# Patient Record
Sex: FEMALE | Race: WHITE | NOT HISPANIC OR LATINO | Employment: OTHER | URBAN - METROPOLITAN AREA
[De-identification: names, ages, dates, MRNs, and addresses within clinical notes are randomized per-mention and may not be internally consistent; named-entity substitution may affect disease eponyms.]

---

## 2017-01-04 ENCOUNTER — OFFICE VISIT (OUTPATIENT)
Dept: OTOLARYNGOLOGY | Facility: CLINIC | Age: 72
End: 2017-01-04
Payer: MEDICARE

## 2017-01-04 ENCOUNTER — CLINICAL SUPPORT (OUTPATIENT)
Dept: AUDIOLOGY | Facility: CLINIC | Age: 72
End: 2017-01-04
Payer: MEDICARE

## 2017-01-04 VITALS
DIASTOLIC BLOOD PRESSURE: 70 MMHG | WEIGHT: 179 LBS | SYSTOLIC BLOOD PRESSURE: 114 MMHG | BODY MASS INDEX: 33.82 KG/M2 | TEMPERATURE: 99 F | HEART RATE: 80 BPM

## 2017-01-04 DIAGNOSIS — H93.11 RIGHT-SIDED TINNITUS: Primary | ICD-10-CM

## 2017-01-04 DIAGNOSIS — H93.11 SUBJECTIVE TINNITUS, RIGHT: Primary | ICD-10-CM

## 2017-01-04 DIAGNOSIS — H69.93 ETD (EUSTACHIAN TUBE DYSFUNCTION), BILATERAL: ICD-10-CM

## 2017-01-04 DIAGNOSIS — G24.5 BLEPHAROSPASM: ICD-10-CM

## 2017-01-04 PROCEDURE — 99999 PR PBB SHADOW E&M-EST. PATIENT-LVL III: CPT | Mod: PBBFAC,,, | Performed by: ORTHOPAEDIC SURGERY

## 2017-01-04 PROCEDURE — 92557 COMPREHENSIVE HEARING TEST: CPT | Mod: S$GLB,,, | Performed by: AUDIOLOGIST

## 2017-01-04 PROCEDURE — 99214 OFFICE O/P EST MOD 30 MIN: CPT | Mod: S$GLB,,, | Performed by: ORTHOPAEDIC SURGERY

## 2017-01-04 PROCEDURE — 92567 TYMPANOMETRY: CPT | Mod: S$GLB,,, | Performed by: AUDIOLOGIST

## 2017-01-04 RX ORDER — CETIRIZINE HYDROCHLORIDE 10 MG/1
10 TABLET ORAL DAILY
COMMUNITY
End: 2022-05-11 | Stop reason: SDUPTHER

## 2017-01-04 RX ORDER — METHYLPREDNISOLONE 4 MG/1
TABLET ORAL
Qty: 1 PACKAGE | Refills: 0 | Status: SHIPPED | OUTPATIENT
Start: 2017-01-04 | End: 2017-02-02 | Stop reason: ALTCHOICE

## 2017-01-04 NOTE — PROGRESS NOTES
Subjective:       Patient ID: Jessica Perez is a 71 y.o. female.    Chief Complaint: Follow-up (rev )    HPI Comments: Patient is a very pleasant 71 year old female here to see me today for evaluation of several issues.  First, she has had constant tinnitus in her right ear that is worse at night.  She has not noted any significant change in her hearing.  She has no ear pain or ear drainage.  She has a family history of hearing loss.  She does have intermittent issues with dizziness, and she has noted a lightheaded and woozy feeling when she stands up (low blood pressure).  She has not changed her medications recently, and does not take any ASA.  She has an upcoming flight to [place], and is concerned that she will have ear pain and pressure following the flight.    Review of Systems   Constitutional: Positive for fatigue. Negative for chills, fever and unexpected weight change.   HENT: Positive for tinnitus. Negative for congestion, dental problem, ear discharge, ear pain, facial swelling, hearing loss, nosebleeds, postnasal drip, rhinorrhea, sinus pressure, sneezing, sore throat, trouble swallowing and voice change.    Eyes: Negative for redness, itching and visual disturbance.   Respiratory: Positive for shortness of breath. Negative for cough (she feels that she is more winded with exertion), choking and wheezing.    Cardiovascular: Negative for chest pain and palpitations.   Gastrointestinal: Negative for abdominal pain.        No reflux.   Musculoskeletal: Negative for gait problem.   Skin: Negative for rash.   Allergic/Immunologic: Positive for environmental allergies (on Zyrtec and Flonase as needed, not currently taking).   Neurological: Positive for light-headedness. Negative for dizziness and headaches.       Objective:      Physical Exam   Constitutional: She is oriented to person, place, and time. She appears well-developed and well-nourished. No distress.   HENT:   Head: Normocephalic and  atraumatic.   Right Ear: Tympanic membrane, external ear and ear canal normal.   Left Ear: Tympanic membrane, external ear and ear canal normal.   Nose: Mucosal edema and rhinorrhea present. No nasal deformity or septal deviation. No epistaxis. Right sinus exhibits no maxillary sinus tenderness and no frontal sinus tenderness. Left sinus exhibits no maxillary sinus tenderness and no frontal sinus tenderness.   Mouth/Throat: Uvula is midline, oropharynx is clear and moist and mucous membranes are normal. Mucous membranes are not pale and not dry. No dental caries. No oropharyngeal exudate or posterior oropharyngeal erythema.   Eyes: Conjunctivae, EOM and lids are normal. Pupils are equal, round, and reactive to light. Right eye exhibits no chemosis. Left eye exhibits no chemosis. Right conjunctiva is not injected. Left conjunctiva is not injected. No scleral icterus. Right eye exhibits normal extraocular motion and no nystagmus. Left eye exhibits normal extraocular motion and no nystagmus.   Neck: Trachea normal and phonation normal. No tracheal tenderness present. No tracheal deviation present. No thyroid mass and no thyromegaly present.   Cardiovascular: Intact distal pulses.    Pulmonary/Chest: Effort normal. No stridor. No respiratory distress.   Abdominal: She exhibits no distension.   Lymphadenopathy:        Head (right side): No submental, no submandibular, no preauricular, no posterior auricular and no occipital adenopathy present.        Head (left side): No submental, no submandibular, no preauricular, no posterior auricular and no occipital adenopathy present.     She has no cervical adenopathy.   Neurological: She is alert and oriented to person, place, and time. No cranial nerve deficit.   Skin: Skin is warm and dry. No rash noted. No erythema.   Psychiatric: She has a normal mood and affect. Her behavior is normal.       AUDIOGRAM:  Results reveal normal hearing sensitivity 250-8000 Hz bilaterally.  Speech Reception Thresholds were 10 dBHL for the right ear and 10 dBHL for the left ear. Word recognition scores were excellent bilaterally. Tympanograms were Type A, normal for the right ear and Type A, normal for the left ear.         Assessment:       1. Right-sided tinnitus    2. ETD (eustachian tube dysfunction), bilateral    3. Blepharospasm        Plan:       1.  Right sided tinnitus:  We reviewed her audiogram together in detail.  Overall, she does not have significant hearing loss as is often found with tinnitus Some patients find that restricting the salt or caffeine in their diet helps, she has been drinking more caffeine over the last year so that certainly could be a potential cause.  There is also an OTC supplement, lipflavinoids, that some people find to be effective though their benefit is not fully proven.  Tinnitus tends to be louder in times of stress and fatigue, and may decrease with time.  Sound machines may also be an effective masking technique if needed at night.  2.  ETD:  I would recommend she start on Flonase now prior to her flight.  We reviewed proper technique to help decrease nasal irritation.  She can also use Afrin 30 min prior to take off and landing.  Earplanes are also available from drug stores as an ear plug to help with pressure.  I did send in a medrol dose pack for her to take in [place] only if absolutely necessary if she cannot pop her ears following her flight.  3.  Blepharospasm:  Reassured patient it is a benign finding, and may also be exacerbated by caffeine and fatigue as above.

## 2017-01-04 NOTE — MR AVS SNAPSHOT
[facility] - ENT  [address] [facility] [address] [address]  Phone: [phone]  Fax: [phone]                  Jessica Perez   2017 2:00 PM   Office Visit    Description:  Female : 1945   Provider:  Rolanda Coyle MD   Department:  [facility] - ENT           Reason for Visit     Follow-up           Diagnoses this Visit        Comments    Right-sided tinnitus    -  Primary            To Do List           Future Appointments        Provider Department Dept Phone    2017 10:40 AM [phone]    3/15/2017 1:00 PM Ramiro Palomino MD [facility] Ophthalmology [phone]      Goals (5 Years of Data)     None       These Medications        Disp Refills Start End    methylPREDNISolone (MEDROL DOSEPACK) 4 mg tablet 1 Package 0 2017     use as directed    Pharmacy: [id number] #: [phone]         [facility] On Call     [facility] On [facility] Nurse Care Line -  Assistance  Registered nurses in the [facility] On Call Center provide clinical advisement, health education, appointment booking, and other advisory services.  Call for this free service at [phone].             Medications           Message regarding Medications     Verify the changes and/or additions to your medication regime listed below are the same as discussed with your clinician today.  If any of these changes or additions are incorrect, please notify your healthcare provider.        START taking these NEW medications        Refills    methylPREDNISolone (MEDROL DOSEPACK) 4 mg tablet 0    Sig: use as directed    Class: Normal      STOP taking these medications     baclofen (LIORESAL) 10 MG tablet Take 10 mg by mouth as needed.     FLAXSEED OIL ORAL     cetirizine (ZYRTEC) 5 MG chewable tablet Take 5 mg by mouth once daily.    olopatadine (PATANOL) 0.1 % ophthalmic solution Place 1 drop into both eyes 2 (two) times daily.     olopatadine (PAZEO) 0.7 % Drop Apply 1 drop to eye once daily.    olopatadine 0.7 % Drop Apply 1 drop to eye once daily.           Verify that the below list of medications is an accurate representation of the medications you are currently taking.  If none reported, the list may be blank. If incorrect, please contact your healthcare provider. Carry this list with you in case of emergency.           Current Medications     acetaminophen (TYLENOL) 500 MG tablet Take 500 mg by mouth every 6 (six) hours as needed for Pain.    artificial tear, hypromellose, (GENTEAL) 0.3 % Gel     cetirizine (ZYRTEC) 10 MG tablet Take 10 mg by mouth once daily.    fluticasone (FLONASE) 50 mcg/actuation nasal spray 2 sprays by Each Nare route once daily.    krill oil-omega-3-dha-epa 150-450 mg CpDR Take by mouth.    methylPREDNISolone (MEDROL DOSEPACK) 4 mg tablet use as directed    multivitamin capsule Take 1 capsule by mouth Daily.    naproxen sodium (ALEVE) 220 mg Cap Take 1 capsule by mouth 2 (two) times daily.    peg 400-propylene glycol (SYSTANE ULTRA) 0.4-0.3 % Drop Place 1 drop into both eyes As instructed. As directed Over the counter products     ranitidine (ZANTAC) 150 MG tablet Take 1 tablet (150 mg total) by mouth 2 (two) times daily.           Clinical Reference Information           Vital Signs - Last Recorded  Most recent update: 1/4/2017  2:34 PM by Saima Ordonez LPN    BP Pulse Temp Wt BMI    114/70 (BP Location: Left arm, Patient Position: Sitting, BP Method: Automatic) 80 98.7 °F (37.1 °C) (Tympanic) 81.2 kg (179 lb 0.2 oz) 33.82 kg/m2      Blood Pressure          Most Recent Value    BP  114/70      Allergies as of 1/4/2017     Augmentin [Amoxicillin-pot Clavulanate]    Bactrim  [Sulfamethoxazole-trimethoprim]    Lipitor  [Atorvastatin]    Pravachol  [Pravastatin]    Statins-hmg-coa Reductase Inhibitors    Sulfa (Sulfonamide Antibiotics)      Immunizations Administered on Date of Encounter - 1/4/2017     None     "  Instructions    Tinnitus:  Try and decrease caffeine  Over the counter lipoflavinoid (supplement)  Annual hearing tests    Ear pain with flying:  Start Flonase daily  Afrin 30 min before take off and landing  Consider "Earplanes" ear plugs         "

## 2017-01-04 NOTE — PROGRESS NOTES
"Jessica Perez was seen 01/04/2017 for an audiological evaluation.  Patient complains of constant tinnitus Ad for years.  She reports that the "crickets" have gotten louder at night over the past year.      Results reveal normal hearing sensitivity 250-8000 Hz bilaterally.  Speech Reception Thresholds were  10 dBHL for the right ear and 10 dBHL for the left ear.   Word recognition scores were excellent bilaterally.  Tympanograms were Type A, normal for the right ear and Type A, normal for the left ear.    Patient was counseled on the above findings.    Recommendations include:    1.  ENT followup  2.  Recheck per ENT  3.  Wear hearing protective devices around loud noise  4.  Annual audiograms          "

## 2017-01-04 NOTE — PATIENT INSTRUCTIONS
"Tinnitus:  Try and decrease caffeine  Over the counter lipoflavinoid (supplement)  Annual hearing tests    Ear pain with flying:  Start Flonase daily  Afrin 30 min before take off and landing  Consider "Earplanes" ear plugs    "

## 2017-01-23 DIAGNOSIS — Z13.820 SPECIAL SCREENING FOR OSTEOPOROSIS: Primary | ICD-10-CM

## 2017-02-02 ENCOUNTER — OFFICE VISIT (OUTPATIENT)
Dept: INTERNAL MEDICINE | Facility: CLINIC | Age: 72
End: 2017-02-02
Payer: MEDICARE

## 2017-02-02 VITALS
BODY MASS INDEX: 33.59 KG/M2 | SYSTOLIC BLOOD PRESSURE: 110 MMHG | WEIGHT: 177.94 LBS | HEART RATE: 70 BPM | TEMPERATURE: 98 F | HEIGHT: 61 IN | DIASTOLIC BLOOD PRESSURE: 70 MMHG

## 2017-02-02 DIAGNOSIS — G47.62 SLEEP RELATED LEG CRAMPS: ICD-10-CM

## 2017-02-02 PROCEDURE — 99213 OFFICE O/P EST LOW 20 MIN: CPT | Mod: S$GLB,,, | Performed by: INTERNAL MEDICINE

## 2017-02-02 PROCEDURE — 99999 PR PBB SHADOW E&M-EST. PATIENT-LVL III: CPT | Mod: PBBFAC,,, | Performed by: INTERNAL MEDICINE

## 2017-02-02 NOTE — PROGRESS NOTES
"Subjective:       Patient ID: Jessica Perez is a 72 y.o. female.    Chief Complaint: Follow-up    HPI  patient is a 72-year-old female presenting today following up on her leg cramps.  She has been trying the vitamin B and vitamin E as we had discussed at last visit.  She also has been using a topical preparation that her daughter had recommended.  At this time she is no longer having any problems with the leg cramps.  She has not had any adverse effects of the vitamins and in general is feeling like she's doing pretty well.  We discussed the response to her treatment and recommended continuing doing what she is doing as it seems to be working.    Review of Systems    Objective:     Visit Vitals    /70    Pulse 70    Temp 98.3 °F (36.8 °C) (Tympanic)    Ht 5' 1" (1.549 m)    Wt 80.7 kg (177 lb 14.6 oz)    BMI 33.62 kg/m2        Physical Exam   Constitutional: She appears well-developed and well-nourished.   HENT:   Head: Normocephalic and atraumatic.   Cardiovascular: Normal rate, regular rhythm and intact distal pulses.  Exam reveals no gallop and no friction rub.    No murmur heard.  Pulmonary/Chest: Breath sounds normal. She has no wheezes. She has no rales. She exhibits no tenderness.   Abdominal: Soft. Bowel sounds are normal. She exhibits no distension. There is no tenderness.   Lymphadenopathy:     She has no cervical adenopathy.   Skin: No rash noted.   Vitals reviewed.      No visits with results within 2 Week(s) from this visit.  Latest known visit with results is:    Lab Visit on 11/21/2016   Component Date Value    Hepatitis C Ab 11/21/2016 Negative     Sodium 11/21/2016 140     Potassium 11/21/2016 4.4     Chloride 11/21/2016 106     CO2 11/21/2016 21*    Glucose 11/21/2016 89     BUN, Bld 11/21/2016 13     Creatinine 11/21/2016 0.9     Calcium 11/21/2016 9.7     Total Protein 11/21/2016 7.7     Albumin 11/21/2016 4.1     Total Bilirubin 11/21/2016 0.3     Alkaline " Phosphatase 11/21/2016 121     AST 11/21/2016 22     ALT 11/21/2016 18     Anion Gap 11/21/2016 13     eGFR if African American 11/21/2016 >60.0     eGFR if non African Amer* 11/21/2016 >60.0     Cholesterol 11/21/2016 289*    Triglycerides 11/21/2016 263*    HDL 11/21/2016 53     LDL Cholesterol 11/21/2016 183.4*    HDL/Chol Ratio 11/21/2016 18.3*    Total Cholesterol/HDL Ra* 11/21/2016 5.5*    Non-HDL Cholesterol 11/21/2016 236     WBC 11/21/2016 8.39     RBC 11/21/2016 5.15     Hemoglobin 11/21/2016 15.8     Hematocrit 11/21/2016 46.7     MCV 11/21/2016 91     MCH 11/21/2016 30.7     MCHC 11/21/2016 33.8     RDW 11/21/2016 12.6     Platelets 11/21/2016 243     MPV 11/21/2016 11.4     Gran # 11/21/2016 5.6     Lymph # 11/21/2016 2.3     Mono # 11/21/2016 0.3     Eos # 11/21/2016 0.1     Baso # 11/21/2016 0.03     Gran% 11/21/2016 67.2     Lymph% 11/21/2016 27.7     Mono% 11/21/2016 3.3*    Eosinophil% 11/21/2016 1.2     Basophil% 11/21/2016 0.4     Differential Method 11/21/2016 Automated     Sodium 11/21/2016 140     Potassium 11/21/2016 4.4     Chloride 11/21/2016 106     CO2 11/21/2016 21*    Glucose 11/21/2016 89     BUN, Bld 11/21/2016 13     Creatinine 11/21/2016 0.9     Calcium 11/21/2016 9.7     Total Protein 11/21/2016 7.7     Albumin 11/21/2016 4.1     Total Bilirubin 11/21/2016 0.3     Alkaline Phosphatase 11/21/2016 121     AST 11/21/2016 22     ALT 11/21/2016 18     Anion Gap 11/21/2016 13     eGFR if African American 11/21/2016 >60.0     eGFR if non African Amer* 11/21/2016 >60.0     Cholesterol 11/21/2016 289*    Triglycerides 11/21/2016 263*    HDL 11/21/2016 53     LDL Cholesterol 11/21/2016 183.4*    HDL/Chol Ratio 11/21/2016 18.3*    Total Cholesterol/HDL Ra* 11/21/2016 5.5*    Non-HDL Cholesterol 11/21/2016 236     Hepatitis C Ab 11/21/2016 Negative     TSH 11/21/2016 3.632        Assessment:       1. Sleep related leg cramps         Plan:   Sleep related leg cramps  Resolution of leg cramps. Continue current regimen.     Jessica was seen today for follow-up.    Diagnoses and all orders for this visit:    Sleep related leg cramps        Return in about 3 months (around 5/2/2017).

## 2017-02-02 NOTE — MR AVS SNAPSHOT
[facility]  [address] [place] [address]  Phone: [phone]  Fax: [phone]                  Jessica Perez   2017 10:40 AM   Office Visit    Description:  Female : 1945   Provider:  Warren Lemus MD   Department:  [place]-Internal Medicine           Reason for Visit     Follow-up           Diagnoses this Visit        Comments    Sleep related leg cramps                To Do List           Future Appointments        Provider Department Dept Phone    3/15/2017 1:00 PM Ramiro Palomino MD [facility] Ophthalmology [phone]      Goals (5 Years of Data)     None      Follow-Up and Disposition     Return in about 3 months (around 2017).      [facility] On Call     [facility] On Call Nurse Care Line -  Assistance  Registered nurses in the [facility] On Call Center provide clinical advisement, health education, appointment booking, and other advisory services.  Call for this free service at [phone].             Medications           Message regarding Medications     Verify the changes and/or additions to your medication regime listed below are the same as discussed with your clinician today.  If any of these changes or additions are incorrect, please notify your healthcare provider.        STOP taking these medications     methylPREDNISolone (MEDROL DOSEPACK) 4 mg tablet use as directed           Verify that the below list of medications is an accurate representation of the medications you are currently taking.  If none reported, the list may be blank. If incorrect, please contact your healthcare provider. Carry this list with you in case of emergency.           Current Medications     acetaminophen (TYLENOL) 500 MG tablet Take 500 mg by mouth every 6 (six) hours as needed for Pain.    artificial tear, hypromellose, (GENTEAL) 0.3 % Gel     cetirizine (ZYRTEC) 10 MG tablet Take 10 mg by mouth once daily.    fluticasone (FLONASE) 50 mcg/actuation  "nasal spray 2 sprays by Each Nare route once daily.    krill oil-omega-3-dha-epa 150-450 mg CpDR Take by mouth.    multivitamin capsule Take 1 capsule by mouth Daily.    naproxen sodium (ALEVE) 220 mg Cap Take 1 capsule by mouth 2 (two) times daily.    peg 400-propylene glycol (SYSTANE ULTRA) 0.4-0.3 % Drop Place 1 drop into both eyes As instructed. As directed Over the counter products     ranitidine (ZANTAC) 150 MG tablet Take 1 tablet (150 mg total) by mouth 2 (two) times daily.           Clinical Reference Information           Your Vitals Were     BP Pulse Temp Height Weight BMI    110/70 70 98.3 °F (36.8 °C) (Tympanic) 5' 1" (1.549 m) 80.7 kg (177 lb 14.6 oz) 33.62 kg/m2      Blood Pressure          Most Recent Value    BP  110/70      Allergies as of 2/2/2017     Augmentin [Amoxicillin-pot Clavulanate]    Bactrim  [Sulfamethoxazole-trimethoprim]    Lipitor  [Atorvastatin]    Pravachol  [Pravastatin]    Statins-hmg-coa Reductase Inhibitors    Sulfa (Sulfonamide Antibiotics)      Immunizations Administered on Date of Encounter - 2/2/2017     None      Language Assistance Services     ATTENTION: Language assistance services are available, free of charge. Please call [phone].      ATENCIÓN: Si hnala rich, tiene a florentino disposición servicios gratuitos de asistencia lingüística. Llame al [phone].     SELVIN Ý: N?u b?n nói Ti?ng Vi?t, có các d?ch v? h? tr? ngôn ng? mi?n phí dành cho b?n. G?i s? [phone].         [facility] complies with applicable Federal civil rights laws and does not discriminate on the basis of race, color, national origin, age, disability, or sex.        "

## 2017-03-15 ENCOUNTER — OFFICE VISIT (OUTPATIENT)
Dept: OPHTHALMOLOGY | Facility: CLINIC | Age: 72
End: 2017-03-15
Payer: MEDICARE

## 2017-03-15 DIAGNOSIS — H04.123 DRY EYE SYNDROME, BILATERAL: ICD-10-CM

## 2017-03-15 DIAGNOSIS — H40.013 OPEN ANGLE WITH BORDERLINE FINDINGS, LOW RISK, BILATERAL: Primary | ICD-10-CM

## 2017-03-15 DIAGNOSIS — H52.7 REFRACTION DISORDER: ICD-10-CM

## 2017-03-15 PROCEDURE — 92250 FUNDUS PHOTOGRAPHY W/I&R: CPT | Mod: S$GLB,,, | Performed by: OPHTHALMOLOGY

## 2017-03-15 PROCEDURE — 99999 PR PBB SHADOW E&M-EST. PATIENT-LVL I: CPT | Mod: PBBFAC,,, | Performed by: OPHTHALMOLOGY

## 2017-03-15 PROCEDURE — 92015 DETERMINE REFRACTIVE STATE: CPT | Mod: S$GLB,,, | Performed by: OPHTHALMOLOGY

## 2017-03-15 PROCEDURE — 92014 COMPRE OPH EXAM EST PT 1/>: CPT | Mod: S$GLB,,, | Performed by: OPHTHALMOLOGY

## 2017-03-15 RX ORDER — VITAMIN B COMPLEX
1 CAPSULE ORAL DAILY
COMMUNITY

## 2017-03-15 NOTE — PROGRESS NOTES
HPI     Here for 4 month IOP check and FD with SDPs.  Pt c/o decreased near   vision, problems reading newspaper.      1. COAG SUSP  SLT OD 3/26/15  SLT OS 5/15  2. Dry Eyes  Systane Ultra OU BID, Gen OU QHS  3. NSC OU           Last edited by Ramiro Palomino MD on 3/15/2017  1:41 PM.         Assessment /Plan     For exam results, see Encounter Report.      ICD-10-CM ICD-9-CM    1. Open angle with borderline findings, low risk, bilateral H40.013 365.01 Doing well post slt OU   2. 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 Dry eye syndrome, bilateral H04.123 375.15 Findings and symptoms consistent with mild dry eyes.   Recommend regular use of Artificial Tears. These should be thought of as Ocular Surface Moisturizers similar to skin moisturizers in that regular use is required to achieve maximum benefit.  Specifically, I recommend the following:    Systane Ultra 3-4 times a day.   The first dose upon awakening is most important because we do not make tears at night.  Avoid generic products as they contain Benzalkonium Chloride as a preservative. This is a very irritating chemical and can make your eyes worse.    Omega 3 Fish Oils  1000 to 2000 mgs per day of Nordic Naturals or PRN Dry Eye formula [facility]           Return to clinic 4 months with Hvf and gOCT  Disp MR

## 2017-07-21 ENCOUNTER — OFFICE VISIT (OUTPATIENT)
Dept: OPHTHALMOLOGY | Facility: CLINIC | Age: 72
End: 2017-07-21
Payer: MEDICARE

## 2017-07-21 DIAGNOSIS — H40.013 OPEN ANGLE WITH BORDERLINE FINDINGS, LOW RISK, BILATERAL: Primary | ICD-10-CM

## 2017-07-21 DIAGNOSIS — H04.123 DRY EYE SYNDROME, BILATERAL: ICD-10-CM

## 2017-07-21 PROCEDURE — 92012 INTRM OPH EXAM EST PATIENT: CPT | Mod: S$GLB,,, | Performed by: OPHTHALMOLOGY

## 2017-07-21 PROCEDURE — 99999 PR PBB SHADOW E&M-EST. PATIENT-LVL I: CPT | Mod: PBBFAC,,, | Performed by: OPHTHALMOLOGY

## 2017-07-21 PROCEDURE — 92133 CPTRZD OPH DX IMG PST SGM ON: CPT | Mod: S$GLB,,, | Performed by: OPHTHALMOLOGY

## 2017-07-21 PROCEDURE — 92083 EXTENDED VISUAL FIELD XM: CPT | Mod: S$GLB,,, | Performed by: OPHTHALMOLOGY

## 2017-07-21 RX ORDER — OMEPRAZOLE 10 MG/1
10 CAPSULE, DELAYED RELEASE ORAL DAILY
COMMUNITY
End: 2017-08-09

## 2017-07-21 NOTE — PROGRESS NOTES
HPI     Glaucoma Suspect    Additional comments: no drops           Comments   Patient is here for a 4 month iop check and hvf review and goct review.          PCP: Dr. Lemus  1. COAG SUSP  SLT OD 3/26/15  SLT OS 5/15  2. Dry Eyes  OU Systane Ultra BID, Genteal GEL QHS  3. NSC OU    PCP: Dr. Lemus  1. COAG SUSP  SLT OD 3/26/15  SLT OS 5/15  2. Dry Eyes   OU Systane Ultra  BID, Genteal GEL QHS  3. NSC OU       Last edited by FLAVIA Carpenter on 7/21/2017  2:16 PM. (History)            Assessment /Plan     For exam results, see Encounter Report.      ICD-10-CM ICD-9-CM    1. Open angle with borderline findings, low risk, bilateral H40.013 365.01 Posterior Segment OCT Optic Nerve- Both eyes      Kumar Visual Field - OU - Extended - Both Eyes    IOP stable   2. Dry eye syndrome, bilateral H04.123 375.15        Return to clinic 6 months with dilation and SDP's.

## 2017-07-27 ENCOUNTER — TELEPHONE (OUTPATIENT)
Dept: INTERNAL MEDICINE | Facility: CLINIC | Age: 72
End: 2017-07-27

## 2017-07-27 NOTE — TELEPHONE ENCOUNTER
Patient was scheduled earliest available with one of the doctors here.  Patient refused to see urgent care or [place] or another location.  Patient was scheduled.  Patient verbalized understanding of her appointment time an date with Dr. Zamora.

## 2017-08-09 ENCOUNTER — OFFICE VISIT (OUTPATIENT)
Dept: INTERNAL MEDICINE | Facility: CLINIC | Age: 72
End: 2017-08-09
Payer: MEDICARE

## 2017-08-09 ENCOUNTER — HOSPITAL ENCOUNTER (OUTPATIENT)
Dept: RADIOLOGY | Facility: HOSPITAL | Age: 72
Discharge: HOME | End: 2017-08-09
Attending: FAMILY MEDICINE
Payer: MEDICARE

## 2017-08-09 VITALS
BODY MASS INDEX: 33.63 KG/M2 | WEIGHT: 178.13 LBS | HEIGHT: 61 IN | OXYGEN SATURATION: 98 % | HEART RATE: 70 BPM | SYSTOLIC BLOOD PRESSURE: 110 MMHG | DIASTOLIC BLOOD PRESSURE: 74 MMHG | TEMPERATURE: 98 F

## 2017-08-09 DIAGNOSIS — R25.2 MUSCLE CRAMPS: ICD-10-CM

## 2017-08-09 DIAGNOSIS — R61 EXCESSIVE SWEATING: Primary | ICD-10-CM

## 2017-08-09 DIAGNOSIS — R06.02 SOB (SHORTNESS OF BREATH) ON EXERTION: ICD-10-CM

## 2017-08-09 DIAGNOSIS — R61 EXCESSIVE SWEATING: ICD-10-CM

## 2017-08-09 DIAGNOSIS — G47.62 SLEEP RELATED LEG CRAMPS: ICD-10-CM

## 2017-08-09 PROCEDURE — 93010 ELECTROCARDIOGRAM REPORT: CPT | Mod: S$GLB,,, | Performed by: INTERNAL MEDICINE

## 2017-08-09 PROCEDURE — 99999 PR PBB SHADOW E&M-EST. PATIENT-LVL III: CPT | Mod: PBBFAC,,, | Performed by: FAMILY MEDICINE

## 2017-08-09 PROCEDURE — 1126F AMNT PAIN NOTED NONE PRSNT: CPT | Mod: S$GLB,,, | Performed by: FAMILY MEDICINE

## 2017-08-09 PROCEDURE — 71020 XR CHEST PA AND LATERAL: CPT | Mod: TC,PO

## 2017-08-09 PROCEDURE — 93005 ELECTROCARDIOGRAM TRACING: CPT | Mod: S$GLB,,, | Performed by: FAMILY MEDICINE

## 2017-08-09 PROCEDURE — 99214 OFFICE O/P EST MOD 30 MIN: CPT | Mod: S$GLB,,, | Performed by: FAMILY MEDICINE

## 2017-08-09 PROCEDURE — 71020 XR CHEST PA AND LATERAL: CPT | Mod: 26,,, | Performed by: RADIOLOGY

## 2017-08-09 PROCEDURE — 3008F BODY MASS INDEX DOCD: CPT | Mod: S$GLB,,, | Performed by: FAMILY MEDICINE

## 2017-08-09 PROCEDURE — 1159F MED LIST DOCD IN RCRD: CPT | Mod: S$GLB,,, | Performed by: FAMILY MEDICINE

## 2017-08-09 NOTE — PROGRESS NOTES
"Subjective:      Patient ID: Jessica Perez is a 72 y.o. female.    Chief Complaint: Shortness of Breath; Excessive Sweating; and Other (cramps to lower backs, ankles tingling in the morning)    HPI  71 yo female pt of Dr. Lemus's with hx of leg cramps/muscle cramps, hyperlipidemia here with c/o excessive sweating and SOB with exertion.  She feels her internal thermostat is "off."  Sweating occurs with exertion as well as at rest.  Can happen day or night.  She finds that she is becoming more SOB with activity as well.  This has occurred over time.  No chest pain or chest pressure.  She has had some weight gain, feels it could be that as well.  She has cramping in her back muscles while sleeping/waking.  Will get them in her hands as well.    Has joint pains.  Denies orthopnea, no leg swelling.  Is drinking 16 oz coffee daily and about 32 oz of ice tea.  Drinks a diet ginger ale and then 3-4 bottles of water daily.  Eats a good bit of sodium in her diet.    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     Family History   Problem Relation Age of Onset    Diabetes Father     Cancer Father      prostate ca    Hypertension Father     Cancer Sister      cervical ca    Dementia Mother     Osteoporosis Mother      Past Surgical History:   Procedure Laterality Date    CHOLECYSTECTOMY      DILATION AND CURETTAGE OF UTERUS      finger surgery      middle finger on right hand    TONSILLECTOMY, ADENOIDECTOMY      TUBAL LIGATION       Social History   Substance Use Topics    Smoking status: Former Smoker    Smokeless tobacco: Never Used    Alcohol use No       /74 (BP Location: Right arm, Patient Position: Sitting, BP Method: Manual)   Pulse 70   Temp 98.3 °F (36.8 °C) (Tympanic)   Ht 5' 1" (1.549 m)   Wt 80.8 kg (178 lb 2.1 oz)   SpO2 98%   BMI 33.66 " kg/m²     Review of Systems   Constitutional: Positive for diaphoresis. Negative for activity change, appetite change, chills, fatigue, fever and unexpected weight change.   HENT: Negative for ear pain, hearing loss, postnasal drip, rhinorrhea and tinnitus.    Eyes: Negative for visual disturbance.   Respiratory: Positive for shortness of breath. Negative for cough and wheezing.    Cardiovascular: Negative for chest pain, palpitations and leg swelling.   Gastrointestinal: Negative for abdominal distention, constipation and diarrhea.   Endocrine: Positive for heat intolerance. Negative for polydipsia and polyuria.   Genitourinary: Negative for dysuria, frequency, hematuria and urgency.   Musculoskeletal: Positive for arthralgias. Negative for back pain and joint swelling.   Skin: Negative.    Neurological: Negative for weakness and headaches.     Objective:     Physical Exam   Constitutional: She is oriented to person, place, and time. She appears well-developed and well-nourished.   HENT:   Mouth/Throat: Oropharynx is clear and moist.   Eyes: Conjunctivae are normal. Pupils are equal, round, and reactive to light.   Neck: Normal range of motion. Neck supple. No thyromegaly present.   Cardiovascular: Normal rate, regular rhythm and normal heart sounds.    Pulmonary/Chest: Effort normal and breath sounds normal. No respiratory distress. She has no wheezes.   Abdominal: Soft. Bowel sounds are normal. She exhibits no distension.   Musculoskeletal:   Trace edema BL LE   Neurological: She is alert and oriented to person, place, and time.   Skin: Skin is warm and dry.   Psychiatric: She has a normal mood and affect. Her behavior is normal. Thought content normal.   Nursing note and vitals reviewed.      Lab Results   Component Value Date    WBC 8.39 11/21/2016    HGB 15.8 11/21/2016    HCT 46.7 11/21/2016     11/21/2016    CHOL 289 (H) 11/21/2016    CHOL 289 (H) 11/21/2016    TRIG 263 (H) 11/21/2016    TRIG 263 (H)  11/21/2016    HDL 53 11/21/2016    HDL 53 11/21/2016    ALT 18 11/21/2016    ALT 18 11/21/2016    AST 22 11/21/2016    AST 22 11/21/2016     11/21/2016     11/21/2016    K 4.4 11/21/2016    K 4.4 11/21/2016     11/21/2016     11/21/2016    CREATININE 0.9 11/21/2016    CREATININE 0.9 11/21/2016    BUN 13 11/21/2016    BUN 13 11/21/2016    CO2 21 (L) 11/21/2016    CO2 21 (L) 11/21/2016    TSH 3.632 11/21/2016       Assessment:     1. Excessive sweating    2. SOB (shortness of breath) on exertion    3. Muscle cramps    4. Sleep related leg cramps       Plan:   Excessive sweating  -     TSH; Future; Expected date: 08/09/2017  -     T4, free; Future; Expected date: 08/09/2017  -     X-Ray Chest PA And Lateral; Future; Expected date: 08/09/2017    SOB (shortness of breath) on exertion  -     IN OFFICE EKG 12-LEAD (to Muse)  -     X-Ray Chest PA And Lateral; Future; Expected date: 08/09/2017    Muscle cramps  -     CBC auto differential; Future; Expected date: 08/09/2017  -     Basic metabolic panel; Future; Expected date: 08/09/2017  -     Vitamin D; Future; Expected date: 08/09/2017  -     Magnesium; Future; Expected date: 08/09/2017  -     FERRITIN; Future; Expected date: 08/09/2017    Sleep related leg cramps  -     CBC auto differential; Future; Expected date: 08/09/2017  -     Basic metabolic panel; Future; Expected date: 08/09/2017  -     Vitamin D; Future; Expected date: 08/09/2017    The muscle cramps have been on ongoing, chronic issue.  Will check CBC/Mg today as well as ferritin level.  I suspect she is getting too much caffeine stimulant.  Reduce slowly, 64 ounces water daily.  Low sodium diet.  SOB with exertion is new.  Will start with EKG/CXR, consider stress test.  Not a smoker/fam hx of heart disease and pt has hyperlipidemia.  Low risk overall.  Check thyroid functions today.  Consider black cohosh/evening primrose as well if all of above is normal.  F/u to be determined once  above is reviewed

## 2017-08-09 NOTE — PATIENT INSTRUCTIONS
Low-Salt Diet  This diet removes foods that are high in salt. It also limits the amount of salt you use when cooking. It is most often used for people with high blood pressure, edema (fluid retention), and kidney, liver, or heart disease.  Table salt contains the mineral sodium. Your body needs sodium to work normally. But too much sodium can make your health problems worse. Your healthcare provider is recommending a low-salt (also called low-sodium) diet for you. Your total daily allowance of salt is 1,500 to 2,300 milligrams (mg). It is less than 1 teaspoon of table salt. This means you can have only about 500 to 700 mg of sodium at each meal. People with certain health problems should limit salt intake to the lower end of the recommended range.    When you cook, dont add much salt. If you can cook without using salt, even better. Dont add salt to your food at the table.  When shopping, read food labels. Salt is often called sodium on the label. Choose foods that are salt-free, low salt, or very low salt. Note that foods with reduced salt may not lower your salt intake enough.    Beans, potatoes, and pasta  Ok: Dry beans, split peas, lentils, potatoes, rice, macaroni, pasta, spaghetti without added salt  Avoid: Potato chips, tortilla chips, and similar products  Breads and cereals  Ok: Low-sodium breads, rolls, cereals, and cakes; low-salt crackers, matzo crackers  Avoid: Salted crackers, pretzels, popcorn, Urdu toast, pancakes, muffins  Dairy  Ok: Milk, chocolate milk, hot chocolate mix, low-salt cheeses, and yogurt  Avoid: Processed cheese and cheese spreads; Roquefort, Camembert, and cottage cheese; buttermilk, instant breakfast drink  Desserts  Ok: Ice cream, frozen yogurt, juice bars, gelatin, cookies and pies, sugar, honey, jelly, hard candy  Avoid: Most pies, cakes and cookies prepared or processed with salt; instant pudding  Drinks  Ok: Tea, coffee, fizzy (carbonated) drinks, juices  Avoid: Flavored  coffees, electrolyte replacement drinks, sports drinks  Meats  Ok: All fresh meat, fish, poultry, low-salt tuna, eggs, egg substitute  Avoid: Smoked, pickled, brine-cured, or salted meats and fish. This includes fernandez, chipped beef, corned beef, hot dogs, deli meats, ham, kosher meats, salt pork, sausage, canned tuna, salted codfish, smoked salmon, herring, sardines, or anchovies.  Seasonings and spices  Ok: Most seasonings are okay. Good substitutes for salt include: fresh herb blends, hot sauce, lemon, garlic, herrmann, vinegar, dry mustard, parsley, cilantro, horseradish, tomato paste, regular margarine, mayonnaise, unsalted butter, cream cheese, vegetable oil, cream, low-salt salad dressing and gravy.  Avoid: Regular ketchup, relishes, pickles, soy sauce, teriyaki sauce, Worcestershire sauce, BBQ sauce, tartar sauce, meat tenderizer, chili sauce, regular gravy, regular salad dressing, salted butter  Soups  Ok: Low-salt soups and broths made with allowed foods  Avoid: Bouillon cubes, soups with smoked or salted meats, regular soup and broth  Vegetables  Ok: Most vegetables are okay; also low-salt tomato and vegetable juices  Avoid: Sauerkraut and other brine-soaked vegetables; pickles and other pickled vegetables; tomato juice, olives  Date Last Reviewed: 8/1/2016 © 2000-2016 [employer]. [address] [place] [id number]. All rights reserved. This information is not intended as a substitute for professional medical care. Always follow your healthcare professional's instructions.        Eating Heart-Healthy Food: Using the DASH Plan    Eating for your heart doesnt have to be hard or boring. You just need to know how to make healthier choices. The DASH eating plan has been developed to help you do just that. DASH stands for Dietary Approaches to Stop Hypertension. It is a plan that has been proven to be healthier for your heart and to lower your risk for high blood pressure. It can also help  lower your risk for cancer, heart disease, osteoporosis, and diabetes.  Choosing from each food group  Choose foods from each of the food groups below each day. Try to get the recommended number of servings for each food group. The serving numbers are based on a diet of 2,000 calories a day. Talk to your doctor if youre unsure about your calorie needs. Along with getting the correct servings, the DASH plan also recommends a sodium intake less than 2,300 mg per day.        Grains  Servings: 6 to 8 a day  A serving is:  · 1 slice bread  · 1 ounce dry cereal  · Half a cup cooked rice, pasta or cereal  Best choices: Whole grains and any grains high in fiber. Vegetables  Servings: 4 to 5 a day  A serving is:  · 1 cup raw leafy vegetable  · Half a cup cut-up raw or cooked vegetable  · Half a cup vegetable juice  Best choices: Fresh or frozen vegetables prepared without added salt or fat.   Fruits  Servings: 4 to 5 a day  A serving is:  · 1 medium fruit  · One-quarter cup dried fruit  · Half a cup fresh, frozen, or canned fruit  · Half a cup of 100% fruit juices  Best choices: A variety of fresh fruits of different colors. Whole fruits are a better choice than fruit juices. Low-fat or fat-free dairy  Servings: 2 to 3 a day  A serving is:  · 1 cup milk  · 1 cup yogurt  · One and a half ounces cheese  Best choices: Skim or 1% milk, low-fat or fat-free yogurt or buttermilk, and low-fat cheeses.         Lean meats, poultry, fish  Servings: 6 or fewer a day  A serving is:  · 1 ounce cooked meats, poultry, or fish  · 1 egg  Best choices: Lean poultry and fish. Trim away visible fat. Broil, grill, roast, or boil instead of frying. Remove skin from poultry before eating. Limit how much red meat you eat.  Nuts, seeds, beans  Servings: 4 to 5 a week  A serving is:  · One-third cup nuts (one and a half ounces)  · 2 tablespoons nut butter or seeds  · Half a cup cooked dry beans or legumes  Best choices: Dry roasted nuts with no salt  added, lentils, kidney beans, garbanzo beans, and whole lira beans.   Fats and oils  Servings: 2 to 3 a day  A serving is:  · 1 teaspoon vegetable oil  · 1 teaspoon soft margarine  · 1 tablespoon mayonnaise  · 2 tablespoons salad dressing  Best choices: Nut and vegetable oils (nontropical vegetable oils), such as olive and canola oil. Sweets  Servings: 5 a week or fewer  A serving is:  · 1 tablespoon sugar, maple syrup, or honey  · 1 tablespoon jam or jelly  · 1 half-ounce jelly beans (about 15)  · 1 cup lemonade  Best choices: Dried fruit can be a satisfying sweet. Choose low-fat sweets. And watch your serving sizes!      For more on the DASH eating plan, visit:  www.nhlbi.nih.gov/health/health-topics/topics/dash   Date Last Reviewed: 6/1/2016  © 8378-7819 [employer]. [address] [place] [address], [place] [address]. All rights reserved. This information is not intended as a substitute for professional medical care. Always follow your healthcare professional's instructions.

## 2017-08-11 ENCOUNTER — PATIENT MESSAGE (OUTPATIENT)
Dept: ADMINISTRATIVE | Facility: OTHER | Age: 72
End: 2017-08-11

## 2017-08-11 ENCOUNTER — PATIENT MESSAGE (OUTPATIENT)
Dept: INTERNAL MEDICINE | Facility: CLINIC | Age: 72
End: 2017-08-11

## 2017-08-11 DIAGNOSIS — R06.02 SOB (SHORTNESS OF BREATH) ON EXERTION: Primary | ICD-10-CM

## 2017-08-11 NOTE — TELEPHONE ENCOUNTER
Called pt and booked echo for 8-17-17 at 3:15pm at [facility] location.  EKG with no acute findings .

## 2017-08-17 ENCOUNTER — CLINICAL SUPPORT (OUTPATIENT)
Dept: CARDIOLOGY | Facility: CLINIC | Age: 72
End: 2017-08-17
Payer: MEDICARE

## 2017-08-17 DIAGNOSIS — R06.02 SOB (SHORTNESS OF BREATH) ON EXERTION: ICD-10-CM

## 2017-08-17 LAB
DIASTOLIC DYSFUNCTION: NO
ESTIMATED PA SYSTOLIC PRESSURE: 31.73
RETIRED EF AND QEF - SEE NOTES: 55 (ref 55–65)

## 2017-08-17 PROCEDURE — 93306 TTE W/DOPPLER COMPLETE: CPT | Mod: S$GLB,,, | Performed by: INTERNAL MEDICINE

## 2017-09-20 ENCOUNTER — OFFICE VISIT (OUTPATIENT)
Dept: INTERNAL MEDICINE | Facility: CLINIC | Age: 72
End: 2017-09-20
Payer: MEDICARE

## 2017-09-20 ENCOUNTER — TELEPHONE (OUTPATIENT)
Dept: INTERNAL MEDICINE | Facility: CLINIC | Age: 72
End: 2017-09-20

## 2017-09-20 VITALS
DIASTOLIC BLOOD PRESSURE: 60 MMHG | BODY MASS INDEX: 33.76 KG/M2 | WEIGHT: 178.81 LBS | TEMPERATURE: 99 F | HEIGHT: 61 IN | HEART RATE: 70 BPM | SYSTOLIC BLOOD PRESSURE: 105 MMHG

## 2017-09-20 DIAGNOSIS — M25.562 LEFT KNEE PAIN, UNSPECIFIED CHRONICITY: ICD-10-CM

## 2017-09-20 DIAGNOSIS — M54.6 CHRONIC BILATERAL THORACIC BACK PAIN: ICD-10-CM

## 2017-09-20 DIAGNOSIS — G89.29 CHRONIC BILATERAL THORACIC BACK PAIN: ICD-10-CM

## 2017-09-20 DIAGNOSIS — R68.89 DECREASED EXERCISE TOLERANCE: Primary | ICD-10-CM

## 2017-09-20 PROCEDURE — 1126F AMNT PAIN NOTED NONE PRSNT: CPT | Mod: S$GLB,,, | Performed by: INTERNAL MEDICINE

## 2017-09-20 PROCEDURE — 3008F BODY MASS INDEX DOCD: CPT | Mod: S$GLB,,, | Performed by: INTERNAL MEDICINE

## 2017-09-20 PROCEDURE — 99214 OFFICE O/P EST MOD 30 MIN: CPT | Mod: S$GLB,,, | Performed by: INTERNAL MEDICINE

## 2017-09-20 PROCEDURE — 1159F MED LIST DOCD IN RCRD: CPT | Mod: S$GLB,,, | Performed by: INTERNAL MEDICINE

## 2017-09-20 PROCEDURE — 99999 PR PBB SHADOW E&M-EST. PATIENT-LVL III: CPT | Mod: PBBFAC,,, | Performed by: INTERNAL MEDICINE

## 2017-09-20 NOTE — TELEPHONE ENCOUNTER
Pt has an order in for a stress test. Her  Venancio pompa is having a procedure done at [facility] on Friday and she would like to know if her test can be done on the same day. Pt is currently scheduled at the next available. Please advise thanks!

## 2017-09-20 NOTE — PROGRESS NOTES
"Subjective:       Patient ID: Jessica Perez is a 72 y.o. female.    Chief Complaint: Excessive Sweating; Leg Swelling; Hot Flashes; and Back Pain    HPI Patient is a 72-year-old female presenting today for concerns about decreased exercise tolerance.  She indicates over the last 2 months maybe as much as 6 months she's been noticing decreased capacity to exert herself.  She indicates doing simple tasks such as passing the vacuum or walking by a lawnmower has created a situation where she is now becoming short of breath and diaphoretic with an about 15 or 20 minutes of this activity.  This is a significant change from her baseline.  She is not expressing chest discomfort she does have shortness of breath and diaphoresis.  The symptom is not been getting worse necessarily but is not getting any better over the last few months.    She's been having some issues with chronic lower back to mid back discomfort.  This issue remains problematic at this time.  She has not been really taking anything or dressing it with any specific treatment but he does remain sort of a problematic symptom for her.    She also complains of some left knee discomfort.  She's been having some tenderness and discomfort in left knee and what she feels like my be some fluid in the knee.  There is no history of trauma or injury.    Review of Systems   Constitutional: Negative for chills and fever.   Respiratory: Positive for shortness of breath. Negative for cough and wheezing.    Cardiovascular: Negative for chest pain and palpitations.   Gastrointestinal: Negative for abdominal pain, blood in stool, constipation, nausea and vomiting.   Genitourinary: Negative for dysuria, hematuria and pelvic pain.   Musculoskeletal: Positive for back pain.   Skin: Negative for rash.   Neurological: Positive for weakness. Negative for numbness.       Objective:   /60   Pulse 70   Temp 98.9 °F (37.2 °C) (Tympanic)   Ht 5' 1" (1.549 m)   Wt 81.1 kg " (178 lb 12.7 oz)   BMI 33.78 kg/m²      Physical Exam   Constitutional: She appears well-developed and well-nourished.   HENT:   Head: Normocephalic and atraumatic.   Cardiovascular: Normal rate, regular rhythm and intact distal pulses.  Exam reveals no gallop and no friction rub.    No murmur heard.  Pulmonary/Chest: Breath sounds normal. She has no wheezes. She has no rales. She exhibits no tenderness.   Abdominal: Soft. Bowel sounds are normal. She exhibits no distension. There is no tenderness.   Musculoskeletal:   Mild swelling in the left knee.  No effusion.   Lymphadenopathy:     She has no cervical adenopathy.   Skin: No rash noted.   Vitals reviewed.      No visits with results within 2 Week(s) from this visit.   Latest known visit with results is:   Clinical Support on 08/17/2017   Component Date Value    EF 08/17/2017 55     Diastolic Dysfunction 08/17/2017 No     Est. PA Systolic Pressure 08/17/2017 31.73        Assessment:       1. Decreased exercise tolerance    2. Left knee pain, unspecified chronicity    3. Chronic bilateral thoracic back pain        Plan:   No problem-specific Assessment & Plan notes found for this encounter.    Jessica was seen today for excessive sweating, leg swelling, hot flashes and back pain.    Diagnoses and all orders for this visit:    Decreased exercise tolerance  Comments:  Significant decreased exercise tolerance. stress echo. aspirin  Orders:  -     Exercise stress echo; Future    Left knee pain, unspecified chronicity  Comments:  Continue meloxicam at this time.  We'll hold off on workup until completion of workup for the decreased exercise tolerance.    Chronic bilateral thoracic back pain  Comments:  Discussed with the patient.  We will hold on workup until after the workup for the decreased exercise tolerance.        Return if symptoms worsen or fail to improve, for After stress test.

## 2017-10-03 ENCOUNTER — TELEPHONE (OUTPATIENT)
Dept: INTERNAL MEDICINE | Facility: CLINIC | Age: 72
End: 2017-10-03

## 2017-10-03 ENCOUNTER — DOCUMENTATION ONLY (OUTPATIENT)
Dept: CARDIOLOGY | Facility: CLINIC | Age: 72
End: 2017-10-03

## 2017-10-03 DIAGNOSIS — R68.89 DECREASED EXERCISE TOLERANCE: Primary | ICD-10-CM

## 2017-10-03 NOTE — TELEPHONE ENCOUNTER
----- Message from Maine Ornelas RN sent at 10/3/2017  1:50 PM CDT -----  I am reviewing Ms. Perez's doctor's notes from her most recent visit indicating that she has decreased exercise tolerance as well as knee joint swelling & pain as well as back pain. Given her symptoms it is unlikely that she will be able to complete the protocol needed for a successful stress echo. It is Cardiology's recommendation/protocol to preform a Pharmaceutical Nuclear stress test to test. If you agree please cancel the SE and order a Nuclear Regadenosen [4636] and NM MPI [[id number]] and schedule the test for Ms. Perez.

## 2017-10-03 NOTE — TELEPHONE ENCOUNTER
I have ordered the nucleiar pharm stres test.  I don't have access to the area of the chart to cancel the other test.  I will see if my nurse can do it.

## 2017-10-06 ENCOUNTER — HOSPITAL ENCOUNTER (OUTPATIENT)
Dept: RADIOLOGY | Facility: HOSPITAL | Age: 72
Discharge: HOME | End: 2017-10-06
Attending: INTERNAL MEDICINE
Payer: MEDICARE

## 2017-10-06 ENCOUNTER — CLINICAL SUPPORT (OUTPATIENT)
Dept: CARDIOLOGY | Facility: CLINIC | Age: 72
End: 2017-10-06
Payer: MEDICARE

## 2017-10-06 DIAGNOSIS — R68.89 DECREASED EXERCISE TOLERANCE: ICD-10-CM

## 2017-10-06 LAB — DIASTOLIC DYSFUNCTION: NO

## 2017-10-06 PROCEDURE — A9502 TC99M TETROFOSMIN: HCPCS | Mod: PO

## 2017-10-06 PROCEDURE — 78452 HT MUSCLE IMAGE SPECT MULT: CPT | Mod: TC,PO

## 2017-10-06 PROCEDURE — 93015 CV STRESS TEST SUPVJ I&R: CPT | Mod: S$GLB,,, | Performed by: NUCLEAR MEDICINE

## 2017-10-06 PROCEDURE — 78452 HT MUSCLE IMAGE SPECT MULT: CPT | Mod: 26,,, | Performed by: NUCLEAR MEDICINE

## 2017-10-11 ENCOUNTER — OFFICE VISIT (OUTPATIENT)
Dept: INTERNAL MEDICINE | Facility: CLINIC | Age: 72
End: 2017-10-11
Payer: MEDICARE

## 2017-10-11 VITALS
DIASTOLIC BLOOD PRESSURE: 70 MMHG | SYSTOLIC BLOOD PRESSURE: 110 MMHG | BODY MASS INDEX: 33.84 KG/M2 | WEIGHT: 179.25 LBS | TEMPERATURE: 99 F | HEIGHT: 61 IN | HEART RATE: 70 BPM

## 2017-10-11 DIAGNOSIS — Z12.31 ENCOUNTER FOR SCREENING MAMMOGRAM FOR HIGH-RISK PATIENT: ICD-10-CM

## 2017-10-11 DIAGNOSIS — R53.81 PHYSICAL DECONDITIONING: Primary | ICD-10-CM

## 2017-10-11 PROCEDURE — 99213 OFFICE O/P EST LOW 20 MIN: CPT | Mod: 25,S$GLB,, | Performed by: INTERNAL MEDICINE

## 2017-10-11 PROCEDURE — G0008 ADMIN INFLUENZA VIRUS VAC: HCPCS | Mod: S$GLB,,, | Performed by: INTERNAL MEDICINE

## 2017-10-11 PROCEDURE — 90662 IIV NO PRSV INCREASED AG IM: CPT | Mod: S$GLB,,, | Performed by: INTERNAL MEDICINE

## 2017-10-11 PROCEDURE — 99999 PR PBB SHADOW E&M-EST. PATIENT-LVL III: CPT | Mod: PBBFAC,,, | Performed by: INTERNAL MEDICINE

## 2017-10-11 NOTE — PROGRESS NOTES
"Subjective:       Patient ID: Jessica Perez is a 72 y.o. female.    Chief Complaint: Follow-up    HPI  patient is a 72-year-old female presented following up on her shortness of breath issues.  Patient is described as decreased exercise tolerance.  We did initial evaluation with stress test which she did very well on.  He did not show any signs of cardiac ischemia or decreased cardiac function.  She has a very limited smoking history dating back to about 60 years ago.  There is no high priority or probability of underlying lung disease.  We discussed the nature of her symptoms and possibility for causation at this time.  I do not think that there is need at this point pursue pulmonary workup due to her minimal smoking history and the duration of being so long ago.  At this point I think her deconditioning is the most likely cause of her symptoms.  We discussed diet and exercise as an approach to addressing these issues.  I discussed with her phone applications that can help her count her calories and work on weight loss from that standpoint.  I talked her about the importance of doing some aerobic exercises well.  Given her decreased exercise tolerance at this time are recommended a graded program and she expressed understanding of that.    Review of Systems    Objective:   /70   Pulse 70   Temp 98.7 °F (37.1 °C) (Tympanic)   Ht 5' 1" (1.549 m)   Wt 81.3 kg (179 lb 3.7 oz)   BMI 33.87 kg/m²      Physical Exam   Constitutional: She appears well-developed and well-nourished.   HENT:   Head: Normocephalic and atraumatic.   Cardiovascular: Normal rate, regular rhythm and intact distal pulses.  Exam reveals no gallop and no friction rub.    No murmur heard.  Pulmonary/Chest: Breath sounds normal. She has no wheezes. She has no rales. She exhibits no tenderness.   Abdominal: Soft. Bowel sounds are normal. She exhibits no distension. There is no tenderness.   Lymphadenopathy:     She has no cervical " adenopathy.   Skin: No rash noted.   Vitals reviewed.      Clinical Support on 10/06/2017   Component Date Value    Diastolic Dysfunction 10/06/2017 No        Assessment:       1. Physical deconditioning    2. Encounter for screening mammogram for high-risk patient        Plan:   No problem-specific Assessment & Plan notes found for this encounter.    Jessica was seen today for follow-up.    Diagnoses and all orders for this visit:    Physical deconditioning  Comments:  Start graded exercise program, limit calories for weight loss    Encounter for screening mammogram for high-risk patient  -     Mammo Digital Screening Bilateral With CAD; Future    Other orders  -     Influenza - High Dose (65+) (PF) (IM)        Return if symptoms worsen or fail to improve.

## 2017-11-22 ENCOUNTER — HOSPITAL ENCOUNTER (OUTPATIENT)
Dept: RADIOLOGY | Facility: HOSPITAL | Age: 72
Discharge: HOME | End: 2017-11-22
Attending: INTERNAL MEDICINE
Payer: MEDICARE

## 2017-11-22 VITALS — BODY MASS INDEX: 33.79 KG/M2 | HEIGHT: 61 IN | WEIGHT: 179 LBS

## 2017-11-22 DIAGNOSIS — Z12.31 ENCOUNTER FOR SCREENING MAMMOGRAM FOR HIGH-RISK PATIENT: ICD-10-CM

## 2017-11-22 PROCEDURE — 77063 BREAST TOMOSYNTHESIS BI: CPT | Mod: 26,,, | Performed by: RADIOLOGY

## 2017-11-22 PROCEDURE — 77067 SCR MAMMO BI INCL CAD: CPT | Mod: TC,PO

## 2017-11-22 PROCEDURE — 77067 SCR MAMMO BI INCL CAD: CPT | Mod: 26,,, | Performed by: RADIOLOGY

## 2017-11-24 NOTE — PROGRESS NOTES
Your mammogram is normal.  Repeat screening is recommended in one year.    Sincerely,    Warren Lemus M.D.        If you would like to review your experience with Dr. Lemus or [facility], please follow the link below:    [email or web address]

## 2018-01-23 ENCOUNTER — OFFICE VISIT (OUTPATIENT)
Dept: OPHTHALMOLOGY | Facility: CLINIC | Age: 73
End: 2018-01-23
Payer: MEDICARE

## 2018-01-23 DIAGNOSIS — H40.013 OPEN ANGLE WITH BORDERLINE FINDINGS, LOW RISK, BILATERAL: Primary | ICD-10-CM

## 2018-01-23 DIAGNOSIS — M35.01 KERATITIS SICCA, BILATERAL: ICD-10-CM

## 2018-01-23 DIAGNOSIS — H43.813 POSTERIOR VITREOUS DETACHMENT OF BOTH EYES: ICD-10-CM

## 2018-01-23 DIAGNOSIS — H52.7 REFRACTION DISORDER: ICD-10-CM

## 2018-01-23 DIAGNOSIS — H25.13 NUCLEAR SCLEROSIS, BILATERAL: ICD-10-CM

## 2018-01-23 PROCEDURE — 92250 FUNDUS PHOTOGRAPHY W/I&R: CPT | Mod: S$GLB,,, | Performed by: OPHTHALMOLOGY

## 2018-01-23 PROCEDURE — 92014 COMPRE OPH EXAM EST PT 1/>: CPT | Mod: S$GLB,,, | Performed by: OPHTHALMOLOGY

## 2018-01-23 PROCEDURE — 99999 PR PBB SHADOW E&M-EST. PATIENT-LVL II: CPT | Mod: PBBFAC,,, | Performed by: OPHTHALMOLOGY

## 2018-01-23 RX ORDER — OLOPATADINE HYDROCHLORIDE 1 MG/ML
1 SOLUTION/ DROPS OPHTHALMIC 2 TIMES DAILY
Qty: 5 ML | Refills: 1 | Status: SHIPPED | OUTPATIENT
Start: 2018-01-23 | End: 2019-02-05

## 2018-01-23 NOTE — PROGRESS NOTES
HPI     Glaucoma    Additional comments: 6 month FD/SDP, no drops           Comments   Pt states she lost her glasses since her last visit. For the last 3-4   weeks she's had eye fatigue at the end of the day. Harder to see tv and   read, she's currently wearing an older pair of glasses. No pain or   irritation in the eyes. She's still using tears as needed. Request a   prescription for Patanol to help with allergies. Also would like to get a   copy of her glasses prescription today.    PCP: Dr. Lemus  1. COAG SUSP  SLT OD 3/26/15  SLT OS 5/15  2. Dry Eyes  OU Systane Ultra BID, Genteal GEL QHS  3. NSC OU       Last edited by Fuentes Duke on 1/23/2018  1:18 PM. (History)            Assessment /Plan     For exam results, see Encounter Report.      ICD-10-CM ICD-9-CM    1. Open angle with borderline findings, low risk, bilateral H40.013 365.01 Color Fundus Photography - OU - Both Eyes    Glaucoma risk level is unchanged at this time and patient will continued to be followed.      2. Keratitis sicca, bilateral H16.223 370.8 Very symptomatic. Will use tears qid, and ointment at night time. Will consider xiidra or restasis    3. 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 Nuclear sclerosis, bilateral H25.13 366.16 Stable. Will watch   5. Posterior vitreous detachment of both eyes H43.813 379.21 Stable. Will watch     Patanol prn ou   Tears qid ou, ointment qhs  Return to clinic 6 months with IOP check and GOCT.

## 2018-02-28 ENCOUNTER — PATIENT OUTREACH (OUTPATIENT)
Dept: ADMINISTRATIVE | Facility: HOSPITAL | Age: 73
End: 2018-02-28

## 2018-03-12 ENCOUNTER — TELEPHONE (OUTPATIENT)
Dept: INTERNAL MEDICINE | Facility: CLINIC | Age: 73
End: 2018-03-12

## 2018-03-12 NOTE — TELEPHONE ENCOUNTER
----- Message from Luba Jeana sent at 3/12/2018  2:09 PM CDT -----  Pt at [phone]//states she is needing to reschedule her appt scheduled on 3-14-18//she has another appt that date//would like to come in on 3-15-18 or 3-16-18 or the next week//please call//lashawn/chanel

## 2018-04-02 ENCOUNTER — OFFICE VISIT (OUTPATIENT)
Dept: INTERNAL MEDICINE | Facility: CLINIC | Age: 73
End: 2018-04-02
Payer: MEDICARE

## 2018-04-02 VITALS
HEART RATE: 70 BPM | SYSTOLIC BLOOD PRESSURE: 122 MMHG | WEIGHT: 176.38 LBS | HEIGHT: 62 IN | TEMPERATURE: 98 F | DIASTOLIC BLOOD PRESSURE: 70 MMHG | BODY MASS INDEX: 32.46 KG/M2

## 2018-04-02 DIAGNOSIS — M25.569 KNEE PAIN, UNSPECIFIED CHRONICITY, UNSPECIFIED LATERALITY: Primary | ICD-10-CM

## 2018-04-02 DIAGNOSIS — M25.562 ACUTE PAIN OF LEFT KNEE: Primary | ICD-10-CM

## 2018-04-02 DIAGNOSIS — M85.852 OSTEOPENIA OF LEFT THIGH: ICD-10-CM

## 2018-04-02 DIAGNOSIS — E78.2 MIXED HYPERLIPIDEMIA: ICD-10-CM

## 2018-04-02 PROCEDURE — 99999 PR PBB SHADOW E&M-EST. PATIENT-LVL III: CPT | Mod: PBBFAC,,, | Performed by: INTERNAL MEDICINE

## 2018-04-02 PROCEDURE — 99214 OFFICE O/P EST MOD 30 MIN: CPT | Mod: S$GLB,,, | Performed by: INTERNAL MEDICINE

## 2018-04-02 RX ORDER — FERROUS SULFATE, DRIED 160(50) MG
1 TABLET, EXTENDED RELEASE ORAL 2 TIMES DAILY WITH MEALS
COMMUNITY
End: 2019-04-16

## 2018-04-02 RX ORDER — TERIPARATIDE 250 UG/ML
INJECTION, SOLUTION SUBCUTANEOUS
COMMUNITY
Start: 2018-03-20 | End: 2018-04-02 | Stop reason: ALTCHOICE

## 2018-04-02 RX ORDER — ALENDRONATE SODIUM 70 MG/1
TABLET ORAL
Qty: 4 TABLET | Refills: 11 | Status: SHIPPED | OUTPATIENT
Start: 2018-04-02 | End: 2018-09-07

## 2018-04-02 NOTE — PROGRESS NOTES
"Subjective:       Patient ID: Jessica Perez is a 73 y.o. female.    Chief Complaint: Knee Pain (discuss tx for knee that she is on) and Bowel Incontinence    HPI  Patient is a 73-year-old female has been having problems with left knee pain.  She says is been on for a couple of months now.  She went saw Dr. Epstein and he did injection in the knee which helped for about 4 days and then went away.  She noticed that he also did an MRI.  She does not have the MRI available.  She continues to have pain in the knee and would like to get in with [facility] for further treatment at this time.    She was noted on her most recent bone density to have osteopenia with high fracture risk.  She had brought this up with Dr. Epstein's group when he saw the them.  They started her on Forteo.  She had GI side effects with Forteo and was not happy about the cost of it.  She was never tried her offered bisphosphonate.  She would like to give that a try.  She has been taking calcium and vitamin D as well as magnesium.    She has a history of hyperlipidemia which is been unable to be controlled well due to intolerance to cholesterol medications.  She is interested in getting updated number to see what may look like at this time.    Review of Systems   Constitutional: Negative for chills and fever.   Respiratory: Negative for cough, shortness of breath and wheezing.    Cardiovascular: Negative for chest pain and palpitations.   Gastrointestinal: Positive for diarrhea. Negative for blood in stool, constipation, nausea and vomiting.   Genitourinary: Negative for dysuria and hematuria.   Musculoskeletal: Positive for arthralgias.   Skin: Negative for rash.       Objective:   /70   Pulse 70   Temp 98.2 °F (36.8 °C) (Tympanic)   Ht 5' 1.5" (1.562 m)   Wt 80 kg (176 lb 5.9 oz)   BMI 32.78 kg/m²      Physical Exam   Constitutional: She appears well-developed and well-nourished.   HENT:   Head: Normocephalic and " atraumatic.   Cardiovascular: Normal rate, regular rhythm and intact distal pulses.  Exam reveals no gallop and no friction rub.    No murmur heard.  Pulmonary/Chest: Breath sounds normal. She has no wheezes. She has no rales. She exhibits no tenderness.   Abdominal: Soft. Bowel sounds are normal. She exhibits no distension. There is no tenderness.   Lymphadenopathy:     She has no cervical adenopathy.   Skin: No rash noted.   Vitals reviewed.      No visits with results within 2 Week(s) from this visit.   Latest known visit with results is:   Clinical Support on 10/06/2017   Component Date Value    Diastolic Dysfunction 10/06/2017 No        Assessment:       1. Acute pain of left knee    2. Osteopenia of left thigh    3. Mixed hyperlipidemia        Plan:   Osteopenia of left thigh  Start fosamax 70 mg once weekly on an empty stomach.    Jessica was seen today for knee pain and bowel incontinence.    Diagnoses and all orders for this visit:    Acute pain of left knee  Comments:  Failed steroid injection. MRI done outside. COnsult ortho and bring copy of mri to visit.  Orders:  -     Ambulatory referral to Orthopedics    Osteopenia of left thigh  Comments:  start fosamax 70 mg once weekly on an empty stomach.   Stay upright for 40 minutes after taking it.  Orders:  -     alendronate (FOSAMAX) 70 MG tablet; Take 1 tablet once weekly in the morning with a glass of water on an empty stomach.Do not eat food or lie down for  30 minutes afterwards.    Mixed hyperlipidemia  -     CBC auto differential; Future  -     Comprehensive metabolic panel; Future  -     Lipid panel; Future     we will see how she does discontinuing the Forteo.  We discussed the effects and side effects with Fosamax she expressed understanding.  We'll see her back in a few weeks for follow-up on how she is tolerating the Fosamax as well as to go over her lab work and see what more thing is been able do for her.    Follow-up in about 6 weeks (around  5/14/2018).

## 2018-04-04 ENCOUNTER — OFFICE VISIT (OUTPATIENT)
Dept: ORTHOPEDICS | Facility: CLINIC | Age: 73
End: 2018-04-04
Payer: MEDICARE

## 2018-04-04 ENCOUNTER — HOSPITAL ENCOUNTER (OUTPATIENT)
Dept: RADIOLOGY | Facility: HOSPITAL | Age: 73
Discharge: HOME | End: 2018-04-04
Attending: ORTHOPAEDIC SURGERY
Payer: MEDICARE

## 2018-04-04 VITALS
SYSTOLIC BLOOD PRESSURE: 133 MMHG | DIASTOLIC BLOOD PRESSURE: 73 MMHG | HEIGHT: 61 IN | BODY MASS INDEX: 33.23 KG/M2 | HEART RATE: 74 BPM | WEIGHT: 176 LBS

## 2018-04-04 DIAGNOSIS — M25.562 ACUTE PAIN OF LEFT KNEE: Primary | ICD-10-CM

## 2018-04-04 DIAGNOSIS — M17.12 ARTHRITIS OF LEFT KNEE: ICD-10-CM

## 2018-04-04 DIAGNOSIS — M25.569 KNEE PAIN, UNSPECIFIED CHRONICITY, UNSPECIFIED LATERALITY: ICD-10-CM

## 2018-04-04 DIAGNOSIS — M17.0 BILATERAL PRIMARY OSTEOARTHRITIS OF KNEE: Primary | ICD-10-CM

## 2018-04-04 PROCEDURE — 99204 OFFICE O/P NEW MOD 45 MIN: CPT | Mod: S$GLB,,, | Performed by: ORTHOPAEDIC SURGERY

## 2018-04-04 PROCEDURE — 73564 X-RAY EXAM KNEE 4 OR MORE: CPT | Mod: 26,50,, | Performed by: RADIOLOGY

## 2018-04-04 PROCEDURE — 73564 X-RAY EXAM KNEE 4 OR MORE: CPT | Mod: TC,50,FY,PO

## 2018-04-04 PROCEDURE — 99999 PR PBB SHADOW E&M-EST. PATIENT-LVL III: CPT | Mod: PBBFAC,,, | Performed by: ORTHOPAEDIC SURGERY

## 2018-04-04 RX ORDER — DICLOFENAC SODIUM 10 MG/G
2 GEL TOPICAL 4 TIMES DAILY
Qty: 1 TUBE | Refills: 2 | Status: SHIPPED | OUTPATIENT
Start: 2018-04-04 | End: 2018-04-19 | Stop reason: SDUPTHER

## 2018-04-04 RX ORDER — MELOXICAM 15 MG/1
15 TABLET ORAL DAILY
Qty: 30 TABLET | Refills: 2 | Status: SHIPPED | OUTPATIENT
Start: 2018-04-04 | End: 2020-01-20

## 2018-04-04 NOTE — PROGRESS NOTES
Subjective:     Patient ID: Jessica Perez is a 73 y.o. female.    Chief Complaint: Pain of the Left Knee    Patient is here for left knee pain. No NONA. No previous injury.      Knee Pain    The pain is present in the left knee. This is a new problem. The current episode started more than 1 month ago. The problem occurs intermittently. The problem has been waxing and waning. The quality of the pain is described as sharp, dull and aching. The pain is at a severity of 2/10. Associated symptoms include joint locking, joint swelling and stiffness. Pertinent negatives include no fever or numbness. Associated symptoms comments: Giving out sensations. The symptoms are aggravated by walking, bending and activity. She has tried injection treatment and heat (Cortisone injection) for the symptoms. The treatment provided mild relief. Physical therapy was not tried.      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     Past Surgical History:   Procedure Laterality Date    CHOLECYSTECTOMY      DILATION AND CURETTAGE OF UTERUS      finger surgery      middle finger on right hand    GALLBLADDER SURGERY      TONSILLECTOMY, ADENOIDECTOMY      TUBAL LIGATION       Family History   Problem Relation Age of Onset    Diabetes Father     Cancer Father      prostate ca    Hypertension Father     Cancer Sister      cervical ca    Hepatitis Sister     Dementia Mother     Osteoporosis Mother      Social History     Social History    Marital status:      Spouse name: N/A    Number of children: N/A    Years of education: N/A     Occupational History    Not on file.     Social History Main Topics    Smoking status: Former Smoker     Packs/day: 0.50     Years: 12.00     Types: Cigarettes    Smokeless tobacco: Never Used    Alcohol use No    Drug use: No    Sexual activity:  Yes     Other Topics Concern    Not on file     Social History Narrative    No narrative on file     Medication List with Changes/Refills   Current Medications    ALENDRONATE (FOSAMAX) 70 MG TABLET    Take 1 tablet once weekly in the morning with a glass of water on an empty stomach.Do not eat food or lie down for  30 minutes afterwards.    ARTIFICIAL TEAR, HYPROMELLOSE, (GENTEAL) 0.3 % GEL        B COMPLEX VITAMINS CAPSULE    Take 1 capsule by mouth once daily.    CALCIUM-VITAMIN D3 (CALCIUM 500 + D) 500 MG(1,250MG) -200 UNIT PER TABLET    Take 1 tablet by mouth 2 (two) times daily with meals.    CETIRIZINE (ZYRTEC) 10 MG TABLET    Take 10 mg by mouth once daily.    FLUTICASONE (FLONASE) 50 MCG/ACTUATION NASAL SPRAY    2 sprays by Each Nare route once daily.    KRILL OIL-OMEGA-3-DHA--450 MG CPDR    Take by mouth.    MULTIVITAMIN CAPSULE    Take 1 capsule by mouth Daily.    NAPROXEN SODIUM (ALEVE) 220 MG CAP    Take 1 capsule by mouth 2 (two) times daily.    OLOPATADINE (PATANOL) 0.1 % OPHTHALMIC SOLUTION    Place 1 drop into both eyes 2 (two) times daily.    -PROPYLENE GLYCOL (SYSTANE ULTRA) 0.4-0.3 % DROP    Place 1 drop into both eyes As instructed. As directed Over the counter products     RANITIDINE (ZANTAC) 150 MG TABLET    Take 1 tablet (150 mg total) by mouth 2 (two) times daily.    VITAMIN E 100 UNIT CAPSULE    Take 100 Units by mouth once daily.     Review of patient's allergies indicates:   Allergen Reactions    Augmentin [amoxicillin-pot clavulanate] Other (See Comments)     Diarrhea, yeast    Bactrim  [sulfamethoxazole-trimethoprim]      Other reaction(s): Unknown    Celebrex [celecoxib] Other (See Comments)     Stomach pain, gas     Lipitor  [atorvastatin]      Other reaction(s): Unknown    Pravachol  [pravastatin]      Other reaction(s): Unknown    Statins-hmg-coa reductase inhibitors      Other reaction(s): Muscle pain    Sulfa (sulfonamide antibiotics)      Other reaction(s):  Swelling     Review of Systems   Constitution: Positive for night sweats. Negative for fever.   HENT: Positive for hearing loss.    Eyes: Positive for blurred vision. Negative for visual disturbance.   Cardiovascular: Negative for chest pain and leg swelling.   Respiratory: Positive for shortness of breath.    Endocrine: Negative for polyuria.   Hematologic/Lymphatic: Negative for bleeding problem.   Skin: Negative for rash.   Musculoskeletal: Positive for back pain, joint pain, joint swelling, muscle cramps and stiffness. Negative for muscle weakness.   Gastrointestinal: Negative for melena.   Genitourinary: Negative for hematuria.   Neurological: Negative for loss of balance, numbness and paresthesias.   Psychiatric/Behavioral: Negative for altered mental status.       Objective:   Body mass index is 33.25 kg/m².  Vitals:    04/04/18 1301   BP: 133/73   Pulse: 74       General: Jessica is well-developed, well-nourished, appears stated age, in no acute distress, alert and oriented to time, place and person.       General    Vitals reviewed.  Constitutional: She is oriented to person, place, and time. She appears well-developed and well-nourished. No distress.   HENT:   Mouth/Throat: No oropharyngeal exudate.   Eyes: Right eye exhibits no discharge. Left eye exhibits no discharge.   Neck: Normal range of motion.   Pulmonary/Chest: Effort normal. No respiratory distress.   Neurological: She is alert and oriented to person, place, and time. She has normal reflexes. No cranial nerve deficit. Coordination normal.   Psychiatric: She has a normal mood and affect. Her behavior is normal. Judgment and thought content normal.     General Musculoskeletal Exam   Gait: normal       Right Knee Exam     Inspection   Erythema: absent  Scars: absent  Swelling: absent  Effusion: effusion  Deformity: deformity  Bruising: absent    Tenderness   The patient is experiencing no tenderness.         Crepitus   The patient has crepitus of  the patella.    Range of Motion   Extension: 0   Flexion: 140     Tests   Meniscus   Fanta:  Medial - negative Lateral - negative  Ligament Examination Lachman: normal (-1 to 2mm) PCL-Posterior Drawer: normal (0 to 2mm)     MCL - Valgus: normal (0 to 2mm)  LCL - Varus: normal  Posterior Sag Test: negative  Posterolateral Corner: unstable (>15 degrees difference)  Patella   Patellar Apprehension: negative  Passive Patellar Tilt: neutral  Patellar Tracking: normal  Patellar Glide (quadrants): Lateral - 1   Medial - 2  Patellar Grind: positive    Other   Meniscal Cyst: absent  Popliteal (Baker's) Cyst: absent  Sensation: normal    Left Knee Exam     Inspection   Erythema: absent  Scars: absent  Swelling: absent  Effusion: absent  Deformity: deformity  Bruising: absent    Tenderness   The patient tender to palpation of the medial joint line, pes anserinus and condyle.    Crepitus   The patient has crepitus of the patella.    Range of Motion   Extension: 0   Flexion: 140     Tests   Meniscus   Fanta:  Medial - negative Lateral - negative  Stability Lachman: normal (-1 to 2mm) PCL-Posterior Drawer: normal (0 to 2mm)  MCL - Valgus: normal (0 to 2mm)  LCL - Varus: normal (0 to 2mm)  Posterior Sag Test: negative  Posterolateral Corner: unstable (>15 degrees difference)  Patella   Patellar Apprehension: negative  Passive Patellar Tilt: neutral  Patellar Tracking: normal  Patellar Glide (Quadrants): Lateral - 1 Medial - 2  Patellar Grind: positive    Other   Meniscal Cyst: absent  Popliteal (Baker's) Cyst: absent  Sensation: normal    Right Hip Exam     Tests   Thai: negative  Left Hip Exam     Tests   Thai: negative          Muscle Strength   Right Lower Extremity   Quadriceps:  5/5   Hamstrin/5   Left Lower Extremity   Quadriceps:  4/5   Hamstrin/5     Reflexes     Left Side  Quadriceps:  2+  Achilles:  2+    Right Side   Quadriceps:  2+  Achilles:  2+    Vascular Exam     Right Pulses  Dorsalis Pedis:       2+  Posterior Tibial:      2+        Left Pulses  Dorsalis Pedis:      2+  Posterior Tibial:      2+        X-rays including standing, weight bearing AP and flexion bilateral knees, lateral and merchant views ordered and images reviewed by me show:    No fracture, dislocation or other pathology   Medial compartment: mild-mod degenerative changes   Lateral compartment: no degenerative changes   Patellofemoral compartment: no degenerative changes      Assessment:     Encounter Diagnosis   Name Primary?    Bilateral primary osteoarthritis of knee Yes        Plan:     1. 1. PT for quad strengthening/Home exercise program    2.  Mobic/voltaren    3. Synvisc authorization

## 2018-04-04 NOTE — LETTER
April 4, 2018      Warren Lemus MD  [address]  [address]  [place] [address]           [facility]  [address]  [place] [address]  Phone: [phone]  Fax: [phone]          Patient: Jessica Perez   MR Number: [id number]   YOB: 1945   Date of Visit: 4/4/2018       Dear Dr. Warren Lemus:    Thank you for referring Jessica Perez to me for evaluation. Attached you will find relevant portions of my assessment and plan of care.    If you have questions, please do not hesitate to call me. I look forward to following Jessica Perez along with you.    Sincerely,    Faustino Ponce MD    Enclosure  CC:  No Recipients    If you would like to receive this communication electronically, please contact [email or web address] or [phone] to request more information on [facility] access.    For providers and/or their staff who would like to refer a patient to [facility], please contact us through our one-stop-shop provider referral line, [facility], at [phone].    If you feel you have received this communication in error or would no longer like to receive these types of communications, please e-mail [email or web address]

## 2018-04-11 ENCOUNTER — LAB VISIT (OUTPATIENT)
Dept: LAB | Facility: HOSPITAL | Age: 73
End: 2018-04-11
Attending: INTERNAL MEDICINE
Payer: MEDICARE

## 2018-04-11 DIAGNOSIS — E78.2 MIXED HYPERLIPIDEMIA: ICD-10-CM

## 2018-04-11 LAB
ALBUMIN SERPL BCP-MCNC: 4 G/DL
ALP SERPL-CCNC: 112 U/L
ALT SERPL W/O P-5'-P-CCNC: 23 U/L
ANION GAP SERPL CALC-SCNC: 9 MMOL/L
AST SERPL-CCNC: 23 U/L
BASOPHILS # BLD AUTO: 0.04 K/UL
BASOPHILS NFR BLD: 0.6 %
BILIRUB SERPL-MCNC: 0.3 MG/DL
BUN SERPL-MCNC: 13 MG/DL
CALCIUM SERPL-MCNC: 9.5 MG/DL
CHLORIDE SERPL-SCNC: 109 MMOL/L
CHOLEST SERPL-MCNC: 282 MG/DL
CHOLEST/HDLC SERPL: 5.2 {RATIO}
CO2 SERPL-SCNC: 24 MMOL/L
CREAT SERPL-MCNC: 0.9 MG/DL
DIFFERENTIAL METHOD: ABNORMAL
EOSINOPHIL # BLD AUTO: 0.1 K/UL
EOSINOPHIL NFR BLD: 2 %
ERYTHROCYTE [DISTWIDTH] IN BLOOD BY AUTOMATED COUNT: 13.2 %
EST. GFR  (AFRICAN AMERICAN): >60 ML/MIN/1.73 M^2
EST. GFR  (NON AFRICAN AMERICAN): >60 ML/MIN/1.73 M^2
GLUCOSE SERPL-MCNC: 82 MG/DL
HCT VFR BLD AUTO: 44.9 %
HDLC SERPL-MCNC: 54 MG/DL
HDLC SERPL: 19.1 %
HGB BLD-MCNC: 14.1 G/DL
IMM GRANULOCYTES # BLD AUTO: 0.01 K/UL
IMM GRANULOCYTES NFR BLD AUTO: 0.2 %
LDLC SERPL CALC-MCNC: 181.8 MG/DL
LYMPHOCYTES # BLD AUTO: 2.5 K/UL
LYMPHOCYTES NFR BLD: 38.5 %
MCH RBC QN AUTO: 30.6 PG
MCHC RBC AUTO-ENTMCNC: 31.4 G/DL
MCV RBC AUTO: 97 FL
MONOCYTES # BLD AUTO: 0.3 K/UL
MONOCYTES NFR BLD: 4.2 %
NEUTROPHILS # BLD AUTO: 3.5 K/UL
NEUTROPHILS NFR BLD: 54.5 %
NONHDLC SERPL-MCNC: 228 MG/DL
NRBC BLD-RTO: 0 /100 WBC
PLATELET # BLD AUTO: 176 K/UL
PMV BLD AUTO: 10.9 FL
POTASSIUM SERPL-SCNC: 4.1 MMOL/L
PROT SERPL-MCNC: 7 G/DL
RBC # BLD AUTO: 4.61 M/UL
SODIUM SERPL-SCNC: 142 MMOL/L
TRIGL SERPL-MCNC: 231 MG/DL
WBC # BLD AUTO: 6.41 K/UL

## 2018-04-11 PROCEDURE — 85025 COMPLETE CBC W/AUTO DIFF WBC: CPT

## 2018-04-11 PROCEDURE — 36415 COLL VENOUS BLD VENIPUNCTURE: CPT | Mod: PO

## 2018-04-11 PROCEDURE — 80061 LIPID PANEL: CPT

## 2018-04-11 PROCEDURE — 80053 COMPREHEN METABOLIC PANEL: CPT

## 2018-04-19 RX ORDER — DICLOFENAC SODIUM 10 MG/G
2 GEL TOPICAL 4 TIMES DAILY
Qty: 1 TUBE | Refills: 2 | Status: SHIPPED | OUTPATIENT
Start: 2018-04-19 | End: 2018-08-24 | Stop reason: SDUPTHER

## 2018-04-19 NOTE — TELEPHONE ENCOUNTER
The patient called to get a Prior Authorization . Medication has been sent to the [facility] . Pt verbalized understanding. -AS    ----- Message from Roberto Craft sent at 4/19/2018  1:36 PM CDT -----  Pt is requesting a call from nurse to call the pharmacy for a PA    [facility] [address] [address]  [address] [address] [address]  Phone: [phone] Fax: [phone]      Please call pt back at [phone]

## 2018-04-24 ENCOUNTER — TELEPHONE (OUTPATIENT)
Dept: ORTHOPEDICS | Facility: CLINIC | Age: 73
End: 2018-04-24

## 2018-04-24 NOTE — TELEPHONE ENCOUNTER
Spoke with Destiny about her medicare plan of care and we will fax it over to them.FP  ----- Message from Summer Covington sent at 4/24/2018 10:13 AM CDT -----  Contact: Destiny/ [facility]   Caller states she is checking on the status of the Medicare Plan of Care that was faxed on 4/12 and 4/18 and never received back . [phone] Fax [phone] phone

## 2018-05-16 ENCOUNTER — OFFICE VISIT (OUTPATIENT)
Dept: ORTHOPEDICS | Facility: CLINIC | Age: 73
End: 2018-05-16
Payer: MEDICARE

## 2018-05-16 VITALS
HEART RATE: 83 BPM | SYSTOLIC BLOOD PRESSURE: 126 MMHG | HEIGHT: 61 IN | DIASTOLIC BLOOD PRESSURE: 75 MMHG | WEIGHT: 175.94 LBS | BODY MASS INDEX: 33.22 KG/M2

## 2018-05-16 DIAGNOSIS — M17.0 BILATERAL PRIMARY OSTEOARTHRITIS OF KNEE: Primary | ICD-10-CM

## 2018-05-16 PROCEDURE — 99999 PR PBB SHADOW E&M-EST. PATIENT-LVL III: CPT | Mod: PBBFAC,,, | Performed by: ORTHOPAEDIC SURGERY

## 2018-05-16 PROCEDURE — 99499 UNLISTED E&M SERVICE: CPT | Mod: S$GLB,,, | Performed by: ORTHOPAEDIC SURGERY

## 2018-05-16 PROCEDURE — 20610 DRAIN/INJ JOINT/BURSA W/O US: CPT | Mod: LT,S$GLB,, | Performed by: ORTHOPAEDIC SURGERY

## 2018-05-16 NOTE — PROGRESS NOTES
Patient is here for follow up of her knee arthritis. She  is requesting synvisc injection #1.  [facility] reviewed per encounter record. She has failed other conservative modalities including NSAIDS, activity modification, weight loss      After viscosupplementation info and post-injection info was given, the left knee was prepped with betadine and alcohol and injected with 16 mg synvisc from a inferolateral approach. Patient tolerated the injection well.

## 2018-05-23 ENCOUNTER — LAB VISIT (OUTPATIENT)
Dept: LAB | Facility: HOSPITAL | Age: 73
End: 2018-05-23
Attending: INTERNAL MEDICINE
Payer: MEDICARE

## 2018-05-23 ENCOUNTER — OFFICE VISIT (OUTPATIENT)
Dept: RHEUMATOLOGY | Facility: CLINIC | Age: 73
End: 2018-05-23
Payer: MEDICARE

## 2018-05-23 ENCOUNTER — OFFICE VISIT (OUTPATIENT)
Dept: ORTHOPEDICS | Facility: CLINIC | Age: 73
End: 2018-05-23
Payer: MEDICARE

## 2018-05-23 VITALS
WEIGHT: 175 LBS | HEIGHT: 61 IN | BODY MASS INDEX: 33.04 KG/M2 | SYSTOLIC BLOOD PRESSURE: 127 MMHG | HEART RATE: 73 BPM | DIASTOLIC BLOOD PRESSURE: 74 MMHG

## 2018-05-23 VITALS
SYSTOLIC BLOOD PRESSURE: 174 MMHG | BODY MASS INDEX: 34.16 KG/M2 | HEART RATE: 78 BPM | DIASTOLIC BLOOD PRESSURE: 88 MMHG | WEIGHT: 180.75 LBS

## 2018-05-23 DIAGNOSIS — M17.11 ARTHRITIS OF RIGHT KNEE: Primary | ICD-10-CM

## 2018-05-23 DIAGNOSIS — M17.0 PRIMARY OSTEOARTHRITIS OF BOTH KNEES: Primary | ICD-10-CM

## 2018-05-23 DIAGNOSIS — M17.0 BILATERAL PRIMARY OSTEOARTHRITIS OF KNEE: ICD-10-CM

## 2018-05-23 DIAGNOSIS — R25.2 MUSCLE CRAMPS: Primary | ICD-10-CM

## 2018-05-23 DIAGNOSIS — R25.2 MUSCLE CRAMPS: ICD-10-CM

## 2018-05-23 DIAGNOSIS — M85.89 OSTEOPENIA OF MULTIPLE SITES: ICD-10-CM

## 2018-05-23 LAB
CCP AB SER IA-ACNC: <0.5 U/ML
CRP SERPL-MCNC: 1.9 MG/L
ERYTHROCYTE [SEDIMENTATION RATE] IN BLOOD BY WESTERGREN METHOD: 7 MM/HR
RHEUMATOID FACT SERPL-ACNC: <10 IU/ML

## 2018-05-23 PROCEDURE — 99999 PR PBB SHADOW E&M-EST. PATIENT-LVL II: CPT | Mod: PBBFAC,,, | Performed by: ORTHOPAEDIC SURGERY

## 2018-05-23 PROCEDURE — 20610 DRAIN/INJ JOINT/BURSA W/O US: CPT | Mod: LT,S$GLB,, | Performed by: ORTHOPAEDIC SURGERY

## 2018-05-23 PROCEDURE — 86140 C-REACTIVE PROTEIN: CPT

## 2018-05-23 PROCEDURE — 86038 ANTINUCLEAR ANTIBODIES: CPT

## 2018-05-23 PROCEDURE — 99499 UNLISTED E&M SERVICE: CPT | Mod: S$GLB,,, | Performed by: ORTHOPAEDIC SURGERY

## 2018-05-23 PROCEDURE — 86431 RHEUMATOID FACTOR QUANT: CPT

## 2018-05-23 PROCEDURE — 99204 OFFICE O/P NEW MOD 45 MIN: CPT | Mod: S$GLB,,, | Performed by: INTERNAL MEDICINE

## 2018-05-23 PROCEDURE — 99999 PR PBB SHADOW E&M-EST. PATIENT-LVL III: CPT | Mod: PBBFAC,,, | Performed by: INTERNAL MEDICINE

## 2018-05-23 PROCEDURE — 86200 CCP ANTIBODY: CPT

## 2018-05-23 PROCEDURE — 36415 COLL VENOUS BLD VENIPUNCTURE: CPT | Mod: PO

## 2018-05-23 PROCEDURE — 85651 RBC SED RATE NONAUTOMATED: CPT | Mod: PO

## 2018-05-23 RX ORDER — CYCLOBENZAPRINE HCL 10 MG
10 TABLET ORAL NIGHTLY
Qty: 30 TABLET | Refills: 2 | Status: SHIPPED | OUTPATIENT
Start: 2018-05-23 | End: 2019-07-01

## 2018-05-23 NOTE — PROGRESS NOTES
Patient is here for follow up of her knee arthritis. She  is requesting synvisc injection #2.  [facility] reviewed per encounter record. She has failed other conservative modalities including NSAIDS, activity modification, weight loss    The first shot was tolerated well.     After viscosupplementation info and post-injection info was given, the left knee was prepped with betadine and alcohol and injected with 16 mg synvisc from a inferolateral approach. Patient tolerated the injection well.

## 2018-05-23 NOTE — PROGRESS NOTES
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    Nuclear sclerosis - Both Eyes    Alopecia, unspecified    Sleep related leg cramps    Unspecified gastritis and gastroduodenitis without mention of hemorrhage    HLD (hyperlipidemia)    Osteopenia of multiple sites    Bilateral primary osteoarthritis of knee    Muscle cramps     Here to establish care for generalized OA  She mentions of generalized arthralgias with cramping   May occur any time   They are prominent with activity and some times at night   It involves both legs and lower back   She has b/l hand pains , aggravated with activity , LBP aggravated with activity , relieved with rest   No radiation down the legs   She has early am stiffness for few minutes , but pain actually prominent with activity    She has b/l knee pains , left > right , is currently following with ortho for synvisc injections   She had DEXA suggestive of osteopenia   Dr Valdez / josé  started her on Forteo.  She had GI side effects with Forteo and was not happy about the cost of it.  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       Past Surgical History:   Procedure Laterality Date    CHOLECYSTECTOMY      DILATION AND CURETTAGE OF UTERUS      finger surgery      middle finger on right hand    GALLBLADDER SURGERY      TONSILLECTOMY, ADENOIDECTOMY      TUBAL LIGATION           Social History   Substance Use Topics    Smoking status: Former Smoker     Packs/day: 0.50     Years: 12.00     Types: Cigarettes    Smokeless tobacco: Never  Used    Alcohol use No       Family History   Problem Relation Age of Onset    Diabetes Father     Cancer Father         prostate ca    Hypertension Father     Cancer Sister         cervical ca    Hepatitis Sister     Dementia Mother     Osteoporosis Mother        Review of patient's allergies indicates:   Allergen Reactions    Augmentin [amoxicillin-pot clavulanate] Other (See Comments)     Diarrhea, yeast    Bactrim  [sulfamethoxazole-trimethoprim]      Other reaction(s): Unknown    Celebrex [celecoxib] Other (See Comments)     Stomach pain, gas     Lipitor  [atorvastatin]      Other reaction(s): Unknown    Pravachol  [pravastatin]      Other reaction(s): Unknown    Statins-hmg-coa reductase inhibitors      Other reaction(s): Muscle pain    Sulfa (sulfonamide antibiotics)      Other reaction(s): Swelling             Review of Systems:  Constitutional: Denies fever, chills. No recent weight changes.   Fatigue: no  Muscle weakness: no  Headaches: no new headaches  Eyes: No redness or dryness.  No recent visual changes.  ENT: Denies dry mouth. No oral or nasal ulcers.  Card: No chest pain.  Resp: No cough or sob.   Gastro: No nausea or vomiting.  No heartburn.  Constipation: no  Diarrhea: no  Genito:  No dysuria.  No genital ulcers.  Skin: No rash.  Raynauds:no  Neuro: No numbness / tingling.   Psych: No depression, anxiety  Endo:  no excess thirst.  Heme: no abnormal bleeding or bruising  Clots:none   Miscarriages : none     OBJECTIVE:     Vital Signs   Vitals:    05/23/18 0910   BP: (!) 174/88   Pulse: 78     Physical Exam:  General Appearance:  NAD.   Gait: not favoring.  HEENT: PERRL.  Eyes not dry or injected.  No nasal ulcers.  Mouth not dry, no oral lesions.  Lymph: cervical, supraclavicular or axillary nodes: none abnormal   Cardio: no irregularity of S1 or S2.  No gallops or rubs.   Resp: Normal respiratory motion. Clear to auscultation bilaterally.   No abnormal chest conformation.  Abd:  Soft, non-tender, nondistended.  No masses.   Skin: Head and neck,  and extremities examined. No rashes.   Neuro: Ox3.   Cranial nerves II-XII grossly intact.   Sensation intact  in both distal LE and upper extremities to light touch.  Musculoskeletal Exam:    Rt hand : prominent OA changes   herbedens nodes +    Left hand : + OA changes     Tender points:  Muscle strength:Equal and full in all mm groups of the upper and lower ext.    Laboratory:   Results for orders placed or performed in visit on 04/11/18   CBC auto differential   Result Value Ref Range    WBC 6.41 3.90 - 12.70 K/uL    RBC 4.61 4.00 - 5.40 M/uL    Hemoglobin 14.1 12.0 - 16.0 g/dL    Hematocrit 44.9 37.0 - 48.5 %    MCV 97 82 - 98 fL    MCH 30.6 27.0 - 31.0 pg    MCHC 31.4 (L) 32.0 - 36.0 g/dL    RDW 13.2 11.5 - 14.5 %    Platelets 176 150 - 350 K/uL    MPV 10.9 9.2 - 12.9 fL    Immature Granulocytes 0.2 0.0 - 0.5 %    Gran # (ANC) 3.5 1.8 - 7.7 K/uL    Immature Grans (Abs) 0.01 0.00 - 0.04 K/uL    Lymph # 2.5 1.0 - 4.8 K/uL    Mono # 0.3 0.3 - 1.0 K/uL    Eos # 0.1 0.0 - 0.5 K/uL    Baso # 0.04 0.00 - 0.20 K/uL    nRBC 0 0 /100 WBC    Gran% 54.5 38.0 - 73.0 %    Lymph% 38.5 18.0 - 48.0 %    Mono% 4.2 4.0 - 15.0 %    Eosinophil% 2.0 0.0 - 8.0 %    Basophil% 0.6 0.0 - 1.9 %    Differential Method Automated    Comprehensive metabolic panel   Result Value Ref Range    Sodium 142 136 - 145 mmol/L    Potassium 4.1 3.5 - 5.1 mmol/L    Chloride 109 95 - 110 mmol/L    CO2 24 23 - 29 mmol/L    Glucose 82 70 - 110 mg/dL    BUN, Bld 13 8 - 23 mg/dL    Creatinine 0.9 0.5 - 1.4 mg/dL    Calcium 9.5 8.7 - 10.5 mg/dL    Total Protein 7.0 6.0 - 8.4 g/dL    Albumin 4.0 3.5 - 5.2 g/dL    Total Bilirubin 0.3 0.1 - 1.0 mg/dL    Alkaline Phosphatase 112 55 - 135 U/L    AST 23 10 - 40 U/L    ALT 23 10 - 44 U/L    Anion Gap 9 8 - 16 mmol/L    eGFR if African American >60.0 >60 mL/min/1.73 m^2    eGFR if non African American >60.0 >60 mL/min/1.73 m^2   Lipid panel    Result Value Ref Range    Cholesterol 282 (H) 120 - 199 mg/dL    Triglycerides 231 (H) 30 - 150 mg/dL    HDL 54 40 - 75 mg/dL    LDL Cholesterol 181.8 (H) 63.0 - 159.0 mg/dL    HDL/Chol Ratio 19.1 (L) 20.0 - 50.0 %    Total Cholesterol/HDL Ratio 5.2 (H) 2.0 - 5.0    Non-HDL Cholesterol 228 mg/dL     Lab Results   Component Value Date    SEDRATE 7 11/11/2014       Imaging :    The L1 to L4 vertebral bone mineral density is equal to 1.026 g/cm squared with a T score of -1.3 and a Z score of 0.4.  This represents a 1.8% increase in bone mineral density since the prior study  which is not a statistically significant change.    The left femoral neck bone mineral density is equal to 0.698 g/cm squared with a T score of -2.4 and a Z score of -0.7.  This represents a 3.6% increase in bone mineral density since the prior study  which is not a statistically significant change.   Impression      Osteopenia -- there is a 22.6% risk of a major osteoporotic fracture and a 8.2% risk of hip fracture in the next 10 years (FRAX).         Notes reviewed  Other procedures:    ASSESSMENT/PLAN:     Muscle cramps  -     HELGA; Future; Expected date: 05/23/2018  -     Rheumatoid factor; Future; Expected date: 05/23/2018  -     Cyclic citrul peptide antibody, IgG; Future; Expected date: 05/23/2018  -     Sedimentation rate, manual; Standing  -     C-reactive protein; Standing    Osteopenia of multiple sites    Bilateral primary osteoarthritis of knee    Other orders  -     cyclobenzaprine (FLEXERIL) 10 MG tablet; Take 1 tablet (10 mg total) by mouth every evening.  Dispense: 30 tablet; Refill: 2      1: Generalized OA : inv hands / feet / back / knees   With cramping   Agree with PT   C/w ortho for synvisc injections   Mobic/ tylenol as needed   Try flexeril prn   Do not see any evidence of CTD on exam , check labs as above       2: Osteopenia :  With high FRAX   Continue Fosamax 70 mg weekly   Vitamin d otc     Risks vs Benefits and  potential side effects of medication prescribed today were discussed with patient.   Reinforced the importance of taking fosamax with abundant water and sitting upright for at least thirty minutes.   Jaw necrosis and atypical fractures are rare side effects from the medication.     3 month return

## 2018-05-24 ENCOUNTER — OFFICE VISIT (OUTPATIENT)
Dept: INTERNAL MEDICINE | Facility: CLINIC | Age: 73
End: 2018-05-24
Payer: MEDICARE

## 2018-05-24 VITALS
WEIGHT: 181.19 LBS | HEIGHT: 61 IN | HEART RATE: 72 BPM | TEMPERATURE: 98 F | DIASTOLIC BLOOD PRESSURE: 68 MMHG | SYSTOLIC BLOOD PRESSURE: 110 MMHG | BODY MASS INDEX: 34.21 KG/M2

## 2018-05-24 DIAGNOSIS — T46.6X5A STATIN MYOPATHY: ICD-10-CM

## 2018-05-24 DIAGNOSIS — E78.2 MIXED HYPERLIPIDEMIA: ICD-10-CM

## 2018-05-24 DIAGNOSIS — M85.89 OSTEOPENIA OF MULTIPLE SITES: ICD-10-CM

## 2018-05-24 DIAGNOSIS — Z00.00 ROUTINE GENERAL MEDICAL EXAMINATION AT HEALTH CARE FACILITY: Primary | ICD-10-CM

## 2018-05-24 DIAGNOSIS — G72.0 STATIN MYOPATHY: ICD-10-CM

## 2018-05-24 LAB — ANA SER QL IF: NORMAL

## 2018-05-24 PROCEDURE — 99999 PR PBB SHADOW E&M-EST. PATIENT-LVL III: CPT | Mod: PBBFAC,,, | Performed by: INTERNAL MEDICINE

## 2018-05-24 PROCEDURE — 99397 PER PM REEVAL EST PAT 65+ YR: CPT | Mod: S$GLB,,, | Performed by: INTERNAL MEDICINE

## 2018-05-24 NOTE — PROGRESS NOTES
"Subjective:       Patient ID: Jessica Perez is a 73 y.o. female.    Chief Complaint: Knee Pain (follow up Dr. Ponce physical therapy)    HPI  Patient is a 73-year-old female presented for a day physical exam review of chronic health issues.  She is a patient has hyperlipidemia which is been on effectively treated due to the fact that she cannot tolerate statins.  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  She also has osteopenia and some chronic arthritic issues.  Recently she has been followed by Dr. paredes and Orthopedics for knee pain and had an injection in her knee just yesterday.  Otherwise she relates no major issues.  She states she has continued to do well.  She is tolerating her current therapies as prescribed.  She has had no hospitalizations or ER visits.    Review of Systems   Constitutional: Negative for chills and fever.   HENT: Negative for hearing loss.    Eyes: Negative for photophobia and visual disturbance.   Respiratory: Negative for cough, shortness of breath and wheezing.    Cardiovascular: Negative for chest pain and palpitations.   Gastrointestinal: Negative for blood in stool, constipation, nausea and vomiting.   Genitourinary: Negative for dysuria and hematuria.   Musculoskeletal: Positive for arthralgias. Negative for neck pain and neck stiffness.   Skin: Negative for rash.   Neurological: Negative for syncope, weakness, light-headedness and headaches.   Hematological: Negative for adenopathy.   Psychiatric/Behavioral: Negative for dysphoric mood. The patient is not nervous/anxious.        Objective:   /68   Pulse 72   Temp 97.6 °F (36.4 °C) (Tympanic)   Ht 5' 1" (1.549 m)   Wt 82.2 kg (181 lb 3.5 oz)   BMI 34.24 kg/m²      Physical Exam   Constitutional: She is oriented to person, place, and time. She appears well-developed and well-nourished.   HENT:   Head: Normocephalic and atraumatic.   Eyes: EOM are normal. Pupils are equal, round, and reactive to " light.   Neck: Normal range of motion. Neck supple. No thyromegaly present.   Cardiovascular: Normal rate, regular rhythm and intact distal pulses.  Exam reveals no gallop and no friction rub.    No murmur heard.  Pulmonary/Chest: Breath sounds normal. She has no wheezes. She has no rales. She exhibits no tenderness.   Abdominal: Soft. Bowel sounds are normal. She exhibits no distension and no mass. There is no tenderness. There is no rebound and no guarding.   Musculoskeletal: She exhibits no edema.   Lymphadenopathy:     She has no cervical adenopathy.   Neurological: She is alert and oriented to person, place, and time. She has normal reflexes. She displays normal reflexes. No cranial nerve deficit.   Skin: Skin is warm and dry. No rash noted.   Psychiatric: She has a normal mood and affect. Her behavior is normal. Judgment normal.   Vitals reviewed.          Assessment:       1. Routine general medical examination at health care facility    2. Mixed hyperlipidemia    3. Osteopenia of multiple sites    4. Statin myopathy        Plan:   No problem-specific Assessment & Plan notes found for this encounter.    Jessica was seen today for knee pain.    Diagnoses and all orders for this visit:    Routine general medical examination at health care facility  Comments:  Focus on good health habits, low fat diet, regular exercise, seatbelt use, sunscreen use    Mixed hyperlipidemia  Comments:  Intolerant to statins, work on low fat diet    Osteopenia of multiple sites  Comments:  continue alendrodate, continue calcium    Statin myopathy        Follow-up in about 6 months (around 11/24/2018).

## 2018-05-25 ENCOUNTER — TELEPHONE (OUTPATIENT)
Dept: RHEUMATOLOGY | Facility: CLINIC | Age: 73
End: 2018-05-25

## 2018-05-25 NOTE — TELEPHONE ENCOUNTER
----- Message from Maribel Youssef MD sent at 5/25/2018  2:53 PM CDT -----  [facility]   Let her know

## 2018-05-30 ENCOUNTER — OFFICE VISIT (OUTPATIENT)
Dept: ORTHOPEDICS | Facility: CLINIC | Age: 73
End: 2018-05-30
Payer: MEDICARE

## 2018-05-30 VITALS
HEART RATE: 78 BPM | DIASTOLIC BLOOD PRESSURE: 72 MMHG | HEIGHT: 61 IN | BODY MASS INDEX: 34.17 KG/M2 | WEIGHT: 181 LBS | SYSTOLIC BLOOD PRESSURE: 126 MMHG

## 2018-05-30 DIAGNOSIS — M17.0 PRIMARY OSTEOARTHRITIS OF BOTH KNEES: ICD-10-CM

## 2018-05-30 DIAGNOSIS — M17.11 PRIMARY OSTEOARTHRITIS OF RIGHT KNEE: Primary | ICD-10-CM

## 2018-05-30 PROCEDURE — 20610 DRAIN/INJ JOINT/BURSA W/O US: CPT | Mod: LT,S$GLB,, | Performed by: ORTHOPAEDIC SURGERY

## 2018-05-30 PROCEDURE — 99999 PR PBB SHADOW E&M-EST. PATIENT-LVL III: CPT | Mod: PBBFAC,,, | Performed by: ORTHOPAEDIC SURGERY

## 2018-05-30 PROCEDURE — 99499 UNLISTED E&M SERVICE: CPT | Mod: S$GLB,,, | Performed by: ORTHOPAEDIC SURGERY

## 2018-05-30 NOTE — PROGRESS NOTES
Patient is here for follow up of her knee arthritis. She  is requesting synvisc injection #3.  [facility] reviewed per encounter record. She has failed other conservative modalities including NSAIDS, activity modification, weight loss    The first and second shot was tolerated well.     After viscosupplementation info and post-injection info was given, the left knee was prepped with betadine and alcohol and injected with 16 mg synvisc from a inferolateral approach. Patient tolerated the injection well.

## 2018-06-12 ENCOUNTER — TELEPHONE (OUTPATIENT)
Dept: ORTHOPEDICS | Facility: CLINIC | Age: 73
End: 2018-06-12

## 2018-06-12 NOTE — TELEPHONE ENCOUNTER
Spoke with fabián Renner about the patient. They state that they had faxed over a plan of care for the patient for Dr. Ponce to sign it. She states that they have not received the sign fax. She said that she will refax it to us. I told her that I will have Dr. Ponce sign it and fax it back over to them. FP

## 2018-06-14 ENCOUNTER — TELEPHONE (OUTPATIENT)
Dept: ORTHOPEDICS | Facility: CLINIC | Age: 73
End: 2018-06-14

## 2018-06-14 NOTE — TELEPHONE ENCOUNTER
The fax number wasn't working, another fax has been sent to a different location.   Ms. Askew was thankful for my help.   ----- Message from Narendra Mckee sent at 6/14/2018  9:19 AM CDT -----  Contact:  [facility]     [phone]  Would like to consult with nurse regarding status of request for signed Plan of Care.  Please call back at [phone].  Md Allen

## 2018-07-11 ENCOUNTER — OFFICE VISIT (OUTPATIENT)
Dept: ORTHOPEDICS | Facility: CLINIC | Age: 73
End: 2018-07-11
Payer: MEDICARE

## 2018-07-11 VITALS
HEIGHT: 61 IN | DIASTOLIC BLOOD PRESSURE: 81 MMHG | BODY MASS INDEX: 34.17 KG/M2 | SYSTOLIC BLOOD PRESSURE: 135 MMHG | WEIGHT: 181 LBS | HEART RATE: 87 BPM

## 2018-07-11 DIAGNOSIS — M17.12 PRIMARY OSTEOARTHRITIS OF LEFT KNEE: Primary | ICD-10-CM

## 2018-07-11 DIAGNOSIS — M17.0 PRIMARY OSTEOARTHRITIS OF BOTH KNEES: ICD-10-CM

## 2018-07-11 PROCEDURE — 99999 PR PBB SHADOW E&M-EST. PATIENT-LVL III: CPT | Mod: PBBFAC,,, | Performed by: ORTHOPAEDIC SURGERY

## 2018-07-11 PROCEDURE — 99499 UNLISTED E&M SERVICE: CPT | Mod: S$GLB,,, | Performed by: ORTHOPAEDIC SURGERY

## 2018-07-11 PROCEDURE — 20610 DRAIN/INJ JOINT/BURSA W/O US: CPT | Mod: RT,S$GLB,, | Performed by: ORTHOPAEDIC SURGERY

## 2018-07-11 NOTE — PROGRESS NOTES
Patient is here for follow up of her knee arthritis. She  is requesting synvisc injection #1.  [facility] reviewed per encounter record. She has failed other conservative modalities including NSAIDS, activity modification, weight loss      After viscosupplementation info and post-injection info was given, the right knee was prepped with betadine and alcohol and injected with 16 mg synvisc from a inferolateral approach. Patient tolerated the injection well.

## 2018-07-18 ENCOUNTER — OFFICE VISIT (OUTPATIENT)
Dept: ORTHOPEDICS | Facility: CLINIC | Age: 73
End: 2018-07-18
Payer: MEDICARE

## 2018-07-18 VITALS
BODY MASS INDEX: 34.17 KG/M2 | DIASTOLIC BLOOD PRESSURE: 81 MMHG | WEIGHT: 181 LBS | SYSTOLIC BLOOD PRESSURE: 135 MMHG | HEIGHT: 61 IN | HEART RATE: 81 BPM

## 2018-07-18 DIAGNOSIS — M17.0 BILATERAL PRIMARY OSTEOARTHRITIS OF KNEE: Primary | ICD-10-CM

## 2018-07-18 PROCEDURE — 99499 UNLISTED E&M SERVICE: CPT | Mod: S$GLB,,, | Performed by: ORTHOPAEDIC SURGERY

## 2018-07-18 PROCEDURE — 20610 DRAIN/INJ JOINT/BURSA W/O US: CPT | Mod: RT,S$GLB,, | Performed by: ORTHOPAEDIC SURGERY

## 2018-07-18 PROCEDURE — 99999 PR PBB SHADOW E&M-EST. PATIENT-LVL III: CPT | Mod: PBBFAC,,, | Performed by: ORTHOPAEDIC SURGERY

## 2018-07-18 NOTE — PROGRESS NOTES
Patient is here for follow up of her knee arthritis. She  is requesting synvisc injection #2.  [facility] reviewed per encounter record. She has failed other conservative modalities including NSAIDS, activity modification, weight loss      After viscosupplementation info and post-injection info was given, the right knee was prepped with betadine and alcohol and injected with 16 mg synvisc from a inferolateral approach. Patient tolerated the injection well.

## 2018-07-24 ENCOUNTER — OFFICE VISIT (OUTPATIENT)
Dept: OPHTHALMOLOGY | Facility: CLINIC | Age: 73
End: 2018-07-24
Payer: MEDICARE

## 2018-07-24 DIAGNOSIS — H52.7 REFRACTION DISORDER: ICD-10-CM

## 2018-07-24 DIAGNOSIS — H43.813 POSTERIOR VITREOUS DETACHMENT OF BOTH EYES: ICD-10-CM

## 2018-07-24 DIAGNOSIS — M35.01 KERATITIS SICCA, BILATERAL: ICD-10-CM

## 2018-07-24 DIAGNOSIS — H25.13 NUCLEAR SCLEROSIS, BILATERAL: ICD-10-CM

## 2018-07-24 DIAGNOSIS — H40.013 OPEN ANGLE WITH BORDERLINE FINDINGS, LOW RISK, BILATERAL: Primary | ICD-10-CM

## 2018-07-24 PROCEDURE — 92014 COMPRE OPH EXAM EST PT 1/>: CPT | Mod: S$GLB,,, | Performed by: OPHTHALMOLOGY

## 2018-07-24 PROCEDURE — 92133 CPTRZD OPH DX IMG PST SGM ON: CPT | Mod: S$GLB,,, | Performed by: OPHTHALMOLOGY

## 2018-07-24 PROCEDURE — 92083 EXTENDED VISUAL FIELD XM: CPT | Mod: S$GLB,,, | Performed by: OPHTHALMOLOGY

## 2018-07-24 PROCEDURE — 99999 PR PBB SHADOW E&M-EST. PATIENT-LVL I: CPT | Mod: PBBFAC,,, | Performed by: OPHTHALMOLOGY

## 2018-07-24 NOTE — PROGRESS NOTES
HPI     6 month IOP check with [facility]  Pt says OS>OD floater still present, says its lots more floater- spider   web things that have been building since I saw Dr Palomino last   AFT's PRN    PCP: Dr. Lemus  1. COAG SUSP  SLT OD 3/26/15  SLT OS 5/15  2. Dry Eyes  3. NSC OU    OU Systane Ultra BID, Genteal GEL QHS    Last edited by Rmairo Palomino MD on 7/24/2018  2:39 PM. (History)            Assessment /Plan     For exam results, see Encounter Report.      ICD-10-CM ICD-9-CM    1. Open angle with borderline findings, low risk, bilateral H40.013 365.01 Kumar Visual Field - OU - Extended - Both Eyes      Posterior Segment OCT Optic Nerve- Both eyes Done today   Doing well - intraocular pressure is within acceptable range relative to target pressure with no evidence of progression.   Continue current treatment.  Reviewed importance of continued compliance with treatment and follow up.      2. Posterior vitreous detachment of both eyes H43.813 379.21 Patient seen and evaluated for PVD in the BOTH  eye. No tears or breaks were seen after careful retinal evaluation. Discussed retinal detachment signs and symptoms including flashes of lights, floaters, perceived curtains or veils. Advised to patient to monitor visual status including increase in flashes and floaters, or the development of visual field changes including curtain and /or veils. Advised patient to RTC urgently if these symptoms occur. Explained the need for follow up exams to the patient even if there are no changes in the symptoms.       3. Keratitis sicca, bilateral H16.223 370.8 Appear stable at this time. Continue dry eye tx below, could consider Rx drops on the future if needed    4. Nuclear sclerosis, bilateral H25.13 366.16 Early   You were found to have an early cataract in your eye(s) today, however the cataract is not affecting your activities of daily living, such as reading and driving.You do not need  surgery at this time. We will recheck your  cataract at your next visit. You are welcome to call for an earlier appointment if your vision gets worse.                 RETURN TO CLINIC 6 months IOP

## 2018-07-25 ENCOUNTER — OFFICE VISIT (OUTPATIENT)
Dept: ORTHOPEDICS | Facility: CLINIC | Age: 73
End: 2018-07-25
Payer: MEDICARE

## 2018-07-25 VITALS
HEIGHT: 61 IN | SYSTOLIC BLOOD PRESSURE: 138 MMHG | BODY MASS INDEX: 34.17 KG/M2 | DIASTOLIC BLOOD PRESSURE: 82 MMHG | HEART RATE: 68 BPM | WEIGHT: 181 LBS

## 2018-07-25 DIAGNOSIS — M17.0 PRIMARY OSTEOARTHRITIS OF BOTH KNEES: Primary | ICD-10-CM

## 2018-07-25 PROCEDURE — 99499 UNLISTED E&M SERVICE: CPT | Mod: S$GLB,,, | Performed by: ORTHOPAEDIC SURGERY

## 2018-07-25 PROCEDURE — 99999 PR PBB SHADOW E&M-EST. PATIENT-LVL II: CPT | Mod: PBBFAC,,, | Performed by: ORTHOPAEDIC SURGERY

## 2018-07-25 PROCEDURE — 20610 DRAIN/INJ JOINT/BURSA W/O US: CPT | Mod: RT,S$GLB,, | Performed by: ORTHOPAEDIC SURGERY

## 2018-07-25 NOTE — PROGRESS NOTES
Subjective:     Patient ID: Jessica Perez is a 73 y.o. female.    Chief Complaint: No chief complaint on file.    Patient is here for left knee pain. No NONA. No previous injury.      Knee Pain    The pain is present in the left knee. This is a new problem. The current episode started more than 1 month ago. The problem occurs intermittently. The problem has been waxing and waning. The quality of the pain is described as sharp, dull and aching. The pain is at a severity of 0/10. Associated symptoms include joint locking, joint swelling and stiffness. Pertinent negatives include no fever or numbness. Associated symptoms comments: Giving out sensations. The symptoms are aggravated by walking, bending and activity. She has tried injection treatment and heat (Cortisone injection) for the symptoms. The treatment provided mild relief. Physical therapy was not tried.      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     Past Surgical History:   Procedure Laterality Date    CHOLECYSTECTOMY      DILATION AND CURETTAGE OF UTERUS      finger surgery      middle finger on right hand    GALLBLADDER SURGERY      TONSILLECTOMY, ADENOIDECTOMY      TUBAL LIGATION       Family History   Problem Relation Age of Onset    Diabetes Father     Cancer Father         prostate ca    Hypertension Father     Cancer Sister         cervical ca    Hepatitis Sister     Dementia Mother     Osteoporosis Mother      Social History     Social History    Marital status:      Spouse name: N/A    Number of children: N/A    Years of education: N/A     Occupational History    Not on file.     Social History Main Topics    Smoking status: Former Smoker     Packs/day: 0.50     Years: 12.00     Types: Cigarettes    Smokeless tobacco: Never Used    Alcohol use No    Drug use: No     Sexual activity: Yes     Other Topics Concern    Not on file     Social History Narrative    No narrative on file     Medication List with Changes/Refills   Current Medications    ALENDRONATE (FOSAMAX) 70 MG TABLET    Take 1 tablet once weekly in the morning with a glass of water on an empty stomach.Do not eat food or lie down for  30 minutes afterwards.    ARTIFICIAL TEAR, HYPROMELLOSE, (GENTEAL) 0.3 % GEL        B COMPLEX VITAMINS CAPSULE    Take 1 capsule by mouth once daily.    CALCIUM-VITAMIN D3 (CALCIUM 500 + D) 500 MG(1,250MG) -200 UNIT PER TABLET    Take 1 tablet by mouth 2 (two) times daily with meals.    CETIRIZINE (ZYRTEC) 10 MG TABLET    Take 10 mg by mouth once daily.    CYCLOBENZAPRINE (FLEXERIL) 10 MG TABLET    Take 1 tablet (10 mg total) by mouth every evening.    DICLOFENAC SODIUM (VOLTAREN) 1 % GEL    Apply 2 g topically 4 (four) times daily.    FLUTICASONE (FLONASE) 50 MCG/ACTUATION NASAL SPRAY    2 sprays by Each Nare route once daily.    KRILL OIL-OMEGA-3-DHA--450 MG CPDR    Take by mouth.    MELOXICAM (MOBIC) 15 MG TABLET    Take 1 tablet (15 mg total) by mouth once daily.    MULTIVITAMIN CAPSULE    Take 1 capsule by mouth Daily.    NAPROXEN SODIUM (ALEVE) 220 MG CAP    Take 1 capsule by mouth 2 (two) times daily.    OLOPATADINE (PATANOL) 0.1 % OPHTHALMIC SOLUTION    Place 1 drop into both eyes 2 (two) times daily.    -PROPYLENE GLYCOL (SYSTANE ULTRA) 0.4-0.3 % DROP    Place 1 drop into both eyes As instructed. As directed Over the counter products     RANITIDINE (ZANTAC) 150 MG TABLET    Take 1 tablet (150 mg total) by mouth 2 (two) times daily.    VITAMIN E 100 UNIT CAPSULE    Take 100 Units by mouth once daily.     Review of patient's allergies indicates:   Allergen Reactions    Augmentin [amoxicillin-pot clavulanate] Other (See Comments)     Diarrhea, yeast    Bactrim  [sulfamethoxazole-trimethoprim]      Other reaction(s): Unknown    Celebrex [celecoxib] Other (See  Comments)     Stomach pain, gas     Lipitor  [atorvastatin]      Other reaction(s): Unknown    Pravachol  [pravastatin]      Other reaction(s): Unknown    Statins-hmg-coa reductase inhibitors      Other reaction(s): Muscle pain    Sulfa (sulfonamide antibiotics)      Other reaction(s): Swelling     Review of Systems   Constitution: Positive for night sweats. Negative for fever.   HENT: Positive for hearing loss.    Eyes: Positive for blurred vision. Negative for visual disturbance.   Cardiovascular: Negative for chest pain and leg swelling.   Respiratory: Positive for shortness of breath.    Endocrine: Negative for polyuria.   Hematologic/Lymphatic: Negative for bleeding problem.   Skin: Negative for rash.   Musculoskeletal: Positive for back pain, joint pain, joint swelling, muscle cramps and stiffness. Negative for muscle weakness.   Gastrointestinal: Negative for melena.   Genitourinary: Negative for hematuria.   Neurological: Negative for loss of balance, numbness and paresthesias.   Psychiatric/Behavioral: Negative for altered mental status.       Objective:   There is no height or weight on file to calculate BMI.  There were no vitals filed for this visit.    General: Jessica is well-developed, well-nourished, appears stated age, in no acute distress, alert and oriented to time, place and person.       General    Vitals reviewed.  Constitutional: She is oriented to person, place, and time. She appears well-developed and well-nourished. No distress.   HENT:   Mouth/Throat: No oropharyngeal exudate.   Eyes: Right eye exhibits no discharge. Left eye exhibits no discharge.   Neck: Normal range of motion.   Pulmonary/Chest: Effort normal. No respiratory distress.   Neurological: She is alert and oriented to person, place, and time. She has normal reflexes. No cranial nerve deficit. Coordination normal.   Psychiatric: She has a normal mood and affect. Her behavior is normal. Judgment and thought content normal.      General Musculoskeletal Exam   Gait: normal       Right Knee Exam     Inspection   Erythema: absent  Scars: absent  Swelling: absent  Effusion: effusion  Deformity: deformity  Bruising: absent    Tenderness   The patient is experiencing no tenderness.         Crepitus   The patient has crepitus of the patella.    Range of Motion   Extension: 0   Flexion: 140     Tests   Meniscus   Fanta:  Medial - negative Lateral - negative  Ligament Examination Lachman: normal (-1 to 2mm) PCL-Posterior Drawer: normal (0 to 2mm)     MCL - Valgus: normal (0 to 2mm)  LCL - Varus: normal  Posterior Sag Test: negative  Posterolateral Corner: unstable (>15 degrees difference)  Patella   Patellar Apprehension: negative  Passive Patellar Tilt: neutral  Patellar Tracking: normal  Patellar Glide (quadrants): Lateral - 1   Medial - 2  Patellar Grind: positive    Other   Meniscal Cyst: absent  Popliteal (Baker's) Cyst: absent  Sensation: normal    Left Knee Exam     Inspection   Erythema: absent  Scars: absent  Swelling: absent  Effusion: absent  Deformity: deformity  Bruising: absent    Tenderness   The patient tender to palpation of the medial joint line, pes anserinus and condyle.    Crepitus   The patient has crepitus of the patella.    Range of Motion   Extension: 0   Flexion: 140     Tests   Meniscus   Fanta:  Medial - negative Lateral - negative  Stability Lachman: normal (-1 to 2mm) PCL-Posterior Drawer: normal (0 to 2mm)  MCL - Valgus: normal (0 to 2mm)  LCL - Varus: normal (0 to 2mm)  Posterior Sag Test: negative  Posterolateral Corner: unstable (>15 degrees difference)  Patella   Patellar Apprehension: negative  Passive Patellar Tilt: neutral  Patellar Tracking: normal  Patellar Glide (Quadrants): Lateral - 1 Medial - 2  Patellar Grind: positive    Other   Meniscal Cyst: absent  Popliteal (Baker's) Cyst: absent  Sensation: normal    Right Hip Exam     Tests   Thai: negative  Left Hip Exam     Tests   Thai:  negative          Muscle Strength   Right Lower Extremity   Quadriceps:  5/5   Hamstrin/5   Left Lower Extremity   Quadriceps:  4/5   Hamstrin/5     Reflexes     Left Side  Quadriceps:  2+  Achilles:  2+    Right Side   Quadriceps:  2+  Achilles:  2+    Vascular Exam     Right Pulses  Dorsalis Pedis:      2+  Posterior Tibial:      2+        Left Pulses  Dorsalis Pedis:      2+  Posterior Tibial:      2+        X-rays including standing, weight bearing AP and flexion bilateral knees, lateral and merchant views ordered and images reviewed by me show:    No fracture, dislocation or other pathology   Medial compartment: mild-mod degenerative changes   Lateral compartment: no degenerative changes   Patellofemoral compartment: no degenerative changes      Assessment:     No diagnosis found.     Plan:     1. 1. PT for quad strengthening/Home exercise program    2.  Mobic/voltaren    3. Synvisc authorization

## 2018-07-25 NOTE — PROGRESS NOTES
Patient is here for follow up of her knee arthritis. She  is requesting synvisc injection #3.  [facility] reviewed per encounter record. She has failed other conservative modalities including NSAIDS, activity modification, weight loss      After viscosupplementation info and post-injection info was given, the right knee was prepped with betadine and alcohol and injected with 16 mg synvisc from a inferolateral approach. Patient tolerated the injection well.

## 2018-08-24 ENCOUNTER — LAB VISIT (OUTPATIENT)
Dept: LAB | Facility: HOSPITAL | Age: 73
End: 2018-08-24
Attending: INTERNAL MEDICINE
Payer: MEDICARE

## 2018-08-24 ENCOUNTER — OFFICE VISIT (OUTPATIENT)
Dept: RHEUMATOLOGY | Facility: CLINIC | Age: 73
End: 2018-08-24
Payer: MEDICARE

## 2018-08-24 VITALS
BODY MASS INDEX: 34.04 KG/M2 | SYSTOLIC BLOOD PRESSURE: 117 MMHG | WEIGHT: 180.31 LBS | DIASTOLIC BLOOD PRESSURE: 67 MMHG | HEART RATE: 65 BPM | HEIGHT: 61 IN

## 2018-08-24 DIAGNOSIS — M81.0 AGE-RELATED OSTEOPOROSIS WITHOUT CURRENT PATHOLOGICAL FRACTURE: ICD-10-CM

## 2018-08-24 DIAGNOSIS — M17.0 PRIMARY OSTEOARTHRITIS OF BOTH KNEES: Primary | ICD-10-CM

## 2018-08-24 DIAGNOSIS — M19.049 HAND ARTHRITIS: ICD-10-CM

## 2018-08-24 DIAGNOSIS — M81.0 AGE-RELATED OSTEOPOROSIS WITHOUT CURRENT PATHOLOGICAL FRACTURE: Primary | ICD-10-CM

## 2018-08-24 DIAGNOSIS — M85.89 OSTEOPENIA OF MULTIPLE SITES: ICD-10-CM

## 2018-08-24 LAB
ANION GAP SERPL CALC-SCNC: 14 MMOL/L
BUN SERPL-MCNC: 17 MG/DL
CALCIUM SERPL-MCNC: 10.8 MG/DL
CHLORIDE SERPL-SCNC: 107 MMOL/L
CO2 SERPL-SCNC: 20 MMOL/L
CREAT SERPL-MCNC: 0.8 MG/DL
EST. GFR  (AFRICAN AMERICAN): >60 ML/MIN/1.73 M^2
EST. GFR  (NON AFRICAN AMERICAN): >60 ML/MIN/1.73 M^2
GLUCOSE SERPL-MCNC: 87 MG/DL
POTASSIUM SERPL-SCNC: 4.4 MMOL/L
SODIUM SERPL-SCNC: 141 MMOL/L

## 2018-08-24 PROCEDURE — 99999 PR PBB SHADOW E&M-EST. PATIENT-LVL III: CPT | Mod: PBBFAC,,, | Performed by: INTERNAL MEDICINE

## 2018-08-24 PROCEDURE — 36415 COLL VENOUS BLD VENIPUNCTURE: CPT | Mod: PO

## 2018-08-24 PROCEDURE — 80048 BASIC METABOLIC PNL TOTAL CA: CPT

## 2018-08-24 PROCEDURE — 99214 OFFICE O/P EST MOD 30 MIN: CPT | Mod: S$GLB,,, | Performed by: INTERNAL MEDICINE

## 2018-08-24 RX ORDER — HEPARIN 100 UNIT/ML
500 SYRINGE INTRAVENOUS
Status: CANCELLED | OUTPATIENT
Start: 2018-08-24

## 2018-08-24 RX ORDER — ACETAMINOPHEN 325 MG/1
650 TABLET ORAL
Status: CANCELLED | OUTPATIENT
Start: 2018-08-24

## 2018-08-24 RX ORDER — METHOCARBAMOL 500 MG/1
500 TABLET, FILM COATED ORAL 2 TIMES DAILY PRN
Qty: 60 TABLET | Refills: 1 | Status: SHIPPED | OUTPATIENT
Start: 2018-08-24 | End: 2021-04-13 | Stop reason: ALTCHOICE

## 2018-08-24 RX ORDER — ZOLEDRONIC ACID 5 MG/100ML
5 INJECTION, SOLUTION INTRAVENOUS
Status: CANCELLED | OUTPATIENT
Start: 2018-08-24

## 2018-08-24 RX ORDER — SODIUM CHLORIDE 0.9 % (FLUSH) 0.9 %
10 SYRINGE (ML) INJECTION
Status: CANCELLED | OUTPATIENT
Start: 2018-08-24

## 2018-08-24 RX ORDER — DICLOFENAC SODIUM 10 MG/G
2 GEL TOPICAL 2 TIMES DAILY
Qty: 1 TUBE | Refills: 2 | Status: SHIPPED | OUTPATIENT
Start: 2018-08-24 | End: 2020-07-30

## 2018-08-24 NOTE — PATIENT INSTRUCTIONS
Alendronate tablets  What is this medicine?  ALENDRONATE (a CARA droe bella) slows calcium loss from bones. It helps to make normal healthy bone and to slow bone loss in people with Paget's disease and osteoporosis. It may be used in others at risk for bone loss.  How should I use this medicine?  You must take this medicine exactly as directed or you will lower the amount of the medicine you absorb into your body or you may cause yourself harm. Take this medicine by mouth first thing in the morning, after you are up for the day. Do not eat or drink anything before you take your medicine. Swallow the tablet with a full glass (6 to 8 fluid ounces) of plain water. Do not take this medicine with any other drink. Do not chew or crush the tablet. After taking this medicine, do not eat breakfast, drink, or take any medicines or vitamins for at least 30 minutes. Sit or stand up for at least 30 minutes after you take this medicine; do not lie down. Do not take your medicine more often than directed.  Talk to your pediatrician regarding the use of this medicine in children. Special care may be needed.  What side effects may I notice from receiving this medicine?  Side effects that you should report to your doctor or health care professional as soon as possible:  · allergic reactions like skin rash, itching or hives, swelling of the face, lips, or tongue  · black or tarry stools  · bone, muscle or joint pain  · changes in vision  · chest pain  · heartburn or stomach pain  · jaw pain, especially after dental work  · pain or trouble when swallowing  · redness, blistering, peeling or loosening of the skin, including inside the mouth  Side effects that usually do not require medical attention (report to your doctor or health care professional if they continue or are bothersome):  · changes in taste  · diarrhea or constipation  · eye pain or itching  · headache  · nausea or vomiting  · stomach gas or fullness  What may interact  with this medicine?  · aluminum hydroxide  · antacids  · aspirin  · calcium supplements  · drugs for inflammation like ibuprofen, naproxen, and others  · iron supplements  · magnesium supplements  · vitamins with minerals  What if I miss a dose?  If you miss a dose, do not take it later in the day. Continue your normal schedule starting the next morning. Do not take double or extra doses.  Where should I keep my medicine?  Keep out of the reach of children.  Store at room temperature of 15 and 30 degrees C (59 and 86 degrees F). Throw away any unused medicine after the expiration date.  What should I tell my health care provider before I take this medicine?  They need to know if you have any of these conditions:  · dental disease  · esophagus, stomach, or intestine problems, like acid reflux or GERD  · kidney disease  · low blood calcium  · low vitamin D  · problems sitting or standing 30 minutes  · trouble swallowing  · an unusual or allergic reaction to alendronate, other medicines, foods, dyes, or preservatives  · pregnant or trying to get pregnant  · breast-feeding  What should I watch for while using this medicine?  Visit your doctor or health care professional for regular checks ups. It may be some time before you see benefit from this medicine. Do not stop taking your medicine except on your doctor's advice. Your doctor or health care professional may order blood tests and other tests to see how you are doing.  You should make sure you get enough calcium and vitamin D while you are taking this medicine, unless your doctor tells you not to. Discuss the foods you eat and the vitamins you take with your health care professional.  Some people who take this medicine have severe bone, joint, and/or muscle pain. This medicine may also increase your risk for a broken thigh bone. Tell your doctor right away if you have pain in your upper leg or groin. Tell your doctor if you have any pain that does not go away or that  gets worse.  This medicine can make you more sensitive to the sun. If you get a rash while taking this medicine, sunlight may cause the rash to get worse. Keep out of the sun. If you cannot avoid being in the sun, wear protective clothing and use sunscreen. Do not use sun lamps or tanning beds/booths.  NOTE:This sheet is a summary. It may not cover all possible information. If you have questions about this medicine, talk to your doctor, pharmacist, or health care provider. Copyright© 2017 [employer]        Zoledronic Acid injection (Paget's Disease, Osteoporosis)  What is this medicine?  ZOLEDRONIC ACID (LIGIA le dron ik AS id) lowers the amount of calcium loss from bone. It is used to treat Paget's disease and osteoporosis in women.  How should I use this medicine?  This medicine is for infusion into a vein. It is given by a health care professional in a hospital or clinic setting.  Talk to your pediatrician regarding the use of this medicine in children. This medicine is not approved for use in children.  What side effects may I notice from receiving this medicine?  Side effects that you should report to your doctor or health care professional as soon as possible:  · allergic reactions like skin rash, itching or hives, swelling of the face, lips, or tongue  · anxiety, confusion, or depression  · breathing problems  · changes in vision  · eye pain  · feeling faint or lightheaded, falls  · jaw pain, especially after dental work  · mouth sores  · muscle cramps, stiffness, or weakness  · redness, blistering, peeling or loosening of the skin, including inside the mouth  · trouble passing urine or change in the amount of urine  Side effects that usually do not require medical attention (report to your doctor or health care professional if they continue or are bothersome):  · bone, joint, or muscle pain  · constipation  · diarrhea  · fever  · hair loss  · irritation at site where injected  · loss of appetite  · nausea,  vomiting  · stomach upset  · trouble sleeping  · trouble swallowing  · weak or tired  What may interact with this medicine?  · certain antibiotics given by injection  · NSAIDs, medicines for pain and inflammation, like ibuprofen or naproxen  · some diuretics like bumetanide, furosemide  · teriparatide  What if I miss a dose?  It is important not to miss your dose. Call your doctor or health care professional if you are unable to keep an appointment.  Where should I keep my medicine?  This drug is given in a hospital or clinic and will not be stored at home.  What should I tell my health care provider before I take this medicine?  They need to know if you have any of these conditions:  · aspirin-sensitive asthma  · cancer, especially if you are receiving medicines used to treat cancer  · dental disease or wear dentures  · infection  · kidney disease  · low levels of calcium in the blood  · past surgery on the parathyroid gland or intestines  · receiving corticosteroids like dexamethasone or prednisone  · an unusual or allergic reaction to zoledronic acid, other medicines, foods, dyes, or preservatives  · pregnant or trying to get pregnant  · breast-feeding  What should I watch for while using this medicine?  Visit your doctor or health care professional for regular checkups. It may be some time before you see the benefit from this medicine. Do not stop taking your medicine unless your doctor tells you to. Your doctor may order blood tests or other tests to see how you are doing.  Women should inform their doctor if they wish to become pregnant or think they might be pregnant. There is a potential for serious side effects to an unborn child. Talk to your health care professional or pharmacist for more information.  You should make sure that you get enough calcium and vitamin D while you are taking this medicine. Discuss the foods you eat and the vitamins you take with your health care professional.  Some people who  take this medicine have severe bone, joint, and/or muscle pain. This medicine may also increase your risk for jaw problems or a broken thigh bone. Tell your doctor right away if you have severe pain in your jaw, bones, joints, or muscles. Tell your doctor if you have any pain that does not go away or that gets worse.  Tell your dentist and dental surgeon that you are taking this medicine. You should not have major dental surgery while on this medicine. See your dentist to have a dental exam and fix any dental problems before starting this medicine. Take good care of your teeth while on this medicine. Make sure you see your dentist for regular follow-up appointments.  NOTE:This sheet is a summary. It may not cover all possible information. If you have questions about this medicine, talk to your doctor, pharmacist, or health care provider. Copyright© 2017 [employer]        Zoledronic Acid injection (Paget's Disease, Osteoporosis)  What is this medicine?  ZOLEDRONIC ACID (LIGIA le dron ik AS id) lowers the amount of calcium loss from bone. It is used to treat Paget's disease and osteoporosis in women.  How should I use this medicine?  This medicine is for infusion into a vein. It is given by a health care professional in a hospital or clinic setting.  Talk to your pediatrician regarding the use of this medicine in children. This medicine is not approved for use in children.  What side effects may I notice from receiving this medicine?  Side effects that you should report to your doctor or health care professional as soon as possible:  · allergic reactions like skin rash, itching or hives, swelling of the face, lips, or tongue  · anxiety, confusion, or depression  · breathing problems  · changes in vision  · eye pain  · feeling faint or lightheaded, falls  · jaw pain, especially after dental work  · mouth sores  · muscle cramps, stiffness, or weakness  · redness, blistering, peeling or loosening of the skin, including  inside the mouth  · trouble passing urine or change in the amount of urine  Side effects that usually do not require medical attention (report to your doctor or health care professional if they continue or are bothersome):  · bone, joint, or muscle pain  · constipation  · diarrhea  · fever  · hair loss  · irritation at site where injected  · loss of appetite  · nausea, vomiting  · stomach upset  · trouble sleeping  · trouble swallowing  · weak or tired  What may interact with this medicine?  · certain antibiotics given by injection  · NSAIDs, medicines for pain and inflammation, like ibuprofen or naproxen  · some diuretics like bumetanide, furosemide  · teriparatide  What if I miss a dose?  It is important not to miss your dose. Call your doctor or health care professional if you are unable to keep an appointment.  Where should I keep my medicine?  This drug is given in a hospital or clinic and will not be stored at home.  What should I tell my health care provider before I take this medicine?  They need to know if you have any of these conditions:  · aspirin-sensitive asthma  · cancer, especially if you are receiving medicines used to treat cancer  · dental disease or wear dentures  · infection  · kidney disease  · low levels of calcium in the blood  · past surgery on the parathyroid gland or intestines  · receiving corticosteroids like dexamethasone or prednisone  · an unusual or allergic reaction to zoledronic acid, other medicines, foods, dyes, or preservatives  · pregnant or trying to get pregnant  · breast-feeding  What should I watch for while using this medicine?  Visit your doctor or health care professional for regular checkups. It may be some time before you see the benefit from this medicine. Do not stop taking your medicine unless your doctor tells you to. Your doctor may order blood tests or other tests to see how you are doing.  Women should inform their doctor if they wish to become pregnant or think  they might be pregnant. There is a potential for serious side effects to an unborn child. Talk to your health care professional or pharmacist for more information.  You should make sure that you get enough calcium and vitamin D while you are taking this medicine. Discuss the foods you eat and the vitamins you take with your health care professional.  Some people who take this medicine have severe bone, joint, and/or muscle pain. This medicine may also increase your risk for jaw problems or a broken thigh bone. Tell your doctor right away if you have severe pain in your jaw, bones, joints, or muscles. Tell your doctor if you have any pain that does not go away or that gets worse.  Tell your dentist and dental surgeon that you are taking this medicine. You should not have major dental surgery while on this medicine. See your dentist to have a dental exam and fix any dental problems before starting this medicine. Take good care of your teeth while on this medicine. Make sure you see your dentist for regular follow-up appointments.  NOTE:This sheet is a summary. It may not cover all possible information. If you have questions about this medicine, talk to your doctor, pharmacist, or health care provider. Copyright© 2017 [employer]

## 2018-08-24 NOTE — PROGRESS NOTES
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    Nuclear sclerosis - Both Eyes    Alopecia, unspecified    Sleep related leg cramps    Unspecified gastritis and gastroduodenitis without mention of hemorrhage    HLD (hyperlipidemia)    Osteopenia of multiple sites    Bilateral primary osteoarthritis of knee    Muscle cramps    Statin myopathy    Primary osteoarthritis of right knee    Primary osteoarthritis of both knees     Today , here for f/u of osteoarthritis, osteopenia with high FRAX and her last labs  Recent labs reveal normal RF/CCP/HELGA with normal ESR/CRP      Since last visit she is on Flexeril it helps with the back pain but makes her drowsy as well  Back pain has been stable it is aggravated with activity  No radiation, loss of sensations.  No incontinence    Her main concern today is stiffness in right middle finger  She had trigger finger in the past which was repaired and no longer has any trouble with triggering but it is the right 3rd DIP which is swollen and bothers her  She declines injections today and she tries to avoid needles    She has chronic bilateral knee OA and get Synvisc injections per Ortho   She has completed PT  She sparingly uses Mobic only when needed    She is on Fosamax for osteopenia  But her GERD is worsening with Fosamax and would try alternative options  She was intolerant to Forteo in the past as well  Denies any jaw pain, bleeding gums, dental caries.  No anticipated dental work.  She is aware to notify dentist prior to any dental procedures      Initial visit :  She mentions of generalized arthralgias with cramping   May occur any time   They are prominent with activity and some times at night   It involves both legs and lower back   She has b/l hand  pains , aggravated with activity , LBP aggravated with activity , relieved with rest   No radiation down the legs   She has early am stiffness for few minutes , but pain actually prominent with activity    She has b/l knee pains , left > right , is currently following with ortho for synvisc injections   She had DEXA suggestive of osteopenia   Dr Valdez / josé  started her on Forteo.  She had GI side effects with Forteo and was not happy about the cost of it.  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       Past Surgical History:   Procedure Laterality Date    CHOLECYSTECTOMY      DILATION AND CURETTAGE OF UTERUS      finger surgery      middle finger on right hand    GALLBLADDER SURGERY      TONSILLECTOMY, ADENOIDECTOMY      TUBAL LIGATION           Social History     Tobacco Use    Smoking status: Former Smoker     Packs/day: 0.50     Years: 12.00     Pack years: 6.00     Types: Cigarettes    Smokeless tobacco: Never Used   Substance Use Topics    Alcohol use: No    Drug use: No       Family History   Problem Relation Age of Onset    Diabetes Father     Cancer Father         prostate ca    Hypertension Father     Cancer Sister         cervical ca    Hepatitis Sister     Dementia Mother     Osteoporosis Mother        Review of patient's allergies indicates:   Allergen Reactions    Augmentin [amoxicillin-pot clavulanate] Other (See Comments)     Diarrhea, yeast    Bactrim  [sulfamethoxazole-trimethoprim]      Other reaction(s): Unknown    Celebrex [celecoxib] Other (See Comments)     Stomach pain, gas     Lipitor  [atorvastatin]      Other reaction(s): Unknown    Pravachol  [pravastatin]      Other reaction(s): Unknown    Statins-hmg-coa  reductase inhibitors      Other reaction(s): Muscle pain    Sulfa (sulfonamide antibiotics)      Other reaction(s): Swelling             Review of Systems:  Constitutional: Denies fever, chills. No recent weight changes.   Fatigue: no  Muscle weakness: no  Headaches: no new headaches  Eyes: No redness or dryness.  No recent visual changes.  ENT: Denies dry mouth. No oral or nasal ulcers.  Card: No chest pain.  Resp: No cough or sob.   Gastro: No nausea or vomiting.  No heartburn.  Constipation: no  Diarrhea: no  Genito:  No dysuria.  No genital ulcers.  Skin: No rash.  Raynauds:no  Neuro: No numbness / tingling.   Psych: No depression, anxiety  Endo:  no excess thirst.  Heme: no abnormal bleeding or bruising  Clots:none       OBJECTIVE:     Vital Signs   Vitals:    08/24/18 0848   BP: 117/67   Pulse: 65     Physical Exam:  General Appearance:  NAD.   Gait: not favoring.  HEENT: PERRL.  Eyes not dry or injected.  No nasal ulcers.  Mouth not dry, no oral lesions.  Lymph: cervical, supraclavicular or axillary nodes: none abnormal   Cardio: no irregularity of S1 or S2.  No gallops or rubs.   Resp: Normal respiratory motion. Clear to auscultation bilaterally.   No abnormal chest conformation.  Abd: Soft, non-tender, nondistended.  No masses.   Skin: Head and neck,  and extremities examined. No rashes.   Neuro: Ox3.   Cranial nerves II-XII grossly intact.   Sensation intact  in both distal LE and upper extremities to light touch.  Musculoskeletal Exam:    Rt hand : prominent OA changes   Right 3rd DIP S+ T     Left hand : + OA changes     Spine:  Normal exam normal range of motion  Muscle strength:Equal and full in all mm groups of the upper and lower ext.    Laboratory:   Results for orders placed or performed in visit on 05/23/18   HELGA   Result Value Ref Range    HELGA Screen Negative <1:160 Negative <1:160   Rheumatoid factor   Result Value Ref Range    Rheumatoid Factor <10.0 0.0 - 15.0 IU/mL   Cyclic citrul peptide  antibody, IgG   Result Value Ref Range    CCP Antibodies <0.5 <5.0 U/mL   Sedimentation rate, manual   Result Value Ref Range    Sed Rate 7 0 - 20 mm/Hr   C-reactive protein   Result Value Ref Range    CRP 1.9 0.0 - 8.2 mg/L     Lab Results   Component Value Date    SEDRATE 7 05/23/2018       Imaging :    The L1 to L4 vertebral bone mineral density is equal to 1.026 g/cm squared with a T score of -1.3 and a Z score of 0.4.  This represents a 1.8% increase in bone mineral density since the prior study  which is not a statistically significant change.    The left femoral neck bone mineral density is equal to 0.698 g/cm squared with a T score of -2.4 and a Z score of -0.7.  This represents a 3.6% increase in bone mineral density since the prior study  which is not a statistically significant change.   Impression      Osteopenia -- there is a 22.6% risk of a major osteoporotic fracture and a 8.2% risk of hip fracture in the next 10 years (FRAX).         Notes reviewed  Other procedures:    ASSESSMENT/PLAN:     Primary osteoarthritis of both knees    Hand arthritis    Osteopenia of multiple sites    Other orders  -     methocarbamol (ROBAXIN) 500 MG Tab; Take 1 tablet (500 mg total) by mouth 2 (two) times daily as needed.  Dispense: 60 tablet; Refill: 1  -     diclofenac sodium (VOLTAREN) 1 % Gel; Apply 2 g topically 2 (two) times daily.  Dispense: 1 Tube; Refill: 2      1: Generalized OA : inv hands  / back / knees     Rf/CCP/HELGA - negative   Hand - 3 rd DIP - S+ T   C/w ortho for synvisc injections for knee OA   tylenol as needed   Try flexeril prn qhs for back pain  During the day time try robaxin as flexeril makes her drowsy   Voltaren gel topical    2: Osteopenia :  With high FRAX   Has GI intolerance to Fosamax , will switch to IV Reclast  Vitamin d otc     Risks vs Benefits and potential side effects of medication prescribed today were discussed with patient.    Medication literature given to patient up discharge.  We also discussed about jaw necrosis and atypical fractures which are rare side effects from the medication.      will schedule IV Reclast in next few weeks  6  month return for routine follow-up    Disclaimer: This note was prepared using voice recognition system and is likely to have sound alike errors and is not proof read.  Please call me with any questions.

## 2018-08-27 ENCOUNTER — TELEPHONE (OUTPATIENT)
Dept: RHEUMATOLOGY | Facility: CLINIC | Age: 73
End: 2018-08-27

## 2018-08-27 NOTE — TELEPHONE ENCOUNTER
ePatient state that both Baclofen and Voltaren Gel need a prior auth . Informed patient that we will fill out the correct forms and fax to insurance.

## 2018-08-27 NOTE — TELEPHONE ENCOUNTER
----- Message from Layla Mills sent at 8/27/2018 11:02 AM CDT -----  Contact: pt  Request a call concerning a prior authorization on a med refill, the pt can be reached at [phone]///[email or web address]

## 2018-08-28 ENCOUNTER — TELEPHONE (OUTPATIENT)
Dept: RHEUMATOLOGY | Facility: CLINIC | Age: 73
End: 2018-08-28

## 2018-08-28 DIAGNOSIS — M81.0 AGE-RELATED OSTEOPOROSIS WITHOUT CURRENT PATHOLOGICAL FRACTURE: Primary | ICD-10-CM

## 2018-08-28 NOTE — TELEPHONE ENCOUNTER
----- Message from Maribel Youssef MD sent at 8/28/2018  8:24 AM CDT -----  Calcium levels high , will need to decrease ca supplements and vit d supplements you are taking and repeat levels in 2 weeks , along with PTH , orders placed

## 2018-09-07 ENCOUNTER — INFUSION (OUTPATIENT)
Dept: RHEUMATOLOGY | Facility: HOSPITAL | Age: 73
End: 2018-09-07
Attending: INTERNAL MEDICINE
Payer: MEDICARE

## 2018-09-07 VITALS
WEIGHT: 178.38 LBS | HEART RATE: 82 BPM | OXYGEN SATURATION: 98 % | TEMPERATURE: 97 F | DIASTOLIC BLOOD PRESSURE: 82 MMHG | BODY MASS INDEX: 33.7 KG/M2 | RESPIRATION RATE: 18 BRPM | SYSTOLIC BLOOD PRESSURE: 133 MMHG

## 2018-09-07 DIAGNOSIS — M81.0 AGE-RELATED OSTEOPOROSIS WITHOUT CURRENT PATHOLOGICAL FRACTURE: Primary | ICD-10-CM

## 2018-09-07 PROCEDURE — 25000003 PHARM REV CODE 250: Mod: PO | Performed by: INTERNAL MEDICINE

## 2018-09-07 PROCEDURE — 63600175 PHARM REV CODE 636 W HCPCS: Mod: PO | Performed by: INTERNAL MEDICINE

## 2018-09-07 PROCEDURE — 96365 THER/PROPH/DIAG IV INF INIT: CPT | Mod: PO

## 2018-09-07 PROCEDURE — A4216 STERILE WATER/SALINE, 10 ML: HCPCS | Mod: PO | Performed by: INTERNAL MEDICINE

## 2018-09-07 RX ORDER — ACETAMINOPHEN 325 MG/1
650 TABLET ORAL
Status: COMPLETED | OUTPATIENT
Start: 2018-09-07 | End: 2018-09-07

## 2018-09-07 RX ORDER — ZOLEDRONIC ACID 5 MG/100ML
5 INJECTION, SOLUTION INTRAVENOUS
Status: COMPLETED | OUTPATIENT
Start: 2018-09-07 | End: 2018-09-07

## 2018-09-07 RX ORDER — HEPARIN 100 UNIT/ML
500 SYRINGE INTRAVENOUS
Status: CANCELLED | OUTPATIENT
Start: 2018-09-07

## 2018-09-07 RX ORDER — SODIUM CHLORIDE 0.9 % (FLUSH) 0.9 %
10 SYRINGE (ML) INJECTION
Status: DISCONTINUED | OUTPATIENT
Start: 2018-09-07 | End: 2018-09-07 | Stop reason: HOSPADM

## 2018-09-07 RX ORDER — ACETAMINOPHEN 325 MG/1
650 TABLET ORAL
Status: CANCELLED | OUTPATIENT
Start: 2018-09-07

## 2018-09-07 RX ORDER — ZOLEDRONIC ACID 5 MG/100ML
5 INJECTION, SOLUTION INTRAVENOUS
Status: CANCELLED | OUTPATIENT
Start: 2018-09-07

## 2018-09-07 RX ORDER — SODIUM CHLORIDE 0.9 % (FLUSH) 0.9 %
10 SYRINGE (ML) INJECTION
Status: CANCELLED | OUTPATIENT
Start: 2018-09-07

## 2018-09-07 RX ADMIN — ACETAMINOPHEN 650 MG: 325 TABLET ORAL at 09:09

## 2018-09-07 RX ADMIN — ZOLEDRONIC ACID 5 MG: 5 INJECTION, SOLUTION INTRAVENOUS at 09:09

## 2018-09-07 RX ADMIN — SODIUM CHLORIDE, PRESERVATIVE FREE 10 ML: 5 INJECTION INTRAVENOUS at 09:09

## 2018-09-07 NOTE — PROGRESS NOTES
Infusion# 1  S/S infection noted or voiced? denies  Recent labs? yes  Recent vaccines? denies    Premeds? Tylenol 650 mg PO    Reclast 5 mg administered IV at a 20 minute rate per orders; see MAR & Flow Sheet for more  details. Tolerated well without adverse events

## 2018-09-07 NOTE — PATIENT INSTRUCTIONS
Zoledronic Acid injection (Paget's Disease, Osteoporosis)  What is this medicine?  ZOLEDRONIC ACID (LIGIA le dron ik AS id) lowers the amount of calcium loss from bone. It is used to treat Paget's disease and osteoporosis in women.  How should I use this medicine?  This medicine is for infusion into a vein. It is given by a health care professional in a hospital or clinic setting.  Talk to your pediatrician regarding the use of this medicine in children. This medicine is not approved for use in children.  What side effects may I notice from receiving this medicine?  Side effects that you should report to your doctor or health care professional as soon as possible:  · allergic reactions like skin rash, itching or hives, swelling of the face, lips, or tongue  · anxiety, confusion, or depression  · breathing problems  · changes in vision  · eye pain  · feeling faint or lightheaded, falls  · jaw pain, especially after dental work  · mouth sores  · muscle cramps, stiffness, or weakness  · redness, blistering, peeling or loosening of the skin, including inside the mouth  · trouble passing urine or change in the amount of urine  Side effects that usually do not require medical attention (report to your doctor or health care professional if they continue or are bothersome):  · bone, joint, or muscle pain  · constipation  · diarrhea  · fever  · hair loss  · irritation at site where injected  · loss of appetite  · nausea, vomiting  · stomach upset  · trouble sleeping  · trouble swallowing  · weak or tired  What may interact with this medicine?  · certain antibiotics given by injection  · NSAIDs, medicines for pain and inflammation, like ibuprofen or naproxen  · some diuretics like bumetanide, furosemide  · teriparatide  What if I miss a dose?  It is important not to miss your dose. Call your doctor or health care professional if you are unable to keep an appointment.  Where should I keep my medicine?  This drug is given in a  hospital or clinic and will not be stored at home.  What should I tell my health care provider before I take this medicine?  They need to know if you have any of these conditions:  · aspirin-sensitive asthma  · cancer, especially if you are receiving medicines used to treat cancer  · dental disease or wear dentures  · infection  · kidney disease  · low levels of calcium in the blood  · past surgery on the parathyroid gland or intestines  · receiving corticosteroids like dexamethasone or prednisone  · an unusual or allergic reaction to zoledronic acid, other medicines, foods, dyes, or preservatives  · pregnant or trying to get pregnant  · breast-feeding  What should I watch for while using this medicine?  Visit your doctor or health care professional for regular checkups. It may be some time before you see the benefit from this medicine. Do not stop taking your medicine unless your doctor tells you to. Your doctor may order blood tests or other tests to see how you are doing.  Women should inform their doctor if they wish to become pregnant or think they might be pregnant. There is a potential for serious side effects to an unborn child. Talk to your health care professional or pharmacist for more information.  You should make sure that you get enough calcium and vitamin D while you are taking this medicine. Discuss the foods you eat and the vitamins you take with your health care professional.  Some people who take this medicine have severe bone, joint, and/or muscle pain. This medicine may also increase your risk for jaw problems or a broken thigh bone. Tell your doctor right away if you have severe pain in your jaw, bones, joints, or muscles. Tell your doctor if you have any pain that does not go away or that gets worse.  Tell your dentist and dental surgeon that you are taking this medicine. You should not have major dental surgery while on this medicine. See your dentist to have a dental exam and fix any dental  problems before starting this medicine. Take good care of your teeth while on this medicine. Make sure you see your dentist for regular follow-up appointments.  NOTE:This sheet is a summary. It may not cover all possible information. If you have questions about this medicine, talk to your doctor, pharmacist, or health care provider. Copyright© 2017 [employer]

## 2018-09-17 ENCOUNTER — TELEPHONE (OUTPATIENT)
Dept: RHEUMATOLOGY | Facility: CLINIC | Age: 73
End: 2018-09-17

## 2018-09-24 ENCOUNTER — IMMUNIZATION (OUTPATIENT)
Dept: INTERNAL MEDICINE | Facility: CLINIC | Age: 73
End: 2018-09-24
Payer: MEDICARE

## 2018-09-24 PROCEDURE — 90662 IIV NO PRSV INCREASED AG IM: CPT | Mod: PBBFAC,PO

## 2018-11-27 ENCOUNTER — TELEPHONE (OUTPATIENT)
Dept: INTERNAL MEDICINE | Facility: CLINIC | Age: 73
End: 2018-11-27

## 2018-11-27 DIAGNOSIS — Z12.31 BREAST CANCER SCREENING BY MAMMOGRAM: Primary | ICD-10-CM

## 2018-11-27 NOTE — TELEPHONE ENCOUNTER
----- Message from Michelle Suazo sent at 11/27/2018  1:29 PM CST -----  please put in yearly mammo orders..[phone]

## 2018-12-14 ENCOUNTER — HOSPITAL ENCOUNTER (OUTPATIENT)
Dept: RADIOLOGY | Facility: HOSPITAL | Age: 73
Discharge: HOME | End: 2018-12-14
Attending: INTERNAL MEDICINE
Payer: MEDICARE

## 2018-12-14 VITALS — WEIGHT: 178 LBS | HEIGHT: 61 IN | BODY MASS INDEX: 33.61 KG/M2

## 2018-12-14 DIAGNOSIS — Z12.31 BREAST CANCER SCREENING BY MAMMOGRAM: ICD-10-CM

## 2018-12-14 PROCEDURE — 77067 SCR MAMMO BI INCL CAD: CPT | Mod: 26,,, | Performed by: RADIOLOGY

## 2018-12-14 PROCEDURE — 77063 BREAST TOMOSYNTHESIS BI: CPT | Mod: 26,,, | Performed by: RADIOLOGY

## 2018-12-14 PROCEDURE — 77067 SCR MAMMO BI INCL CAD: CPT | Mod: TC,PO

## 2018-12-14 PROCEDURE — 77063 BREAST TOMOSYNTHESIS BI: CPT | Mod: TC,PO

## 2018-12-18 NOTE — PROGRESS NOTES
Your mammogram is normal.  Repeat screening is recommended in one year.    Sincerely,    Warren Lemus M.D.        If you would like to review your experience with Dr. Lemus or [facility], please follow the link below:    [email or web address]

## 2019-01-23 ENCOUNTER — OFFICE VISIT (OUTPATIENT)
Dept: OPHTHALMOLOGY | Facility: CLINIC | Age: 74
End: 2019-01-23
Payer: MEDICARE

## 2019-01-23 DIAGNOSIS — M35.01 KERATITIS SICCA, BILATERAL: ICD-10-CM

## 2019-01-23 DIAGNOSIS — H40.013 OPEN ANGLE WITH BORDERLINE FINDINGS, LOW RISK, BILATERAL: ICD-10-CM

## 2019-01-23 DIAGNOSIS — H25.11 NUCLEAR SENILE CATARACT OF RIGHT EYE: ICD-10-CM

## 2019-01-23 DIAGNOSIS — H25.12 NUCLEAR SENILE CATARACT OF LEFT EYE: Primary | ICD-10-CM

## 2019-01-23 PROCEDURE — 92136 IOL MASTER - OS - LEFT EYE: ICD-10-PCS | Mod: LT,S$GLB,, | Performed by: OPHTHALMOLOGY

## 2019-01-23 PROCEDURE — 99999 PR PBB SHADOW E&M-EST. PATIENT-LVL II: ICD-10-PCS | Mod: PBBFAC,,, | Performed by: OPHTHALMOLOGY

## 2019-01-23 PROCEDURE — 92014 COMPRE OPH EXAM EST PT 1/>: CPT | Mod: S$GLB,,, | Performed by: OPHTHALMOLOGY

## 2019-01-23 PROCEDURE — 92136 OPHTHALMIC BIOMETRY: CPT | Mod: LT,S$GLB,, | Performed by: OPHTHALMOLOGY

## 2019-01-23 PROCEDURE — 99999 PR PBB SHADOW E&M-EST. PATIENT-LVL II: CPT | Mod: PBBFAC,,, | Performed by: OPHTHALMOLOGY

## 2019-01-23 PROCEDURE — 92014 PR EYE EXAM, EST PATIENT,COMPREHESV: ICD-10-PCS | Mod: S$GLB,,, | Performed by: OPHTHALMOLOGY

## 2019-01-23 RX ORDER — KETOROLAC TROMETHAMINE 5 MG/ML
1 SOLUTION OPHTHALMIC 4 TIMES DAILY
Qty: 1 BOTTLE | Refills: 3 | Status: SHIPPED | OUTPATIENT
Start: 2019-01-23 | End: 2019-01-28

## 2019-01-23 RX ORDER — POLYMYXIN B SULFATE AND TRIMETHOPRIM 1; 10000 MG/ML; [USP'U]/ML
1 SOLUTION OPHTHALMIC EVERY 4 HOURS
Qty: 1 BOTTLE | Refills: 1 | Status: SHIPPED | OUTPATIENT
Start: 2019-01-23 | End: 2019-01-28

## 2019-01-23 RX ORDER — DIFLUPREDNATE OPHTHALMIC 0.5 MG/ML
1 EMULSION OPHTHALMIC 4 TIMES DAILY
Qty: 1 BOTTLE | Refills: 1 | Status: SHIPPED | OUTPATIENT
Start: 2019-01-23 | End: 2019-02-22

## 2019-01-23 NOTE — PROGRESS NOTES
HPI     Patient returns for a 6 month dry eye check, patient says ou have trouble   with glare, blinded by lights, and cant drive at night due to glare       PCP: Dr. Lemus  1. COAG SUSP  SLT OD 3/26/15  SLT OS 5/15  2. Dry Eyes  3. NSC OU    In the past Pt wore cls for monovision- then changed to MF cls     OU Systane Ultra BID, Genteal GEL QHS    Last edited by Ramiro Palomino MD on 1/23/2019  2:30 PM. (History)            Assessment /Plan     For exam results, see Encounter Report.      ICD-10-CM ICD-9-CM    1. Nuclear senile cataract of left eye H25.12 366.16 Visually Significant Cataract OS  Patient reports decreased vision consistent with the clinical amount of lenticular opacity, which reaches the level of visual significance and affects activities of daily living including reading and glare. Risks, benefits, and alternatives to cataract surgery were discussed.   The pt expressed a desire to proceed with surgery with the potential for some reasonable degree of visual improvement.    Discussed IOL options and refractive outcomes for this patient.    Phaco left eye,   Topical  monofocal IOL.  Will aim for distance  Referral to [facility] for Ophthalmic surgery  Prescriptions sent for preoperative medications  Durezol, polytrim and ketorolac  Explained that patient may need glasses after surgery.  Discussed that vision may be limited by dryness, PVD    25.5         2. Open angle with borderline findings, low risk, bilateral H40.013 365.01 Glaucoma risk level is unchanged at this time and patient will continued to be followed.      3. Nuclear senile cataract of right eye H25.11 366.16 Will do OS first.    4. Keratitis sicca, bilateral H16.223 370.8 Well. Continue current treatment.        Return to clinic for phaco OS

## 2019-02-04 ENCOUNTER — OUTSIDE PLACE OF SERVICE (OUTPATIENT)
Dept: ADMINISTRATIVE | Facility: OTHER | Age: 74
End: 2019-02-04
Payer: MEDICARE

## 2019-02-04 PROCEDURE — 66984 XCAPSL CTRC RMVL W/O ECP: CPT | Mod: LT,,, | Performed by: OPHTHALMOLOGY

## 2019-02-04 PROCEDURE — 66984 PR REMOVAL, CATARACT, W/INSRT INTRAOC LENS, W/O ENDO CYCLO: ICD-10-PCS | Mod: LT,,, | Performed by: OPHTHALMOLOGY

## 2019-02-05 ENCOUNTER — OFFICE VISIT (OUTPATIENT)
Dept: OPHTHALMOLOGY | Facility: CLINIC | Age: 74
End: 2019-02-05
Payer: MEDICARE

## 2019-02-05 DIAGNOSIS — Z98.42 CATARACT EXTRACTION STATUS, LEFT: ICD-10-CM

## 2019-02-05 DIAGNOSIS — Z98.890 POST-OPERATIVE STATE: Primary | ICD-10-CM

## 2019-02-05 PROCEDURE — 99999 PR PBB SHADOW E&M-EST. PATIENT-LVL I: ICD-10-PCS | Mod: PBBFAC,,, | Performed by: OPHTHALMOLOGY

## 2019-02-05 PROCEDURE — 99999 PR PBB SHADOW E&M-EST. PATIENT-LVL I: CPT | Mod: PBBFAC,,, | Performed by: OPHTHALMOLOGY

## 2019-02-05 PROCEDURE — 99024 PR POST-OP FOLLOW-UP VISIT: ICD-10-PCS | Mod: S$GLB,,, | Performed by: OPHTHALMOLOGY

## 2019-02-05 PROCEDURE — 99024 POSTOP FOLLOW-UP VISIT: CPT | Mod: S$GLB,,, | Performed by: OPHTHALMOLOGY

## 2019-02-05 RX ORDER — POLYMYXIN B SULFATE AND TRIMETHOPRIM 1; 10000 MG/ML; [USP'U]/ML
1 SOLUTION OPHTHALMIC 4 TIMES DAILY
COMMUNITY
End: 2019-04-16

## 2019-02-05 RX ORDER — KETOROLAC TROMETHAMINE 5 MG/ML
1 SOLUTION OPHTHALMIC 4 TIMES DAILY
COMMUNITY
End: 2019-04-16 | Stop reason: ALTCHOICE

## 2019-02-05 NOTE — PROGRESS NOTES
HPI     Post-op Evaluation      Additional comments: 1 day PO PCIOL OS              Comments     1 day PO PCIOL OS  No pain today, had a headache yesterday  VA improving OS    PCP: Dr. Lemus  1. COAG SUSP  SLT OD 3/26/15  SLT OS 5/15  2. Dry Eyes  3. NSC OD  PCIOL OS 2-5-19    In the past Pt wore cls for monovision- then changed to MF cls     OU Systane Ultra BID, Genteal GEL QHS  OS: Polytrim, Durezol, Ketorolac QID          Last edited by Bhavana Godinez on 2/5/2019 11:58 AM. (History)            Assessment /Plan     For exam results, see Encounter Report.      ICD-10-CM ICD-9-CM    1. Post-operative state Z98.890 V45.89 1 day phaco OS. Doing well    2. Cataract extraction status, left Z98.42 V45.61        PO Day 1 S/P Phaco/IOL  left eye  Doing well.    Use Durezol QID  Ketorolac until 2/10  Polytrim  4 x day until 2/10  Reinstructed in importance of absolute compliance with Post-OP instructions including medications, shield at bedtime, and limitation of activities. Follow up appointments in approximately one and six weeks or call immediately for increased pain, redness or vision loss.

## 2019-02-12 ENCOUNTER — OFFICE VISIT (OUTPATIENT)
Dept: OPHTHALMOLOGY | Facility: CLINIC | Age: 74
End: 2019-02-12
Payer: MEDICARE

## 2019-02-12 DIAGNOSIS — Z98.890 POST-OPERATIVE STATE: Primary | ICD-10-CM

## 2019-02-12 DIAGNOSIS — H40.013 OPEN ANGLE WITH BORDERLINE FINDINGS, LOW RISK, BILATERAL: ICD-10-CM

## 2019-02-12 DIAGNOSIS — H25.11 NUCLEAR SENILE CATARACT OF RIGHT EYE: ICD-10-CM

## 2019-02-12 DIAGNOSIS — Z98.42 CATARACT EXTRACTION STATUS, LEFT: ICD-10-CM

## 2019-02-12 PROCEDURE — 92136 IOL MASTER - MOD 26- OD- RIGHT EYE: ICD-10-PCS | Mod: 26,RT,S$GLB, | Performed by: OPHTHALMOLOGY

## 2019-02-12 PROCEDURE — 99024 PR POST-OP FOLLOW-UP VISIT: ICD-10-PCS | Mod: S$GLB,,, | Performed by: OPHTHALMOLOGY

## 2019-02-12 PROCEDURE — 99999 PR PBB SHADOW E&M-EST. PATIENT-LVL II: CPT | Mod: PBBFAC,,, | Performed by: OPHTHALMOLOGY

## 2019-02-12 PROCEDURE — 92136 OPHTHALMIC BIOMETRY: CPT | Mod: 26,RT,S$GLB, | Performed by: OPHTHALMOLOGY

## 2019-02-12 PROCEDURE — 99999 PR PBB SHADOW E&M-EST. PATIENT-LVL II: ICD-10-PCS | Mod: PBBFAC,,, | Performed by: OPHTHALMOLOGY

## 2019-02-12 PROCEDURE — 99024 POSTOP FOLLOW-UP VISIT: CPT | Mod: S$GLB,,, | Performed by: OPHTHALMOLOGY

## 2019-02-12 RX ORDER — DIFLUPREDNATE OPHTHALMIC 0.5 MG/ML
1 EMULSION OPHTHALMIC 4 TIMES DAILY
Qty: 1 BOTTLE | Refills: 1 | Status: SHIPPED | OUTPATIENT
Start: 2019-02-12 | End: 2019-03-14

## 2019-02-12 NOTE — PROGRESS NOTES
HPI     Post-op Evaluation      Additional comments: 1 week PCIOL OS              Comments     The patient states her left eye is doing well.  100% drop compliance    PCP: Dr. Lemus  1. COAG SUSP  SLT OD 3/26/15  SLT OS 5/15  2. Dry Eyes  3. NSC OD  PCIOL OS +25.5 SN60WF / CDE: 9.46 / 2-4-19 2-5-19    In the past Pt wore cls for monovision- then changed to MF cls     OU: Systane Ultra BID, Genteal GEL QHS  OS: Durezol QID          Last edited by Melly House on 2/12/2019  3:34 PM. (History)            Assessment /Plan     For exam results, see Encounter Report.      ICD-10-CM ICD-9-CM    1. Post-operative state Z98.890 V45.89 1 wk s/p phaco OS. Doing well.     PO Week 1 S/P Phaco/ IOL left eye:   Doing well with no evidence of infection or abnormal inflammation.     D/C antibiotic drops and NSAID  Taper Durezol weekly per instruction sheet.  Resume normal activitites and d/c eye shield.  OTC reading glasses can be used until evaluated for final MR.  D/c Shield at night              2. Cataract extraction status, left Z98.42 V45.61    3. Open angle with borderline findings, low risk, bilateral H40.013 365.01 IOP normal.    4. Nuclear senile cataract of right eye H25.11 366.16 Visually Significant Cataract OD  Patient reports decreased vision consistent with the clinical amount of lenticular opacity, which reaches the level of visual significance and affects activities of daily living including reading and glare. Risks, benefits, and alternatives to cataract surgery were discussed.   The pt expressed a desire to proceed with surgery with the potential for some reasonable degree of visual improvement.    Discussed IOL options and refractive outcomes for this patient.    Phaco right eye,   Topical   monofocal IOL.  Will aim for near  Referral to [facility] for Ophthalmic surgery  Prescriptions sent for preoperative medications  Durezol, polytrim and ketorolac  Explained that patient may need glasses  after surgery.  Discussed that vision may be limited by PVD     Aim for -1.75         Return to clinic for phaco OD, aim for near -1.75

## 2019-02-25 ENCOUNTER — OFFICE VISIT (OUTPATIENT)
Dept: RHEUMATOLOGY | Facility: CLINIC | Age: 74
End: 2019-02-25
Payer: MEDICARE

## 2019-02-25 VITALS
BODY MASS INDEX: 34.41 KG/M2 | DIASTOLIC BLOOD PRESSURE: 79 MMHG | WEIGHT: 182.13 LBS | SYSTOLIC BLOOD PRESSURE: 122 MMHG | HEART RATE: 73 BPM

## 2019-02-25 DIAGNOSIS — M85.89 OSTEOPENIA OF MULTIPLE SITES: Primary | ICD-10-CM

## 2019-02-25 PROCEDURE — 99214 PR OFFICE/OUTPT VISIT, EST, LEVL IV, 30-39 MIN: ICD-10-PCS | Mod: S$GLB,,, | Performed by: INTERNAL MEDICINE

## 2019-02-25 PROCEDURE — 99214 OFFICE O/P EST MOD 30 MIN: CPT | Mod: S$GLB,,, | Performed by: INTERNAL MEDICINE

## 2019-02-25 PROCEDURE — 99999 PR PBB SHADOW E&M-EST. PATIENT-LVL III: ICD-10-PCS | Mod: PBBFAC,,, | Performed by: INTERNAL MEDICINE

## 2019-02-25 PROCEDURE — 99999 PR PBB SHADOW E&M-EST. PATIENT-LVL III: CPT | Mod: PBBFAC,,, | Performed by: INTERNAL MEDICINE

## 2019-02-25 PROCEDURE — 1101F PT FALLS ASSESS-DOCD LE1/YR: CPT | Mod: CPTII,S$GLB,, | Performed by: INTERNAL MEDICINE

## 2019-02-25 PROCEDURE — 1101F PR PT FALLS ASSESS DOC 0-1 FALLS W/OUT INJ PAST YR: ICD-10-PCS | Mod: CPTII,S$GLB,, | Performed by: INTERNAL MEDICINE

## 2019-02-25 NOTE — PROGRESS NOTES
CC:  Chief Complaint   Patient presents with    Follow-up     6mo follow up; having pain in knees, stiffrness in hands and tingling in ankles, and lower back pain     And osteoarthritis    History of Present Illness:  Jessica Vazquez a 74 y.o.yo female   Patient Active Problem List   Diagnosis    Open angle with borderline findings, low risk - Both Eyes    Nuclear sclerosis - Both Eyes    Alopecia, unspecified    Sleep related leg cramps    Unspecified gastritis and gastroduodenitis without mention of hemorrhage    HLD (hyperlipidemia)    Osteopenia of multiple sites    Bilateral primary osteoarthritis of knee    Muscle cramps    Statin myopathy    Primary osteoarthritis of right knee    Primary osteoarthritis of both knees    Age-related osteoporosis without current pathological fracture     Today , here for f/u of osteoarthritis, osteopenia with high FRAX   Labs  reveal normal RF/CCP/HELGA with normal ESR/CRP  Since last visit she received her 1st dose of IV Reclast   Tolerated well no issues  She takes calcium and vitamin-D OTC, with last vitamin-D levels within normal limits  No jaw pain, bleeding gums or dental caries or anticipated dental work        Back pain has been stable it is aggravated with activity  No radiation, loss of sensations.  No incontinence  Uses Robaxin as needed along with Mobic as needed and this helps with the back as well as knees    Her main concern today is stiffness in right middle finger  She had trigger finger in the past which was repaired and no longer has any trouble with triggering but it is the right 3rd DIP which is swollen and bothers her  Uses Voltaren gel as needed and this helps  She is not interested in any injections as this is tolerable now      She has chronic bilateral knee OA and get Synvisc injections per Ortho   She has completed PT  She sparingly uses Mobic only when needed    She what on Fosamax for osteopenia  But add worsening GERD on  Fosamax, so more to Reclast IV in September 2018  She was intolerant to Forteo in the past as well  She is aware to notify dentist prior to any dental procedures      Initial visit :  She mentions of generalized arthralgias with cramping   May occur any time   They are prominent with activity and some times at night   It involves both legs and lower back   She has b/l hand pains , aggravated with activity , LBP aggravated with activity , relieved with rest   No radiation down the legs   She has early am stiffness for few minutes , but pain actually prominent with activity    She has b/l knee pains , left > right , is currently following with ortho for synvisc injections   She had DEXA suggestive of osteopenia   Dr Valdez / ortho  started her on Forteo.  She had GI side effects with Forteo and was not happy about the cost of it.  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       Past Surgical History:   Procedure Laterality Date    CATARACT EXTRACTION W/  INTRAOCULAR LENS IMPLANT Left 02/04/2019    CHOLECYSTECTOMY      COLONOSCOPY N/A 10/25/2013    Performed by Eber Rudd MD at [facility] [place] [facility]    DILATION AND CURETTAGE OF UTERUS      finger surgery      middle finger on right hand    GALLBLADDER SURGERY      TONSILLECTOMY, ADENOIDECTOMY      TUBAL LIGATION           Social History     Tobacco Use    Smoking status: Former Smoker     Packs/day: 0.50     Years: 12.00     Pack years: 6.00     Types: Cigarettes    Smokeless tobacco: Never Used   Substance Use Topics    Alcohol use: No    Drug use: No       Family History   Problem Relation Age of Onset    Diabetes Father     Cancer Father         prostate ca    Hypertension Father     Cancer Sister          cervical ca    Hepatitis Sister     Dementia Mother     Osteoporosis Mother     Breast cancer Paternal Aunt        Review of patient's allergies indicates:   Allergen Reactions    Augmentin [amoxicillin-pot clavulanate] Other (See Comments)     Diarrhea, yeast    Bactrim  [sulfamethoxazole-trimethoprim]      Other reaction(s): Unknown    Celebrex [celecoxib] Other (See Comments)     Stomach pain, gas     Lipitor  [atorvastatin]      Other reaction(s): Unknown    Pravachol  [pravastatin]      Other reaction(s): Unknown    Statins-hmg-coa reductase inhibitors      Other reaction(s): Muscle pain    Sulfa (sulfonamide antibiotics)      Other reaction(s): Swelling             Review of Systems:  Constitutional: Denies fever, chills. No recent weight changes.   Fatigue: no  Muscle weakness: no  Headaches: no new headaches  Eyes: No redness or dryness.  No recent visual changes.  ENT: Denies dry mouth. No oral or nasal ulcers.  Card: No chest pain.  Resp: No cough or sob.   Gastro: No nausea or vomiting.  No heartburn.  Constipation: no  Diarrhea: no  Genito:  No dysuria.  No genital ulcers.  Skin: No rash.  Raynauds:no  Neuro: No numbness / tingling.   Psych: No depression, anxiety  Endo:  no excess thirst.  Heme: no abnormal bleeding or bruising  Clots:none       OBJECTIVE:     Vital Signs   Vitals:    02/25/19 0850   BP: 122/79   Pulse: 73     Physical Exam:  General Appearance:  NAD.   Gait: not favoring.  HEENT: PERRL.  Eyes not dry or injected.  No nasal ulcers.  Mouth not dry, no oral lesions.  Lymph: cervical, supraclavicular or axillary nodes: none abnormal   Cardio: no irregularity of S1 or S2.  No gallops or rubs.   Resp: Normal respiratory motion. Clear to auscultation bilaterally.   No abnormal chest conformation.  Abd: Soft, non-tender, nondistended.  No masses.   Skin: Head and neck,  and extremities examined. No rashes.   Neuro: Ox3.   Cranial nerves II-XII grossly intact.   Sensation intact  in  both distal LE and upper extremities to light touch.  Musculoskeletal Exam:    Rt hand : prominent OA changes   Right 3rd DIP S+ T     Left hand : + OA changes     Spine:  Normal exam normal range of motion  Muscle strength:Equal and full in all mm groups of the upper and lower ext.    Laboratory:   Results for orders placed or performed in visit on 09/07/18   PTH, intact   Result Value Ref Range    PTH, Intact 70.0 9.0 - 77.0 pg/mL   Basic metabolic panel   Result Value Ref Range    Sodium 141 136 - 145 mmol/L    Potassium 4.0 3.5 - 5.1 mmol/L    Chloride 109 95 - 110 mmol/L    CO2 23 23 - 29 mmol/L    Glucose 113 (H) 70 - 110 mg/dL    BUN, Bld 14 8 - 23 mg/dL    Creatinine 0.8 0.5 - 1.4 mg/dL    Calcium 10.2 8.7 - 10.5 mg/dL    Anion Gap 9 8 - 16 mmol/L    eGFR if African American >60.0 >60 mL/min/1.73 m^2    eGFR if non African American >60.0 >60 mL/min/1.73 m^2     Lab Results   Component Value Date    SEDRATE 7 05/23/2018       Imaging :    The L1 to L4 vertebral bone mineral density is equal to 1.026 g/cm squared with a T score of -1.3 and a Z score of 0.4.  This represents a 1.8% increase in bone mineral density since the prior study  which is not a statistically significant change.    The left femoral neck bone mineral density is equal to 0.698 g/cm squared with a T score of -2.4 and a Z score of -0.7.  This represents a 3.6% increase in bone mineral density since the prior study  which is not a statistically significant change.   Impression      Osteopenia -- there is a 22.6% risk of a major osteoporotic fracture and a 8.2% risk of hip fracture in the next 10 years (FRAX).         Notes reviewed  Other procedures:    ASSESSMENT/PLAN:     Osteopenia of multiple sites  -     Vitamin D; Future; Expected date: 02/25/2019  -     Comprehensive metabolic panel; Future; Expected date: 02/25/2019      1: Generalized OA : inv hands  / back / knees     Rf/CCP/HELGA - negative   Hand - 3 rd DIP - S+ T - Voltaren gel  topical  C/w ortho for synvisc injections for knee OA at needed   tylenol as needed   Mobic, Robaxin as needed for back pain  May use Flexeril at night      2: Osteopenia :  With high FRAX   Has GI intolerance to Fosamax , continue with IV Reclast  Vitamin d otc     Risks vs Benefits and potential side effects of medication prescribed today were discussed with patient.    Medication literature given to patient up discharge. We also discussed about jaw necrosis and atypical fractures which are rare side effects from the medication.       6  month return for routine follow-up for IV Reclast in September, then on will see her once a year    Disclaimer: This note was prepared using voice recognition system and is likely to have sound alike errors and is not proof read.  Please call me with any questions.

## 2019-03-04 ENCOUNTER — OUTSIDE PLACE OF SERVICE (OUTPATIENT)
Dept: ADMINISTRATIVE | Facility: OTHER | Age: 74
End: 2019-03-04
Payer: MEDICARE

## 2019-03-04 PROCEDURE — 66984 XCAPSL CTRC RMVL W/O ECP: CPT | Mod: 79,RT,, | Performed by: OPHTHALMOLOGY

## 2019-03-04 PROCEDURE — 66984 PR REMOVAL, CATARACT, W/INSRT INTRAOC LENS, W/O ENDO CYCLO: ICD-10-PCS | Mod: 79,RT,, | Performed by: OPHTHALMOLOGY

## 2019-03-05 ENCOUNTER — OFFICE VISIT (OUTPATIENT)
Dept: OPHTHALMOLOGY | Facility: CLINIC | Age: 74
End: 2019-03-05
Payer: MEDICARE

## 2019-03-05 DIAGNOSIS — Z98.41 CATARACT EXTRACTION STATUS, RIGHT: ICD-10-CM

## 2019-03-05 DIAGNOSIS — Z98.890 POST-OPERATIVE STATE: Primary | ICD-10-CM

## 2019-03-05 PROCEDURE — 99024 POSTOP FOLLOW-UP VISIT: CPT | Mod: S$GLB,,, | Performed by: OPHTHALMOLOGY

## 2019-03-05 PROCEDURE — 99999 PR PBB SHADOW E&M-EST. PATIENT-LVL I: ICD-10-PCS | Mod: PBBFAC,,, | Performed by: OPHTHALMOLOGY

## 2019-03-05 PROCEDURE — 99999 PR PBB SHADOW E&M-EST. PATIENT-LVL I: CPT | Mod: PBBFAC,,, | Performed by: OPHTHALMOLOGY

## 2019-03-05 PROCEDURE — 99024 PR POST-OP FOLLOW-UP VISIT: ICD-10-PCS | Mod: S$GLB,,, | Performed by: OPHTHALMOLOGY

## 2019-03-05 NOTE — PROGRESS NOTES
HPI     Post-op Evaluation      Additional comments: 1 Day Phaco OD              Comments     Patient States Right Eye Vision Is still blurred but improving & seeing a   twitch in her side vision.    PCP: Dr. Lemus  1. COAG SUSP  SLT OD 3/26/15  SLT OS 5/15  2. Dry Eyes  3. PCIOL OD 3/4/2019  PCIOL OS +25.5 SN60WF / CDE: 9.46 / 2-4-19 2-5-19    In the past Pt wore cls for monovision- then changed to MF cls     OU: Systane Ultra BID, Genteal GEL PRN  OS: Durezol QID (Stopped)  OD: Polytrim, Ketorolac, Durezol 4 Times Daily            Last edited by Ramiro Palomino MD on 3/5/2019 11:31 AM. (History)            Assessment /Plan     For exam results, see Encounter Report.      ICD-10-CM ICD-9-CM    1. Post-operative state Z98.890 V45.89 1 day phaco OD. Doing well    2. Cataract extraction status, right Z98.41 V45.61        PO Day 1 S/P Phaco/IOL  right eye  Doing well.    Use Durezol QID  Ketorolac  Polytrim  4 x day-do not stop due to mucoid discharge   Reinstructed in importance of absolute compliance with Post-OP instructions including medications, shield at bedtime, and limitation of activities. Follow up appointments in approximately one and six weeks or call immediately for increased pain, redness or vision loss.

## 2019-03-07 DIAGNOSIS — M85.89 OSTEOPENIA OF MULTIPLE SITES: Primary | ICD-10-CM

## 2019-03-08 ENCOUNTER — TELEPHONE (OUTPATIENT)
Dept: OPHTHALMOLOGY | Facility: CLINIC | Age: 74
End: 2019-03-08

## 2019-03-08 NOTE — TELEPHONE ENCOUNTER
----- Message from Flora Barreraite sent at 3/8/2019 10:39 AM CST -----  Contact: Pt   Pt called and stated that she has redness and pain in the left eye and needs to know what to do. She can be reached at [phone].    Thanks,  Tf

## 2019-03-19 ENCOUNTER — OFFICE VISIT (OUTPATIENT)
Dept: OPHTHALMOLOGY | Facility: CLINIC | Age: 74
End: 2019-03-19
Payer: MEDICARE

## 2019-03-19 DIAGNOSIS — Z98.41 CATARACT EXTRACTION STATUS, RIGHT: ICD-10-CM

## 2019-03-19 DIAGNOSIS — Z98.42 CATARACT EXTRACTION STATUS, LEFT: ICD-10-CM

## 2019-03-19 DIAGNOSIS — Z98.890 POST-OPERATIVE STATE: Primary | ICD-10-CM

## 2019-03-19 DIAGNOSIS — H40.013 OPEN ANGLE WITH BORDERLINE FINDINGS, LOW RISK, BILATERAL: ICD-10-CM

## 2019-03-19 PROCEDURE — 99024 POSTOP FOLLOW-UP VISIT: CPT | Mod: S$GLB,,, | Performed by: OPHTHALMOLOGY

## 2019-03-19 PROCEDURE — 99024 PR POST-OP FOLLOW-UP VISIT: ICD-10-PCS | Mod: S$GLB,,, | Performed by: OPHTHALMOLOGY

## 2019-03-19 PROCEDURE — 99999 PR PBB SHADOW E&M-EST. PATIENT-LVL I: ICD-10-PCS | Mod: PBBFAC,,, | Performed by: OPHTHALMOLOGY

## 2019-03-19 PROCEDURE — 99999 PR PBB SHADOW E&M-EST. PATIENT-LVL I: CPT | Mod: PBBFAC,,, | Performed by: OPHTHALMOLOGY

## 2019-03-19 NOTE — PROGRESS NOTES
HPI     Post-op Evaluation      Additional comments: 2 week PCIOL OD              Comments     The patient stopped all drops in both eyes Baudilio 3/17/19. The patient   states her right eye has never worked good for near and she sees shadow   next to words. The patient states she just thinks the mono vision is not   working well together for her. The patient states her left eye is doing   fine but it does get tired towards the evening.    PCP: Dr. Lemus  1. COAG SUSP  SLT OD 3/26/15  SLT OS 5/15  2. Dry Eyes  3. PCIOL OD 3/4/2019 (+28.0 SN60WF) CDE 10.38 -NEAR  PCIOL OS +25.5 SN60WF / CDE: 9.46 / 2-4-19 2-5-19      In the past Pt wore cls for monovision- then changed to MF cls                 Last edited by Melly House on 3/19/2019  2:43 PM. (History)            Assessment /Plan     For exam results, see Encounter Report.      ICD-10-CM ICD-9-CM    1. Post-operative state Z98.890 V45.89    2. Cataract extraction status, right Z98.41 V45.61 Doing well except with reduced vision   3. Cataract extraction status, left Z98.42 V45.61 Mild iritis persists   4. Open angle with borderline findings, low risk, bilateral H40.013 365.01        S/p Phaco OU with  reduced vision OD, mild pco OD, no other abnormality other than mild pco  Will observe and see if Va improves   Durezol bid OU  Return to clinic 4 weeks , refract OU

## 2019-04-16 ENCOUNTER — OFFICE VISIT (OUTPATIENT)
Dept: OPHTHALMOLOGY | Facility: CLINIC | Age: 74
End: 2019-04-16
Payer: MEDICARE

## 2019-04-16 DIAGNOSIS — H52.7 REFRACTION DISORDER: ICD-10-CM

## 2019-04-16 DIAGNOSIS — Z98.42 CATARACT EXTRACTION STATUS, LEFT: ICD-10-CM

## 2019-04-16 DIAGNOSIS — H04.123 DRY EYE SYNDROME, BILATERAL: ICD-10-CM

## 2019-04-16 DIAGNOSIS — Z98.890 POST-OPERATIVE STATE: Primary | ICD-10-CM

## 2019-04-16 DIAGNOSIS — Z98.41 CATARACT EXTRACTION STATUS, RIGHT: ICD-10-CM

## 2019-04-16 PROCEDURE — 99999 PR PBB SHADOW E&M-EST. PATIENT-LVL I: CPT | Mod: PBBFAC,,, | Performed by: OPHTHALMOLOGY

## 2019-04-16 PROCEDURE — 99024 PR POST-OP FOLLOW-UP VISIT: ICD-10-PCS | Mod: S$GLB,,, | Performed by: OPHTHALMOLOGY

## 2019-04-16 PROCEDURE — 99024 POSTOP FOLLOW-UP VISIT: CPT | Mod: S$GLB,,, | Performed by: OPHTHALMOLOGY

## 2019-04-16 PROCEDURE — 99999 PR PBB SHADOW E&M-EST. PATIENT-LVL I: ICD-10-PCS | Mod: PBBFAC,,, | Performed by: OPHTHALMOLOGY

## 2019-04-16 RX ORDER — DUREZOL 0.5 MG/ML
EMULSION OPHTHALMIC
COMMUNITY
Start: 2019-03-20 | End: 2019-07-01

## 2019-04-16 NOTE — PROGRESS NOTES
HPI     Post-op Evaluation      Additional comments: PO OU PCIOL              Comments     PO 5 wk  PCIOL OU  Allergy problem with congestion and tearing  VA still blurred  No pain     PCP: Dr. Lemus  1. COAG SUSP  SLT OD 3/26/15  SLT OS 5/15  2. Dry Eyes  3. PCIOL OD 3/4/2019 (+28.0 SN60WF) CDE 10.38 -NEAR  PCIOL OS +25.5 SN60WF / CDE: 9.46 / 2-4-19 2-5-19    In the past Pt wore cls for monovision- then changed to MF cls    OU Durezol BID          Last edited by Bhavana Godinez on 4/16/2019  3:02 PM. (History)            Assessment /Plan     For exam results, see Encounter Report.      ICD-10-CM ICD-9-CM    1. Post-operative state Z98.890 V45.89 stable   2. Cataract extraction status, right Z98.41 V45.61    3. Cataract extraction status, left Z98.42 V45.61    4. Refraction disorder H52.7 367.9 MR dispensed today for driving glasses   5. Dry eye syndrome, bilateral H04.123 375.15 monitor     Stop Durezol    Return to clinic 6 months HVF and GOCT

## 2019-04-22 ENCOUNTER — TELEPHONE (OUTPATIENT)
Dept: OPHTHALMOLOGY | Facility: CLINIC | Age: 74
End: 2019-04-22

## 2019-04-22 ENCOUNTER — TELEPHONE (OUTPATIENT)
Dept: OTOLARYNGOLOGY | Facility: CLINIC | Age: 74
End: 2019-04-22

## 2019-04-22 NOTE — TELEPHONE ENCOUNTER
----- Message from Dari Ochoa sent at 4/22/2019  2:03 PM CDT -----  Contact: pt  .Type:  Needs Medical Advice    Who Called:  pt  Symptoms (please be specific):  Eyes ache, red and sensitive  (also has sinus)  How long has patient had these symptoms:  This morning   Pharmacy name and phone #:      [facility] - [place], [place] - [address] [facility] [address]  [address] [address] [address]  Phone: [phone] Fax: [phone]    Would the patient rather a call back or a response via [facility]? Call back   Best Call Back Number: [phone] (home)     Additional Information: pt stated she took Claritin and symptoms has not cleared

## 2019-04-22 NOTE — TELEPHONE ENCOUNTER
Patient states she has recent surgery on her eyes and will see her eye doctor first and if need will make appt with .

## 2019-04-22 NOTE — TELEPHONE ENCOUNTER
Patient woke up this am and stated ou are achy and light sensitive she stopped her Durezol ast week and I informed her that Dr. Palomino was in surgery this afternoon but to restart Durezol Qid ou and I contact her no later than tomorrow after speaking with Dr. Palomino.

## 2019-04-22 NOTE — TELEPHONE ENCOUNTER
----- Message from Dari Ochoa sent at 4/22/2019  2:07 PM CDT -----  Contact: pt  .Type:  Needs Medical Advice    Who Called:  pt  Symptoms (please be specific):  Eyes ache, red and sensitive  (also has sinus)  How long has patient had these symptoms:  This morning   Pharmacy name and phone #:      [facility] - [place], [place] - [address] [address] [facility] [address]  [address] [address] [address]  Phone: [phone] Fax: [phone]    Would the patient rather a call back or a response via [facility]? Call back   Best Call Back Number: [phone] (home)     Additional Information: pt stated she took Claritin and symptoms has not cleared

## 2019-04-23 ENCOUNTER — TELEPHONE (OUTPATIENT)
Dept: OPHTHALMOLOGY | Facility: CLINIC | Age: 74
End: 2019-04-23

## 2019-04-23 NOTE — TELEPHONE ENCOUNTER
----- Message from Maureen Espinosa sent at 4/23/2019  2:11 PM CDT -----  Contact: Patient   Patient would like a call back at [phone], Regards to how long she needs to keep doing the drops. And she is doing much better.    Thanks  Td

## 2019-04-23 NOTE — TELEPHONE ENCOUNTER
----- Message from Maureen Espinosa sent at 4/23/2019  2:11 PM CDT -----  Contact: Patient   Patient would like a call back at [phone], Regards to how long she needs to keep doing the drops. And she is doing much better.    Thanks  Td

## 2019-04-23 NOTE — TELEPHONE ENCOUNTER
Spoke with patient.  Advise her to garadually taper Durezol and call us back if she has more problems.

## 2019-04-23 NOTE — TELEPHONE ENCOUNTER
----- Message from Maureen Espinosa sent at 4/23/2019  2:11 PM CDT -----  Contact: Patient   Patient would like a call back at [phone], Regards to how long she needs to keep doing the drops. And she is doing much better.    Thanks  Td

## 2019-04-25 ENCOUNTER — TELEPHONE (OUTPATIENT)
Dept: OPHTHALMOLOGY | Facility: CLINIC | Age: 74
End: 2019-04-25

## 2019-04-25 NOTE — TELEPHONE ENCOUNTER
----- Message from Maureen Espinosa sent at 4/25/2019 11:24 AM CDT -----  Contact: Patient   Type:  Patient Returning Call    Who Called: Jessica  Who Left Message for Patient: Not sure  Does the patient know what this is regarding?: Not sure  Would the patient rather a call back or a response via [facility]? Call back   Best Call Back Number: Please call her at [phone]  Additional Information: n/a

## 2019-04-25 NOTE — TELEPHONE ENCOUNTER
Called back to inform pt that no one knows of a phone call to her, but we will let her know if we need anything.

## 2019-05-27 ENCOUNTER — PATIENT OUTREACH (OUTPATIENT)
Dept: ADMINISTRATIVE | Facility: HOSPITAL | Age: 74
End: 2019-05-27

## 2019-05-27 NOTE — PROGRESS NOTES
Contacted patient to schedule annual pcp appointment. Patient has an appointment scheduled 07/01/19

## 2019-07-01 ENCOUNTER — OFFICE VISIT (OUTPATIENT)
Dept: INTERNAL MEDICINE | Facility: CLINIC | Age: 74
End: 2019-07-01
Payer: MEDICARE

## 2019-07-01 VITALS
DIASTOLIC BLOOD PRESSURE: 82 MMHG | HEART RATE: 70 BPM | TEMPERATURE: 98 F | BODY MASS INDEX: 33.51 KG/M2 | WEIGHT: 177.5 LBS | SYSTOLIC BLOOD PRESSURE: 110 MMHG | HEIGHT: 61 IN

## 2019-07-01 DIAGNOSIS — M17.0 PRIMARY OSTEOARTHRITIS OF BOTH KNEES: ICD-10-CM

## 2019-07-01 DIAGNOSIS — T46.6X5A STATIN MYOPATHY: ICD-10-CM

## 2019-07-01 DIAGNOSIS — G72.0 STATIN MYOPATHY: ICD-10-CM

## 2019-07-01 DIAGNOSIS — E78.2 MIXED HYPERLIPIDEMIA: ICD-10-CM

## 2019-07-01 DIAGNOSIS — Z00.00 ROUTINE GENERAL MEDICAL EXAMINATION AT HEALTH CARE FACILITY: Primary | ICD-10-CM

## 2019-07-01 PROCEDURE — 99999 PR PBB SHADOW E&M-EST. PATIENT-LVL III: ICD-10-PCS | Mod: PBBFAC,,, | Performed by: INTERNAL MEDICINE

## 2019-07-01 PROCEDURE — 99214 OFFICE O/P EST MOD 30 MIN: CPT | Mod: S$GLB,,, | Performed by: INTERNAL MEDICINE

## 2019-07-01 PROCEDURE — 99214 PR OFFICE/OUTPT VISIT, EST, LEVL IV, 30-39 MIN: ICD-10-PCS | Mod: S$GLB,,, | Performed by: INTERNAL MEDICINE

## 2019-07-01 PROCEDURE — 99999 PR PBB SHADOW E&M-EST. PATIENT-LVL III: CPT | Mod: PBBFAC,,, | Performed by: INTERNAL MEDICINE

## 2019-07-01 NOTE — PROGRESS NOTES
"Subjective:       Patient ID: Jessica Perez is a 74 y.o. female.    Chief Complaint: Annual Exam    HPI Patient is a 74-year-old female presenting today for updated physical exam review of chronic health issues.  Patient has history of hyperlipidemia, myopathy, osteoarthritis.  She indicates she has been doing well overall.  She continues to take her medications as prescribed.  She relates no major health issues.  She has continued to follow with Orthopedics for her arthritic issues.    Review of Systems   Constitutional: Positive for unexpected weight change. Negative for activity change.   HENT: Positive for hearing loss. Negative for rhinorrhea and trouble swallowing.    Eyes: Negative for discharge and visual disturbance.   Respiratory: Negative for chest tightness and wheezing.    Cardiovascular: Negative for chest pain and palpitations.   Gastrointestinal: Negative for blood in stool, constipation, diarrhea and vomiting.   Endocrine: Negative for polydipsia and polyuria.   Genitourinary: Negative for difficulty urinating, dysuria, hematuria and menstrual problem.   Musculoskeletal: Positive for arthralgias, joint swelling and neck pain.   Neurological: Positive for headaches.   Psychiatric/Behavioral: Positive for dysphoric mood. Negative for confusion.       Objective:   /82   Pulse 70   Temp 97.5 °F (36.4 °C) (Tympanic)   Ht 5' 1" (1.549 m)   Wt 80.5 kg (177 lb 7.5 oz)   BMI 33.53 kg/m²      Physical Exam   Constitutional: She is oriented to person, place, and time. She appears well-developed and well-nourished.   HENT:   Head: Normocephalic and atraumatic.   Eyes: Pupils are equal, round, and reactive to light. EOM are normal.   Neck: Normal range of motion. Neck supple. No thyromegaly present.   Cardiovascular: Normal rate, regular rhythm and intact distal pulses. Exam reveals no gallop and no friction rub.   No murmur heard.  Pulmonary/Chest: Breath sounds normal. She has no wheezes. She " has no rales. She exhibits no tenderness.   Abdominal: Soft. Bowel sounds are normal. She exhibits no distension and no mass. There is no tenderness. There is no rebound and no guarding.   Musculoskeletal: She exhibits no edema.   Lymphadenopathy:     She has no cervical adenopathy.   Neurological: She is alert and oriented to person, place, and time. She has normal reflexes. She displays normal reflexes. No cranial nerve deficit.   Skin: Skin is warm and dry. No rash noted.   Psychiatric: She has a normal mood and affect. Her behavior is normal. Judgment normal.   Vitals reviewed.          Assessment:       1. Routine general medical examination at health care facility    2. Mixed hyperlipidemia    3. Statin myopathy    4. Primary osteoarthritis of both knees        Plan:   No problem-specific Assessment & Plan notes found for this encounter.    Routine general medical examination at [facility] care facility  Comments:  Focus on good health habits, low fat diet, regular exercise, seatbelt use, sunscreen use  Orders:  -     CBC auto differential; Future; Expected date: 07/03/2019  -     Comprehensive metabolic panel; Future; Expected date: 07/03/2019  -     Lipid panel; Future; Expected date: 07/03/2019  -     TSH; Future; Expected date: 07/03/2019    Mixed hyperlipidemia  Comments:  follow a low fat diet, high fiber, regular exercise    Statin myopathy  Comments:  low fat diet    Primary osteoarthritis of both knees          Follow up in about 1 year (around 7/1/2020).

## 2019-07-02 ENCOUNTER — LAB VISIT (OUTPATIENT)
Dept: LAB | Facility: HOSPITAL | Age: 74
End: 2019-07-02
Attending: INTERNAL MEDICINE
Payer: MEDICARE

## 2019-07-02 DIAGNOSIS — Z00.00 ROUTINE GENERAL MEDICAL EXAMINATION AT HEALTH CARE FACILITY: ICD-10-CM

## 2019-07-02 LAB
ALBUMIN SERPL BCP-MCNC: 3.8 G/DL (ref 3.5–5.2)
ALP SERPL-CCNC: 91 U/L (ref 55–135)
ALT SERPL W/O P-5'-P-CCNC: 17 U/L (ref 10–44)
ANION GAP SERPL CALC-SCNC: 9 MMOL/L (ref 8–16)
AST SERPL-CCNC: 21 U/L (ref 10–40)
BASOPHILS # BLD AUTO: 0.04 K/UL (ref 0–0.2)
BASOPHILS NFR BLD: 0.6 % (ref 0–1.9)
BILIRUB SERPL-MCNC: 0.4 MG/DL (ref 0.1–1)
BUN SERPL-MCNC: 9 MG/DL (ref 8–23)
CALCIUM SERPL-MCNC: 9.6 MG/DL (ref 8.7–10.5)
CHLORIDE SERPL-SCNC: 109 MMOL/L (ref 95–110)
CHOLEST SERPL-MCNC: 281 MG/DL (ref 120–199)
CHOLEST/HDLC SERPL: 5.5 {RATIO} (ref 2–5)
CO2 SERPL-SCNC: 24 MMOL/L (ref 23–29)
CREAT SERPL-MCNC: 0.8 MG/DL (ref 0.5–1.4)
DIFFERENTIAL METHOD: ABNORMAL
EOSINOPHIL # BLD AUTO: 0.1 K/UL (ref 0–0.5)
EOSINOPHIL NFR BLD: 2 % (ref 0–8)
ERYTHROCYTE [DISTWIDTH] IN BLOOD BY AUTOMATED COUNT: 13.2 % (ref 11.5–14.5)
EST. GFR  (AFRICAN AMERICAN): >60 ML/MIN/1.73 M^2
EST. GFR  (NON AFRICAN AMERICAN): >60 ML/MIN/1.73 M^2
GLUCOSE SERPL-MCNC: 81 MG/DL (ref 70–110)
HCT VFR BLD AUTO: 44.5 % (ref 37–48.5)
HDLC SERPL-MCNC: 51 MG/DL (ref 40–75)
HDLC SERPL: 18.1 % (ref 20–50)
HGB BLD-MCNC: 14.5 G/DL (ref 12–16)
IMM GRANULOCYTES # BLD AUTO: 0.01 K/UL (ref 0–0.04)
IMM GRANULOCYTES NFR BLD AUTO: 0.2 % (ref 0–0.5)
LDLC SERPL CALC-MCNC: 167 MG/DL (ref 63–159)
LYMPHOCYTES # BLD AUTO: 2.6 K/UL (ref 1–4.8)
LYMPHOCYTES NFR BLD: 40.7 % (ref 18–48)
MCH RBC QN AUTO: 31.3 PG (ref 27–31)
MCHC RBC AUTO-ENTMCNC: 32.6 G/DL (ref 32–36)
MCV RBC AUTO: 96 FL (ref 82–98)
MONOCYTES # BLD AUTO: 0.3 K/UL (ref 0.3–1)
MONOCYTES NFR BLD: 5 % (ref 4–15)
NEUTROPHILS # BLD AUTO: 3.3 K/UL (ref 1.8–7.7)
NEUTROPHILS NFR BLD: 51.5 % (ref 38–73)
NONHDLC SERPL-MCNC: 230 MG/DL
NRBC BLD-RTO: 0 /100 WBC
PLATELET # BLD AUTO: 203 K/UL (ref 150–350)
PMV BLD AUTO: 11.4 FL (ref 9.2–12.9)
POTASSIUM SERPL-SCNC: 4.1 MMOL/L (ref 3.5–5.1)
PROT SERPL-MCNC: 6.8 G/DL (ref 6–8.4)
RBC # BLD AUTO: 4.63 M/UL (ref 4–5.4)
SODIUM SERPL-SCNC: 142 MMOL/L (ref 136–145)
TRIGL SERPL-MCNC: 315 MG/DL (ref 30–150)
WBC # BLD AUTO: 6.44 K/UL (ref 3.9–12.7)

## 2019-07-02 PROCEDURE — 80061 LIPID PANEL: CPT

## 2019-07-02 PROCEDURE — 36415 COLL VENOUS BLD VENIPUNCTURE: CPT | Mod: PO

## 2019-07-02 PROCEDURE — 85025 COMPLETE CBC W/AUTO DIFF WBC: CPT

## 2019-07-02 PROCEDURE — 80053 COMPREHEN METABOLIC PANEL: CPT

## 2019-07-02 PROCEDURE — 84443 ASSAY THYROID STIM HORMONE: CPT

## 2019-07-03 ENCOUNTER — PATIENT MESSAGE (OUTPATIENT)
Dept: INTERNAL MEDICINE | Facility: CLINIC | Age: 74
End: 2019-07-03

## 2019-07-03 DIAGNOSIS — E78.2 MIXED HYPERLIPIDEMIA: Primary | ICD-10-CM

## 2019-07-03 LAB — TSH SERPL DL<=0.005 MIU/L-ACNC: 2.04 UIU/ML (ref 0.4–4)

## 2019-07-31 DIAGNOSIS — Z76.89 ENCOUNTER TO ESTABLISH CARE: Primary | ICD-10-CM

## 2019-08-08 ENCOUNTER — OFFICE VISIT (OUTPATIENT)
Dept: CARDIOLOGY | Facility: CLINIC | Age: 74
End: 2019-08-08
Payer: MEDICARE

## 2019-08-08 ENCOUNTER — TELEPHONE (OUTPATIENT)
Dept: PHARMACY | Facility: CLINIC | Age: 74
End: 2019-08-08

## 2019-08-08 VITALS
SYSTOLIC BLOOD PRESSURE: 120 MMHG | HEIGHT: 61 IN | WEIGHT: 177.69 LBS | HEART RATE: 68 BPM | BODY MASS INDEX: 33.55 KG/M2 | DIASTOLIC BLOOD PRESSURE: 74 MMHG

## 2019-08-08 DIAGNOSIS — T46.6X5A STATIN MYOPATHY: Primary | ICD-10-CM

## 2019-08-08 DIAGNOSIS — E78.00 PURE HYPERCHOLESTEROLEMIA: ICD-10-CM

## 2019-08-08 DIAGNOSIS — G89.29 CHRONIC CHEST PAIN: ICD-10-CM

## 2019-08-08 DIAGNOSIS — R07.9 CHRONIC CHEST PAIN: ICD-10-CM

## 2019-08-08 DIAGNOSIS — R07.9 CHEST PAIN, MODERATE CORONARY ARTERY RISK: ICD-10-CM

## 2019-08-08 DIAGNOSIS — R06.02 SOB (SHORTNESS OF BREATH): ICD-10-CM

## 2019-08-08 DIAGNOSIS — G72.0 STATIN MYOPATHY: Primary | ICD-10-CM

## 2019-08-08 DIAGNOSIS — Z82.49 FAMILY HISTORY OF ARTERIOSCLEROTIC CARDIOVASCULAR DISEASE: ICD-10-CM

## 2019-08-08 PROCEDURE — 99205 PR OFFICE/OUTPT VISIT, NEW, LEVL V, 60-74 MIN: ICD-10-PCS | Mod: S$GLB,,, | Performed by: INTERNAL MEDICINE

## 2019-08-08 PROCEDURE — 99205 OFFICE O/P NEW HI 60 MIN: CPT | Mod: S$GLB,,, | Performed by: INTERNAL MEDICINE

## 2019-08-08 PROCEDURE — 1101F PT FALLS ASSESS-DOCD LE1/YR: CPT | Mod: CPTII,S$GLB,, | Performed by: INTERNAL MEDICINE

## 2019-08-08 PROCEDURE — 1101F PR PT FALLS ASSESS DOC 0-1 FALLS W/OUT INJ PAST YR: ICD-10-PCS | Mod: CPTII,S$GLB,, | Performed by: INTERNAL MEDICINE

## 2019-08-08 PROCEDURE — 99999 PR PBB SHADOW E&M-EST. PATIENT-LVL III: ICD-10-PCS | Mod: PBBFAC,,, | Performed by: INTERNAL MEDICINE

## 2019-08-08 PROCEDURE — 99999 PR PBB SHADOW E&M-EST. PATIENT-LVL III: CPT | Mod: PBBFAC,,, | Performed by: INTERNAL MEDICINE

## 2019-08-08 NOTE — LETTER
August 11, 2019      Warren Lemus MD  [address] [facility]  [address]  [place] [address]           [place] [facility]  [address] [facility] [address]  Phone: [phone]  Fax: [phone]          Patient: Jessica Perez   MR Number: [id number]   YOB: 1945   Date of Visit: 8/8/2019       Dear Dr. Warren Lemus:    Thank you for referring Jessica Perez to me for evaluation. Attached you will find relevant portions of my assessment and plan of care.    If you have questions, please do not hesitate to call me. I look forward to following Jessica Perez along with you.    Sincerely,    Marshall Mackay MD    Enclosure  CC:  No Recipients    If you would like to receive this communication electronically, please contact [email or web address] or [phone] to request more information on [facility] access.    For providers and/or their staff who would like to refer a patient to [facility], please contact us through our one-stop-shop provider referral line, [facility], at [phone].    If you feel you have received this communication in error or would no longer like to receive these types of communications, please e-mail [email or web address]

## 2019-08-08 NOTE — TELEPHONE ENCOUNTER
Informed Patient  that [facility] received prescription for Repatha and prior authorization is required.  [facility] will be back in touch once insurance determination is received.

## 2019-08-08 NOTE — PROGRESS NOTES
Subjective:   Patient ID:  Jessica Perez is a 74 y.o. female who presents for follow-up of Consult; Chest Pain; Shortness of Breath; Excessive Sweating; Numbness (left ); Arm Swelling (hands); and Headache  Pt presents for cardiac evaluation. Pt with recent new SOB/MONROY and diphoresis. Pt also with CP- left sided.    CRF- HLP, age, family hx.    Hyperlipidemia   This is a chronic problem. The current episode started more than 1 year ago. Recent lipid tests were reviewed and are variable. Associated symptoms include chest pain and shortness of breath. She is currently on no antihyperlipidemic treatment. The current treatment provides mild improvement of lipids. Compliance problems include medication side effects.  Risk factors for coronary artery disease include post-menopausal and dyslipidemia.   Shortness of Breath   This is a new problem. The current episode started more than 1 month ago. The problem occurs intermittently. The problem has been waxing and waning. Associated symptoms include chest pain. Pertinent negatives include no leg swelling. Nothing aggravates the symptoms. The patient has no known risk factors for DVT/PE. She has tried rest for the symptoms. The treatment provided moderate relief.   Chest Pain    This is a recurrent problem. The current episode started more than 1 month ago. The problem occurs intermittently. The problem has been waxing and waning. The pain is present in the lateral region. The pain is mild. The quality of the pain is described as dull. The pain does not radiate. Associated symptoms include shortness of breath. Pertinent negatives include no dizziness or palpitations. The pain is aggravated by nothing. She has tried rest for the symptoms. The treatment provided moderate relief. Risk factors include sedentary lifestyle and being elderly.   Her past medical history is significant for hyperlipidemia.   Pertinent negatives for past medical history include no muscle weakness.        Review of Systems   Constitution: Negative. Negative for weight gain.   HENT: Negative.    Eyes: Negative.    Cardiovascular: Positive for chest pain. Negative for leg swelling and palpitations.   Respiratory: Positive for shortness of breath.    Endocrine: Negative.    Hematologic/Lymphatic: Negative.    Skin: Negative.    Musculoskeletal: Negative for muscle weakness.   Gastrointestinal: Negative.    Genitourinary: Negative.    Neurological: Negative.  Negative for dizziness.   Psychiatric/Behavioral: Negative.    Allergic/Immunologic: Negative.      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     Social History     Socioeconomic History    Marital status:      Spouse name: Not on file    Number of children: Not on file    Years of education: Not on file    Highest education level: Not on file   Occupational History    Not on file   Social Needs    Financial resource strain: Not hard at all    Food insecurity:     Worry: Never true     Inability: Never true    Transportation needs:     Medical: Not on file     Non-medical: No   Tobacco Use    Smoking status: Former Smoker     Packs/day: 0.50     Years: 12.00     Pack years: 6.00     Types: Cigarettes    Smokeless tobacco: Never Used   Substance and Sexual Activity    Alcohol use: No     Frequency: Monthly or less     Drinks per session: 1 or 2     Binge frequency: Never    Drug use: No    Sexual activity: Yes   Lifestyle    Physical activity:     Days per week: 2 days     Minutes per session: 40 min    Stress: Rather much   Relationships     Social connections:     Talks on phone: More than three times a week     Gets together: Once a week     Attends [employer] service: Not on file     Active member of club or organization: Yes     Attends meetings of clubs or organizations: More than 4 times per year     Relationship status:    Other Topics Concern    Not on file   Social History Narrative    Not on file     Current Outpatient Medications on File Prior to Visit   Medication Sig Dispense Refill    artificial tear, hypromellose, (GENTEAL) 0.3 % Gel       b complex vitamins capsule Take 1 capsule by mouth once daily.      diclofenac sodium (VOLTAREN) 1 % Gel Apply 2 g topically 2 (two) times daily. 1 Tube 2    fluticasone (FLONASE) 50 mcg/actuation nasal spray 2 sprays by Each Nare route once daily. 1 Bottle 12    krill oil-omega-3-dha-epa 150-450 mg CpDR Take by mouth.      loratadine/pseudoephedrine (CLARITIN-D 12 HOUR ORAL) Take by mouth.      meloxicam (MOBIC) 15 MG tablet Take 1 tablet (15 mg total) by mouth once daily. 30 tablet 2    multivitamin capsule Take 1 capsule by mouth Daily.      peg 400-propylene glycol (SYSTANE ULTRA) 0.4-0.3 % Drop Place 1 drop into both eyes As instructed. As directed Over the counter products       vitamin E 100 UNIT capsule Take 100 Units by mouth once daily.      cetirizine (ZYRTEC) 10 MG tablet Take 10 mg by mouth once daily.      methocarbamol (ROBAXIN) 500 MG Tab Take 1 tablet (500 mg total) by mouth 2 (two) times daily as needed. 60 tablet 1     No current facility-administered medications on file prior to visit.      Review of patient's allergies indicates:   Allergen Reactions    Augmentin [amoxicillin-pot clavulanate] Other (See Comments)     Diarrhea, yeast    Bactrim  [sulfamethoxazole-trimethoprim]      Other reaction(s): Unknown    Celebrex [celecoxib] Other (See Comments)     Stomach pain, gas     Lipitor  [atorvastatin]      Other reaction(s): Unknown    Pravachol   [pravastatin]      Other reaction(s): Unknown    Statins-hmg-coa reductase inhibitors      Other reaction(s): Muscle pain    Sulfa (sulfonamide antibiotics)      Other reaction(s): Swelling       Objective:     Physical Exam   Constitutional: She is oriented to person, place, and time. She appears well-developed and well-nourished.   HENT:   Head: Normocephalic and atraumatic.   Eyes: Pupils are equal, round, and reactive to light. Conjunctivae and EOM are normal.   Neck: Normal range of motion. Neck supple.   Cardiovascular: Normal rate, regular rhythm, normal heart sounds and intact distal pulses.   Pulmonary/Chest: Effort normal and breath sounds normal.   Abdominal: Soft. Bowel sounds are normal.   Musculoskeletal: Normal range of motion.   Neurological: She is alert and oriented to person, place, and time.   Skin: Skin is warm and dry.   Psychiatric: She has a normal mood and affect.   Nursing note and vitals reviewed.      Assessment:     1. Statin myopathy    2. Pure hypercholesterolemia    3. Family history of arteriosclerotic cardiovascular disease        Plan:     Statin myopathy    Pure hypercholesterolemia    Family history of arteriosclerotic cardiovascular disease      Stress test and echo  Start repatha

## 2019-08-15 NOTE — TELEPHONE ENCOUNTER
PA for Repatha DENIED on 8/10/19 because plan requires one of the following diagnosis:    - Confirmed Heterozygous familial hypercholesterolemia (HeFH) OR  - Confirmed Atherosclerotic Cardiovascular Disease (ASCVD) OR  - high risk of cardiovascular event (ie: diabetes mellitus)    Appeal Window:  60 days

## 2019-08-22 ENCOUNTER — TELEPHONE (OUTPATIENT)
Dept: CARDIOLOGY | Facility: CLINIC | Age: 74
End: 2019-08-22

## 2019-08-22 NOTE — TELEPHONE ENCOUNTER
Received message yesterday from [facility] Cynthia,that approval of Terrance has been given.Approval # [id number].She requested we call back at [phone].I have called twice yesterday and this morning and only could leave a message.I left message with patient to contact her [facility] pharmacy department and to have Cynthia to contact our office.

## 2019-08-22 NOTE — TELEPHONE ENCOUNTER
Spoke with patient and informed her that her appeal for Repatha was approved.[facility] notified.Faxed note to [phone].

## 2019-08-23 ENCOUNTER — HOSPITAL ENCOUNTER (OUTPATIENT)
Dept: RADIOLOGY | Facility: HOSPITAL | Age: 74
Discharge: HOME | End: 2019-08-23
Attending: INTERNAL MEDICINE
Payer: MEDICARE

## 2019-08-23 ENCOUNTER — CLINICAL SUPPORT (OUTPATIENT)
Dept: CARDIOLOGY | Facility: CLINIC | Age: 74
End: 2019-08-23
Attending: INTERNAL MEDICINE
Payer: MEDICARE

## 2019-08-23 DIAGNOSIS — G89.29 CHRONIC CHEST PAIN: ICD-10-CM

## 2019-08-23 DIAGNOSIS — R07.9 CHRONIC CHEST PAIN: ICD-10-CM

## 2019-08-23 LAB — DIASTOLIC DYSFUNCTION: NO

## 2019-08-23 PROCEDURE — 78452 NM MULTI TREAD STRESS CARDIAC COMPONENT: ICD-10-PCS | Mod: 26,,, | Performed by: INTERNAL MEDICINE

## 2019-08-23 PROCEDURE — 93015 NM MULTI TREAD STRESS CARDIAC COMPONENT: ICD-10-PCS | Mod: S$GLB,,, | Performed by: INTERNAL MEDICINE

## 2019-08-23 PROCEDURE — A9502 TC99M TETROFOSMIN: HCPCS

## 2019-08-23 PROCEDURE — 93306 2D ECHO WITH COLOR FLOW DOPPLER: ICD-10-PCS | Mod: S$GLB,,, | Performed by: INTERNAL MEDICINE

## 2019-08-23 PROCEDURE — 93306 TTE W/DOPPLER COMPLETE: CPT | Mod: S$GLB,,, | Performed by: INTERNAL MEDICINE

## 2019-08-23 PROCEDURE — 93015 CV STRESS TEST SUPVJ I&R: CPT | Mod: S$GLB,,, | Performed by: INTERNAL MEDICINE

## 2019-08-23 PROCEDURE — 78452 HT MUSCLE IMAGE SPECT MULT: CPT | Mod: 26,,, | Performed by: INTERNAL MEDICINE

## 2019-08-24 LAB
DIASTOLIC DYSFUNCTION: YES
ESTIMATED PA SYSTOLIC PRESSURE: 28.09
RETIRED EF AND QEF - SEE NOTES: 60 (ref 55–65)

## 2019-08-26 ENCOUNTER — TELEPHONE (OUTPATIENT)
Dept: CARDIOLOGY | Facility: CLINIC | Age: 74
End: 2019-08-26

## 2019-08-26 NOTE — TELEPHONE ENCOUNTER
Called and pt notified. Cm  ----- Message from Marshall Mackay MD sent at 8/25/2019  9:26 PM CDT -----  Stress test is nml

## 2019-08-26 NOTE — TELEPHONE ENCOUNTER
Called and left v/m echo is wnl limits. Call if any questions. Cm  ----- Message from Marshall Mackay MD sent at 8/25/2019  9:25 PM CDT -----  Echo is wnl limits

## 2019-08-27 NOTE — TELEPHONE ENCOUNTER
Repatha is approved by the patient's insurance with a high co pay. We will be assisting the patient in applying to the  for assistance.   Sending Dr Marshall Mackay,  a staff message regarding approval and faxing assistance application for their review and signature

## 2019-09-09 RX ORDER — ZOLEDRONIC ACID 5 MG/100ML
5 INJECTION, SOLUTION INTRAVENOUS
Status: CANCELLED | OUTPATIENT
Start: 2019-09-09

## 2019-09-09 RX ORDER — ACETAMINOPHEN 325 MG/1
650 TABLET ORAL
Status: CANCELLED | OUTPATIENT
Start: 2019-09-09

## 2019-09-09 RX ORDER — SODIUM CHLORIDE 0.9 % (FLUSH) 0.9 %
10 SYRINGE (ML) INJECTION
Status: CANCELLED | OUTPATIENT
Start: 2019-09-09

## 2019-09-09 RX ORDER — HEPARIN 100 UNIT/ML
500 SYRINGE INTRAVENOUS
Status: CANCELLED | OUTPATIENT
Start: 2019-09-09

## 2019-09-12 ENCOUNTER — OFFICE VISIT (OUTPATIENT)
Dept: RHEUMATOLOGY | Facility: CLINIC | Age: 74
End: 2019-09-12
Payer: MEDICARE

## 2019-09-12 ENCOUNTER — LAB VISIT (OUTPATIENT)
Dept: LAB | Facility: HOSPITAL | Age: 74
End: 2019-09-12
Attending: INTERNAL MEDICINE
Payer: MEDICARE

## 2019-09-12 ENCOUNTER — INFUSION (OUTPATIENT)
Dept: RHEUMATOLOGY | Facility: HOSPITAL | Age: 74
End: 2019-09-12
Attending: INTERNAL MEDICINE
Payer: MEDICARE

## 2019-09-12 VITALS
SYSTOLIC BLOOD PRESSURE: 130 MMHG | BODY MASS INDEX: 33.32 KG/M2 | DIASTOLIC BLOOD PRESSURE: 75 MMHG | HEART RATE: 82 BPM | WEIGHT: 176.38 LBS

## 2019-09-12 VITALS
RESPIRATION RATE: 18 BRPM | BODY MASS INDEX: 33.32 KG/M2 | DIASTOLIC BLOOD PRESSURE: 65 MMHG | SYSTOLIC BLOOD PRESSURE: 123 MMHG | HEART RATE: 61 BPM | WEIGHT: 176.38 LBS

## 2019-09-12 DIAGNOSIS — M81.0 AGE-RELATED OSTEOPOROSIS WITHOUT CURRENT PATHOLOGICAL FRACTURE: Primary | ICD-10-CM

## 2019-09-12 DIAGNOSIS — M85.89 OSTEOPENIA OF MULTIPLE SITES: Primary | ICD-10-CM

## 2019-09-12 DIAGNOSIS — M17.0 PRIMARY OSTEOARTHRITIS OF BOTH KNEES: ICD-10-CM

## 2019-09-12 DIAGNOSIS — M85.89 OSTEOPENIA OF MULTIPLE SITES: ICD-10-CM

## 2019-09-12 LAB
25(OH)D3+25(OH)D2 SERPL-MCNC: 29 NG/ML (ref 30–96)
ALBUMIN SERPL BCP-MCNC: 3.7 G/DL (ref 3.5–5.2)
ALP SERPL-CCNC: 91 U/L (ref 55–135)
ALT SERPL W/O P-5'-P-CCNC: 11 U/L (ref 10–44)
ANION GAP SERPL CALC-SCNC: 10 MMOL/L (ref 8–16)
AST SERPL-CCNC: 19 U/L (ref 10–40)
BILIRUB SERPL-MCNC: 0.4 MG/DL (ref 0.1–1)
BUN SERPL-MCNC: 9 MG/DL (ref 8–23)
CALCIUM SERPL-MCNC: 9.4 MG/DL (ref 8.7–10.5)
CHLORIDE SERPL-SCNC: 110 MMOL/L (ref 95–110)
CO2 SERPL-SCNC: 22 MMOL/L (ref 23–29)
CREAT SERPL-MCNC: 0.8 MG/DL (ref 0.5–1.4)
EST. GFR  (AFRICAN AMERICAN): >60 ML/MIN/1.73 M^2
EST. GFR  (NON AFRICAN AMERICAN): >60 ML/MIN/1.73 M^2
GLUCOSE SERPL-MCNC: 95 MG/DL (ref 70–110)
POTASSIUM SERPL-SCNC: 4.1 MMOL/L (ref 3.5–5.1)
PROT SERPL-MCNC: 6.8 G/DL (ref 6–8.4)
SODIUM SERPL-SCNC: 142 MMOL/L (ref 136–145)

## 2019-09-12 PROCEDURE — A4216 STERILE WATER/SALINE, 10 ML: HCPCS | Performed by: INTERNAL MEDICINE

## 2019-09-12 PROCEDURE — 1101F PR PT FALLS ASSESS DOC 0-1 FALLS W/OUT INJ PAST YR: ICD-10-PCS | Mod: CPTII,S$GLB,, | Performed by: INTERNAL MEDICINE

## 2019-09-12 PROCEDURE — 80053 COMPREHEN METABOLIC PANEL: CPT

## 2019-09-12 PROCEDURE — 63600175 PHARM REV CODE 636 W HCPCS: Performed by: INTERNAL MEDICINE

## 2019-09-12 PROCEDURE — 99999 PR PBB SHADOW E&M-EST. PATIENT-LVL III: ICD-10-PCS | Mod: PBBFAC,,, | Performed by: INTERNAL MEDICINE

## 2019-09-12 PROCEDURE — 25000003 PHARM REV CODE 250: Performed by: INTERNAL MEDICINE

## 2019-09-12 PROCEDURE — 99214 PR OFFICE/OUTPT VISIT, EST, LEVL IV, 30-39 MIN: ICD-10-PCS | Mod: S$GLB,,, | Performed by: INTERNAL MEDICINE

## 2019-09-12 PROCEDURE — 36415 COLL VENOUS BLD VENIPUNCTURE: CPT

## 2019-09-12 PROCEDURE — 99214 OFFICE O/P EST MOD 30 MIN: CPT | Mod: S$GLB,,, | Performed by: INTERNAL MEDICINE

## 2019-09-12 PROCEDURE — 96365 THER/PROPH/DIAG IV INF INIT: CPT

## 2019-09-12 PROCEDURE — 99999 PR PBB SHADOW E&M-EST. PATIENT-LVL III: CPT | Mod: PBBFAC,,, | Performed by: INTERNAL MEDICINE

## 2019-09-12 PROCEDURE — 82306 VITAMIN D 25 HYDROXY: CPT

## 2019-09-12 PROCEDURE — 1101F PT FALLS ASSESS-DOCD LE1/YR: CPT | Mod: CPTII,S$GLB,, | Performed by: INTERNAL MEDICINE

## 2019-09-12 RX ORDER — ZOLEDRONIC ACID 5 MG/100ML
5 INJECTION, SOLUTION INTRAVENOUS
Status: CANCELLED | OUTPATIENT
Start: 2019-09-12

## 2019-09-12 RX ORDER — ACETAMINOPHEN 325 MG/1
650 TABLET ORAL
Status: CANCELLED | OUTPATIENT
Start: 2019-09-12

## 2019-09-12 RX ORDER — SODIUM CHLORIDE 0.9 % (FLUSH) 0.9 %
10 SYRINGE (ML) INJECTION
Status: DISCONTINUED | OUTPATIENT
Start: 2019-09-12 | End: 2019-09-12 | Stop reason: HOSPADM

## 2019-09-12 RX ORDER — ZOLEDRONIC ACID 5 MG/100ML
5 INJECTION, SOLUTION INTRAVENOUS
Status: COMPLETED | OUTPATIENT
Start: 2019-09-12 | End: 2019-09-12

## 2019-09-12 RX ORDER — SODIUM CHLORIDE 0.9 % (FLUSH) 0.9 %
10 SYRINGE (ML) INJECTION
Status: CANCELLED | OUTPATIENT
Start: 2019-09-12

## 2019-09-12 RX ORDER — HEPARIN 100 UNIT/ML
500 SYRINGE INTRAVENOUS
Status: CANCELLED | OUTPATIENT
Start: 2019-09-12

## 2019-09-12 RX ORDER — ACETAMINOPHEN 325 MG/1
650 TABLET ORAL
Status: COMPLETED | OUTPATIENT
Start: 2019-09-12 | End: 2019-09-12

## 2019-09-12 RX ADMIN — ACETAMINOPHEN 650 MG: 325 TABLET ORAL at 10:09

## 2019-09-12 RX ADMIN — ZOLEDRONIC ACID 5 MG: 5 INJECTION, SOLUTION INTRAVENOUS at 10:09

## 2019-09-12 RX ADMIN — Medication 10 ML: at 10:09

## 2019-09-12 NOTE — NURSING
Infusion # 2 - Reclast 5 mg q year  Last dose- 9/7/18    Any:  -recent illness, infection, or antibiotic use in past week-  -open wounds or mouth sores- denies  -invasive procedures or surgeries in past 4 weeks or in upcoming 4 weeks- denies  -vaccinations in past week- denies  -any new symptoms/change in symptoms-denies  -chance you may be pregnant- n/a      Recent labs? 9/12/19  Last Rheumatology provider visit- Seen by Dr. Youssef on 9/12/19     Premeds-650 mg Tylenol PO     Reclast 5 mg administered IV at a 15 minute rate per orders; see MAR and vitals for more  details. Tolerated well without adverse events, discharged and ambulatory out of clinic.

## 2019-09-12 NOTE — PLAN OF CARE
Problem: Adult Inpatient Plan of Care  Goal: Optimal Comfort and Wellbeing    Intervention: Monitor Pain and Promote Comfort  Comfort measures provided. Elevated feet and provided warm blankets.

## 2019-09-12 NOTE — PROGRESS NOTES
CC:  Chief Complaint   Patient presents with    Osteopenia     Reclast    And osteoarthritis    History of Present Illness:  Jessica Vazquez a 74 y.o.yo female   Patient Active Problem List   Diagnosis    Open angle with borderline findings, low risk - Both Eyes    Nuclear sclerosis - Both Eyes    Alopecia, unspecified    Sleep related leg cramps    Unspecified gastritis and gastroduodenitis without mention of hemorrhage    HLD (hyperlipidemia)    Osteopenia of multiple sites    Bilateral primary osteoarthritis of knee    Muscle cramps    Statin myopathy    Primary osteoarthritis of right knee    Primary osteoarthritis of both knees    Age-related osteoporosis without current pathological fracture    Family history of arteriosclerotic cardiovascular disease    Chest pain, moderate coronary artery risk    SOB (shortness of breath)     Today osteopenia with high FRAX , here for 2nd dose of IV Reclast  Since last visit she received her 1st dose of IV Reclast   Tolerated well no issues  She takes a MVT with D , with last vitamin-D levels within normal limits  No jaw pain, bleeding gums or dental caries or anticipated dental work      She was on Fosamax for osteopenia  But add worsening GERD on Fosamax, so more to Reclast IV in September 2018  She was intolerant to Forteo in the past as well  She is aware to notify dentist prior to any dental procedures        History of osteoarthritis  Labs  reveal normal RF/CCP/HELGA with normal ESR/CRP  She has chronic bilateral knee OA and get Synvisc injections per Ortho   She has completed PT  She sparingly uses Mobic only when needed  Mostly uses tylenol as needed   Uses voltaren gel prn     Back pain has been stable it is aggravated with activity  No radiation, loss of sensations.  No incontinence  Uses Robaxin as needed along with Mobic as needed and this helps with the back as well as knees        Initial visit :  She mentions of generalized  arthralgias with cramping   May occur any time   They are prominent with activity and some times at night   It involves both legs and lower back   She has b/l hand pains , aggravated with activity , LBP aggravated with activity , relieved with rest   No radiation down the legs   She has early am stiffness for few minutes , but pain actually prominent with activity    She has b/l knee pains , left > right , is currently following with ortho for synvisc injections   She had DEXA suggestive of osteopenia   Dr Valdez / ortho  started her on Forteo.  She had GI side effects with Forteo and was not happy about the cost of it.  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00     Pack years: 6.00     Types: Cigarettes    Smokeless tobacco: Never Used   Substance Use Topics    Alcohol use: No     Frequency: Monthly or less     Drinks per session: 1 or 2     Binge frequency: Never    Drug use: No       Family History   Problem Relation Age of Onset    Diabetes Father     Cancer Father         prostate ca    Hypertension Father     Cancer Sister         cervical ca    Hepatitis Sister     Dementia Mother      Osteoporosis Mother     Breast cancer Paternal Aunt        Review of patient's allergies indicates:   Allergen Reactions    Augmentin [amoxicillin-pot clavulanate] Other (See Comments)     Diarrhea, yeast    Bactrim  [sulfamethoxazole-trimethoprim]      Other reaction(s): Unknown    Celebrex [celecoxib] Other (See Comments)     Stomach pain, gas     Lipitor  [atorvastatin]      Other reaction(s): Unknown    Pravachol  [pravastatin]      Other reaction(s): Unknown    Statins-hmg-coa reductase inhibitors      Other reaction(s): Muscle pain    Sulfa (sulfonamide antibiotics)      Other reaction(s): Swelling             Review of Systems:  Constitutional: Denies fever, chills. No recent weight changes.   Fatigue: no  Muscle weakness: no  Headaches: no new headaches  Eyes: No redness or dryness.  No recent visual changes.  ENT: Denies dry mouth. No oral or nasal ulcers.  Card: No chest pain.  Resp: No cough or sob.   Gastro: No nausea or vomiting.  No heartburn.  Constipation: no  Diarrhea: no  Genito:  No dysuria.  No genital ulcers.  Skin: No rash.  Raynauds:no  Neuro: No numbness / tingling.   Psych: No depression, anxiety  Endo:  no excess thirst.  Heme: no abnormal bleeding or bruising  Clots:none       OBJECTIVE:     Vital Signs   Vitals:    09/12/19 0951   BP: 130/75   Pulse: 82     Physical Exam:  General Appearance:  NAD.   Gait: not favoring.  HEENT: PERRL.  Eyes not dry or injected.  No nasal ulcers.  Mouth not dry, no oral lesions.  Lymph: cervical, supraclavicular or axillary nodes: none abnormal   Cardio: no irregularity of S1 or S2.  No gallops or rubs.   Resp: Normal respiratory motion. Clear to auscultation bilaterally.   No abnormal chest conformation.  Abd: Soft, non-tender, nondistended.  No masses.   Skin: Head and neck,  and extremities examined. No rashes.   Neuro: Ox3.   Cranial nerves II-XII grossly intact.   Sensation intact  in both distal LE and upper extremities to light  touch.  Musculoskeletal Exam:    Rt hand : prominent OA changes       Left hand : + OA changes     Spine:  Normal exam normal range of motion  Muscle strength:Equal and full in all mm groups of the upper and lower ext.    Laboratory:   Results for orders placed or performed in visit on 09/12/19   Comprehensive metabolic panel   Result Value Ref Range    Sodium 142 136 - 145 mmol/L    Potassium 4.1 3.5 - 5.1 mmol/L    Chloride 110 95 - 110 mmol/L    CO2 22 (L) 23 - 29 mmol/L    Glucose 95 70 - 110 mg/dL    BUN, Bld 9 8 - 23 mg/dL    Creatinine 0.8 0.5 - 1.4 mg/dL    Calcium 9.4 8.7 - 10.5 mg/dL    Total Protein 6.8 6.0 - 8.4 g/dL    Albumin 3.7 3.5 - 5.2 g/dL    Total Bilirubin 0.4 0.1 - 1.0 mg/dL    Alkaline Phosphatase 91 55 - 135 U/L    AST 19 10 - 40 U/L    ALT 11 10 - 44 U/L    Anion Gap 10 8 - 16 mmol/L    eGFR if African American >60 >60 mL/min/1.73 m^2    eGFR if non African American >60 >60 mL/min/1.73 m^2     Lab Results   Component Value Date    SEDRATE 7 05/23/2018       Imaging :    The L1 to L4 vertebral bone mineral density is equal to 1.026 g/cm squared with a T score of -1.3 and a Z score of 0.4.  This represents a 1.8% increase in bone mineral density since the prior study  which is not a statistically significant change.    The left femoral neck bone mineral density is equal to 0.698 g/cm squared with a T score of -2.4 and a Z score of -0.7.  This represents a 3.6% increase in bone mineral density since the prior study  which is not a statistically significant change.   Impression      Osteopenia -- there is a 22.6% risk of a major osteoporotic fracture and a 8.2% risk of hip fracture in the next 10 years (FRAX).         Notes reviewed  Other procedures:    ASSESSMENT/PLAN:     Osteopenia of multiple sites    Primary osteoarthritis of both knees      1: Generalized OA : inv hands  / back / knees     Rf/CCP/HELGA - negative   Voltaren gel topical as needed  C/w ortho for synvisc injections for knee OA  at needed   tylenol as needed   Mobic, Robaxin as needed for back pain  May use Flexeril at night      2: Osteopenia :  With high FRAX   Has GI intolerance to Fosamax , continue with IV Reclast, 2nd dose today  Vitamin d otc     Risks vs Benefits and potential side effects of medication prescribed today were discussed with patient.    Medication literature given to patient up discharge. We also discussed about jaw necrosis and atypical fractures which are rare side effects from the medication.        1 year return     Disclaimer: This note was prepared using voice recognition system and is likely to have sound alike errors and is not proof read.  Please call me with any questions.

## 2019-09-19 ENCOUNTER — OFFICE VISIT (OUTPATIENT)
Dept: CARDIOLOGY | Facility: CLINIC | Age: 74
End: 2019-09-19
Payer: MEDICARE

## 2019-09-19 VITALS
OXYGEN SATURATION: 96 % | BODY MASS INDEX: 33.55 KG/M2 | SYSTOLIC BLOOD PRESSURE: 128 MMHG | HEIGHT: 61 IN | WEIGHT: 177.69 LBS | HEART RATE: 68 BPM | RESPIRATION RATE: 18 BRPM | DIASTOLIC BLOOD PRESSURE: 74 MMHG

## 2019-09-19 DIAGNOSIS — Z91.89 AT RISK FOR SLEEP APNEA: ICD-10-CM

## 2019-09-19 DIAGNOSIS — T46.6X5A STATIN MYOPATHY: ICD-10-CM

## 2019-09-19 DIAGNOSIS — G72.0 STATIN MYOPATHY: ICD-10-CM

## 2019-09-19 DIAGNOSIS — R07.9 CHEST PAIN, MODERATE CORONARY ARTERY RISK: ICD-10-CM

## 2019-09-19 DIAGNOSIS — E78.00 PURE HYPERCHOLESTEROLEMIA: Primary | ICD-10-CM

## 2019-09-19 DIAGNOSIS — R06.02 SOB (SHORTNESS OF BREATH): ICD-10-CM

## 2019-09-19 PROCEDURE — 99999 PR PBB SHADOW E&M-EST. PATIENT-LVL IV: ICD-10-PCS | Mod: PBBFAC,,, | Performed by: INTERNAL MEDICINE

## 2019-09-19 PROCEDURE — 99214 OFFICE O/P EST MOD 30 MIN: CPT | Mod: S$GLB,,, | Performed by: INTERNAL MEDICINE

## 2019-09-19 PROCEDURE — 99999 PR PBB SHADOW E&M-EST. PATIENT-LVL IV: CPT | Mod: PBBFAC,,, | Performed by: INTERNAL MEDICINE

## 2019-09-19 PROCEDURE — 1101F PR PT FALLS ASSESS DOC 0-1 FALLS W/OUT INJ PAST YR: ICD-10-PCS | Mod: CPTII,S$GLB,, | Performed by: INTERNAL MEDICINE

## 2019-09-19 PROCEDURE — 1101F PT FALLS ASSESS-DOCD LE1/YR: CPT | Mod: CPTII,S$GLB,, | Performed by: INTERNAL MEDICINE

## 2019-09-19 PROCEDURE — 99214 PR OFFICE/OUTPT VISIT, EST, LEVL IV, 30-39 MIN: ICD-10-PCS | Mod: S$GLB,,, | Performed by: INTERNAL MEDICINE

## 2019-09-19 NOTE — PROGRESS NOTES
Subjective:   Patient ID:  Jessica Perez is a 74 y.o. female who presents for follow-up of Follow-up (6 mos f/u)  Pt still with fatigue. Cardiac tests pending due to insurance.Pt with sx of sleep apnea- fatigue, insomnia.  Pts son has sleep apnea. NMT and echo nml.    Chest Pain    This is a chronic problem. The current episode started more than 1 year ago. The problem occurs rarely. The problem has been resolved. The pain is present in the lateral region. The pain is mild. The quality of the pain is described as dull. The pain does not radiate. Associated symptoms include shortness of breath. Pertinent negatives include no dizziness or palpitations. The pain is aggravated by nothing. She has tried rest for the symptoms. The treatment provided moderate relief.   Pertinent negatives for past medical history include no muscle weakness. Prior diagnostic workup includes stress thallium and echocardiogram.   Shortness of Breath   This is a recurrent problem. The current episode started more than 1 month ago. The problem occurs rarely. The problem has been resolved. Associated symptoms include chest pain. Pertinent negatives include no leg swelling. The symptoms are aggravated by any activity. She has tried rest for the symptoms. The treatment provided moderate relief.       Review of Systems   Constitution: Negative. Negative for weight gain.   HENT: Negative.    Eyes: Negative.    Cardiovascular: Positive for chest pain. Negative for leg swelling and palpitations.   Respiratory: Positive for shortness of breath.    Endocrine: Negative.    Hematologic/Lymphatic: Negative.    Skin: Negative.    Musculoskeletal: Negative for muscle weakness.   Gastrointestinal: Negative.    Genitourinary: Negative.    Neurological: Negative.  Negative for dizziness.   Psychiatric/Behavioral: Negative.    Allergic/Immunologic: Negative.      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     Social History     Socioeconomic History    Marital status:      Spouse name: Not on file    Number of children: Not on file    Years of education: Not on file    Highest education level: Not on file   Occupational History    Not on file   Social Needs    Financial resource strain: Not hard at all    Food insecurity:     Worry: Never true     Inability: Never true    Transportation needs:     Medical: Not on file     Non-medical: No   Tobacco Use    Smoking status: Former Smoker     Packs/day: 0.50     Years: 12.00     Pack years: 6.00     Types: Cigarettes    Smokeless tobacco: Never Used   Substance and Sexual Activity    Alcohol use: No     Frequency: Monthly or less     Drinks per session: 1 or 2     Binge frequency: Never    Drug use: No    Sexual activity: Yes   Lifestyle    Physical activity:     Days per week: 2 days     Minutes per session: 40 min    Stress: Rather much   Relationships    Social connections:     Talks on phone: More than three times a week     Gets together: Once a week     Attends Restorationism service: Not on file     Active member of club or organization: Yes     Attends meetings of clubs or organizations: More than 4 times per year     Relationship status:    Other Topics Concern    Not on file   Social History Narrative    Not on file     Current Outpatient Medications on File Prior to Visit   Medication Sig Dispense Refill    artificial tear, hypromellose, (GENTEAL) 0.3 % Gel       b complex vitamins capsule Take 1 capsule by mouth once daily.      cetirizine (ZYRTEC) 10 MG tablet Take 10 mg by mouth  once daily.      diclofenac sodium (VOLTAREN) 1 % Gel Apply 2 g topically 2 (two) times daily. 1 Tube 2    evolocumab (REPATHA SURECLICK) 140 mg/mL PnIj Inject 1 mL (140 mg total) into the skin every 14 (fourteen) days. 2 mL 6    fluticasone (FLONASE) 50 mcg/actuation nasal spray 2 sprays by Each Nare route once daily. 1 Bottle 12    krill oil-omega-3-dha-epa 150-450 mg CpDR Take by mouth.      loratadine/pseudoephedrine (CLARITIN-D 12 HOUR ORAL) Take by mouth.      meloxicam (MOBIC) 15 MG tablet Take 1 tablet (15 mg total) by mouth once daily. 30 tablet 2    methocarbamol (ROBAXIN) 500 MG Tab Take 1 tablet (500 mg total) by mouth 2 (two) times daily as needed. 60 tablet 1    multivitamin capsule Take 1 capsule by mouth Daily.      vitamin E 100 UNIT capsule Take 100 Units by mouth once daily.       No current facility-administered medications on file prior to visit.      Review of patient's allergies indicates:   Allergen Reactions    Augmentin [amoxicillin-pot clavulanate] Other (See Comments)     Diarrhea, yeast    Bactrim  [sulfamethoxazole-trimethoprim]      Other reaction(s): Unknown    Celebrex [celecoxib] Other (See Comments)     Stomach pain, gas     Lipitor  [atorvastatin]      Other reaction(s): Unknown    Pravachol  [pravastatin]      Other reaction(s): Unknown    Statins-hmg-coa reductase inhibitors      Other reaction(s): Muscle pain    Sulfa (sulfonamide antibiotics)      Other reaction(s): Swelling    Zoster vaccine live        Objective:     Physical Exam   Constitutional: She is oriented to person, place, and time. She appears well-developed and well-nourished.   HENT:   Head: Normocephalic and atraumatic.   Eyes: Pupils are equal, round, and reactive to light. Conjunctivae and EOM are normal.   Neck: Normal range of motion. Neck supple.   Cardiovascular: Normal rate, regular rhythm, normal heart sounds and intact distal pulses.   Pulmonary/Chest: Effort normal and breath sounds  normal.   Abdominal: Soft. Bowel sounds are normal.   Musculoskeletal: Normal range of motion.   Neurological: She is alert and oriented to person, place, and time.   Skin: Skin is warm and dry.   Psychiatric: She has a normal mood and affect.   Nursing note and vitals reviewed.      Assessment:     1. Pure hypercholesterolemia    2. Chest pain, moderate coronary artery risk    3. Statin myopathy    4. SOB (shortness of breath)    5. At risk for sleep apnea        Plan:     Pure hypercholesterolemia    Chest pain, moderate coronary artery risk    Statin myopathy    SOB (shortness of breath)    At risk for sleep apnea  -     Ambulatory consult to Pulmonology      Consult sleep medicine

## 2019-09-23 ENCOUNTER — OFFICE VISIT (OUTPATIENT)
Dept: PULMONOLOGY | Facility: CLINIC | Age: 74
End: 2019-09-23
Payer: MEDICARE

## 2019-09-23 VITALS
HEIGHT: 62 IN | BODY MASS INDEX: 32.78 KG/M2 | RESPIRATION RATE: 18 BRPM | WEIGHT: 178.13 LBS | DIASTOLIC BLOOD PRESSURE: 72 MMHG | SYSTOLIC BLOOD PRESSURE: 122 MMHG | HEART RATE: 75 BPM | OXYGEN SATURATION: 98 %

## 2019-09-23 DIAGNOSIS — E66.9 OBESITY, CLASS I, BMI 30-34.9: ICD-10-CM

## 2019-09-23 DIAGNOSIS — G47.33 OSA (OBSTRUCTIVE SLEEP APNEA): Primary | ICD-10-CM

## 2019-09-23 DIAGNOSIS — G47.00 INSOMNIA, UNSPECIFIED TYPE: ICD-10-CM

## 2019-09-23 PROCEDURE — 1101F PR PT FALLS ASSESS DOC 0-1 FALLS W/OUT INJ PAST YR: ICD-10-PCS | Mod: CPTII,S$GLB,, | Performed by: NURSE PRACTITIONER

## 2019-09-23 PROCEDURE — 99204 PR OFFICE/OUTPT VISIT, NEW, LEVL IV, 45-59 MIN: ICD-10-PCS | Mod: S$GLB,,, | Performed by: NURSE PRACTITIONER

## 2019-09-23 PROCEDURE — 99204 OFFICE O/P NEW MOD 45 MIN: CPT | Mod: S$GLB,,, | Performed by: NURSE PRACTITIONER

## 2019-09-23 PROCEDURE — 1101F PT FALLS ASSESS-DOCD LE1/YR: CPT | Mod: CPTII,S$GLB,, | Performed by: NURSE PRACTITIONER

## 2019-09-23 PROCEDURE — 99999 PR PBB SHADOW E&M-EST. PATIENT-LVL IV: ICD-10-PCS | Mod: PBBFAC,,, | Performed by: NURSE PRACTITIONER

## 2019-09-23 PROCEDURE — 99999 PR PBB SHADOW E&M-EST. PATIENT-LVL IV: CPT | Mod: PBBFAC,,, | Performed by: NURSE PRACTITIONER

## 2019-09-23 NOTE — PATIENT INSTRUCTIONS
Your provider has placed an order for a sleep study.   You should be receiving a phone call from the sleep lab shortly after your study has been approved by your insurance. Please make sure you have your current phone numbers in the [facility] system. If you do not hear from anyone in the next 10 business days, please call the sleep lab at [phone] to schedule your sleep study. The sleep studies are performed at [facility] seven nights a week.  When you are scheduling your sleep study, they will also make you a follow up appointment with your provider. This follow up appointment will be 10-14 days after your sleep study to review the results. If it is noted that you do have sleep apnea on your initial sleep study, you may receive a call back for a second night study with the CPAP before you come back to the office.

## 2019-09-23 NOTE — LETTER
September 23, 2019      Marshall Mackay MD  [address]  [place] [address]           [facility]  [address] [facility] [place] [address]  Phone: [phone]  Fax: [phone]          Patient: Jessica Perez   MR Number: [id number]   YOB: 1945   Date of Visit: 9/23/2019       Dear Dr. Marshall Mackay:    Thank you for referring Jessica Perez to me for evaluation. Attached you will find relevant portions of my assessment and plan of care.    If you have questions, please do not hesitate to call me. I look forward to following Jessica Perez along with you.    Sincerely,    Elizabeth Lejeune, NP    Enclosure  CC:  No Recipients    If you would like to receive this communication electronically, please contact [email or web address] or [phone] to request more information on [employer] access.    For providers and/or their staff who would like to refer a patient to [facility], please contact us through our one-stop-shop provider referral line, [facility], at [phone].    If you feel you have received this communication in error or would no longer like to receive these types of communications, please e-mail [email or web address]

## 2019-09-23 NOTE — PROGRESS NOTES
Subjective:      Patient ID: Jessica Perez is a 74 y.o. female.    Chief Complaint: Sleeping Problem    HPI    Patient presents to the office today for evaluation of sleep apnea. Referred by cardiology with CP. Patient with snoring. Patient not having problems falling asleep, but wakes up frequently throughout the night.  Patient does not wake up feeling refreshed in the morning.  Patient with daytime hypersomnolence.  [place] Sleepiness Scale score 6. She goes to bed a 9:30 and watches TV for one hour. Wakes up 8am. Never refreshed. She wakes herself up snoring and coughing.   Leg movements due to knee pain.     STOP - BANG Questionnaire:     1. Snoring : Do you snore loudly ?    Yes    2. Tired : Do you often feel tired, fatigued, or sleepy during daytime?   Yes    3. Observed: Has anyone observed you stop breathing during your sleep?   No    4. Blood pressure : Do you have or are you being treated for high blood pressure?   No    5. BMI :BMI more than 35 kg/m2?   No    6. Age : Age over 50 yr old?   Yes    7. Neck circumference: Neck circumference greater than 40 cm?   No    8. Gender: Gender male?   No    High risk of MARC: Yes 5 - 8  Intermediate risk of MARC: Yes 3 - 4  Low risk of MARC: Yes 0 - 2      References:   STOP Questionnaire   A Tool to Screen Patients for Obstructive Sleep Apnea: ROLANDO KhanR.C.P.C., HELLNE Cm.B.B.S., Dawna Sullivan M.D.,Ivet Granados, Ph.D., Zachary Mg M.B.B.S.,_ HELLEN Martinez.Sc.,_ Clemencia Stevens M.D., Dedrick Cleveland F.R.C.P.C.; Anesthesiology 2008; 108:812-21 Copyright © 2008, the American Society of Anesthesiologists, Inc. [employer]    Patient Active Problem List   Diagnosis    Open angle with borderline findings, low risk - Both Eyes    Nuclear sclerosis - Both Eyes    Alopecia, unspecified    Sleep related leg cramps    Unspecified gastritis and gastroduodenitis without mention of hemorrhage    HLD  "(hyperlipidemia)    Osteopenia of multiple sites    Bilateral primary osteoarthritis of knee    Muscle cramps    Statin myopathy    Primary osteoarthritis of right knee    Primary osteoarthritis of both knees    Age-related osteoporosis without current pathological fracture    Family history of arteriosclerotic cardiovascular disease    Chest pain, moderate coronary artery risk    SOB (shortness of breath)    At risk for sleep apnea         /72   Pulse 75   Resp 18   Ht 5' 2" (1.575 m)   Wt 80.8 kg (178 lb 2.1 oz)   SpO2 98%   BMI 32.58 kg/m²   Body mass index is 32.58 kg/m².    Review of Systems   Constitutional: Positive for fatigue.   Respiratory: Positive for snoring and dyspnea on extertion.    Gastrointestinal: Positive for acid reflux.   Psychiatric/Behavioral: Positive for sleep disturbance.   All other systems reviewed and are negative.        Objective:      Physical Exam   Constitutional: She is oriented to person, place, and time. She appears well-developed and well-nourished.   HENT:   Head: Normocephalic and atraumatic.   Mouth/Throat: Oropharynx is clear and moist. No oropharyngeal exudate.   Eyes: Right eye exhibits no discharge. Left eye exhibits no discharge.   Neck: Normal range of motion. Neck supple. No tracheal deviation present. No thyromegaly present.   Cardiovascular: Normal rate, regular rhythm and normal heart sounds. Exam reveals no gallop and no friction rub.   No murmur heard.  Pulmonary/Chest: Effort normal and breath sounds normal. No stridor. No respiratory distress. She has no wheezes. She has no rales.   Abdominal: Soft. Bowel sounds are normal. She exhibits no distension and no mass. There is no tenderness.   Musculoskeletal: Normal range of motion. She exhibits no edema.   Lymphadenopathy:     She has no cervical adenopathy.   Neurological: She is alert and oriented to person, place, and time. Coordination normal.   Skin: Skin is warm and dry. No rash " noted.   Psychiatric: She has a normal mood and affect.         Assessment:     1. MARC (obstructive sleep apnea)    2. Insomnia, unspecified type    3. Obesity, Class I, BMI 30-34.9       Outpatient Encounter Medications as of 9/23/2019   Medication Sig Dispense Refill    artificial tear, hypromellose, (GENTEAL) 0.3 % Gel       b complex vitamins capsule Take 1 capsule by mouth once daily.      cetirizine (ZYRTEC) 10 MG tablet Take 10 mg by mouth once daily.      diclofenac sodium (VOLTAREN) 1 % Gel Apply 2 g topically 2 (two) times daily. 1 Tube 2    evolocumab (REPATHA SURECLICK) 140 mg/mL PnIj Inject 1 mL (140 mg total) into the skin every 14 (fourteen) days. 2 mL 6    evolocumab (REPATHA SURECLICK) 140 mg/mL PnIj       fluticasone (FLONASE) 50 mcg/actuation nasal spray 2 sprays by Each Nare route once daily. 1 Bottle 12    krill oil-omega-3-dha-epa 150-450 mg CpDR Take by mouth.      loratadine/pseudoephedrine (CLARITIN-D 12 HOUR ORAL) Take by mouth.      meloxicam (MOBIC) 15 MG tablet Take 1 tablet (15 mg total) by mouth once daily. 30 tablet 2    methocarbamol (ROBAXIN) 500 MG Tab Take 1 tablet (500 mg total) by mouth 2 (two) times daily as needed. 60 tablet 1    multivitamin capsule Take 1 capsule by mouth Daily.      vitamin E 100 UNIT capsule Take 100 Units by mouth once daily.       No facility-administered encounter medications on file as of 9/23/2019.      Orders Placed This Encounter   Procedures    Polysomnogram (CPAP will be added if patient meets diagnostic criteria.)     Standing Status:   Future     Standing Expiration Date:   9/22/2020     Plan:     Problem List Items Addressed This Visit     None      Visit Diagnoses     MARC (obstructive sleep apnea)    -  Primary    Relevant Orders    Polysomnogram (CPAP will be added if patient meets diagnostic criteria.)    Insomnia, unspecified type        Obesity, Class I, BMI 30-34.9             Weight loss and exercise to improve overall  health.  No electronics in bedroom.

## 2019-09-24 NOTE — TELEPHONE ENCOUNTER
FOR DOCUMENTATION ONLY: Financial Assistance for Repatha is approved from 9/24/19 to 12/31/19  [employer] .Sending a staff message to Dr Marshall Mackay regarding assistance approval.

## 2019-09-28 ENCOUNTER — HOSPITAL ENCOUNTER (OUTPATIENT)
Dept: SLEEP MEDICINE | Facility: HOSPITAL | Age: 74
Discharge: HOME | End: 2019-09-28
Attending: NURSE PRACTITIONER
Payer: MEDICARE

## 2019-09-28 DIAGNOSIS — F51.04 PSYCHOPHYSIOLOGIC INSOMNIA: ICD-10-CM

## 2019-09-28 DIAGNOSIS — R06.83 PRIMARY SNORING: ICD-10-CM

## 2019-09-28 DIAGNOSIS — G47.33 OSA (OBSTRUCTIVE SLEEP APNEA): ICD-10-CM

## 2019-09-28 PROCEDURE — 95810 POLYSOM 6/> YRS 4/> PARAM: CPT | Mod: 26,,, | Performed by: INTERNAL MEDICINE

## 2019-09-28 PROCEDURE — 95810 POLYSOM 6/> YRS 4/> PARAM: CPT

## 2019-09-28 PROCEDURE — 95810 PR POLYSOMNOGRAPHY, 4 OR MORE: ICD-10-PCS | Mod: 26,,, | Performed by: INTERNAL MEDICINE

## 2019-09-28 NOTE — Clinical Note
No Significant Obstructive Sleep apnea(MARC) : AHI 1.1 events per hour.EKG showed no cardiac abnormalities.No significant Oxygen DesaturationEEG did not show alpha intrusion.The patient snored with moderate snoring volume.MILD significant periodic leg movements(PLMs) during sleep. However, no significant associated arousals.Reduced sleep efficiency, long primary sleep latency, normal REM sleep latency and normal slow wavelatency.Reduced testing in Supine position may underestimate MARC severityPRIMARY SNORINGInsomnia related to Sleep initiation/sleep maintaince/terminalMarBeverley loveB - 01/11/1955Page 2 of 3Electronically Authenticated By Sylvester Crowley MD on 10/03/2019 05:57 PM, from [phone] Crowley MDNPI: [id number] for SNORING: Mandibular device, ENT surgeries where appropriate and NASAL THEREVENTAvoid alcohol, sedatives and other CNS depressants that may worsen sleep apnea and disrupt normal sleeparc

## 2019-10-03 NOTE — PROCEDURES
"No Significant Obstructive Sleep apnea(MARC) : AHI 1.1 events per hour.  EKG showed no cardiac abnormalities.  No significant Oxygen Desaturation  EEG did not show alpha intrusion.  The patient snored with moderate snoring volume.  MILD significant periodic leg movements(PLMs) during sleep. However, no significant associated arousals.  Reduced sleep efficiency, long primary sleep latency, normal REM sleep latency and normal slow wave  latency.  Reduced testing in Supine position may underestimate MARC severity  PRIMARY SNORING  Insomnia related to Sleep initiation/sleep maintaince/terminal  Jessica Perez - 01/11/1955  Page 2 of 3  Electronically Authenticated By Sylvester Crowley MD on 10/03/2019 05:57 PM, from [phone]  Sylvester Crowley MD  NPI: [id number]  RECOMMENDATIONS  MISCELLANEOUS  Interventions for SNORING: Mandibular device, ENT surgeries where appropriate and NASAL THEREVENT  Avoid alcohol, sedatives and other CNS depressants that may worsen sleep apnea and disrupt normal sleep  architecture.  Sleep hygiene should be reviewed to assess factors that may improve sleep quality.  Weight management and regular exercise should be initiated or continued.  Cognitive behavior therapy for insomnia.  Sleep restriction    See imported Sleep Study result in "Chart Review" under the   "Media tab".      (This Sleep Study was interpreted by a Board Certified Sleep   Specialist who conducted an epoch-by-epoch review of the entire   raw data recording.)     (The indication for this sleep study was reviewed and deemed   appropriate by [facility] Practice Parameters or other reasons by a   Board Certified Sleep Specialist.)    Sylvester Crowley MD      "

## 2019-10-15 ENCOUNTER — OFFICE VISIT (OUTPATIENT)
Dept: OPHTHALMOLOGY | Facility: CLINIC | Age: 74
End: 2019-10-15
Payer: MEDICARE

## 2019-10-15 DIAGNOSIS — Z98.41 CATARACT EXTRACTION STATUS, RIGHT: ICD-10-CM

## 2019-10-15 DIAGNOSIS — M35.01 KERATITIS SICCA, BILATERAL: ICD-10-CM

## 2019-10-15 DIAGNOSIS — H40.013 OPEN ANGLE WITH BORDERLINE FINDINGS, LOW RISK, BILATERAL: Primary | ICD-10-CM

## 2019-10-15 DIAGNOSIS — Z98.42 CATARACT EXTRACTION STATUS, LEFT: ICD-10-CM

## 2019-10-15 PROCEDURE — 92012 INTRM OPH EXAM EST PATIENT: CPT | Mod: S$GLB,,, | Performed by: OPHTHALMOLOGY

## 2019-10-15 PROCEDURE — 92133 CPTRZD OPH DX IMG PST SGM ON: CPT | Mod: S$GLB,,, | Performed by: OPHTHALMOLOGY

## 2019-10-15 PROCEDURE — 99999 PR PBB SHADOW E&M-EST. PATIENT-LVL II: ICD-10-PCS | Mod: PBBFAC,,, | Performed by: OPHTHALMOLOGY

## 2019-10-15 PROCEDURE — 99999 PR PBB SHADOW E&M-EST. PATIENT-LVL II: CPT | Mod: PBBFAC,,, | Performed by: OPHTHALMOLOGY

## 2019-10-15 PROCEDURE — 92083 EXTENDED VISUAL FIELD XM: CPT | Mod: S$GLB,,, | Performed by: OPHTHALMOLOGY

## 2019-10-15 PROCEDURE — 92083 HUMPHREY VISUAL FIELD - OU - BOTH EYES: ICD-10-PCS | Mod: S$GLB,,, | Performed by: OPHTHALMOLOGY

## 2019-10-15 PROCEDURE — 92133 POSTERIOR SEGMENT OCT OPTIC NERVE(OCULAR COHERENCE TOMOGRAPHY) - OU - BOTH EYES: ICD-10-PCS | Mod: S$GLB,,, | Performed by: OPHTHALMOLOGY

## 2019-10-15 PROCEDURE — 92012 PR EYE EXAM, EST PATIENT,INTERMED: ICD-10-PCS | Mod: S$GLB,,, | Performed by: OPHTHALMOLOGY

## 2019-10-15 NOTE — PROGRESS NOTES
HPI     Glaucoma Suspect      Additional comments: 6 month HVF/GOCT, no drops              Comments     Pt states her eyes get fatigued at the end of the day from using her   computer and reading. Uses systane as needed. No ocular pain or   irritation. Has to wear her glasses when driving to get clear vision, can   read without any correction.     PCP: Dr. Lemus  1. COAG SUSP  SLT OD 3/26/15  SLT OS 5/15  2. Dry Eyes  3. PCIOL OD 3/4/2019 (+28.0 SN60WF) CDE 10.38 -NEAR  PCIOL OS +25.5 SN60WF / CDE: 9.46 / 2-4-19 2-5-19    In the past Pt wore cls for monovision- then changed to MF cls          Last edited by Fuentes Duke on 10/15/2019  1:18 PM. (History)            Assessment /Plan     For exam results, see Encounter Report.      ICD-10-CM ICD-9-CM    1. Open angle with borderline findings, low risk, bilateral H40.013 365.01 Posterior Segment OCT Optic Nerve- Both eyes stable      Kumar Visual Field - OU - Extended - Both Eyes stable   Doing well - intraocular pressure is within acceptable range relative to target pressure with no evidence of progression.   Continue current treatment.  Reviewed importance of continued compliance with treatment and follow up.      2. Cataract extraction status, right Z98.41 V45.61    3. Cataract extraction status, left Z98.42 V45.61    4. Keratitis sicca, bilateral H16.223 370.8 stable       Return to clinic 6 months for dilation

## 2019-10-23 NOTE — TELEPHONE ENCOUNTER
Repatha is approved by the patient's insurance with a high co pay. We will be assisting the patient in Renewing the  assistance for 2020.   Sending Dr Marshall Mackay a staff message regarding approval and faxing assistance application for their review and signature

## 2019-10-24 ENCOUNTER — OFFICE VISIT (OUTPATIENT)
Dept: URGENT CARE | Facility: CLINIC | Age: 74
End: 2019-10-24
Payer: MEDICARE

## 2019-10-24 VITALS
HEIGHT: 62 IN | SYSTOLIC BLOOD PRESSURE: 116 MMHG | WEIGHT: 179 LBS | TEMPERATURE: 98 F | RESPIRATION RATE: 16 BRPM | BODY MASS INDEX: 32.94 KG/M2 | DIASTOLIC BLOOD PRESSURE: 64 MMHG | HEART RATE: 68 BPM

## 2019-10-24 DIAGNOSIS — M94.0 COSTOCHONDRITIS, ACUTE: ICD-10-CM

## 2019-10-24 DIAGNOSIS — H93.8X1 SENSATION OF FULLNESS IN RIGHT EAR: ICD-10-CM

## 2019-10-24 DIAGNOSIS — R05.9 COUGH: ICD-10-CM

## 2019-10-24 DIAGNOSIS — J30.9 ALLERGIC RHINITIS, UNSPECIFIED SEASONALITY, UNSPECIFIED TRIGGER: Primary | ICD-10-CM

## 2019-10-24 PROCEDURE — 99214 OFFICE O/P EST MOD 30 MIN: CPT | Mod: S$GLB,,, | Performed by: NURSE PRACTITIONER

## 2019-10-24 PROCEDURE — 99999 PR PBB SHADOW E&M-EST. PATIENT-LVL IV: CPT | Mod: PBBFAC,,, | Performed by: NURSE PRACTITIONER

## 2019-10-24 PROCEDURE — 1101F PR PT FALLS ASSESS DOC 0-1 FALLS W/OUT INJ PAST YR: ICD-10-PCS | Mod: CPTII,S$GLB,, | Performed by: NURSE PRACTITIONER

## 2019-10-24 PROCEDURE — 99214 PR OFFICE/OUTPT VISIT, EST, LEVL IV, 30-39 MIN: ICD-10-PCS | Mod: S$GLB,,, | Performed by: NURSE PRACTITIONER

## 2019-10-24 PROCEDURE — 1101F PT FALLS ASSESS-DOCD LE1/YR: CPT | Mod: CPTII,S$GLB,, | Performed by: NURSE PRACTITIONER

## 2019-10-24 PROCEDURE — 99999 PR PBB SHADOW E&M-EST. PATIENT-LVL IV: ICD-10-PCS | Mod: PBBFAC,,, | Performed by: NURSE PRACTITIONER

## 2019-10-24 RX ORDER — HYDROXYZINE PAMOATE 50 MG/1
50 CAPSULE ORAL EVERY 8 HOURS PRN
Qty: 30 CAPSULE | Refills: 0 | Status: SHIPPED | OUTPATIENT
Start: 2019-10-24 | End: 2019-11-03

## 2019-10-24 RX ORDER — FLUTICASONE PROPIONATE 50 MCG
2 SPRAY, SUSPENSION (ML) NASAL DAILY
Qty: 1 BOTTLE | Refills: 0 | Status: SHIPPED | OUTPATIENT
Start: 2019-10-24 | End: 2019-11-07

## 2019-10-24 NOTE — PROGRESS NOTES
"Subjective:      Patient ID: Jessica Perez is a 74 y.o. female.    Chief Complaint: No chief complaint on file.    /64 (BP Location: Right arm, Patient Position: Sitting, BP Method: Large (Manual))   Pulse 68   Temp 97.6 °F (36.4 °C) (Oral)   Resp 16   Ht 5' 2" (1.575 m)   Wt 81.2 kg (179 lb 0.2 oz)   BMI 32.74 kg/m²     Cough   This is a new problem. The current episode started more than 1 month ago. The problem has been unchanged. The problem occurs hourly. The cough is non-productive. Associated symptoms include ear congestion (right), postnasal drip, rhinorrhea and shortness of breath. Pertinent negatives include no chest pain, chills, ear pain, fever, headaches, myalgias, nasal congestion, sore throat or wheezing. Associated symptoms comments: Sneezing, chest wall pain near sternum. The symptoms are aggravated by lying down and exercise. Treatments tried: allegra D, but hasn't been taking. The treatment provided mild relief. Her past medical history is significant for environmental allergies.   Pt states she was seen by cardiology recently and is "fine". Also states she sometimes can't sleep at night due to "drip" and other allergy symptoms.    Review of patient's allergies indicates:   Allergen Reactions    Augmentin [amoxicillin-pot clavulanate] Other (See Comments)     Diarrhea, yeast    Bactrim  [sulfamethoxazole-trimethoprim]      Other reaction(s): Unknown    Celebrex [celecoxib] Other (See Comments)     Stomach pain, gas     Lipitor  [atorvastatin]      Other reaction(s): Unknown    Pravachol  [pravastatin]      Other reaction(s): Unknown    Statins-hmg-coa reductase inhibitors      Other reaction(s): Muscle pain    Sulfa (sulfonamide antibiotics)      Other reaction(s): Swelling    Zoster vaccine live         Review of Systems   Constitutional: Positive for malaise/fatigue. Negative for chills and fever.   HENT: Positive for postnasal drip and rhinorrhea. Negative for " congestion, ear pain, sinus pain and sore throat.         Right ear congestion   Respiratory: Positive for cough and shortness of breath. Negative for sputum production and wheezing.    Cardiovascular: Negative for chest pain and palpitations.   Musculoskeletal: Negative for myalgias.        Chest pain near sternum   Neurological: Negative for dizziness, loss of consciousness and headaches.   Endo/Heme/Allergies: Positive for environmental allergies.   All other systems reviewed and are negative.     Objective:      Physical Exam   Constitutional: She is oriented to person, place, and time. Vital signs are normal. She appears well-developed and well-nourished.   HENT:   Head: Normocephalic and atraumatic.   Right Ear: Ear canal normal. Tympanic membrane is not erythematous. A middle ear effusion is present.   Left Ear: Tympanic membrane and ear canal normal. Tympanic membrane is not erythematous.  No middle ear effusion.   Nose: No mucosal edema or rhinorrhea. Right sinus exhibits no maxillary sinus tenderness. Left sinus exhibits no maxillary sinus tenderness.   Mouth/Throat: Uvula is midline and mucous membranes are normal. Posterior oropharyngeal erythema (cobblestoning pattern) present. No oropharyngeal exudate or posterior oropharyngeal edema.   Eyes: Pupils are equal, round, and reactive to light. Conjunctivae, EOM and lids are normal.   Neck: Normal range of motion. Neck supple.   Cardiovascular: Normal rate, regular rhythm, normal heart sounds and intact distal pulses.   Pulmonary/Chest: Effort normal and breath sounds normal. No accessory muscle usage. No respiratory distress.   Musculoskeletal: Normal range of motion.        Arms:  Neurological: She is alert and oriented to person, place, and time. No cranial nerve deficit.   Skin: Skin is warm and dry. Capillary refill takes less than 2 seconds.   Psychiatric: She has a normal mood and affect. Her behavior is normal.   Vitals reviewed.      Assessment:        1. Allergic rhinitis, unspecified seasonality, unspecified trigger    2. Costochondritis, acute    3. Cough    4. Sensation of fullness in right ear        Plan:     Allergic rhinitis, unspecified seasonality, unspecified trigger  -     hydrOXYzine pamoate (VISTARIL) 50 MG Cap; Take 1 capsule (50 mg total) by mouth every 8 (eight) hours as needed.  Dispense: 30 capsule; Refill: 0  -     fluticasone propionate (FLONASE) 50 mcg/actuation nasal spray; 2 sprays (100 mcg total) by Each Nostril route once daily. for 14 days  Dispense: 1 Bottle; Refill: 0    Costochondritis, acute    Cough    Sensation of fullness in right ear    Stop Claritin D. Start taking over the counter 10 mg orally disintegrating tabs daily.  Use flonase daily for the next two weeks.  Resume taking antitinflammatory (Mobic) 15 mg daily for the next 7-10 days.  Take Vistaril as needed at night for allergy symptoms.  Increase fluid intake.  Follow up with PCP as needed.

## 2019-10-24 NOTE — PATIENT INSTRUCTIONS
Stop Claritin D. Start taking over the counter 10 mg orally disintegrating tabs daily.  Use flonase daily for the next two weeks.  Resume taking antitinflammatory (Mobic) 15 mg daily for the next 7-10 days.  Take Vistaril as needed at night for allergy symptoms.  Increase fluid intake.  Follow up with PCP as needed.

## 2019-11-08 ENCOUNTER — OFFICE VISIT (OUTPATIENT)
Dept: URGENT CARE | Facility: CLINIC | Age: 74
End: 2019-11-08
Payer: MEDICARE

## 2019-11-08 VITALS
BODY MASS INDEX: 33.1 KG/M2 | HEART RATE: 60 BPM | TEMPERATURE: 98 F | WEIGHT: 179.88 LBS | SYSTOLIC BLOOD PRESSURE: 112 MMHG | HEIGHT: 62 IN | DIASTOLIC BLOOD PRESSURE: 72 MMHG

## 2019-11-08 DIAGNOSIS — R39.9 UTI SYMPTOMS: Primary | ICD-10-CM

## 2019-11-08 DIAGNOSIS — T36.95XA ANTIBIOTIC-INDUCED YEAST INFECTION: ICD-10-CM

## 2019-11-08 DIAGNOSIS — N30.01 ACUTE CYSTITIS WITH HEMATURIA: ICD-10-CM

## 2019-11-08 DIAGNOSIS — B37.9 ANTIBIOTIC-INDUCED YEAST INFECTION: ICD-10-CM

## 2019-11-08 LAB
BILIRUB SERPL-MCNC: NEGATIVE MG/DL
BLOOD URINE, POC: ABNORMAL
COLOR, POC UA: YELLOW
GLUCOSE UR QL STRIP: NORMAL
KETONES UR QL STRIP: NEGATIVE
LEUKOCYTE ESTERASE URINE, POC: ABNORMAL
NITRITE, POC UA: POSITIVE
PH, POC UA: 5
PROTEIN, POC: 30
SPECIFIC GRAVITY, POC UA: 1
UROBILINOGEN, POC UA: NEGATIVE

## 2019-11-08 PROCEDURE — 1101F PT FALLS ASSESS-DOCD LE1/YR: CPT | Mod: CPTII,S$GLB,, | Performed by: PHYSICIAN ASSISTANT

## 2019-11-08 PROCEDURE — 1101F PR PT FALLS ASSESS DOC 0-1 FALLS W/OUT INJ PAST YR: ICD-10-PCS | Mod: CPTII,S$GLB,, | Performed by: PHYSICIAN ASSISTANT

## 2019-11-08 PROCEDURE — 99999 PR PBB SHADOW E&M-EST. PATIENT-LVL IV: CPT | Mod: PBBFAC,,, | Performed by: PHYSICIAN ASSISTANT

## 2019-11-08 PROCEDURE — 87186 SC STD MICRODIL/AGAR DIL: CPT

## 2019-11-08 PROCEDURE — 99999 PR PBB SHADOW E&M-EST. PATIENT-LVL IV: ICD-10-PCS | Mod: PBBFAC,,, | Performed by: PHYSICIAN ASSISTANT

## 2019-11-08 PROCEDURE — 99214 OFFICE O/P EST MOD 30 MIN: CPT | Mod: S$GLB,,, | Performed by: PHYSICIAN ASSISTANT

## 2019-11-08 PROCEDURE — 87088 URINE BACTERIA CULTURE: CPT

## 2019-11-08 PROCEDURE — 81001 POCT URINALYSIS, DIPSTICK OR TABLET REAGENT, AUTOMATED, WITH MICROSCOP: ICD-10-PCS | Mod: S$GLB,,, | Performed by: PHYSICIAN ASSISTANT

## 2019-11-08 PROCEDURE — 99214 PR OFFICE/OUTPT VISIT, EST, LEVL IV, 30-39 MIN: ICD-10-PCS | Mod: S$GLB,,, | Performed by: PHYSICIAN ASSISTANT

## 2019-11-08 PROCEDURE — 81001 URINALYSIS AUTO W/SCOPE: CPT | Mod: S$GLB,,, | Performed by: PHYSICIAN ASSISTANT

## 2019-11-08 PROCEDURE — 87086 URINE CULTURE/COLONY COUNT: CPT

## 2019-11-08 PROCEDURE — 87077 CULTURE AEROBIC IDENTIFY: CPT

## 2019-11-08 RX ORDER — FLUCONAZOLE 150 MG/1
150 TABLET ORAL ONCE
Qty: 2 TABLET | Refills: 0 | Status: SHIPPED | OUTPATIENT
Start: 2019-11-08 | End: 2019-11-08

## 2019-11-08 RX ORDER — NITROFURANTOIN 25; 75 MG/1; MG/1
100 CAPSULE ORAL 2 TIMES DAILY
Qty: 14 CAPSULE | Refills: 0 | Status: SHIPPED | OUTPATIENT
Start: 2019-11-08 | End: 2019-11-15

## 2019-11-08 NOTE — PROGRESS NOTES
"Subjective:      Patient ID: Jessica Perez is a 74 y.o. female.    Chief Complaint: Urinary Tract Infection    Urinary Tract Infection    This is a new problem. Episode onset: 2 days ago  The problem has been gradually worsening. There has been no fever. Associated symptoms include flank pain, frequency, hematuria (once last night ), hesitancy and urgency. Pertinent negatives include no behavior changes, chills, nausea or vomiting. Treatments tried: cranberry juice  There is no history of kidney stones.     Review of Systems   Constitutional: Negative for chills, diaphoresis, fatigue and fever.   Gastrointestinal: Negative for nausea and vomiting.   Genitourinary: Positive for flank pain, frequency, hematuria (once last night ), hesitancy and urgency. Negative for difficulty urinating and dysuria.   Musculoskeletal: Negative for back pain and myalgias.   Neurological: Negative for dizziness and weakness.       Objective:   /72 (BP Location: Right arm, Patient Position: Sitting, BP Method: Medium (Manual))   Pulse 60   Temp 97.8 °F (36.6 °C) (Tympanic)   Ht 5' 2" (1.575 m)   Wt 81.6 kg (179 lb 14.3 oz)   BMI 32.90 kg/m²   Physical Exam   Constitutional: She is oriented to person, place, and time. She appears well-developed and well-nourished. No distress.   Abdominal: Soft. Normal appearance. There is no tenderness. There is CVA tenderness.   Neurological: She is alert and oriented to person, place, and time.   Skin: Skin is warm and dry. She is not diaphoretic.     Assessment:      1. UTI symptoms    2. Acute cystitis with hematuria    3. Antibiotic-induced yeast infection       Plan:   UTI symptoms  -     POCT urinalysis, dipstick or tablet reag  -     Urine culture    Acute cystitis with hematuria  -     nitrofurantoin, macrocrystal-monohydrate, (MACROBID) 100 MG capsule; Take 1 capsule (100 mg total) by mouth 2 (two) times daily. for 7 days  Dispense: 14 capsule; Refill: 0    Antibiotic-induced " yeast infection  -     fluconazole (DIFLUCAN) 150 MG Tab; Take 1 tablet (150 mg total) by mouth once. May repeat after 72 hours if symptoms persist for 1 dose  Dispense: 2 tablet; Refill: 0    Acute Cystitis    -  Urinalysis with leukocytes, nitrates, and blood    -  Start Macrobid and await urine culture    -  Offered KUB to rule out stone with flank pain/blood - would like to treat for UTI/increase fluids first and see if helps    -  Increase fluids     AVS provided and instructions reviewed with patient. Patient was counseled on supportive care and instructed to return or contact primary care provider if condition does not improve or for any new or worsening symptoms.    Mariella Hale PA-C   Physician Assistant   [facility]

## 2019-11-11 LAB — BACTERIA UR CULT: ABNORMAL

## 2019-11-18 ENCOUNTER — OFFICE VISIT (OUTPATIENT)
Dept: URGENT CARE | Facility: CLINIC | Age: 74
End: 2019-11-18
Payer: MEDICARE

## 2019-11-18 VITALS
SYSTOLIC BLOOD PRESSURE: 120 MMHG | OXYGEN SATURATION: 98 % | TEMPERATURE: 99 F | WEIGHT: 182.31 LBS | HEART RATE: 76 BPM | BODY MASS INDEX: 33.55 KG/M2 | HEIGHT: 62 IN | DIASTOLIC BLOOD PRESSURE: 78 MMHG

## 2019-11-18 DIAGNOSIS — R30.0 DYSURIA: ICD-10-CM

## 2019-11-18 DIAGNOSIS — N30.00 ACUTE CYSTITIS WITHOUT HEMATURIA: Primary | ICD-10-CM

## 2019-11-18 DIAGNOSIS — Z29.9 PROPHYLACTIC MEASURE: ICD-10-CM

## 2019-11-18 LAB
BILIRUB SERPL-MCNC: ABNORMAL MG/DL
BLOOD URINE, POC: NEGATIVE
COLOR, POC UA: YELLOW
GLUCOSE UR QL STRIP: NORMAL
KETONES UR QL STRIP: NEGATIVE
LEUKOCYTE ESTERASE URINE, POC: ABNORMAL
NITRITE, POC UA: NEGATIVE
PH, POC UA: 5
PROTEIN, POC: ABNORMAL
SPECIFIC GRAVITY, POC UA: 1.01
UROBILINOGEN, POC UA: NORMAL

## 2019-11-18 PROCEDURE — 99999 PR PBB SHADOW E&M-EST. PATIENT-LVL III: ICD-10-PCS | Mod: PBBFAC,,, | Performed by: NURSE PRACTITIONER

## 2019-11-18 PROCEDURE — 1101F PT FALLS ASSESS-DOCD LE1/YR: CPT | Mod: CPTII,S$GLB,, | Performed by: NURSE PRACTITIONER

## 2019-11-18 PROCEDURE — 87086 URINE CULTURE/COLONY COUNT: CPT

## 2019-11-18 PROCEDURE — 99214 PR OFFICE/OUTPT VISIT, EST, LEVL IV, 30-39 MIN: ICD-10-PCS | Mod: S$GLB,,, | Performed by: NURSE PRACTITIONER

## 2019-11-18 PROCEDURE — 1101F PR PT FALLS ASSESS DOC 0-1 FALLS W/OUT INJ PAST YR: ICD-10-PCS | Mod: CPTII,S$GLB,, | Performed by: NURSE PRACTITIONER

## 2019-11-18 PROCEDURE — 81001 POCT URINALYSIS, DIPSTICK OR TABLET REAGENT, AUTOMATED, WITH MICROSCOP: ICD-10-PCS | Mod: S$GLB,,, | Performed by: NURSE PRACTITIONER

## 2019-11-18 PROCEDURE — 81001 URINALYSIS AUTO W/SCOPE: CPT | Mod: S$GLB,,, | Performed by: NURSE PRACTITIONER

## 2019-11-18 PROCEDURE — 99214 OFFICE O/P EST MOD 30 MIN: CPT | Mod: S$GLB,,, | Performed by: NURSE PRACTITIONER

## 2019-11-18 PROCEDURE — 87086 URINE CULTURE/COLONY COUNT: CPT | Mod: S$GLB,,, | Performed by: NURSE PRACTITIONER

## 2019-11-18 PROCEDURE — 99999 PR PBB SHADOW E&M-EST. PATIENT-LVL III: CPT | Mod: PBBFAC,,, | Performed by: NURSE PRACTITIONER

## 2019-11-18 PROCEDURE — 87086 CULTURE, URINE: ICD-10-PCS | Mod: S$GLB,,, | Performed by: NURSE PRACTITIONER

## 2019-11-18 RX ORDER — FLUCONAZOLE 150 MG/1
150 TABLET ORAL DAILY
Qty: 1 TABLET | Refills: 0 | Status: SHIPPED | OUTPATIENT
Start: 2019-11-18 | End: 2019-11-19

## 2019-11-18 RX ORDER — NITROFURANTOIN 25; 75 MG/1; MG/1
100 CAPSULE ORAL 2 TIMES DAILY
Qty: 14 CAPSULE | Refills: 0 | Status: SHIPPED | OUTPATIENT
Start: 2019-11-18 | End: 2019-11-25

## 2019-11-18 NOTE — PATIENT INSTRUCTIONS
Understanding Urinary Tract Infections (UTIs)  Most UTIs are caused by bacteria, although they may also be caused by viruses or fungi. Bacteria from the bowel are the most common source of infection. The infection may start because of any of the following:  · Sexual activity. During sex, bacteria can travel from the penis, vagina, or rectum into the urethra.   · Bacteria on the skin outside the rectum may travel into the urethra. This is more common in women since the rectum and urethra are closer to each other than in men. Wiping from front to back after using the toilet and keeping the area clean can help prevent germs from getting to the urethra.  · Blockage of urine flow through the urinary tract. If urine sits too long, germs may start to grow out of control.      Parts of the urinary tract  The infection can occur in any part of the urinary tract.  · The kidneys collect and store urine.  · The ureters carry urine from the kidneys to the bladder.  · The bladder holds urine until you are ready to let it out.  · The urethra carries urine from the bladder out of the body. It is shorter in women, so bacteria can move through it more easily. The urethra is longer in men, so a UTI is less likely to reach the bladder or kidneys in men.  Date Last Reviewed: 1/1/2017  © 3796-7491 [employer]. [address] [place] [id number], [place] [address]. All rights reserved. This information is not intended as a substitute for professional medical care. Always follow your healthcare professional's instructions.

## 2019-11-18 NOTE — PROGRESS NOTES
"Subjective:       Patient ID: Jessica Perez is a 74 y.o. female.    Vitals:  height is 5' 2" (1.575 m) and weight is 82.7 kg (182 lb 5.1 oz). Her temperature is 98.6 °F (37 °C). Her blood pressure is 120/78 and her pulse is 76. Her oxygen saturation is 98%.     Chief Complaint: Urinary Tract Infection    Urinary Tract Infection    This is a recurrent problem. The current episode started yesterday. The problem occurs every urination. The problem has been waxing and waning. Quality: twinges. Pain severity now: discomfort. There has been no fever. She is not sexually active. There is no history of pyelonephritis. Associated symptoms include urgency. Pertinent negatives include no flank pain or frequency. She has tried increased fluids (cranberry juice) for the symptoms. The treatment provided no relief. Her past medical history is significant for catheterization and recurrent UTIs. There is no history of diabetes insipidus, diabetes mellitus, hypertension, kidney stones or STD.       Constitution: Negative for fever.   Genitourinary: Positive for dysuria (mild) and urgency. Negative for frequency, urine decreased and flank pain.       Objective:      Physical Exam   Constitutional: She is oriented to person, place, and time. Vital signs are normal. She appears well-developed and well-nourished. She is cooperative. No distress.   HENT:   Head: Normocephalic.   Right Ear: Hearing and external ear normal.   Left Ear: Hearing and external ear normal.   Cardiovascular: Normal rate.   Pulmonary/Chest: Effort normal. No accessory muscle usage. No tachypnea and no bradypnea. No respiratory distress.   Abdominal: Normal appearance. She exhibits no distension, no ascites and no mass. There is no tenderness.   Neurological: She is alert and oriented to person, place, and time.   Skin: Skin is warm, dry, intact, not diaphoretic and not pale. Capillary refill takes less than 2 seconds.   Nursing note and vitals reviewed.      "   Assessment:       1. Acute cystitis without hematuria    2. Dysuria    3. Prophylactic measure        Plan:         Acute cystitis without hematuria  -     Urine culture  -     nitrofurantoin, macrocrystal-monohydrate, (MACROBID) 100 MG capsule; Take 1 capsule (100 mg total) by mouth 2 (two) times daily. for 7 days  Dispense: 14 capsule; Refill: 0    Dysuria  -     POCT URINE DIPSTICK WITH MICROSCOPE, AUTOMATED  -     Urine culture    Prophylactic measure  -     fluconazole (DIFLUCAN) 150 MG Tab; Take 1 tablet (150 mg total) by mouth once daily. for 1 day  Dispense: 1 tablet; Refill: 0          Results for orders placed or performed in visit on 11/18/19   POCT URINE DIPSTICK WITH MICROSCOPE, AUTOMATED   Result Value Ref Range    Color, UA Yellow     Spec Grav UA 1.015     pH, UA 5     WBC, UA Trace     Nitrite, UA Negative     Protein Trace     Glucose, UA Normal     Ketones, UA Negative     Urobilinogen, UA Normal     Bilirubin +     Blood, UA Negative      · Increase fluids (avoid caffeine or sugary beverages as these will irritate the bladder).   · Take your antibiotics exactly as prescribed and make sure to complete entire course even if you begin to feel better. This will prevent recurrence of infection and antibiotic resistance.   · We have prescribed an antibiotic that covers most bacteria that cause urinary tract infections, however, if your urine culture shows that you have any bacterial resistance to the antibiotic you were prescribed or an unusual bacterial strain, we will notify you and make any necessary changes. Urine cultures usually result in about three days.   · Please follow up with your primary care provider if your symptoms do not improve within 2-3 days or sooner for any new or worsening symptoms.  · For any severe pain, bright red blood in urine, difficulty urinating, fever that does not improve with Tylenol or Ibuprofen, inability to urinate, incontinence of urine or bowels, or for any  other urgent concerns, please go to the ER for immediate evaluation.

## 2019-11-20 LAB
BACTERIA UR CULT: NORMAL
BACTERIA UR CULT: NORMAL

## 2019-12-04 ENCOUNTER — TELEPHONE (OUTPATIENT)
Dept: URGENT CARE | Facility: CLINIC | Age: 74
End: 2019-12-04

## 2019-12-04 NOTE — TELEPHONE ENCOUNTER
NA/LVM    ----- Message from Dominique Walker NP sent at 12/4/2019  8:44 AM CST -----  Multiple organisms noted in urine, none more than the other. Will repeat urine if symptoms are still present, get worse and persist

## 2019-12-09 ENCOUNTER — TELEPHONE (OUTPATIENT)
Dept: INTERNAL MEDICINE | Facility: CLINIC | Age: 74
End: 2019-12-09

## 2019-12-09 DIAGNOSIS — Z12.31 ENCOUNTER FOR SCREENING MAMMOGRAM FOR HIGH-RISK PATIENT: Primary | ICD-10-CM

## 2019-12-09 NOTE — TELEPHONE ENCOUNTER
----- Message from Shayy Zepeda sent at 12/9/2019 10:39 AM CST -----  Contact: Patient  .Type:  Mammogram    Caller is requesting to schedule their annual mammogram appointment.  Order is not listed in EPIC.  Please enter order and contact patient to schedule.  Name of Caller:Jessica  Where would they like the mammogram performed? [facility]  Would the patient rather a call back or a response via [facility]? Call  Best Call Back Number:.[phone]  Additional Information:

## 2019-12-23 NOTE — TELEPHONE ENCOUNTER
FOR DOCUMENTATION ONLY: Financial Assistance for Repatha is approved from 01/01/20 to 12/31/20  [employer] .Sending a staff message to Dr Marshall Mackay regarding assistance approval.

## 2020-01-02 ENCOUNTER — HOSPITAL ENCOUNTER (OUTPATIENT)
Dept: RADIOLOGY | Facility: HOSPITAL | Age: 75
Discharge: HOME | End: 2020-01-02
Attending: INTERNAL MEDICINE
Payer: MEDICARE

## 2020-01-02 VITALS — BODY MASS INDEX: 33.55 KG/M2 | HEIGHT: 62 IN | WEIGHT: 182.31 LBS

## 2020-01-02 DIAGNOSIS — Z12.31 ENCOUNTER FOR SCREENING MAMMOGRAM FOR HIGH-RISK PATIENT: ICD-10-CM

## 2020-01-02 PROCEDURE — 77067 SCR MAMMO BI INCL CAD: CPT | Mod: 26,,, | Performed by: RADIOLOGY

## 2020-01-02 PROCEDURE — 77067 MAMMO DIGITAL SCREENING BILAT WITH TOMOSYNTHESIS_CAD: ICD-10-PCS | Mod: 26,,, | Performed by: RADIOLOGY

## 2020-01-02 PROCEDURE — 77063 BREAST TOMOSYNTHESIS BI: CPT | Mod: 26,,, | Performed by: RADIOLOGY

## 2020-01-02 PROCEDURE — 77063 MAMMO DIGITAL SCREENING BILAT WITH TOMOSYNTHESIS_CAD: ICD-10-PCS | Mod: 26,,, | Performed by: RADIOLOGY

## 2020-01-02 PROCEDURE — 77067 SCR MAMMO BI INCL CAD: CPT | Mod: TC

## 2020-01-06 NOTE — PROGRESS NOTES
Your mammogram is normal.  Repeat screening is recommended in one year.    Sincerely,    Warren Lemus M.D.        If you would like to review your experience with Dr. Lemus or [facility], please follow the link below:    [email or web address]

## 2020-01-10 DIAGNOSIS — T46.6X5A STATIN MYOPATHY: ICD-10-CM

## 2020-01-10 DIAGNOSIS — E78.5 HYPERLIPIDEMIA, UNSPECIFIED HYPERLIPIDEMIA TYPE: Primary | ICD-10-CM

## 2020-01-10 DIAGNOSIS — G72.0 STATIN MYOPATHY: ICD-10-CM

## 2020-01-10 DIAGNOSIS — Z79.899 ENCOUNTER FOR LONG-TERM CURRENT USE OF MEDICATION: ICD-10-CM

## 2020-01-10 DIAGNOSIS — Z82.49 FAMILY HISTORY OF ARTERIOSCLEROTIC CARDIOVASCULAR DISEASE: ICD-10-CM

## 2020-01-20 ENCOUNTER — OFFICE VISIT (OUTPATIENT)
Dept: INTERNAL MEDICINE | Facility: CLINIC | Age: 75
End: 2020-01-20
Payer: MEDICARE

## 2020-01-20 ENCOUNTER — CLINICAL SUPPORT (OUTPATIENT)
Dept: CARDIOLOGY | Facility: CLINIC | Age: 75
End: 2020-01-20
Payer: MEDICARE

## 2020-01-20 VITALS
HEART RATE: 72 BPM | HEIGHT: 62 IN | SYSTOLIC BLOOD PRESSURE: 124 MMHG | TEMPERATURE: 98 F | WEIGHT: 183.44 LBS | DIASTOLIC BLOOD PRESSURE: 78 MMHG | BODY MASS INDEX: 33.76 KG/M2

## 2020-01-20 DIAGNOSIS — M94.0 COSTOCHONDRITIS: Primary | ICD-10-CM

## 2020-01-20 DIAGNOSIS — M94.0 COSTOCHONDRITIS: ICD-10-CM

## 2020-01-20 PROCEDURE — 1126F AMNT PAIN NOTED NONE PRSNT: CPT | Mod: S$GLB,,, | Performed by: FAMILY MEDICINE

## 2020-01-20 PROCEDURE — 93005 ELECTROCARDIOGRAM TRACING: CPT | Mod: S$GLB,,, | Performed by: FAMILY MEDICINE

## 2020-01-20 PROCEDURE — 1159F PR MEDICATION LIST DOCUMENTED IN MEDICAL RECORD: ICD-10-PCS | Mod: S$GLB,,, | Performed by: FAMILY MEDICINE

## 2020-01-20 PROCEDURE — 99999 PR PBB SHADOW E&M-EST. PATIENT-LVL III: CPT | Mod: PBBFAC,,, | Performed by: FAMILY MEDICINE

## 2020-01-20 PROCEDURE — 1101F PT FALLS ASSESS-DOCD LE1/YR: CPT | Mod: CPTII,S$GLB,, | Performed by: FAMILY MEDICINE

## 2020-01-20 PROCEDURE — 1159F MED LIST DOCD IN RCRD: CPT | Mod: S$GLB,,, | Performed by: FAMILY MEDICINE

## 2020-01-20 PROCEDURE — 99214 PR OFFICE/OUTPT VISIT, EST, LEVL IV, 30-39 MIN: ICD-10-PCS | Mod: S$GLB,,, | Performed by: FAMILY MEDICINE

## 2020-01-20 PROCEDURE — 93010 EKG 12-LEAD: ICD-10-PCS | Mod: S$GLB,,, | Performed by: INTERNAL MEDICINE

## 2020-01-20 PROCEDURE — 1101F PR PT FALLS ASSESS DOC 0-1 FALLS W/OUT INJ PAST YR: ICD-10-PCS | Mod: CPTII,S$GLB,, | Performed by: FAMILY MEDICINE

## 2020-01-20 PROCEDURE — 99999 PR PBB SHADOW E&M-EST. PATIENT-LVL III: ICD-10-PCS | Mod: PBBFAC,,, | Performed by: FAMILY MEDICINE

## 2020-01-20 PROCEDURE — 93010 ELECTROCARDIOGRAM REPORT: CPT | Mod: S$GLB,,, | Performed by: INTERNAL MEDICINE

## 2020-01-20 PROCEDURE — 99214 OFFICE O/P EST MOD 30 MIN: CPT | Mod: S$GLB,,, | Performed by: FAMILY MEDICINE

## 2020-01-20 PROCEDURE — 1126F PR PAIN SEVERITY QUANTIFIED, NO PAIN PRESENT: ICD-10-PCS | Mod: S$GLB,,, | Performed by: FAMILY MEDICINE

## 2020-01-20 PROCEDURE — 93005 EKG 12-LEAD: ICD-10-PCS | Mod: S$GLB,,, | Performed by: FAMILY MEDICINE

## 2020-01-20 RX ORDER — NAPROXEN 500 MG/1
500 TABLET ORAL 2 TIMES DAILY WITH MEALS
Qty: 30 TABLET | Refills: 0 | Status: SHIPPED | OUTPATIENT
Start: 2020-01-20 | End: 2020-07-30 | Stop reason: SDUPTHER

## 2020-01-20 NOTE — PROGRESS NOTES
Subjective:       Patient ID: Jessica Perez is a 75 y.o. female.    Chief Complaint: Pain (pain in the left side of her chest - feels like a bruise but has lasted 5 months - )    Denies SOB, CP only on movement / palpation of chest, and has had some emotional stress with husbands medical ailments and sons recent deployment.  Feels like she is touching a bruise she states.    Saw Dr. Mackay in the past few months.    Chest Pain    This is a new problem. The current episode started more than 1 month ago. The onset quality is gradual. The problem occurs intermittently. The problem has been waxing and waning. Pain location: above left breast. The pain is moderate. The quality of the pain is described as dull (achy). The pain does not radiate. Pertinent negatives include no abdominal pain or shortness of breath.     Review of Systems   Respiratory: Negative for shortness of breath.    Cardiovascular: Positive for chest pain.   Gastrointestinal: Negative for abdominal pain.       Objective:      Physical Exam   Constitutional: She appears well-developed and well-nourished. No distress.   HENT:   Head: Normocephalic and atraumatic.   Cardiovascular: Normal rate, regular rhythm, normal heart sounds and intact distal pulses.   Pulmonary/Chest: Effort normal and breath sounds normal. No respiratory distress. She has no wheezes.   Abdominal: Soft. She exhibits no distension. There is no tenderness. There is no guarding.   Musculoskeletal:   TTP above left breast.   Skin: Skin is warm and dry. No rash noted. She is not diaphoretic. No erythema.   Nursing note and vitals reviewed.      Assessment:       1. Costochondritis        Plan:     Problem List Items Addressed This Visit        Orthopedic    Costochondritis - Primary    Relevant Medications    naproxen (NAPROSYN) 500 MG tablet    Other Relevant Orders    EKG 12-lead

## 2020-01-30 ENCOUNTER — OFFICE VISIT (OUTPATIENT)
Dept: INTERNAL MEDICINE | Facility: CLINIC | Age: 75
End: 2020-01-30
Payer: MEDICARE

## 2020-01-30 VITALS
HEIGHT: 62 IN | DIASTOLIC BLOOD PRESSURE: 78 MMHG | WEIGHT: 181.88 LBS | SYSTOLIC BLOOD PRESSURE: 118 MMHG | HEART RATE: 74 BPM | BODY MASS INDEX: 33.47 KG/M2 | TEMPERATURE: 98 F

## 2020-01-30 DIAGNOSIS — J30.2 SEASONAL ALLERGIC RHINITIS, UNSPECIFIED TRIGGER: Primary | ICD-10-CM

## 2020-01-30 PROCEDURE — 99214 PR OFFICE/OUTPT VISIT, EST, LEVL IV, 30-39 MIN: ICD-10-PCS | Mod: S$GLB,,, | Performed by: FAMILY MEDICINE

## 2020-01-30 PROCEDURE — 1159F PR MEDICATION LIST DOCUMENTED IN MEDICAL RECORD: ICD-10-PCS | Mod: S$GLB,,, | Performed by: FAMILY MEDICINE

## 2020-01-30 PROCEDURE — 99214 OFFICE O/P EST MOD 30 MIN: CPT | Mod: S$GLB,,, | Performed by: FAMILY MEDICINE

## 2020-01-30 PROCEDURE — 1101F PR PT FALLS ASSESS DOC 0-1 FALLS W/OUT INJ PAST YR: ICD-10-PCS | Mod: CPTII,S$GLB,, | Performed by: FAMILY MEDICINE

## 2020-01-30 PROCEDURE — 1126F PR PAIN SEVERITY QUANTIFIED, NO PAIN PRESENT: ICD-10-PCS | Mod: S$GLB,,, | Performed by: FAMILY MEDICINE

## 2020-01-30 PROCEDURE — 1101F PT FALLS ASSESS-DOCD LE1/YR: CPT | Mod: CPTII,S$GLB,, | Performed by: FAMILY MEDICINE

## 2020-01-30 PROCEDURE — 99999 PR PBB SHADOW E&M-EST. PATIENT-LVL III: ICD-10-PCS | Mod: PBBFAC,,, | Performed by: FAMILY MEDICINE

## 2020-01-30 PROCEDURE — 99999 PR PBB SHADOW E&M-EST. PATIENT-LVL III: CPT | Mod: PBBFAC,,, | Performed by: FAMILY MEDICINE

## 2020-01-30 PROCEDURE — 1126F AMNT PAIN NOTED NONE PRSNT: CPT | Mod: S$GLB,,, | Performed by: FAMILY MEDICINE

## 2020-01-30 PROCEDURE — 1159F MED LIST DOCD IN RCRD: CPT | Mod: S$GLB,,, | Performed by: FAMILY MEDICINE

## 2020-01-30 RX ORDER — AZELASTINE 1 MG/ML
1 SPRAY, METERED NASAL 2 TIMES DAILY
Qty: 30 ML | Refills: 1 | Status: SHIPPED | OUTPATIENT
Start: 2020-01-30 | End: 2023-10-03

## 2020-01-30 RX ORDER — PROMETHAZINE HYDROCHLORIDE AND DEXTROMETHORPHAN HYDROBROMIDE 6.25; 15 MG/5ML; MG/5ML
5 SYRUP ORAL NIGHTLY
Qty: 118 ML | Refills: 0 | Status: SHIPPED | OUTPATIENT
Start: 2020-01-30 | End: 2020-07-30

## 2020-01-30 RX ORDER — BENZONATATE 100 MG/1
100 CAPSULE ORAL 3 TIMES DAILY PRN
Qty: 30 CAPSULE | Refills: 0 | Status: SHIPPED | OUTPATIENT
Start: 2020-01-30 | End: 2020-02-09

## 2020-01-30 NOTE — PROGRESS NOTES
Subjective:       Patient ID: Jessica Perez is a 75 y.o. female.    Chief Complaint: No chief complaint on file.    URI    This is a new problem. The current episode started in the past 7 days. The problem has been gradually worsening. There has been no fever. Associated symptoms include congestion, coughing, headaches and sneezing. Pertinent negatives include no abdominal pain, chest pain or sinus pain. She has tried nothing for the symptoms. The treatment provided no relief.     Review of Systems   HENT: Positive for congestion and sneezing. Negative for sinus pain.    Respiratory: Positive for cough.    Cardiovascular: Negative for chest pain.   Gastrointestinal: Negative for abdominal pain.   Neurological: Positive for headaches.       Objective:      Physical Exam   Constitutional: She appears well-developed and well-nourished. No distress.   HENT:   Head: Normocephalic and atraumatic.   Nose: Right sinus exhibits no maxillary sinus tenderness and no frontal sinus tenderness. Left sinus exhibits no maxillary sinus tenderness and no frontal sinus tenderness.   Mouth/Throat: Oropharynx is clear and moist. No oropharyngeal exudate.   Pulmonary/Chest: Effort normal and breath sounds normal. No respiratory distress. She has no wheezes.   Nasal congestion   Skin: Skin is warm and dry. No rash noted. She is not diaphoretic. No erythema.   Nursing note and vitals reviewed.      Assessment:       1. Seasonal allergic rhinitis, unspecified trigger        Plan:     Problem List Items Addressed This Visit        Other    Seasonal allergic rhinitis - Primary    Relevant Medications    azelastine (ASTELIN) 137 mcg (0.1 %) nasal spray    benzonatate (TESSALON) 100 MG capsule    promethazine-dextromethorphan (PROMETHAZINE-DM) 6.25-15 mg/5 mL Syrp

## 2020-03-12 ENCOUNTER — OFFICE VISIT (OUTPATIENT)
Dept: CARDIOLOGY | Facility: CLINIC | Age: 75
End: 2020-03-12
Payer: MEDICARE

## 2020-03-12 ENCOUNTER — HOSPITAL ENCOUNTER (OUTPATIENT)
Dept: CARDIOLOGY | Facility: HOSPITAL | Age: 75
Discharge: HOME | End: 2020-03-12
Attending: INTERNAL MEDICINE
Payer: MEDICARE

## 2020-03-12 VITALS
HEIGHT: 62 IN | BODY MASS INDEX: 33.31 KG/M2 | DIASTOLIC BLOOD PRESSURE: 78 MMHG | HEART RATE: 73 BPM | WEIGHT: 181 LBS | SYSTOLIC BLOOD PRESSURE: 138 MMHG

## 2020-03-12 DIAGNOSIS — E78.00 PURE HYPERCHOLESTEROLEMIA: Primary | ICD-10-CM

## 2020-03-12 DIAGNOSIS — R07.9 CHEST PAIN, MODERATE CORONARY ARTERY RISK: ICD-10-CM

## 2020-03-12 DIAGNOSIS — Z82.49 FAMILY HISTORY OF ARTERIOSCLEROTIC CARDIOVASCULAR DISEASE: ICD-10-CM

## 2020-03-12 DIAGNOSIS — G47.33 OSA (OBSTRUCTIVE SLEEP APNEA): ICD-10-CM

## 2020-03-12 DIAGNOSIS — Z76.89 ENCOUNTER TO ESTABLISH CARE: ICD-10-CM

## 2020-03-12 PROCEDURE — 93010 EKG 12-LEAD: ICD-10-PCS | Mod: ,,, | Performed by: INTERNAL MEDICINE

## 2020-03-12 PROCEDURE — 99999 PR PBB SHADOW E&M-EST. PATIENT-LVL III: ICD-10-PCS | Mod: PBBFAC,,, | Performed by: INTERNAL MEDICINE

## 2020-03-12 PROCEDURE — 1125F PR PAIN SEVERITY QUANTIFIED, PAIN PRESENT: ICD-10-PCS | Mod: S$GLB,,, | Performed by: INTERNAL MEDICINE

## 2020-03-12 PROCEDURE — 1101F PT FALLS ASSESS-DOCD LE1/YR: CPT | Mod: CPTII,S$GLB,, | Performed by: INTERNAL MEDICINE

## 2020-03-12 PROCEDURE — 93005 ELECTROCARDIOGRAM TRACING: CPT | Mod: PO

## 2020-03-12 PROCEDURE — 99999 PR PBB SHADOW E&M-EST. PATIENT-LVL III: CPT | Mod: PBBFAC,,, | Performed by: INTERNAL MEDICINE

## 2020-03-12 PROCEDURE — 1159F PR MEDICATION LIST DOCUMENTED IN MEDICAL RECORD: ICD-10-PCS | Mod: S$GLB,,, | Performed by: INTERNAL MEDICINE

## 2020-03-12 PROCEDURE — 99214 OFFICE O/P EST MOD 30 MIN: CPT | Mod: S$GLB,,, | Performed by: INTERNAL MEDICINE

## 2020-03-12 PROCEDURE — 93010 ELECTROCARDIOGRAM REPORT: CPT | Mod: ,,, | Performed by: INTERNAL MEDICINE

## 2020-03-12 PROCEDURE — 1101F PR PT FALLS ASSESS DOC 0-1 FALLS W/OUT INJ PAST YR: ICD-10-PCS | Mod: CPTII,S$GLB,, | Performed by: INTERNAL MEDICINE

## 2020-03-12 PROCEDURE — 99214 PR OFFICE/OUTPT VISIT, EST, LEVL IV, 30-39 MIN: ICD-10-PCS | Mod: S$GLB,,, | Performed by: INTERNAL MEDICINE

## 2020-03-12 PROCEDURE — 1125F AMNT PAIN NOTED PAIN PRSNT: CPT | Mod: S$GLB,,, | Performed by: INTERNAL MEDICINE

## 2020-03-12 PROCEDURE — 1159F MED LIST DOCD IN RCRD: CPT | Mod: S$GLB,,, | Performed by: INTERNAL MEDICINE

## 2020-03-12 RX ORDER — OMEPRAZOLE 20 MG/1
20 TABLET, DELAYED RELEASE ORAL DAILY
Qty: 30 TABLET | Refills: 11
Start: 2020-03-12 | End: 2022-05-03

## 2020-03-12 NOTE — PROGRESS NOTES
Subjective:   Patient ID:  Jessica Perez is a 75 y.o. female who presents for follow-up of Pure hypercholesterolemia and Statin myopathy  Sleep apnea w/u apparently normal.Patient denies CP, angina or anginal equivalent.   Fatigue better . Pt with atypical sx with some relief from NSAID. Pt has not tried PPI.  NMT and echo is nml. Pt walks and active without sx.    Chest Pain    This is a recurrent problem. The current episode started 1 to 4 weeks ago. The problem occurs intermittently. The problem has been waxing and waning. The pain is present in the lateral region. The pain is mild. The quality of the pain is described as dull. The pain does not radiate. Pertinent negatives include no dizziness, palpitations or shortness of breath. The pain is aggravated by food. She has tried rest for the symptoms. The treatment provided mild relief.   Pertinent negatives for past medical history include no muscle weakness.   Her family medical history is significant for CAD. Prior diagnostic workup includes stress thallium.       Review of Systems   Constitution: Negative. Negative for weight gain.   HENT: Negative.    Eyes: Negative.    Cardiovascular: Positive for chest pain. Negative for leg swelling and palpitations.   Respiratory: Negative.  Negative for shortness of breath.    Endocrine: Negative.    Hematologic/Lymphatic: Negative.    Skin: Negative.    Musculoskeletal: Negative for muscle weakness.   Gastrointestinal: Negative.    Genitourinary: Negative.    Neurological: Negative.  Negative for dizziness.   Psychiatric/Behavioral: Negative.    Allergic/Immunologic: Negative.      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     Social History     Socioeconomic History    Marital status:      Spouse name: Not on file    Number of children: Not on file    Years of education: Not on file    Highest education level: Not on file   Occupational History    Not on file   Social Needs    Financial resource strain: Not hard at all    Food insecurity:     Worry: Never true     Inability: Never true    Transportation needs:     Medical: Not on file     Non-medical: No   Tobacco Use    Smoking status: Former Smoker     Packs/day: 0.50     Years: 12.00     Pack years: 6.00     Types: Cigarettes    Smokeless tobacco: Never Used   Substance and Sexual Activity    Alcohol use: No     Frequency: Monthly or less     Drinks per session: 1 or 2     Binge frequency: Never    Drug use: No    Sexual activity: Yes   Lifestyle    Physical activity:     Days per week: 2 days     Minutes per session: 40 min    Stress: Rather much   Relationships    Social connections:     Talks on phone: More than three times a week     Gets together: Once a week     Attends Latter day service: Not on file     Active member of club or organization: Yes     Attends meetings of clubs or organizations: More than 4 times per year     Relationship status:    Other Topics Concern    Not on file   Social History Narrative    Not on file     Current Outpatient Medications on File Prior to Visit   Medication Sig Dispense Refill    artificial tear, hypromellose, (GENTEAL) 0.3 % Gel       azelastine (ASTELIN) 137 mcg (0.1 %) nasal spray 1 spray (137 mcg total) by Nasal route 2 (two) times daily. 30 mL 1    b complex vitamins capsule Take 1 capsule by mouth once daily.      cetirizine (ZYRTEC) 10 MG tablet Take 10 mg by mouth once daily.      diclofenac sodium (VOLTAREN) 1 % Gel Apply 2 g topically 2 (two) times daily. 1 Tube 2     evolocumab (REPATHA SURECLICK) 140 mg/mL PnIj Inject 1 mL (140 mg total) into the skin every 14 (fourteen) days. 2 mL 6    fluticasone (FLONASE) 50 mcg/actuation nasal spray 2 sprays by Each Nare route once daily. 1 Bottle 12    krill oil-omega-3-dha-epa 150-450 mg CpDR Take by mouth.      loratadine/pseudoephedrine (CLARITIN-D 12 HOUR ORAL) Take by mouth.      methocarbamol (ROBAXIN) 500 MG Tab Take 1 tablet (500 mg total) by mouth 2 (two) times daily as needed. 60 tablet 1    multivitamin capsule Take 1 capsule by mouth Daily.      naproxen (NAPROSYN) 500 MG tablet Take 1 tablet (500 mg total) by mouth 2 (two) times daily with meals. 30 tablet 0    nutritional supplement/fiber (QUINOA-KALE-HEMP ORAL) Take by mouth.      promethazine-dextromethorphan (PROMETHAZINE-DM) 6.25-15 mg/5 mL Syrp Take 5 mLs by mouth every evening. 118 mL 0    vitamin E 100 UNIT capsule Take 100 Units by mouth once daily.      evolocumab (REPATHA SURECLICK) 140 mg/mL PnIj        No current facility-administered medications on file prior to visit.      Review of patient's allergies indicates:   Allergen Reactions    Augmentin [amoxicillin-pot clavulanate] Other (See Comments)     Diarrhea, yeast    Bactrim  [sulfamethoxazole-trimethoprim]      Other reaction(s): Unknown    Celebrex [celecoxib] Other (See Comments)     Stomach pain, gas     Lipitor  [atorvastatin]      Other reaction(s): Unknown    Pravachol  [pravastatin]      Other reaction(s): Unknown    Statins-hmg-coa reductase inhibitors      Other reaction(s): Muscle pain    Sulfa (sulfonamide antibiotics)      Other reaction(s): Swelling    Zoster vaccine live        Objective:     Physical Exam   Constitutional: She is oriented to person, place, and time. She appears well-developed and well-nourished.   HENT:   Head: Normocephalic and atraumatic.   Eyes: Pupils are equal, round, and reactive to light. Conjunctivae and EOM are normal.   Neck: Normal range of motion.  Neck supple.   Cardiovascular: Normal rate, regular rhythm, normal heart sounds and intact distal pulses.   Pulmonary/Chest: Effort normal and breath sounds normal.   Abdominal: Soft. Bowel sounds are normal.   Musculoskeletal: Normal range of motion.   Neurological: She is alert and oriented to person, place, and time.   Skin: Skin is warm and dry.   Psychiatric: She has a normal mood and affect.   Nursing note and vitals reviewed.      Assessment:     1. Pure hypercholesterolemia    2. Chest pain, moderate coronary artery risk    3. Family history of arteriosclerotic cardiovascular disease    4. MARC (obstructive sleep apnea)        Plan:     Pure hypercholesterolemia    Chest pain, moderate coronary artery risk    Family history of arteriosclerotic cardiovascular disease    MARC (obstructive sleep apnea)      PPI for CP  Check lipids

## 2020-04-14 ENCOUNTER — TELEPHONE (OUTPATIENT)
Dept: ADMINISTRATIVE | Facility: HOSPITAL | Age: 75
End: 2020-04-14

## 2020-07-30 ENCOUNTER — TELEPHONE (OUTPATIENT)
Dept: ORTHOPEDICS | Facility: CLINIC | Age: 75
End: 2020-07-30

## 2020-07-30 ENCOUNTER — OFFICE VISIT (OUTPATIENT)
Dept: INTERNAL MEDICINE | Facility: CLINIC | Age: 75
End: 2020-07-30
Payer: MEDICARE

## 2020-07-30 ENCOUNTER — TELEPHONE (OUTPATIENT)
Dept: INTERNAL MEDICINE | Facility: CLINIC | Age: 75
End: 2020-07-30

## 2020-07-30 VITALS
HEIGHT: 61 IN | DIASTOLIC BLOOD PRESSURE: 82 MMHG | TEMPERATURE: 99 F | BODY MASS INDEX: 34.58 KG/M2 | WEIGHT: 183.19 LBS | SYSTOLIC BLOOD PRESSURE: 126 MMHG

## 2020-07-30 DIAGNOSIS — M94.0 COSTOCHONDRITIS: ICD-10-CM

## 2020-07-30 DIAGNOSIS — R25.2 MUSCLE CRAMPS: Primary | ICD-10-CM

## 2020-07-30 DIAGNOSIS — M81.0 AGE-RELATED OSTEOPOROSIS WITHOUT CURRENT PATHOLOGICAL FRACTURE: ICD-10-CM

## 2020-07-30 DIAGNOSIS — M17.0 PRIMARY OSTEOARTHRITIS OF BOTH KNEES: ICD-10-CM

## 2020-07-30 DIAGNOSIS — E66.01 SEVERE OBESITY (BMI 35.0-39.9) WITH COMORBIDITY: ICD-10-CM

## 2020-07-30 PROCEDURE — 1101F PT FALLS ASSESS-DOCD LE1/YR: CPT | Mod: CPTII,S$GLB,, | Performed by: FAMILY MEDICINE

## 2020-07-30 PROCEDURE — 1125F AMNT PAIN NOTED PAIN PRSNT: CPT | Mod: S$GLB,,, | Performed by: FAMILY MEDICINE

## 2020-07-30 PROCEDURE — 3008F PR BODY MASS INDEX (BMI) DOCUMENTED: ICD-10-PCS | Mod: CPTII,S$GLB,, | Performed by: FAMILY MEDICINE

## 2020-07-30 PROCEDURE — 1125F PR PAIN SEVERITY QUANTIFIED, PAIN PRESENT: ICD-10-PCS | Mod: S$GLB,,, | Performed by: FAMILY MEDICINE

## 2020-07-30 PROCEDURE — 1101F PR PT FALLS ASSESS DOC 0-1 FALLS W/OUT INJ PAST YR: ICD-10-PCS | Mod: CPTII,S$GLB,, | Performed by: FAMILY MEDICINE

## 2020-07-30 PROCEDURE — 3008F BODY MASS INDEX DOCD: CPT | Mod: CPTII,S$GLB,, | Performed by: FAMILY MEDICINE

## 2020-07-30 PROCEDURE — 1159F PR MEDICATION LIST DOCUMENTED IN MEDICAL RECORD: ICD-10-PCS | Mod: S$GLB,,, | Performed by: FAMILY MEDICINE

## 2020-07-30 PROCEDURE — 99214 OFFICE O/P EST MOD 30 MIN: CPT | Mod: S$GLB,,, | Performed by: FAMILY MEDICINE

## 2020-07-30 PROCEDURE — 1159F MED LIST DOCD IN RCRD: CPT | Mod: S$GLB,,, | Performed by: FAMILY MEDICINE

## 2020-07-30 PROCEDURE — 99214 PR OFFICE/OUTPT VISIT, EST, LEVL IV, 30-39 MIN: ICD-10-PCS | Mod: S$GLB,,, | Performed by: FAMILY MEDICINE

## 2020-07-30 PROCEDURE — 99999 PR PBB SHADOW E&M-EST. PATIENT-LVL IV: ICD-10-PCS | Mod: PBBFAC,,, | Performed by: FAMILY MEDICINE

## 2020-07-30 PROCEDURE — 99999 PR PBB SHADOW E&M-EST. PATIENT-LVL IV: CPT | Mod: PBBFAC,,, | Performed by: FAMILY MEDICINE

## 2020-07-30 RX ORDER — NAPROXEN 500 MG/1
500 TABLET ORAL 2 TIMES DAILY WITH MEALS
Qty: 60 TABLET | Refills: 1 | Status: SHIPPED | OUTPATIENT
Start: 2020-07-30 | End: 2021-02-05

## 2020-07-30 RX ORDER — QUININE SULFATE 324 MG/1
648 CAPSULE ORAL 2 TIMES DAILY
Qty: 60 CAPSULE | Refills: 0 | Status: SHIPPED | OUTPATIENT
Start: 2020-07-30 | End: 2020-09-21

## 2020-07-30 NOTE — TELEPHONE ENCOUNTER
----- Message from Sarah Gibbons sent at 7/30/2020 12:52 PM CDT -----  Regarding: medication  Pt is calling in regards to pharmacy stating that they need PA  for medication      Medication:  quiNINE sulfate 324 mg Cap      PHARMACY:  [facility] - [facility] [address] [address]  [address] [address] [address]  Phone: [phone] Fax: [phone]      Pls call back at [phone]

## 2020-07-30 NOTE — PROGRESS NOTES
Subjective:       Patient ID: Jessica Perez is a 75 y.o. female.    Chief Complaint: Hyperlipidemia (6 month follow up )    Back Pain  This is a chronic problem. The current episode started more than 1 year ago. The problem occurs constantly. The problem has been gradually worsening since onset. The pain is present in the lumbar spine. The quality of the pain is described as aching and cramping. The pain is moderate. Pertinent negatives include no abdominal pain or chest pain.     Review of Systems   Respiratory: Negative for shortness of breath.    Cardiovascular: Negative for chest pain.   Gastrointestinal: Negative for abdominal pain.   Musculoskeletal: Positive for arthralgias and back pain.       Objective:      Physical Exam  Vitals signs and nursing note reviewed.   Constitutional:       General: She is not in acute distress.     Appearance: Normal appearance. She is well-developed. She is not diaphoretic.   HENT:      Head: Normocephalic and atraumatic.      Nose: Nose normal.   Pulmonary:      Effort: Pulmonary effort is normal. No respiratory distress.      Breath sounds: Normal breath sounds. No wheezing.   Musculoskeletal:      Comments: Left and right medial Knee are TTP.  Full ROM  No crepitus noted    TTP Left rib cage at site where bra attaches.    Lumbar back NTTP, no fasciculations, L-spine NTTP   Skin:     General: Skin is warm and dry.      Findings: No erythema or rash.   Neurological:      Mental Status: She is alert.   Psychiatric:         Mood and Affect: Mood normal.         Behavior: Behavior normal.         Thought Content: Thought content normal.         Judgment: Judgment normal.         Assessment:       1. Muscle cramps    2. Costochondritis    3. Severe obesity (BMI 35.0-39.9) with comorbidity    4. Primary osteoarthritis of both knees    5. Age-related osteoporosis without current pathological fracture        Plan:     Problem List Items Addressed This Visit        Endocrine     Severe obesity (BMI 35.0-39.9) with comorbidity    Relevant Orders    INSULIN, RANDOM       Orthopedic    Primary osteoarthritis of both knees    Relevant Orders    Ambulatory referral/consult to Orthopedics    Age-related osteoporosis without current pathological fracture    Relevant Orders    Ambulatory referral/consult to Orthopedics    Costochondritis    Relevant Medications    naproxen (NAPROSYN) 500 MG tablet       Other    Muscle cramps - Primary    Relevant Medications    quiNINE sulfate 324 mg Cap

## 2020-07-31 ENCOUNTER — LAB VISIT (OUTPATIENT)
Dept: LAB | Facility: HOSPITAL | Age: 75
End: 2020-07-31
Attending: FAMILY MEDICINE
Payer: MEDICARE

## 2020-07-31 DIAGNOSIS — E66.01 SEVERE OBESITY (BMI 35.0-39.9) WITH COMORBIDITY: ICD-10-CM

## 2020-07-31 PROCEDURE — 83525 ASSAY OF INSULIN: CPT

## 2020-08-01 LAB
INSULIN COLLECTION INTERVAL: NORMAL
INSULIN SERPL-ACNC: 10.4 UU/ML

## 2020-08-03 ENCOUNTER — TELEPHONE (OUTPATIENT)
Dept: INTERNAL MEDICINE | Facility: CLINIC | Age: 75
End: 2020-08-03

## 2020-08-03 ENCOUNTER — TELEPHONE (OUTPATIENT)
Dept: CARDIOLOGY | Facility: CLINIC | Age: 75
End: 2020-08-03

## 2020-08-03 NOTE — TELEPHONE ENCOUNTER
----- Message from Lonny Sahu sent at 8/3/2020  4:02 PM CDT -----  Regarding: Results  Contact: FOSTER PÉREZ [[id number]]  Type:  Patient Returning Call    Who Called: FOSTER PÉREZ [[id number]]    Who Left Message for Patient: Marcos     Does the patient know what this is regarding?: yes    Would the patient rather a call back or a response via [facility]? call    Best Call Back Number: [phone]    Additional Information:

## 2020-08-03 NOTE — TELEPHONE ENCOUNTER
----- Message from Breana Sexton sent at 8/3/2020  2:46 PM CDT -----  Type:  Pharmacy Calling to Clarify an RX    Name of Caller:  Pharmacy Name:[facility]   Prescription Name:  What do they need to clarify?:Do not compound  Best Call Back Number:[phone]  Additional Information:

## 2020-08-03 NOTE — TELEPHONE ENCOUNTER
Verified  and pt notified. Cm  ----- Message from Marshall Mackay MD sent at 2020 10:49 AM CDT -----  Lipids reviwed, continue repatha and high fiber diet

## 2020-08-04 ENCOUNTER — TELEPHONE (OUTPATIENT)
Dept: CARDIOLOGY | Facility: CLINIC | Age: 75
End: 2020-08-04

## 2020-08-04 NOTE — TELEPHONE ENCOUNTER
Pt notified and agrees. Medcard updated. cm----- Message from Marshall Mackay MD sent at 8/4/2020  6:39 AM CDT -----  Lipids reveiwed, add niacin OTC flush free 500 mg, for triglycedrides

## 2020-08-10 ENCOUNTER — TELEPHONE (OUTPATIENT)
Dept: INTERNAL MEDICINE | Facility: CLINIC | Age: 75
End: 2020-08-10

## 2020-08-10 NOTE — TELEPHONE ENCOUNTER
----- Message from Shawna Munoz sent at 8/10/2020  8:16 AM CDT -----  Regarding: pain cream, checking on scritpr sent over  Type:  Pharmacy Calling to Clarify an RX    Name of Caller:Milla  Pharmacy Name:[facility]  Prescription Name:Pain cream  What do they need to clarify?:  Best Call Back Number:[phone]  Additional Information: Please call back. Thanks

## 2020-08-10 NOTE — TELEPHONE ENCOUNTER
Spoke w/ Mile with pharmacy. States that she was calling to inform that pain creams would have to be  to due to insurance coverage.

## 2020-08-25 ENCOUNTER — OFFICE VISIT (OUTPATIENT)
Dept: OPHTHALMOLOGY | Facility: CLINIC | Age: 75
End: 2020-08-25
Payer: MEDICARE

## 2020-08-25 DIAGNOSIS — H40.013 OPEN ANGLE WITH BORDERLINE FINDINGS, LOW RISK, BILATERAL: Primary | ICD-10-CM

## 2020-08-25 DIAGNOSIS — M35.01 KERATITIS SICCA, BILATERAL: ICD-10-CM

## 2020-08-25 DIAGNOSIS — H52.7 REFRACTION DISORDER: ICD-10-CM

## 2020-08-25 DIAGNOSIS — Z98.41 CATARACT EXTRACTION STATUS, RIGHT: ICD-10-CM

## 2020-08-25 DIAGNOSIS — Z98.42 CATARACT EXTRACTION STATUS, LEFT: ICD-10-CM

## 2020-08-25 PROCEDURE — 92015 PR REFRACTION: ICD-10-PCS | Mod: S$GLB,,, | Performed by: OPHTHALMOLOGY

## 2020-08-25 PROCEDURE — 99999 PR PBB SHADOW E&M-EST. PATIENT-LVL III: CPT | Mod: PBBFAC,,, | Performed by: OPHTHALMOLOGY

## 2020-08-25 PROCEDURE — 92015 DETERMINE REFRACTIVE STATE: CPT | Mod: S$GLB,,, | Performed by: OPHTHALMOLOGY

## 2020-08-25 PROCEDURE — 92014 PR EYE EXAM, EST PATIENT,COMPREHESV: ICD-10-PCS | Mod: S$GLB,,, | Performed by: OPHTHALMOLOGY

## 2020-08-25 PROCEDURE — 99999 PR PBB SHADOW E&M-EST. PATIENT-LVL III: ICD-10-PCS | Mod: PBBFAC,,, | Performed by: OPHTHALMOLOGY

## 2020-08-25 PROCEDURE — 92014 COMPRE OPH EXAM EST PT 1/>: CPT | Mod: S$GLB,,, | Performed by: OPHTHALMOLOGY

## 2020-08-25 NOTE — PROGRESS NOTES
HPI     Patient c/o tearing in OS  patient states she uses her Systane but   probably not enough I suggested she increase her Systane usage and be 100%   compliant with both the drops and the gel qhs ou.        PCP: Dr. Lemus  1. COAG SUSP  SLT OD 3/26/15  SLT OS 5/15  2. Dry Eyes  3. PCIOL OD 3/4/2019 (+28.0 SN60WF) CDE 10.38 -NEAR  PCIOL OS +25.5 SN60WF / CDE: 9.46 / 2-4-19 2-5-19    In the past Pt wore cls for monovision- then changed to MF cls          Last edited by FLAVIA Carpenter on 8/25/2020  1:16 PM. (History)            Assessment /Plan     For exam results, see Encounter Report.      ICD-10-CM ICD-9-CM    1. Open angle with borderline findings, low risk, bilateral  H40.013 365.01 Glaucoma risk level is unchanged at this time and patient will continued to be followed.      2. Cataract extraction status, right  Z98.41 V45.61 Stable    3. Cataract extraction status, left  Z98.42 V45.61 Stable    4. Keratitis sicca, bilateral  H16.223 370.8 Continue current treatment    5. Refraction disorder  H52.7 367.9 MR dispensed        Return to clinic 6 months with HVF, GOCT, IOP check

## 2020-09-14 ENCOUNTER — OFFICE VISIT (OUTPATIENT)
Dept: RHEUMATOLOGY | Facility: CLINIC | Age: 75
End: 2020-09-14
Payer: MEDICARE

## 2020-09-14 ENCOUNTER — INFUSION (OUTPATIENT)
Dept: INFUSION THERAPY | Facility: HOSPITAL | Age: 75
End: 2020-09-14
Attending: INTERNAL MEDICINE
Payer: MEDICARE

## 2020-09-14 VITALS
HEIGHT: 61 IN | HEART RATE: 72 BPM | OXYGEN SATURATION: 97 % | BODY MASS INDEX: 33.88 KG/M2 | RESPIRATION RATE: 18 BRPM | SYSTOLIC BLOOD PRESSURE: 121 MMHG | TEMPERATURE: 98 F | DIASTOLIC BLOOD PRESSURE: 73 MMHG | WEIGHT: 179.44 LBS

## 2020-09-14 VITALS
BODY MASS INDEX: 33.91 KG/M2 | WEIGHT: 179.44 LBS | SYSTOLIC BLOOD PRESSURE: 113 MMHG | DIASTOLIC BLOOD PRESSURE: 69 MMHG | HEART RATE: 100 BPM

## 2020-09-14 DIAGNOSIS — M81.0 AGE-RELATED OSTEOPOROSIS WITHOUT CURRENT PATHOLOGICAL FRACTURE: Primary | ICD-10-CM

## 2020-09-14 DIAGNOSIS — M15.9 PRIMARY OSTEOARTHRITIS INVOLVING MULTIPLE JOINTS: ICD-10-CM

## 2020-09-14 PROCEDURE — 3008F PR BODY MASS INDEX (BMI) DOCUMENTED: ICD-10-PCS | Mod: CPTII,S$GLB,, | Performed by: INTERNAL MEDICINE

## 2020-09-14 PROCEDURE — 1159F MED LIST DOCD IN RCRD: CPT | Mod: S$GLB,,, | Performed by: INTERNAL MEDICINE

## 2020-09-14 PROCEDURE — 25000003 PHARM REV CODE 250: Performed by: INTERNAL MEDICINE

## 2020-09-14 PROCEDURE — 3008F BODY MASS INDEX DOCD: CPT | Mod: CPTII,S$GLB,, | Performed by: INTERNAL MEDICINE

## 2020-09-14 PROCEDURE — 99999 PR PBB SHADOW E&M-EST. PATIENT-LVL IV: CPT | Mod: PBBFAC,,, | Performed by: INTERNAL MEDICINE

## 2020-09-14 PROCEDURE — 1125F AMNT PAIN NOTED PAIN PRSNT: CPT | Mod: S$GLB,,, | Performed by: INTERNAL MEDICINE

## 2020-09-14 PROCEDURE — 1159F PR MEDICATION LIST DOCUMENTED IN MEDICAL RECORD: ICD-10-PCS | Mod: S$GLB,,, | Performed by: INTERNAL MEDICINE

## 2020-09-14 PROCEDURE — 99214 PR OFFICE/OUTPT VISIT, EST, LEVL IV, 30-39 MIN: ICD-10-PCS | Mod: S$GLB,,, | Performed by: INTERNAL MEDICINE

## 2020-09-14 PROCEDURE — 99214 OFFICE O/P EST MOD 30 MIN: CPT | Mod: S$GLB,,, | Performed by: INTERNAL MEDICINE

## 2020-09-14 PROCEDURE — 1101F PR PT FALLS ASSESS DOC 0-1 FALLS W/OUT INJ PAST YR: ICD-10-PCS | Mod: CPTII,S$GLB,, | Performed by: INTERNAL MEDICINE

## 2020-09-14 PROCEDURE — 1101F PT FALLS ASSESS-DOCD LE1/YR: CPT | Mod: CPTII,S$GLB,, | Performed by: INTERNAL MEDICINE

## 2020-09-14 PROCEDURE — 1125F PR PAIN SEVERITY QUANTIFIED, PAIN PRESENT: ICD-10-PCS | Mod: S$GLB,,, | Performed by: INTERNAL MEDICINE

## 2020-09-14 PROCEDURE — A4216 STERILE WATER/SALINE, 10 ML: HCPCS | Performed by: INTERNAL MEDICINE

## 2020-09-14 PROCEDURE — 96374 THER/PROPH/DIAG INJ IV PUSH: CPT

## 2020-09-14 PROCEDURE — 99999 PR PBB SHADOW E&M-EST. PATIENT-LVL IV: ICD-10-PCS | Mod: PBBFAC,,, | Performed by: INTERNAL MEDICINE

## 2020-09-14 PROCEDURE — 63600175 PHARM REV CODE 636 W HCPCS: Performed by: INTERNAL MEDICINE

## 2020-09-14 RX ORDER — ACETAMINOPHEN 325 MG/1
650 TABLET ORAL
Status: CANCELLED | OUTPATIENT
Start: 2020-09-14

## 2020-09-14 RX ORDER — SODIUM CHLORIDE 0.9 % (FLUSH) 0.9 %
10 SYRINGE (ML) INJECTION
Status: CANCELLED | OUTPATIENT
Start: 2020-09-14

## 2020-09-14 RX ORDER — HEPARIN 100 UNIT/ML
500 SYRINGE INTRAVENOUS
Status: CANCELLED | OUTPATIENT
Start: 2020-09-14

## 2020-09-14 RX ORDER — SODIUM CHLORIDE 0.9 % (FLUSH) 0.9 %
10 SYRINGE (ML) INJECTION
Status: DISCONTINUED | OUTPATIENT
Start: 2020-09-14 | End: 2020-09-14 | Stop reason: HOSPADM

## 2020-09-14 RX ORDER — ZOLEDRONIC ACID 5 MG/100ML
5 INJECTION, SOLUTION INTRAVENOUS
Status: CANCELLED | OUTPATIENT
Start: 2020-09-14

## 2020-09-14 RX ORDER — ZOLEDRONIC ACID 5 MG/100ML
5 INJECTION, SOLUTION INTRAVENOUS
Status: COMPLETED | OUTPATIENT
Start: 2020-09-14 | End: 2020-09-14

## 2020-09-14 RX ORDER — ACETAMINOPHEN 325 MG/1
650 TABLET ORAL
Status: COMPLETED | OUTPATIENT
Start: 2020-09-14 | End: 2020-09-14

## 2020-09-14 RX ADMIN — ACETAMINOPHEN 650 MG: 325 TABLET ORAL at 12:09

## 2020-09-14 RX ADMIN — ZOLEDRONIC ACID 5 MG: 5 INJECTION, SOLUTION INTRAVENOUS at 12:09

## 2020-09-14 RX ADMIN — Medication 10 ML: at 12:09

## 2020-09-14 NOTE — PLAN OF CARE
Provide comfort measures.  Provide patient with plenty water to drink. She likes her water.   Explain POT.  Spend time talking with her.

## 2020-09-14 NOTE — PROGRESS NOTES
CC:  Chief Complaint   Patient presents with    Osteopenia     1 yr f/u -Reclast- pain in knees & hands    And osteoarthritis    History of Present Illness:  Jessica Vazquez a 75 y.o.yo female   For  f/u of  osteopenia with high FRAX , here for 3 rd  dose of IV Reclast   she received her 1st dose of IV Reclast   Tolerated well no issues  She takes a MVT with D , with last vitamin-D levels within normal limits  No jaw pain, bleeding gums or dental caries or anticipated dental work  No falls or fractures    She was on Fosamax for osteopenia  But add worsening GERD on Fosamax, so more to Reclast IV in September 2018  She was intolerant to Forteo in the past as well  She is aware to notify dentist prior to any dental procedures        History of osteoarthritis  Labs  reveal normal RF/CCP/HELGA with normal ESR/CRP  She has chronic bilateral knee OA and get Synvisc injections per Ortho   She has completed PT  She sparingly uses Mobic only when needed  Mostly uses tylenol as needed   Uses voltaren gel prn     Back pain has been stable it is aggravated with activity  No radiation, loss of sensations.  No incontinence  Uses Robaxin as needed along with Mobic as needed and this helps with the back as well as knees        Initial visit :  She mentions of generalized arthralgias with cramping   May occur any time   They are prominent with activity and some times at night   It involves both legs and lower back   She has b/l hand pains , aggravated with activity , LBP aggravated with activity , relieved with rest   No radiation down the legs   She has early am stiffness for few minutes , but pain actually prominent with activity    She has b/l knee pains , left > right , is currently following with ortho for synvisc injections   She had DEXA suggestive of osteopenia   Dr Valdez / josé  started her on Forteo.  She had GI side effects with Forteo and was not happy about the cost of it.  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       Past Surgical History:   Procedure Laterality Date    CATARACT EXTRACTION W/  INTRAOCULAR LENS IMPLANT Left 02/04/2019    CATARACT EXTRACTION W/  INTRAOCULAR LENS IMPLANT Right 03/04/2019    CHOLECYSTECTOMY      DILATION AND CURETTAGE OF UTERUS      finger surgery      middle finger on right hand    GALLBLADDER SURGERY      TONSILLECTOMY, ADENOIDECTOMY      TUBAL LIGATION           Social History     Tobacco Use    Smoking status: Former Smoker     Packs/day: 0.50     Years: 12.00     Pack years: 6.00     Types: Cigarettes    Smokeless tobacco: Never Used   Substance Use Topics    Alcohol use: No     Frequency: Monthly or less     Drinks per session: 1 or 2     Binge frequency: Never    Drug use: No       Family History   Problem Relation Age of Onset    Diabetes Father     Cancer Father         prostate ca    Hypertension Father     Cancer Sister         cervical ca    Hepatitis Sister     Dementia Mother     Osteoporosis Mother     Breast cancer Paternal Aunt        Review of patient's allergies indicates:   Allergen Reactions    Augmentin [amoxicillin-pot clavulanate] Other (See Comments)     Diarrhea, yeast    Bactrim  [sulfamethoxazole-trimethoprim]      Other reaction(s): Unknown    Celebrex [celecoxib] Other (See Comments)     Stomach pain, gas     Lipitor  [atorvastatin]      Other reaction(s): Unknown    Pravachol  [pravastatin]      Other reaction(s): Unknown    Statins-hmg-coa reductase inhibitors      Other reaction(s): Muscle pain    Sulfa (sulfonamide antibiotics)      Other reaction(s): Swelling             Review of Systems:  Constitutional: Denies fever, chills. No recent weight changes.   Fatigue: no  Muscle  weakness: no  Headaches: no new headaches  Eyes: No redness or dryness.  No recent visual changes.  ENT: Denies dry mouth. No oral or nasal ulcers.  Card: No chest pain.  Resp: No cough or sob.   Gastro: No nausea or vomiting.  No heartburn.  Constipation: no  Diarrhea: no  Genito:  No dysuria.  No genital ulcers.  Skin: No rash.  Raynauds:no  Neuro: No numbness / tingling.   Psych: No depression, anxiety  Endo:  no excess thirst.  Heme: no abnormal bleeding or bruising  Clots:none       OBJECTIVE:     Vital Signs   Vitals:    09/14/20 1050   BP: 113/69   Pulse: 100     Physical Exam:  General Appearance:  NAD.   Gait: not favoring.  HEENT: PERRL.  Eyes not dry or injected.  No nasal ulcers.  Mouth not dry, no oral lesions.  Lymph: cervical, supraclavicular or axillary nodes: none abnormal   Cardio: no irregularity of S1 or S2.  No gallops or rubs.   Resp: Normal respiratory motion. Clear to auscultation bilaterally.   No abnormal chest conformation.  Abd: Soft, non-tender, nondistended.  No masses.   Skin: Head and neck,  and extremities examined. No rashes.   Neuro: Ox3.   Cranial nerves II-XII grossly intact.   Sensation intact  in both distal LE and upper extremities to light touch.  Musculoskeletal Exam:    Rt hand : prominent OA changes   Left hand : + OA changes     Spine:  Normal exam normal range of motion  Muscle strength:Equal and full in all mm groups of the upper and lower ext.    Laboratory:   Results for orders placed or performed in visit on 09/14/20   Comprehensive metabolic panel   Result Value Ref Range    Sodium 140 136 - 145 mmol/L    Potassium 4.2 3.5 - 5.1 mmol/L    Chloride 107 95 - 110 mmol/L    CO2 25 23 - 29 mmol/L    Glucose 86 70 - 110 mg/dL    BUN, Bld 10 8 - 23 mg/dL    Creatinine 0.9 0.5 - 1.4 mg/dL    Calcium 9.6 8.7 - 10.5 mg/dL    Total Protein 7.4 6.0 - 8.4 g/dL    Albumin 4.2 3.5 - 5.2 g/dL    Total Bilirubin 0.4 0.1 - 1.0 mg/dL    Alkaline Phosphatase 95 55 - 135 U/L    AST 25  10 - 40 U/L    Anion Gap 8 8 - 16 mmol/L    eGFR if African American >60 >60 mL/min/1.73 m^2    eGFR if non African American >60 >60 mL/min/1.73 m^2     Lab Results   Component Value Date    SEDRATE 7 05/23/2018       Imaging :    The L1 to L4 vertebral bone mineral density is equal to 1.026 g/cm squared with a T score of -1.3 and a Z score of 0.4.  This represents a 1.8% increase in bone mineral density since the prior study  which is not a statistically significant change.    The left femoral neck bone mineral density is equal to 0.698 g/cm squared with a T score of -2.4 and a Z score of -0.7.  This represents a 3.6% increase in bone mineral density since the prior study  which is not a statistically significant change.   Impression      Osteopenia -- there is a 22.6% risk of a major osteoporotic fracture and a 8.2% risk of hip fracture in the next 10 years (FRAX).         Notes reviewed  Other procedures:    ASSESSMENT/PLAN:     Age-related osteoporosis without current pathological fracture  -     DXA Bone Density Spine And Hip; Future; Expected date: 09/14/2020    Primary osteoarthritis involving multiple joints      1: Generalized OA : inv hands  / back / knees     Rf/CCP/HELGA - negative   Voltaren gel topical as needed  C/w ortho for synvisc injections for knee OA as needed   tylenol as needed       2: Osteopenia :  With high FRAX   Last PTH normal  Has GI intolerance to Fosamax , continue with IV Reclast, 3 rd dose today   Vitamin d otc   Repeat DEXA  Today     Risks vs Benefits and potential side effects of medication prescribed today were discussed with patient.    Medication literature given to patient up discharge. We also discussed about jaw necrosis and atypical fractures which are rare side effects from the medication.        1 year return     Disclaimer: This note was prepared using voice recognition system and is likely to have sound alike errors and is not proof read.  Please call me with any  questions.

## 2020-09-14 NOTE — NURSING
Infusion # 3  Last infusion was 9/12/19.  Recent Labs? Reviewed  Recent Invasive or planned dental procedures? None/Denies  Premeds? Tylenol 650 mg PO    Reclast 5 mg administered IV over 15 minutes.   See Vitals and MAR for further details.

## 2020-09-15 ENCOUNTER — TELEPHONE (OUTPATIENT)
Dept: RHEUMATOLOGY | Facility: CLINIC | Age: 75
End: 2020-09-15

## 2020-09-15 NOTE — TELEPHONE ENCOUNTER
----- Message from Cristina Pagan sent at 9/15/2020 12:59 PM CDT -----  Regarding: [facility]  States the pt was schedule for a dexa test and its not covered. The patient didn't want to have it done. States he thought the dexa test was the same as the bone density. He is calling to see what's going on. Please call Donell [phone]. Thank you

## 2020-09-18 ENCOUNTER — TELEPHONE (OUTPATIENT)
Dept: RHEUMATOLOGY | Facility: CLINIC | Age: 75
End: 2020-09-18

## 2020-09-18 NOTE — TELEPHONE ENCOUNTER
Returned call to Donell @ number listed below srvc has been auth as noted in our system. Called pt to advise the bone density is approved for tues. Pt verbalized understanding.

## 2020-09-18 NOTE — TELEPHONE ENCOUNTER
----- Message from Libby Oviedo LPN sent at 9/15/2020  3:50 PM CDT -----  ----- Message from Cristina Pagan sent at 9/15/2020 12:59 PM CDT -----  Regarding: [facility]  States the pt was schedule for a dexa test and its not covered. The patient didn't want to have it done. States he thought the dexa test was the same as the bone density. He is calling to see what's going on. Please call Donell [phone]. Thank you

## 2020-09-18 NOTE — TELEPHONE ENCOUNTER
----- Message from Libby Oviedo LPN sent at 9/15/2020  3:50 PM CDT -----  ----- Message from Cristina Pagan sent at 9/15/2020 12:59 PM CDT -----  Regarding: [facility]  States the pt was schedule for a dexa test and its not covered. The patient didn't want to have it done. States he thought the dexa test was the same as the bone density. He is calling to see what's going on. Please call Donell [phone]. Thank you

## 2020-09-18 NOTE — TELEPHONE ENCOUNTER
----- Message from Maureen Espinosa sent at 9/18/2020 11:08 AM CDT -----  Contact: Jessica Doe called in regards to a prior authorization & dx codes needed for appointment this coming Tuesday. Please call [facility] back at [phone]

## 2020-09-21 ENCOUNTER — OFFICE VISIT (OUTPATIENT)
Dept: INTERNAL MEDICINE | Facility: CLINIC | Age: 75
End: 2020-09-21
Payer: MEDICARE

## 2020-09-21 VITALS
BODY MASS INDEX: 32.91 KG/M2 | DIASTOLIC BLOOD PRESSURE: 70 MMHG | WEIGHT: 178.81 LBS | HEIGHT: 62 IN | HEART RATE: 76 BPM | TEMPERATURE: 98 F | SYSTOLIC BLOOD PRESSURE: 116 MMHG

## 2020-09-21 DIAGNOSIS — K29.90 GASTRITIS AND GASTRODUODENITIS: ICD-10-CM

## 2020-09-21 DIAGNOSIS — E66.01 SEVERE OBESITY (BMI 35.0-39.9) WITH COMORBIDITY: ICD-10-CM

## 2020-09-21 DIAGNOSIS — E78.00 PURE HYPERCHOLESTEROLEMIA: Primary | ICD-10-CM

## 2020-09-21 DIAGNOSIS — K29.70 GASTRITIS AND GASTRODUODENITIS: ICD-10-CM

## 2020-09-21 DIAGNOSIS — M81.0 AGE-RELATED OSTEOPOROSIS WITHOUT CURRENT PATHOLOGICAL FRACTURE: ICD-10-CM

## 2020-09-21 DIAGNOSIS — R25.2 MUSCLE CRAMPS: ICD-10-CM

## 2020-09-21 PROCEDURE — 99214 PR OFFICE/OUTPT VISIT, EST, LEVL IV, 30-39 MIN: ICD-10-PCS | Mod: 25,S$GLB,ICN, | Performed by: FAMILY MEDICINE

## 2020-09-21 PROCEDURE — 1126F AMNT PAIN NOTED NONE PRSNT: CPT | Mod: S$GLB,ICN,, | Performed by: FAMILY MEDICINE

## 2020-09-21 PROCEDURE — 3008F PR BODY MASS INDEX (BMI) DOCUMENTED: ICD-10-PCS | Mod: CPTII,S$GLB,ICN, | Performed by: FAMILY MEDICINE

## 2020-09-21 PROCEDURE — G0008 ADMIN INFLUENZA VIRUS VAC: HCPCS | Mod: S$GLB,ICN,, | Performed by: FAMILY MEDICINE

## 2020-09-21 PROCEDURE — 1126F PR PAIN SEVERITY QUANTIFIED, NO PAIN PRESENT: ICD-10-PCS | Mod: S$GLB,ICN,, | Performed by: FAMILY MEDICINE

## 2020-09-21 PROCEDURE — 90694 FLU VACCINE - QUADRIVALENT - ADJUVANTED: ICD-10-PCS | Mod: S$GLB,ICN,, | Performed by: FAMILY MEDICINE

## 2020-09-21 PROCEDURE — 1101F PT FALLS ASSESS-DOCD LE1/YR: CPT | Mod: CPTII,S$GLB,ICN, | Performed by: FAMILY MEDICINE

## 2020-09-21 PROCEDURE — 1101F PR PT FALLS ASSESS DOC 0-1 FALLS W/OUT INJ PAST YR: ICD-10-PCS | Mod: CPTII,S$GLB,ICN, | Performed by: FAMILY MEDICINE

## 2020-09-21 PROCEDURE — 1159F PR MEDICATION LIST DOCUMENTED IN MEDICAL RECORD: ICD-10-PCS | Mod: S$GLB,ICN,, | Performed by: FAMILY MEDICINE

## 2020-09-21 PROCEDURE — 99999 PR PBB SHADOW E&M-EST. PATIENT-LVL IV: CPT | Mod: PBBFAC,,, | Performed by: FAMILY MEDICINE

## 2020-09-21 PROCEDURE — 1159F MED LIST DOCD IN RCRD: CPT | Mod: S$GLB,ICN,, | Performed by: FAMILY MEDICINE

## 2020-09-21 PROCEDURE — 99999 PR PBB SHADOW E&M-EST. PATIENT-LVL IV: ICD-10-PCS | Mod: PBBFAC,,, | Performed by: FAMILY MEDICINE

## 2020-09-21 PROCEDURE — 99214 OFFICE O/P EST MOD 30 MIN: CPT | Mod: 25,S$GLB,ICN, | Performed by: FAMILY MEDICINE

## 2020-09-21 PROCEDURE — G0008 PR ADMIN INFLUENZA VIRUS VAC: ICD-10-PCS | Mod: S$GLB,ICN,, | Performed by: FAMILY MEDICINE

## 2020-09-21 PROCEDURE — 3008F BODY MASS INDEX DOCD: CPT | Mod: CPTII,S$GLB,ICN, | Performed by: FAMILY MEDICINE

## 2020-09-21 PROCEDURE — 90694 VACC AIIV4 NO PRSRV 0.5ML IM: CPT | Mod: S$GLB,ICN,, | Performed by: FAMILY MEDICINE

## 2020-09-21 RX ORDER — ASPIRIN 81 MG/1
81 TABLET ORAL DAILY
COMMUNITY

## 2020-09-21 RX ORDER — QUININE SULFATE 324 MG/1
648 CAPSULE ORAL 2 TIMES DAILY
Qty: 60 CAPSULE | Refills: 0 | Status: CANCELLED | OUTPATIENT
Start: 2020-09-21

## 2020-09-21 NOTE — PROGRESS NOTES
Subjective:       Patient ID: Jessica Perez is a 75 y.o. female.    Chief Complaint: Annual Exam (on saturday pt had neck pain on left side x 20 minutes, feels like a stabbing pain )    Neck Pain   This is a recurrent problem. The current episode started 1 to 4 weeks ago. The problem occurs constantly. The problem has been gradually worsening. The pain is associated with nothing. The quality of the pain is described as aching and cramping. The pain is moderate. The symptoms are aggravated by position. The pain is same all the time. Pertinent negatives include no chest pain. Treatments tried: voltaren. The treatment provided moderate relief.     Review of Systems   Respiratory: Negative for shortness of breath.    Cardiovascular: Negative for chest pain.   Gastrointestinal: Negative for abdominal pain.   Musculoskeletal: Positive for neck pain.       Objective:      Physical Exam  Vitals signs and nursing note reviewed.   Constitutional:       General: She is not in acute distress.     Appearance: Normal appearance. She is well-developed. She is not diaphoretic.   HENT:      Head: Normocephalic and atraumatic.      Nose: Nose normal.   Pulmonary:      Effort: Pulmonary effort is normal. No respiratory distress.      Breath sounds: Normal breath sounds. No wheezing.   Musculoskeletal:      Comments: Left trapezius TTP.  TTP to left paraspinal muscle.  No spinal TTP   Skin:     General: Skin is warm and dry.      Findings: No erythema or rash.   Neurological:      Mental Status: She is alert.         Assessment:       1. Pure hypercholesterolemia    2. Muscle cramps    3. Age-related osteoporosis without current pathological fracture    4. Unspecified gastritis and gastroduodenitis without mention of hemorrhage    5. Severe obesity (BMI 35.0-39.9) with comorbidity        Plan:     Problem List Items Addressed This Visit        Cardiac/Vascular    HLD (hyperlipidemia) - Primary    Current Assessment & Plan      Stable, cont repahta            Endocrine    Severe obesity (BMI 35.0-39.9) with comorbidity    Current Assessment & Plan     Do not eat starches. (nothing white)  Stop sugary snacks,  Stop bottled juices, or sodas.  See if you can lose any weight by stopping these items first, then we will re-visit the issue.  Glycemic Index Diet Handout given to pt with explanation.              GI    Unspecified gastritis and gastroduodenitis without mention of hemorrhage    Overview     Dx updated per 2019 IMO Load            Orthopedic    Age-related osteoporosis without current pathological fracture    Current Assessment & Plan     Cont Reclast.  Following up with Dr. Youssef            Other    Muscle cramps    Current Assessment & Plan     voltaren otc.

## 2020-09-22 ENCOUNTER — APPOINTMENT (OUTPATIENT)
Dept: RADIOLOGY | Facility: HOSPITAL | Age: 75
End: 2020-09-22
Attending: INTERNAL MEDICINE
Payer: MEDICARE

## 2020-09-22 DIAGNOSIS — M81.0 AGE-RELATED OSTEOPOROSIS WITHOUT CURRENT PATHOLOGICAL FRACTURE: ICD-10-CM

## 2020-09-22 PROCEDURE — 77080 DXA BONE DENSITY AXIAL: CPT | Mod: 26,,, | Performed by: RADIOLOGY

## 2020-09-22 PROCEDURE — 77080 DXA BONE DENSITY AXIAL: CPT | Mod: TC

## 2020-09-22 PROCEDURE — 77080 DEXA BONE DENSITY SPINE HIP: ICD-10-PCS | Mod: 26,,, | Performed by: RADIOLOGY

## 2020-09-24 ENCOUNTER — OFFICE VISIT (OUTPATIENT)
Dept: CARDIOLOGY | Facility: CLINIC | Age: 75
End: 2020-09-24
Payer: MEDICARE

## 2020-09-24 VITALS
WEIGHT: 180.31 LBS | HEART RATE: 89 BPM | SYSTOLIC BLOOD PRESSURE: 120 MMHG | OXYGEN SATURATION: 98 % | BODY MASS INDEX: 32.98 KG/M2 | DIASTOLIC BLOOD PRESSURE: 70 MMHG

## 2020-09-24 DIAGNOSIS — R07.9 CHEST PAIN, MODERATE CORONARY ARTERY RISK: ICD-10-CM

## 2020-09-24 DIAGNOSIS — Z82.49 FAMILY HISTORY OF ARTERIOSCLEROTIC CARDIOVASCULAR DISEASE: ICD-10-CM

## 2020-09-24 DIAGNOSIS — R06.02 SOB (SHORTNESS OF BREATH): ICD-10-CM

## 2020-09-24 DIAGNOSIS — E78.00 PURE HYPERCHOLESTEROLEMIA: Primary | ICD-10-CM

## 2020-09-24 PROCEDURE — 99999 PR PBB SHADOW E&M-EST. PATIENT-LVL IV: ICD-10-PCS | Mod: PBBFAC,,, | Performed by: INTERNAL MEDICINE

## 2020-09-24 PROCEDURE — 1101F PT FALLS ASSESS-DOCD LE1/YR: CPT | Mod: CPTII,S$GLB,, | Performed by: INTERNAL MEDICINE

## 2020-09-24 PROCEDURE — 99999 PR PBB SHADOW E&M-EST. PATIENT-LVL IV: CPT | Mod: PBBFAC,,, | Performed by: INTERNAL MEDICINE

## 2020-09-24 PROCEDURE — 99214 OFFICE O/P EST MOD 30 MIN: CPT | Mod: S$GLB,,, | Performed by: INTERNAL MEDICINE

## 2020-09-24 PROCEDURE — 1159F MED LIST DOCD IN RCRD: CPT | Mod: S$GLB,,, | Performed by: INTERNAL MEDICINE

## 2020-09-24 PROCEDURE — 99214 PR OFFICE/OUTPT VISIT, EST, LEVL IV, 30-39 MIN: ICD-10-PCS | Mod: S$GLB,,, | Performed by: INTERNAL MEDICINE

## 2020-09-24 PROCEDURE — 3008F BODY MASS INDEX DOCD: CPT | Mod: CPTII,S$GLB,, | Performed by: INTERNAL MEDICINE

## 2020-09-24 PROCEDURE — 1159F PR MEDICATION LIST DOCUMENTED IN MEDICAL RECORD: ICD-10-PCS | Mod: S$GLB,,, | Performed by: INTERNAL MEDICINE

## 2020-09-24 PROCEDURE — 1101F PR PT FALLS ASSESS DOC 0-1 FALLS W/OUT INJ PAST YR: ICD-10-PCS | Mod: CPTII,S$GLB,, | Performed by: INTERNAL MEDICINE

## 2020-09-24 PROCEDURE — 3008F PR BODY MASS INDEX (BMI) DOCUMENTED: ICD-10-PCS | Mod: CPTII,S$GLB,, | Performed by: INTERNAL MEDICINE

## 2020-09-24 NOTE — PROGRESS NOTES
"Subjective:   Patient ID:  Jessica Perez is a 75 y.o. female who presents for follow-up of Shortness of Breath  Pt tolerating repatha- LDL 44.BP at goal.Patient denies CP, angina or anginal equivalent.  Pt with "flushes"- on niacin. Pt with neck pain at rest. NMT and echo nml.  Shortness of Breath  This is a chronic problem. The current episode started more than 1 month ago. The problem occurs intermittently. The problem has been gradually improving. Associated symptoms include chest pain. Pertinent negatives include no leg swelling. Nothing aggravates the symptoms. She has tried rest for the symptoms. The treatment provided moderate relief.   Chest Pain   This is a chronic problem. The current episode started more than 1 year ago. The problem occurs intermittently. The problem has been gradually improving. The pain is present in the lateral region. The quality of the pain is described as dull. The pain does not radiate. Associated symptoms include shortness of breath. Pertinent negatives include no dizziness or palpitations. The pain is aggravated by nothing. She has tried rest for the symptoms. The treatment provided moderate relief.   Her past medical history is significant for hyperlipidemia.   Pertinent negatives for past medical history include no muscle weakness. Prior diagnostic workup includes stress thallium and echocardiogram.   Hyperlipidemia  This is a chronic problem. The current episode started more than 1 year ago. Recent lipid tests were reviewed and are variable. Associated symptoms include chest pain and shortness of breath. Treatments tried: repatha. The current treatment provides moderate improvement of lipids.       Review of Systems   Constitution: Negative. Negative for weight gain.   HENT: Negative.    Eyes: Negative.    Cardiovascular: Positive for chest pain. Negative for leg swelling and palpitations.   Respiratory: Positive for shortness of breath.    Endocrine: Negative.  "   Hematologic/Lymphatic: Negative.    Skin: Negative.    Musculoskeletal: Negative for muscle weakness.   Gastrointestinal: Negative.    Genitourinary: Negative.    Neurological: Negative.  Negative for dizziness.   Psychiatric/Behavioral: Negative.    Allergic/Immunologic: Negative.      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     Social History     Socioeconomic History    Marital status:      Spouse name: Not on file    Number of children: Not on file    Years of education: Not on file    Highest education level: Not on file   Occupational History    Not on file   Social Needs    Financial resource strain: Not hard at all    Food insecurity     Worry: Never true     Inability: Never true    Transportation needs     Medical: Not on file     Non-medical: No   Tobacco Use    Smoking status: Former Smoker     Packs/day: 0.50     Years: 12.00     Pack years: 6.00     Types: Cigarettes    Smokeless tobacco: Never Used   Substance and Sexual Activity    Alcohol use: No     Frequency: Monthly or less     Drinks per session: 1 or 2     Binge frequency: Never    Drug use: No    Sexual activity: Yes   Lifestyle    Physical activity     Days per week: 2 days     Minutes per session: 40 min    Stress: Rather much   Relationships    Social connections     Talks on phone: More than three times a week     Gets together: Once a week     Attends Yarsani service: Not on file     Active member of club or organization: Yes     Attends meetings of clubs or organizations: More than 4 times per year      Relationship status:    Other Topics Concern    Not on file   Social History Narrative    Not on file     Current Outpatient Medications on File Prior to Visit   Medication Sig Dispense Refill    aspirin (ECOTRIN) 81 MG EC tablet Take 81 mg by mouth once daily.      b complex vitamins capsule Take 1 capsule by mouth once daily.      cetirizine (ZYRTEC) 10 MG tablet Take 10 mg by mouth once daily.      evolocumab (REPATHA SURECLICK) 140 mg/mL PnIj Inject 1 mL (140 mg total) into the skin every 14 (fourteen) days. 2 mL 6    krill oil-omega-3-dha-epa 150-450 mg CpDR Take by mouth.      loratadine/pseudoephedrine (CLARITIN-D 12 HOUR ORAL) Take by mouth.      multivitamin capsule Take 1 capsule by mouth Daily.      naproxen (NAPROSYN) 500 MG tablet Take 1 tablet (500 mg total) by mouth 2 (two) times daily with meals. 60 tablet 1    niacin, inositol niacinate, (NIACIN FLUSH FREE ORAL) Take 500 mg by mouth every evening.      omeprazole (PRILOSEC OTC) 20 MG tablet Take 1 tablet (20 mg total) by mouth once daily. 30 tablet 11    UNABLE TO FIND CBD Oil      vitamin E 100 UNIT capsule Take 100 Units by mouth once daily.      zoledronic acid/mannitol-water (RECLAST IV) Inject into the vein.      artificial tear, hypromellose, (GENTEAL) 0.3 % Gel       azelastine (ASTELIN) 137 mcg (0.1 %) nasal spray 1 spray (137 mcg total) by Nasal route 2 (two) times daily. 30 mL 1    fluticasone (FLONASE) 50 mcg/actuation nasal spray 2 sprays by Each Nare route once daily. 1 Bottle 12    methocarbamol (ROBAXIN) 500 MG Tab Take 1 tablet (500 mg total) by mouth 2 (two) times daily as needed. (Patient not taking: Reported on 9/24/2020) 60 tablet 1    nutritional supplement/fiber (QUINOA-KALE-HEMP ORAL) Take by mouth.      UNABLE TO FIND CBD Oil       No current facility-administered medications on file prior to visit.      Review of patient's allergies indicates:   Allergen Reactions    Augmentin [amoxicillin-pot  clavulanate] Other (See Comments)     Diarrhea, yeast    Bactrim  [sulfamethoxazole-trimethoprim]      Other reaction(s): Unknown    Celebrex [celecoxib] Other (See Comments)     Stomach pain, gas     Lipitor  [atorvastatin]      Other reaction(s): Unknown    Pravachol  [pravastatin]      Other reaction(s): Unknown    Statins-hmg-coa reductase inhibitors      Other reaction(s): Muscle pain    Sulfa (sulfonamide antibiotics)      Other reaction(s): Swelling    Zoster vaccine live        Objective:     Physical Exam   Constitutional: She is oriented to person, place, and time. She appears well-developed and well-nourished.   HENT:   Head: Normocephalic and atraumatic.   Eyes: Pupils are equal, round, and reactive to light. Conjunctivae and EOM are normal.   Neck: Normal range of motion. Neck supple.   Cardiovascular: Normal rate, regular rhythm, normal heart sounds and intact distal pulses.   Pulmonary/Chest: Effort normal and breath sounds normal.   Abdominal: Soft. Bowel sounds are normal.   Musculoskeletal: Normal range of motion.   Neurological: She is alert and oriented to person, place, and time.   Skin: Skin is warm and dry.   Psychiatric: She has a normal mood and affect.   Nursing note and vitals reviewed.      Assessment:     1. Pure hypercholesterolemia    2. Chest pain, moderate coronary artery risk    3. SOB (shortness of breath)    4. Family history of arteriosclerotic cardiovascular disease        Plan:     Pure hypercholesterolemia    Chest pain, moderate coronary artery risk    SOB (shortness of breath)    Family history of arteriosclerotic cardiovascular disease      PPI for CP  Continue repatha-hlp  Stop niacin

## 2020-09-25 ENCOUNTER — PATIENT MESSAGE (OUTPATIENT)
Dept: RHEUMATOLOGY | Facility: CLINIC | Age: 75
End: 2020-09-25

## 2020-09-28 ENCOUNTER — TELEPHONE (OUTPATIENT)
Dept: RHEUMATOLOGY | Facility: CLINIC | Age: 75
End: 2020-09-28

## 2020-09-28 NOTE — TELEPHONE ENCOUNTER
----- Message from Maribel Youssef MD sent at 9/25/2020  3:36 PM CDT -----  I have reviewed your DEXA  It shows good improvement in the spine, but however worsening noted at the hip  So I recommend we stop further Reclast  Instead next year we will do Prolia, this is given as a subcutaneous injection every 6 months    Thank you

## 2020-11-14 DIAGNOSIS — E78.00 PURE HYPERCHOLESTEROLEMIA: Primary | ICD-10-CM

## 2020-11-17 ENCOUNTER — OFFICE VISIT (OUTPATIENT)
Dept: INTERNAL MEDICINE | Facility: CLINIC | Age: 75
End: 2020-11-17
Payer: MEDICARE

## 2020-11-17 ENCOUNTER — OFFICE VISIT (OUTPATIENT)
Dept: CARDIOLOGY | Facility: CLINIC | Age: 75
End: 2020-11-17
Payer: MEDICARE

## 2020-11-17 ENCOUNTER — OFFICE VISIT (OUTPATIENT)
Dept: RHEUMATOLOGY | Facility: CLINIC | Age: 75
End: 2020-11-17
Payer: MEDICARE

## 2020-11-17 VITALS
SYSTOLIC BLOOD PRESSURE: 112 MMHG | WEIGHT: 181.19 LBS | HEART RATE: 68 BPM | DIASTOLIC BLOOD PRESSURE: 70 MMHG | HEIGHT: 61 IN | BODY MASS INDEX: 34.21 KG/M2 | TEMPERATURE: 98 F

## 2020-11-17 VITALS
BODY MASS INDEX: 34.41 KG/M2 | SYSTOLIC BLOOD PRESSURE: 118 MMHG | HEART RATE: 99 BPM | OXYGEN SATURATION: 99 % | DIASTOLIC BLOOD PRESSURE: 72 MMHG | WEIGHT: 182.13 LBS

## 2020-11-17 VITALS
BODY MASS INDEX: 34.34 KG/M2 | WEIGHT: 181.88 LBS | SYSTOLIC BLOOD PRESSURE: 102 MMHG | DIASTOLIC BLOOD PRESSURE: 70 MMHG | HEIGHT: 61 IN

## 2020-11-17 DIAGNOSIS — T46.6X5A STATIN MYOPATHY: ICD-10-CM

## 2020-11-17 DIAGNOSIS — Z91.89 AT RISK FOR SLEEP APNEA: ICD-10-CM

## 2020-11-17 DIAGNOSIS — G72.0 STATIN MYOPATHY: ICD-10-CM

## 2020-11-17 DIAGNOSIS — M15.9 PRIMARY OSTEOARTHRITIS INVOLVING MULTIPLE JOINTS: Primary | ICD-10-CM

## 2020-11-17 DIAGNOSIS — M81.0 AGE-RELATED OSTEOPOROSIS WITHOUT CURRENT PATHOLOGICAL FRACTURE: ICD-10-CM

## 2020-11-17 DIAGNOSIS — R06.02 SOB (SHORTNESS OF BREATH): ICD-10-CM

## 2020-11-17 DIAGNOSIS — M85.89 OSTEOPENIA OF MULTIPLE SITES: ICD-10-CM

## 2020-11-17 DIAGNOSIS — Z12.31 SCREENING MAMMOGRAM, ENCOUNTER FOR: ICD-10-CM

## 2020-11-17 DIAGNOSIS — G47.62 SLEEP RELATED LEG CRAMPS: ICD-10-CM

## 2020-11-17 DIAGNOSIS — E78.00 PURE HYPERCHOLESTEROLEMIA: Primary | ICD-10-CM

## 2020-11-17 DIAGNOSIS — Z71.89 COUNSELING ON HEALTH PROMOTION AND DISEASE PREVENTION: ICD-10-CM

## 2020-11-17 DIAGNOSIS — R07.9 CHEST PAIN, MODERATE CORONARY ARTERY RISK: ICD-10-CM

## 2020-11-17 DIAGNOSIS — E78.5 HYPERLIPIDEMIA, UNSPECIFIED HYPERLIPIDEMIA TYPE: ICD-10-CM

## 2020-11-17 DIAGNOSIS — E78.00 PURE HYPERCHOLESTEROLEMIA: ICD-10-CM

## 2020-11-17 DIAGNOSIS — Z82.49 FAMILY HISTORY OF ARTERIOSCLEROTIC CARDIOVASCULAR DISEASE: ICD-10-CM

## 2020-11-17 DIAGNOSIS — E66.01 SEVERE OBESITY (BMI 35.0-39.9) WITH COMORBIDITY: ICD-10-CM

## 2020-11-17 DIAGNOSIS — Z00.00 ROUTINE GENERAL MEDICAL EXAMINATION AT A HEALTH CARE FACILITY: Primary | ICD-10-CM

## 2020-11-17 PROCEDURE — 3008F PR BODY MASS INDEX (BMI) DOCUMENTED: ICD-10-PCS | Mod: CPTII,S$GLB,, | Performed by: FAMILY MEDICINE

## 2020-11-17 PROCEDURE — 3008F BODY MASS INDEX DOCD: CPT | Mod: CPTII,S$GLB,, | Performed by: FAMILY MEDICINE

## 2020-11-17 PROCEDURE — 99397 PER PM REEVAL EST PAT 65+ YR: CPT | Mod: S$GLB,,, | Performed by: FAMILY MEDICINE

## 2020-11-17 PROCEDURE — 99397 PR PREVENTIVE VISIT,EST,65 & OVER: ICD-10-PCS | Mod: S$GLB,,, | Performed by: FAMILY MEDICINE

## 2020-11-17 PROCEDURE — 3288F FALL RISK ASSESSMENT DOCD: CPT | Mod: CPTII,S$GLB,, | Performed by: FAMILY MEDICINE

## 2020-11-17 PROCEDURE — 93010 EKG 12-LEAD: ICD-10-PCS | Mod: S$GLB,,, | Performed by: INTERNAL MEDICINE

## 2020-11-17 PROCEDURE — 99214 PR OFFICE/OUTPT VISIT, EST, LEVL IV, 30-39 MIN: ICD-10-PCS | Mod: S$GLB,,, | Performed by: INTERNAL MEDICINE

## 2020-11-17 PROCEDURE — 3008F BODY MASS INDEX DOCD: CPT | Mod: CPTII,S$GLB,, | Performed by: INTERNAL MEDICINE

## 2020-11-17 PROCEDURE — 99999 PR PBB SHADOW E&M-EST. PATIENT-LVL III: CPT | Mod: PBBFAC,,, | Performed by: INTERNAL MEDICINE

## 2020-11-17 PROCEDURE — 93005 EKG 12-LEAD: ICD-10-PCS | Mod: S$GLB,,, | Performed by: INTERNAL MEDICINE

## 2020-11-17 PROCEDURE — 99999 PR PBB SHADOW E&M-EST. PATIENT-LVL IV: CPT | Mod: PBBFAC,,, | Performed by: FAMILY MEDICINE

## 2020-11-17 PROCEDURE — 99999 PR PBB SHADOW E&M-EST. PATIENT-LVL III: ICD-10-PCS | Mod: PBBFAC,,, | Performed by: INTERNAL MEDICINE

## 2020-11-17 PROCEDURE — 1159F MED LIST DOCD IN RCRD: CPT | Mod: S$GLB,,, | Performed by: INTERNAL MEDICINE

## 2020-11-17 PROCEDURE — 3008F PR BODY MASS INDEX (BMI) DOCUMENTED: ICD-10-PCS | Mod: CPTII,S$GLB,, | Performed by: INTERNAL MEDICINE

## 2020-11-17 PROCEDURE — 93005 ELECTROCARDIOGRAM TRACING: CPT | Mod: S$GLB,,, | Performed by: INTERNAL MEDICINE

## 2020-11-17 PROCEDURE — 1125F PR PAIN SEVERITY QUANTIFIED, PAIN PRESENT: ICD-10-PCS | Mod: S$GLB,,, | Performed by: INTERNAL MEDICINE

## 2020-11-17 PROCEDURE — 99999 PR PBB SHADOW E&M-EST. PATIENT-LVL IV: ICD-10-PCS | Mod: PBBFAC,,, | Performed by: FAMILY MEDICINE

## 2020-11-17 PROCEDURE — 1126F AMNT PAIN NOTED NONE PRSNT: CPT | Mod: S$GLB,,, | Performed by: FAMILY MEDICINE

## 2020-11-17 PROCEDURE — 3288F PR FALLS RISK ASSESSMENT DOCUMENTED: ICD-10-PCS | Mod: CPTII,S$GLB,, | Performed by: FAMILY MEDICINE

## 2020-11-17 PROCEDURE — 99214 OFFICE O/P EST MOD 30 MIN: CPT | Mod: S$GLB,,, | Performed by: INTERNAL MEDICINE

## 2020-11-17 PROCEDURE — 99999 PR PBB SHADOW E&M-EST. PATIENT-LVL IV: CPT | Mod: PBBFAC,,, | Performed by: INTERNAL MEDICINE

## 2020-11-17 PROCEDURE — 99999 PR PBB SHADOW E&M-EST. PATIENT-LVL IV: ICD-10-PCS | Mod: PBBFAC,,, | Performed by: INTERNAL MEDICINE

## 2020-11-17 PROCEDURE — 1101F PR PT FALLS ASSESS DOC 0-1 FALLS W/OUT INJ PAST YR: ICD-10-PCS | Mod: CPTII,S$GLB,, | Performed by: FAMILY MEDICINE

## 2020-11-17 PROCEDURE — 1125F AMNT PAIN NOTED PAIN PRSNT: CPT | Mod: S$GLB,,, | Performed by: INTERNAL MEDICINE

## 2020-11-17 PROCEDURE — 1101F PT FALLS ASSESS-DOCD LE1/YR: CPT | Mod: CPTII,S$GLB,, | Performed by: FAMILY MEDICINE

## 2020-11-17 PROCEDURE — 1159F PR MEDICATION LIST DOCUMENTED IN MEDICAL RECORD: ICD-10-PCS | Mod: S$GLB,,, | Performed by: INTERNAL MEDICINE

## 2020-11-17 PROCEDURE — 93010 ELECTROCARDIOGRAM REPORT: CPT | Mod: S$GLB,,, | Performed by: INTERNAL MEDICINE

## 2020-11-17 PROCEDURE — 1126F PR PAIN SEVERITY QUANTIFIED, NO PAIN PRESENT: ICD-10-PCS | Mod: S$GLB,,, | Performed by: FAMILY MEDICINE

## 2020-11-17 NOTE — PROGRESS NOTES
Subjective:       Patient ID: Jessica Perez is a 75 y.o. female.    Chief Complaint: Annual Exam    Subjective:     Jessiac Perez is a 75 y.o. female and is here for a comprehensive physical exam. The patient reports no problems.    Do you take any herbs or supplements that were not prescribed by a doctor? Beet root, magnesium  Are you taking calcium supplements? yes  Are you taking aspirin daily? yes     History:  Any STD's in the past? none     The following portions of the patient's history were reviewed and updated as appropriate: allergies, current medications, past family history, past medical history, past social history, past surgical history and problem list.    Review of Systems  Do you have pain that bothers you in your daily life? Yes - arthralgias  Pertinent items are noted in HPI.      Cardiac/Vascular    HLD (hyperlipidemia)       Endocrine    Severe obesity (BMI 35.0-39.9) with comorbidity       Orthopedic    Osteopenia of multiple sites       Other    Sleep related leg cramps    Statin myopathy      Other Visit Diagnoses     Screening mammogram, encounter for        Relevant Orders    Mammo Digital Screening Bilat w/ [facility]      2. Patient Counseling:  --Nutrition: Stressed importance of moderation in sodium/caffeine intake, saturated fat and cholesterol, caloric balance, calcium.  --Discussed calcium supplementation, and the daily use of baby aspirin.  --Exercise: Stressed the importance of regular exercise.   --Substance Abuse: Discussed cessation/primary prevention of tobacco, alcohol - non-user.   --Sexuality: Discussed sexually transmitted disease.  --Injury prevention: Discussed safety belts, smoke detector.   --Dental health: Discussed dental health.  --Immunizations reviewed.    3. Discussed the patient's BMI with her.  The BMI elevted.  4. Follow up as needed for acute illness        Review of Systems   Constitutional: Negative for activity change and fever.   HENT: Negative for  congestion.    Eyes: Negative for discharge.   Respiratory: Negative for shortness of breath.    Cardiovascular: Negative for chest pain.   Gastrointestinal: Negative for abdominal pain.   Genitourinary: Negative for difficulty urinating.   Musculoskeletal: Positive for arthralgias. Negative for joint swelling.   Skin: Negative for rash.   Neurological: Negative for dizziness.   Psychiatric/Behavioral: Negative for agitation.       Objective:      Physical Exam  Vitals signs and nursing note reviewed.   Constitutional:       General: She is not in acute distress.     Appearance: Normal appearance. She is well-developed. She is not diaphoretic.   HENT:      Head: Normocephalic and atraumatic.      Nose: Nose normal.   Eyes:      General: No scleral icterus.     Conjunctiva/sclera: Conjunctivae normal.   Cardiovascular:      Rate and Rhythm: Normal rate and regular rhythm.   Pulmonary:      Effort: Pulmonary effort is normal. No respiratory distress.      Breath sounds: Normal breath sounds. No wheezing.   Abdominal:      General: Bowel sounds are normal.      Palpations: Abdomen is soft.      Tenderness: There is no abdominal tenderness. There is no guarding.   Musculoskeletal:      Comments: Left knee has slight effusion   Skin:     General: Skin is warm and dry.      Coloration: Skin is not pale.      Findings: No erythema or rash.   Neurological:      Mental Status: She is alert.   Psychiatric:         Mood and Affect: Mood normal.         Behavior: Behavior normal.         Thought Content: Thought content normal.         Judgment: Judgment normal.         Assessment:       1. Routine general medical examination at a health care facility    2. Screening mammogram, encounter for    3. Osteopenia of multiple sites    4. Sleep related leg cramps    5. Pure hypercholesterolemia    6. Severe obesity (BMI 35.0-39.9) with comorbidity    7. Statin myopathy    8. Age-related osteoporosis without current pathological  fracture        Plan:     Problem List Items Addressed This Visit        Cardiac/Vascular    HLD (hyperlipidemia)    Relevant Orders    CBC Auto Differential    Comprehensive Metabolic Panel    Lipid Panel       Endocrine    Severe obesity (BMI 35.0-39.9) with comorbidity    Current Assessment & Plan     Do not eat starches. (nothing white)  Stop sugary snacks,  Stop bottled juices, or sodas.                Orthopedic    Osteopenia of multiple sites    Relevant Orders    PTH, intact    Age-related osteoporosis without current pathological fracture    Relevant Orders    TSH    Vitamin D    PTH, intact       Other    Sleep related leg cramps    Statin myopathy      Other Visit Diagnoses     Routine general medical examination at a health care facility    -  Primary    Screening mammogram, encounter for        Relevant Orders    Mammo Digital Screening Bilat w/ [facility]

## 2020-11-17 NOTE — PROGRESS NOTES
Subjective:   Patient ID:  Jessica Perez is a 75 y.o. female who presents for follow-up of No chief complaint on file.  Chest Pain   This is a chronic problem. The current episode started more than 1 year ago. The problem occurs intermittently. The problem has been gradually improving. The pain is present in the lateral region. The quality of the pain is described as dull. The pain does not radiate. Associated symptoms include shortness of breath. Pertinent negatives include no dizziness or palpitations. Review of symptoms again today shows no significant symptoms.Patient denies chest pain, angina or symptoms associated with anginal equivalent.  Hyperlipidemia:  Current problem included intolerances to statin medications.  She is doing much better on Repathia with improvement in symptoms.  Lipid levels have improved.  Repeat lab tests today they are not available at this moment .If there is still evidence of increase triglycerides above target levels of 150,  I will suggest addition of Zetia to current medications.  Patient is here for review of overall symptoms of shortness of breath chest pressure.  Evidence of elevated lipid profile and results of repathia therapy.      Review of Systems   Constitution: Negative for chills, diaphoresis, night sweats, weight gain and weight loss.   HENT: Negative for congestion, hoarse voice, sore throat and stridor.    Eyes: Negative for double vision and pain.   Cardiovascular: Positive for chest pain. Negative for claudication, cyanosis, dyspnea on exertion, irregular heartbeat, leg swelling, near-syncope, orthopnea, palpitations, paroxysmal nocturnal dyspnea and syncope.   Respiratory: Negative for cough, hemoptysis, shortness of breath, sleep disturbances due to breathing, snoring, sputum production and wheezing.    Endocrine: Negative for cold intolerance, heat intolerance and polydipsia.   Hematologic/Lymphatic: Negative for bleeding problem. Does not bruise/bleed  easily.   Skin: Negative for color change, dry skin and rash.   Musculoskeletal: Negative for joint swelling and muscle cramps.   Gastrointestinal: Negative for bloating, abdominal pain, constipation, diarrhea, dysphagia, melena, nausea and vomiting.   Genitourinary: Negative for flank pain and urgency.   Neurological: Negative for dizziness, focal weakness, headaches, light-headedness, loss of balance, seizures and weakness.   Psychiatric/Behavioral: Negative for altered mental status and memory loss. The patient is not nervous/anxious.      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     Social History     Socioeconomic History    Marital status:      Spouse name: Not on file    Number of children: Not on file    Years of education: Not on file    Highest education level: Not on file   Occupational History    Not on file   Social Needs    Financial resource strain: Not hard at all    Food insecurity     Worry: Never true     Inability: Never true    Transportation needs     Medical: Not on file     Non-medical: No   Tobacco Use    Smoking status: Former Smoker     Packs/day: 0.50     Years: 12.00     Pack years: 6.00     Types: Cigarettes    Smokeless tobacco: Never Used   Substance and Sexual Activity    Alcohol use: No     Frequency: Monthly or less     Drinks per session: 1 or 2     Binge frequency: Never    Drug use: No    Sexual activity: Yes   Lifestyle    Physical activity     Days per week: 2 days     Minutes per session: 40 min    Stress: Rather much   Relationships    Social  connections     Talks on phone: More than three times a week     Gets together: Once a week     Attends [employer] service: Not on file     Active member of club or organization: Yes     Attends meetings of clubs or organizations: More than 4 times per year     Relationship status:    Other Topics Concern    Not on file   Social History Narrative    Not on file     Current Outpatient Medications on File Prior to Visit   Medication Sig Dispense Refill    artificial tear, hypromellose, (GENTEAL) 0.3 % Gel       aspirin (ECOTRIN) 81 MG EC tablet Take 81 mg by mouth once daily.      azelastine (ASTELIN) 137 mcg (0.1 %) nasal spray 1 spray (137 mcg total) by Nasal route 2 (two) times daily. 30 mL 1    b complex vitamins capsule Take 1 capsule by mouth once daily.      cetirizine (ZYRTEC) 10 MG tablet Take 10 mg by mouth once daily.      evolocumab (REPATHA SURECLICK) 140 mg/mL PnIj Inject 1 mL (140 mg total) into the skin every 14 (fourteen) days. 2 mL 6    fluticasone (FLONASE) 50 mcg/actuation nasal spray 2 sprays by Each Nare route once daily. 1 Bottle 12    krill oil-omega-3-dha-epa 150-450 mg CpDR Take by mouth.      loratadine/pseudoephedrine (CLARITIN-D 12 HOUR ORAL) Take by mouth.      methocarbamol (ROBAXIN) 500 MG Tab Take 1 tablet (500 mg total) by mouth 2 (two) times daily as needed. 60 tablet 1    multivitamin capsule Take 1 capsule by mouth Daily.      naproxen (NAPROSYN) 500 MG tablet Take 1 tablet (500 mg total) by mouth 2 (two) times daily with meals. 60 tablet 1    nutritional supplement/fiber (QUINOA-KALE-HEMP ORAL) Take by mouth.      omeprazole (PRILOSEC OTC) 20 MG tablet Take 1 tablet (20 mg total) by mouth once daily. 30 tablet 11    UNABLE TO FIND CBD Oil      UNABLE TO FIND CBD Oil      vitamin E 100 UNIT capsule Take 100 Units by mouth once daily.      zoledronic acid/mannitol-water (RECLAST IV) Inject into the vein.       No current facility-administered medications on  file prior to visit.      Review of patient's allergies indicates:   Allergen Reactions    Augmentin [amoxicillin-pot clavulanate] Other (See Comments)     Diarrhea, yeast    Bactrim  [sulfamethoxazole-trimethoprim]      Other reaction(s): Unknown    Celebrex [celecoxib] Other (See Comments)     Stomach pain, gas     Lipitor  [atorvastatin]      Other reaction(s): Unknown    Pravachol  [pravastatin]      Other reaction(s): Unknown    Statins-hmg-coa reductase inhibitors      Other reaction(s): Muscle pain    Sulfa (sulfonamide antibiotics)      Other reaction(s): Swelling    Zoster vaccine live        Objective:     Physical Exam   Constitutional: She is oriented to person, place, and time.   Eyes: Pupils are equal, round, and reactive to light.   Neck: Normal range of motion. No tracheal deviation present.   Cardiovascular: Normal rate, regular rhythm, normal heart sounds and intact distal pulses. Exam reveals no gallop and no friction rub.   No murmur heard.  Pulses:       Carotid pulses are 2+ on the right side and 2+ on the left side.       Radial pulses are 2+ on the right side and 2+ on the left side.        Femoral pulses are 2+ on the right side and 2+ on the left side.       Popliteal pulses are 2+ on the right side and 2+ on the left side.        Dorsalis pedis pulses are 2+ on the right side and 2+ on the left side.        Posterior tibial pulses are 2+ on the right side and 2+ on the left side.   Pulmonary/Chest: Effort normal and breath sounds normal. No stridor. No respiratory distress. She has no wheezes. She has no rales. She exhibits no tenderness.   Abdominal: She exhibits no distension. There is no abdominal tenderness. There is no rebound.   Musculoskeletal:         General: No tenderness or edema.   Neurological: She is alert and oriented to person, place, and time.   Skin: Skin is warm and dry.      Ref Range & Units 3mo ago  (7/31/20) 1yr ago  (7/2/19) 2yr ago  (4/11/18) 3yr  ago  (11/21/16) 3yr ago  (11/21/16)   Cholesterol 120 - 199 mg/dL 149  281High  CM  282High  CM  289High  CM  289High  CM    Comment: The National Cholesterol Education Program (NCEP) has set the   following guidelines (reference ranges) for Cholesterol:   Optimal.....................<200 mg/dL   Borderline High.............200-239 mg/dL   High........................> or = 240 mg/dL    Triglycerides 30 - 150 mg/dL 259High   315High  CM  231High  CM  263High  CM  263High  CM    Comment: The National Cholesterol Education Program (NCEP) has set the   following guidelines (reference values) for triglycerides:   Normal......................<150 mg/dL   Borderline High.............150-199 mg/dL   High........................200-499 mg/dL    HDL 40 - 75 mg/dL 53  51 CM  54 CM  53 CM  53 CM    Comment: The National Cholesterol Education Program (NCEP) has set the   following guidelines (reference values) for HDL Cholesterol:   Low...............<40 mg/dL   Optimal...........>60 mg/dL    LDL Cholesterol 63.0 - 159.0 mg/dL 44.2Low   167.0High  CM  181.8High  CM  183.4High  CM  183.4High  CM    Comment: The National Cholesterol Education Program (NCEP) has set the            Results of cardiac echo on 08/23/2019:    1 - Concentric remodeling.     2 - No wall motion abnormalities.     3 - Normal left ventricular systolic function (EF 60-65%).     4 - Impaired LV relaxation, normal LAP (grade 1 diastolic dysfunction).     5 - Normal right ventricular systolic function .     6 - The estimated PA systolic pressure is greater than 28 mmHg.     Impression: NORMAL MYOCARDIAL PERFUSION from 08/23/2019:  1. The perfusion scan is free of evidence for myocardial ischemia or injury.   2. Resting wall motion is physiologic.   3. Resting LV function is normal.   4. The ventricular volumes are normal at rest and stress.   5. The extracardiac distribution of radioactivity is normal.   Assessment:     1. Pure hypercholesterolemia    2. Chest  pain, moderate coronary artery risk    3. SOB (shortness of breath)    4. Hyperlipidemia, unspecified hyperlipidemia type    5. At risk for sleep apnea    6. Family history of arteriosclerotic cardiovascular disease    7. Statin myopathy        Plan:     Pure hypercholesterolemia    Chest pain, moderate coronary artery risk    SOB (shortness of breath)    Hyperlipidemia, unspecified hyperlipidemia type    At risk for sleep apnea    Family history of arteriosclerotic cardiovascular disease    Statin myopathy      Patient presents to the office review current medications.  For increase triglycerides.  Consider Zetia in addition to the therapy.  Review of prior cardiac echo showed preserved LV function and evidence of mild diastolic dysfunction.  The nuclear scan showed evidence of normal perfusion from 2019.  No exertional symptoms chest pain or shortness of breath.  Current plans including continuation of repathia., possible addition of Zetia if increase triglycerides on current lab tests.  Will review them discussed with the patient after there back.

## 2020-11-17 NOTE — PROGRESS NOTES
RHEUMATOLOGY OUTPATIENT CLINIC NOTE    11/17/2020    Attending Rheumatologist: Dileep Mendoza  Primary Care Provider: Davidson Jang MD   Physician Requesting Consultation: No referring provider defined for this encounter.  Chief Complaint/Reason For Consultation:  Knee Pain (swelling), Hand Pain (swelling), and Osteoporosis    Subjective:       HPI  Jessica Perez is a 75 y.o. White female with osteoporosis and osteoarthritis comes for follow-up visit.    Today:  Last seen by Rheumatology on September.  Received Reclast without significant side effects.  Repeat densitometry test still with high fragility fracture risk, recommended to switch to Prolia 1 due for infusion.  Main complaint is episodic joint pain.  Main area of concern is lower back and bilateral knee pain.  Worst in the evening, aggravated by prolonged range of motion/weight bearing, relieved somewhat by rest and OTC pain medication.  Denies association with prolonged morning stiffness, redness, or current joint swelling.  Denies falls or fractures since last visit.    Review of Systems   Constitutional: Negative for chills, fever and malaise/fatigue.   Eyes: Negative for pain and redness.   Respiratory: Negative for cough, hemoptysis and shortness of breath.    Cardiovascular: Negative for chest pain and leg swelling.   Gastrointestinal: Negative for abdominal pain, blood in stool and melena.   Genitourinary: Negative for dysuria and hematuria.        No history of nephrolithiasis.   Musculoskeletal: Positive for back pain (Chronic, intermittent.  No alarm signs or symptoms.) and joint pain (Knees, mechanical pattern.  Denies precipitation of joint pain/swelling with any particular food or beverage intake.). Negative for falls.   Skin: Negative for rash.   Neurological: Negative for tingling, focal weakness and weakness.   Psychiatric/Behavioral: Negative for memory loss. The patient does not have insomnia.      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     Past Surgical History:   Procedure Laterality Date    CATARACT EXTRACTION W/  INTRAOCULAR LENS IMPLANT Left 02/04/2019    CATARACT EXTRACTION W/  INTRAOCULAR LENS IMPLANT Right 03/04/2019    CHOLECYSTECTOMY      DILATION AND CURETTAGE OF UTERUS      finger surgery      middle finger on right hand    GALLBLADDER SURGERY      TONSILLECTOMY, ADENOIDECTOMY      TUBAL LIGATION       Family History   Problem Relation Age of Onset    Diabetes Father     Cancer Father         prostate ca    Hypertension Father     Cancer Sister         cervical ca    Hepatitis Sister     Dementia Mother     Osteoporosis Mother     Breast cancer Paternal Aunt      Social History     Substance and Sexual Activity   Alcohol Use No    Frequency: Monthly or less    Drinks per session: 1 or 2    Binge frequency: Never     Social History     Tobacco Use   Smoking Status Former Smoker    Packs/day: 0.50    Years: 12.00    Pack years: 6.00    Types: Cigarettes   Smokeless Tobacco Never Used     Social History     Substance and Sexual Activity   Drug Use No       Current Outpatient Medications:     artificial tear, hypromellose, (GENTEAL) 0.3 % Gel, , Disp: , Rfl:     azelastine (ASTELIN) 137 mcg (0.1 %) nasal spray, 1 spray (137 mcg total) by Nasal route 2 (two) times daily., Disp: 30 mL, Rfl: 1    b complex vitamins capsule, Take 1 capsule by mouth once daily., Disp: , Rfl:     calcium carbonate (CALCIUM 500 ORAL), Take by mouth., Disp: , Rfl:     cetirizine (ZYRTEC) 10 MG tablet, Take 10 mg by mouth once daily., Disp: , Rfl:     cyanocobalamin, vitamin B-12, (VITAMIN B-12 ORAL), Take by mouth., Disp: , Rfl:     evolocumab (REPATHA SURECLICK) 140 mg/mL Annemarie, Inject 1 mL (140 mg total) into the skin  "every 14 (fourteen) days., Disp: 2 mL, Rfl: 6    fluticasone (FLONASE) 50 mcg/actuation nasal spray, 2 sprays by Each Nare route once daily., Disp: 1 Bottle, Rfl: 12    krill oil-omega-3-dha-epa 150-450 mg CpDR, Take by mouth., Disp: , Rfl:     loratadine/pseudoephedrine (CLARITIN-D 12 HOUR ORAL), Take by mouth. As needed, Disp: , Rfl:     MAGNESIUM ORAL, Take by mouth., Disp: , Rfl:     multivitamin capsule, Take 1 capsule by mouth Daily., Disp: , Rfl:     naproxen (NAPROSYN) 500 MG tablet, Take 1 tablet (500 mg total) by mouth 2 (two) times daily with meals., Disp: 60 tablet, Rfl: 1    UNABLE TO FIND, CBD Oil, Disp: , Rfl:     vitamin E 100 UNIT capsule, Take 100 Units by mouth once daily., Disp: , Rfl:     zoledronic acid/mannitol-water (RECLAST IV), Inject into the vein., Disp: , Rfl:     aspirin (ECOTRIN) 81 MG EC tablet, Take 81 mg by mouth once daily., Disp: , Rfl:     methocarbamol (ROBAXIN) 500 MG Tab, Take 1 tablet (500 mg total) by mouth 2 (two) times daily as needed., Disp: 60 tablet, Rfl: 1    nutritional supplement/fiber (QUINOA-KALE-HEMP ORAL), Take by mouth., Disp: , Rfl:     omeprazole (PRILOSEC OTC) 20 MG tablet, Take 1 tablet (20 mg total) by mouth once daily., Disp: 30 tablet, Rfl: 11    UNABLE TO FIND, CBD Oil, Disp: , Rfl:      Objective:         Vitals:    11/17/20 1555   BP: 102/70     Physical Exam   Constitutional: No distress.   Estimated body mass index is 34.37 kg/m² as calculated from the following:    Height as of this encounter: 5' 1" (1.549 m).    Weight as of this encounter: 82.5 kg (181 lb 14.1 oz).    Wt Readings from Last 1 Encounters:  11/17/20 1555 : 82.5 kg (181 lb 14.1 oz)     HENT:   Head: Normocephalic and atraumatic.   Eyes: Conjunctivae are normal. Pupils are equal, round, and reactive to light.   Neck: Normal range of motion.   Cardiovascular: Normal rate and intact distal pulses.    Pulmonary/Chest: Effort normal. No respiratory distress.   Abdominal: " Soft. She exhibits no distension.   Neurological: She is alert. Gait normal.   Skin: No rash noted. No erythema.     Musculoskeletal: Normal range of motion. Tenderness (Anserine bursa, right more than left.) and deformity (Heberden's, slight Francis's.  Slight varus deformity right knee.) present.      Comments: : strong  No synovitis or significant squeeze tenderness    AROM: intact  PROM: intact    Devices used by patient: none       Reviewed old and all outside pertinent medical records available.    All lab results personally reviewed and interpreted by me.  Lab Results   Component Value Date    WBC 6.44 07/02/2019    HGB 14.5 07/02/2019    HCT 44.5 07/02/2019    MCV 96 07/02/2019    MCH 31.3 (H) 07/02/2019    MCHC 32.6 07/02/2019    RDW 13.2 07/02/2019     07/02/2019    MPV 11.4 07/02/2019       Lab Results   Component Value Date     09/14/2020    K 4.2 09/14/2020     09/14/2020    CO2 25 09/14/2020    GLU 86 09/14/2020    BUN 10 09/14/2020    CALCIUM 9.6 09/14/2020    PROT 7.4 09/14/2020    ALBUMIN 4.2 09/14/2020    BILITOT 0.4 09/14/2020    AST 25 09/14/2020    ALKPHOS 95 09/14/2020    ALT 21 09/14/2020       Lab Results   Component Value Date    COLORU Yellow 11/18/2019    APPEARANCEUA Clear 03/20/2009    SPECGRAV 1.015 11/18/2019    PHUR 5 11/18/2019    PROTEINUA Negative 03/20/2009    KETONESU Negative 11/18/2019    LEUKOCYTESUR Trace (A) 03/20/2009    NITRITE Negative 11/18/2019    UROBILINOGEN Normal 11/18/2019       Lab Results   Component Value Date    CRP 1.9 05/23/2018       Lab Results   Component Value Date    SEDRATE 7 05/23/2018       Lab Results   Component Value Date    HELGA Negative 03/09/2009    RF <10.0 05/23/2018    SEDRATE 7 05/23/2018       No components found for: 25OHVITDTOT, 62AOFIPJ1, 69BWOGXJ3, METHODNOTE    Lab Results   Component Value Date    URICACID 4.9 04/24/2004       No components found for: TSPOTTB    · PTH 70 2018  · Vitamin D 29 9/2019    Rheum  Labs:   HELGA negative   ACE WNR   CCP negative.  Rheumatoid factor negative.    Imaging:  All imaging reviewed and independently interpreted by me.    Chest x-ray August 2017  Minimal spondylosis    X-ray knees April 2018  Degenerative changes     DEXA scan  September 2020 November 2017  FN: -1.9  -2.4  LS: -1   -1.9  FRAX: 9.2% hip / 19% major     ASSESSMENT / PLAN:     Jessica Perez is a 75 y.o. White female with:    1. Primary osteoarthritis involving multiple joints  - history of present illness and current findings mostly consistent with DJD.  - features of anserine bursitis, knee DJD, and mechanical back pain  - available imaging without features suggestive of chronic inflammatory arthritis  - Discussed and recommended exercise (maribell non wt-bearing/swimming)  - resting affected joint for brief periods (<12 h),   - would benefit from decreasing at least 10% of body weight.  - PT/OT, joint braces/splints  - stretching, massage, heat, paraffin wax  - Acetaminophen prn -> standing up to 3 g per day-> NSAIDs short course (if persistent pain)  - Topicals therapy: Capsaicin / NSAIDs   - consider repeating corticosteroid injection and viscosupplementation of knees  - muscle relaxant deferred by patient on this visit.  Will provide gabapentin trial if refractory pain.  - Ambulatory referral/consult to Physical/Occupational Therapy; Future    2. Age-related osteoporosis without current pathological fracture  - denies episodes of fragility fracture.  Family history of osteoporosis.  -   Last densitometry test with osteopenic high FRAX score  - received Reclast x3.  Consider continuing bone antiresorptive therapy with Prolia next year 9/2021  - Discussed and verbalized understanding concerning the adverse effects of therapy, particularly risk of osteonecrosis and atypical fractures  - adequate dietary calcium and vitamin D intake.  - Avoid immobility, fall prevention     3. Other specified counseling  - over 10  minutes spent regarding below topics:  - Weight loss counseling done.  - Nutrition and exercise counseling.  - Regular exercise:  Aerobic and resistance.  - Medication counseling provided.    Follow up in about 6 months (around 5/17/2021).    Method of contact with patient concerns: [facility]    Disclaimer:  This note is prepared using voice recognition software and as such is likely to have errors and has not been proof read. Please contact me for questions.     Time spent: 25 minutes in face to face discussion concerning diagnosis, prognosis, review of lab and test results, benefits of treatment as well as management of disease, counseling of patient and coordination of care between various health care providers.  Greater than half the time spent was used for coordination of care and counseling of patient.    Dileep Mendoza M.D.  Rheumatology Department   [facility] [place] [facility]

## 2020-11-17 NOTE — PATIENT INSTRUCTIONS
Reducing Knee Pain and Swelling    Many treatments can help reduce pain and swelling in your knee. Your healthcare provider or physical therapist may suggest one or more of the following treatments:  · Icing your knee helps reduce swelling. You may be asked to ice your knee once a day or more. Apply ice for about 15 to 20 minutes at a time, with at least 40 minutes between sessions. Always keep a towel between the ice and your skin.   · Keeping your leg raised above your heart helps excess fluid flow out of your knee joint. This reduces swelling.  · Compression means wrapping an elastic bandage or neoprene sleeve snugly around your knees. This keeps fluid from collecting in your knee joint.  · Electrical stimulation, done by a physical therapist or , can help reduce excess fluid in your knee joint.  · Anti-inflammatory medicines may be prescribed by your healthcare provider. You may take pills or receive injections in your knee.  · Isometric (chanda) exercises strengthen the muscles that support your knee joint. They also help reduce excess fluid in your knee.  · Massage helps fluid drain away from your knee.  Date Last Reviewed: 10/13/2015  © 7203-2169 [employer]. [address] [place] [address]. All rights reserved. This information is not intended as a substitute for professional medical care. Always follow your healthcare professional's instructions.        Diclofenac skin gel  What is this medicine?  DICLOFENAC (dye KLOE fen ak) is a non-steroidal anti-inflammatory drug (NSAID). The 1% skin gel is used to treat osteoarthritis of the hands or knees. The 3% skin gel is used to treat actinic keratosis.  How should I use this medicine?  This medicine is for external use only. Follow the directions on the prescription label. Wash hands before and after use. Do not get this medicine in your eyes. If you do, rinse out with plenty of cool tap water. Use your doses at  regular intervals. Do not use your medicine more often than directed.  A special MedGuide will be given to you by the pharmacist with each prescription and refill of the 1% gel. Be sure to read this information carefully each time.  Talk to your pediatrician regarding the use of this medicine in children. Special care may be needed. The 3% gel is not approved for use in children.  What side effects may I notice from receiving this medicine?  Side effects that you should report to your doctor or health care professional as soon as possible:  · allergic reactions like skin rash, itching or hives, swelling of the face, lips, or tongue  · black or bloody stools, blood in the urine or vomit  · blurred vision  · chest pain  · difficulty breathing or wheezing  · nausea or vomiting  · redness, blistering, peeling or loosening of the skin, including inside the mouth  · slurred speech or weakness on one side of the body  · trouble passing urine or change in the amount of urine  · unexplained weight gain or swelling  · unusually weak or tired  · yellowing of eyes or skin  Side effects that usually do not require medical attention (report to your doctor or health care professional if they continue or are bothersome):  · dizziness  · dry skin  · headache  · heartburn  · increased sensitivity to the sun  · stomach pain  · tingling at the application site  What may interact with this medicine?  · aspirin  · NSAIDs, medicines for pain and inflammation, like ibuprofen or naproxen  Do not use any other skin products without telling your doctor or health care professional.  What if I miss a dose?  If you miss a dose, use it as soon as you can. If it is almost time for your next dose, use only that dose. Do not use double or extra doses.  Where should I keep my medicine?  Keep out of the reach of children.  Store the 1% gel at room temperature between 15 and 30 degrees C (59 and 86 degrees F). Store the 3% gel at room temperature  between 20 and 25 degrees C (68 and 77 degrees F). Protect from light. Throw away any unused medicine after the expiration date.  What should I tell my health care provider before I take this medicine?  They need to know if you have any of these conditions:  · asthma  · bleeding problems  · coronary artery bypass graft (CABG) surgery within the past 2 weeks  · heart disease  · high blood pressure  · if you frequently drink alcohol containing drinks  · kidney disease  · liver disease  · open or infected skin  · stomach problems  · an unusual or allergic reaction to diclofenac, aspirin, other NSAIDs, other medicines, benzyl alcohol (3% gel only), foods, dyes, or preservatives  · pregnant or trying to get pregnant  · breast-feeding  What should I watch for while using this medicine?  Tell your doctor or healthcare professional if your symptoms do not start to get better or if they get worse. You will need to follow up with your health care provider to monitor your progress. You may need to be treated for up to 3 months if you are using the 3% gel, but the full effect may not occur until 1 month after stopping treatment. If you develop a severe skin reaction, contact your doctor or health care professional immediately.  This medicine can make you more sensitive to the sun. Keep out of the sun. If you cannot avoid being in the sun, wear protective clothing and use sunscreen. Do not use sun lamps or tanning beds/booths.  Do not take medicines such as ibuprofen and naproxen with this medicine. Side effects such as stomach upset, nausea, or ulcers may be more likely to occur. Many medicines available without a prescription should not be taken with this medicine.  This medicine does not prevent heart attack or stroke. In fact, this medicine may increase the chance of a heart attack or stroke. The chance may increase with longer use of this medicine and in people who have heart disease. If you take aspirin to prevent heart  attack or stroke, talk with your doctor or health care professional.  This medicine can cause ulcers and bleeding in the stomach and intestines at any time during treatment. Do not smoke cigarettes or drink alcohol. These increase irritation to your stomach and can make it more susceptible to damage from this medicine. Ulcers and bleeding can happen without warning symptoms and can cause death.  You may get drowsy or dizzy. Do not drive, use machinery, or do anything that needs mental alertness until you know how this medicine affects you. Do not stand or sit up quickly, especially if you are an older patient. This reduces the risk of dizzy or fainting spells.  This medicine can cause you to bleed more easily. Try to avoid damage to your teeth and gums when you brush or floss your teeth.  NOTE:This sheet is a summary. It may not cover all possible information. If you have questions about this medicine, talk to your doctor, pharmacist, or health care provider. Copyright© 2017 [employer]        Gabapentin capsules or tablets  What is this medicine?  GABAPENTIN (GA ba pen tin) is used to control partial seizures in adults with epilepsy. It is also used to treat certain types of nerve pain.  How should I use this medicine?  Take this medicine by mouth with a glass of water. Follow the directions on the prescription label. You can take it with or without food. If it upsets your stomach, take it with food.Take your medicine at regular intervals. Do not take it more often than directed. Do not stop taking except on your doctor's advice.  If you are directed to break the 600 or 800 mg tablets in half as part of your dose, the extra half tablet should be used for the next dose. If you have not used the extra half tablet within 28 days, it should be thrown away.  A special MedGuide will be given to you by the pharmacist with each prescription and refill. Be sure to read this information carefully each time.  Talk to your  pediatrician regarding the use of this medicine in children. Special care may be needed.  What side effects may I notice from receiving this medicine?  Side effects that you should report to your doctor or health care professional as soon as possible:  · allergic reactions like skin rash, itching or hives, swelling of the face, lips, or tongue  · worsening of mood, thoughts or actions of suicide or dying  Side effects that usually do not require medical attention (report to your doctor or health care professional if they continue or are bothersome):  · constipation  · difficulty walking or controlling muscle movements  · dizziness  · nausea  · slurred speech  · tiredness  · tremors  · weight gain  What may interact with this medicine?  Do not take this medicine with any of the following medications:  · other gabapentin products  This medicine may also interact with the following medications:  · alcohol  · antacids  · antihistamines for allergy, cough and cold  · certain medicines for anxiety or sleep  · certain medicines for depression or psychotic disturbances  · homatropine; hydrocodone  · naproxen  · narcotic medicines (opiates) for pain  · phenothiazines like chlorpromazine, mesoridazine, prochlorperazine, thioridazine  What if I miss a dose?  If you miss a dose, take it as soon as you can. If it is almost time for your next dose, take only that dose. Do not take double or extra doses.  Where should I keep my medicine?  Keep out of reach of children.  This medicine may cause accidental overdose and death if it taken by other adults, children, or pets. Mix any unused medicine with a substance like cat litter or coffee grounds. Then throw the medicine away in a sealed container like a sealed bag or a coffee can with a lid. Do not use the medicine after the expiration date.  Store at room temperature between 15 and 30 degrees C (59 and 86 degrees F).  What should I tell my health care provider before I take this  medicine?  They need to know if you have any of these conditions:  · kidney disease  · suicidal thoughts, plans, or attempt; a previous suicide attempt by you or a family member  · an unusual or allergic reaction to gabapentin, other medicines, foods, dyes, or preservatives  · pregnant or trying to get pregnant  · breast-feeding  What should I watch for while using this medicine?  Visit your doctor or health care professional for regular checks on your progress. You may want to keep a record at home of how you feel your condition is responding to treatment. You may want to share this information with your doctor or health care professional at each visit. You should contact your doctor or health care professional if your seizures get worse or if you have any new types of seizures. Do not stop taking this medicine or any of your seizure medicines unless instructed by your doctor or health care professional. Stopping your medicine suddenly can increase your seizures or their severity.  Wear a medical identification bracelet or chain if you are taking this medicine for seizures, and carry a card that lists all your medications.  You may get drowsy, dizzy, or have blurred vision. Do not drive, use machinery, or do anything that needs mental alertness until you know how this medicine affects you. To reduce dizzy or fainting spells, do not sit or stand up quickly, especially if you are an older patient. Alcohol can increase drowsiness and dizziness. Avoid alcoholic drinks.  Your mouth may get dry. Chewing sugarless gum or sucking hard candy, and drinking plenty of water will help.  The use of this medicine may increase the chance of suicidal thoughts or actions. Pay special attention to how you are responding while on this medicine. Any worsening of mood, or thoughts of suicide or dying should be reported to your health care professional right away.  Women who become pregnant while using this medicine may enroll in the  [place] Antiepileptic Drug Pregnancy Registry by calling 1-790.326.6808. This registry collects information about the safety of antiepileptic drug use during pregnancy.  NOTE:This sheet is a summary. It may not cover all possible information. If you have questions about this medicine, talk to your doctor, pharmacist, or health care provider. Copyright© 2017 [employer]        Understanding Pes Anserine Bursitis    A bursa is a thin, slippery, sac-like film that contains a small amount of fluid. A bursa is found between bones and soft tissues in and around joints. It cushions and protects joint structures and stops them from rubbing against each other. If a bursa becomes inflamed and irritated, it is known as bursitis.  The pes anserine bursa is found on the inside of the knee joint. It lies between the shinbone (tibia) and 3 tendons that attach the hamstring muscles to the shinbone. Inflammation of this bursa is called pes anserine bursitis.  Causes of pes anserine bursitis  These may include:  · Overuse of the knee during running or other sports  · Sports that require repeated side-to-side motions, such as in tennis or soccer  · Being overweight  · Having knee arthritis  · Having MCL (medial collateral ligament) injury  Symptoms of pes anserine bursitis  The knee joint may be painful. This pain may be worse with kneeling, going up or down stairs, or getting up from a chair. The pain often gets better with rest. The side of the joint may be swollen. It may also be tender and feel warm to the touch.  Treatment for pes anserine bursitis  Treatments may include:  · Resting the knee. This lessens irritation and gives the bursa time to heal.  · Sleeping with a cushion between the thighs. This limits pressure on the bursa.  · Prescription or over-the-counter pain medicines. These help reduce inflammation, swelling, and pain.  · A weight-loss plan if you are overweight. This relieves pressure on the knee  joint.  · Stretching and strengthening exercises. These help improve the strength and flexibility of the muscles around the knee.  · Cold therapy, such as using ice packs. This helps reduce swelling and pain.  · Physical therapy. This may include exercises or ultrasound.  · Injections of medicine into the bursa. These may help relieve symptoms.  For symptoms that dont get better with these treatments, your healthcare provider may recommend surgery to remove the bursa.  Possible complications  If you dont give your knee time to heal, symptoms may return or get worse. Follow your healthcare providers instructions on resting and treating your knee.     When to call your healthcare provider  Call your healthcare provider if:  · Symptoms dont get better or get worse  · New symptoms develop  · Fever, chills or drainage occurs   Date Last Reviewed: 3/10/2016  © 3196-5363 [employer]. [address] [place] [address], [place] [address]. All rights reserved. This information is not intended as a substitute for professional medical care. Always follow your healthcare professional's instructions.        Back Pain (Acute or Chronic)    Back pain is one of the most common problems. The good news is that most people feel better in 1 to 2 weeks, and most of the rest in 1 to 2 months. Most people can remain active.  People experience and describe pain differently; not everyone is the same.  · The pain can be sharp, stabbing, shooting, aching, cramping or burning.  · Movement, standing, bending, lifting, sitting, or walking may worsen pain.  · It can be localized to one spot or area, or it can be more generalized.  · It can spread or radiate upwards, to the front, or go down your arms or legs (sciatica).  · It can cause muscle spasm.  Most of the time, mechanical problems with the muscles or spine cause the pain. Mechanical problems are usually caused by an injury to the muscles or ligaments. While illness can cause back  pain, it is usually not caused by a serious illness. Mechanical problems include:   · Physical activity such as sports, exercise, work, or normal activity  · Overexertion, lifting, pushing, pulling incorrectly or too aggressively  · Sudden twisting, bending, or stretching from an accident, or accidental movement  · Poor posture  · Stretching or moving wrong, without noticing pain at the time  · Poor coordination, lack of regular exercise (check with your doctor about this)  · Spinal disc disease or arthritis  · Stress  Pain can also be related to pregnancy, or illness like appendicitis, bladder or kidney infections, pelvic infections, and many other things.  Acute back pain usually gets better in 1 to 2 weeks. Back pain related to disk disease, arthritis in the spinal joints or spinal stenosis (narrowing of the spinal canal) can become chronic and last for months or years.  Unless you had a physical injury (for example, a car accident or fall) X-rays are usually not needed for the initial evaluation of back pain. If pain continues and does not respond to medical treatment, X-rays and other tests may be needed.  Home care  Try these home care recommendations:  · When in bed, try to find a position of comfort. A firm mattress is best. Try lying flat on your back with pillows under your knees. You can also try lying on your side with your knees bent up towards your chest and a pillow between your knees.  · At first, do not try to stretch out the sore spots. If there is a strain, it is not like the good soreness you get after exercising without an injury. In this case, stretching may make it worse.  · Avoid prolong sitting, long car rides, or travel. This puts more stress on the lower back than standing or walking.  · During the first 24 to 72 hours after an acute injury or flare up of chronic back pain, apply an ice pack to the painful area for 20 minutes and then remove it for 20 minutes. Do this over a period of 60  to 90 minutes or several times a day. This will reduce swelling and pain. Wrap the ice pack in a thin towel or plastic to protect your skin.  · You can start with ice, then switch to heat. Heat (hot shower, hot bath, or heating pad) reduces pain and works well for muscle spasms. Heat can be applied to the painful area for 20 minutes then remove it for 20 minutes. Do this over a period of 60 to 90 minutes or several times a day. Do not sleep on a heating pad. It can lead to skin burns or tissue damage.  · You can alternate ice and heat therapy. Talk with your doctor about the best treatment for your back pain.  · Therapeutic massage can help relax the back muscles without stretching them.  · Be aware of safe lifting methods and do not lift anything without stretching first.  Medicines  Talk to your doctor before using medicine, especially if you have other medical problems or are taking other medicines.  · You may use over-the-counter medicine as directed on the bottle to control pain, unless another pain medicine was prescribed. If you have chronic conditions like diabetes, liver or kidney disease, stomach ulcers, or gastrointestinal bleeding, or are taking blood thinners, talk to your doctor before taking any medicine.  · Be careful if you are given a prescription medicines, narcotics, or medicine for muscle spasms. They can cause drowsiness, affect your coordination, reflexes, and judgement. Do not drive or operate heavy machinery.  Follow-up care  Follow up with your healthcare provider, or as advised.   A radiologist will review any X-rays that were taken. Your provide will notify you of any new findings that may affect your care.  Call 911  Call emergency services if any of the following occur:  · Trouble breathing  · Confusion  · Very drowsy or trouble awakening  · Fainting or loss of consciousness  · Rapid or very slow heart rate  · Loss of bowel or bladder control  When to seek medical advice  Call your  healthcare provider right away if any of these occur:   · Pain becomes worse or spreads to your legs  · Weakness or numbness in one or both legs  · Numbness in the groin or genital area  Date Last Reviewed: 7/1/2016 © 2000-2017 [employer]. [address] [place] [address]. All rights reserved. This information is not intended as a substitute for professional medical care. Always follow your healthcare professional's instructions.        Cyclobenzaprine tablets  What is this medicine?  CYCLOBENZAPRINE (sye kloe DAYSI za preen) is a muscle relaxer. It is used to treat muscle pain, spasms, and stiffness.  How should I use this medicine?  Take this medicine by mouth with a glass of water. Follow the directions on the prescription label. If this medicine upsets your stomach, take it with food or milk. Take your medicine at regular intervals. Do not take it more often than directed.  Talk to your pediatrician regarding the use of this medicine in children. Special care may be needed.  What side effects may I notice from receiving this medicine?  Side effects that you should report to your doctor or health care professional as soon as possible:  · allergic reactions like skin rash, itching or hives, swelling of the face, lips, or tongue  · breathing problems  · chest pain  · fast, irregular heartbeat  · hallucinations  · seizures  · unusually weak or tired  Side effects that usually do not require medical attention (report to your doctor or health care professional if they continue or are bothersome):  · headache  · nausea, vomiting  What may interact with this medicine?  Do not take this medicine with any of the following medications:  · certain medicines for fungal infections like fluconazole, itraconazole, ketoconazole, posaconazole, voriconazole  · cisapride  · dofetilide  · dronedarone  · halofantrine  · levomethadyl  · MAOIs like Carbex, Eldepryl, Marplan, Nardil, and Parnate  · narcotic  medicines for cough  · pimozide  · thioridazine  · ziprasidone  This medicine may also interact with the following medications:  · alcohol  · antihistamines for allergy, cough and cold  · certain medicines for anxiety or sleep  · certain medicines for cancer  · certain medicines for depression like amitriptyline, fluoxetine, sertraline  · certain medicines for infection like alfuzosin, chloroquine, clarithromycin, levofloxacin, mefloquine, pentamidine, troleandomycin  · certain medicines for irregular heart beat  · certain medicines for seizures like phenobarbital, primidone  · contrast dyes  · general anesthetics like halothane, isoflurane, methoxyflurane, propofol  · local anesthetics like lidocaine, pramoxine, tetracaine  · medicines that relax muscles for surgery  · narcotic medicines for pain  · other medicines that prolong the QT interval (cause an abnormal heart rhythm)  · phenothiazines like chlorpromazine, mesoridazine, prochlorperazine  What if I miss a dose?  If you miss a dose, take it as soon as you can. If it is almost time for your next dose, take only that dose. Do not take double or extra doses.  Where should I keep my medicine?  Keep out of the reach of children.  Store at room temperature between 15 and 30 degrees C (59 and 86 degrees F). Keep container tightly closed. Throw away any unused medicine after the expiration date.  What should I tell my health care provider before I take this medicine?  They need to know if you have any of these conditions:  · heart disease, irregular heartbeat, or previous heart attack  · liver disease  · thyroid problem  · an unusual or allergic reaction to cyclobenzaprine, tricyclic antidepressants, lactose, other medicines, foods, dyes, or preservatives  · pregnant or trying to get pregnant  · breast-feeding  What should I watch for while using this medicine?  Tell your doctor or health care professional if your symptoms do not start to get better or if they get  worse.  You may get drowsy or dizzy. Do not drive, use machinery, or do anything that needs mental alertness until you know how this medicine affects you. Do not stand or sit up quickly, especially if you are an older patient. This reduces the risk of dizzy or fainting spells. Alcohol may interfere with the effect of this medicine. Avoid alcoholic drinks.  If you are taking another medicine that also causes drowsiness, you may have more side effects. Give your health care provider a list of all medicines you use. Your doctor will tell you how much medicine to take. Do not take more medicine than directed. Call emergency for help if you have problems breathing or unusual sleepiness.  Your mouth may get dry. Chewing sugarless gum or sucking hard candy, and drinking plenty of water may help. Contact your doctor if the problem does not go away or is severe.  NOTE:This sheet is a summary. It may not cover all possible information. If you have questions about this medicine, talk to your doctor, pharmacist, or health care provider. Copyright© 2017 [employer]

## 2020-11-18 ENCOUNTER — LAB VISIT (OUTPATIENT)
Dept: LAB | Facility: HOSPITAL | Age: 75
End: 2020-11-18
Attending: FAMILY MEDICINE
Payer: MEDICARE

## 2020-11-18 DIAGNOSIS — E78.00 PURE HYPERCHOLESTEROLEMIA: ICD-10-CM

## 2020-11-18 DIAGNOSIS — M85.89 OSTEOPENIA OF MULTIPLE SITES: ICD-10-CM

## 2020-11-18 DIAGNOSIS — M81.0 AGE-RELATED OSTEOPOROSIS WITHOUT CURRENT PATHOLOGICAL FRACTURE: ICD-10-CM

## 2020-11-18 LAB
ALBUMIN SERPL BCP-MCNC: 4 G/DL (ref 3.5–5.2)
ALP SERPL-CCNC: 84 U/L (ref 55–135)
ALT SERPL W/O P-5'-P-CCNC: 21 U/L (ref 10–44)
ANION GAP SERPL CALC-SCNC: 10 MMOL/L (ref 8–16)
AST SERPL-CCNC: 25 U/L (ref 10–40)
BASOPHILS # BLD AUTO: 0.05 K/UL (ref 0–0.2)
BASOPHILS NFR BLD: 0.7 % (ref 0–1.9)
BILIRUB SERPL-MCNC: 0.5 MG/DL (ref 0.1–1)
BUN SERPL-MCNC: 15 MG/DL (ref 8–23)
CALCIUM SERPL-MCNC: 8.9 MG/DL (ref 8.7–10.5)
CHLORIDE SERPL-SCNC: 108 MMOL/L (ref 95–110)
CHOLEST SERPL-MCNC: 149 MG/DL (ref 120–199)
CHOLEST/HDLC SERPL: 2.8 {RATIO} (ref 2–5)
CO2 SERPL-SCNC: 23 MMOL/L (ref 23–29)
CREAT SERPL-MCNC: 0.8 MG/DL (ref 0.5–1.4)
DIFFERENTIAL METHOD: ABNORMAL
EOSINOPHIL # BLD AUTO: 0.1 K/UL (ref 0–0.5)
EOSINOPHIL NFR BLD: 1.8 % (ref 0–8)
ERYTHROCYTE [DISTWIDTH] IN BLOOD BY AUTOMATED COUNT: 12.7 % (ref 11.5–14.5)
EST. GFR  (AFRICAN AMERICAN): >60 ML/MIN/1.73 M^2
EST. GFR  (NON AFRICAN AMERICAN): >60 ML/MIN/1.73 M^2
GLUCOSE SERPL-MCNC: 92 MG/DL (ref 70–110)
HCT VFR BLD AUTO: 41.4 % (ref 37–48.5)
HDLC SERPL-MCNC: 54 MG/DL (ref 40–75)
HDLC SERPL: 36.2 % (ref 20–50)
HGB BLD-MCNC: 13.5 G/DL (ref 12–16)
IMM GRANULOCYTES # BLD AUTO: 0.02 K/UL (ref 0–0.04)
IMM GRANULOCYTES NFR BLD AUTO: 0.3 % (ref 0–0.5)
LDLC SERPL CALC-MCNC: 64.4 MG/DL (ref 63–159)
LYMPHOCYTES # BLD AUTO: 2.2 K/UL (ref 1–4.8)
LYMPHOCYTES NFR BLD: 32.3 % (ref 18–48)
MCH RBC QN AUTO: 31.4 PG (ref 27–31)
MCHC RBC AUTO-ENTMCNC: 32.6 G/DL (ref 32–36)
MCV RBC AUTO: 96 FL (ref 82–98)
MONOCYTES # BLD AUTO: 0.3 K/UL (ref 0.3–1)
MONOCYTES NFR BLD: 4.8 % (ref 4–15)
NEUTROPHILS # BLD AUTO: 4 K/UL (ref 1.8–7.7)
NEUTROPHILS NFR BLD: 60.1 % (ref 38–73)
NONHDLC SERPL-MCNC: 95 MG/DL
NRBC BLD-RTO: 0 /100 WBC
PLATELET # BLD AUTO: 182 K/UL (ref 150–350)
PMV BLD AUTO: 11.5 FL (ref 9.2–12.9)
POTASSIUM SERPL-SCNC: 4.1 MMOL/L (ref 3.5–5.1)
PROT SERPL-MCNC: 6.8 G/DL (ref 6–8.4)
RBC # BLD AUTO: 4.3 M/UL (ref 4–5.4)
SODIUM SERPL-SCNC: 141 MMOL/L (ref 136–145)
TRIGL SERPL-MCNC: 153 MG/DL (ref 30–150)
TSH SERPL DL<=0.005 MIU/L-ACNC: 2.57 UIU/ML (ref 0.4–4)
WBC # BLD AUTO: 6.68 K/UL (ref 3.9–12.7)

## 2020-11-18 PROCEDURE — 80053 COMPREHEN METABOLIC PANEL: CPT

## 2020-11-18 PROCEDURE — 84443 ASSAY THYROID STIM HORMONE: CPT

## 2020-11-18 PROCEDURE — 36415 COLL VENOUS BLD VENIPUNCTURE: CPT | Mod: PO

## 2020-11-18 PROCEDURE — 82306 VITAMIN D 25 HYDROXY: CPT

## 2020-11-18 PROCEDURE — 85025 COMPLETE CBC W/AUTO DIFF WBC: CPT

## 2020-11-18 PROCEDURE — 80061 LIPID PANEL: CPT

## 2020-11-18 PROCEDURE — 83970 ASSAY OF PARATHORMONE: CPT

## 2020-11-19 ENCOUNTER — SPECIALTY PHARMACY (OUTPATIENT)
Dept: PHARMACY | Facility: CLINIC | Age: 75
End: 2020-11-19

## 2020-11-19 LAB
25(OH)D3+25(OH)D2 SERPL-MCNC: 32 NG/ML (ref 30–96)
PTH-INTACT SERPL-MCNC: 121 PG/ML (ref 9–77)

## 2020-11-20 ENCOUNTER — PATIENT MESSAGE (OUTPATIENT)
Dept: INTERNAL MEDICINE | Facility: CLINIC | Age: 75
End: 2020-11-20

## 2020-11-20 NOTE — PROGRESS NOTES
Please call pt with abnormal results. Pt does not need appt at this time, unless they have questions or wish to further discuss.  Para thyroid hormone now elevated, can re-check at next visit.  The rest of her blood work looks great. Keep up the good work.

## 2020-11-24 NOTE — TELEPHONE ENCOUNTER
Repatha Sureclick 140mg approved [id number] 1/1/2020 to 12/31/2020. Co-pay $35. Will complete BI.     Patient would like to obtain medication from [facility], if possible. Currently getting from manufacture.

## 2020-11-30 ENCOUNTER — CLINICAL SUPPORT (OUTPATIENT)
Dept: REHABILITATION | Facility: HOSPITAL | Age: 75
End: 2020-11-30
Attending: INTERNAL MEDICINE
Payer: MEDICARE

## 2020-11-30 DIAGNOSIS — M15.9 PRIMARY OSTEOARTHRITIS INVOLVING MULTIPLE JOINTS: ICD-10-CM

## 2020-11-30 DIAGNOSIS — R53.1 GENERALIZED WEAKNESS: ICD-10-CM

## 2020-11-30 DIAGNOSIS — M54.50 CHRONIC MIDLINE LOW BACK PAIN WITHOUT SCIATICA: ICD-10-CM

## 2020-11-30 DIAGNOSIS — R26.2 DIFFICULTY WALKING: ICD-10-CM

## 2020-11-30 DIAGNOSIS — G89.29 CHRONIC MIDLINE LOW BACK PAIN WITHOUT SCIATICA: ICD-10-CM

## 2020-11-30 PROCEDURE — 97110 THERAPEUTIC EXERCISES: CPT | Mod: PN

## 2020-11-30 PROCEDURE — 97162 PT EVAL MOD COMPLEX 30 MIN: CPT | Mod: PN

## 2020-11-30 NOTE — PLAN OF CARE
[facility]  Physical Therapy Initial Evaluation    Date: 11/30/2020   Name: Jessica Perez  Clinic Number: [id number]    Therapy Diagnosis:   Encounter Diagnoses   Name Primary?    Primary osteoarthritis involving multiple joints     Chronic midline low back pain without sciatica     Generalized weakness     Difficulty walking      Physician: Dileep Mendoza MD    Physician Orders: PT Eval and Treat   Medical Diagnosis from Referral: M89.49 (ICD-10-CM) - Primary osteoarthritis involving multiple joints  Evaluation Date: 11/30/2020  Authorization Period Expiration: 12/31/2020  Plan of Care Expiration: 3/3/21  Visit # / Visits authorized: 1/ 20    Time In: 1:30 pm  Time Out: 2:15 pm   Total Appointment Time (timed & untimed codes): 45 minutes    Precautions: Standard    Subjective   Date of onset: many years  History of current condition - Jessica reports: to PT with complaints of low back, knee, and ankle pain that has been ongoing for many years. Pt states that she used to be very active, but that after she retired she has not been as active and has gained some weight. 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 Pt states that she knows she needs to get stronger and work on her core strength.      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    Medications:   Jessica has a current medication list which includes the following prescription(s): artificial tears(hypromellose)(genteal/sustane), aspirin, azelastine, b complex vitamins, calcium carbonate, cetirizine, cyanocobalamin (vitamin b-12), evolocumab, fluticasone propionate, krill oil-omega-3-dha-epa, loratadine/pseudoephedrine, magnesium, methocarbamol, multivitamin, naproxen, nutritional supplement/fiber, omeprazole, UNABLE TO FIND, UNABLE TO FIND, vitamin e, and zoledronic acid/mannitol-water.    Allergies:   Review of patient's allergies indicates:   Allergen Reactions    Augmentin [amoxicillin-pot clavulanate] Other (See Comments)     Diarrhea, yeast    Bactrim  [sulfamethoxazole-trimethoprim]      Other reaction(s): Unknown    Celebrex [celecoxib] Other (See Comments)     Stomach pain, gas     Lipitor  [atorvastatin]      Other reaction(s): Unknown    Pravachol  [pravastatin]      Other reaction(s): Unknown    Statins-hmg-coa reductase inhibitors      Other reaction(s): Muscle pain    Sulfa (sulfonamide antibiotics)      Other reaction(s): Swelling    Zoster vaccine live         Imaging, no recent imaging:     Prior Therapy: no prior PT  Social History: pt lives with  who has dementia and is a high fall risk   Occupation: pt is a homemaker  Prior Level of Function: pt states that up until the last year she was able to get by with occasional pains in her low back and knees   Current Level of Function: pt has daily pain, pain with sweeping, pain with trying to assist her     Pain:  Current 4/10, worst 7/10, best 3/10   Location:   Low back and knees   Description: Aching, Throbbing, Grabbing and Tight  Aggravating Factors: Standing, Walking and housework   Easing Factors: hot bath and rest    Patients goals: "I want to get my back and knees better so I can take care of my "     Objective       Observation: pt pleasant and appropriate throughout " "evaluation; A&O x 4       Posture:  Mild sway back, elevated L illiac crest     Lumbar Range of Motion:    % of normal motion Pain   Flexion 100   no        Extension 50   yes        Left Side Bending 50 stretch        Right Side Bending 75 No         Left rotation   50  no        Right Rotation   75 no             Lower Extremity Strength  Right LE  Left LE    Knee extension: 4-/5 Knee extension: 4-/5   Knee flexion: 4-/5 Knee flexion: 4-/5   Hip flexion: 3+/5 Hip flexion: 3+/5   Hip extension:  3+/5 Hip extension: 3+/5   Hip abduction: 3+/5 Hip abduction: 3+/5   Hip adduction: 4-/5 Hip adduction 4-/5   Ankle dorsiflexion: 4-/5 Ankle dorsiflexion: 4/5   Ankle plantarflexion: 4/5 Ankle plantarflexion: 4/5         Special Tests:  -Repeated Flexion: no change  -Repeated Ext: no change  -Piriformis Test: increased restriction in L   -Bridge Test: pain, cramping in LLE       Neuro Dynamic Testing:    Sciatic nerve:      SLR: R = neg     L = neg       Femoral Nerve:    Femoral nerve test: neg      Joint Mobility: hypomobile lumbar mobility noted throughout lumbar spine    Palpation: tenderness to palpation of L piriformis and lumbar paraspinals    Sensation: intact    Flexibility: impaired B piriformis and HS flexibility (L more than R)     SIJ Assessment: Pt found to have L AI rotational fault with upslip     TERRIE: 9/50 (18% disability)     TREATMENT   Treatment Time In: 2:00 pm  Treatment Time Out: 2:15 pm  Total Treatment time (time-based codes) separate from Evaluation: 15 minutes    Jessica received therapeutic exercises to develop strength, endurance, ROM, flexibility, posture and core stabilization for 15 minutes including:    Bridges x 10   Piriformis stretch 3 x 15" each   Hip adduction squeeze with ball 2 x 1'   Belt block 2 x 1'    Open books x 10 each   SL abduction x 10 each     MET for L AI correction with shotgunning       Home Exercises and Patient Education Provided    Education provided:   - - PT POC  - " HEP  - PT prognosis and diagnosis  - all questions and concerns answered       Written Home Exercises Provided: yes.  Exercises were reviewed and Jessica was able to demonstrate them prior to the end of the session.  Jessica demonstrated good  understanding of the education provided.     See EMR under Patient Instructions for exercises provided 11/30/2020.    Assessment   Jessica is a 75 y.o. female referred to outpatient Physical Therapy with a medical diagnosis of M89.49 (ICD-10-CM) - Primary osteoarthritis involving multiple joints. Patient presents with complaints of low back, knee and, ankle pain that is likely 2/2 arthritis with mild lumbar radiculopathy and poor pelvic control. She presents today with generalized LE weakness as well as decreased lumbar motion and control and poor core recruitment. Pt is caregiver to  with dementia and will benefit from skilled PT intervention in order to address deficits above in order to improve her overall core strength, lumbar ROM, knee stability and overall levels of functional mobility needed to improve her overall QOL and promote a safe return to her PLOF.     Patient prognosis is Good.   Patientt will benefit from skilled outpatient Physical Therapy to address the deficits stated above and in the chart below, provide patient /family education, and to maximize patientt's level of independence.     Plan of care discussed with patient: Yes  Patient's spiritual, cultural and educational needs considered and patient is agreeable to the plan of care and goals as stated below:     Anticipated Barriers for therapy: chronicity of pain, pt's ability to be compliant with program d/t taking care of      Medical Necessity is demonstrated by the following  History  Co-morbidities and personal factors that may impact the plan of care Co-morbidities:   see above    Personal Factors:   age     moderate   Examination  Body Structures and Functions, activity limitations  and participation restrictions that may impact the plan of care Body Regions:   back  lower extremities  upper extremities    Body Systems:    gross symmetry  ROM  strength  gross coordinated movement  balance  gait  transfers  transitions  motor control    Participation Restrictions:   Difficulty walking, daily pain, difficulty with ADLs    Activity limitations:   Learning and applying knowledge  no deficits    General Tasks and Commands  no deficits    Communication  no deficits    Mobility  walking    Self care  dressing  looking after one's health    Domestic Life  shopping  cooking  doing house work (cleaning house, washing dishes, laundry)  assisting others    Interactions/Relationships  no deficits    Life Areas  no deficits    Community and Social Life  community life  recreation and leisure         moderate   Clinical Presentation evolving clinical presentation with changing clinical characteristics moderate   Decision Making/ Complexity Score: moderate     Goals:  Short Term Goals: 4 weeks   1. Pt will be independent with HEP performance in order to continue PT progress at home.   2. Pt will report pain at worst in low back of 5/10 or less  3. Pt will tolerate progression to standing core and LE exercises.   4. Pt will improve lumbar rotation and SB motion by 25% or greater  5. Pt will present with no pelvic obliquity for 3 consecutive sessions    Long Term Goals: 6 weeks   1. Pt will improve TERRIE disability score to 10% disability or less in order to improve overall QOL and return to PLOF.   2. Pt will demo at least 4/5 strength in hip and pelvic stabilizers  3. Pt will tolerate performance of 45 minutes of exercise with no complaints of increased low back pain   4. Pt will report ability to clean her home with no increased low back pain       Plan   Plan of care Certification: 11/30/2020 to 3/3/2020.    Outpatient Physical Therapy 2 times weekly for 8 weeks to include the following interventions:  Cervical/Lumbar Traction, Electrical Stimulation PRN, Gait Training, Manual Therapy, Moist Heat/ Ice, Neuromuscular Re-ed, Patient Education, Self Care, Therapeutic Activites and Therapeutic Exercise. And any other therapies and modalities as clinically indicated and appropriate, including but not limited to FDN and telehealth. Pt may be seen by PTA as a part of pt's care team.       Jayne Craft, PT, DPT  11/30/2020

## 2020-12-03 ENCOUNTER — CLINICAL SUPPORT (OUTPATIENT)
Dept: REHABILITATION | Facility: HOSPITAL | Age: 75
End: 2020-12-03
Attending: INTERNAL MEDICINE
Payer: MEDICARE

## 2020-12-03 DIAGNOSIS — R53.1 GENERALIZED WEAKNESS: ICD-10-CM

## 2020-12-03 DIAGNOSIS — R26.2 DIFFICULTY WALKING: ICD-10-CM

## 2020-12-03 DIAGNOSIS — G89.29 CHRONIC MIDLINE LOW BACK PAIN WITHOUT SCIATICA: ICD-10-CM

## 2020-12-03 DIAGNOSIS — M54.50 CHRONIC MIDLINE LOW BACK PAIN WITHOUT SCIATICA: ICD-10-CM

## 2020-12-03 PROCEDURE — 97140 MANUAL THERAPY 1/> REGIONS: CPT | Mod: PN,CQ

## 2020-12-03 PROCEDURE — 97110 THERAPEUTIC EXERCISES: CPT | Mod: PN,CQ

## 2020-12-04 NOTE — PROGRESS NOTES
"  Physical Therapy Treatment Note     Name: Jessica Perez  Clinic Number: [id number]    Therapy Diagnosis:   Encounter Diagnoses   Name Primary?    Chronic midline low back pain without sciatica     Generalized weakness     Difficulty walking      Physician: Dileep Mendoza MD    Visit Date: 12/7/2020    Physician Orders: PT Eval and Treat  Medical Diagnosis: M89.49 (ICD-10-CM) - Primary osteoarthritis involving multiple joints  Evaluation Date: 11/30/2020  Authorization Period Expiration: 12/31/2020  Plan of Care Certification Period: 3/3/21  Visit #/Visits authorized: 3/ 20  PTA Visit #: 2     Time In: 1:00  Time Out: 1:50  Total Billable Time: 50 minutes    Precautions: Standard    Subjective     Pt reports: Her back is feeling much better, not really feeling much pain anymore.  She was compliant with home exercise program.  Response to previous treatment: sore for a few days, but felt much better  Functional change: moving better    Pain: 1/10  Location: low back and knees    Objective     Jessica received therapeutic exercises to develop strength, endurance, ROM, flexibility, posture and core stabilization for 35 minutes including:    Bridges x 10  Bridges on ball x15 bent knee x15 straight leg  +LTR on ball 2'  +DKTC on ball 2'  +Ab press with swiss ball  Piriformis stretch 3 x 30" each   Hip adduction squeeze with ball 2 x 1'   Belt block 2 x 1'  Clamshells 2 x 10 ball between feet  Reverse clamshells 2 x 10 ball at knees  Hip adduction 5x5" hold in HL manually resisted     Open books x 10 each   SL abduction x 10 each     Jessiac received the following manual therapy techniques were applied to the following for 15 minutes, including:    STM to bilateral lumbar paraspinals and quadratus lumborum musculature  Myofascial release to bilateral lumbar paraspinals and QL      Home Exercises Provided and Patient Education Provided     Education provided:   - Self knee distraction with ankle weights at home " before using stationary bicycle at home to reduce knee pain.    Written Home Exercises Provided: Patient instructed to cont prior HEP.  Exercises were reviewed and Jessica was able to demonstrate them prior to the end of the session.  Jessica demonstrated good  understanding of the education provided.     See EMR under Patient Instructions for exercises provided 11/30/2020.    Assessment     Pt presents today with reduced pain overall and improved posture. Pt demo increased strength and ROM during exercises today with improved tolerance to progression of exercises. Noted hypertonic bilateral lumbar and thoracic paraspinals, QL and gluteal musculature, relieved by manual therapy. Noted reduced tone compared to previous visit with reports of reduced pain. Pt tolerated tx session well today with no complaints. Pt will benefit from cont skilled PT and progression of exercises.    Jessica is progressing well towards her goals.   Pt prognosis is Good.     Pt will continue to benefit from skilled outpatient physical therapy to address the deficits listed in the problem list box on initial evaluation, provide pt/family education and to maximize pt's level of independence in the home and community environment.     Pt's spiritual, cultural and educational needs considered and pt agreeable to plan of care and goals.     Anticipated barriers to physical therapy: chronicity of pain, pt's ability to be compliant with program d/t taking care of      Goals:   Short Term Goals: 4 weeks   1. Pt will be independent with HEP performance in order to continue PT progress at home. Met 12/4/2020  2. Pt will report pain at worst in low back of 5/10 or less Progressing not met  3. Pt will tolerate progression to standing core and LE exercises. Progressing not met  4. Pt will improve lumbar rotation and SB motion by 25% or greater Progressing not met  5. Pt will present with no pelvic obliquity for 3 consecutive sessions Progressing  not met     Long Term Goals: 6 weeks   1. Pt will improve TERRIE disability score to 10% disability or less in order to improve overall QOL and return to PLOF. Progressing not met  2. Pt will demo at least 4/5 strength in hip and pelvic stabilizers Progressing not met  3. Pt will tolerate performance of 45 minutes of exercise with no complaints of increased low back pain Progressing not met  4. Pt will report ability to clean her home with no increased low back pain Progressing not met    Plan     Cont to progress pt as tolerated and indicated per POC.    Chuck Ornelas, PTA

## 2020-12-07 ENCOUNTER — CLINICAL SUPPORT (OUTPATIENT)
Dept: REHABILITATION | Facility: HOSPITAL | Age: 75
End: 2020-12-07
Attending: INTERNAL MEDICINE
Payer: MEDICARE

## 2020-12-07 DIAGNOSIS — R26.2 DIFFICULTY WALKING: ICD-10-CM

## 2020-12-07 DIAGNOSIS — G89.29 CHRONIC MIDLINE LOW BACK PAIN WITHOUT SCIATICA: ICD-10-CM

## 2020-12-07 DIAGNOSIS — R53.1 GENERALIZED WEAKNESS: ICD-10-CM

## 2020-12-07 DIAGNOSIS — M54.50 CHRONIC MIDLINE LOW BACK PAIN WITHOUT SCIATICA: ICD-10-CM

## 2020-12-07 PROCEDURE — 97140 MANUAL THERAPY 1/> REGIONS: CPT | Mod: PN,CQ

## 2020-12-07 PROCEDURE — 97110 THERAPEUTIC EXERCISES: CPT | Mod: PN,CQ

## 2020-12-07 NOTE — PROGRESS NOTES
"  Physical Therapy Treatment Note     Name: Jessica Perez  Clinic Number: [id number]    Therapy Diagnosis:   Encounter Diagnoses   Name Primary?    Chronic midline low back pain without sciatica     Generalized weakness     Difficulty walking      Physician: Dileep Mendoza MD    Visit Date: 12/9/2020    Physician Orders: PT Eval and Treat  Medical Diagnosis: M89.49 (ICD-10-CM) - Primary osteoarthritis involving multiple joints  Evaluation Date: 11/30/2020  Authorization Period Expiration: 12/31/2020  Plan of Care Certification Period: 3/3/21  Visit #/Visits authorized: 4/ 20  PTA Visit #: 0     Time In: 12:45 pm   Time Out: 1:45 pm   Total Billable Time: 60 minutes    Precautions: Standard    Subjective     Pt reports: that her back is really sore today from having to  her  off of the floor last night.   She was compliant with home exercise program.  Response to previous treatment: sore for a few days, but felt much better  Functional change: moving better    Pain: 1/10  Location: low back and knees    Objective     Jessica received therapeutic exercises to develop strength, endurance, ROM, flexibility, posture and core stabilization for 40 minutes including:    Bridges x 10  Bridges on ball x15 bent knee x15 straight leg  LTR on ball 2'  DKTC on ball 2'  +TA activation 3" hold x 1'  +PPT 2 x 10  +hooklying TA activation with hip abduction GTB x 1'   Ab press with swiss ball 3"hold x 1'   Piriformis stretch 3 x 30" each   Hip adduction squeeze with ball 2 x 1'   Belt block 2 x 1'  +SLR 2 x 10   Clamshells 2 x 10 ball between feet  Reverse clamshells 2 x 10 ball at knees  Hip adduction 5x5" hold in HL manually resisted     Open books x 10 each   SL abduction x 10 each     Jessica received the following manual therapy techniques were applied to the following for 20 minutes, including:    STM to bilateral lumbar paraspinals and quadratus lumborum musculature  Myofascial release to bilateral " lumbar paraspinals and QL  Trigger point release to B lower trap and lats       Home Exercises Provided and Patient Education Provided     Education provided:   - Self knee distraction with ankle weights at home before using stationary bicycle at home to reduce knee pain.    Written Home Exercises Provided: Patient instructed to cont prior HEP.  Exercises were reviewed and Jessica was able to demonstrate them prior to the end of the session.  Jessica demonstrated good  understanding of the education provided.     See EMR under Patient Instructions for exercises provided 11/30/2020.    Assessment     Pt tolerated session well with no complaints. She presents with improved exercise tolerance following initial need for stretching d/t complaints of low back pain and stiffness from picking her  off of the floor. Pt with improved core control and pelvic control noted today with core stability exercises. Pt with good response to manual therapy today and reports good pain reduction with STM. Pt also educated today on better body mechanics to  her  off of the floor. Pt will continue to benefit from skilled PT intervention in order to further progress core stability and postural control as well as promote improved pelvic and lumbar strength.   Jessica is progressing well towards her goals.   Pt prognosis is Good.     Pt will continue to benefit from skilled outpatient physical therapy to address the deficits listed in the problem list box on initial evaluation, provide pt/family education and to maximize pt's level of independence in the home and community environment.     Pt's spiritual, cultural and educational needs considered and pt agreeable to plan of care and goals.     Anticipated barriers to physical therapy: chronicity of pain, pt's ability to be compliant with program d/t taking care of      Goals:   Short Term Goals: 4 weeks   1. Pt will be independent with HEP performance in order  to continue PT progress at home. Met 12/4/2020  2. Pt will report pain at worst in low back of 5/10 or less Progressing not met  3. Pt will tolerate progression to standing core and LE exercises. Progressing not met  4. Pt will improve lumbar rotation and SB motion by 25% or greater Progressing not met  5. Pt will present with no pelvic obliquity for 3 consecutive sessions Progressing not met     Long Term Goals: 6 weeks   1. Pt will improve TERRIE disability score to 10% disability or less in order to improve overall QOL and return to PLOF. Progressing not met  2. Pt will demo at least 4/5 strength in hip and pelvic stabilizers Progressing not met  3. Pt will tolerate performance of 45 minutes of exercise with no complaints of increased low back pain Progressing not met  4. Pt will report ability to clean her home with no increased low back pain Progressing not met    Plan     Cont to progress pt as tolerated and indicated per POC.    Jayne Craft, PT, DPT  12/9/2020

## 2020-12-09 ENCOUNTER — CLINICAL SUPPORT (OUTPATIENT)
Dept: REHABILITATION | Facility: HOSPITAL | Age: 75
End: 2020-12-09
Attending: INTERNAL MEDICINE
Payer: MEDICARE

## 2020-12-09 DIAGNOSIS — R26.2 DIFFICULTY WALKING: ICD-10-CM

## 2020-12-09 DIAGNOSIS — G89.29 CHRONIC MIDLINE LOW BACK PAIN WITHOUT SCIATICA: ICD-10-CM

## 2020-12-09 DIAGNOSIS — R53.1 GENERALIZED WEAKNESS: ICD-10-CM

## 2020-12-09 DIAGNOSIS — M54.50 CHRONIC MIDLINE LOW BACK PAIN WITHOUT SCIATICA: ICD-10-CM

## 2020-12-09 PROCEDURE — 97140 MANUAL THERAPY 1/> REGIONS: CPT | Mod: PN

## 2020-12-09 PROCEDURE — 97110 THERAPEUTIC EXERCISES: CPT | Mod: PN

## 2020-12-09 NOTE — PROGRESS NOTES
"  Physical Therapy Treatment Note     Name: Jessica Perez  Clinic Number: [id number]    Therapy Diagnosis:   No diagnosis found.  Physician: Dileep Mendoza MD    Visit Date: 12/14/2020    Physician Orders: PT Eval and Treat  Medical Diagnosis: M89.49 (ICD-10-CM) - Primary osteoarthritis involving multiple joints  Evaluation Date: 11/30/2020  Authorization Period Expiration: 12/31/2020  Plan of Care Certification Period: 3/3/21  Visit #/Visits authorized:*** 4/ 20  PTA Visit #: 0     Time In: ***   Time Out: ***   Total Billable Time: *** minutes    Precautions: Standard    Subjective     Pt reports: ***  She was compliant with home exercise program.  Response to previous treatment: sore for a few days, but felt much better  Functional change: moving better    Pain: 1/10  Location: low back and knees    Objective     Jessica received therapeutic exercises to develop strength, endurance, ROM, flexibility, posture and core stabilization for 40 minutes including:    Bridges x 10  Bridges on ball x15 bent knee x15 straight leg  LTR on ball 2'  DKTC on ball 2'  TA activation 3" hold x 1'  PPT 2 x 10  hooklying TA activation with hip abduction GTB x 1'   Ab press with swiss ball 3"hold x 1'   Piriformis stretch 3 x 30" each   Hip adduction squeeze with ball 2 x 1'   Belt block 2 x 1'  SLR 2 x 10   Clamshells 2 x 10 ball between feet  Reverse clamshells 2 x 10 ball at knees  Hip adduction 5x5" hold in HL manually resisted     Open books x 10 each   SL abduction x 10 each     Jessica received the following manual therapy techniques were applied to the following for 20 minutes, including:    STM to bilateral lumbar paraspinals and quadratus lumborum musculature  Myofascial release to bilateral lumbar paraspinals and QL  Trigger point release to B lower trap and lats       Home Exercises Provided and Patient Education Provided     Education provided:   - Self knee distraction with ankle weights at home before using " stationary bicycle at home to reduce knee pain.    Written Home Exercises Provided: Patient instructed to cont prior HEP.  Exercises were reviewed and Jessica was able to demonstrate them prior to the end of the session.  Jessica demonstrated good  understanding of the education provided.     See EMR under Patient Instructions for exercises provided 11/30/2020.    Assessment     ***  Jessica is progressing well towards her goals.   Pt prognosis is Good.     Pt will continue to benefit from skilled outpatient physical therapy to address the deficits listed in the problem list box on initial evaluation, provide pt/family education and to maximize pt's level of independence in the home and community environment.     Pt's spiritual, cultural and educational needs considered and pt agreeable to plan of care and goals.     Anticipated barriers to physical therapy: chronicity of pain, pt's ability to be compliant with program d/t taking care of      Goals:   Short Term Goals: 4 weeks   1. Pt will be independent with HEP performance in order to continue PT progress at home. Met 12/4/2020  2. Pt will report pain at worst in low back of 5/10 or less Progressing not met  3. Pt will tolerate progression to standing core and LE exercises. Progressing not met  4. Pt will improve lumbar rotation and SB motion by 25% or greater Progressing not met  5. Pt will present with no pelvic obliquity for 3 consecutive sessions Progressing not met     Long Term Goals: 6 weeks   1. Pt will improve TERRIE disability score to 10% disability or less in order to improve overall QOL and return to PLOF. Progressing not met  2. Pt will demo at least 4/5 strength in hip and pelvic stabilizers Progressing not met  3. Pt will tolerate performance of 45 minutes of exercise with no complaints of increased low back pain Progressing not met  4. Pt will report ability to clean her home with no increased low back pain Progressing not met    Plan      Cont to progress pt as tolerated and indicated per POC.    Jayne Craft, PT, DPT  12/9/2020

## 2020-12-10 NOTE — PROGRESS NOTES
"  Physical Therapy Treatment Note     Name: Jessica Perez  Clinic Number: [id number]    Therapy Diagnosis:   Encounter Diagnoses   Name Primary?    Chronic midline low back pain without sciatica     Generalized weakness     Difficulty walking      Physician: Dileep Mendoza MD    Visit Date: 12/14/2020    Physician Orders: PT Eval and Treat  Medical Diagnosis: M89.49 (ICD-10-CM) - Primary osteoarthritis involving multiple joints  Evaluation Date: 11/30/2020  Authorization Period Expiration: 12/31/2020  Plan of Care Certification Period: 3/3/21  Visit #/Visits authorized: 5/ 20  PTA Visit #: 1     Time In: 12:52 pm   Time Out: 1:37 pm   Total Billable Time: 45 minutes    Precautions: Standard    Subjective     Pt reports: Her back feels a lot better, it really doesn't bother her anymore until she does housework like vacuuming.   She was compliant with home exercise program.  Response to previous treatment: sore for a few days, but felt much better  Functional change: moving better    Pain: 1/10  Location: low back and knees    Objective     Jessica received therapeutic exercises to develop strength, endurance, ROM, flexibility, posture and core stabilization for 30 minutes including:    Bridges x 10  Bridges on ball x15 bent knee x15 straight leg  LTR on ball 2'  DKTC on ball 2'  TA activation 3" hold x 1'  PPT march x 1'  hooklying TA activation with hip abduction GTB x 1'   Ab press with swiss ball 3"hold x 1'   Piriformis stretch 3 x 30" each   Hip adduction squeeze with ball 2 x 1'   Belt block 2 x 1'  SLR 2 x 10   Clamshells 2 x 10 ball between feet  Reverse clamshells 2 x 10 ball at knees  Hip adduction 5x5" hold in HL manually resisted     Open books x 10 each   SL abduction 2 x 10 each     Jessica received the following manual therapy techniques were applied to the following for 15 minutes, including:    STM to bilateral lumbar paraspinals and quadratus lumborum musculature  Myofascial release to " bilateral lumbar paraspinals and QL  Trigger point release to B lower trap and lats       Home Exercises Provided and Patient Education Provided     Education provided:   - Self knee distraction with ankle weights at home before using stationary bicycle at home to reduce knee pain.    Written Home Exercises Provided: Patient instructed to cont prior HEP.  Exercises were reviewed and Jessica was able to demonstrate them prior to the end of the session.  Jessica demonstrated good  understanding of the education provided.     See EMR under Patient Instructions for exercises provided 11/30/2020.    Assessment     Pt tolerated tx session well today with no complaints. She is showing strength progression through increased tolerance to exercises and progression today. She cont to present with the most fatigue with abduction exercises with compensations, which she adjusts with verbal cueing. Noted hypertonic bilateral lumbar paraspinals and gluteal musculature, relieved by manual therapy. Pt will benefit from cont skilled PT and progression of exercises    Jessica is progressing well towards her goals.   Pt prognosis is Good.     Pt will continue to benefit from skilled outpatient physical therapy to address the deficits listed in the problem list box on initial evaluation, provide pt/family education and to maximize pt's level of independence in the home and community environment.     Pt's spiritual, cultural and educational needs considered and pt agreeable to plan of care and goals.     Anticipated barriers to physical therapy: chronicity of pain, pt's ability to be compliant with program d/t taking care of      Goals:   Short Term Goals: 4 weeks   1. Pt will be independent with HEP performance in order to continue PT progress at home. Met 12/4/2020  2. Pt will report pain at worst in low back of 5/10 or less Progressing not met  3. Pt will tolerate progression to standing core and LE exercises. Progressing not  met  4. Pt will improve lumbar rotation and SB motion by 25% or greater Progressing not met  5. Pt will present with no pelvic obliquity for 3 consecutive sessions Progressing not met     Long Term Goals: 6 weeks   1. Pt will improve TERRIE disability score to 10% disability or less in order to improve overall QOL and return to PLOF. Progressing not met  2. Pt will demo at least 4/5 strength in hip and pelvic stabilizers Progressing not met  3. Pt will tolerate performance of 45 minutes of exercise with no complaints of increased low back pain Progressing not met  4. Pt will report ability to clean her home with no increased low back pain Progressing not met    Plan     Cont to progress pt as tolerated and indicated per POC.    Chuck Ornelas, PTA  12/14/2020

## 2020-12-11 NOTE — PROGRESS NOTES
Subjective:   Patient ID:  Jessica Perez is a 75 y.o. female who presents for follow-up of No chief complaint on file.   Patient stable doing well current medications overall improvement in triglycerides.  Continues current medications.  Patient denies chest pain, angina or symptoms associated with anginal equivalent.  Cholesterol profiles were improved.  Triglycerides are still elevated.  Will add Zetia 10 mg daily current regimen repeat cholesterol profile again in about a month.    Patient is here for re-evaluation overall cholesterol profile.  Improvement in overall profile including triglycerides.      Review of Systems   Constitution: Negative for chills, diaphoresis, night sweats, weight gain and weight loss.   HENT: Negative for congestion, hoarse voice, sore throat and stridor.    Eyes: Negative for double vision and pain.   Cardiovascular: Negative for chest pain, claudication, cyanosis, dyspnea on exertion, irregular heartbeat, leg swelling, near-syncope, orthopnea, palpitations, paroxysmal nocturnal dyspnea and syncope.   Respiratory: Negative for cough, hemoptysis, shortness of breath, sleep disturbances due to breathing, snoring, sputum production and wheezing.    Endocrine: Negative for cold intolerance, heat intolerance and polydipsia.   Hematologic/Lymphatic: Negative for bleeding problem. Does not bruise/bleed easily.   Skin: Negative for color change, dry skin and rash.   Musculoskeletal: Negative for joint swelling and muscle cramps.   Gastrointestinal: Negative for bloating, abdominal pain, constipation, diarrhea, dysphagia, melena, nausea and vomiting.   Genitourinary: Negative for flank pain and urgency.   Neurological: Negative for dizziness, focal weakness, headaches, light-headedness, loss of balance, seizures and weakness.   Psychiatric/Behavioral: Negative for altered mental status and memory loss. The patient is not nervous/anxious.      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     Social History     Socioeconomic History    Marital status:      Spouse name: Not on file    Number of children: Not on file    Years of education: Not on file    Highest education level: Not on file   Occupational History    Not on file   Social Needs    Financial resource strain: Not hard at all    Food insecurity     Worry: Never true     Inability: Never true    Transportation needs     Medical: Not on file     Non-medical: No   Tobacco Use    Smoking status: Former Smoker     Packs/day: 0.50     Years: 12.00     Pack years: 6.00     Types: Cigarettes    Smokeless tobacco: Never Used   Substance and Sexual Activity    Alcohol use: No     Frequency: Monthly or less     Drinks per session: 1 or 2     Binge frequency: Never    Drug use: No    Sexual activity: Yes   Lifestyle    Physical activity     Days per week: 2 days     Minutes per session: 40 min    Stress: Rather much   Relationships    Social connections     Talks on phone: More than three times a week     Gets together: Once a week     Attends [employer] service: Not on file     Active member of club or organization: Yes     Attends meetings of clubs or organizations: More than 4 times per year     Relationship status:    Other Topics Concern    Not on file   Social History Narrative    Not on file     Current Outpatient Medications on File Prior to Visit   Medication Sig Dispense Refill    artificial tear, hypromellose, (GENTEAL) 0.3 % Gel       aspirin (ECOTRIN) 81 MG EC tablet Take 81 mg by mouth once daily.       azelastine (ASTELIN) 137 mcg (0.1 %) nasal spray 1 spray (137 mcg total) by Nasal route 2 (two) times daily. 30 mL 1    b complex vitamins capsule Take 1 capsule by mouth once daily.      calcium carbonate (CALCIUM 500 ORAL) Take by mouth.      cetirizine (ZYRTEC) 10 MG tablet Take 10 mg by mouth once daily.      cyanocobalamin, vitamin B-12, (VITAMIN B-12 ORAL) Take by mouth.      evolocumab (REPATHA SURECLICK) 140 mg/mL PnIj Inject 1 mL (140 mg total) into the skin every 14 (fourteen) days. 2 mL 6    fluticasone (FLONASE) 50 mcg/actuation nasal spray 2 sprays by Each Nare route once daily. 1 Bottle 12    krill oil-omega-3-dha-epa 150-450 mg CpDR Take by mouth.      loratadine/pseudoephedrine (CLARITIN-D 12 HOUR ORAL) Take by mouth. As needed      MAGNESIUM ORAL Take by mouth.      methocarbamol (ROBAXIN) 500 MG Tab Take 1 tablet (500 mg total) by mouth 2 (two) times daily as needed. (Patient not taking: Reported on 11/17/2020) 60 tablet 1    multivitamin capsule Take 1 capsule by mouth Daily.      naproxen (NAPROSYN) 500 MG tablet Take 1 tablet (500 mg total) by mouth 2 (two) times daily with meals. 60 tablet 1    nutritional supplement/fiber (QUINOA-KALE-HEMP ORAL) Take by mouth.      omeprazole (PRILOSEC OTC) 20 MG tablet Take 1 tablet (20 mg total) by mouth once daily. 30 tablet 11    UNABLE TO FIND CBD Oil      UNABLE TO FIND CBD Oil      vitamin E 100 UNIT capsule Take 100 Units by mouth once daily.      zoledronic acid/mannitol-water (RECLAST IV) Inject into the vein.       No current facility-administered medications on file prior to visit.      Review of patient's allergies indicates:   Allergen Reactions    Augmentin [amoxicillin-pot clavulanate] Other (See Comments)     Diarrhea, yeast    Bactrim  [sulfamethoxazole-trimethoprim]      Other reaction(s): Unknown    Celebrex [celecoxib] Other (See Comments)     Stomach pain, gas     Lipitor  [atorvastatin]      Other reaction(s):  Unknown    Pravachol  [pravastatin]      Other reaction(s): Unknown    Statins-hmg-coa reductase inhibitors      Other reaction(s): Muscle pain    Sulfa (sulfonamide antibiotics)      Other reaction(s): Swelling    Zoster vaccine live        Objective:     Physical Exam   Constitutional: She is oriented to person, place, and time.   Eyes: Pupils are equal, round, and reactive to light.   Neck: Normal range of motion. No tracheal deviation present.   Cardiovascular: Normal rate, regular rhythm, normal heart sounds and intact distal pulses. Exam reveals no gallop and no friction rub.   No murmur heard.  Pulses:       Carotid pulses are 2+ on the right side and 2+ on the left side.       Radial pulses are 2+ on the right side and 2+ on the left side.        Femoral pulses are 2+ on the right side and 2+ on the left side.       Popliteal pulses are 2+ on the right side and 2+ on the left side.        Dorsalis pedis pulses are 2+ on the right side and 2+ on the left side.        Posterior tibial pulses are 2+ on the right side and 2+ on the left side.   Pulmonary/Chest: Effort normal and breath sounds normal. No stridor. No respiratory distress. She has no wheezes. She has no rales. She exhibits no tenderness.   Abdominal: She exhibits no distension. There is no abdominal tenderness. There is no rebound.   Musculoskeletal:         General: No tenderness or edema.   Neurological: She is alert and oriented to person, place, and time.   Skin: Skin is warm and dry.       Units 3wk ago 4mo ago 1yr ago 2yr ago    Cholesterol 120 - 199 mg/dL 149  149 CM  281High  CM  282High  CM    Comment: The National Cholesterol Education Program (NCEP) has set the   following guidelines (reference ranges) for Cholesterol:   Optimal.....................<200 mg/dL   Borderline High.............200-239 mg/dL   High........................> or = 240 mg/dL    Triglycerides 30 - 150 mg/dL 153High   259High  CM  315High  CM  231High  CM     Comment: The National Cholesterol Education Program (NCEP) has set the   following guidelines (reference values) for triglycerides:   Normal......................<150 mg/dL   Borderline High.............150-199 mg/dL   High........................200-499 mg/dL    HDL 40 - 75 mg/dL 54  53 CM  51 CM  54 CM    Comment: The National Cholesterol Education Program (NCEP) has set the   following guidelines (reference values) for HDL Cholesterol:   Low...............<40 mg/dL   Optimal...........>60 mg/dL    LDL Cholesterol 63.0 - 159.0 mg/dL 64.4  44.2Low  CM  167.0High  CM  181.8High  CM    Comment: The National Cholesterol Education Program (NCEP) has set the   following guidelines (reference values) for LDL Cholesterol:   Optimal.......................<130 mg/dL   Borderline High...............130-159 mg/dL   High..........................160-189 mg/dL   Very High.....................>190 mg/dL    HDL/Cholesterol Ratio 20.0 - 50.0 % 36.2  35.6  18.1Low   19.1Low     Total Cholesterol/HDL Ratio 2.0 - 5.0 2.8  2.8  5.5High            Assessment:     1. Pure hypercholesterolemia    2. Family history of arteriosclerotic cardiovascular disease    3. Encounter for long-term current use of medication    4. Chest pain, moderate coronary artery risk    5. Statin myopathy    6. Chronic chest pain    7. SOB (shortness of breath)    8. Encounter to establish care    9. Hyperlipidemia, unspecified hyperlipidemia type    10. At risk for sleep apnea        Plan:     Pure hypercholesterolemia    Family history of arteriosclerotic cardiovascular disease    Encounter for long-term current use of medication    Chest pain, moderate coronary artery risk    Statin myopathy    Chronic chest pain    SOB (shortness of breath)    Encounter to establish care    Hyperlipidemia, unspecified hyperlipidemia type    At risk for sleep apnea      Improved overall.  Patient is on Repatha and doing well on this medication.  Stable cholesterol levels of  triglycerides elevated will add Zetia 10 mg daily to current regimen repeat cholesterol profile in about a month.  Otherwise doing well this time will see her again in March 2021.  Sooner if any acute recurrence symptoms.

## 2020-12-14 ENCOUNTER — OFFICE VISIT (OUTPATIENT)
Dept: CARDIOLOGY | Facility: CLINIC | Age: 75
End: 2020-12-14
Payer: MEDICARE

## 2020-12-14 ENCOUNTER — CLINICAL SUPPORT (OUTPATIENT)
Dept: REHABILITATION | Facility: HOSPITAL | Age: 75
End: 2020-12-14
Attending: INTERNAL MEDICINE
Payer: MEDICARE

## 2020-12-14 VITALS
DIASTOLIC BLOOD PRESSURE: 74 MMHG | HEART RATE: 97 BPM | OXYGEN SATURATION: 96 % | SYSTOLIC BLOOD PRESSURE: 118 MMHG | WEIGHT: 184.31 LBS | BODY MASS INDEX: 34.82 KG/M2

## 2020-12-14 DIAGNOSIS — Z91.89 AT RISK FOR SLEEP APNEA: ICD-10-CM

## 2020-12-14 DIAGNOSIS — G89.29 CHRONIC MIDLINE LOW BACK PAIN WITHOUT SCIATICA: ICD-10-CM

## 2020-12-14 DIAGNOSIS — Z76.89 ENCOUNTER TO ESTABLISH CARE: ICD-10-CM

## 2020-12-14 DIAGNOSIS — T46.6X5A STATIN MYOPATHY: ICD-10-CM

## 2020-12-14 DIAGNOSIS — M54.50 CHRONIC MIDLINE LOW BACK PAIN WITHOUT SCIATICA: ICD-10-CM

## 2020-12-14 DIAGNOSIS — Z82.49 FAMILY HISTORY OF ARTERIOSCLEROTIC CARDIOVASCULAR DISEASE: ICD-10-CM

## 2020-12-14 DIAGNOSIS — G72.0 STATIN MYOPATHY: ICD-10-CM

## 2020-12-14 DIAGNOSIS — R06.02 SOB (SHORTNESS OF BREATH): ICD-10-CM

## 2020-12-14 DIAGNOSIS — Z79.899 ENCOUNTER FOR LONG-TERM CURRENT USE OF MEDICATION: ICD-10-CM

## 2020-12-14 DIAGNOSIS — R26.2 DIFFICULTY WALKING: ICD-10-CM

## 2020-12-14 DIAGNOSIS — R07.9 CHEST PAIN, MODERATE CORONARY ARTERY RISK: ICD-10-CM

## 2020-12-14 DIAGNOSIS — E78.00 PURE HYPERCHOLESTEROLEMIA: ICD-10-CM

## 2020-12-14 DIAGNOSIS — E78.5 HYPERLIPIDEMIA, UNSPECIFIED HYPERLIPIDEMIA TYPE: Primary | ICD-10-CM

## 2020-12-14 DIAGNOSIS — R07.9 CHRONIC CHEST PAIN: ICD-10-CM

## 2020-12-14 DIAGNOSIS — R53.1 GENERALIZED WEAKNESS: ICD-10-CM

## 2020-12-14 DIAGNOSIS — G89.29 CHRONIC CHEST PAIN: ICD-10-CM

## 2020-12-14 PROCEDURE — 3008F BODY MASS INDEX DOCD: CPT | Mod: CPTII,S$GLB,, | Performed by: INTERNAL MEDICINE

## 2020-12-14 PROCEDURE — 97110 THERAPEUTIC EXERCISES: CPT | Mod: PN,CQ

## 2020-12-14 PROCEDURE — 99214 OFFICE O/P EST MOD 30 MIN: CPT | Mod: S$GLB,,, | Performed by: INTERNAL MEDICINE

## 2020-12-14 PROCEDURE — 99999 PR PBB SHADOW E&M-EST. PATIENT-LVL IV: ICD-10-PCS | Mod: PBBFAC,,, | Performed by: INTERNAL MEDICINE

## 2020-12-14 PROCEDURE — 97140 MANUAL THERAPY 1/> REGIONS: CPT | Mod: PN,CQ

## 2020-12-14 PROCEDURE — 3008F PR BODY MASS INDEX (BMI) DOCUMENTED: ICD-10-PCS | Mod: CPTII,S$GLB,, | Performed by: INTERNAL MEDICINE

## 2020-12-14 PROCEDURE — 1159F PR MEDICATION LIST DOCUMENTED IN MEDICAL RECORD: ICD-10-PCS | Mod: S$GLB,,, | Performed by: INTERNAL MEDICINE

## 2020-12-14 PROCEDURE — 1159F MED LIST DOCD IN RCRD: CPT | Mod: S$GLB,,, | Performed by: INTERNAL MEDICINE

## 2020-12-14 PROCEDURE — 99214 PR OFFICE/OUTPT VISIT, EST, LEVL IV, 30-39 MIN: ICD-10-PCS | Mod: S$GLB,,, | Performed by: INTERNAL MEDICINE

## 2020-12-14 PROCEDURE — 99999 PR PBB SHADOW E&M-EST. PATIENT-LVL IV: CPT | Mod: PBBFAC,,, | Performed by: INTERNAL MEDICINE

## 2020-12-14 RX ORDER — EZETIMIBE 10 MG/1
10 TABLET ORAL DAILY
Qty: 90 TABLET | Refills: 3 | Status: SHIPPED | OUTPATIENT
Start: 2020-12-14 | End: 2021-11-12

## 2020-12-15 NOTE — PROGRESS NOTES
"  Physical Therapy Treatment Note     Name: Jessica Perez  Clinic Number: [id number]    Therapy Diagnosis:   No diagnosis found.  Physician: Dileep Mendoza MD    Visit Date: 12/16/2020    Physician Orders: PT Eval and Treat  Medical Diagnosis: M89.49 (ICD-10-CM) - Primary osteoarthritis involving multiple joints  Evaluation Date: 11/30/2020  Authorization Period Expiration: 12/31/2020  Plan of Care Certification Period: 3/3/21  Visit #/Visits authorized: 6/ 20  PTA Visit #: 0     Time In: 12:45 pm    Time Out: 1:30 pm    Total Billable Time: 45 minutes    Precautions: Standard    Subjective     Pt reports: no new complaints. She states that she has been feeling a lot better since beginning PT.   She was compliant with home exercise program.  Response to previous treatment: sore for a few days, but felt much better  Functional change: moving better    Pain: 1/10  Location: low back and knees    Objective     Jessica received therapeutic exercises to develop strength, endurance, ROM, flexibility, posture and core stabilization for 30 minutes including:    Bridges 2 x 10  Bridges on ball x15 bent knee x15 straight leg  LTR on ball 2'  DKTC on ball 2'  TA activation 3" hold x 1'  PPT march x 1'  hooklying TA activation with hip abduction GTB x 1'   +bent knee fall outs GTB x 1'   Ab press with swiss ball 3"hold x 1'   Piriformis stretch 3 x 30" each   Hip adduction squeeze with ball 2 x 1'   Belt block 2 x 1'  SLR 2 x 10   Clamshells 2 x 10 ball between feet  Reverse clamshells 2 x 10 ball at knees  Hip adduction 5x5" hold in HL manually resisted     Open books x 10 each   SL abduction 2 x 10 each     Jessica received the following manual therapy techniques were applied to the following for 15 minutes, including:    STM to bilateral lumbar paraspinals and quadratus lumborum musculature  Myofascial release to bilateral lumbar paraspinals and QL  Trigger point release to B lower trap and lats       Home Exercises " Provided and Patient Education Provided     Education provided:   - Self knee distraction with ankle weights at home before using stationary bicycle at home to reduce knee pain.    Written Home Exercises Provided: Patient instructed to cont prior HEP.  Exercises were reviewed and Jessica was able to demonstrate them prior to the end of the session.  Jessica demonstrated good  understanding of the education provided.     See EMR under Patient Instructions for exercises provided 11/30/2020.    Assessment     Pt tolerated session well with no complaints. She fatigues quickly with hip abduction activities and TA activation. Pt requires cues to maintain good TA activation with all supine and hooklying activities. Decreased tone noted in paraspinals today and pt has good response to manual therapy.     Jessica is progressing well towards her goals.   Pt prognosis is Good.     Pt will continue to benefit from skilled outpatient physical therapy to address the deficits listed in the problem list box on initial evaluation, provide pt/family education and to maximize pt's level of independence in the home and community environment.     Pt's spiritual, cultural and educational needs considered and pt agreeable to plan of care and goals.     Anticipated barriers to physical therapy: chronicity of pain, pt's ability to be compliant with program d/t taking care of      Goals:   Short Term Goals: 4 weeks   1. Pt will be independent with HEP performance in order to continue PT progress at home. Met 12/4/2020  2. Pt will report pain at worst in low back of 5/10 or less Progressing not met  3. Pt will tolerate progression to standing core and LE exercises. Progressing not met  4. Pt will improve lumbar rotation and SB motion by 25% or greater Progressing not met  5. Pt will present with no pelvic obliquity for 3 consecutive sessions Progressing not met     Long Term Goals: 6 weeks   1. Pt will improve TERRIE disability score  to 10% disability or less in order to improve overall QOL and return to PLOF. Progressing not met  2. Pt will demo at least 4/5 strength in hip and pelvic stabilizers Progressing not met  3. Pt will tolerate performance of 45 minutes of exercise with no complaints of increased low back pain Progressing not met  4. Pt will report ability to clean her home with no increased low back pain Progressing not met    Plan     Cont to progress pt as tolerated and indicated per POC.    Jayne Craft, PT, DPT  12/15/2020

## 2020-12-16 ENCOUNTER — CLINICAL SUPPORT (OUTPATIENT)
Dept: REHABILITATION | Facility: HOSPITAL | Age: 75
End: 2020-12-16
Attending: INTERNAL MEDICINE
Payer: MEDICARE

## 2020-12-16 DIAGNOSIS — R53.1 GENERALIZED WEAKNESS: ICD-10-CM

## 2020-12-16 DIAGNOSIS — R26.2 DIFFICULTY WALKING: ICD-10-CM

## 2020-12-16 DIAGNOSIS — M54.50 CHRONIC MIDLINE LOW BACK PAIN WITHOUT SCIATICA: ICD-10-CM

## 2020-12-16 DIAGNOSIS — G89.29 CHRONIC MIDLINE LOW BACK PAIN WITHOUT SCIATICA: ICD-10-CM

## 2020-12-16 PROCEDURE — 97110 THERAPEUTIC EXERCISES: CPT | Mod: PN

## 2020-12-16 PROCEDURE — 97140 MANUAL THERAPY 1/> REGIONS: CPT | Mod: PN

## 2020-12-16 NOTE — PROGRESS NOTES
"  Physical Therapy Treatment Note     Name: Jessica Perez  Clinic Number: [id number]    Therapy Diagnosis:   Encounter Diagnoses   Name Primary?    Chronic midline low back pain without sciatica     Generalized weakness     Difficulty walking      Physician: Dileep Mendoza MD    Visit Date: 12/21/2020    Physician Orders: PT Eval and Treat  Medical Diagnosis: M89.49 (ICD-10-CM) - Primary osteoarthritis involving multiple joints  Evaluation Date: 11/30/2020   PN DUE: 12/30/2020  Authorization Period Expiration: 12/31/2020  Plan of Care Certification Period: 3/3/21  Visit #/Visits authorized: 7/ 20  PTA Visit #: 0     Time In: 12:45 pm     Time Out: 1:30 pm    Total Billable Time: 45 minutes    Precautions: Standard    Subjective     Pt reports: no new complaints. She states that she has been trying to stretch as much as possible at home, but does state that her low back is feeling a little better.   She was compliant with home exercise program.  Response to previous treatment: sore for a few days, but felt much better  Functional change: moving better    Pain: 1/10  Location: low back and knees    Objective     Jessica received therapeutic exercises to develop strength, endurance, ROM, flexibility, posture and core stabilization for 30 minutes including:    Bridges 2 x 10  Bridges on ball x15 bent knee x15 straight leg  LTR on ball 2'  DKTC on ball 2'  TA activation 3" hold x 1'  PPT march x 1'  hooklying TA activation with hip abduction GTB x 1'   bent knee fall outs GTB x 1'   Ab press with swiss ball 3"hold x 1'   Piriformis stretch 3 x 30" each   Hip adduction squeeze with ball 2 x 1'   Belt block 2 x 1'  SLR 2 x 10   Clamshells 2 x 10 ball between feet  Reverse clamshells 2 x 10 ball at knees  Hip adduction 5x5" hold in HL manually resisted     Open books x 10 each   SL abduction 2 x 10 each     Jessica received the following manual therapy techniques were applied to the following for 15 minutes, " including:    STM to bilateral lumbar paraspinals and quadratus lumborum musculature  Myofascial release to bilateral lumbar paraspinals and QL  Trigger point release to B lower trap and lats       Home Exercises Provided and Patient Education Provided     Education provided:   - Self knee distraction with ankle weights at home before using stationary bicycle at home to reduce knee pain.    Written Home Exercises Provided: Patient instructed to cont prior HEP.  Exercises were reviewed and Jessica was able to demonstrate them prior to the end of the session.  Jessica demonstrated good  understanding of the education provided.     See EMR under Patient Instructions for exercises provided 11/30/2020.    Assessment     Pt tolerated session well with no complaints. Pt with improved core recruitment noted today and good tolerance of progression of exercise today. Pt will continue to benefit from skilled PT intervention in order to further progress core stability and postural control needed for improved QOL and return to her PLOF.     Jessica is progressing well towards her goals.   Pt prognosis is Good.     Pt will continue to benefit from skilled outpatient physical therapy to address the deficits listed in the problem list box on initial evaluation, provide pt/family education and to maximize pt's level of independence in the home and community environment.     Pt's spiritual, cultural and educational needs considered and pt agreeable to plan of care and goals.     Anticipated barriers to physical therapy: chronicity of pain, pt's ability to be compliant with program d/t taking care of      Goals:   Short Term Goals: 4 weeks   1. Pt will be independent with HEP performance in order to continue PT progress at home. Met 12/4/2020  2. Pt will report pain at worst in low back of 5/10 or less Progressing not met  3. Pt will tolerate progression to standing core and LE exercises. Progressing not met  4. Pt will  improve lumbar rotation and SB motion by 25% or greater Progressing not met  5. Pt will present with no pelvic obliquity for 3 consecutive sessions Progressing not met     Long Term Goals: 6 weeks   1. Pt will improve TERRIE disability score to 10% disability or less in order to improve overall QOL and return to PLOF. Progressing not met  2. Pt will demo at least 4/5 strength in hip and pelvic stabilizers Progressing not met  3. Pt will tolerate performance of 45 minutes of exercise with no complaints of increased low back pain Progressing not met  4. Pt will report ability to clean her home with no increased low back pain Progressing not met    Plan     Cont to progress pt as tolerated and indicated per POC.    Jayne Craft, PT, DPT  12/21/2020

## 2020-12-19 NOTE — TELEPHONE ENCOUNTER
Financial Assistance for [employer] approved from 11/19/20-11/18/21  Source:[employer] [place]   BIN: [id number]  PCN:[id number]   Id:[id number]  GRP:[id number]   SAVINGS $ [phone]

## 2020-12-21 ENCOUNTER — CLINICAL SUPPORT (OUTPATIENT)
Dept: REHABILITATION | Facility: HOSPITAL | Age: 75
End: 2020-12-21
Attending: INTERNAL MEDICINE
Payer: MEDICARE

## 2020-12-21 DIAGNOSIS — G89.29 CHRONIC MIDLINE LOW BACK PAIN WITHOUT SCIATICA: ICD-10-CM

## 2020-12-21 DIAGNOSIS — R26.2 DIFFICULTY WALKING: ICD-10-CM

## 2020-12-21 DIAGNOSIS — R53.1 GENERALIZED WEAKNESS: ICD-10-CM

## 2020-12-21 DIAGNOSIS — M54.50 CHRONIC MIDLINE LOW BACK PAIN WITHOUT SCIATICA: ICD-10-CM

## 2020-12-21 PROCEDURE — 97110 THERAPEUTIC EXERCISES: CPT | Mod: PN

## 2020-12-21 PROCEDURE — 97140 MANUAL THERAPY 1/> REGIONS: CPT | Mod: PN

## 2020-12-21 NOTE — PROGRESS NOTES
"  Physical Therapy Treatment Note     Name: Jessica Perez  Clinic Number: [id number]    Therapy Diagnosis:   Encounter Diagnoses   Name Primary?    Chronic midline low back pain without sciatica     Generalized weakness     Difficulty walking      Physician: Dileep Mendoza MD    Visit Date: 12/23/2020    Physician Orders: PT Eval and Treat  Medical Diagnosis: M89.49 (ICD-10-CM) - Primary osteoarthritis involving multiple joints  Evaluation Date: 11/30/2020  Authorization Period Expiration: 12/31/2020  Plan of Care Certification Period: 3/3/21  Visit #/Visits authorized: 8/ 20  PTA Visit #: 1     Time In: 1:30 pm    Time Out: 2:15 pm    Total Billable Time: 45 minutes    Precautions: Standard    Subjective     Pt reports: Pt reports feeling a "stitch" in her back when taking care of the dogs at home, when they pulled on each leash at the same time. And she reports bending over and getting stuck, she used her hands on the washer to get back upright, but was fine the next day.   She was compliant with home exercise program.  Response to previous treatment: sore for a few days, but felt much better  Functional change: moving better    Pain: 1/10  Location: low back and knees    Objective     Jessica received therapeutic exercises to develop strength, endurance, ROM, flexibility, posture and core stabilization for 30 minutes including:      Bridges into abduction RTB 2 x 10  Bridges on ball x15 bent knee x15 straight leg  LTR on ball 2'  DKTC on ball 2'  TA activation 3" hold x 1'  PPT march #2 x 1'  hooklying TA activation with hip abduction GTB x 1'   bent knee fall outs GTB x 1'   Ab press with swiss ball 3"hold x 1'   Piriformis stretch 3 x 30" each   Hip adduction squeeze with ball 2 x 1'   Belt block 2 x 1'  SLR 2 x 10 #2  SL abduction 2 x 10 each   Clamshells 2 x 10 ball RTB  Prone hip extension RTB at knees 3 x 5  Reverse clamshells 2 x 10 ball at knees  Hip adduction 5x5" hold in HL manually " resisted     Open books x 10 each       Jessica received the following manual therapy techniques were applied to the following for 15 minutes, including:    STM to bilateral lumbar paraspinals and quadratus lumborum musculature  Myofascial release to bilateral lumbar paraspinals and QL  Trigger point release to B lower trap and lats   Manual lumbar traction through gait belt      Home Exercises Provided and Patient Education Provided     Education provided:   - Self knee distraction with ankle weights at home before using stationary bicycle at home to reduce knee pain.    Written Home Exercises Provided: Patient instructed to cont prior HEP.  Exercises were reviewed and Jessica was able to demonstrate them prior to the end of the session.  Jessica demonstrated good  understanding of the education provided.     See EMR under Patient Instructions for exercises provided 11/30/2020.    Assessment     Pt tolerated tx session well today with no complaints. Pt is progressing well with strengthening exercises with demo through increased tolerance to progression today and less muscular fatigue noted. Noted hypertonic bilateral lumbar paraspinals, QL and gluteal musculature, relieved by manual therapy today. Pt responded well to manual traction today with reports of significant relief and reduced pain. Pt will benefit from cont skilled PT and progression of exercises.    Jessica is progressing well towards her goals.   Pt prognosis is Good.     Pt will continue to benefit from skilled outpatient physical therapy to address the deficits listed in the problem list box on initial evaluation, provide pt/family education and to maximize pt's level of independence in the home and community environment.     Pt's spiritual, cultural and educational needs considered and pt agreeable to plan of care and goals.     Anticipated barriers to physical therapy: chronicity of pain, pt's ability to be compliant with program d/t taking care  of      Goals:   Short Term Goals: 4 weeks   1. Pt will be independent with HEP performance in order to continue PT progress at home. Met 12/4/2020  2. Pt will report pain at worst in low back of 5/10 or less Progressing not met  3. Pt will tolerate progression to standing core and LE exercises. Progressing not met  4. Pt will improve lumbar rotation and SB motion by 25% or greater Progressing not met  5. Pt will present with no pelvic obliquity for 3 consecutive sessions Progressing not met     Long Term Goals: 6 weeks   1. Pt will improve TERRIE disability score to 10% disability or less in order to improve overall QOL and return to PLOF. Progressing not met  2. Pt will demo at least 4/5 strength in hip and pelvic stabilizers Progressing not met  3. Pt will tolerate performance of 45 minutes of exercise with no complaints of increased low back pain Progressing not met  4. Pt will report ability to clean her home with no increased low back pain Progressing not met    Plan     Cont to progress pt as tolerated and indicated per POC.    Chuck Ornelas, PTA  12/23/2020

## 2020-12-23 ENCOUNTER — CLINICAL SUPPORT (OUTPATIENT)
Dept: REHABILITATION | Facility: HOSPITAL | Age: 75
End: 2020-12-23
Attending: INTERNAL MEDICINE
Payer: MEDICARE

## 2020-12-23 DIAGNOSIS — R26.2 DIFFICULTY WALKING: ICD-10-CM

## 2020-12-23 DIAGNOSIS — R53.1 GENERALIZED WEAKNESS: ICD-10-CM

## 2020-12-23 DIAGNOSIS — M54.50 CHRONIC MIDLINE LOW BACK PAIN WITHOUT SCIATICA: ICD-10-CM

## 2020-12-23 DIAGNOSIS — G89.29 CHRONIC MIDLINE LOW BACK PAIN WITHOUT SCIATICA: ICD-10-CM

## 2020-12-23 PROCEDURE — 97140 MANUAL THERAPY 1/> REGIONS: CPT | Mod: PN,CQ

## 2020-12-23 PROCEDURE — 97110 THERAPEUTIC EXERCISES: CPT | Mod: PN,CQ

## 2020-12-24 NOTE — PROGRESS NOTES
Physical Therapy Treatment Note     Name: Jessica Perez  Clinic Number: [id number]    Therapy Diagnosis:   Encounter Diagnoses   Name Primary?    Chronic midline low back pain without sciatica     Generalized weakness     Difficulty walking      Physician: Dileep Mendoza MD    Visit Date: 12/28/2020    Physician Orders: PT Eval and Treat  Medical Diagnosis: M89.49 (ICD-10-CM) - Primary osteoarthritis involving multiple joints  Evaluation Date: 11/30/2020  Authorization Period Expiration: 12/31/2020  Plan of Care Certification Period: 3/3/21  Visit #/Visits authorized: 9/ 20  PTA Visit #: 0     Time In: 12:45 pm    Time Out: 1:30 pm    Total Billable Time: 45 minutes    Precautions: Standard    Subjective     Pt reports: that overall she feels that she has gotten stronger and that her pain is getting better. She states that she feels that she is getting stronger each session and that her pain is more controlled. She states, however, that she has been having to do more around her home helping her .   She was compliant with home exercise program.  Response to previous treatment: sore for a few days, but felt much better  Functional change: moving better    Pain: 1/10  Location: low back and knees    Objective     MEASURES:   Lumbar Range of Motion:     % of normal motion Pain   Flexion 100    no         Extension 50    yes         Left Side Bending 50 stretch         Right Side Bending 75 No          Left rotation    50  no         Right Rotation    75 no               Lower Extremity Strength  Right LE   Left LE     Knee extension: 4-/5 Knee extension: 4-/5   Knee flexion: 4-/5 Knee flexion: 4-/5   Hip flexion: 4-/5 Hip flexion: 4-/5   Hip extension:  4-/5 Hip extension: 4-/5   Hip abduction: 4-/5 Hip abduction: 4-/5   Hip adduction: 4-/5 Hip adduction 4-/5   Ankle dorsiflexion: 4-/5 Ankle dorsiflexion: 4/5   Ankle plantarflexion: 4/5 Ankle plantarflexion: 4/5      TERRIE: 13/50 (26% disability)  "    TREATMENT:  Jessica received therapeutic exercises to develop strength, endurance, ROM, flexibility, posture and core stabilization for 30 minutes including:      Bridges into abduction GTB 2 x 10  Bridges on ball x15 bent knee x15 straight leg  LTR on ball 2'  DKTC on ball 2'  TA activation 3" hold x 1'  PPT march #2 x 1'  hooklying TA activation with hip abduction GTB x 1'   bent knee fall outs GTB x 1'   Ab press with swiss ball 3"hold x 1'   Piriformis stretch 3 x 30" each   Hip adduction squeeze with ball 2 x 1'   Belt block 2 x 1'  SLR 2 x 10 #2  SL abduction 2 x 10 each   Clamshells 2 x 10 ball RTB  Prone hip extension RTB at knees 3 x 5  Reverse clamshells 2 x 10 ball at knees  Hip adduction 5x5" hold in HL manually resisted     Open books x 10 each       Jessica received the following manual therapy techniques were applied to the following for 15 minutes, including:    STM to bilateral lumbar paraspinals and quadratus lumborum musculature  Myofascial release to bilateral lumbar paraspinals and QL  Trigger point release to B lower trap and lats   Manual lumbar traction through gait belt      Home Exercises Provided and Patient Education Provided     Education provided:   - Self knee distraction with ankle weights at home before using stationary bicycle at home to reduce knee pain.    Written Home Exercises Provided: Patient instructed to cont prior HEP.  Exercises were reviewed and Jessica was able to demonstrate them prior to the end of the session.  Jessica demonstrated good  understanding of the education provided.     See EMR under Patient Instructions for exercises provided 11/30/2020.    Assessment     Pt has made fair progress since beginning skilled PT. She continues to report daily pain and spasm and continues to present with increased paraspinal tone. Pt has however, tolerated good progression of exercise and demonstrates improved core control and core stability. Hip abductors and extension " continues to be noted as weakest. Pt will continue to benefit from skilled PT intervention in order to further progress core control, LE strength, and postural stability needed for improved QOL and return to her PLOF.     Jessica is progressing well towards her goals.   Pt prognosis is Good.     Pt will continue to benefit from skilled outpatient physical therapy to address the deficits listed in the problem list box on initial evaluation, provide pt/family education and to maximize pt's level of independence in the home and community environment.     Pt's spiritual, cultural and educational needs considered and pt agreeable to plan of care and goals.     Anticipated barriers to physical therapy: chronicity of pain, pt's ability to be compliant with program d/t taking care of      Goals:   Short Term Goals: 4 weeks   1. Pt will be independent with HEP performance in order to continue PT progress at home. Met 12/4/2020  2. Pt will report pain at worst in low back of 5/10 or less Progressing not met  3. Pt will tolerate progression to standing core and LE exercises. Gaol met 12/28/2020  4. Pt will improve lumbar rotation and SB motion by 25% or greater Progressing not met  5. Pt will present with no pelvic obliquity for 3 consecutive sessions goal met, 12/28/2020     Long Term Goals: 6 weeks   1. Pt will improve TERRIE disability score to 10% disability or less in order to improve overall QOL and return to PLOF. Progressing not met  2. Pt will demo at least 4/5 strength in hip and pelvic stabilizers Progressing not met  3. Pt will tolerate performance of 45 minutes of exercise with no complaints of increased low back pain Progressing not met  4. Pt will report ability to clean her home with no increased low back pain Progressing not met    Plan     Cont to progress pt as tolerated and indicated per POC.    Continue per PT POC 2xwk for additional 4 weeks, continue to progress standing exercise     Jayne Craft, PT,  DPT  12/28/2020

## 2020-12-28 ENCOUNTER — CLINICAL SUPPORT (OUTPATIENT)
Dept: REHABILITATION | Facility: HOSPITAL | Age: 75
End: 2020-12-28
Attending: INTERNAL MEDICINE
Payer: MEDICARE

## 2020-12-28 DIAGNOSIS — M54.50 CHRONIC MIDLINE LOW BACK PAIN WITHOUT SCIATICA: ICD-10-CM

## 2020-12-28 DIAGNOSIS — G89.29 CHRONIC MIDLINE LOW BACK PAIN WITHOUT SCIATICA: ICD-10-CM

## 2020-12-28 DIAGNOSIS — R26.2 DIFFICULTY WALKING: ICD-10-CM

## 2020-12-28 DIAGNOSIS — R53.1 GENERALIZED WEAKNESS: ICD-10-CM

## 2020-12-28 PROCEDURE — 97140 MANUAL THERAPY 1/> REGIONS: CPT | Mod: PN

## 2020-12-28 PROCEDURE — 97110 THERAPEUTIC EXERCISES: CPT | Mod: PN

## 2020-12-29 NOTE — PROGRESS NOTES
"  Physical Therapy Treatment Note     Name: Jessica Perez  Clinic Number: [id number]    Therapy Diagnosis:   Encounter Diagnoses   Name Primary?    Chronic midline low back pain without sciatica     Generalized weakness     Difficulty walking      Physician: Dileep Mendoza MD    Visit Date: 12/30/2020    Physician Orders: PT Eval and Treat  Medical Diagnosis: M89.49 (ICD-10-CM) - Primary osteoarthritis involving multiple joints  Evaluation Date: 11/30/2020  Authorization Period Expiration: 12/31/2020  Plan of Care Certification Period: 3/3/21  Visit #/Visits authorized:  10/ 20  PTA Visit #: 0     Time In: 1:30  pm   Time Out: 2:15 pm   Total Billable Time: 45 minutes    Precautions: Standard    Subjective     Pt reports: no new complaints. She states that she felt pretty good following her last session and that her pain stayed away for an entire day.   She was compliant with home exercise program.  Response to previous treatment: sore for a few days, but felt much better  Functional change: moving better    Pain: 1/10  Location: low back and knees    Objective     TREATMENT:  Jessica received therapeutic exercises to develop strength, endurance, ROM, flexibility, posture and core stabilization for 30 minutes including:      Bridges into abduction GTB 2 x 10  Bridges on ball x15 bent knee x15 straight leg  LTR on ball 2'  DKTC on ball 2'  TA activation 3" hold x 1'  PPT march #2 x 1'  hooklying TA activation with hip abduction GTB x 1'   bent knee fall outs GTB x 1'   Ab press with swiss ball 3"hold x 1'   Piriformis stretch 3 x 30" each   Hip adduction squeeze with ball 2 x 1'   Belt block 2 x 1'  SLR 2 x 10 #2  SL abduction 2 x 10 each   Clamshells 2 x 10 ball RTB  Prone hip extension RTB at knees 3 x 5  Reverse clamshells 2 x 10 ball at knees  Hip adduction 5x5" hold in HL manually resisted     Open books x 10 each       Jessica received the following manual therapy techniques were applied to the " following for 15 minutes, including:    STM to bilateral lumbar paraspinals and quadratus lumborum musculature  Myofascial release to bilateral lumbar paraspinals and QL  Trigger point release to B lower trap and lats   Manual lumbar traction through gait belt      Home Exercises Provided and Patient Education Provided     Education provided:   - Self knee distraction with ankle weights at home before using stationary bicycle at home to reduce knee pain.    Written Home Exercises Provided: Patient instructed to cont prior HEP.  Exercises were reviewed and Jessica was able to demonstrate them prior to the end of the session.  Jessica demonstrated good  understanding of the education provided.     See EMR under Patient Instructions for exercises provided 11/30/2020.    Assessment     Pt tolerated session well with no complaints. She continues to demonstrate improved core control and LE strength as well as improved awareness of her posture and how it affects her pain. Pt will continue to benefit from skilled PT intervention in order to further progress core control and postural awareness needed for improved QOL and return to her PLOF.     Jessica is progressing well towards her goals.   Pt prognosis is Good.     Pt will continue to benefit from skilled outpatient physical therapy to address the deficits listed in the problem list box on initial evaluation, provide pt/family education and to maximize pt's level of independence in the home and community environment.     Pt's spiritual, cultural and educational needs considered and pt agreeable to plan of care and goals.     Anticipated barriers to physical therapy: chronicity of pain, pt's ability to be compliant with program d/t taking care of      Goals:   Short Term Goals: 4 weeks   1. Pt will be independent with HEP performance in order to continue PT progress at home. Met 12/4/2020  2. Pt will report pain at worst in low back of 5/10 or less Progressing  not met  3. Pt will tolerate progression to standing core and LE exercises. Gaol met 12/28/2020  4. Pt will improve lumbar rotation and SB motion by 25% or greater Progressing not met  5. Pt will present with no pelvic obliquity for 3 consecutive sessions goal met, 12/28/2020     Long Term Goals: 6 weeks   1. Pt will improve TRERIE disability score to 10% disability or less in order to improve overall QOL and return to PLOF. Progressing not met  2. Pt will demo at least 4/5 strength in hip and pelvic stabilizers Progressing not met  3. Pt will tolerate performance of 45 minutes of exercise with no complaints of increased low back pain Progressing not met  4. Pt will report ability to clean her home with no increased low back pain Progressing not met    Plan     Continue PT per POC, assess tolerance to today's session,  progress exercise as tolerated and appropriate        Jayne Craft, PT, DPT  12/30/2020

## 2020-12-30 ENCOUNTER — CLINICAL SUPPORT (OUTPATIENT)
Dept: REHABILITATION | Facility: HOSPITAL | Age: 75
End: 2020-12-30
Attending: INTERNAL MEDICINE
Payer: MEDICARE

## 2020-12-30 DIAGNOSIS — R26.2 DIFFICULTY WALKING: ICD-10-CM

## 2020-12-30 DIAGNOSIS — M54.50 CHRONIC MIDLINE LOW BACK PAIN WITHOUT SCIATICA: ICD-10-CM

## 2020-12-30 DIAGNOSIS — R53.1 GENERALIZED WEAKNESS: ICD-10-CM

## 2020-12-30 DIAGNOSIS — G89.29 CHRONIC MIDLINE LOW BACK PAIN WITHOUT SCIATICA: ICD-10-CM

## 2020-12-30 PROCEDURE — 97110 THERAPEUTIC EXERCISES: CPT | Mod: PN

## 2020-12-30 PROCEDURE — 97140 MANUAL THERAPY 1/> REGIONS: CPT | Mod: PN

## 2021-01-04 ENCOUNTER — LAB VISIT (OUTPATIENT)
Dept: LAB | Facility: HOSPITAL | Age: 76
End: 2021-01-04
Attending: INTERNAL MEDICINE
Payer: MEDICARE

## 2021-01-04 ENCOUNTER — TELEPHONE (OUTPATIENT)
Dept: INTERNAL MEDICINE | Facility: CLINIC | Age: 76
End: 2021-01-04

## 2021-01-04 DIAGNOSIS — E78.5 HYPERLIPIDEMIA, UNSPECIFIED HYPERLIPIDEMIA TYPE: ICD-10-CM

## 2021-01-04 DIAGNOSIS — E78.00 PURE HYPERCHOLESTEROLEMIA: ICD-10-CM

## 2021-01-04 DIAGNOSIS — Z82.49 FAMILY HISTORY OF ARTERIOSCLEROTIC CARDIOVASCULAR DISEASE: ICD-10-CM

## 2021-01-04 DIAGNOSIS — R07.9 CHRONIC CHEST PAIN: ICD-10-CM

## 2021-01-04 DIAGNOSIS — T46.6X5A STATIN MYOPATHY: ICD-10-CM

## 2021-01-04 DIAGNOSIS — R06.02 SOB (SHORTNESS OF BREATH): ICD-10-CM

## 2021-01-04 DIAGNOSIS — G89.29 CHRONIC CHEST PAIN: ICD-10-CM

## 2021-01-04 DIAGNOSIS — Z76.89 ENCOUNTER TO ESTABLISH CARE: ICD-10-CM

## 2021-01-04 DIAGNOSIS — Z79.899 ENCOUNTER FOR LONG-TERM CURRENT USE OF MEDICATION: ICD-10-CM

## 2021-01-04 DIAGNOSIS — G72.0 STATIN MYOPATHY: ICD-10-CM

## 2021-01-04 DIAGNOSIS — R07.9 CHEST PAIN, MODERATE CORONARY ARTERY RISK: ICD-10-CM

## 2021-01-04 DIAGNOSIS — Z91.89 AT RISK FOR SLEEP APNEA: ICD-10-CM

## 2021-01-04 PROCEDURE — 36415 COLL VENOUS BLD VENIPUNCTURE: CPT | Mod: PO

## 2021-01-04 PROCEDURE — 80061 LIPID PANEL: CPT

## 2021-01-05 ENCOUNTER — TELEPHONE (OUTPATIENT)
Dept: CARDIOLOGY | Facility: CLINIC | Age: 76
End: 2021-01-05

## 2021-01-05 LAB
CHOLEST SERPL-MCNC: 137 MG/DL (ref 120–199)
CHOLEST/HDLC SERPL: 2.4 {RATIO} (ref 2–5)
HDLC SERPL-MCNC: 58 MG/DL (ref 40–75)
HDLC SERPL: 42.3 % (ref 20–50)
LDLC SERPL CALC-MCNC: 38.6 MG/DL (ref 63–159)
NONHDLC SERPL-MCNC: 79 MG/DL
TRIGL SERPL-MCNC: 202 MG/DL (ref 30–150)

## 2021-01-14 ENCOUNTER — HOSPITAL ENCOUNTER (OUTPATIENT)
Dept: RADIOLOGY | Facility: HOSPITAL | Age: 76
Discharge: HOME | End: 2021-01-14
Attending: FAMILY MEDICINE
Payer: MEDICARE

## 2021-01-14 VITALS — BODY MASS INDEX: 34.8 KG/M2 | WEIGHT: 184.31 LBS | HEIGHT: 61 IN

## 2021-01-14 DIAGNOSIS — Z12.31 SCREENING MAMMOGRAM, ENCOUNTER FOR: ICD-10-CM

## 2021-01-14 PROCEDURE — 77063 MAMMO DIGITAL SCREENING BILAT WITH TOMO: ICD-10-PCS | Mod: 26,,, | Performed by: RADIOLOGY

## 2021-01-14 PROCEDURE — 77063 BREAST TOMOSYNTHESIS BI: CPT | Mod: 26,,, | Performed by: RADIOLOGY

## 2021-01-14 PROCEDURE — 77067 SCR MAMMO BI INCL CAD: CPT | Mod: TC

## 2021-01-14 PROCEDURE — 77067 MAMMO DIGITAL SCREENING BILAT WITH TOMO: ICD-10-PCS | Mod: 26,,, | Performed by: RADIOLOGY

## 2021-01-14 PROCEDURE — 77067 SCR MAMMO BI INCL CAD: CPT | Mod: 26,,, | Performed by: RADIOLOGY

## 2021-01-15 ENCOUNTER — PATIENT MESSAGE (OUTPATIENT)
Dept: INTERNAL MEDICINE | Facility: CLINIC | Age: 76
End: 2021-01-15

## 2021-01-22 ENCOUNTER — TELEPHONE (OUTPATIENT)
Dept: INTERNAL MEDICINE | Facility: CLINIC | Age: 76
End: 2021-01-22

## 2021-02-05 ENCOUNTER — OFFICE VISIT (OUTPATIENT)
Dept: INTERNAL MEDICINE | Facility: CLINIC | Age: 76
End: 2021-02-05
Payer: MEDICARE

## 2021-02-05 ENCOUNTER — HOSPITAL ENCOUNTER (OUTPATIENT)
Dept: RADIOLOGY | Facility: HOSPITAL | Age: 76
Discharge: HOME | End: 2021-02-05
Attending: NURSE PRACTITIONER
Payer: MEDICARE

## 2021-02-05 ENCOUNTER — TELEPHONE (OUTPATIENT)
Dept: INTERNAL MEDICINE | Facility: CLINIC | Age: 76
End: 2021-02-05

## 2021-02-05 VITALS
HEIGHT: 61 IN | TEMPERATURE: 99 F | SYSTOLIC BLOOD PRESSURE: 104 MMHG | WEIGHT: 181 LBS | HEART RATE: 82 BPM | BODY MASS INDEX: 34.17 KG/M2 | DIASTOLIC BLOOD PRESSURE: 70 MMHG

## 2021-02-05 DIAGNOSIS — M25.562 CHRONIC PAIN OF LEFT KNEE: ICD-10-CM

## 2021-02-05 DIAGNOSIS — M25.562 CHRONIC PAIN OF LEFT KNEE: Primary | ICD-10-CM

## 2021-02-05 DIAGNOSIS — G89.29 CHRONIC PAIN OF LEFT KNEE: ICD-10-CM

## 2021-02-05 DIAGNOSIS — G89.29 CHRONIC PAIN OF LEFT KNEE: Primary | ICD-10-CM

## 2021-02-05 PROCEDURE — 1101F PR PT FALLS ASSESS DOC 0-1 FALLS W/OUT INJ PAST YR: ICD-10-PCS | Mod: CPTII,S$GLB,, | Performed by: NURSE PRACTITIONER

## 2021-02-05 PROCEDURE — 96372 THER/PROPH/DIAG INJ SC/IM: CPT | Mod: S$GLB,,, | Performed by: NURSE PRACTITIONER

## 2021-02-05 PROCEDURE — 3288F PR FALLS RISK ASSESSMENT DOCUMENTED: ICD-10-PCS | Mod: CPTII,S$GLB,, | Performed by: NURSE PRACTITIONER

## 2021-02-05 PROCEDURE — 3008F BODY MASS INDEX DOCD: CPT | Mod: CPTII,S$GLB,, | Performed by: NURSE PRACTITIONER

## 2021-02-05 PROCEDURE — 99999 PR PBB SHADOW E&M-EST. PATIENT-LVL V: ICD-10-PCS | Mod: PBBFAC,,, | Performed by: NURSE PRACTITIONER

## 2021-02-05 PROCEDURE — 73562 X-RAY EXAM OF KNEE 3: CPT | Mod: 26,50,, | Performed by: RADIOLOGY

## 2021-02-05 PROCEDURE — 3008F PR BODY MASS INDEX (BMI) DOCUMENTED: ICD-10-PCS | Mod: CPTII,S$GLB,, | Performed by: NURSE PRACTITIONER

## 2021-02-05 PROCEDURE — 1125F PR PAIN SEVERITY QUANTIFIED, PAIN PRESENT: ICD-10-PCS | Mod: S$GLB,,, | Performed by: NURSE PRACTITIONER

## 2021-02-05 PROCEDURE — 73562 XR KNEE ORTHO BILAT: ICD-10-PCS | Mod: 26,50,, | Performed by: RADIOLOGY

## 2021-02-05 PROCEDURE — 99214 OFFICE O/P EST MOD 30 MIN: CPT | Mod: 25,S$GLB,, | Performed by: NURSE PRACTITIONER

## 2021-02-05 PROCEDURE — 99999 PR PBB SHADOW E&M-EST. PATIENT-LVL V: CPT | Mod: PBBFAC,,, | Performed by: NURSE PRACTITIONER

## 2021-02-05 PROCEDURE — 96372 PR INJECTION,THERAP/PROPH/DIAG2ST, IM OR SUBCUT: ICD-10-PCS | Mod: S$GLB,,, | Performed by: NURSE PRACTITIONER

## 2021-02-05 PROCEDURE — 1159F PR MEDICATION LIST DOCUMENTED IN MEDICAL RECORD: ICD-10-PCS | Mod: S$GLB,,, | Performed by: NURSE PRACTITIONER

## 2021-02-05 PROCEDURE — 99214 PR OFFICE/OUTPT VISIT, EST, LEVL IV, 30-39 MIN: ICD-10-PCS | Mod: 25,S$GLB,, | Performed by: NURSE PRACTITIONER

## 2021-02-05 PROCEDURE — 3288F FALL RISK ASSESSMENT DOCD: CPT | Mod: CPTII,S$GLB,, | Performed by: NURSE PRACTITIONER

## 2021-02-05 PROCEDURE — 73562 X-RAY EXAM OF KNEE 3: CPT | Mod: TC,50,FY,PO

## 2021-02-05 PROCEDURE — 1125F AMNT PAIN NOTED PAIN PRSNT: CPT | Mod: S$GLB,,, | Performed by: NURSE PRACTITIONER

## 2021-02-05 PROCEDURE — 1159F MED LIST DOCD IN RCRD: CPT | Mod: S$GLB,,, | Performed by: NURSE PRACTITIONER

## 2021-02-05 PROCEDURE — 1101F PT FALLS ASSESS-DOCD LE1/YR: CPT | Mod: CPTII,S$GLB,, | Performed by: NURSE PRACTITIONER

## 2021-02-05 RX ORDER — KETOROLAC TROMETHAMINE 30 MG/ML
30 INJECTION, SOLUTION INTRAMUSCULAR; INTRAVENOUS
Status: COMPLETED | OUTPATIENT
Start: 2021-02-05 | End: 2021-02-05

## 2021-02-05 RX ORDER — MELOXICAM 15 MG/1
15 TABLET ORAL DAILY
Qty: 30 TABLET | Refills: 0 | Status: SHIPPED | OUTPATIENT
Start: 2021-02-05 | End: 2021-04-13 | Stop reason: ALTCHOICE

## 2021-02-05 RX ADMIN — KETOROLAC TROMETHAMINE 30 MG: 30 INJECTION, SOLUTION INTRAMUSCULAR; INTRAVENOUS at 02:02

## 2021-02-09 ENCOUNTER — OFFICE VISIT (OUTPATIENT)
Dept: INTERNAL MEDICINE | Facility: CLINIC | Age: 76
End: 2021-02-09
Payer: MEDICARE

## 2021-02-09 VITALS
RESPIRATION RATE: 18 BRPM | SYSTOLIC BLOOD PRESSURE: 110 MMHG | DIASTOLIC BLOOD PRESSURE: 60 MMHG | HEIGHT: 61 IN | BODY MASS INDEX: 34.17 KG/M2 | HEART RATE: 72 BPM | WEIGHT: 181 LBS | TEMPERATURE: 98 F

## 2021-02-09 DIAGNOSIS — E78.00 PURE HYPERCHOLESTEROLEMIA: ICD-10-CM

## 2021-02-09 DIAGNOSIS — M17.10 ARTHRITIS OF KNEE: Primary | ICD-10-CM

## 2021-02-09 DIAGNOSIS — E21.3 HYPERPARATHYROIDISM: ICD-10-CM

## 2021-02-09 DIAGNOSIS — M25.50 MULTIPLE JOINT PAIN: ICD-10-CM

## 2021-02-09 PROCEDURE — 1125F AMNT PAIN NOTED PAIN PRSNT: CPT | Mod: S$GLB,,, | Performed by: FAMILY MEDICINE

## 2021-02-09 PROCEDURE — 99999 PR PBB SHADOW E&M-EST. PATIENT-LVL V: CPT | Mod: PBBFAC,,, | Performed by: FAMILY MEDICINE

## 2021-02-09 PROCEDURE — 20610 DRAIN/INJ JOINT/BURSA W/O US: CPT | Mod: LT,S$GLB,, | Performed by: FAMILY MEDICINE

## 2021-02-09 PROCEDURE — 1159F PR MEDICATION LIST DOCUMENTED IN MEDICAL RECORD: ICD-10-PCS | Mod: S$GLB,,, | Performed by: FAMILY MEDICINE

## 2021-02-09 PROCEDURE — 3288F FALL RISK ASSESSMENT DOCD: CPT | Mod: CPTII,S$GLB,, | Performed by: FAMILY MEDICINE

## 2021-02-09 PROCEDURE — 1101F PR PT FALLS ASSESS DOC 0-1 FALLS W/OUT INJ PAST YR: ICD-10-PCS | Mod: CPTII,S$GLB,, | Performed by: FAMILY MEDICINE

## 2021-02-09 PROCEDURE — 99214 PR OFFICE/OUTPT VISIT, EST, LEVL IV, 30-39 MIN: ICD-10-PCS | Mod: 25,S$GLB,, | Performed by: FAMILY MEDICINE

## 2021-02-09 PROCEDURE — 1159F MED LIST DOCD IN RCRD: CPT | Mod: S$GLB,,, | Performed by: FAMILY MEDICINE

## 2021-02-09 PROCEDURE — 1125F PR PAIN SEVERITY QUANTIFIED, PAIN PRESENT: ICD-10-PCS | Mod: S$GLB,,, | Performed by: FAMILY MEDICINE

## 2021-02-09 PROCEDURE — 99999 PR PBB SHADOW E&M-EST. PATIENT-LVL V: ICD-10-PCS | Mod: PBBFAC,,, | Performed by: FAMILY MEDICINE

## 2021-02-09 PROCEDURE — 3288F PR FALLS RISK ASSESSMENT DOCUMENTED: ICD-10-PCS | Mod: CPTII,S$GLB,, | Performed by: FAMILY MEDICINE

## 2021-02-09 PROCEDURE — 3008F PR BODY MASS INDEX (BMI) DOCUMENTED: ICD-10-PCS | Mod: CPTII,S$GLB,, | Performed by: FAMILY MEDICINE

## 2021-02-09 PROCEDURE — 99214 OFFICE O/P EST MOD 30 MIN: CPT | Mod: 25,S$GLB,, | Performed by: FAMILY MEDICINE

## 2021-02-09 PROCEDURE — 3008F BODY MASS INDEX DOCD: CPT | Mod: CPTII,S$GLB,, | Performed by: FAMILY MEDICINE

## 2021-02-09 PROCEDURE — 20610 LARGE JOINT ASPIRATION/INJECTION: L KNEE: ICD-10-PCS | Mod: LT,S$GLB,, | Performed by: FAMILY MEDICINE

## 2021-02-09 PROCEDURE — 1101F PT FALLS ASSESS-DOCD LE1/YR: CPT | Mod: CPTII,S$GLB,, | Performed by: FAMILY MEDICINE

## 2021-02-09 RX ORDER — TRIAMCINOLONE ACETONIDE 40 MG/ML
40 INJECTION, SUSPENSION INTRA-ARTICULAR; INTRAMUSCULAR
Status: DISCONTINUED | OUTPATIENT
Start: 2021-02-09 | End: 2021-02-09 | Stop reason: HOSPADM

## 2021-02-09 RX ADMIN — TRIAMCINOLONE ACETONIDE 40 MG: 40 INJECTION, SUSPENSION INTRA-ARTICULAR; INTRAMUSCULAR at 02:02

## 2021-02-28 ENCOUNTER — PATIENT MESSAGE (OUTPATIENT)
Dept: INTERNAL MEDICINE | Facility: CLINIC | Age: 76
End: 2021-02-28

## 2021-03-04 ENCOUNTER — SPECIALTY PHARMACY (OUTPATIENT)
Dept: PHARMACY | Facility: CLINIC | Age: 76
End: 2021-03-04

## 2021-03-16 ENCOUNTER — LAB VISIT (OUTPATIENT)
Dept: LAB | Facility: HOSPITAL | Age: 76
End: 2021-03-16
Attending: INTERNAL MEDICINE
Payer: MEDICARE

## 2021-03-16 ENCOUNTER — OFFICE VISIT (OUTPATIENT)
Dept: CARDIOLOGY | Facility: CLINIC | Age: 76
End: 2021-03-16
Payer: MEDICARE

## 2021-03-16 VITALS
BODY MASS INDEX: 33.07 KG/M2 | HEART RATE: 50 BPM | OXYGEN SATURATION: 98 % | DIASTOLIC BLOOD PRESSURE: 62 MMHG | SYSTOLIC BLOOD PRESSURE: 124 MMHG | WEIGHT: 175.06 LBS

## 2021-03-16 DIAGNOSIS — R06.02 SOB (SHORTNESS OF BREATH): ICD-10-CM

## 2021-03-16 DIAGNOSIS — G89.29 CHRONIC CHEST PAIN: ICD-10-CM

## 2021-03-16 DIAGNOSIS — Z82.49 FAMILY HISTORY OF ARTERIOSCLEROTIC CARDIOVASCULAR DISEASE: ICD-10-CM

## 2021-03-16 DIAGNOSIS — E78.00 PURE HYPERCHOLESTEROLEMIA: ICD-10-CM

## 2021-03-16 DIAGNOSIS — R00.2 PALPITATIONS: Primary | ICD-10-CM

## 2021-03-16 DIAGNOSIS — G72.0 STATIN MYOPATHY: ICD-10-CM

## 2021-03-16 DIAGNOSIS — E78.5 HYPERLIPIDEMIA, UNSPECIFIED HYPERLIPIDEMIA TYPE: ICD-10-CM

## 2021-03-16 DIAGNOSIS — Z79.899 ENCOUNTER FOR LONG-TERM CURRENT USE OF MEDICATION: ICD-10-CM

## 2021-03-16 DIAGNOSIS — R07.9 CHEST PAIN, MODERATE CORONARY ARTERY RISK: ICD-10-CM

## 2021-03-16 DIAGNOSIS — T46.6X5A STATIN MYOPATHY: ICD-10-CM

## 2021-03-16 DIAGNOSIS — R07.9 CHRONIC CHEST PAIN: ICD-10-CM

## 2021-03-16 DIAGNOSIS — Z76.89 ENCOUNTER TO ESTABLISH CARE: ICD-10-CM

## 2021-03-16 PROCEDURE — 99999 PR PBB SHADOW E&M-EST. PATIENT-LVL V: CPT | Mod: PBBFAC,,, | Performed by: INTERNAL MEDICINE

## 2021-03-16 PROCEDURE — 99999 PR PBB SHADOW E&M-EST. PATIENT-LVL V: ICD-10-PCS | Mod: PBBFAC,,, | Performed by: INTERNAL MEDICINE

## 2021-03-16 PROCEDURE — 1159F MED LIST DOCD IN RCRD: CPT | Mod: S$GLB,,, | Performed by: INTERNAL MEDICINE

## 2021-03-16 PROCEDURE — 36415 COLL VENOUS BLD VENIPUNCTURE: CPT | Mod: PO | Performed by: INTERNAL MEDICINE

## 2021-03-16 PROCEDURE — 99215 OFFICE O/P EST HI 40 MIN: CPT | Mod: S$GLB,,, | Performed by: INTERNAL MEDICINE

## 2021-03-16 PROCEDURE — 99215 PR OFFICE/OUTPT VISIT, EST, LEVL V, 40-54 MIN: ICD-10-PCS | Mod: S$GLB,,, | Performed by: INTERNAL MEDICINE

## 2021-03-16 PROCEDURE — 80061 LIPID PANEL: CPT | Performed by: INTERNAL MEDICINE

## 2021-03-16 PROCEDURE — 80048 BASIC METABOLIC PNL TOTAL CA: CPT | Performed by: INTERNAL MEDICINE

## 2021-03-16 PROCEDURE — 3008F BODY MASS INDEX DOCD: CPT | Mod: CPTII,S$GLB,, | Performed by: INTERNAL MEDICINE

## 2021-03-16 PROCEDURE — 3008F PR BODY MASS INDEX (BMI) DOCUMENTED: ICD-10-PCS | Mod: CPTII,S$GLB,, | Performed by: INTERNAL MEDICINE

## 2021-03-16 PROCEDURE — 1159F PR MEDICATION LIST DOCUMENTED IN MEDICAL RECORD: ICD-10-PCS | Mod: S$GLB,,, | Performed by: INTERNAL MEDICINE

## 2021-03-17 ENCOUNTER — HOSPITAL ENCOUNTER (OUTPATIENT)
Dept: CARDIOLOGY | Facility: HOSPITAL | Age: 76
Discharge: HOME | End: 2021-03-17
Attending: INTERNAL MEDICINE
Payer: MEDICARE

## 2021-03-17 ENCOUNTER — TELEPHONE (OUTPATIENT)
Dept: CARDIOLOGY | Facility: CLINIC | Age: 76
End: 2021-03-17

## 2021-03-17 DIAGNOSIS — Z79.899 ENCOUNTER FOR LONG-TERM CURRENT USE OF MEDICATION: ICD-10-CM

## 2021-03-17 DIAGNOSIS — G72.0 STATIN MYOPATHY: ICD-10-CM

## 2021-03-17 DIAGNOSIS — R06.02 SOB (SHORTNESS OF BREATH): ICD-10-CM

## 2021-03-17 DIAGNOSIS — R07.9 CHRONIC CHEST PAIN: ICD-10-CM

## 2021-03-17 DIAGNOSIS — E78.5 HYPERLIPIDEMIA, UNSPECIFIED HYPERLIPIDEMIA TYPE: ICD-10-CM

## 2021-03-17 DIAGNOSIS — Z82.49 FAMILY HISTORY OF ARTERIOSCLEROTIC CARDIOVASCULAR DISEASE: ICD-10-CM

## 2021-03-17 DIAGNOSIS — T46.6X5A STATIN MYOPATHY: ICD-10-CM

## 2021-03-17 DIAGNOSIS — R07.9 CHEST PAIN, MODERATE CORONARY ARTERY RISK: ICD-10-CM

## 2021-03-17 DIAGNOSIS — E78.00 PURE HYPERCHOLESTEROLEMIA: ICD-10-CM

## 2021-03-17 DIAGNOSIS — G89.29 CHRONIC CHEST PAIN: ICD-10-CM

## 2021-03-17 DIAGNOSIS — Z76.89 ENCOUNTER TO ESTABLISH CARE: ICD-10-CM

## 2021-03-17 DIAGNOSIS — R00.2 PALPITATIONS: ICD-10-CM

## 2021-03-17 LAB
ANION GAP SERPL CALC-SCNC: 11 MMOL/L (ref 8–16)
BUN SERPL-MCNC: 17 MG/DL (ref 8–23)
CALCIUM SERPL-MCNC: 9.5 MG/DL (ref 8.7–10.5)
CHLORIDE SERPL-SCNC: 106 MMOL/L (ref 95–110)
CHOLEST SERPL-MCNC: 159 MG/DL (ref 120–199)
CHOLEST/HDLC SERPL: 2 {RATIO} (ref 2–5)
CO2 SERPL-SCNC: 24 MMOL/L (ref 23–29)
CREAT SERPL-MCNC: 0.8 MG/DL (ref 0.5–1.4)
EST. GFR  (AFRICAN AMERICAN): >60 ML/MIN/1.73 M^2
EST. GFR  (NON AFRICAN AMERICAN): >60 ML/MIN/1.73 M^2
GLUCOSE SERPL-MCNC: 89 MG/DL (ref 70–110)
HDLC SERPL-MCNC: 79 MG/DL (ref 40–75)
HDLC SERPL: 49.7 % (ref 20–50)
LDLC SERPL CALC-MCNC: 47.8 MG/DL (ref 63–159)
NONHDLC SERPL-MCNC: 80 MG/DL
POTASSIUM SERPL-SCNC: 4.5 MMOL/L (ref 3.5–5.1)
SODIUM SERPL-SCNC: 141 MMOL/L (ref 136–145)
TRIGL SERPL-MCNC: 161 MG/DL (ref 30–150)

## 2021-03-17 PROCEDURE — 93225 XTRNL ECG REC<48 HRS REC: CPT | Mod: PO

## 2021-03-17 PROCEDURE — 93227 HOLTER MONITOR - 48 HOUR (CUPID ONLY): ICD-10-PCS | Mod: ,,, | Performed by: INTERNAL MEDICINE

## 2021-03-17 PROCEDURE — 93227 XTRNL ECG REC<48 HR R&I: CPT | Mod: ,,, | Performed by: INTERNAL MEDICINE

## 2021-03-22 ENCOUNTER — OFFICE VISIT (OUTPATIENT)
Dept: OPHTHALMOLOGY | Facility: CLINIC | Age: 76
End: 2021-03-22
Payer: MEDICARE

## 2021-03-22 ENCOUNTER — OFFICE VISIT (OUTPATIENT)
Dept: INTERNAL MEDICINE | Facility: CLINIC | Age: 76
End: 2021-03-22
Payer: MEDICARE

## 2021-03-22 VITALS
TEMPERATURE: 97 F | SYSTOLIC BLOOD PRESSURE: 110 MMHG | HEIGHT: 61 IN | WEIGHT: 179.44 LBS | BODY MASS INDEX: 33.88 KG/M2 | DIASTOLIC BLOOD PRESSURE: 80 MMHG

## 2021-03-22 DIAGNOSIS — H40.013 OPEN ANGLE WITH BORDERLINE FINDINGS, LOW RISK, BILATERAL: Primary | ICD-10-CM

## 2021-03-22 DIAGNOSIS — E78.00 PURE HYPERCHOLESTEROLEMIA: Primary | ICD-10-CM

## 2021-03-22 DIAGNOSIS — M70.50 PES ANSERINE BURSITIS: ICD-10-CM

## 2021-03-22 DIAGNOSIS — M25.562 ACUTE PAIN OF LEFT KNEE: ICD-10-CM

## 2021-03-22 DIAGNOSIS — M35.01 KERATITIS SICCA, BILATERAL: ICD-10-CM

## 2021-03-22 DIAGNOSIS — F51.04 PSYCHOPHYSIOLOGIC INSOMNIA: ICD-10-CM

## 2021-03-22 DIAGNOSIS — E66.9 OBESITY (BMI 30.0-34.9): ICD-10-CM

## 2021-03-22 DIAGNOSIS — F41.1 GAD (GENERALIZED ANXIETY DISORDER): ICD-10-CM

## 2021-03-22 PROBLEM — M17.10 ARTHRITIS OF KNEE: Status: RESOLVED | Noted: 2021-02-09 | Resolved: 2021-03-22

## 2021-03-22 PROBLEM — R53.1 GENERALIZED WEAKNESS: Status: RESOLVED | Noted: 2020-11-30 | Resolved: 2021-03-22

## 2021-03-22 PROBLEM — M94.0 COSTOCHONDRITIS: Status: RESOLVED | Noted: 2020-01-20 | Resolved: 2021-03-22

## 2021-03-22 PROBLEM — H16.223 KERATITIS SICCA, BILATERAL: Status: ACTIVE | Noted: 2021-03-22

## 2021-03-22 PROBLEM — R26.2 DIFFICULTY WALKING: Status: RESOLVED | Noted: 2020-11-30 | Resolved: 2021-03-22

## 2021-03-22 PROBLEM — R25.2 MUSCLE CRAMPS: Status: RESOLVED | Noted: 2018-05-23 | Resolved: 2021-03-22

## 2021-03-22 PROBLEM — R06.02 SOB (SHORTNESS OF BREATH): Status: RESOLVED | Noted: 2019-08-08 | Resolved: 2021-03-22

## 2021-03-22 PROBLEM — Z91.89 AT RISK FOR SLEEP APNEA: Status: RESOLVED | Noted: 2019-09-19 | Resolved: 2021-03-22

## 2021-03-22 PROBLEM — M85.89 OSTEOPENIA OF MULTIPLE SITES: Status: RESOLVED | Noted: 2018-04-02 | Resolved: 2021-03-22

## 2021-03-22 PROBLEM — E66.811 OBESITY (BMI 30.0-34.9): Status: ACTIVE | Noted: 2020-07-30

## 2021-03-22 PROBLEM — M17.11 PRIMARY OSTEOARTHRITIS OF RIGHT KNEE: Status: RESOLVED | Noted: 2018-05-30 | Resolved: 2021-03-22

## 2021-03-22 PROBLEM — M17.0 BILATERAL PRIMARY OSTEOARTHRITIS OF KNEE: Status: RESOLVED | Noted: 2018-04-04 | Resolved: 2021-03-22

## 2021-03-22 PROCEDURE — 3008F BODY MASS INDEX DOCD: CPT | Mod: CPTII,S$GLB,, | Performed by: FAMILY MEDICINE

## 2021-03-22 PROCEDURE — 1125F PR PAIN SEVERITY QUANTIFIED, PAIN PRESENT: ICD-10-PCS | Mod: S$GLB,,, | Performed by: FAMILY MEDICINE

## 2021-03-22 PROCEDURE — 3008F PR BODY MASS INDEX (BMI) DOCUMENTED: ICD-10-PCS | Mod: CPTII,S$GLB,, | Performed by: FAMILY MEDICINE

## 2021-03-22 PROCEDURE — 1101F PR PT FALLS ASSESS DOC 0-1 FALLS W/OUT INJ PAST YR: ICD-10-PCS | Mod: CPTII,S$GLB,, | Performed by: FAMILY MEDICINE

## 2021-03-22 PROCEDURE — 99213 PR OFFICE/OUTPT VISIT, EST, LEVL III, 20-29 MIN: ICD-10-PCS | Mod: S$GLB,,, | Performed by: STUDENT IN AN ORGANIZED HEALTH CARE EDUCATION/TRAINING PROGRAM

## 2021-03-22 PROCEDURE — 99999 PR PBB SHADOW E&M-EST. PATIENT-LVL III: ICD-10-PCS | Mod: PBBFAC,,, | Performed by: STUDENT IN AN ORGANIZED HEALTH CARE EDUCATION/TRAINING PROGRAM

## 2021-03-22 PROCEDURE — 99999 PR PBB SHADOW E&M-EST. PATIENT-LVL III: CPT | Mod: PBBFAC,,, | Performed by: STUDENT IN AN ORGANIZED HEALTH CARE EDUCATION/TRAINING PROGRAM

## 2021-03-22 PROCEDURE — 1159F PR MEDICATION LIST DOCUMENTED IN MEDICAL RECORD: ICD-10-PCS | Mod: S$GLB,,, | Performed by: FAMILY MEDICINE

## 2021-03-22 PROCEDURE — 99214 PR OFFICE/OUTPT VISIT, EST, LEVL IV, 30-39 MIN: ICD-10-PCS | Mod: S$GLB,,, | Performed by: FAMILY MEDICINE

## 2021-03-22 PROCEDURE — 99214 OFFICE O/P EST MOD 30 MIN: CPT | Mod: S$GLB,,, | Performed by: FAMILY MEDICINE

## 2021-03-22 PROCEDURE — 1125F AMNT PAIN NOTED PAIN PRSNT: CPT | Mod: S$GLB,,, | Performed by: FAMILY MEDICINE

## 2021-03-22 PROCEDURE — 1159F MED LIST DOCD IN RCRD: CPT | Mod: S$GLB,,, | Performed by: FAMILY MEDICINE

## 2021-03-22 PROCEDURE — 1159F PR MEDICATION LIST DOCUMENTED IN MEDICAL RECORD: ICD-10-PCS | Mod: S$GLB,,, | Performed by: STUDENT IN AN ORGANIZED HEALTH CARE EDUCATION/TRAINING PROGRAM

## 2021-03-22 PROCEDURE — 99999 PR PBB SHADOW E&M-EST. PATIENT-LVL IV: CPT | Mod: PBBFAC,,, | Performed by: FAMILY MEDICINE

## 2021-03-22 PROCEDURE — 3288F FALL RISK ASSESSMENT DOCD: CPT | Mod: CPTII,S$GLB,, | Performed by: FAMILY MEDICINE

## 2021-03-22 PROCEDURE — 1159F MED LIST DOCD IN RCRD: CPT | Mod: S$GLB,,, | Performed by: STUDENT IN AN ORGANIZED HEALTH CARE EDUCATION/TRAINING PROGRAM

## 2021-03-22 PROCEDURE — 99213 OFFICE O/P EST LOW 20 MIN: CPT | Mod: S$GLB,,, | Performed by: STUDENT IN AN ORGANIZED HEALTH CARE EDUCATION/TRAINING PROGRAM

## 2021-03-22 PROCEDURE — 99999 PR PBB SHADOW E&M-EST. PATIENT-LVL IV: ICD-10-PCS | Mod: PBBFAC,,, | Performed by: FAMILY MEDICINE

## 2021-03-22 PROCEDURE — 1101F PT FALLS ASSESS-DOCD LE1/YR: CPT | Mod: CPTII,S$GLB,, | Performed by: FAMILY MEDICINE

## 2021-03-22 PROCEDURE — 3288F PR FALLS RISK ASSESSMENT DOCUMENTED: ICD-10-PCS | Mod: CPTII,S$GLB,, | Performed by: FAMILY MEDICINE

## 2021-03-22 RX ORDER — FLUOXETINE 10 MG/1
10 CAPSULE ORAL DAILY
Qty: 90 CAPSULE | Refills: 0 | Status: SHIPPED | OUTPATIENT
Start: 2021-03-22 | End: 2021-06-21

## 2021-03-22 RX ORDER — INDOMETHACIN 50 MG/1
50 CAPSULE ORAL 3 TIMES DAILY PRN
Qty: 30 CAPSULE | Refills: 0 | Status: SHIPPED | OUTPATIENT
Start: 2021-03-22 | End: 2021-06-21 | Stop reason: SDUPTHER

## 2021-03-23 ENCOUNTER — CLINICAL SUPPORT (OUTPATIENT)
Dept: REHABILITATION | Facility: HOSPITAL | Age: 76
End: 2021-03-23
Attending: FAMILY MEDICINE
Payer: MEDICARE

## 2021-03-23 DIAGNOSIS — M70.50 PES ANSERINE BURSITIS: ICD-10-CM

## 2021-03-23 DIAGNOSIS — G89.29 CHRONIC PAIN OF BOTH KNEES: ICD-10-CM

## 2021-03-23 DIAGNOSIS — M25.562 CHRONIC PAIN OF BOTH KNEES: ICD-10-CM

## 2021-03-23 DIAGNOSIS — M25.562 ACUTE PAIN OF LEFT KNEE: ICD-10-CM

## 2021-03-23 DIAGNOSIS — M54.50 CHRONIC BILATERAL LOW BACK PAIN WITHOUT SCIATICA: ICD-10-CM

## 2021-03-23 DIAGNOSIS — M25.561 CHRONIC PAIN OF BOTH KNEES: ICD-10-CM

## 2021-03-23 DIAGNOSIS — G89.29 CHRONIC BILATERAL LOW BACK PAIN WITHOUT SCIATICA: ICD-10-CM

## 2021-03-23 LAB
OHS CV EVENT MONITOR DAY: 2
OHS CV HOLTER LENGTH DECIMAL HOURS: 96
OHS CV HOLTER LENGTH HOURS: 48
OHS CV HOLTER LENGTH MINUTES: 0

## 2021-03-23 PROCEDURE — 97162 PT EVAL MOD COMPLEX 30 MIN: CPT | Mod: PN

## 2021-03-23 PROCEDURE — 97140 MANUAL THERAPY 1/> REGIONS: CPT | Mod: PN

## 2021-03-24 ENCOUNTER — TELEPHONE (OUTPATIENT)
Dept: CARDIOLOGY | Facility: CLINIC | Age: 76
End: 2021-03-24

## 2021-03-30 ENCOUNTER — CLINICAL SUPPORT (OUTPATIENT)
Dept: REHABILITATION | Facility: HOSPITAL | Age: 76
End: 2021-03-30
Attending: FAMILY MEDICINE
Payer: MEDICARE

## 2021-03-30 DIAGNOSIS — M54.50 CHRONIC BILATERAL LOW BACK PAIN WITHOUT SCIATICA: ICD-10-CM

## 2021-03-30 DIAGNOSIS — M25.562 CHRONIC PAIN OF BOTH KNEES: ICD-10-CM

## 2021-03-30 DIAGNOSIS — M25.561 CHRONIC PAIN OF BOTH KNEES: ICD-10-CM

## 2021-03-30 DIAGNOSIS — G89.29 CHRONIC PAIN OF BOTH KNEES: ICD-10-CM

## 2021-03-30 DIAGNOSIS — G89.29 CHRONIC BILATERAL LOW BACK PAIN WITHOUT SCIATICA: ICD-10-CM

## 2021-03-30 PROCEDURE — 97140 MANUAL THERAPY 1/> REGIONS: CPT | Mod: PN,CQ

## 2021-03-30 PROCEDURE — 97110 THERAPEUTIC EXERCISES: CPT | Mod: PN,CQ

## 2021-04-01 ENCOUNTER — CLINICAL SUPPORT (OUTPATIENT)
Dept: REHABILITATION | Facility: HOSPITAL | Age: 76
End: 2021-04-01
Attending: FAMILY MEDICINE
Payer: MEDICARE

## 2021-04-01 DIAGNOSIS — M54.50 CHRONIC BILATERAL LOW BACK PAIN WITHOUT SCIATICA: ICD-10-CM

## 2021-04-01 DIAGNOSIS — M25.561 CHRONIC PAIN OF BOTH KNEES: ICD-10-CM

## 2021-04-01 DIAGNOSIS — M25.562 CHRONIC PAIN OF BOTH KNEES: ICD-10-CM

## 2021-04-01 DIAGNOSIS — G89.29 CHRONIC PAIN OF BOTH KNEES: ICD-10-CM

## 2021-04-01 DIAGNOSIS — G89.29 CHRONIC BILATERAL LOW BACK PAIN WITHOUT SCIATICA: ICD-10-CM

## 2021-04-01 PROCEDURE — 97140 MANUAL THERAPY 1/> REGIONS: CPT | Mod: PN,CQ

## 2021-04-01 PROCEDURE — 97110 THERAPEUTIC EXERCISES: CPT | Mod: PN,CQ

## 2021-04-05 ENCOUNTER — OFFICE VISIT (OUTPATIENT)
Dept: INTERNAL MEDICINE | Facility: CLINIC | Age: 76
End: 2021-04-05
Payer: MEDICARE

## 2021-04-05 VITALS
SYSTOLIC BLOOD PRESSURE: 120 MMHG | RESPIRATION RATE: 18 BRPM | HEIGHT: 62 IN | TEMPERATURE: 98 F | DIASTOLIC BLOOD PRESSURE: 70 MMHG | HEART RATE: 66 BPM | WEIGHT: 184.06 LBS | BODY MASS INDEX: 33.87 KG/M2

## 2021-04-05 DIAGNOSIS — R30.0 DYSURIA: Primary | ICD-10-CM

## 2021-04-05 PROCEDURE — 3008F PR BODY MASS INDEX (BMI) DOCUMENTED: ICD-10-PCS | Mod: CPTII,S$GLB,, | Performed by: PHYSICIAN ASSISTANT

## 2021-04-05 PROCEDURE — 1125F AMNT PAIN NOTED PAIN PRSNT: CPT | Mod: S$GLB,,, | Performed by: PHYSICIAN ASSISTANT

## 2021-04-05 PROCEDURE — 1101F PT FALLS ASSESS-DOCD LE1/YR: CPT | Mod: CPTII,S$GLB,, | Performed by: PHYSICIAN ASSISTANT

## 2021-04-05 PROCEDURE — 1101F PR PT FALLS ASSESS DOC 0-1 FALLS W/OUT INJ PAST YR: ICD-10-PCS | Mod: CPTII,S$GLB,, | Performed by: PHYSICIAN ASSISTANT

## 2021-04-05 PROCEDURE — 1125F PR PAIN SEVERITY QUANTIFIED, PAIN PRESENT: ICD-10-PCS | Mod: S$GLB,,, | Performed by: PHYSICIAN ASSISTANT

## 2021-04-05 PROCEDURE — 3288F PR FALLS RISK ASSESSMENT DOCUMENTED: ICD-10-PCS | Mod: CPTII,S$GLB,, | Performed by: PHYSICIAN ASSISTANT

## 2021-04-05 PROCEDURE — 99213 OFFICE O/P EST LOW 20 MIN: CPT | Mod: S$GLB,,, | Performed by: PHYSICIAN ASSISTANT

## 2021-04-05 PROCEDURE — 99213 PR OFFICE/OUTPT VISIT, EST, LEVL III, 20-29 MIN: ICD-10-PCS | Mod: S$GLB,,, | Performed by: PHYSICIAN ASSISTANT

## 2021-04-05 PROCEDURE — 1159F PR MEDICATION LIST DOCUMENTED IN MEDICAL RECORD: ICD-10-PCS | Mod: S$GLB,,, | Performed by: PHYSICIAN ASSISTANT

## 2021-04-05 PROCEDURE — 3008F BODY MASS INDEX DOCD: CPT | Mod: CPTII,S$GLB,, | Performed by: PHYSICIAN ASSISTANT

## 2021-04-05 PROCEDURE — 99999 PR PBB SHADOW E&M-EST. PATIENT-LVL V: CPT | Mod: PBBFAC,,, | Performed by: PHYSICIAN ASSISTANT

## 2021-04-05 PROCEDURE — 3288F FALL RISK ASSESSMENT DOCD: CPT | Mod: CPTII,S$GLB,, | Performed by: PHYSICIAN ASSISTANT

## 2021-04-05 PROCEDURE — 1159F MED LIST DOCD IN RCRD: CPT | Mod: S$GLB,,, | Performed by: PHYSICIAN ASSISTANT

## 2021-04-05 PROCEDURE — 81001 URINALYSIS AUTO W/SCOPE: CPT | Performed by: PHYSICIAN ASSISTANT

## 2021-04-05 PROCEDURE — 99999 PR PBB SHADOW E&M-EST. PATIENT-LVL V: ICD-10-PCS | Mod: PBBFAC,,, | Performed by: PHYSICIAN ASSISTANT

## 2021-04-05 RX ORDER — NITROFURANTOIN 25; 75 MG/1; MG/1
100 CAPSULE ORAL 2 TIMES DAILY
Qty: 10 CAPSULE | Refills: 0 | Status: SHIPPED | OUTPATIENT
Start: 2021-04-05 | End: 2021-04-10

## 2021-04-06 DIAGNOSIS — R81 GLUCOSURIA: Primary | ICD-10-CM

## 2021-04-06 LAB
BACTERIA #/AREA URNS AUTO: ABNORMAL /HPF
BILIRUB UR QL STRIP: NEGATIVE
CAOX CRY UR QL COMP ASSIST: ABNORMAL
CLARITY UR REFRACT.AUTO: ABNORMAL
COLOR UR AUTO: ABNORMAL
GLUCOSE UR QL STRIP: ABNORMAL
HGB UR QL STRIP: NEGATIVE
HYALINE CASTS UR QL AUTO: 5 /LPF
KETONES UR QL STRIP: NEGATIVE
LEUKOCYTE ESTERASE UR QL STRIP: ABNORMAL
MICROSCOPIC COMMENT: ABNORMAL
NITRITE UR QL STRIP: NEGATIVE
NON-SQ EPI CELLS #/AREA URNS AUTO: 0 /HPF
PH UR STRIP: 5 [PH] (ref 5–8)
PROT UR QL STRIP: NEGATIVE
RBC #/AREA URNS AUTO: 1 /HPF (ref 0–4)
SP GR UR STRIP: >1.03 (ref 1–1.03)
SQUAMOUS #/AREA URNS AUTO: 2 /HPF
URN SPEC COLLECT METH UR: ABNORMAL
WBC #/AREA URNS AUTO: 1 /HPF (ref 0–5)

## 2021-04-07 ENCOUNTER — LAB VISIT (OUTPATIENT)
Dept: LAB | Facility: HOSPITAL | Age: 76
End: 2021-04-07
Attending: PHYSICIAN ASSISTANT
Payer: MEDICARE

## 2021-04-07 DIAGNOSIS — R81 GLUCOSURIA: ICD-10-CM

## 2021-04-07 PROCEDURE — 82947 ASSAY GLUCOSE BLOOD QUANT: CPT | Performed by: PHYSICIAN ASSISTANT

## 2021-04-07 PROCEDURE — 36415 COLL VENOUS BLD VENIPUNCTURE: CPT | Mod: PO | Performed by: PHYSICIAN ASSISTANT

## 2021-04-08 ENCOUNTER — CLINICAL SUPPORT (OUTPATIENT)
Dept: REHABILITATION | Facility: HOSPITAL | Age: 76
End: 2021-04-08
Attending: FAMILY MEDICINE
Payer: MEDICARE

## 2021-04-08 DIAGNOSIS — M25.561 CHRONIC PAIN OF BOTH KNEES: ICD-10-CM

## 2021-04-08 DIAGNOSIS — G89.29 CHRONIC BILATERAL LOW BACK PAIN WITHOUT SCIATICA: ICD-10-CM

## 2021-04-08 DIAGNOSIS — M25.562 CHRONIC PAIN OF BOTH KNEES: ICD-10-CM

## 2021-04-08 DIAGNOSIS — M54.50 CHRONIC BILATERAL LOW BACK PAIN WITHOUT SCIATICA: ICD-10-CM

## 2021-04-08 DIAGNOSIS — G89.29 CHRONIC PAIN OF BOTH KNEES: ICD-10-CM

## 2021-04-08 LAB — GLUCOSE SERPL-MCNC: 71 MG/DL (ref 70–110)

## 2021-04-08 PROCEDURE — 97140 MANUAL THERAPY 1/> REGIONS: CPT | Mod: PN

## 2021-04-08 PROCEDURE — 97110 THERAPEUTIC EXERCISES: CPT | Mod: PN

## 2021-04-09 DIAGNOSIS — R81 GLUCOSURIA: Primary | ICD-10-CM

## 2021-04-10 ENCOUNTER — SPECIALTY PHARMACY (OUTPATIENT)
Dept: PHARMACY | Facility: CLINIC | Age: 76
End: 2021-04-10

## 2021-04-12 ENCOUNTER — CLINICAL SUPPORT (OUTPATIENT)
Dept: REHABILITATION | Facility: HOSPITAL | Age: 76
End: 2021-04-12
Attending: FAMILY MEDICINE
Payer: MEDICARE

## 2021-04-12 ENCOUNTER — LAB VISIT (OUTPATIENT)
Dept: LAB | Facility: HOSPITAL | Age: 76
End: 2021-04-12
Attending: PHYSICIAN ASSISTANT
Payer: MEDICARE

## 2021-04-12 DIAGNOSIS — G89.29 CHRONIC PAIN OF BOTH KNEES: ICD-10-CM

## 2021-04-12 DIAGNOSIS — R81 GLUCOSURIA: ICD-10-CM

## 2021-04-12 DIAGNOSIS — M25.561 CHRONIC PAIN OF BOTH KNEES: ICD-10-CM

## 2021-04-12 DIAGNOSIS — G89.29 CHRONIC BILATERAL LOW BACK PAIN WITHOUT SCIATICA: ICD-10-CM

## 2021-04-12 DIAGNOSIS — M25.562 CHRONIC PAIN OF BOTH KNEES: ICD-10-CM

## 2021-04-12 DIAGNOSIS — M54.50 CHRONIC BILATERAL LOW BACK PAIN WITHOUT SCIATICA: ICD-10-CM

## 2021-04-12 LAB
BILIRUB UR QL STRIP: NEGATIVE
CLARITY UR REFRACT.AUTO: CLEAR
COLOR UR AUTO: COLORLESS
GLUCOSE UR QL STRIP: NEGATIVE
HGB UR QL STRIP: NEGATIVE
KETONES UR QL STRIP: NEGATIVE
LEUKOCYTE ESTERASE UR QL STRIP: ABNORMAL
MICROSCOPIC COMMENT: NORMAL
NITRITE UR QL STRIP: NEGATIVE
PH UR STRIP: 6 [PH] (ref 5–8)
PROT UR QL STRIP: NEGATIVE
RBC #/AREA URNS AUTO: 0 /HPF (ref 0–4)
SP GR UR STRIP: 1 (ref 1–1.03)
SQUAMOUS #/AREA URNS AUTO: 0 /HPF
URN SPEC COLLECT METH UR: ABNORMAL
WBC #/AREA URNS AUTO: 1 /HPF (ref 0–5)

## 2021-04-12 PROCEDURE — 81001 URINALYSIS AUTO W/SCOPE: CPT | Performed by: PHYSICIAN ASSISTANT

## 2021-04-12 PROCEDURE — 97110 THERAPEUTIC EXERCISES: CPT | Mod: PN

## 2021-04-12 PROCEDURE — 97140 MANUAL THERAPY 1/> REGIONS: CPT | Mod: PN

## 2021-04-13 ENCOUNTER — OFFICE VISIT (OUTPATIENT)
Dept: CARDIOLOGY | Facility: CLINIC | Age: 76
End: 2021-04-13
Payer: MEDICARE

## 2021-04-13 VITALS
DIASTOLIC BLOOD PRESSURE: 60 MMHG | HEART RATE: 92 BPM | OXYGEN SATURATION: 98 % | SYSTOLIC BLOOD PRESSURE: 122 MMHG | BODY MASS INDEX: 32.38 KG/M2 | WEIGHT: 177 LBS

## 2021-04-13 DIAGNOSIS — Z79.899 ENCOUNTER FOR LONG-TERM CURRENT USE OF MEDICATION: ICD-10-CM

## 2021-04-13 DIAGNOSIS — R06.02 SOB (SHORTNESS OF BREATH): ICD-10-CM

## 2021-04-13 DIAGNOSIS — R07.9 CHEST PAIN, MODERATE CORONARY ARTERY RISK: ICD-10-CM

## 2021-04-13 DIAGNOSIS — E78.00 PURE HYPERCHOLESTEROLEMIA: Primary | ICD-10-CM

## 2021-04-13 DIAGNOSIS — E78.5 HYPERLIPIDEMIA, UNSPECIFIED HYPERLIPIDEMIA TYPE: ICD-10-CM

## 2021-04-13 DIAGNOSIS — R00.2 PALPITATIONS: ICD-10-CM

## 2021-04-13 DIAGNOSIS — G72.0 STATIN MYOPATHY: ICD-10-CM

## 2021-04-13 DIAGNOSIS — Z82.49 FAMILY HISTORY OF ARTERIOSCLEROTIC CARDIOVASCULAR DISEASE: ICD-10-CM

## 2021-04-13 DIAGNOSIS — Z76.89 ENCOUNTER TO ESTABLISH CARE: ICD-10-CM

## 2021-04-13 DIAGNOSIS — R07.9 CHRONIC CHEST PAIN: ICD-10-CM

## 2021-04-13 DIAGNOSIS — G89.29 CHRONIC CHEST PAIN: ICD-10-CM

## 2021-04-13 DIAGNOSIS — T46.6X5A STATIN MYOPATHY: ICD-10-CM

## 2021-04-13 PROCEDURE — 3008F PR BODY MASS INDEX (BMI) DOCUMENTED: ICD-10-PCS | Mod: CPTII,S$GLB,, | Performed by: INTERNAL MEDICINE

## 2021-04-13 PROCEDURE — 99214 PR OFFICE/OUTPT VISIT, EST, LEVL IV, 30-39 MIN: ICD-10-PCS | Mod: S$GLB,,, | Performed by: INTERNAL MEDICINE

## 2021-04-13 PROCEDURE — 1159F MED LIST DOCD IN RCRD: CPT | Mod: S$GLB,,, | Performed by: INTERNAL MEDICINE

## 2021-04-13 PROCEDURE — 1159F PR MEDICATION LIST DOCUMENTED IN MEDICAL RECORD: ICD-10-PCS | Mod: S$GLB,,, | Performed by: INTERNAL MEDICINE

## 2021-04-13 PROCEDURE — 99999 PR PBB SHADOW E&M-EST. PATIENT-LVL IV: ICD-10-PCS | Mod: PBBFAC,,, | Performed by: INTERNAL MEDICINE

## 2021-04-13 PROCEDURE — 3008F BODY MASS INDEX DOCD: CPT | Mod: CPTII,S$GLB,, | Performed by: INTERNAL MEDICINE

## 2021-04-13 PROCEDURE — 99999 PR PBB SHADOW E&M-EST. PATIENT-LVL IV: CPT | Mod: PBBFAC,,, | Performed by: INTERNAL MEDICINE

## 2021-04-13 PROCEDURE — 99214 OFFICE O/P EST MOD 30 MIN: CPT | Mod: S$GLB,,, | Performed by: INTERNAL MEDICINE

## 2021-04-14 ENCOUNTER — CLINICAL SUPPORT (OUTPATIENT)
Dept: REHABILITATION | Facility: HOSPITAL | Age: 76
End: 2021-04-14
Attending: FAMILY MEDICINE
Payer: MEDICARE

## 2021-04-14 DIAGNOSIS — M25.562 CHRONIC PAIN OF BOTH KNEES: ICD-10-CM

## 2021-04-14 DIAGNOSIS — G89.29 CHRONIC BILATERAL LOW BACK PAIN WITHOUT SCIATICA: ICD-10-CM

## 2021-04-14 DIAGNOSIS — G89.29 CHRONIC PAIN OF BOTH KNEES: ICD-10-CM

## 2021-04-14 DIAGNOSIS — M25.561 CHRONIC PAIN OF BOTH KNEES: ICD-10-CM

## 2021-04-14 DIAGNOSIS — M54.50 CHRONIC BILATERAL LOW BACK PAIN WITHOUT SCIATICA: ICD-10-CM

## 2021-04-14 PROCEDURE — 97140 MANUAL THERAPY 1/> REGIONS: CPT | Mod: PN

## 2021-04-14 PROCEDURE — 97110 THERAPEUTIC EXERCISES: CPT | Mod: PN

## 2021-04-19 ENCOUNTER — CLINICAL SUPPORT (OUTPATIENT)
Dept: REHABILITATION | Facility: HOSPITAL | Age: 76
End: 2021-04-19
Attending: FAMILY MEDICINE
Payer: MEDICARE

## 2021-04-19 DIAGNOSIS — G89.29 CHRONIC BILATERAL LOW BACK PAIN WITHOUT SCIATICA: ICD-10-CM

## 2021-04-19 DIAGNOSIS — M25.562 CHRONIC PAIN OF BOTH KNEES: ICD-10-CM

## 2021-04-19 DIAGNOSIS — M25.561 CHRONIC PAIN OF BOTH KNEES: ICD-10-CM

## 2021-04-19 DIAGNOSIS — M54.50 CHRONIC BILATERAL LOW BACK PAIN WITHOUT SCIATICA: ICD-10-CM

## 2021-04-19 DIAGNOSIS — G89.29 CHRONIC PAIN OF BOTH KNEES: ICD-10-CM

## 2021-04-19 PROCEDURE — 97110 THERAPEUTIC EXERCISES: CPT | Mod: PN

## 2021-04-19 PROCEDURE — 97140 MANUAL THERAPY 1/> REGIONS: CPT | Mod: PN

## 2021-04-20 ENCOUNTER — OFFICE VISIT (OUTPATIENT)
Dept: RHEUMATOLOGY | Facility: CLINIC | Age: 76
End: 2021-04-20
Payer: MEDICARE

## 2021-04-20 VITALS
SYSTOLIC BLOOD PRESSURE: 126 MMHG | DIASTOLIC BLOOD PRESSURE: 70 MMHG | BODY MASS INDEX: 32.94 KG/M2 | WEIGHT: 179 LBS | HEART RATE: 100 BPM | HEIGHT: 62 IN

## 2021-04-20 DIAGNOSIS — M81.0 AGE-RELATED OSTEOPOROSIS WITHOUT CURRENT PATHOLOGICAL FRACTURE: Primary | ICD-10-CM

## 2021-04-20 DIAGNOSIS — G89.29 CHRONIC LOW BACK PAIN, UNSPECIFIED BACK PAIN LATERALITY, UNSPECIFIED WHETHER SCIATICA PRESENT: ICD-10-CM

## 2021-04-20 DIAGNOSIS — Z71.89 COUNSELING ON HEALTH PROMOTION AND DISEASE PREVENTION: ICD-10-CM

## 2021-04-20 DIAGNOSIS — M17.0 PRIMARY OSTEOARTHRITIS OF BOTH KNEES: ICD-10-CM

## 2021-04-20 DIAGNOSIS — M54.50 CHRONIC LOW BACK PAIN, UNSPECIFIED BACK PAIN LATERALITY, UNSPECIFIED WHETHER SCIATICA PRESENT: ICD-10-CM

## 2021-04-20 PROCEDURE — 1125F AMNT PAIN NOTED PAIN PRSNT: CPT | Mod: S$GLB,,, | Performed by: INTERNAL MEDICINE

## 2021-04-20 PROCEDURE — 99999 PR PBB SHADOW E&M-EST. PATIENT-LVL V: ICD-10-PCS | Mod: PBBFAC,,, | Performed by: INTERNAL MEDICINE

## 2021-04-20 PROCEDURE — 3008F BODY MASS INDEX DOCD: CPT | Mod: CPTII,S$GLB,, | Performed by: INTERNAL MEDICINE

## 2021-04-20 PROCEDURE — 99999 PR PBB SHADOW E&M-EST. PATIENT-LVL V: CPT | Mod: PBBFAC,,, | Performed by: INTERNAL MEDICINE

## 2021-04-20 PROCEDURE — 1125F PR PAIN SEVERITY QUANTIFIED, PAIN PRESENT: ICD-10-PCS | Mod: S$GLB,,, | Performed by: INTERNAL MEDICINE

## 2021-04-20 PROCEDURE — 3008F PR BODY MASS INDEX (BMI) DOCUMENTED: ICD-10-PCS | Mod: CPTII,S$GLB,, | Performed by: INTERNAL MEDICINE

## 2021-04-20 PROCEDURE — 1159F PR MEDICATION LIST DOCUMENTED IN MEDICAL RECORD: ICD-10-PCS | Mod: S$GLB,,, | Performed by: INTERNAL MEDICINE

## 2021-04-20 PROCEDURE — 1159F MED LIST DOCD IN RCRD: CPT | Mod: S$GLB,,, | Performed by: INTERNAL MEDICINE

## 2021-04-20 PROCEDURE — 99214 OFFICE O/P EST MOD 30 MIN: CPT | Mod: S$GLB,,, | Performed by: INTERNAL MEDICINE

## 2021-04-20 PROCEDURE — 99214 PR OFFICE/OUTPT VISIT, EST, LEVL IV, 30-39 MIN: ICD-10-PCS | Mod: S$GLB,,, | Performed by: INTERNAL MEDICINE

## 2021-04-20 RX ORDER — TIZANIDINE 4 MG/1
4 TABLET ORAL NIGHTLY PRN
Qty: 30 TABLET | Refills: 0 | Status: SHIPPED | OUTPATIENT
Start: 2021-04-20 | End: 2021-04-30

## 2021-04-20 RX ORDER — GABAPENTIN 100 MG/1
100 CAPSULE ORAL NIGHTLY
Qty: 30 CAPSULE | Refills: 3 | Status: SHIPPED | OUTPATIENT
Start: 2021-04-20 | End: 2021-06-21

## 2021-04-21 ENCOUNTER — CLINICAL SUPPORT (OUTPATIENT)
Dept: REHABILITATION | Facility: HOSPITAL | Age: 76
End: 2021-04-21
Attending: FAMILY MEDICINE
Payer: MEDICARE

## 2021-04-21 DIAGNOSIS — M25.561 CHRONIC PAIN OF BOTH KNEES: ICD-10-CM

## 2021-04-21 DIAGNOSIS — M25.562 CHRONIC PAIN OF BOTH KNEES: ICD-10-CM

## 2021-04-21 DIAGNOSIS — G89.29 CHRONIC PAIN OF BOTH KNEES: ICD-10-CM

## 2021-04-21 DIAGNOSIS — M54.50 CHRONIC BILATERAL LOW BACK PAIN WITHOUT SCIATICA: ICD-10-CM

## 2021-04-21 DIAGNOSIS — G89.29 CHRONIC BILATERAL LOW BACK PAIN WITHOUT SCIATICA: ICD-10-CM

## 2021-04-21 PROCEDURE — 97140 MANUAL THERAPY 1/> REGIONS: CPT | Mod: PN,CQ

## 2021-04-26 ENCOUNTER — CLINICAL SUPPORT (OUTPATIENT)
Dept: REHABILITATION | Facility: HOSPITAL | Age: 76
End: 2021-04-26
Attending: FAMILY MEDICINE
Payer: MEDICARE

## 2021-04-26 DIAGNOSIS — M25.562 CHRONIC PAIN OF BOTH KNEES: ICD-10-CM

## 2021-04-26 DIAGNOSIS — G89.29 CHRONIC BILATERAL LOW BACK PAIN WITHOUT SCIATICA: ICD-10-CM

## 2021-04-26 DIAGNOSIS — G89.29 CHRONIC PAIN OF BOTH KNEES: ICD-10-CM

## 2021-04-26 DIAGNOSIS — M54.50 CHRONIC BILATERAL LOW BACK PAIN WITHOUT SCIATICA: ICD-10-CM

## 2021-04-26 DIAGNOSIS — M25.561 CHRONIC PAIN OF BOTH KNEES: ICD-10-CM

## 2021-04-26 PROCEDURE — 97140 MANUAL THERAPY 1/> REGIONS: CPT | Mod: PN

## 2021-04-28 ENCOUNTER — PATIENT MESSAGE (OUTPATIENT)
Dept: RESEARCH | Facility: HOSPITAL | Age: 76
End: 2021-04-28

## 2021-04-29 ENCOUNTER — CLINICAL SUPPORT (OUTPATIENT)
Dept: REHABILITATION | Facility: HOSPITAL | Age: 76
End: 2021-04-29
Attending: FAMILY MEDICINE
Payer: MEDICARE

## 2021-04-29 DIAGNOSIS — G89.29 CHRONIC BILATERAL LOW BACK PAIN WITHOUT SCIATICA: ICD-10-CM

## 2021-04-29 DIAGNOSIS — M25.561 CHRONIC PAIN OF BOTH KNEES: ICD-10-CM

## 2021-04-29 DIAGNOSIS — M54.50 CHRONIC BILATERAL LOW BACK PAIN WITHOUT SCIATICA: ICD-10-CM

## 2021-04-29 DIAGNOSIS — M25.562 CHRONIC PAIN OF BOTH KNEES: ICD-10-CM

## 2021-04-29 DIAGNOSIS — G89.29 CHRONIC PAIN OF BOTH KNEES: ICD-10-CM

## 2021-04-29 PROCEDURE — 97140 MANUAL THERAPY 1/> REGIONS: CPT | Mod: PN

## 2021-04-29 PROCEDURE — 97110 THERAPEUTIC EXERCISES: CPT | Mod: PN

## 2021-05-10 ENCOUNTER — SPECIALTY PHARMACY (OUTPATIENT)
Dept: PHARMACY | Facility: CLINIC | Age: 76
End: 2021-05-10

## 2021-05-10 ENCOUNTER — CLINICAL SUPPORT (OUTPATIENT)
Dept: REHABILITATION | Facility: HOSPITAL | Age: 76
End: 2021-05-10
Attending: FAMILY MEDICINE
Payer: MEDICARE

## 2021-05-10 DIAGNOSIS — M25.562 CHRONIC PAIN OF BOTH KNEES: ICD-10-CM

## 2021-05-10 DIAGNOSIS — M25.561 CHRONIC PAIN OF BOTH KNEES: ICD-10-CM

## 2021-05-10 DIAGNOSIS — M54.50 CHRONIC BILATERAL LOW BACK PAIN WITHOUT SCIATICA: ICD-10-CM

## 2021-05-10 DIAGNOSIS — G89.29 CHRONIC PAIN OF BOTH KNEES: ICD-10-CM

## 2021-05-10 DIAGNOSIS — G89.29 CHRONIC BILATERAL LOW BACK PAIN WITHOUT SCIATICA: ICD-10-CM

## 2021-05-10 PROCEDURE — 97110 THERAPEUTIC EXERCISES: CPT | Mod: PN

## 2021-05-10 PROCEDURE — 97140 MANUAL THERAPY 1/> REGIONS: CPT | Mod: PN

## 2021-05-12 ENCOUNTER — CLINICAL SUPPORT (OUTPATIENT)
Dept: REHABILITATION | Facility: HOSPITAL | Age: 76
End: 2021-05-12
Attending: FAMILY MEDICINE
Payer: MEDICARE

## 2021-05-12 DIAGNOSIS — G89.29 CHRONIC BILATERAL LOW BACK PAIN WITHOUT SCIATICA: ICD-10-CM

## 2021-05-12 DIAGNOSIS — M25.562 CHRONIC PAIN OF BOTH KNEES: ICD-10-CM

## 2021-05-12 DIAGNOSIS — M25.561 CHRONIC PAIN OF BOTH KNEES: ICD-10-CM

## 2021-05-12 DIAGNOSIS — M54.50 CHRONIC BILATERAL LOW BACK PAIN WITHOUT SCIATICA: ICD-10-CM

## 2021-05-12 DIAGNOSIS — G89.29 CHRONIC PAIN OF BOTH KNEES: ICD-10-CM

## 2021-05-12 PROCEDURE — 97110 THERAPEUTIC EXERCISES: CPT | Mod: PN

## 2021-05-12 PROCEDURE — 97140 MANUAL THERAPY 1/> REGIONS: CPT | Mod: PN

## 2021-05-19 DIAGNOSIS — E78.00 PURE HYPERCHOLESTEROLEMIA: ICD-10-CM

## 2021-05-20 RX ORDER — EVOLOCUMAB 140 MG/ML
140 INJECTION, SOLUTION SUBCUTANEOUS
Qty: 2 ML | Refills: 6 | Status: SHIPPED | OUTPATIENT
Start: 2021-05-20 | End: 2022-02-28 | Stop reason: SDUPTHER

## 2021-05-24 ENCOUNTER — TELEPHONE (OUTPATIENT)
Dept: PHARMACY | Facility: CLINIC | Age: 76
End: 2021-05-24

## 2021-06-03 ENCOUNTER — CLINICAL SUPPORT (OUTPATIENT)
Dept: REHABILITATION | Facility: HOSPITAL | Age: 76
End: 2021-06-03
Attending: FAMILY MEDICINE
Payer: MEDICARE

## 2021-06-03 DIAGNOSIS — M25.562 CHRONIC PAIN OF BOTH KNEES: ICD-10-CM

## 2021-06-03 DIAGNOSIS — M54.50 CHRONIC BILATERAL LOW BACK PAIN WITHOUT SCIATICA: ICD-10-CM

## 2021-06-03 DIAGNOSIS — G89.29 CHRONIC PAIN OF BOTH KNEES: ICD-10-CM

## 2021-06-03 DIAGNOSIS — M25.561 CHRONIC PAIN OF BOTH KNEES: ICD-10-CM

## 2021-06-03 DIAGNOSIS — G89.29 CHRONIC BILATERAL LOW BACK PAIN WITHOUT SCIATICA: ICD-10-CM

## 2021-06-03 PROCEDURE — 97110 THERAPEUTIC EXERCISES: CPT | Mod: PN

## 2021-06-03 PROCEDURE — 97140 MANUAL THERAPY 1/> REGIONS: CPT | Mod: PN

## 2021-06-07 ENCOUNTER — CLINICAL SUPPORT (OUTPATIENT)
Dept: REHABILITATION | Facility: HOSPITAL | Age: 76
End: 2021-06-07
Attending: FAMILY MEDICINE
Payer: MEDICARE

## 2021-06-07 DIAGNOSIS — M25.561 CHRONIC PAIN OF BOTH KNEES: ICD-10-CM

## 2021-06-07 DIAGNOSIS — G89.29 CHRONIC BILATERAL LOW BACK PAIN WITHOUT SCIATICA: ICD-10-CM

## 2021-06-07 DIAGNOSIS — G89.29 CHRONIC PAIN OF BOTH KNEES: ICD-10-CM

## 2021-06-07 DIAGNOSIS — M54.50 CHRONIC BILATERAL LOW BACK PAIN WITHOUT SCIATICA: ICD-10-CM

## 2021-06-07 DIAGNOSIS — M25.562 CHRONIC PAIN OF BOTH KNEES: ICD-10-CM

## 2021-06-07 PROCEDURE — 97110 THERAPEUTIC EXERCISES: CPT | Mod: PN

## 2021-06-07 PROCEDURE — 97140 MANUAL THERAPY 1/> REGIONS: CPT | Mod: PN

## 2021-06-08 ENCOUNTER — PATIENT OUTREACH (OUTPATIENT)
Dept: ADMINISTRATIVE | Facility: OTHER | Age: 76
End: 2021-06-08

## 2021-06-09 ENCOUNTER — OFFICE VISIT (OUTPATIENT)
Dept: OPHTHALMOLOGY | Facility: CLINIC | Age: 76
End: 2021-06-09
Payer: MEDICARE

## 2021-06-09 DIAGNOSIS — Z96.1 PSEUDOPHAKIA OF BOTH EYES: ICD-10-CM

## 2021-06-09 DIAGNOSIS — M35.01 KERATITIS SICCA, BILATERAL: ICD-10-CM

## 2021-06-09 DIAGNOSIS — H40.013 OPEN ANGLE WITH BORDERLINE FINDINGS, LOW RISK, BILATERAL: Primary | ICD-10-CM

## 2021-06-09 PROCEDURE — 99213 PR OFFICE/OUTPT VISIT, EST, LEVL III, 20-29 MIN: ICD-10-PCS | Mod: S$GLB,,, | Performed by: OPHTHALMOLOGY

## 2021-06-09 PROCEDURE — 1159F PR MEDICATION LIST DOCUMENTED IN MEDICAL RECORD: ICD-10-PCS | Mod: S$GLB,,, | Performed by: OPHTHALMOLOGY

## 2021-06-09 PROCEDURE — 92083 HUMPHREY VISUAL FIELD - OU - BOTH EYES: ICD-10-PCS | Mod: S$GLB,,, | Performed by: OPHTHALMOLOGY

## 2021-06-09 PROCEDURE — 92083 EXTENDED VISUAL FIELD XM: CPT | Mod: S$GLB,,, | Performed by: OPHTHALMOLOGY

## 2021-06-09 PROCEDURE — 92133 CPTRZD OPH DX IMG PST SGM ON: CPT | Mod: S$GLB,,, | Performed by: OPHTHALMOLOGY

## 2021-06-09 PROCEDURE — 92133 POSTERIOR SEGMENT OCT OPTIC NERVE(OCULAR COHERENCE TOMOGRAPHY) - OU - BOTH EYES: ICD-10-PCS | Mod: S$GLB,,, | Performed by: OPHTHALMOLOGY

## 2021-06-09 PROCEDURE — 99213 OFFICE O/P EST LOW 20 MIN: CPT | Mod: S$GLB,,, | Performed by: OPHTHALMOLOGY

## 2021-06-09 PROCEDURE — 99999 PR PBB SHADOW E&M-EST. PATIENT-LVL III: CPT | Mod: PBBFAC,,, | Performed by: OPHTHALMOLOGY

## 2021-06-09 PROCEDURE — 99999 PR PBB SHADOW E&M-EST. PATIENT-LVL III: ICD-10-PCS | Mod: PBBFAC,,, | Performed by: OPHTHALMOLOGY

## 2021-06-09 PROCEDURE — 1159F MED LIST DOCD IN RCRD: CPT | Mod: S$GLB,,, | Performed by: OPHTHALMOLOGY

## 2021-06-10 ENCOUNTER — CLINICAL SUPPORT (OUTPATIENT)
Dept: REHABILITATION | Facility: HOSPITAL | Age: 76
End: 2021-06-10
Attending: FAMILY MEDICINE
Payer: MEDICARE

## 2021-06-10 DIAGNOSIS — M54.50 CHRONIC BILATERAL LOW BACK PAIN WITHOUT SCIATICA: ICD-10-CM

## 2021-06-10 DIAGNOSIS — M25.562 CHRONIC PAIN OF BOTH KNEES: ICD-10-CM

## 2021-06-10 DIAGNOSIS — G89.29 CHRONIC BILATERAL LOW BACK PAIN WITHOUT SCIATICA: ICD-10-CM

## 2021-06-10 DIAGNOSIS — G89.29 CHRONIC PAIN OF BOTH KNEES: ICD-10-CM

## 2021-06-10 DIAGNOSIS — M25.561 CHRONIC PAIN OF BOTH KNEES: ICD-10-CM

## 2021-06-10 PROCEDURE — 97110 THERAPEUTIC EXERCISES: CPT | Mod: PN,CQ

## 2021-06-10 PROCEDURE — 97140 MANUAL THERAPY 1/> REGIONS: CPT | Mod: PN,CQ

## 2021-06-14 ENCOUNTER — CLINICAL SUPPORT (OUTPATIENT)
Dept: REHABILITATION | Facility: HOSPITAL | Age: 76
End: 2021-06-14
Attending: FAMILY MEDICINE
Payer: MEDICARE

## 2021-06-14 DIAGNOSIS — M25.562 CHRONIC PAIN OF BOTH KNEES: ICD-10-CM

## 2021-06-14 DIAGNOSIS — G89.29 CHRONIC BILATERAL LOW BACK PAIN WITHOUT SCIATICA: ICD-10-CM

## 2021-06-14 DIAGNOSIS — M25.561 CHRONIC PAIN OF BOTH KNEES: ICD-10-CM

## 2021-06-14 DIAGNOSIS — M54.50 CHRONIC BILATERAL LOW BACK PAIN WITHOUT SCIATICA: ICD-10-CM

## 2021-06-14 DIAGNOSIS — G89.29 CHRONIC PAIN OF BOTH KNEES: ICD-10-CM

## 2021-06-14 PROCEDURE — 97110 THERAPEUTIC EXERCISES: CPT | Mod: PN,CQ

## 2021-06-14 PROCEDURE — 97140 MANUAL THERAPY 1/> REGIONS: CPT | Mod: PN,CQ

## 2021-06-21 ENCOUNTER — OFFICE VISIT (OUTPATIENT)
Dept: INTERNAL MEDICINE | Facility: CLINIC | Age: 76
End: 2021-06-21
Payer: MEDICARE

## 2021-06-21 ENCOUNTER — CLINICAL SUPPORT (OUTPATIENT)
Dept: REHABILITATION | Facility: HOSPITAL | Age: 76
End: 2021-06-21
Attending: FAMILY MEDICINE
Payer: MEDICARE

## 2021-06-21 VITALS
WEIGHT: 179.69 LBS | BODY MASS INDEX: 33.07 KG/M2 | HEART RATE: 76 BPM | DIASTOLIC BLOOD PRESSURE: 80 MMHG | HEIGHT: 62 IN | SYSTOLIC BLOOD PRESSURE: 121 MMHG | TEMPERATURE: 98 F

## 2021-06-21 DIAGNOSIS — M81.0 AGE-RELATED OSTEOPOROSIS WITHOUT CURRENT PATHOLOGICAL FRACTURE: ICD-10-CM

## 2021-06-21 DIAGNOSIS — M54.50 CHRONIC BILATERAL LOW BACK PAIN WITHOUT SCIATICA: ICD-10-CM

## 2021-06-21 DIAGNOSIS — E78.00 PURE HYPERCHOLESTEROLEMIA: ICD-10-CM

## 2021-06-21 DIAGNOSIS — M70.50 PES ANSERINE BURSITIS: Primary | ICD-10-CM

## 2021-06-21 DIAGNOSIS — E21.3 HYPERPARATHYROIDISM: ICD-10-CM

## 2021-06-21 DIAGNOSIS — G89.29 CHRONIC BILATERAL LOW BACK PAIN WITHOUT SCIATICA: ICD-10-CM

## 2021-06-21 PROBLEM — R07.9 CHEST PAIN, MODERATE CORONARY ARTERY RISK: Status: RESOLVED | Noted: 2019-08-08 | Resolved: 2021-06-21

## 2021-06-21 PROBLEM — R26.2 DIFFICULTY WALKING: Status: RESOLVED | Noted: 2020-11-30 | Resolved: 2021-06-21

## 2021-06-21 PROBLEM — M25.562 ACUTE PAIN OF LEFT KNEE: Status: RESOLVED | Noted: 2021-03-22 | Resolved: 2021-06-21

## 2021-06-21 PROBLEM — M25.562 CHRONIC PAIN OF BOTH KNEES: Status: RESOLVED | Noted: 2021-03-23 | Resolved: 2021-06-21

## 2021-06-21 PROBLEM — R53.1 GENERALIZED WEAKNESS: Status: RESOLVED | Noted: 2020-11-30 | Resolved: 2021-06-21

## 2021-06-21 PROBLEM — M25.561 CHRONIC PAIN OF BOTH KNEES: Status: RESOLVED | Noted: 2021-03-23 | Resolved: 2021-06-21

## 2021-06-21 PROBLEM — F41.1 GAD (GENERALIZED ANXIETY DISORDER): Status: RESOLVED | Noted: 2021-03-22 | Resolved: 2021-06-21

## 2021-06-21 PROCEDURE — 99214 OFFICE O/P EST MOD 30 MIN: CPT | Mod: S$GLB,,, | Performed by: FAMILY MEDICINE

## 2021-06-21 PROCEDURE — 97140 MANUAL THERAPY 1/> REGIONS: CPT | Mod: PN

## 2021-06-21 PROCEDURE — 1126F PR PAIN SEVERITY QUANTIFIED, NO PAIN PRESENT: ICD-10-PCS | Mod: S$GLB,,, | Performed by: FAMILY MEDICINE

## 2021-06-21 PROCEDURE — 3008F PR BODY MASS INDEX (BMI) DOCUMENTED: ICD-10-PCS | Mod: CPTII,S$GLB,, | Performed by: FAMILY MEDICINE

## 2021-06-21 PROCEDURE — 99214 PR OFFICE/OUTPT VISIT, EST, LEVL IV, 30-39 MIN: ICD-10-PCS | Mod: S$GLB,,, | Performed by: FAMILY MEDICINE

## 2021-06-21 PROCEDURE — 1126F AMNT PAIN NOTED NONE PRSNT: CPT | Mod: S$GLB,,, | Performed by: FAMILY MEDICINE

## 2021-06-21 PROCEDURE — 1159F PR MEDICATION LIST DOCUMENTED IN MEDICAL RECORD: ICD-10-PCS | Mod: S$GLB,,, | Performed by: FAMILY MEDICINE

## 2021-06-21 PROCEDURE — 1159F MED LIST DOCD IN RCRD: CPT | Mod: S$GLB,,, | Performed by: FAMILY MEDICINE

## 2021-06-21 PROCEDURE — 97110 THERAPEUTIC EXERCISES: CPT | Mod: PN

## 2021-06-21 PROCEDURE — 3008F BODY MASS INDEX DOCD: CPT | Mod: CPTII,S$GLB,, | Performed by: FAMILY MEDICINE

## 2021-06-21 PROCEDURE — 99999 PR PBB SHADOW E&M-EST. PATIENT-LVL IV: CPT | Mod: PBBFAC,,, | Performed by: FAMILY MEDICINE

## 2021-06-21 PROCEDURE — 99999 PR PBB SHADOW E&M-EST. PATIENT-LVL IV: ICD-10-PCS | Mod: PBBFAC,,, | Performed by: FAMILY MEDICINE

## 2021-06-21 RX ORDER — INDOMETHACIN 50 MG/1
50 CAPSULE ORAL 3 TIMES DAILY PRN
Qty: 30 CAPSULE | Refills: 0 | Status: SHIPPED | OUTPATIENT
Start: 2021-06-21 | End: 2021-06-25

## 2021-06-21 RX ORDER — GABAPENTIN 100 MG/1
CAPSULE ORAL
Qty: 266 CAPSULE | Refills: 0 | Status: SHIPPED | OUTPATIENT
Start: 2021-06-21 | End: 2022-05-03

## 2021-06-22 ENCOUNTER — TELEPHONE (OUTPATIENT)
Dept: INTERNAL MEDICINE | Facility: CLINIC | Age: 76
End: 2021-06-22

## 2021-06-23 ENCOUNTER — TELEPHONE (OUTPATIENT)
Dept: INTERNAL MEDICINE | Facility: CLINIC | Age: 76
End: 2021-06-23

## 2021-06-23 ENCOUNTER — CLINICAL SUPPORT (OUTPATIENT)
Dept: REHABILITATION | Facility: HOSPITAL | Age: 76
End: 2021-06-23
Attending: FAMILY MEDICINE
Payer: MEDICARE

## 2021-06-23 DIAGNOSIS — G89.29 CHRONIC BILATERAL LOW BACK PAIN WITHOUT SCIATICA: ICD-10-CM

## 2021-06-23 DIAGNOSIS — M54.50 CHRONIC BILATERAL LOW BACK PAIN WITHOUT SCIATICA: ICD-10-CM

## 2021-06-23 PROCEDURE — 97140 MANUAL THERAPY 1/> REGIONS: CPT | Mod: PN

## 2021-06-23 PROCEDURE — 97110 THERAPEUTIC EXERCISES: CPT | Mod: PN

## 2021-06-23 RX ORDER — ETODOLAC 400 MG/1
400 TABLET, FILM COATED ORAL 2 TIMES DAILY
Qty: 60 TABLET | Refills: 0 | Status: SHIPPED | OUTPATIENT
Start: 2021-06-23 | End: 2021-08-20 | Stop reason: SDUPTHER

## 2021-06-28 ENCOUNTER — TELEPHONE (OUTPATIENT)
Dept: INTERNAL MEDICINE | Facility: CLINIC | Age: 76
End: 2021-06-28

## 2021-06-28 ENCOUNTER — CLINICAL SUPPORT (OUTPATIENT)
Dept: REHABILITATION | Facility: HOSPITAL | Age: 76
End: 2021-06-28
Attending: FAMILY MEDICINE
Payer: MEDICARE

## 2021-06-28 DIAGNOSIS — G89.29 CHRONIC BILATERAL LOW BACK PAIN WITHOUT SCIATICA: ICD-10-CM

## 2021-06-28 DIAGNOSIS — M54.50 CHRONIC BILATERAL LOW BACK PAIN WITHOUT SCIATICA: ICD-10-CM

## 2021-06-28 PROCEDURE — 97110 THERAPEUTIC EXERCISES: CPT | Mod: PN

## 2021-06-28 PROCEDURE — 97140 MANUAL THERAPY 1/> REGIONS: CPT | Mod: PN

## 2021-06-30 ENCOUNTER — CLINICAL SUPPORT (OUTPATIENT)
Dept: REHABILITATION | Facility: HOSPITAL | Age: 76
End: 2021-06-30
Attending: FAMILY MEDICINE
Payer: MEDICARE

## 2021-06-30 DIAGNOSIS — M54.50 CHRONIC BILATERAL LOW BACK PAIN WITHOUT SCIATICA: ICD-10-CM

## 2021-06-30 DIAGNOSIS — G89.29 CHRONIC BILATERAL LOW BACK PAIN WITHOUT SCIATICA: ICD-10-CM

## 2021-06-30 PROCEDURE — 97140 MANUAL THERAPY 1/> REGIONS: CPT | Mod: PN,CQ

## 2021-06-30 PROCEDURE — 97110 THERAPEUTIC EXERCISES: CPT | Mod: PN,CQ

## 2021-07-07 ENCOUNTER — CLINICAL SUPPORT (OUTPATIENT)
Dept: REHABILITATION | Facility: HOSPITAL | Age: 76
End: 2021-07-07
Payer: MEDICARE

## 2021-07-07 DIAGNOSIS — M54.50 CHRONIC BILATERAL LOW BACK PAIN WITHOUT SCIATICA: ICD-10-CM

## 2021-07-07 DIAGNOSIS — G89.29 CHRONIC BILATERAL LOW BACK PAIN WITHOUT SCIATICA: ICD-10-CM

## 2021-07-07 PROCEDURE — 97140 MANUAL THERAPY 1/> REGIONS: CPT | Mod: PN

## 2021-07-07 PROCEDURE — 97110 THERAPEUTIC EXERCISES: CPT | Mod: PN

## 2021-07-12 ENCOUNTER — PATIENT OUTREACH (OUTPATIENT)
Dept: ADMINISTRATIVE | Facility: OTHER | Age: 76
End: 2021-07-12

## 2021-07-12 ENCOUNTER — CLINICAL SUPPORT (OUTPATIENT)
Dept: REHABILITATION | Facility: HOSPITAL | Age: 76
End: 2021-07-12
Payer: MEDICARE

## 2021-07-12 DIAGNOSIS — G89.29 CHRONIC BILATERAL LOW BACK PAIN WITHOUT SCIATICA: ICD-10-CM

## 2021-07-12 DIAGNOSIS — M54.50 CHRONIC BILATERAL LOW BACK PAIN WITHOUT SCIATICA: ICD-10-CM

## 2021-07-12 PROCEDURE — 97110 THERAPEUTIC EXERCISES: CPT | Mod: PN,CQ

## 2021-07-12 PROCEDURE — 97140 MANUAL THERAPY 1/> REGIONS: CPT | Mod: PN,CQ

## 2021-07-13 ENCOUNTER — OFFICE VISIT (OUTPATIENT)
Dept: CARDIOLOGY | Facility: CLINIC | Age: 76
End: 2021-07-13
Payer: MEDICARE

## 2021-07-13 VITALS
DIASTOLIC BLOOD PRESSURE: 62 MMHG | SYSTOLIC BLOOD PRESSURE: 118 MMHG | BODY MASS INDEX: 33.1 KG/M2 | OXYGEN SATURATION: 94 % | WEIGHT: 181 LBS | HEART RATE: 60 BPM

## 2021-07-13 DIAGNOSIS — G89.29 CHRONIC CHEST PAIN: ICD-10-CM

## 2021-07-13 DIAGNOSIS — E78.5 HYPERLIPIDEMIA, UNSPECIFIED HYPERLIPIDEMIA TYPE: ICD-10-CM

## 2021-07-13 DIAGNOSIS — Z79.899 ENCOUNTER FOR LONG-TERM CURRENT USE OF MEDICATION: ICD-10-CM

## 2021-07-13 DIAGNOSIS — Z91.89 AT RISK FOR SLEEP APNEA: ICD-10-CM

## 2021-07-13 DIAGNOSIS — R06.02 SOB (SHORTNESS OF BREATH): ICD-10-CM

## 2021-07-13 DIAGNOSIS — Z76.89 ENCOUNTER TO ESTABLISH CARE: ICD-10-CM

## 2021-07-13 DIAGNOSIS — R07.9 CHEST PAIN, MODERATE CORONARY ARTERY RISK: ICD-10-CM

## 2021-07-13 DIAGNOSIS — R07.9 CHRONIC CHEST PAIN: ICD-10-CM

## 2021-07-13 DIAGNOSIS — E78.00 PURE HYPERCHOLESTEROLEMIA: Primary | ICD-10-CM

## 2021-07-13 PROCEDURE — 99999 PR PBB SHADOW E&M-EST. PATIENT-LVL IV: CPT | Mod: PBBFAC,,, | Performed by: INTERNAL MEDICINE

## 2021-07-13 PROCEDURE — 1159F MED LIST DOCD IN RCRD: CPT | Mod: S$GLB,,, | Performed by: INTERNAL MEDICINE

## 2021-07-13 PROCEDURE — 99214 OFFICE O/P EST MOD 30 MIN: CPT | Mod: S$GLB,,, | Performed by: INTERNAL MEDICINE

## 2021-07-13 PROCEDURE — 1159F PR MEDICATION LIST DOCUMENTED IN MEDICAL RECORD: ICD-10-PCS | Mod: S$GLB,,, | Performed by: INTERNAL MEDICINE

## 2021-07-13 PROCEDURE — 99214 PR OFFICE/OUTPT VISIT, EST, LEVL IV, 30-39 MIN: ICD-10-PCS | Mod: S$GLB,,, | Performed by: INTERNAL MEDICINE

## 2021-07-13 PROCEDURE — 3008F BODY MASS INDEX DOCD: CPT | Mod: CPTII,S$GLB,, | Performed by: INTERNAL MEDICINE

## 2021-07-13 PROCEDURE — 99999 PR PBB SHADOW E&M-EST. PATIENT-LVL IV: ICD-10-PCS | Mod: PBBFAC,,, | Performed by: INTERNAL MEDICINE

## 2021-07-13 PROCEDURE — 3008F PR BODY MASS INDEX (BMI) DOCUMENTED: ICD-10-PCS | Mod: CPTII,S$GLB,, | Performed by: INTERNAL MEDICINE

## 2021-07-15 ENCOUNTER — CLINICAL SUPPORT (OUTPATIENT)
Dept: REHABILITATION | Facility: HOSPITAL | Age: 76
End: 2021-07-15
Payer: MEDICARE

## 2021-07-15 DIAGNOSIS — M54.50 CHRONIC BILATERAL LOW BACK PAIN WITHOUT SCIATICA: ICD-10-CM

## 2021-07-15 DIAGNOSIS — G89.29 CHRONIC BILATERAL LOW BACK PAIN WITHOUT SCIATICA: ICD-10-CM

## 2021-07-15 PROCEDURE — 97110 THERAPEUTIC EXERCISES: CPT | Mod: PN

## 2021-07-15 PROCEDURE — 97140 MANUAL THERAPY 1/> REGIONS: CPT | Mod: PN

## 2021-07-19 ENCOUNTER — CLINICAL SUPPORT (OUTPATIENT)
Dept: REHABILITATION | Facility: HOSPITAL | Age: 76
End: 2021-07-19
Payer: MEDICARE

## 2021-07-19 DIAGNOSIS — M54.50 CHRONIC BILATERAL LOW BACK PAIN WITHOUT SCIATICA: ICD-10-CM

## 2021-07-19 DIAGNOSIS — G89.29 CHRONIC BILATERAL LOW BACK PAIN WITHOUT SCIATICA: ICD-10-CM

## 2021-07-19 PROCEDURE — 97110 THERAPEUTIC EXERCISES: CPT | Mod: PN,CQ

## 2021-07-19 PROCEDURE — 97140 MANUAL THERAPY 1/> REGIONS: CPT | Mod: PN,CQ

## 2021-07-21 ENCOUNTER — CLINICAL SUPPORT (OUTPATIENT)
Dept: REHABILITATION | Facility: HOSPITAL | Age: 76
End: 2021-07-21
Payer: MEDICARE

## 2021-07-21 DIAGNOSIS — M54.50 CHRONIC BILATERAL LOW BACK PAIN WITHOUT SCIATICA: ICD-10-CM

## 2021-07-21 DIAGNOSIS — G89.29 CHRONIC BILATERAL LOW BACK PAIN WITHOUT SCIATICA: ICD-10-CM

## 2021-07-21 PROCEDURE — 97140 MANUAL THERAPY 1/> REGIONS: CPT | Mod: PN

## 2021-07-21 PROCEDURE — 97110 THERAPEUTIC EXERCISES: CPT | Mod: PN

## 2021-07-26 ENCOUNTER — CLINICAL SUPPORT (OUTPATIENT)
Dept: REHABILITATION | Facility: HOSPITAL | Age: 76
End: 2021-07-26
Payer: MEDICARE

## 2021-07-26 DIAGNOSIS — G89.29 CHRONIC BILATERAL LOW BACK PAIN WITHOUT SCIATICA: ICD-10-CM

## 2021-07-26 DIAGNOSIS — M54.50 CHRONIC BILATERAL LOW BACK PAIN WITHOUT SCIATICA: ICD-10-CM

## 2021-07-26 PROCEDURE — 97110 THERAPEUTIC EXERCISES: CPT | Mod: PN,CQ

## 2021-07-26 PROCEDURE — 97140 MANUAL THERAPY 1/> REGIONS: CPT | Mod: PN,CQ

## 2021-07-28 ENCOUNTER — CLINICAL SUPPORT (OUTPATIENT)
Dept: REHABILITATION | Facility: HOSPITAL | Age: 76
End: 2021-07-28
Payer: MEDICARE

## 2021-07-28 DIAGNOSIS — G89.29 CHRONIC BILATERAL LOW BACK PAIN WITHOUT SCIATICA: ICD-10-CM

## 2021-07-28 DIAGNOSIS — M54.50 CHRONIC BILATERAL LOW BACK PAIN WITHOUT SCIATICA: ICD-10-CM

## 2021-07-28 PROCEDURE — 97140 MANUAL THERAPY 1/> REGIONS: CPT | Mod: PN

## 2021-07-28 PROCEDURE — 97110 THERAPEUTIC EXERCISES: CPT | Mod: PN

## 2021-08-02 ENCOUNTER — CLINICAL SUPPORT (OUTPATIENT)
Dept: REHABILITATION | Facility: HOSPITAL | Age: 76
End: 2021-08-02
Attending: FAMILY MEDICINE
Payer: MEDICARE

## 2021-08-02 DIAGNOSIS — M54.50 CHRONIC BILATERAL LOW BACK PAIN WITHOUT SCIATICA: ICD-10-CM

## 2021-08-02 DIAGNOSIS — G89.29 CHRONIC BILATERAL LOW BACK PAIN WITHOUT SCIATICA: ICD-10-CM

## 2021-08-02 PROCEDURE — 97140 MANUAL THERAPY 1/> REGIONS: CPT | Mod: PN,CQ

## 2021-08-02 PROCEDURE — 97110 THERAPEUTIC EXERCISES: CPT | Mod: PN,CQ

## 2021-08-04 ENCOUNTER — CLINICAL SUPPORT (OUTPATIENT)
Dept: REHABILITATION | Facility: HOSPITAL | Age: 76
End: 2021-08-04
Attending: FAMILY MEDICINE
Payer: MEDICARE

## 2021-08-04 DIAGNOSIS — M54.50 CHRONIC BILATERAL LOW BACK PAIN WITHOUT SCIATICA: ICD-10-CM

## 2021-08-04 DIAGNOSIS — G89.29 CHRONIC BILATERAL LOW BACK PAIN WITHOUT SCIATICA: ICD-10-CM

## 2021-08-04 PROCEDURE — 97110 THERAPEUTIC EXERCISES: CPT | Mod: PN

## 2021-08-09 ENCOUNTER — CLINICAL SUPPORT (OUTPATIENT)
Dept: REHABILITATION | Facility: HOSPITAL | Age: 76
End: 2021-08-09
Attending: FAMILY MEDICINE
Payer: MEDICARE

## 2021-08-09 DIAGNOSIS — G89.29 CHRONIC BILATERAL LOW BACK PAIN WITHOUT SCIATICA: ICD-10-CM

## 2021-08-09 DIAGNOSIS — M54.50 CHRONIC BILATERAL LOW BACK PAIN WITHOUT SCIATICA: ICD-10-CM

## 2021-08-09 PROCEDURE — 97110 THERAPEUTIC EXERCISES: CPT | Mod: PN,CQ

## 2021-08-12 ENCOUNTER — OFFICE VISIT (OUTPATIENT)
Dept: URGENT CARE | Facility: CLINIC | Age: 76
End: 2021-08-12
Payer: MEDICARE

## 2021-08-12 ENCOUNTER — CLINICAL SUPPORT (OUTPATIENT)
Dept: REHABILITATION | Facility: HOSPITAL | Age: 76
End: 2021-08-12
Attending: FAMILY MEDICINE
Payer: MEDICARE

## 2021-08-12 VITALS
WEIGHT: 181 LBS | OXYGEN SATURATION: 96 % | SYSTOLIC BLOOD PRESSURE: 113 MMHG | HEIGHT: 62 IN | BODY MASS INDEX: 33.31 KG/M2 | RESPIRATION RATE: 16 BRPM | HEART RATE: 85 BPM | DIASTOLIC BLOOD PRESSURE: 59 MMHG | TEMPERATURE: 99 F

## 2021-08-12 DIAGNOSIS — R51.9 ACUTE NONINTRACTABLE HEADACHE, UNSPECIFIED HEADACHE TYPE: ICD-10-CM

## 2021-08-12 DIAGNOSIS — R50.9 FEVER AND CHILLS: ICD-10-CM

## 2021-08-12 DIAGNOSIS — M54.50 CHRONIC BILATERAL LOW BACK PAIN WITHOUT SCIATICA: ICD-10-CM

## 2021-08-12 DIAGNOSIS — G89.29 CHRONIC BILATERAL LOW BACK PAIN WITHOUT SCIATICA: ICD-10-CM

## 2021-08-12 DIAGNOSIS — R09.82 POSTNASAL DRIP: ICD-10-CM

## 2021-08-12 DIAGNOSIS — R52 GENERALIZED BODY ACHES: ICD-10-CM

## 2021-08-12 DIAGNOSIS — J02.9 SORE THROAT: ICD-10-CM

## 2021-08-12 DIAGNOSIS — R05.9 COUGH: Primary | ICD-10-CM

## 2021-08-12 LAB
CTP QC/QA: YES
SARS-COV-2 RDRP RESP QL NAA+PROBE: NEGATIVE

## 2021-08-12 PROCEDURE — 1126F PR PAIN SEVERITY QUANTIFIED, NO PAIN PRESENT: ICD-10-PCS | Mod: CPTII,S$GLB,, | Performed by: PHYSICIAN ASSISTANT

## 2021-08-12 PROCEDURE — 1160F PR REVIEW ALL MEDS BY PRESCRIBER/CLIN PHARMACIST DOCUMENTED: ICD-10-PCS | Mod: CPTII,S$GLB,, | Performed by: PHYSICIAN ASSISTANT

## 2021-08-12 PROCEDURE — 97110 THERAPEUTIC EXERCISES: CPT | Mod: PN

## 2021-08-12 PROCEDURE — 1126F AMNT PAIN NOTED NONE PRSNT: CPT | Mod: CPTII,S$GLB,, | Performed by: PHYSICIAN ASSISTANT

## 2021-08-12 PROCEDURE — U0002 COVID-19 LAB TEST NON-CDC: HCPCS | Mod: QW,S$GLB,, | Performed by: PHYSICIAN ASSISTANT

## 2021-08-12 PROCEDURE — 99214 OFFICE O/P EST MOD 30 MIN: CPT | Mod: S$GLB,,, | Performed by: PHYSICIAN ASSISTANT

## 2021-08-12 PROCEDURE — 99214 PR OFFICE/OUTPT VISIT, EST, LEVL IV, 30-39 MIN: ICD-10-PCS | Mod: S$GLB,,, | Performed by: PHYSICIAN ASSISTANT

## 2021-08-12 PROCEDURE — 1159F PR MEDICATION LIST DOCUMENTED IN MEDICAL RECORD: ICD-10-PCS | Mod: CPTII,S$GLB,, | Performed by: PHYSICIAN ASSISTANT

## 2021-08-12 PROCEDURE — 3008F BODY MASS INDEX DOCD: CPT | Mod: CPTII,S$GLB,, | Performed by: PHYSICIAN ASSISTANT

## 2021-08-12 PROCEDURE — 1160F RVW MEDS BY RX/DR IN RCRD: CPT | Mod: CPTII,S$GLB,, | Performed by: PHYSICIAN ASSISTANT

## 2021-08-12 PROCEDURE — 3074F PR MOST RECENT SYSTOLIC BLOOD PRESSURE < 130 MM HG: ICD-10-PCS | Mod: CPTII,S$GLB,, | Performed by: PHYSICIAN ASSISTANT

## 2021-08-12 PROCEDURE — 3074F SYST BP LT 130 MM HG: CPT | Mod: CPTII,S$GLB,, | Performed by: PHYSICIAN ASSISTANT

## 2021-08-12 PROCEDURE — 3078F DIAST BP <80 MM HG: CPT | Mod: CPTII,S$GLB,, | Performed by: PHYSICIAN ASSISTANT

## 2021-08-12 PROCEDURE — 3008F PR BODY MASS INDEX (BMI) DOCUMENTED: ICD-10-PCS | Mod: CPTII,S$GLB,, | Performed by: PHYSICIAN ASSISTANT

## 2021-08-12 PROCEDURE — 1159F MED LIST DOCD IN RCRD: CPT | Mod: CPTII,S$GLB,, | Performed by: PHYSICIAN ASSISTANT

## 2021-08-12 PROCEDURE — U0002: ICD-10-PCS | Mod: QW,S$GLB,, | Performed by: PHYSICIAN ASSISTANT

## 2021-08-12 PROCEDURE — 3078F PR MOST RECENT DIASTOLIC BLOOD PRESSURE < 80 MM HG: ICD-10-PCS | Mod: CPTII,S$GLB,, | Performed by: PHYSICIAN ASSISTANT

## 2021-08-12 RX ORDER — BENZONATATE 100 MG/1
100 CAPSULE ORAL 3 TIMES DAILY PRN
Qty: 21 CAPSULE | Refills: 0 | Status: SHIPPED | OUTPATIENT
Start: 2021-08-12 | End: 2021-08-19

## 2021-08-12 RX ORDER — AZITHROMYCIN 250 MG/1
TABLET, FILM COATED ORAL
Qty: 6 TABLET | Refills: 0 | Status: SHIPPED | OUTPATIENT
Start: 2021-08-12 | End: 2021-08-20 | Stop reason: ALTCHOICE

## 2021-08-12 RX ORDER — MINERAL OIL
180 ENEMA (ML) RECTAL DAILY
Qty: 30 TABLET | Refills: 0 | Status: SHIPPED | OUTPATIENT
Start: 2021-08-12 | End: 2023-04-25

## 2021-08-20 ENCOUNTER — SPECIALTY PHARMACY (OUTPATIENT)
Dept: PHARMACY | Facility: CLINIC | Age: 76
End: 2021-08-20

## 2021-08-20 ENCOUNTER — OFFICE VISIT (OUTPATIENT)
Dept: INTERNAL MEDICINE | Facility: CLINIC | Age: 76
End: 2021-08-20
Payer: MEDICARE

## 2021-08-20 VITALS
WEIGHT: 178.13 LBS | BODY MASS INDEX: 32.78 KG/M2 | HEART RATE: 92 BPM | DIASTOLIC BLOOD PRESSURE: 78 MMHG | SYSTOLIC BLOOD PRESSURE: 118 MMHG | TEMPERATURE: 98 F | HEIGHT: 62 IN

## 2021-08-20 DIAGNOSIS — M17.0 PRIMARY OSTEOARTHRITIS OF BOTH KNEES: Primary | ICD-10-CM

## 2021-08-20 DIAGNOSIS — E78.00 PURE HYPERCHOLESTEROLEMIA: ICD-10-CM

## 2021-08-20 PROCEDURE — 1160F PR REVIEW ALL MEDS BY PRESCRIBER/CLIN PHARMACIST DOCUMENTED: ICD-10-PCS | Mod: CPTII,S$GLB,, | Performed by: FAMILY MEDICINE

## 2021-08-20 PROCEDURE — 1125F AMNT PAIN NOTED PAIN PRSNT: CPT | Mod: CPTII,S$GLB,, | Performed by: FAMILY MEDICINE

## 2021-08-20 PROCEDURE — 99999 PR PBB SHADOW E&M-EST. PATIENT-LVL IV: CPT | Mod: PBBFAC,,, | Performed by: FAMILY MEDICINE

## 2021-08-20 PROCEDURE — 99214 OFFICE O/P EST MOD 30 MIN: CPT | Mod: S$GLB,,, | Performed by: FAMILY MEDICINE

## 2021-08-20 PROCEDURE — 3078F PR MOST RECENT DIASTOLIC BLOOD PRESSURE < 80 MM HG: ICD-10-PCS | Mod: CPTII,S$GLB,, | Performed by: FAMILY MEDICINE

## 2021-08-20 PROCEDURE — 3288F PR FALLS RISK ASSESSMENT DOCUMENTED: ICD-10-PCS | Mod: CPTII,S$GLB,, | Performed by: FAMILY MEDICINE

## 2021-08-20 PROCEDURE — 99999 PR PBB SHADOW E&M-EST. PATIENT-LVL IV: ICD-10-PCS | Mod: PBBFAC,,, | Performed by: FAMILY MEDICINE

## 2021-08-20 PROCEDURE — 3078F DIAST BP <80 MM HG: CPT | Mod: CPTII,S$GLB,, | Performed by: FAMILY MEDICINE

## 2021-08-20 PROCEDURE — 1101F PR PT FALLS ASSESS DOC 0-1 FALLS W/OUT INJ PAST YR: ICD-10-PCS | Mod: CPTII,S$GLB,, | Performed by: FAMILY MEDICINE

## 2021-08-20 PROCEDURE — 3074F PR MOST RECENT SYSTOLIC BLOOD PRESSURE < 130 MM HG: ICD-10-PCS | Mod: CPTII,S$GLB,, | Performed by: FAMILY MEDICINE

## 2021-08-20 PROCEDURE — 1160F RVW MEDS BY RX/DR IN RCRD: CPT | Mod: CPTII,S$GLB,, | Performed by: FAMILY MEDICINE

## 2021-08-20 PROCEDURE — 1101F PT FALLS ASSESS-DOCD LE1/YR: CPT | Mod: CPTII,S$GLB,, | Performed by: FAMILY MEDICINE

## 2021-08-20 PROCEDURE — 3074F SYST BP LT 130 MM HG: CPT | Mod: CPTII,S$GLB,, | Performed by: FAMILY MEDICINE

## 2021-08-20 PROCEDURE — 1125F PR PAIN SEVERITY QUANTIFIED, PAIN PRESENT: ICD-10-PCS | Mod: CPTII,S$GLB,, | Performed by: FAMILY MEDICINE

## 2021-08-20 PROCEDURE — 1159F MED LIST DOCD IN RCRD: CPT | Mod: CPTII,S$GLB,, | Performed by: FAMILY MEDICINE

## 2021-08-20 PROCEDURE — 99214 PR OFFICE/OUTPT VISIT, EST, LEVL IV, 30-39 MIN: ICD-10-PCS | Mod: S$GLB,,, | Performed by: FAMILY MEDICINE

## 2021-08-20 PROCEDURE — 3288F FALL RISK ASSESSMENT DOCD: CPT | Mod: CPTII,S$GLB,, | Performed by: FAMILY MEDICINE

## 2021-08-20 PROCEDURE — 1159F PR MEDICATION LIST DOCUMENTED IN MEDICAL RECORD: ICD-10-PCS | Mod: CPTII,S$GLB,, | Performed by: FAMILY MEDICINE

## 2021-08-20 PROCEDURE — 3008F PR BODY MASS INDEX (BMI) DOCUMENTED: ICD-10-PCS | Mod: CPTII,S$GLB,, | Performed by: FAMILY MEDICINE

## 2021-08-20 PROCEDURE — 3008F BODY MASS INDEX DOCD: CPT | Mod: CPTII,S$GLB,, | Performed by: FAMILY MEDICINE

## 2021-08-20 RX ORDER — TRAMADOL HYDROCHLORIDE 50 MG/1
50 TABLET ORAL EVERY 6 HOURS PRN
Qty: 21 TABLET | Refills: 0 | Status: SHIPPED | OUTPATIENT
Start: 2021-08-20 | End: 2022-05-03

## 2021-08-20 RX ORDER — ETODOLAC 400 MG/1
400 TABLET, FILM COATED ORAL 2 TIMES DAILY
Qty: 60 TABLET | Refills: 0 | Status: SHIPPED | OUTPATIENT
Start: 2021-08-20 | End: 2022-05-03

## 2021-09-10 ENCOUNTER — OFFICE VISIT (OUTPATIENT)
Dept: SPORTS MEDICINE | Facility: CLINIC | Age: 76
End: 2021-09-10
Payer: MEDICARE

## 2021-09-10 VITALS — WEIGHT: 178 LBS | HEIGHT: 62 IN | BODY MASS INDEX: 32.76 KG/M2

## 2021-09-10 DIAGNOSIS — M25.561 CHRONIC PAIN OF BOTH KNEES: Primary | ICD-10-CM

## 2021-09-10 DIAGNOSIS — G89.29 CHRONIC PAIN OF BOTH KNEES: Primary | ICD-10-CM

## 2021-09-10 DIAGNOSIS — M17.0 PRIMARY OSTEOARTHRITIS OF BOTH KNEES: ICD-10-CM

## 2021-09-10 DIAGNOSIS — R26.9 GAIT ABNORMALITY: ICD-10-CM

## 2021-09-10 DIAGNOSIS — M25.562 CHRONIC PAIN OF BOTH KNEES: Primary | ICD-10-CM

## 2021-09-10 PROCEDURE — 3008F PR BODY MASS INDEX (BMI) DOCUMENTED: ICD-10-PCS | Mod: CPTII,S$GLB,, | Performed by: PHYSICAL MEDICINE & REHABILITATION

## 2021-09-10 PROCEDURE — 1125F AMNT PAIN NOTED PAIN PRSNT: CPT | Mod: CPTII,S$GLB,, | Performed by: PHYSICAL MEDICINE & REHABILITATION

## 2021-09-10 PROCEDURE — 1101F PR PT FALLS ASSESS DOC 0-1 FALLS W/OUT INJ PAST YR: ICD-10-PCS | Mod: CPTII,S$GLB,, | Performed by: PHYSICAL MEDICINE & REHABILITATION

## 2021-09-10 PROCEDURE — 3008F BODY MASS INDEX DOCD: CPT | Mod: CPTII,S$GLB,, | Performed by: PHYSICAL MEDICINE & REHABILITATION

## 2021-09-10 PROCEDURE — 99204 PR OFFICE/OUTPT VISIT, NEW, LEVL IV, 45-59 MIN: ICD-10-PCS | Mod: S$GLB,,, | Performed by: PHYSICAL MEDICINE & REHABILITATION

## 2021-09-10 PROCEDURE — 99204 OFFICE O/P NEW MOD 45 MIN: CPT | Mod: S$GLB,,, | Performed by: PHYSICAL MEDICINE & REHABILITATION

## 2021-09-10 PROCEDURE — 1160F RVW MEDS BY RX/DR IN RCRD: CPT | Mod: CPTII,S$GLB,, | Performed by: PHYSICAL MEDICINE & REHABILITATION

## 2021-09-10 PROCEDURE — 1160F PR REVIEW ALL MEDS BY PRESCRIBER/CLIN PHARMACIST DOCUMENTED: ICD-10-PCS | Mod: CPTII,S$GLB,, | Performed by: PHYSICAL MEDICINE & REHABILITATION

## 2021-09-10 PROCEDURE — 1125F PR PAIN SEVERITY QUANTIFIED, PAIN PRESENT: ICD-10-PCS | Mod: CPTII,S$GLB,, | Performed by: PHYSICAL MEDICINE & REHABILITATION

## 2021-09-10 PROCEDURE — 3288F PR FALLS RISK ASSESSMENT DOCUMENTED: ICD-10-PCS | Mod: CPTII,S$GLB,, | Performed by: PHYSICAL MEDICINE & REHABILITATION

## 2021-09-10 PROCEDURE — 1101F PT FALLS ASSESS-DOCD LE1/YR: CPT | Mod: CPTII,S$GLB,, | Performed by: PHYSICAL MEDICINE & REHABILITATION

## 2021-09-10 PROCEDURE — 1159F MED LIST DOCD IN RCRD: CPT | Mod: CPTII,S$GLB,, | Performed by: PHYSICAL MEDICINE & REHABILITATION

## 2021-09-10 PROCEDURE — 99999 PR PBB SHADOW E&M-EST. PATIENT-LVL IV: CPT | Mod: PBBFAC,,, | Performed by: PHYSICAL MEDICINE & REHABILITATION

## 2021-09-10 PROCEDURE — 1159F PR MEDICATION LIST DOCUMENTED IN MEDICAL RECORD: ICD-10-PCS | Mod: CPTII,S$GLB,, | Performed by: PHYSICAL MEDICINE & REHABILITATION

## 2021-09-10 PROCEDURE — 99999 PR PBB SHADOW E&M-EST. PATIENT-LVL IV: ICD-10-PCS | Mod: PBBFAC,,, | Performed by: PHYSICAL MEDICINE & REHABILITATION

## 2021-09-10 PROCEDURE — 3288F FALL RISK ASSESSMENT DOCD: CPT | Mod: CPTII,S$GLB,, | Performed by: PHYSICAL MEDICINE & REHABILITATION

## 2021-09-20 ENCOUNTER — TELEPHONE (OUTPATIENT)
Dept: PAIN MEDICINE | Facility: CLINIC | Age: 76
End: 2021-09-20

## 2021-09-22 ENCOUNTER — TELEPHONE (OUTPATIENT)
Dept: PAIN MEDICINE | Facility: CLINIC | Age: 76
End: 2021-09-22

## 2021-09-23 ENCOUNTER — TELEPHONE (OUTPATIENT)
Dept: PAIN MEDICINE | Facility: CLINIC | Age: 76
End: 2021-09-23

## 2021-09-24 ENCOUNTER — TELEPHONE (OUTPATIENT)
Dept: PAIN MEDICINE | Facility: CLINIC | Age: 76
End: 2021-09-24

## 2021-09-24 ENCOUNTER — OFFICE VISIT (OUTPATIENT)
Dept: PAIN MEDICINE | Facility: CLINIC | Age: 76
End: 2021-09-24
Payer: MEDICARE

## 2021-09-24 VITALS
DIASTOLIC BLOOD PRESSURE: 78 MMHG | HEART RATE: 96 BPM | BODY MASS INDEX: 32.32 KG/M2 | SYSTOLIC BLOOD PRESSURE: 119 MMHG | HEIGHT: 62 IN | WEIGHT: 175.63 LBS

## 2021-09-24 DIAGNOSIS — M25.561 CHRONIC PAIN OF BOTH KNEES: ICD-10-CM

## 2021-09-24 DIAGNOSIS — M79.2 NEUROPATHIC PAIN: Primary | ICD-10-CM

## 2021-09-24 DIAGNOSIS — M17.0 PRIMARY OSTEOARTHRITIS OF BOTH KNEES: ICD-10-CM

## 2021-09-24 DIAGNOSIS — G89.29 CHRONIC PAIN OF BOTH KNEES: ICD-10-CM

## 2021-09-24 DIAGNOSIS — M25.562 CHRONIC PAIN OF BOTH KNEES: ICD-10-CM

## 2021-09-24 PROCEDURE — 3074F SYST BP LT 130 MM HG: CPT | Mod: CPTII,S$GLB,, | Performed by: ANESTHESIOLOGY

## 2021-09-24 PROCEDURE — 1125F PR PAIN SEVERITY QUANTIFIED, PAIN PRESENT: ICD-10-PCS | Mod: CPTII,S$GLB,, | Performed by: ANESTHESIOLOGY

## 2021-09-24 PROCEDURE — 3078F DIAST BP <80 MM HG: CPT | Mod: CPTII,S$GLB,, | Performed by: ANESTHESIOLOGY

## 2021-09-24 PROCEDURE — 1101F PT FALLS ASSESS-DOCD LE1/YR: CPT | Mod: CPTII,S$GLB,, | Performed by: ANESTHESIOLOGY

## 2021-09-24 PROCEDURE — 99999 PR PBB SHADOW E&M-EST. PATIENT-LVL V: ICD-10-PCS | Mod: PBBFAC,,, | Performed by: ANESTHESIOLOGY

## 2021-09-24 PROCEDURE — 3078F PR MOST RECENT DIASTOLIC BLOOD PRESSURE < 80 MM HG: ICD-10-PCS | Mod: CPTII,S$GLB,, | Performed by: ANESTHESIOLOGY

## 2021-09-24 PROCEDURE — 99204 PR OFFICE/OUTPT VISIT, NEW, LEVL IV, 45-59 MIN: ICD-10-PCS | Mod: S$GLB,,, | Performed by: ANESTHESIOLOGY

## 2021-09-24 PROCEDURE — 1159F PR MEDICATION LIST DOCUMENTED IN MEDICAL RECORD: ICD-10-PCS | Mod: CPTII,S$GLB,, | Performed by: ANESTHESIOLOGY

## 2021-09-24 PROCEDURE — 3288F PR FALLS RISK ASSESSMENT DOCUMENTED: ICD-10-PCS | Mod: CPTII,S$GLB,, | Performed by: ANESTHESIOLOGY

## 2021-09-24 PROCEDURE — 1125F AMNT PAIN NOTED PAIN PRSNT: CPT | Mod: CPTII,S$GLB,, | Performed by: ANESTHESIOLOGY

## 2021-09-24 PROCEDURE — 3288F FALL RISK ASSESSMENT DOCD: CPT | Mod: CPTII,S$GLB,, | Performed by: ANESTHESIOLOGY

## 2021-09-24 PROCEDURE — 1101F PR PT FALLS ASSESS DOC 0-1 FALLS W/OUT INJ PAST YR: ICD-10-PCS | Mod: CPTII,S$GLB,, | Performed by: ANESTHESIOLOGY

## 2021-09-24 PROCEDURE — 99999 PR PBB SHADOW E&M-EST. PATIENT-LVL V: CPT | Mod: PBBFAC,,, | Performed by: ANESTHESIOLOGY

## 2021-09-24 PROCEDURE — 3074F PR MOST RECENT SYSTOLIC BLOOD PRESSURE < 130 MM HG: ICD-10-PCS | Mod: CPTII,S$GLB,, | Performed by: ANESTHESIOLOGY

## 2021-09-24 PROCEDURE — 1159F MED LIST DOCD IN RCRD: CPT | Mod: CPTII,S$GLB,, | Performed by: ANESTHESIOLOGY

## 2021-09-24 PROCEDURE — 99204 OFFICE O/P NEW MOD 45 MIN: CPT | Mod: S$GLB,,, | Performed by: ANESTHESIOLOGY

## 2021-09-24 PROCEDURE — 3008F PR BODY MASS INDEX (BMI) DOCUMENTED: ICD-10-PCS | Mod: CPTII,S$GLB,, | Performed by: ANESTHESIOLOGY

## 2021-09-24 PROCEDURE — 3008F BODY MASS INDEX DOCD: CPT | Mod: CPTII,S$GLB,, | Performed by: ANESTHESIOLOGY

## 2021-09-24 RX ORDER — AMITRIPTYLINE HYDROCHLORIDE 25 MG/1
25 TABLET, FILM COATED ORAL NIGHTLY PRN
Qty: 30 TABLET | Refills: 1 | Status: SHIPPED | OUTPATIENT
Start: 2021-09-24 | End: 2022-01-06

## 2021-10-01 ENCOUNTER — HOSPITAL ENCOUNTER (OUTPATIENT)
Facility: HOSPITAL | Age: 76
Discharge: HOME | End: 2021-10-01
Attending: ANESTHESIOLOGY | Admitting: ANESTHESIOLOGY
Payer: MEDICARE

## 2021-10-01 VITALS
HEIGHT: 62 IN | BODY MASS INDEX: 32.56 KG/M2 | OXYGEN SATURATION: 98 % | WEIGHT: 176.94 LBS | HEART RATE: 76 BPM | TEMPERATURE: 98 F | SYSTOLIC BLOOD PRESSURE: 119 MMHG | DIASTOLIC BLOOD PRESSURE: 62 MMHG | RESPIRATION RATE: 16 BRPM

## 2021-10-01 DIAGNOSIS — M17.0 OSTEOARTHRITIS OF BOTH KNEES, UNSPECIFIED OSTEOARTHRITIS TYPE: Primary | ICD-10-CM

## 2021-10-01 DIAGNOSIS — M17.9 KNEE OSTEOARTHRITIS: ICD-10-CM

## 2021-10-01 PROCEDURE — 64454 NJX AA&/STRD GNCLR NRV BRNCH: CPT | Mod: 50 | Performed by: ANESTHESIOLOGY

## 2021-10-01 PROCEDURE — 25000003 PHARM REV CODE 250: Performed by: ANESTHESIOLOGY

## 2021-10-01 PROCEDURE — 64454 PR NERVE BLOCK INJ, ANES/STEROID, GENICULAR NERVE, W/IMG: ICD-10-PCS | Mod: 50,,, | Performed by: ANESTHESIOLOGY

## 2021-10-01 PROCEDURE — 64454 NJX AA&/STRD GNCLR NRV BRNCH: CPT | Mod: 50,,, | Performed by: ANESTHESIOLOGY

## 2021-10-01 PROCEDURE — 64450 NJX AA&/STRD OTHER PN/BRANCH: CPT | Mod: 50 | Performed by: ANESTHESIOLOGY

## 2021-10-01 PROCEDURE — 63600175 PHARM REV CODE 636 W HCPCS: Performed by: ANESTHESIOLOGY

## 2021-10-01 RX ORDER — FENTANYL CITRATE 50 UG/ML
INJECTION, SOLUTION INTRAMUSCULAR; INTRAVENOUS
Status: DISCONTINUED | OUTPATIENT
Start: 2021-10-01 | End: 2021-10-01 | Stop reason: HOSPADM

## 2021-10-01 RX ORDER — MIDAZOLAM HYDROCHLORIDE 1 MG/ML
INJECTION, SOLUTION INTRAMUSCULAR; INTRAVENOUS
Status: DISCONTINUED | OUTPATIENT
Start: 2021-10-01 | End: 2021-10-01 | Stop reason: HOSPADM

## 2021-10-01 RX ORDER — INDOMETHACIN 25 MG/1
CAPSULE ORAL
Status: DISCONTINUED | OUTPATIENT
Start: 2021-10-01 | End: 2021-10-01 | Stop reason: HOSPADM

## 2021-10-01 RX ORDER — TRIAMCINOLONE ACETONIDE 40 MG/ML
INJECTION, SUSPENSION INTRA-ARTICULAR; INTRAMUSCULAR
Status: DISCONTINUED | OUTPATIENT
Start: 2021-10-01 | End: 2021-10-01 | Stop reason: HOSPADM

## 2021-10-01 RX ORDER — BUPIVACAINE HYDROCHLORIDE 5 MG/ML
INJECTION, SOLUTION EPIDURAL; INTRACAUDAL
Status: DISCONTINUED | OUTPATIENT
Start: 2021-10-01 | End: 2021-10-01 | Stop reason: HOSPADM

## 2021-10-04 ENCOUNTER — TELEPHONE (OUTPATIENT)
Dept: PAIN MEDICINE | Facility: CLINIC | Age: 76
End: 2021-10-04

## 2021-10-04 ENCOUNTER — TELEPHONE (OUTPATIENT)
Dept: INTERNAL MEDICINE | Facility: CLINIC | Age: 76
End: 2021-10-04

## 2021-10-09 ENCOUNTER — IMMUNIZATION (OUTPATIENT)
Dept: INTERNAL MEDICINE | Facility: CLINIC | Age: 76
End: 2021-10-09
Payer: MEDICARE

## 2021-10-09 PROCEDURE — 90694 FLU VACCINE - QUADRIVALENT - ADJUVANTED: ICD-10-PCS | Mod: S$GLB,,, | Performed by: INTERNAL MEDICINE

## 2021-10-09 PROCEDURE — G0008 ADMIN INFLUENZA VIRUS VAC: HCPCS | Mod: S$GLB,,, | Performed by: INTERNAL MEDICINE

## 2021-10-09 PROCEDURE — G0008 FLU VACCINE - QUADRIVALENT - ADJUVANTED: ICD-10-PCS | Mod: S$GLB,,, | Performed by: INTERNAL MEDICINE

## 2021-10-09 PROCEDURE — 90694 VACC AIIV4 NO PRSRV 0.5ML IM: CPT | Mod: S$GLB,,, | Performed by: INTERNAL MEDICINE

## 2021-10-11 ENCOUNTER — CLINICAL SUPPORT (OUTPATIENT)
Dept: REHABILITATION | Facility: HOSPITAL | Age: 76
End: 2021-10-11
Attending: ANESTHESIOLOGY
Payer: MEDICARE

## 2021-10-11 DIAGNOSIS — M17.0 PRIMARY OSTEOARTHRITIS OF BOTH KNEES: ICD-10-CM

## 2021-10-11 DIAGNOSIS — R26.89 GAIT, ANTALGIC: ICD-10-CM

## 2021-10-11 DIAGNOSIS — R26.89 BALANCE PROBLEM: ICD-10-CM

## 2021-10-11 DIAGNOSIS — G89.29 CHRONIC PAIN OF BOTH KNEES: ICD-10-CM

## 2021-10-11 DIAGNOSIS — M25.562 CHRONIC PAIN OF BOTH KNEES: ICD-10-CM

## 2021-10-11 DIAGNOSIS — R53.1 DECREASED STRENGTH: ICD-10-CM

## 2021-10-11 DIAGNOSIS — M25.561 CHRONIC PAIN OF BOTH KNEES: ICD-10-CM

## 2021-10-11 PROCEDURE — 97161 PT EVAL LOW COMPLEX 20 MIN: CPT | Mod: KX

## 2021-10-12 ENCOUNTER — LAB VISIT (OUTPATIENT)
Dept: LAB | Facility: HOSPITAL | Age: 76
End: 2021-10-12
Attending: INTERNAL MEDICINE
Payer: MEDICARE

## 2021-10-12 DIAGNOSIS — M81.0 AGE-RELATED OSTEOPOROSIS WITHOUT CURRENT PATHOLOGICAL FRACTURE: ICD-10-CM

## 2021-10-12 LAB
25(OH)D3+25(OH)D2 SERPL-MCNC: 51 NG/ML (ref 30–96)
ALBUMIN SERPL BCP-MCNC: 3.9 G/DL (ref 3.5–5.2)
ALP SERPL-CCNC: 91 U/L (ref 55–135)
ALT SERPL W/O P-5'-P-CCNC: 23 U/L (ref 10–44)
ANION GAP SERPL CALC-SCNC: 10 MMOL/L (ref 8–16)
AST SERPL-CCNC: 19 U/L (ref 10–40)
BILIRUB SERPL-MCNC: 0.5 MG/DL (ref 0.1–1)
BUN SERPL-MCNC: 23 MG/DL (ref 8–23)
CALCIUM SERPL-MCNC: 9.5 MG/DL (ref 8.7–10.5)
CHLORIDE SERPL-SCNC: 108 MMOL/L (ref 95–110)
CO2 SERPL-SCNC: 23 MMOL/L (ref 23–29)
CREAT SERPL-MCNC: 0.9 MG/DL (ref 0.5–1.4)
EST. GFR  (AFRICAN AMERICAN): >60 ML/MIN/1.73 M^2
EST. GFR  (NON AFRICAN AMERICAN): >60 ML/MIN/1.73 M^2
GLUCOSE SERPL-MCNC: 82 MG/DL (ref 70–110)
POTASSIUM SERPL-SCNC: 4 MMOL/L (ref 3.5–5.1)
PROT SERPL-MCNC: 6.8 G/DL (ref 6–8.4)
PTH-INTACT SERPL-MCNC: 61.9 PG/ML (ref 9–77)
SODIUM SERPL-SCNC: 141 MMOL/L (ref 136–145)

## 2021-10-12 PROCEDURE — 36415 COLL VENOUS BLD VENIPUNCTURE: CPT | Performed by: INTERNAL MEDICINE

## 2021-10-12 PROCEDURE — 82306 VITAMIN D 25 HYDROXY: CPT | Performed by: INTERNAL MEDICINE

## 2021-10-12 PROCEDURE — 83970 ASSAY OF PARATHORMONE: CPT | Performed by: INTERNAL MEDICINE

## 2021-10-12 PROCEDURE — 80053 COMPREHEN METABOLIC PANEL: CPT | Performed by: INTERNAL MEDICINE

## 2021-10-13 ENCOUNTER — OFFICE VISIT (OUTPATIENT)
Dept: SPORTS MEDICINE | Facility: CLINIC | Age: 76
End: 2021-10-13
Payer: MEDICARE

## 2021-10-13 ENCOUNTER — TELEPHONE (OUTPATIENT)
Dept: RHEUMATOLOGY | Facility: CLINIC | Age: 76
End: 2021-10-13

## 2021-10-13 VITALS — BODY MASS INDEX: 32.39 KG/M2 | WEIGHT: 176 LBS | HEIGHT: 62 IN

## 2021-10-13 DIAGNOSIS — G89.29 CHRONIC PAIN OF BOTH KNEES: Primary | ICD-10-CM

## 2021-10-13 DIAGNOSIS — M25.561 CHRONIC PAIN OF BOTH KNEES: Primary | ICD-10-CM

## 2021-10-13 DIAGNOSIS — M25.562 CHRONIC PAIN OF BOTH KNEES: Primary | ICD-10-CM

## 2021-10-13 DIAGNOSIS — R26.9 GAIT ABNORMALITY: ICD-10-CM

## 2021-10-13 DIAGNOSIS — M17.0 PRIMARY OSTEOARTHRITIS OF BOTH KNEES: ICD-10-CM

## 2021-10-13 PROCEDURE — 1101F PT FALLS ASSESS-DOCD LE1/YR: CPT | Mod: CPTII,S$GLB,, | Performed by: PHYSICAL MEDICINE & REHABILITATION

## 2021-10-13 PROCEDURE — 1125F PR PAIN SEVERITY QUANTIFIED, PAIN PRESENT: ICD-10-PCS | Mod: CPTII,S$GLB,, | Performed by: PHYSICAL MEDICINE & REHABILITATION

## 2021-10-13 PROCEDURE — 20610 LARGE JOINT ASPIRATION/INJECTION: BILATERAL KNEE: ICD-10-PCS | Mod: 50,S$GLB,, | Performed by: PHYSICAL MEDICINE & REHABILITATION

## 2021-10-13 PROCEDURE — 1160F PR REVIEW ALL MEDS BY PRESCRIBER/CLIN PHARMACIST DOCUMENTED: ICD-10-PCS | Mod: CPTII,S$GLB,, | Performed by: PHYSICAL MEDICINE & REHABILITATION

## 2021-10-13 PROCEDURE — 3288F FALL RISK ASSESSMENT DOCD: CPT | Mod: CPTII,S$GLB,, | Performed by: PHYSICAL MEDICINE & REHABILITATION

## 2021-10-13 PROCEDURE — 99499 NO LOS: ICD-10-PCS | Mod: S$GLB,,, | Performed by: PHYSICAL MEDICINE & REHABILITATION

## 2021-10-13 PROCEDURE — 1159F PR MEDICATION LIST DOCUMENTED IN MEDICAL RECORD: ICD-10-PCS | Mod: CPTII,S$GLB,, | Performed by: PHYSICAL MEDICINE & REHABILITATION

## 2021-10-13 PROCEDURE — 1159F MED LIST DOCD IN RCRD: CPT | Mod: CPTII,S$GLB,, | Performed by: PHYSICAL MEDICINE & REHABILITATION

## 2021-10-13 PROCEDURE — 3008F PR BODY MASS INDEX (BMI) DOCUMENTED: ICD-10-PCS | Mod: CPTII,S$GLB,, | Performed by: PHYSICAL MEDICINE & REHABILITATION

## 2021-10-13 PROCEDURE — 1160F RVW MEDS BY RX/DR IN RCRD: CPT | Mod: CPTII,S$GLB,, | Performed by: PHYSICAL MEDICINE & REHABILITATION

## 2021-10-13 PROCEDURE — 99999 PR PBB SHADOW E&M-EST. PATIENT-LVL IV: CPT | Mod: PBBFAC,,, | Performed by: PHYSICAL MEDICINE & REHABILITATION

## 2021-10-13 PROCEDURE — 99999 PR PBB SHADOW E&M-EST. PATIENT-LVL IV: ICD-10-PCS | Mod: PBBFAC,,, | Performed by: PHYSICAL MEDICINE & REHABILITATION

## 2021-10-13 PROCEDURE — 3288F PR FALLS RISK ASSESSMENT DOCUMENTED: ICD-10-PCS | Mod: CPTII,S$GLB,, | Performed by: PHYSICAL MEDICINE & REHABILITATION

## 2021-10-13 PROCEDURE — 99499 UNLISTED E&M SERVICE: CPT | Mod: S$GLB,,, | Performed by: PHYSICAL MEDICINE & REHABILITATION

## 2021-10-13 PROCEDURE — 3008F BODY MASS INDEX DOCD: CPT | Mod: CPTII,S$GLB,, | Performed by: PHYSICAL MEDICINE & REHABILITATION

## 2021-10-13 PROCEDURE — 1125F AMNT PAIN NOTED PAIN PRSNT: CPT | Mod: CPTII,S$GLB,, | Performed by: PHYSICAL MEDICINE & REHABILITATION

## 2021-10-13 PROCEDURE — 1101F PR PT FALLS ASSESS DOC 0-1 FALLS W/OUT INJ PAST YR: ICD-10-PCS | Mod: CPTII,S$GLB,, | Performed by: PHYSICAL MEDICINE & REHABILITATION

## 2021-10-13 PROCEDURE — 20610 DRAIN/INJ JOINT/BURSA W/O US: CPT | Mod: 50,S$GLB,, | Performed by: PHYSICAL MEDICINE & REHABILITATION

## 2021-10-19 ENCOUNTER — OFFICE VISIT (OUTPATIENT)
Dept: RHEUMATOLOGY | Facility: CLINIC | Age: 76
End: 2021-10-19
Payer: MEDICARE

## 2021-10-19 ENCOUNTER — PATIENT MESSAGE (OUTPATIENT)
Dept: RHEUMATOLOGY | Facility: CLINIC | Age: 76
End: 2021-10-19

## 2021-10-19 VITALS
HEART RATE: 71 BPM | DIASTOLIC BLOOD PRESSURE: 64 MMHG | WEIGHT: 175.94 LBS | BODY MASS INDEX: 32.18 KG/M2 | SYSTOLIC BLOOD PRESSURE: 129 MMHG

## 2021-10-19 DIAGNOSIS — Z71.89 COUNSELING ON HEALTH PROMOTION AND DISEASE PREVENTION: ICD-10-CM

## 2021-10-19 DIAGNOSIS — M81.0 AGE-RELATED OSTEOPOROSIS WITHOUT CURRENT PATHOLOGICAL FRACTURE: Primary | ICD-10-CM

## 2021-10-19 PROCEDURE — 3078F PR MOST RECENT DIASTOLIC BLOOD PRESSURE < 80 MM HG: ICD-10-PCS | Mod: CPTII,S$GLB,, | Performed by: INTERNAL MEDICINE

## 2021-10-19 PROCEDURE — 99214 PR OFFICE/OUTPT VISIT, EST, LEVL IV, 30-39 MIN: ICD-10-PCS | Mod: S$GLB,,, | Performed by: INTERNAL MEDICINE

## 2021-10-19 PROCEDURE — 99999 PR PBB SHADOW E&M-EST. PATIENT-LVL IV: CPT | Mod: PBBFAC,,, | Performed by: INTERNAL MEDICINE

## 2021-10-19 PROCEDURE — 3074F SYST BP LT 130 MM HG: CPT | Mod: CPTII,S$GLB,, | Performed by: INTERNAL MEDICINE

## 2021-10-19 PROCEDURE — 99214 OFFICE O/P EST MOD 30 MIN: CPT | Mod: S$GLB,,, | Performed by: INTERNAL MEDICINE

## 2021-10-19 PROCEDURE — 3074F PR MOST RECENT SYSTOLIC BLOOD PRESSURE < 130 MM HG: ICD-10-PCS | Mod: CPTII,S$GLB,, | Performed by: INTERNAL MEDICINE

## 2021-10-19 PROCEDURE — 3288F PR FALLS RISK ASSESSMENT DOCUMENTED: ICD-10-PCS | Mod: CPTII,S$GLB,, | Performed by: INTERNAL MEDICINE

## 2021-10-19 PROCEDURE — 3078F DIAST BP <80 MM HG: CPT | Mod: CPTII,S$GLB,, | Performed by: INTERNAL MEDICINE

## 2021-10-19 PROCEDURE — 3288F FALL RISK ASSESSMENT DOCD: CPT | Mod: CPTII,S$GLB,, | Performed by: INTERNAL MEDICINE

## 2021-10-19 PROCEDURE — 1101F PR PT FALLS ASSESS DOC 0-1 FALLS W/OUT INJ PAST YR: ICD-10-PCS | Mod: CPTII,S$GLB,, | Performed by: INTERNAL MEDICINE

## 2021-10-19 PROCEDURE — 99999 PR PBB SHADOW E&M-EST. PATIENT-LVL IV: ICD-10-PCS | Mod: PBBFAC,,, | Performed by: INTERNAL MEDICINE

## 2021-10-19 PROCEDURE — 3008F PR BODY MASS INDEX (BMI) DOCUMENTED: ICD-10-PCS | Mod: CPTII,S$GLB,, | Performed by: INTERNAL MEDICINE

## 2021-10-19 PROCEDURE — 1159F MED LIST DOCD IN RCRD: CPT | Mod: CPTII,S$GLB,, | Performed by: INTERNAL MEDICINE

## 2021-10-19 PROCEDURE — 1159F PR MEDICATION LIST DOCUMENTED IN MEDICAL RECORD: ICD-10-PCS | Mod: CPTII,S$GLB,, | Performed by: INTERNAL MEDICINE

## 2021-10-19 PROCEDURE — 1160F PR REVIEW ALL MEDS BY PRESCRIBER/CLIN PHARMACIST DOCUMENTED: ICD-10-PCS | Mod: CPTII,S$GLB,, | Performed by: INTERNAL MEDICINE

## 2021-10-19 PROCEDURE — 1125F AMNT PAIN NOTED PAIN PRSNT: CPT | Mod: CPTII,S$GLB,, | Performed by: INTERNAL MEDICINE

## 2021-10-19 PROCEDURE — 1125F PR PAIN SEVERITY QUANTIFIED, PAIN PRESENT: ICD-10-PCS | Mod: CPTII,S$GLB,, | Performed by: INTERNAL MEDICINE

## 2021-10-19 PROCEDURE — 1101F PT FALLS ASSESS-DOCD LE1/YR: CPT | Mod: CPTII,S$GLB,, | Performed by: INTERNAL MEDICINE

## 2021-10-19 PROCEDURE — 3008F BODY MASS INDEX DOCD: CPT | Mod: CPTII,S$GLB,, | Performed by: INTERNAL MEDICINE

## 2021-10-19 PROCEDURE — 1160F RVW MEDS BY RX/DR IN RCRD: CPT | Mod: CPTII,S$GLB,, | Performed by: INTERNAL MEDICINE

## 2021-10-20 ENCOUNTER — CLINICAL SUPPORT (OUTPATIENT)
Dept: REHABILITATION | Facility: HOSPITAL | Age: 76
End: 2021-10-20
Payer: MEDICARE

## 2021-10-20 DIAGNOSIS — R26.89 GAIT, ANTALGIC: ICD-10-CM

## 2021-10-20 DIAGNOSIS — R53.1 DECREASED STRENGTH: ICD-10-CM

## 2021-10-20 DIAGNOSIS — R26.89 BALANCE PROBLEM: ICD-10-CM

## 2021-10-20 PROCEDURE — 97113 AQUATIC THERAPY/EXERCISES: CPT | Mod: CQ

## 2021-10-21 ENCOUNTER — OFFICE VISIT (OUTPATIENT)
Dept: PAIN MEDICINE | Facility: CLINIC | Age: 76
End: 2021-10-21
Payer: MEDICARE

## 2021-10-21 VITALS
HEIGHT: 62 IN | HEART RATE: 74 BPM | DIASTOLIC BLOOD PRESSURE: 75 MMHG | WEIGHT: 175.25 LBS | SYSTOLIC BLOOD PRESSURE: 135 MMHG | BODY MASS INDEX: 32.25 KG/M2

## 2021-10-21 DIAGNOSIS — G89.29 CHRONIC PAIN OF BOTH KNEES: ICD-10-CM

## 2021-10-21 DIAGNOSIS — M17.0 OSTEOARTHRITIS OF BOTH KNEES, UNSPECIFIED OSTEOARTHRITIS TYPE: Primary | ICD-10-CM

## 2021-10-21 DIAGNOSIS — M79.2 NEUROPATHIC PAIN: ICD-10-CM

## 2021-10-21 DIAGNOSIS — M25.561 CHRONIC PAIN OF BOTH KNEES: ICD-10-CM

## 2021-10-21 DIAGNOSIS — M25.562 CHRONIC PAIN OF BOTH KNEES: ICD-10-CM

## 2021-10-21 PROBLEM — R26.89 GAIT, ANTALGIC: Status: ACTIVE | Noted: 2021-10-21

## 2021-10-21 PROBLEM — R53.1 DECREASED STRENGTH: Status: ACTIVE | Noted: 2021-10-21

## 2021-10-21 PROBLEM — R26.89 BALANCE PROBLEM: Status: ACTIVE | Noted: 2021-10-21

## 2021-10-21 PROCEDURE — 1159F PR MEDICATION LIST DOCUMENTED IN MEDICAL RECORD: ICD-10-PCS | Mod: CPTII,S$GLB,, | Performed by: ANESTHESIOLOGY

## 2021-10-21 PROCEDURE — 99999 PR PBB SHADOW E&M-EST. PATIENT-LVL V: CPT | Mod: PBBFAC,,, | Performed by: ANESTHESIOLOGY

## 2021-10-21 PROCEDURE — 1125F PR PAIN SEVERITY QUANTIFIED, PAIN PRESENT: ICD-10-PCS | Mod: CPTII,S$GLB,, | Performed by: ANESTHESIOLOGY

## 2021-10-21 PROCEDURE — 3008F BODY MASS INDEX DOCD: CPT | Mod: CPTII,S$GLB,, | Performed by: ANESTHESIOLOGY

## 2021-10-21 PROCEDURE — 3008F PR BODY MASS INDEX (BMI) DOCUMENTED: ICD-10-PCS | Mod: CPTII,S$GLB,, | Performed by: ANESTHESIOLOGY

## 2021-10-21 PROCEDURE — 3075F SYST BP GE 130 - 139MM HG: CPT | Mod: CPTII,S$GLB,, | Performed by: ANESTHESIOLOGY

## 2021-10-21 PROCEDURE — 1101F PT FALLS ASSESS-DOCD LE1/YR: CPT | Mod: CPTII,S$GLB,, | Performed by: ANESTHESIOLOGY

## 2021-10-21 PROCEDURE — 1125F AMNT PAIN NOTED PAIN PRSNT: CPT | Mod: CPTII,S$GLB,, | Performed by: ANESTHESIOLOGY

## 2021-10-21 PROCEDURE — 1160F PR REVIEW ALL MEDS BY PRESCRIBER/CLIN PHARMACIST DOCUMENTED: ICD-10-PCS | Mod: CPTII,S$GLB,, | Performed by: ANESTHESIOLOGY

## 2021-10-21 PROCEDURE — 3288F FALL RISK ASSESSMENT DOCD: CPT | Mod: CPTII,S$GLB,, | Performed by: ANESTHESIOLOGY

## 2021-10-21 PROCEDURE — 1159F MED LIST DOCD IN RCRD: CPT | Mod: CPTII,S$GLB,, | Performed by: ANESTHESIOLOGY

## 2021-10-21 PROCEDURE — 3075F PR MOST RECENT SYSTOLIC BLOOD PRESS GE 130-139MM HG: ICD-10-PCS | Mod: CPTII,S$GLB,, | Performed by: ANESTHESIOLOGY

## 2021-10-21 PROCEDURE — 99214 PR OFFICE/OUTPT VISIT, EST, LEVL IV, 30-39 MIN: ICD-10-PCS | Mod: S$GLB,,, | Performed by: ANESTHESIOLOGY

## 2021-10-21 PROCEDURE — 3078F DIAST BP <80 MM HG: CPT | Mod: CPTII,S$GLB,, | Performed by: ANESTHESIOLOGY

## 2021-10-21 PROCEDURE — 1101F PR PT FALLS ASSESS DOC 0-1 FALLS W/OUT INJ PAST YR: ICD-10-PCS | Mod: CPTII,S$GLB,, | Performed by: ANESTHESIOLOGY

## 2021-10-21 PROCEDURE — 3288F PR FALLS RISK ASSESSMENT DOCUMENTED: ICD-10-PCS | Mod: CPTII,S$GLB,, | Performed by: ANESTHESIOLOGY

## 2021-10-21 PROCEDURE — 99214 OFFICE O/P EST MOD 30 MIN: CPT | Mod: S$GLB,,, | Performed by: ANESTHESIOLOGY

## 2021-10-21 PROCEDURE — 1160F RVW MEDS BY RX/DR IN RCRD: CPT | Mod: CPTII,S$GLB,, | Performed by: ANESTHESIOLOGY

## 2021-10-21 PROCEDURE — 3078F PR MOST RECENT DIASTOLIC BLOOD PRESSURE < 80 MM HG: ICD-10-PCS | Mod: CPTII,S$GLB,, | Performed by: ANESTHESIOLOGY

## 2021-10-21 PROCEDURE — 99999 PR PBB SHADOW E&M-EST. PATIENT-LVL V: ICD-10-PCS | Mod: PBBFAC,,, | Performed by: ANESTHESIOLOGY

## 2021-10-22 ENCOUNTER — OFFICE VISIT (OUTPATIENT)
Dept: SPORTS MEDICINE | Facility: CLINIC | Age: 76
End: 2021-10-22
Payer: MEDICARE

## 2021-10-22 VITALS — BODY MASS INDEX: 32.2 KG/M2 | HEIGHT: 62 IN | WEIGHT: 175 LBS

## 2021-10-22 DIAGNOSIS — G89.29 CHRONIC PAIN OF BOTH KNEES: ICD-10-CM

## 2021-10-22 DIAGNOSIS — M25.562 CHRONIC PAIN OF BOTH KNEES: ICD-10-CM

## 2021-10-22 DIAGNOSIS — M17.0 PRIMARY OSTEOARTHRITIS OF BOTH KNEES: Primary | ICD-10-CM

## 2021-10-22 DIAGNOSIS — M25.561 CHRONIC PAIN OF BOTH KNEES: ICD-10-CM

## 2021-10-22 PROCEDURE — 20610 DRAIN/INJ JOINT/BURSA W/O US: CPT | Mod: 50,S$GLB,, | Performed by: PHYSICAL MEDICINE & REHABILITATION

## 2021-10-22 PROCEDURE — 99999 PR PBB SHADOW E&M-EST. PATIENT-LVL IV: CPT | Mod: PBBFAC,,, | Performed by: PHYSICAL MEDICINE & REHABILITATION

## 2021-10-22 PROCEDURE — 1125F PR PAIN SEVERITY QUANTIFIED, PAIN PRESENT: ICD-10-PCS | Mod: CPTII,S$GLB,, | Performed by: PHYSICAL MEDICINE & REHABILITATION

## 2021-10-22 PROCEDURE — 1101F PT FALLS ASSESS-DOCD LE1/YR: CPT | Mod: CPTII,S$GLB,, | Performed by: PHYSICAL MEDICINE & REHABILITATION

## 2021-10-22 PROCEDURE — 1101F PR PT FALLS ASSESS DOC 0-1 FALLS W/OUT INJ PAST YR: ICD-10-PCS | Mod: CPTII,S$GLB,, | Performed by: PHYSICAL MEDICINE & REHABILITATION

## 2021-10-22 PROCEDURE — 1159F MED LIST DOCD IN RCRD: CPT | Mod: CPTII,S$GLB,, | Performed by: PHYSICAL MEDICINE & REHABILITATION

## 2021-10-22 PROCEDURE — 99999 PR PBB SHADOW E&M-EST. PATIENT-LVL IV: ICD-10-PCS | Mod: PBBFAC,,, | Performed by: PHYSICAL MEDICINE & REHABILITATION

## 2021-10-22 PROCEDURE — 99499 UNLISTED E&M SERVICE: CPT | Mod: S$GLB,,, | Performed by: PHYSICAL MEDICINE & REHABILITATION

## 2021-10-22 PROCEDURE — 1160F RVW MEDS BY RX/DR IN RCRD: CPT | Mod: CPTII,S$GLB,, | Performed by: PHYSICAL MEDICINE & REHABILITATION

## 2021-10-22 PROCEDURE — 20610 LARGE JOINT ASPIRATION/INJECTION: BILATERAL KNEE: ICD-10-PCS | Mod: 50,S$GLB,, | Performed by: PHYSICAL MEDICINE & REHABILITATION

## 2021-10-22 PROCEDURE — 3288F FALL RISK ASSESSMENT DOCD: CPT | Mod: CPTII,S$GLB,, | Performed by: PHYSICAL MEDICINE & REHABILITATION

## 2021-10-22 PROCEDURE — 3288F PR FALLS RISK ASSESSMENT DOCUMENTED: ICD-10-PCS | Mod: CPTII,S$GLB,, | Performed by: PHYSICAL MEDICINE & REHABILITATION

## 2021-10-22 PROCEDURE — 3008F PR BODY MASS INDEX (BMI) DOCUMENTED: ICD-10-PCS | Mod: CPTII,S$GLB,, | Performed by: PHYSICAL MEDICINE & REHABILITATION

## 2021-10-22 PROCEDURE — 3008F BODY MASS INDEX DOCD: CPT | Mod: CPTII,S$GLB,, | Performed by: PHYSICAL MEDICINE & REHABILITATION

## 2021-10-22 PROCEDURE — 1160F PR REVIEW ALL MEDS BY PRESCRIBER/CLIN PHARMACIST DOCUMENTED: ICD-10-PCS | Mod: CPTII,S$GLB,, | Performed by: PHYSICAL MEDICINE & REHABILITATION

## 2021-10-22 PROCEDURE — 1125F AMNT PAIN NOTED PAIN PRSNT: CPT | Mod: CPTII,S$GLB,, | Performed by: PHYSICAL MEDICINE & REHABILITATION

## 2021-10-22 PROCEDURE — 99499 NO LOS: ICD-10-PCS | Mod: S$GLB,,, | Performed by: PHYSICAL MEDICINE & REHABILITATION

## 2021-10-22 PROCEDURE — 1159F PR MEDICATION LIST DOCUMENTED IN MEDICAL RECORD: ICD-10-PCS | Mod: CPTII,S$GLB,, | Performed by: PHYSICAL MEDICINE & REHABILITATION

## 2021-10-25 ENCOUNTER — TELEPHONE (OUTPATIENT)
Dept: PAIN MEDICINE | Facility: CLINIC | Age: 76
End: 2021-10-25
Payer: MEDICARE

## 2021-10-26 ENCOUNTER — TELEPHONE (OUTPATIENT)
Dept: PAIN MEDICINE | Facility: CLINIC | Age: 76
End: 2021-10-26
Payer: MEDICARE

## 2021-10-27 ENCOUNTER — INFUSION (OUTPATIENT)
Dept: INFUSION THERAPY | Facility: HOSPITAL | Age: 76
End: 2021-10-27
Attending: FAMILY MEDICINE
Payer: MEDICARE

## 2021-10-27 ENCOUNTER — PATIENT MESSAGE (OUTPATIENT)
Dept: PAIN MEDICINE | Facility: HOSPITAL | Age: 76
End: 2021-10-27
Payer: MEDICARE

## 2021-10-27 VITALS
DIASTOLIC BLOOD PRESSURE: 72 MMHG | HEART RATE: 80 BPM | SYSTOLIC BLOOD PRESSURE: 130 MMHG | OXYGEN SATURATION: 97 % | RESPIRATION RATE: 16 BRPM | TEMPERATURE: 98 F

## 2021-10-27 DIAGNOSIS — M81.0 AGE-RELATED OSTEOPOROSIS WITHOUT CURRENT PATHOLOGICAL FRACTURE: Primary | ICD-10-CM

## 2021-10-27 PROCEDURE — 96372 THER/PROPH/DIAG INJ SC/IM: CPT

## 2021-10-27 PROCEDURE — 63600175 PHARM REV CODE 636 W HCPCS: Mod: JG | Performed by: INTERNAL MEDICINE

## 2021-10-27 RX ADMIN — DENOSUMAB 60 MG: 60 INJECTION SUBCUTANEOUS at 11:10

## 2021-10-29 ENCOUNTER — OFFICE VISIT (OUTPATIENT)
Dept: SPORTS MEDICINE | Facility: CLINIC | Age: 76
End: 2021-10-29
Payer: MEDICARE

## 2021-10-29 VITALS — HEIGHT: 62 IN | WEIGHT: 175 LBS | BODY MASS INDEX: 32.2 KG/M2

## 2021-10-29 DIAGNOSIS — R26.9 GAIT ABNORMALITY: ICD-10-CM

## 2021-10-29 DIAGNOSIS — G89.29 CHRONIC PAIN OF BOTH KNEES: Primary | ICD-10-CM

## 2021-10-29 DIAGNOSIS — M25.561 CHRONIC PAIN OF BOTH KNEES: Primary | ICD-10-CM

## 2021-10-29 DIAGNOSIS — M17.0 PRIMARY OSTEOARTHRITIS OF BOTH KNEES: ICD-10-CM

## 2021-10-29 DIAGNOSIS — M25.562 CHRONIC PAIN OF BOTH KNEES: Primary | ICD-10-CM

## 2021-10-29 PROCEDURE — 20610 DRAIN/INJ JOINT/BURSA W/O US: CPT | Mod: 50,S$GLB,, | Performed by: PHYSICAL MEDICINE & REHABILITATION

## 2021-10-29 PROCEDURE — 1160F PR REVIEW ALL MEDS BY PRESCRIBER/CLIN PHARMACIST DOCUMENTED: ICD-10-PCS | Mod: CPTII,S$GLB,, | Performed by: PHYSICAL MEDICINE & REHABILITATION

## 2021-10-29 PROCEDURE — 1101F PR PT FALLS ASSESS DOC 0-1 FALLS W/OUT INJ PAST YR: ICD-10-PCS | Mod: CPTII,S$GLB,, | Performed by: PHYSICAL MEDICINE & REHABILITATION

## 2021-10-29 PROCEDURE — 1125F PR PAIN SEVERITY QUANTIFIED, PAIN PRESENT: ICD-10-PCS | Mod: CPTII,S$GLB,, | Performed by: PHYSICAL MEDICINE & REHABILITATION

## 2021-10-29 PROCEDURE — 99999 PR PBB SHADOW E&M-EST. PATIENT-LVL IV: CPT | Mod: PBBFAC,,, | Performed by: PHYSICAL MEDICINE & REHABILITATION

## 2021-10-29 PROCEDURE — 3288F PR FALLS RISK ASSESSMENT DOCUMENTED: ICD-10-PCS | Mod: CPTII,S$GLB,, | Performed by: PHYSICAL MEDICINE & REHABILITATION

## 2021-10-29 PROCEDURE — 20610 LARGE JOINT ASPIRATION/INJECTION: BILATERAL KNEE: ICD-10-PCS | Mod: 50,S$GLB,, | Performed by: PHYSICAL MEDICINE & REHABILITATION

## 2021-10-29 PROCEDURE — 3008F PR BODY MASS INDEX (BMI) DOCUMENTED: ICD-10-PCS | Mod: CPTII,S$GLB,, | Performed by: PHYSICAL MEDICINE & REHABILITATION

## 2021-10-29 PROCEDURE — 99499 UNLISTED E&M SERVICE: CPT | Mod: S$GLB,,, | Performed by: PHYSICAL MEDICINE & REHABILITATION

## 2021-10-29 PROCEDURE — 3288F FALL RISK ASSESSMENT DOCD: CPT | Mod: CPTII,S$GLB,, | Performed by: PHYSICAL MEDICINE & REHABILITATION

## 2021-10-29 PROCEDURE — 1159F PR MEDICATION LIST DOCUMENTED IN MEDICAL RECORD: ICD-10-PCS | Mod: CPTII,S$GLB,, | Performed by: PHYSICAL MEDICINE & REHABILITATION

## 2021-10-29 PROCEDURE — 1101F PT FALLS ASSESS-DOCD LE1/YR: CPT | Mod: CPTII,S$GLB,, | Performed by: PHYSICAL MEDICINE & REHABILITATION

## 2021-10-29 PROCEDURE — 99999 PR PBB SHADOW E&M-EST. PATIENT-LVL IV: ICD-10-PCS | Mod: PBBFAC,,, | Performed by: PHYSICAL MEDICINE & REHABILITATION

## 2021-10-29 PROCEDURE — 1125F AMNT PAIN NOTED PAIN PRSNT: CPT | Mod: CPTII,S$GLB,, | Performed by: PHYSICAL MEDICINE & REHABILITATION

## 2021-10-29 PROCEDURE — 3008F BODY MASS INDEX DOCD: CPT | Mod: CPTII,S$GLB,, | Performed by: PHYSICAL MEDICINE & REHABILITATION

## 2021-10-29 PROCEDURE — 99499 NO LOS: ICD-10-PCS | Mod: S$GLB,,, | Performed by: PHYSICAL MEDICINE & REHABILITATION

## 2021-10-29 PROCEDURE — 1159F MED LIST DOCD IN RCRD: CPT | Mod: CPTII,S$GLB,, | Performed by: PHYSICAL MEDICINE & REHABILITATION

## 2021-10-29 PROCEDURE — 1160F RVW MEDS BY RX/DR IN RCRD: CPT | Mod: CPTII,S$GLB,, | Performed by: PHYSICAL MEDICINE & REHABILITATION

## 2021-11-01 ENCOUNTER — CLINICAL SUPPORT (OUTPATIENT)
Dept: REHABILITATION | Facility: HOSPITAL | Age: 76
End: 2021-11-01
Payer: MEDICARE

## 2021-11-01 DIAGNOSIS — R53.1 DECREASED STRENGTH: ICD-10-CM

## 2021-11-01 DIAGNOSIS — R26.89 GAIT, ANTALGIC: ICD-10-CM

## 2021-11-01 DIAGNOSIS — R26.89 BALANCE PROBLEM: ICD-10-CM

## 2021-11-01 PROCEDURE — 97113 AQUATIC THERAPY/EXERCISES: CPT

## 2021-11-02 ENCOUNTER — TELEPHONE (OUTPATIENT)
Dept: PAIN MEDICINE | Facility: CLINIC | Age: 76
End: 2021-11-02
Payer: MEDICARE

## 2021-11-05 ENCOUNTER — HOSPITAL ENCOUNTER (OUTPATIENT)
Facility: HOSPITAL | Age: 76
Discharge: HOME | End: 2021-11-05
Attending: ANESTHESIOLOGY | Admitting: ANESTHESIOLOGY
Payer: MEDICARE

## 2021-11-05 VITALS
HEART RATE: 78 BPM | DIASTOLIC BLOOD PRESSURE: 63 MMHG | BODY MASS INDEX: 32.41 KG/M2 | WEIGHT: 176.13 LBS | HEIGHT: 62 IN | SYSTOLIC BLOOD PRESSURE: 112 MMHG | RESPIRATION RATE: 18 BRPM | OXYGEN SATURATION: 96 % | TEMPERATURE: 98 F

## 2021-11-05 DIAGNOSIS — M17.12 OSTEOARTHRITIS OF LEFT KNEE, UNSPECIFIED OSTEOARTHRITIS TYPE: ICD-10-CM

## 2021-11-05 PROCEDURE — 64624 DSTRJ NULYT AGT GNCLR NRV: CPT | Mod: LT,,, | Performed by: ANESTHESIOLOGY

## 2021-11-05 PROCEDURE — 64624 PR DESTRUCT, NEUROLYTIC AGENT, GENICULAR NERVE, W/IMG: ICD-10-PCS | Mod: LT,,, | Performed by: ANESTHESIOLOGY

## 2021-11-05 PROCEDURE — 64640 INJECTION TREATMENT OF NERVE: CPT | Performed by: ANESTHESIOLOGY

## 2021-11-05 PROCEDURE — 64624 DSTRJ NULYT AGT GNCLR NRV: CPT | Performed by: ANESTHESIOLOGY

## 2021-11-05 PROCEDURE — 63600175 PHARM REV CODE 636 W HCPCS: Performed by: ANESTHESIOLOGY

## 2021-11-05 PROCEDURE — 25000003 PHARM REV CODE 250: Performed by: ANESTHESIOLOGY

## 2021-11-05 PROCEDURE — 99152 MOD SED SAME PHYS/QHP 5/>YRS: CPT | Performed by: ANESTHESIOLOGY

## 2021-11-05 RX ORDER — FENTANYL CITRATE 50 UG/ML
INJECTION, SOLUTION INTRAMUSCULAR; INTRAVENOUS
Status: DISCONTINUED | OUTPATIENT
Start: 2021-11-05 | End: 2021-11-05 | Stop reason: HOSPADM

## 2021-11-05 RX ORDER — BUPIVACAINE HYDROCHLORIDE 2.5 MG/ML
INJECTION, SOLUTION EPIDURAL; INFILTRATION; INTRACAUDAL
Status: DISCONTINUED | OUTPATIENT
Start: 2021-11-05 | End: 2021-11-05 | Stop reason: HOSPADM

## 2021-11-05 RX ORDER — MIDAZOLAM HYDROCHLORIDE 1 MG/ML
INJECTION, SOLUTION INTRAMUSCULAR; INTRAVENOUS
Status: DISCONTINUED | OUTPATIENT
Start: 2021-11-05 | End: 2021-11-05 | Stop reason: HOSPADM

## 2021-11-05 RX ORDER — TRIAMCINOLONE ACETONIDE 40 MG/ML
INJECTION, SUSPENSION INTRA-ARTICULAR; INTRAMUSCULAR
Status: DISCONTINUED | OUTPATIENT
Start: 2021-11-05 | End: 2021-11-05 | Stop reason: HOSPADM

## 2021-11-05 RX ORDER — SODIUM BICARBONATE 1 MEQ/ML
SYRINGE (ML) INTRAVENOUS
Status: DISCONTINUED | OUTPATIENT
Start: 2021-11-05 | End: 2021-11-05 | Stop reason: HOSPADM

## 2021-11-08 ENCOUNTER — SPECIALTY PHARMACY (OUTPATIENT)
Dept: PHARMACY | Facility: CLINIC | Age: 76
End: 2021-11-08
Payer: MEDICARE

## 2021-11-09 ENCOUNTER — TELEPHONE (OUTPATIENT)
Dept: PAIN MEDICINE | Facility: CLINIC | Age: 76
End: 2021-11-09
Payer: MEDICARE

## 2021-11-09 RX ORDER — METHYLPREDNISOLONE 4 MG/1
TABLET ORAL
Qty: 1 EACH | Refills: 0 | Status: SHIPPED | OUTPATIENT
Start: 2021-11-09 | End: 2022-05-03 | Stop reason: ALTCHOICE

## 2021-11-15 ENCOUNTER — TELEPHONE (OUTPATIENT)
Dept: PAIN MEDICINE | Facility: CLINIC | Age: 76
End: 2021-11-15
Payer: MEDICARE

## 2021-11-17 ENCOUNTER — TELEPHONE (OUTPATIENT)
Dept: CARDIOLOGY | Facility: CLINIC | Age: 76
End: 2021-11-17
Payer: MEDICARE

## 2021-12-13 ENCOUNTER — SPECIALTY PHARMACY (OUTPATIENT)
Dept: PHARMACY | Facility: CLINIC | Age: 76
End: 2021-12-13
Payer: MEDICARE

## 2021-12-13 NOTE — TELEPHONE ENCOUNTER
Patient called to report that her zoila renewal paperwork is filled out and put in the mail, and she has one dose left for her injection due on 12/16.     Patient reports she will be out of town 12/16-12-27, and requests all calls for updates/questions, or to schedule her next refill be directed to her cell phone. She will prefer her next refill delivery no earlier than 12/29 to ensure she is home to receive the package. OSP to f/u pending receipt of FA paperwork in the mail.

## 2021-12-15 NOTE — TELEPHONE ENCOUNTER
[EDIT] - Per Urvashi's note, will be on a lookout for Pt's portion in the mail    12/7 Mailed Pt PAP portion to Pt's address in [facility] per pt request  12/7 Faxed MD portion     PTL

## 2021-12-17 NOTE — TELEPHONE ENCOUNTER
Informed patient that representative at [employer] stated that applications will not be processed until after January 4th. Patient was in [place] during the call and requested a call back on 12/28. She agreed to pay $35 for a 1 month supply. She is due for her injection on 12/30.

## 2021-12-21 NOTE — PROGRESS NOTES
"  Physical Therapy Treatment Note     Name: Jessica Perez  Clinic Number: [id number]    Therapy Diagnosis:   Encounter Diagnoses   Name Primary?    Chronic midline low back pain without sciatica     Generalized weakness     Difficulty walking      Physician: Dileep Mendoza MD    Visit Date: 12/3/2020    Physician Orders: PT Eval and Treat  Medical Diagnosis: M89.49 (ICD-10-CM) - Primary osteoarthritis involving multiple joints  Evaluation Date: 11/30/2020  Authorization Period Expiration: 12/31/2020  Plan of Care Certification Period: 3/3/21  Visit #/Visits authorized: 2/ 20  PTA Visit #: 1     Time In: 1:00  Time Out: 1:45  Total Billable Time: 45 minutes    Precautions: Standard    Subjective     Pt reports: felt pretty sore but the exercises at home are helping.   She was compliant with home exercise program.  Response to previous treatment: soreness  Functional change: none reported    Pain: 4/10  Location: low back and knees    Objective     Jessica received therapeutic exercises to develop strength, endurance, ROM, flexibility, posture and core stabilization for 30 minutes including:    Bridges x 10  Bridges on ball 2 x 10   +LTR on ball 2'  +DKTC on ball 2'  +Ab press with swiss ball  Piriformis stretch 3 x 30" each   Hip adduction squeeze with ball 2 x 1'   Belt block 2 x 1'  +Clamshells 2 x 10 ball between feet  +Reverse clamshells 2 x 10 ball at knees  +Hip adduction 5x5" hold in HL manually resisted     Open books x 10 each   SL abduction x 10 each      MET for L AI correction with shotgunning     Jessica received the following manual therapy techniques were applied to the following for 15 minutes, including:    STM to bilateral lumbar paraspinals and quadratus lumborum musculature  Myofascial release to bilateral lumbar paraspinals and QL      Home Exercises Provided and Patient Education Provided     Education provided:   - Self knee distraction with ankle weights at home before using " Please let Trinity know that her UA is showing UTI.  Can treat with macrobid bid x 5 days.    Culture is in progress   stationary bicycle at home to reduce knee pain.    Written Home Exercises Provided: Patient instructed to cont prior HEP.  Exercises were reviewed and Jessica was able to demonstrate them prior to the end of the session.  Jessica demonstrated good  understanding of the education provided.     See EMR under Patient Instructions for exercises provided 11/30/2020.    Assessment     Pt presents today with LBP affecting her posture with a mild forward trunk lean. Noted pt weakness in hip abductor through demo of increased fatigue in abduction exercises compared to flexion or extension and reports of muscle tension/cramping during exercises. Pt showed a good understanding of each exercise and the importance of correct form and the need for strengthening. Pt tolerated tx session well today with no complaints and will benefit from cont skilled PT and progression of exercises.  Jessica is progressing well towards her goals.   Pt prognosis is Good.     Pt will continue to benefit from skilled outpatient physical therapy to address the deficits listed in the problem list box on initial evaluation, provide pt/family education and to maximize pt's level of independence in the home and community environment.     Pt's spiritual, cultural and educational needs considered and pt agreeable to plan of care and goals.     Anticipated barriers to physical therapy: chronicity of pain, pt's ability to be compliant with program d/t taking care of      Goals:   Short Term Goals: 4 weeks   1. Pt will be independent with HEP performance in order to continue PT progress at home. Met 12/4/2020  2. Pt will report pain at worst in low back of 5/10 or less Progressing not met  3. Pt will tolerate progression to standing core and LE exercises. Progressing not met  4. Pt will improve lumbar rotation and SB motion by 25% or greater Progressing not met  5. Pt will present with no pelvic obliquity for 3 consecutive sessions Progressing not  met     Long Term Goals: 6 weeks   1. Pt will improve TERRIE disability score to 10% disability or less in order to improve overall QOL and return to PLOF. Progressing not met  2. Pt will demo at least 4/5 strength in hip and pelvic stabilizers Progressing not met  3. Pt will tolerate performance of 45 minutes of exercise with no complaints of increased low back pain Progressing not met  4. Pt will report ability to clean her home with no increased low back pain Progressing not met    Plan     Cont to progress pt as tolerated and indicated per POC.    Chuck Ornelas, PTA

## 2021-12-29 ENCOUNTER — SPECIALTY PHARMACY (OUTPATIENT)
Dept: PHARMACY | Facility: CLINIC | Age: 76
End: 2021-12-29
Payer: MEDICARE

## 2022-01-05 NOTE — PROGRESS NOTES
Established Patient Chronic Pain Note     Referring Physician: No ref. provider found    PCP: Davidson Jang MD    Chief Complaint:   Chief Complaint   Patient presents with    Knee Pain     left        SUBJECTIVE:  Interval Hx: 1/6/22  Pt presents s/p L genicular cooled RFA 11/5/21.  Patient reports no meaningful relief following her radiofrequency ablation.  Patient reports she continues to have significant relief in the right knee following her right-sided genicular diagnostic block.  Patient reports her right knee feels a world of difference.   In the left knee, patient again received 1 week of significant relief following her diagnostic block and no relief following her radiofrequency ablation or with Euflexxa injections with Dr. Hamilton.  Patient reports initially her left knee pain was exacerbated following the 2nd procedure.  Patient continues to report significant pain along the medial aspect of the left knee.  Pain is exacerbated with knee flexion, extension and with ambulation.  Patient denies significant weakness in bilateral lower extremities.    Interval Hx 10/21/21  Patient presents for follow-up after bilateral genicular diagnostic block October 1, 2021. Patient reports 100% relief in the right knee which is sustained and 90% in the left knee which lasted approximately 1 week.  Of note patient also received bilateral Euflexxa injections in knees October 13, 2021 with Dr. Hamilton.  Patient is scheduled for injection 2 and 3 in the next 2 weeks.  Today patient reports pain along the medial aspect of the left knee.  Pain is currently a 5/10 and intermittent.  Pain is exacerbated with standing and with ambulation.  Patient denies any associated weakness in bilateral lower extremities.  Patient reports she has an upcoming trip to [place] with her son would like to be able to [place] [facility] [place].      HPI 9/24/21  Jessica Perez is a 76 y.o. female with past medical history significant  "for for hyperlipidemia, obstructive sleep apnea, who presents to the clinic for the evaluation of bilateral knee pain.  Patient reports pain began approximately 3 years prior without inciting accident injury or trauma.  Over the last year the pain has become constant and interferes with sleep.  Today patient relates pain is 8/10 but at its worst is 10/10.  Pain is described as a "bruise" and burning in nature.  Pain is more severe on the left than on the right.  Patient describes suprapatellar pain as well as pain along the medial joint line.  She does report inferior radiation along the medial aspect of both legs to the shin.  She reports subjective weakness with a sensation of buckling in bilateral lower extremities with prolonged standing.  Pain is exacerbated with walking.  Patient reports she is able to get through a groceries trip at [employer] as long as she has a shopping cart and can ambulate for approximately an hour.  Pain has been improved by prior corticosteroid and viscosupplementation however these have been short lived.  Pain is also improved with ice packs.    Patient reports significant motor weakness.  Patient denies night fever/night sweats, urinary incontinence, bowel incontinence and significant weight loss.    Pain Disability Index Review:46    Non-Pharmacologic Treatments:  Physical Therapy/Home Exercise: yes  Ice/Heat:yes  TENS: no  Acupuncture: no  Massage: no  Chiropractic: no    Other: no    Pain Medications:  - Opioids: Ultram (Tramadol HCL)  - Adjuvant Medications: Neurontin (Gabapentin)  - Anti-Coagulants: Aspirin    Pain Procedures:   -11/5/21: L genicular cooled RFA  -10/29/21:  Euflexxa bilateral knees; Dr. Hamilton  -10/22/21:  Euflexxa bilateral knees; Dr. Hamilton  -10/13/21: Euflexxa: b/l knees: Dr. Hamilton  -February 9, 2021:  Left knee corticosteroid injection; Dr. Jang  -July 25, 2018:  Right knee Synvisc injection 3; Dr. Ponce  -July 18, 2018:  Right knee Synvisc injection 2.; Dr." Rosario  -July 11, 2018:  Right knee Synvisc injection 1.:  Dr. Ponce  -May 16th, May 23rd, May 30th 2018:  Synvisc injections 1 through 3; left knee; Dr. Ponce      Past Medical History:   Diagnosis Date    Acute pain of left knee 3/22/2021    Arthritis     Arthritis of knee 2/9/2021    At risk for sleep apnea 9/19/2019    Bilateral primary osteoarthritis of knee 4/4/2018    Chest pain, moderate coronary artery risk 8/8/2019    Chronic pain of both knees 3/23/2021    Costochondritis 1/20/2020    Depression, major, recurrent, mild     Difficulty walking 11/30/2020    Difficulty walking 11/30/2020    LANRE (generalized anxiety disorder) 3/22/2021    Generalized weakness 11/30/2020    Generalized weakness 11/30/2020    Glaucoma     HLD (hyperlipidemia)     Muscle cramps 5/23/2018    Osteopenia of multiple sites 4/2/2018    Primary osteoarthritis of right knee 05/30/2018    Primary snoring     Sleep related leg cramps 9/7/2010    SOB (shortness of breath) 8/8/2019    Unspecified gastritis and gastroduodenitis without mention of hemorrhage 11/28/2010     Dx updated per 2019 IMO Load    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     Past Surgical History:   Procedure Laterality Date    CATARACT EXTRACTION W/  INTRAOCULAR LENS IMPLANT Left 02/04/2019    CATARACT EXTRACTION W/  INTRAOCULAR LENS IMPLANT Right 03/04/2019    CHOLECYSTECTOMY      DILATION AND CURETTAGE OF UTERUS      finger surgery      middle finger on right hand    GALLBLADDER SURGERY      INJECTION OF ANESTHETIC AGENT AROUND NERVE Bilateral 10/1/2021    Procedure: Bilateral Genicular nerve block -;  Surgeon: Primo Bond MD;  Location: [facility];  Service: Pain Management;  Laterality: Bilateral;    RADIOFREQUENCY THERMOCOAGULATION Left 11/5/2021    Procedure: Left Genicular Nerve RFA (COOLIEF) with RN IV sedation;  Surgeon: Primo Bond MD;  Location: [facility];  Service: Pain Management;  Laterality: Left;     TONSILLECTOMY, ADENOIDECTOMY      TUBAL LIGATION       Review of patient's allergies indicates:   Allergen Reactions    Augmentin [amoxicillin-pot clavulanate] Other (See Comments)     Diarrhea, yeast    Bactrim  [sulfamethoxazole-trimethoprim]      Other reaction(s): Unknown    Celebrex [celecoxib] Other (See Comments)     Stomach pain, gas     Lipitor  [atorvastatin]      Other reaction(s): Unknown    Pravachol  [pravastatin]      Other reaction(s): Unknown    Statins-hmg-coa reductase inhibitors      Other reaction(s): Muscle pain    Sulfa (sulfonamide antibiotics)      Other reaction(s): Swelling    Zoster vaccine live        Current Outpatient Medications   Medication Sig    artificial tears,hypromellose,,GENTEAL/SUSTANE, 0.3 % Gel     aspirin (ECOTRIN) 81 MG EC tablet Take 81 mg by mouth once daily.    azelastine (ASTELIN) 137 mcg (0.1 %) nasal spray 1 spray (137 mcg total) by Nasal route 2 (two) times daily.    b complex vitamins capsule Take 1 capsule by mouth once daily.    calcium carbonate (CALCIUM 500 ORAL) Take by mouth.    cetirizine (ZYRTEC) 10 MG tablet Take 10 mg by mouth once daily.    cyanocobalamin, vitamin B-12, (VITAMIN B-12 ORAL) Take by mouth.    denosumab (PROLIA SUBQ) Inject into the skin.    evolocumab (REPATHA SURECLICK) 140 mg/mL PnIj Inject 1 pen (140 mg total) into the skin every 14 (fourteen) days.    ezetimibe (ZETIA) 10 mg tablet TAKE 1 TABLET BY MOUTH EVERY DAY    fexofenadine (ALLEGRA) 180 MG tablet Take 1 tablet (180 mg total) by mouth once daily.    fluticasone (FLONASE) 50 mcg/actuation nasal spray 2 sprays by Each Nare route once daily.    krill oil-omega-3-dha-epa 150-450 mg CpDR Take by mouth.    loratadine/pseudoephedrine (CLARITIN-D 12 HOUR ORAL) Take by mouth. As needed    MAGNESIUM ORAL Take by mouth.    multivitamin capsule Take 1 capsule by mouth Daily.    UNABLE TO FIND CBD Oil    vitamin E 100 UNIT capsule Take 100 Units by mouth once  daily.    amitriptyline (ELAVIL) 25 MG tablet Take 1 tablet (25 mg total) by mouth nightly as needed for Insomnia for 7 days, THEN 2 tablets (50 mg total) nightly as needed for Insomnia.    etodolac (LODINE) 400 MG tablet Take 1 tablet (400 mg total) by mouth 2 (two) times daily.    gabapentin (NEURONTIN) 100 MG capsule Take 1 capsule (100 mg total) by mouth every evening for 1 day, THEN 2 capsules (200 mg total) every evening for 2 days, THEN 3 capsules (300 mg total) every evening. (Patient not taking: Reported on 9/10/2021)    methylPREDNISolone (MEDROL DOSEPACK) 4 mg tablet use as directed    nutritional supplement/fiber (QUINOA-KALE-HEMP ORAL) Take by mouth.    omeprazole (PRILOSEC OTC) 20 MG tablet Take 1 tablet (20 mg total) by mouth once daily. (Patient not taking: Reported on 1/6/2022)    traMADoL (ULTRAM) 50 mg tablet Take 1 tablet (50 mg total) by mouth every 6 (six) hours as needed for Pain. (Patient not taking: Reported on 1/6/2022)    zoledronic acid/mannitol-water (RECLAST IV) Inject into the vein.     Current Facility-Administered Medications   Medication    triamcinolone acetonide injection 32 mg       Review of Systems     GENERAL:  No weight loss, malaise or fevers.  HEENT:   No recent changes in vision or hearing  NECK:  Negative for lumps, no difficulty with swallowing.  RESPIRATORY:  Negative for cough, wheezing or shortness of breath, patient denies any recent URI.  CARDIOVASCULAR:  Negative for chest pain, leg swelling or palpitations.  GI:  Negative for abdominal discomfort, blood in stools or black stools or change in bowel habits.  MUSCULOSKELETAL:  See HPI.  SKIN:  Negative for lesions, rash, and itching.  PSYCH:  No mood disorder or recent psychosocial stressors.   HEMATOLOGY/LYMPHOLOGY:  Negative for prolonged bleeding, bruising easily or swollen nodes.    NEURO:   No history of headaches, syncope, paralysis, seizures or tremors.  All other reviewed and negative other than  HPI.    OBJECTIVE:    /69   Pulse 80   Wt 80.1 kg (176 lb 9.4 oz)   BMI 32.30 kg/m²       Physical Exam    GENERAL: Well appearing, in no acute distress, alert and oriented x3.  PSYCH:  Mood and affect appropriate.  SKIN: Skin color, texture, turgor normal, no rashes or lesions.  HEAD/FACE:  Normocephalic, atraumatic. Cranial nerves grossly intact.    CV: RRR with palpation of the radial artery.  PULM: No evidence of respiratory difficulty, symmetric chest rise.  GI:  Soft and non-tender.    BACK: Straight leg raising in the sitting and supine positions is negative to radicular pain.   EXTREMITIES: Peripheral joint ROM is full with pain without obvious instability or laxity in all four extremities. No deformities, edema, or skin discoloration. Good capillary refill.  MUSCULOSKELETAL: Able to stand on heels & toes.   Knee Exam:  bilateral    Erythema: Absent  Deformity/Swelling: Absent  Effusion: Absent  Chronic Bony Changes: Present  Creptius: Present     Mild diffuse tenderness palpation of the medial  joint lines and patella     ROM: full range with pain     Strength is 5/5 bilateral     Meniscus   Fanta:  Medial - negative Lateral - negative    Stability Lachman: normal  PCL-Posterior Drawer: normal MCL - Valgus: normal  LCL - Varus: normal     Patella   Patellar apprehension: negative  Patellar Tracking: normal     Neurovascular intact    -5/5strength testing hip flexion, quadriceps, gastrocnemius soleus, anterior tibialis and EHL bilaterally.  -Normaltesting knee (patellar) jerk and ankle (achilles) jerk    NEURO: Bilateral upper and lower extremity coordination and muscle stretch reflexes are physiologic and symmetric. No loss of sensation is noted.  GAIT: normal.    Imaging:    10/20/15    MRI Cervical Spine Without Contrast  Findings:    Straightening of the normal C-spine curvature.  Vertebral height well maintained with anterior and posterior marginal spurring, disc desiccation and degenerative  endplate changes.  Marrow signal otherwise within normal limits.  Cord appears normal in caliber, signal and position.  No acute fracture or subluxation noted.    Craniovertebral junction within normal limits.  Incidental note made of partially empty sella.    C2 -- 3: Disc desiccation with minimal posterior degenerative ridging centrally.  No acquired stenosis.    C3 -- 4: Disc desiccation with posterior degenerative ridging.  Bilateral uncovertebral osteophyte formation with mild to moderate and mild foraminal stenosis on the left and right respectively.  Mild effacement left lateral recess.  No central canal stenosis.    C4 -- 5: Disc desiccation with uniform loss of height.  Broad based posterior bony ridging with disc extension.  Bilateral uncovertebral osteophyte formation with severe foraminal stenosis.  No central stenosis.    C5 -- 6: Disc and desiccation with uniform loss of height.  Bilateral tiny perineural cysts within the neural foramen.  Posterior bony ridging with disc extension.  Effaced anterior subarachnoid space and lateral recesses.  Uncovertebral osteophyte formation with moderate to severe left and severe right foraminal stenosis.  No central stenosis.    C6 -- 7: Posterior degenerative ridging, greater on the left without herniation or protrusion.  Minimal foraminal stenosis, slightly greater on the left .  No central stenosis.    C7 -- T1: No significant findings.    2/5/21 XR knees  FINDINGS:  Osteopenia.  Bilateral tricompartmental degenerative changes are seen most prevalent in the medial compartments with mild to moderate joint space narrowing adjacent marginal spurring.  Findings are not significantly changed since the comparison exam.  No fracture or dislocation.  No joint effusion.  No patellar tilt.  Soft tissues are within normal limits.     ASSESSMENT: 76 y.o. year old female with bilateral knee pain, consistent with     1. Osteoarthritis of both knees, unspecified osteoarthritis  type  Ambulatory referral/consult to Orthopedics   2. Chronic pain of both knees  Ambulatory referral/consult to Orthopedics   3. Neuropathic pain           PLAN:   - Interventions:  Patient has not had significant or sustained relief following left-sided cold radiofrequency ablation.  Will refer to orthopedic surgery:       -we discussed repeating right-sided genicular diagnostic block should her symptoms exacerbate as she has had excellent response in the right knee.    - Anticoagulation use: yes aspirin     report:  Reviewed and consistent with medication use as prescribed.    - Medications:  -Initiate Elavil 25 mg nightly.  We have discussed increasing this dose to 50 mg, 2 tablets in 1 week if well tolerated.  We have discussed potential common side effects of duloxetine including constipation, dizziness, dry mouth, drowsiness, urinary retention or headache.  Patient expresses understanding.      - Therapy:  We discussed continuing physical therapy to help manage the patient/s painful condition. The patient was counseled that muscle strengthening will improve the long term prognosis in regards to pain and may also help increase range of motion and mobility. They were told that one of the goals of physical therapy is that they learn how to do the exercises so that they can do them independently at home daily upon completion. The patient's questions were answered and they were agreeable to this course.     - Imaging: Reviewed available imaging with patient and answered any questions they had regarding study.    - Follow up visit: return to clinic in 3 months    The above plan and management options were discussed at length with patient. Patient is in agreement with the above and verbalized understanding.    - I discussed the goals of interventional chronic pain management with the patient on today's visit. We discussed a multimodal and systematic approach to pain.  This includes diagnostic and  therapeutic injections, adjuvant pharmacologic treatment, physical therapy, and at times psychiatry.  I emphasized the importance of regular exercise, core strengthening and stretching, diet and weight loss as a cornerstone of long-term pain management.    - This condition does not require this patient to take time off of work, and the primary goal of our Pain Management services is to improve the patient's functional capacity.  - Patient Questions: Answered all of the patient's questions regarding diagnoses, therapy, treatment and next steps      Primo Bond MD  Interventional Pain Management  [facility]    Disclaimer:  This note was prepared using voice recognition system and is likely to have sound alike errors that may have been overlooked even after proof reading.  Please call me with any questions

## 2022-01-05 NOTE — PROGRESS NOTES
Subjective:       Patient ID: Jessica Perez is a 76 y.o. female.    Chief Complaint: Hyperlipidemia    Jessica Perez is a 76 y.o. female and is here for a comprehensive physical exam.    Do you take any herbs or supplements that were not prescribed by a doctor? Multi vit  Are you taking calcium supplements? yes  Are you taking aspirin daily? yes     History:  Any STD's in the past? none    The following portions of the patient's history were reviewed and updated as appropriate: allergies, current medications, past family history, past medical history, past social history, past surgical history and problem list.    Review of Systems  Do you have pain that bothers you in your daily life? no  Pertinent items are noted in HPI.      2. Patient Counseling:  --Nutrition: Stressed importance of moderation in sodium/caffeine intake, saturated fat and cholesterol, caloric balance.  --Exercise: Stressed the importance of regular exercise.  --Substance Abuse: Discussed cessation/primary prevention of tobacco, alcohol - occ etoh.    --Sexuality: Discussed sexually transmitted disease.  --Injury prevention: Discussed safety belts, smoke detector.   --Dental health: Discussed dental health.  --Immunizations reviewed.    3. Discussed the patient's BMI.  4. Follow up as needed for acute illness      Review of Systems   Constitutional: Negative for fever.   HENT: Negative for congestion.    Eyes: Negative for discharge.   Respiratory: Negative for shortness of breath.    Cardiovascular: Negative for chest pain.   Gastrointestinal: Positive for abdominal pain and diarrhea.   Genitourinary: Negative for difficulty urinating.   Musculoskeletal: Positive for arthralgias. Negative for joint swelling.   Skin: Negative for rash.   Neurological: Negative for dizziness.   Psychiatric/Behavioral: Negative for agitation.       Objective:      Physical Exam  Vitals and nursing note reviewed.   Constitutional:       General: She is  not in acute distress.     Appearance: Normal appearance. She is well-developed. She is not diaphoretic.   HENT:      Head: Normocephalic and atraumatic.   Cardiovascular:      Rate and Rhythm: Normal rate and regular rhythm.   Pulmonary:      Effort: Pulmonary effort is normal. No respiratory distress.      Breath sounds: Normal breath sounds. No wheezing.   Abdominal:      General: There is no distension.      Palpations: Abdomen is soft.      Tenderness: There is no abdominal tenderness. There is no guarding.   Skin:     General: Skin is warm and dry.      Coloration: Skin is not pale.      Findings: No erythema or rash.      Comments: Good turgor   Neurological:      Mental Status: She is alert.   Psychiatric:         Mood and Affect: Mood normal.         Thought Content: Thought content normal.         Assessment:       1. Routine general medical examination at a health care facility    2. Hyperparathyroidism    3. Keratitis sicca, bilateral    4. Urinary tract infection without hematuria, site unspecified    5. Pure hypercholesterolemia    6. Screening mammogram, encounter for    7. Imbalance    8. Other peripheral vertigo, bilateral     9. Abdominal pain, unspecified abdominal location    10. Diarrhea, unspecified type        Plan:     Problem List Items Addressed This Visit        Ophtho    Keratitis sicca, bilateral    Overview     Cont Genteal drops at bedtime  Followed by Dr. Palomino            Cardiac/Vascular    HLD (hyperlipidemia)    Overview     Chronic, Stable, cont zetia         Relevant Orders    CBC Auto Differential    Comprehensive Metabolic Panel    Lipid Panel    TSH       Renal/    Urinary tract infection without hematuria    Relevant Orders    Urinalysis, Reflex to Urine Culture Urine, Clean Catch    Screening mammogram, encounter for    Relevant Orders    Mammo Digital Screening Bilat w/ Cory       Endocrine    Hyperparathyroidism    Overview     elevated pth - 121 as of 11/18/2020          Relevant Orders    PTH, intact       Other    Routine general medical examination at a health care facility - Primary    Imbalance    Relevant Orders    Ambulatory referral/consult to Audiology      Other Visit Diagnoses     Other peripheral vertigo, bilateral         Relevant Orders    Ambulatory referral/consult to Audiology    Abdominal pain, unspecified abdominal location        Diarrhea, unspecified type        Relevant Orders    H. pylori antigen, stool    Pancreatic elastase, fecal    Fecal fat, qualitative    Occult blood x 1, stool    WBC, Stool    Rotavirus antigen, stool    Adenovirus Antigen EIA, Stool    Calprotectin, Stool    Giardia / Cryptosporidum, EIA    Stool Exam-Ova,Cysts,Parasites    Clostridium difficile EIA    Stool culture

## 2022-01-06 ENCOUNTER — OFFICE VISIT (OUTPATIENT)
Dept: PAIN MEDICINE | Facility: CLINIC | Age: 77
End: 2022-01-06
Payer: MEDICARE

## 2022-01-06 ENCOUNTER — OFFICE VISIT (OUTPATIENT)
Dept: INTERNAL MEDICINE | Facility: CLINIC | Age: 77
End: 2022-01-06
Payer: MEDICARE

## 2022-01-06 VITALS
DIASTOLIC BLOOD PRESSURE: 72 MMHG | WEIGHT: 176.56 LBS | BODY MASS INDEX: 32.3 KG/M2 | SYSTOLIC BLOOD PRESSURE: 122 MMHG | OXYGEN SATURATION: 97 % | TEMPERATURE: 97 F | HEART RATE: 80 BPM

## 2022-01-06 VITALS
BODY MASS INDEX: 32.3 KG/M2 | DIASTOLIC BLOOD PRESSURE: 69 MMHG | SYSTOLIC BLOOD PRESSURE: 127 MMHG | WEIGHT: 176.56 LBS | HEART RATE: 80 BPM

## 2022-01-06 DIAGNOSIS — Z00.00 ROUTINE GENERAL MEDICAL EXAMINATION AT A HEALTH CARE FACILITY: Primary | ICD-10-CM

## 2022-01-06 DIAGNOSIS — N39.0 URINARY TRACT INFECTION WITHOUT HEMATURIA, SITE UNSPECIFIED: ICD-10-CM

## 2022-01-06 DIAGNOSIS — Z12.31 SCREENING MAMMOGRAM, ENCOUNTER FOR: ICD-10-CM

## 2022-01-06 DIAGNOSIS — M25.562 CHRONIC PAIN OF BOTH KNEES: ICD-10-CM

## 2022-01-06 DIAGNOSIS — E21.3 HYPERPARATHYROIDISM: ICD-10-CM

## 2022-01-06 DIAGNOSIS — E78.00 PURE HYPERCHOLESTEROLEMIA: ICD-10-CM

## 2022-01-06 DIAGNOSIS — H81.393 OTHER PERIPHERAL VERTIGO, BILATERAL: ICD-10-CM

## 2022-01-06 DIAGNOSIS — R19.7 DIARRHEA, UNSPECIFIED TYPE: ICD-10-CM

## 2022-01-06 DIAGNOSIS — M79.2 NEUROPATHIC PAIN: ICD-10-CM

## 2022-01-06 DIAGNOSIS — R26.89 IMBALANCE: ICD-10-CM

## 2022-01-06 DIAGNOSIS — G89.29 CHRONIC PAIN OF BOTH KNEES: ICD-10-CM

## 2022-01-06 DIAGNOSIS — M35.01 KERATITIS SICCA, BILATERAL: ICD-10-CM

## 2022-01-06 DIAGNOSIS — M17.0 OSTEOARTHRITIS OF BOTH KNEES, UNSPECIFIED OSTEOARTHRITIS TYPE: Primary | ICD-10-CM

## 2022-01-06 DIAGNOSIS — R10.9 ABDOMINAL PAIN, UNSPECIFIED ABDOMINAL LOCATION: ICD-10-CM

## 2022-01-06 DIAGNOSIS — M25.561 CHRONIC PAIN OF BOTH KNEES: ICD-10-CM

## 2022-01-06 PROCEDURE — 99999 PR PBB SHADOW E&M-EST. PATIENT-LVL V: CPT | Mod: PBBFAC,,, | Performed by: ANESTHESIOLOGY

## 2022-01-06 PROCEDURE — 1101F PT FALLS ASSESS-DOCD LE1/YR: CPT | Mod: CPTII,S$GLB,, | Performed by: ANESTHESIOLOGY

## 2022-01-06 PROCEDURE — 3008F BODY MASS INDEX DOCD: CPT | Mod: CPTII,S$GLB,, | Performed by: FAMILY MEDICINE

## 2022-01-06 PROCEDURE — 3078F DIAST BP <80 MM HG: CPT | Mod: CPTII,S$GLB,, | Performed by: FAMILY MEDICINE

## 2022-01-06 PROCEDURE — 3008F BODY MASS INDEX DOCD: CPT | Mod: CPTII,S$GLB,, | Performed by: ANESTHESIOLOGY

## 2022-01-06 PROCEDURE — 3074F SYST BP LT 130 MM HG: CPT | Mod: CPTII,S$GLB,, | Performed by: ANESTHESIOLOGY

## 2022-01-06 PROCEDURE — 1125F PR PAIN SEVERITY QUANTIFIED, PAIN PRESENT: ICD-10-PCS | Mod: CPTII,S$GLB,, | Performed by: ANESTHESIOLOGY

## 2022-01-06 PROCEDURE — 1101F PR PT FALLS ASSESS DOC 0-1 FALLS W/OUT INJ PAST YR: ICD-10-PCS | Mod: CPTII,S$GLB,, | Performed by: FAMILY MEDICINE

## 2022-01-06 PROCEDURE — 99214 PR OFFICE/OUTPT VISIT, EST, LEVL IV, 30-39 MIN: ICD-10-PCS | Mod: S$GLB,,, | Performed by: ANESTHESIOLOGY

## 2022-01-06 PROCEDURE — 3288F FALL RISK ASSESSMENT DOCD: CPT | Mod: CPTII,S$GLB,, | Performed by: FAMILY MEDICINE

## 2022-01-06 PROCEDURE — 3078F DIAST BP <80 MM HG: CPT | Mod: CPTII,S$GLB,, | Performed by: ANESTHESIOLOGY

## 2022-01-06 PROCEDURE — 1159F MED LIST DOCD IN RCRD: CPT | Mod: CPTII,S$GLB,, | Performed by: FAMILY MEDICINE

## 2022-01-06 PROCEDURE — 1101F PT FALLS ASSESS-DOCD LE1/YR: CPT | Mod: CPTII,S$GLB,, | Performed by: FAMILY MEDICINE

## 2022-01-06 PROCEDURE — 81001 URINALYSIS AUTO W/SCOPE: CPT | Performed by: FAMILY MEDICINE

## 2022-01-06 PROCEDURE — 99999 PR PBB SHADOW E&M-EST. PATIENT-LVL III: ICD-10-PCS | Mod: PBBFAC,,, | Performed by: FAMILY MEDICINE

## 2022-01-06 PROCEDURE — 99214 OFFICE O/P EST MOD 30 MIN: CPT | Mod: S$GLB,,, | Performed by: ANESTHESIOLOGY

## 2022-01-06 PROCEDURE — 1126F PR PAIN SEVERITY QUANTIFIED, NO PAIN PRESENT: ICD-10-PCS | Mod: CPTII,S$GLB,, | Performed by: FAMILY MEDICINE

## 2022-01-06 PROCEDURE — 99999 PR PBB SHADOW E&M-EST. PATIENT-LVL V: ICD-10-PCS | Mod: PBBFAC,,, | Performed by: ANESTHESIOLOGY

## 2022-01-06 PROCEDURE — 1126F AMNT PAIN NOTED NONE PRSNT: CPT | Mod: CPTII,S$GLB,, | Performed by: FAMILY MEDICINE

## 2022-01-06 PROCEDURE — 1125F AMNT PAIN NOTED PAIN PRSNT: CPT | Mod: CPTII,S$GLB,, | Performed by: ANESTHESIOLOGY

## 2022-01-06 PROCEDURE — 3008F PR BODY MASS INDEX (BMI) DOCUMENTED: ICD-10-PCS | Mod: CPTII,S$GLB,, | Performed by: ANESTHESIOLOGY

## 2022-01-06 PROCEDURE — 1159F PR MEDICATION LIST DOCUMENTED IN MEDICAL RECORD: ICD-10-PCS | Mod: CPTII,S$GLB,, | Performed by: ANESTHESIOLOGY

## 2022-01-06 PROCEDURE — 3074F PR MOST RECENT SYSTOLIC BLOOD PRESSURE < 130 MM HG: ICD-10-PCS | Mod: CPTII,S$GLB,, | Performed by: ANESTHESIOLOGY

## 2022-01-06 PROCEDURE — 99397 PER PM REEVAL EST PAT 65+ YR: CPT | Mod: S$GLB,,, | Performed by: FAMILY MEDICINE

## 2022-01-06 PROCEDURE — 3074F SYST BP LT 130 MM HG: CPT | Mod: CPTII,S$GLB,, | Performed by: FAMILY MEDICINE

## 2022-01-06 PROCEDURE — 1159F MED LIST DOCD IN RCRD: CPT | Mod: CPTII,S$GLB,, | Performed by: ANESTHESIOLOGY

## 2022-01-06 PROCEDURE — 3008F PR BODY MASS INDEX (BMI) DOCUMENTED: ICD-10-PCS | Mod: CPTII,S$GLB,, | Performed by: FAMILY MEDICINE

## 2022-01-06 PROCEDURE — 3288F FALL RISK ASSESSMENT DOCD: CPT | Mod: CPTII,S$GLB,, | Performed by: ANESTHESIOLOGY

## 2022-01-06 PROCEDURE — 3078F PR MOST RECENT DIASTOLIC BLOOD PRESSURE < 80 MM HG: ICD-10-PCS | Mod: CPTII,S$GLB,, | Performed by: ANESTHESIOLOGY

## 2022-01-06 PROCEDURE — 1159F PR MEDICATION LIST DOCUMENTED IN MEDICAL RECORD: ICD-10-PCS | Mod: CPTII,S$GLB,, | Performed by: FAMILY MEDICINE

## 2022-01-06 PROCEDURE — 1101F PR PT FALLS ASSESS DOC 0-1 FALLS W/OUT INJ PAST YR: ICD-10-PCS | Mod: CPTII,S$GLB,, | Performed by: ANESTHESIOLOGY

## 2022-01-06 PROCEDURE — 3078F PR MOST RECENT DIASTOLIC BLOOD PRESSURE < 80 MM HG: ICD-10-PCS | Mod: CPTII,S$GLB,, | Performed by: FAMILY MEDICINE

## 2022-01-06 PROCEDURE — 3288F PR FALLS RISK ASSESSMENT DOCUMENTED: ICD-10-PCS | Mod: CPTII,S$GLB,, | Performed by: FAMILY MEDICINE

## 2022-01-06 PROCEDURE — 3288F PR FALLS RISK ASSESSMENT DOCUMENTED: ICD-10-PCS | Mod: CPTII,S$GLB,, | Performed by: ANESTHESIOLOGY

## 2022-01-06 PROCEDURE — 3074F PR MOST RECENT SYSTOLIC BLOOD PRESSURE < 130 MM HG: ICD-10-PCS | Mod: CPTII,S$GLB,, | Performed by: FAMILY MEDICINE

## 2022-01-06 PROCEDURE — 99397 PR PREVENTIVE VISIT,EST,65 & OVER: ICD-10-PCS | Mod: S$GLB,,, | Performed by: FAMILY MEDICINE

## 2022-01-06 PROCEDURE — 99999 PR PBB SHADOW E&M-EST. PATIENT-LVL III: CPT | Mod: PBBFAC,,, | Performed by: FAMILY MEDICINE

## 2022-01-06 RX ORDER — AMITRIPTYLINE HYDROCHLORIDE 25 MG/1
TABLET, FILM COATED ORAL
Qty: 50 TABLET | Refills: 2 | Status: SHIPPED | OUTPATIENT
Start: 2022-01-06 | End: 2022-05-03

## 2022-01-06 NOTE — PATIENT INSTRUCTIONS
"Patient Education       Chronic Knee Pain   About this topic   The knee is a large, complex joint. It is made up of 4 bones: The thigh bone, two lower leg bones, and the kneecap. There is a capsule around the joint. Cartilage acts like a shock absorber in the knee and reduces friction, which helps the knee move more smoothly. The knee also has ligaments and tendons. Ligaments are bands of tissue that join one bone to another. Tendons are bands of tissue that join muscles to the bone. There are many muscles around the knee and in front and back of the thigh. If there is a problem with any of these parts of the joint, you can have pain in your knee.       What are the causes?   · Arthritis ? There are a few types of arthritis which all cause swelling of a joint. Osteoarthritis is the most common and happens over time with "wear and tear" on the joint.  · Overuse ? Repeat motions or too much bending at the knee can lead to pain.  · Bursitis ? Bursae are small fluid-filled sacs that help tendons glide easier. These can get swollen and hurt. This often happens to people who do work on their knees, like gardeners, roofers, and carpet layers.  · Ligament tear or sprain ? Ligament injuries can make the knee shaky or unstable and painful.  · Meniscus tear ? Injuries to the meniscus or cartilage can make your knee lock and cause pain with some movements.  · Muscle strain ? Injuries to the muscles near the knee happen often with sports. If these do not heal the right way, ongoing pain can happen.  · Patellar tendinopathy ? The tendon that goes over your kneecap can get swollen and hurt due to overuse and repetitive stress on the knee.  · Chondromalacia patella ? This is a health problem where there is pain under the kneecap from cartilage being worn.  · Dislocating kneecap ? If your kneecap slips out of its groove, pain can happen.  · Baker's cyst ? This is a lump that is behind the knee. This is also known as a popliteal " cyst.  · Hip, ankle, back problems ? Problems in the back and in nearby joints, such as the hip and ankle, can cause pain in the knee.  · Fluid collection ? Fluid can collect in the knee joint after a knee injury and lead to pain if not treated.  · Osgood-Schlatter disease ? This is a health problem seen in children and teens. The front of the knee below the kneecap is bothered by repeat movements.  · Osteochondritis dissecans ? This is a health problem seen in children and teens. There is a problem with the blood flow to the bone and cartilage of the knee.  · Plica syndrome ? Plica are folds of tissue that are left over from growth before birth. These can get sore and cause pain.  · Patellofemoral pain syndrome - This happens when you have pain in front of your knee or around or behind your kneecap.  · Iliotibial band syndrome - This happens when the iliotibial band, tissues that runs down the outside of the thigh from the hip to the shin, is overused. It causes pain on the outside of the knee.  What are the main signs?   · Pain or stiffness  · Swelling  · Warmth or redness  · Visible deformity  · Hard to walk, go up or down stairs, or put weight on your leg  · Knee locking ? not able to bend or straighten your knee fully  · Knee is weak, peter, or gives way  · Crunching and popping noises in the knee  How does the doctor diagnose this health problem?   Your doctor will look at your knee. Your doctor will feel all over your knee to find where the pain is. The doctor may move your knee and push or pull on many parts to check your motion and strength. Your doctor may have you stand and walk to see if your knee is stable. The doctor may order:  · Blood tests  · X-ray  · CT or MRI scan  · Ultrasound  · Surgery ? At times, arthroscopic surgery may be needed to find the cause of the pain.  How does the doctor treat this health problem?   Finding out the true cause of your knee pain is the most important step to fix the  problem and lower your pain. Some things that may lessen your knee pain are:  · Rest  · Ice  · Compression  · Elevation - keeping the knee raised  · Braces or supports  · Heat may be used later but not right away. Heat can make swelling worse.  · Exercises to stretch and strengthen the knee  · Physical therapy (PT)  · Surgery  What drugs may be needed?   The doctor may order drugs to:  · Help with pain and swelling  · Fight an infection  The doctor may give you a shot of an anti-inflammatory drug called a corticosteroid. This will help with swelling. Talk with your doctor about the risks of this shot.  What can be done to prevent this health problem?   · If your knee pain is due to overuse, do not do repetitive movements that caused the problem if possible.  · Take breaks often when doing things that use repeat movements.  · Do not sit or keep your knee in one position for long periods of time.  · If you sleep on your side, use a pillow in between your legs. This can help take stress off of the knee.  · Always warm up and stretch before a workout and cool down after.  · If you are a runner, actively stretch before a run. Talk to your doctor to make sure you understand the difference between active and passive stretching. Use good ways to train, such as slowly adding to how far you run.  · Run on softer surfaces such as a track. This is easier on your knee than a hard surface like cement.  · Try activities like swimming or biking rather than running. Running can put a lot of stress on your knee joint.  · Stay away from activities that could result in twisting, sudden stops and starts, and blows to the knee. Sports such as basketball, skiing, football, and jogging are some common sports that can lead to knee injuries.  · Wear shoes with good support. Replace your shoes often.  · Keep a healthy weight. Being too heavy puts more stress on the knee joint. This makes the knee more at risk for injury.  · Stay active and  work out to keep your muscles strong and flexible.  Where can I learn more?   NHS Choices  http://www.nhs.uk/conditions/knee-pain/Pages/Introduction.aspx   Last Reviewed Date   2020-04-23  Consumer Information Use and Disclaimer   This information is not specific medical advice and does not replace information you receive from your health care provider. This is only a brief summary of general information. It does NOT include all information about conditions, illnesses, injuries, tests, procedures, treatments, therapies, discharge instructions or life-style choices that may apply to you. You must talk with your health care provider for complete information about your health and treatment options. This information should not be used to decide whether or not to accept your health care providers advice, instructions or recommendations. Only your health care provider has the knowledge and training to provide advice that is right for you.  Copyright   Copyright © 2021 [employer] and its affiliates and/or licensors. All rights reserved.

## 2022-01-07 ENCOUNTER — LAB VISIT (OUTPATIENT)
Dept: LAB | Facility: HOSPITAL | Age: 77
End: 2022-01-07
Attending: FAMILY MEDICINE
Payer: MEDICARE

## 2022-01-07 ENCOUNTER — TELEPHONE (OUTPATIENT)
Dept: INTERNAL MEDICINE | Facility: CLINIC | Age: 77
End: 2022-01-07
Payer: MEDICARE

## 2022-01-07 DIAGNOSIS — E21.3 HYPERPARATHYROIDISM: ICD-10-CM

## 2022-01-07 DIAGNOSIS — E78.00 PURE HYPERCHOLESTEROLEMIA: ICD-10-CM

## 2022-01-07 LAB
ALBUMIN SERPL BCP-MCNC: 3.8 G/DL (ref 3.5–5.2)
ALP SERPL-CCNC: 75 U/L (ref 55–135)
ALT SERPL W/O P-5'-P-CCNC: 18 U/L (ref 10–44)
ANION GAP SERPL CALC-SCNC: 8 MMOL/L (ref 8–16)
AST SERPL-CCNC: 23 U/L (ref 10–40)
BACTERIA #/AREA URNS AUTO: ABNORMAL /HPF
BASOPHILS # BLD AUTO: 0.05 K/UL (ref 0–0.2)
BASOPHILS NFR BLD: 0.7 % (ref 0–1.9)
BILIRUB SERPL-MCNC: 0.4 MG/DL (ref 0.1–1)
BILIRUB UR QL STRIP: NEGATIVE
BUN SERPL-MCNC: 9 MG/DL (ref 8–23)
CALCIUM SERPL-MCNC: 9.4 MG/DL (ref 8.7–10.5)
CHLORIDE SERPL-SCNC: 110 MMOL/L (ref 95–110)
CHOLEST SERPL-MCNC: 123 MG/DL (ref 120–199)
CHOLEST/HDLC SERPL: 2.1 {RATIO} (ref 2–5)
CLARITY UR REFRACT.AUTO: ABNORMAL
CO2 SERPL-SCNC: 23 MMOL/L (ref 23–29)
COLOR UR AUTO: ABNORMAL
CREAT SERPL-MCNC: 0.7 MG/DL (ref 0.5–1.4)
DIFFERENTIAL METHOD: ABNORMAL
EOSINOPHIL # BLD AUTO: 0.1 K/UL (ref 0–0.5)
EOSINOPHIL NFR BLD: 1.5 % (ref 0–8)
ERYTHROCYTE [DISTWIDTH] IN BLOOD BY AUTOMATED COUNT: 13.3 % (ref 11.5–14.5)
EST. GFR  (AFRICAN AMERICAN): >60 ML/MIN/1.73 M^2
EST. GFR  (NON AFRICAN AMERICAN): >60 ML/MIN/1.73 M^2
GLUCOSE SERPL-MCNC: 89 MG/DL (ref 70–110)
GLUCOSE UR QL STRIP: NEGATIVE
HCT VFR BLD AUTO: 40.1 % (ref 37–48.5)
HDLC SERPL-MCNC: 58 MG/DL (ref 40–75)
HDLC SERPL: 47.2 % (ref 20–50)
HGB BLD-MCNC: 13.1 G/DL (ref 12–16)
HGB UR QL STRIP: NEGATIVE
HYALINE CASTS UR QL AUTO: 9 /LPF
IMM GRANULOCYTES # BLD AUTO: 0.02 K/UL (ref 0–0.04)
IMM GRANULOCYTES NFR BLD AUTO: 0.3 % (ref 0–0.5)
KETONES UR QL STRIP: NEGATIVE
LDLC SERPL CALC-MCNC: 27.6 MG/DL (ref 63–159)
LEUKOCYTE ESTERASE UR QL STRIP: NEGATIVE
LYMPHOCYTES # BLD AUTO: 2.2 K/UL (ref 1–4.8)
LYMPHOCYTES NFR BLD: 32.5 % (ref 18–48)
MCH RBC QN AUTO: 32 PG (ref 27–31)
MCHC RBC AUTO-ENTMCNC: 32.7 G/DL (ref 32–36)
MCV RBC AUTO: 98 FL (ref 82–98)
MICROSCOPIC COMMENT: ABNORMAL
MONOCYTES # BLD AUTO: 0.3 K/UL (ref 0.3–1)
MONOCYTES NFR BLD: 4.7 % (ref 4–15)
NEUTROPHILS # BLD AUTO: 4.1 K/UL (ref 1.8–7.7)
NEUTROPHILS NFR BLD: 60.3 % (ref 38–73)
NITRITE UR QL STRIP: NEGATIVE
NONHDLC SERPL-MCNC: 65 MG/DL
NRBC BLD-RTO: 0 /100 WBC
PH UR STRIP: 5 [PH] (ref 5–8)
PLATELET # BLD AUTO: 244 K/UL (ref 150–450)
PMV BLD AUTO: 11.4 FL (ref 9.2–12.9)
POTASSIUM SERPL-SCNC: 4.2 MMOL/L (ref 3.5–5.1)
PROT SERPL-MCNC: 6.5 G/DL (ref 6–8.4)
PROT UR QL STRIP: NEGATIVE
PTH-INTACT SERPL-MCNC: 87.5 PG/ML (ref 9–77)
RBC # BLD AUTO: 4.09 M/UL (ref 4–5.4)
RBC #/AREA URNS AUTO: 2 /HPF (ref 0–4)
SODIUM SERPL-SCNC: 141 MMOL/L (ref 136–145)
SP GR UR STRIP: 1.02 (ref 1–1.03)
SQUAMOUS #/AREA URNS AUTO: 0 /HPF
TRIGL SERPL-MCNC: 187 MG/DL (ref 30–150)
TSH SERPL DL<=0.005 MIU/L-ACNC: 1.89 UIU/ML (ref 0.4–4)
URATE CRY UR QL COMP ASSIST: ABNORMAL
URN SPEC COLLECT METH UR: ABNORMAL
WBC # BLD AUTO: 6.79 K/UL (ref 3.9–12.7)
WBC #/AREA URNS AUTO: 1 /HPF (ref 0–5)

## 2022-01-07 PROCEDURE — 36415 COLL VENOUS BLD VENIPUNCTURE: CPT | Mod: PO | Performed by: FAMILY MEDICINE

## 2022-01-07 PROCEDURE — 80061 LIPID PANEL: CPT | Performed by: FAMILY MEDICINE

## 2022-01-07 PROCEDURE — 80053 COMPREHEN METABOLIC PANEL: CPT | Performed by: FAMILY MEDICINE

## 2022-01-07 PROCEDURE — 83970 ASSAY OF PARATHORMONE: CPT | Performed by: FAMILY MEDICINE

## 2022-01-07 PROCEDURE — 84443 ASSAY THYROID STIM HORMONE: CPT | Performed by: FAMILY MEDICINE

## 2022-01-07 PROCEDURE — 85025 COMPLETE CBC W/AUTO DIFF WBC: CPT | Performed by: FAMILY MEDICINE

## 2022-01-07 NOTE — TELEPHONE ENCOUNTER
----- Message from Mariella Ruth sent at 1/7/2022  9:46 AM CST -----  Regarding: Handicap  Patient would like someone to contact her to discuss renewing her handicap sticker. I let her know I would send a message and have someone contact her. Thanks!

## 2022-01-09 ENCOUNTER — PATIENT OUTREACH (OUTPATIENT)
Dept: ADMINISTRATIVE | Facility: OTHER | Age: 77
End: 2022-01-09
Payer: MEDICARE

## 2022-01-09 NOTE — PROGRESS NOTES
Chart Reviewed  Care Everywhere updated  Immunizations reconciled  Health Maintenance updated  Ordered:  Upcoming: mammo 1/18/22

## 2022-01-10 ENCOUNTER — OFFICE VISIT (OUTPATIENT)
Dept: CARDIOLOGY | Facility: CLINIC | Age: 77
End: 2022-01-10
Payer: MEDICARE

## 2022-01-10 VITALS
DIASTOLIC BLOOD PRESSURE: 86 MMHG | OXYGEN SATURATION: 97 % | BODY MASS INDEX: 32.78 KG/M2 | SYSTOLIC BLOOD PRESSURE: 118 MMHG | HEART RATE: 89 BPM | WEIGHT: 179.25 LBS

## 2022-01-10 DIAGNOSIS — G72.0 STATIN MYOPATHY: ICD-10-CM

## 2022-01-10 DIAGNOSIS — Z79.899 ENCOUNTER FOR LONG-TERM CURRENT USE OF MEDICATION: ICD-10-CM

## 2022-01-10 DIAGNOSIS — Z76.89 ENCOUNTER TO ESTABLISH CARE: ICD-10-CM

## 2022-01-10 DIAGNOSIS — Z91.89 AT RISK FOR SLEEP APNEA: ICD-10-CM

## 2022-01-10 DIAGNOSIS — R07.9 CHEST PAIN, MODERATE CORONARY ARTERY RISK: ICD-10-CM

## 2022-01-10 DIAGNOSIS — Z82.49 FAMILY HISTORY OF ARTERIOSCLEROTIC CARDIOVASCULAR DISEASE: ICD-10-CM

## 2022-01-10 DIAGNOSIS — T46.6X5A STATIN MYOPATHY: ICD-10-CM

## 2022-01-10 DIAGNOSIS — R06.02 SOB (SHORTNESS OF BREATH): ICD-10-CM

## 2022-01-10 DIAGNOSIS — R00.2 PALPITATIONS: ICD-10-CM

## 2022-01-10 DIAGNOSIS — R07.9 CHRONIC CHEST PAIN: ICD-10-CM

## 2022-01-10 DIAGNOSIS — E78.5 HYPERLIPIDEMIA, UNSPECIFIED HYPERLIPIDEMIA TYPE: ICD-10-CM

## 2022-01-10 DIAGNOSIS — G89.29 CHRONIC CHEST PAIN: ICD-10-CM

## 2022-01-10 DIAGNOSIS — E78.00 PURE HYPERCHOLESTEROLEMIA: Primary | ICD-10-CM

## 2022-01-10 PROCEDURE — 3074F PR MOST RECENT SYSTOLIC BLOOD PRESSURE < 130 MM HG: ICD-10-PCS | Mod: CPTII,S$GLB,, | Performed by: INTERNAL MEDICINE

## 2022-01-10 PROCEDURE — 3288F FALL RISK ASSESSMENT DOCD: CPT | Mod: CPTII,S$GLB,, | Performed by: INTERNAL MEDICINE

## 2022-01-10 PROCEDURE — 99214 PR OFFICE/OUTPT VISIT, EST, LEVL IV, 30-39 MIN: ICD-10-PCS | Mod: S$GLB,,, | Performed by: INTERNAL MEDICINE

## 2022-01-10 PROCEDURE — 99999 PR PBB SHADOW E&M-EST. PATIENT-LVL IV: CPT | Mod: PBBFAC,,, | Performed by: INTERNAL MEDICINE

## 2022-01-10 PROCEDURE — 1159F MED LIST DOCD IN RCRD: CPT | Mod: CPTII,S$GLB,, | Performed by: INTERNAL MEDICINE

## 2022-01-10 PROCEDURE — 3008F BODY MASS INDEX DOCD: CPT | Mod: CPTII,S$GLB,, | Performed by: INTERNAL MEDICINE

## 2022-01-10 PROCEDURE — 99214 OFFICE O/P EST MOD 30 MIN: CPT | Mod: S$GLB,,, | Performed by: INTERNAL MEDICINE

## 2022-01-10 PROCEDURE — 3288F PR FALLS RISK ASSESSMENT DOCUMENTED: ICD-10-PCS | Mod: CPTII,S$GLB,, | Performed by: INTERNAL MEDICINE

## 2022-01-10 PROCEDURE — 1101F PT FALLS ASSESS-DOCD LE1/YR: CPT | Mod: CPTII,S$GLB,, | Performed by: INTERNAL MEDICINE

## 2022-01-10 PROCEDURE — 3074F SYST BP LT 130 MM HG: CPT | Mod: CPTII,S$GLB,, | Performed by: INTERNAL MEDICINE

## 2022-01-10 PROCEDURE — 1126F PR PAIN SEVERITY QUANTIFIED, NO PAIN PRESENT: ICD-10-PCS | Mod: CPTII,S$GLB,, | Performed by: INTERNAL MEDICINE

## 2022-01-10 PROCEDURE — 99999 PR PBB SHADOW E&M-EST. PATIENT-LVL IV: ICD-10-PCS | Mod: PBBFAC,,, | Performed by: INTERNAL MEDICINE

## 2022-01-10 PROCEDURE — 3079F PR MOST RECENT DIASTOLIC BLOOD PRESSURE 80-89 MM HG: ICD-10-PCS | Mod: CPTII,S$GLB,, | Performed by: INTERNAL MEDICINE

## 2022-01-10 PROCEDURE — 1126F AMNT PAIN NOTED NONE PRSNT: CPT | Mod: CPTII,S$GLB,, | Performed by: INTERNAL MEDICINE

## 2022-01-10 PROCEDURE — 1101F PR PT FALLS ASSESS DOC 0-1 FALLS W/OUT INJ PAST YR: ICD-10-PCS | Mod: CPTII,S$GLB,, | Performed by: INTERNAL MEDICINE

## 2022-01-10 PROCEDURE — 3008F PR BODY MASS INDEX (BMI) DOCUMENTED: ICD-10-PCS | Mod: CPTII,S$GLB,, | Performed by: INTERNAL MEDICINE

## 2022-01-10 PROCEDURE — 3079F DIAST BP 80-89 MM HG: CPT | Mod: CPTII,S$GLB,, | Performed by: INTERNAL MEDICINE

## 2022-01-10 PROCEDURE — 1159F PR MEDICATION LIST DOCUMENTED IN MEDICAL RECORD: ICD-10-PCS | Mod: CPTII,S$GLB,, | Performed by: INTERNAL MEDICINE

## 2022-01-10 NOTE — PROGRESS NOTES
Excellent cholesterol CBC looks good.  Keep up the good work  Parathyroid hormone is elevated, do she seen nephrology, if not please send referral for me to sign for Nephrology.

## 2022-01-10 NOTE — PROGRESS NOTES
Subjective:   Patient ID:  Jessica Perez is a 76 y.o. female who presents for follow-up of No chief complaint on file.  Patient is stable on the Zetia.  Improvement overall.  Will continue to follow up cholesterol profiles into the future.  Patient also complains of palpitations particularly at night and a sense of vibration.  Will do a Holter monitor in follow-up.  No chest pain noted.Patient is here for re-evaluation overall cholesterol profile.  Improvement in overall profile including triglycerides.     Patient is stable on the Zetia.  Improvement overall.  Will continue to follow up cholesterol profiles into the future.  Patient also complains of palpitations particularly at night and a sense of vibration.  Will do a Holter monitor in follow-up.  No chest pain noted.  Patient doing well or stable continue current medication other changes.  She continues Repatha , follow-up cholesterol profile 6 months will see her in 6 months.       This visit finds patient feeling well she stable current medications cholesterol profiles have greatly improved on Repatha and doing well this time.      Review of Systems   Constitutional: Negative for chills, diaphoresis, night sweats, weight gain and weight loss.   HENT: Negative for congestion, hoarse voice, sore throat and stridor.    Eyes: Negative for double vision and pain.   Cardiovascular: Negative for chest pain, claudication, cyanosis, dyspnea on exertion, irregular heartbeat, leg swelling, near-syncope, orthopnea, palpitations, paroxysmal nocturnal dyspnea and syncope.   Respiratory: Negative for cough, hemoptysis, shortness of breath, sleep disturbances due to breathing, snoring, sputum production and wheezing.    Endocrine: Negative for cold intolerance, heat intolerance and polydipsia.   Hematologic/Lymphatic: Negative for bleeding problem. Does not bruise/bleed easily.   Skin: Negative for color change, dry skin and rash.   Musculoskeletal: Negative for joint  swelling and muscle cramps.   Gastrointestinal: Negative for bloating, abdominal pain, constipation, diarrhea, dysphagia, melena, nausea and vomiting.   Genitourinary: Negative for flank pain and urgency.   Neurological: Negative for dizziness, focal weakness, headaches, light-headedness, loss of balance, seizures and weakness.   Psychiatric/Behavioral: Negative for altered mental status and memory loss. The patient is not nervous/anxious.      Family History   Problem Relation Age of Onset    Diabetes Father     Cancer Father         prostate ca    Hypertension Father     Cancer Sister         cervical ca    Hepatitis Sister     Dementia Mother     Osteoporosis Mother     Breast cancer Paternal Aunt      Past Medical History:   Diagnosis Date    Acute pain of left knee 3/22/2021    Arthritis     Arthritis of knee 2/9/2021    At risk for sleep apnea 9/19/2019    Bilateral primary osteoarthritis of knee 4/4/2018    Chest pain, moderate coronary artery risk 8/8/2019    Chronic pain of both knees 3/23/2021    Costochondritis 1/20/2020    Depression, major, recurrent, mild     Difficulty walking 11/30/2020    Difficulty walking 11/30/2020    LANRE (generalized anxiety disorder) 3/22/2021    Generalized weakness 11/30/2020    Generalized weakness 11/30/2020    Glaucoma     HLD (hyperlipidemia)     Muscle cramps 5/23/2018    Osteopenia of multiple sites 4/2/2018    Primary osteoarthritis of right knee 05/30/2018    Primary snoring     Sleep related leg cramps 9/7/2010    SOB (shortness of breath) 8/8/2019    Unspecified gastritis and gastroduodenitis without mention of hemorrhage 11/28/2010     Dx updated per 2019 IMO Load    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     Social History     Socioeconomic History    Marital status:    Tobacco Use    Smoking status: Former Smoker     Packs/day: 0.50     Years: 12.00     Pack years: 6.00     Types: Cigarettes    Smokeless tobacco: Never Used    Substance and Sexual Activity    Alcohol use: Yes     Comment: occasionally    Drug use: No    Sexual activity: Yes     Current Outpatient Medications on File Prior to Visit   Medication Sig Dispense Refill    amitriptyline (ELAVIL) 25 MG tablet Take 1 tablet (25 mg total) by mouth nightly as needed for Insomnia for 7 days, THEN 2 tablets (50 mg total) nightly as needed for Insomnia. 50 tablet 2    artificial tears,hypromellose,,GENTEAL/SUSTANE, 0.3 % Gel       aspirin (ECOTRIN) 81 MG EC tablet Take 81 mg by mouth once daily.      azelastine (ASTELIN) 137 mcg (0.1 %) nasal spray 1 spray (137 mcg total) by Nasal route 2 (two) times daily. 30 mL 1    b complex vitamins capsule Take 1 capsule by mouth once daily.      calcium carbonate (CALCIUM 500 ORAL) Take by mouth.      cetirizine (ZYRTEC) 10 MG tablet Take 10 mg by mouth once daily.      cyanocobalamin, vitamin B-12, (VITAMIN B-12 ORAL) Take by mouth.      denosumab (PROLIA SUBQ) Inject into the skin.      etodolac (LODINE) 400 MG tablet Take 1 tablet (400 mg total) by mouth 2 (two) times daily. 60 tablet 0    evolocumab (REPATHA SURECLICK) 140 mg/mL PnIj Inject 1 pen (140 mg total) into the skin every 14 (fourteen) days. 2 mL 6    ezetimibe (ZETIA) 10 mg tablet TAKE 1 TABLET BY MOUTH EVERY DAY 90 tablet 3    fexofenadine (ALLEGRA) 180 MG tablet Take 1 tablet (180 mg total) by mouth once daily. 30 tablet 0    fluticasone (FLONASE) 50 mcg/actuation nasal spray 2 sprays by Each Nare route once daily. 1 Bottle 12    gabapentin (NEURONTIN) 100 MG capsule Take 1 capsule (100 mg total) by mouth every evening for 1 day, THEN 2 capsules (200 mg total) every evening for 2 days, THEN 3 capsules (300 mg total) every evening. (Patient not taking: Reported on 9/10/2021) 266 capsule 0    krill oil-omega-3-dha-epa 150-450 mg CpDR Take by mouth.      loratadine/pseudoephedrine (CLARITIN-D 12 HOUR ORAL) Take by mouth. As needed      MAGNESIUM ORAL Take by  mouth.      methylPREDNISolone (MEDROL DOSEPACK) 4 mg tablet use as directed 1 each 0    multivitamin capsule Take 1 capsule by mouth Daily.      nutritional supplement/fiber (QUINOA-KALE-HEMP ORAL) Take by mouth.      omeprazole (PRILOSEC OTC) 20 MG tablet Take 1 tablet (20 mg total) by mouth once daily. (Patient not taking: Reported on 1/6/2022) 30 tablet 11    traMADoL (ULTRAM) 50 mg tablet Take 1 tablet (50 mg total) by mouth every 6 (six) hours as needed for Pain. 21 tablet 0    UNABLE TO FIND CBD Oil      vitamin E 100 UNIT capsule Take 100 Units by mouth once daily.      zoledronic acid/mannitol-water (RECLAST IV) Inject into the vein.       Current Facility-Administered Medications on File Prior to Visit   Medication Dose Route Frequency Provider Last Rate Last Admin    triamcinolone acetonide injection 32 mg  32 mg Intra-articular 1 time in Clinic/HOD Dileep Mendoza MD         Review of patient's allergies indicates:   Allergen Reactions    Augmentin [amoxicillin-pot clavulanate] Other (See Comments)     Diarrhea, yeast    Bactrim  [sulfamethoxazole-trimethoprim]      Other reaction(s): Unknown    Celebrex [celecoxib] Other (See Comments)     Stomach pain, gas     Lipitor  [atorvastatin]      Other reaction(s): Unknown    Pravachol  [pravastatin]      Other reaction(s): Unknown    Statins-hmg-coa reductase inhibitors      Other reaction(s): Muscle pain    Sulfa (sulfonamide antibiotics)      Other reaction(s): Swelling    Zoster vaccine live        Objective:     Physical Exam  Eyes:      Pupils: Pupils are equal, round, and reactive to light.   Neck:      Trachea: No tracheal deviation.   Cardiovascular:      Rate and Rhythm: Normal rate and regular rhythm.      Pulses: Intact distal pulses.           Carotid pulses are 2+ on the right side and 2+ on the left side.       Radial pulses are 2+ on the right side and 2+ on the left side.        Femoral pulses are 2+ on the right side and 2+ on  the left side.       Popliteal pulses are 2+ on the right side and 2+ on the left side.        Dorsalis pedis pulses are 2+ on the right side and 2+ on the left side.        Posterior tibial pulses are 2+ on the right side and 2+ on the left side.      Heart sounds: Normal heart sounds. No murmur heard.  No friction rub. No gallop.    Pulmonary:      Effort: Pulmonary effort is normal. No respiratory distress.      Breath sounds: Normal breath sounds. No stridor. No wheezing or rales.   Chest:      Chest wall: No tenderness.   Abdominal:      General: There is no distension.      Tenderness: There is no abdominal tenderness. There is no rebound.   Musculoskeletal:         General: No tenderness or edema.      Cervical back: Normal range of motion.   Skin:     General: Skin is warm and dry.   Neurological:      Mental Status: She is alert and oriented to person, place, and time.         Assessment:     1. Pure hypercholesterolemia    2. Chronic chest pain    3. Statin myopathy    4. Hyperlipidemia, unspecified hyperlipidemia type    5. Encounter for long-term current use of medication    6. At risk for sleep apnea    7. Chest pain, moderate coronary artery risk    8. Encounter to establish care    9. Family history of arteriosclerotic cardiovascular disease    10. SOB (shortness of breath)    11. Palpitations        Plan:     Pure hypercholesterolemia    Chronic chest pain    Statin myopathy    Hyperlipidemia, unspecified hyperlipidemia type    Encounter for long-term current use of medication    At risk for sleep apnea    Chest pain, moderate coronary artery risk    Encounter to establish care    Family history of arteriosclerotic cardiovascular disease    SOB (shortness of breath)    Palpitations    Impression 1 chest pain stable no recurrence symptoms  2. Hypercholesterolemia stable on current dose of Repatha and doing well and also on Zetia no changes  Shortness of breath stable improved  Overall doing well no  acute changes follow-up evaluation in 3-6 months.

## 2022-01-11 ENCOUNTER — CLINICAL SUPPORT (OUTPATIENT)
Dept: AUDIOLOGY | Facility: CLINIC | Age: 77
End: 2022-01-11
Payer: MEDICARE

## 2022-01-11 DIAGNOSIS — H81.11 BPPV (BENIGN PAROXYSMAL POSITIONAL VERTIGO), RIGHT: ICD-10-CM

## 2022-01-11 DIAGNOSIS — R42 DIZZINESS: ICD-10-CM

## 2022-01-11 PROCEDURE — 99999 PR PBB SHADOW E&M-EST. PATIENT-LVL I: ICD-10-PCS | Mod: PBBFAC,,, | Performed by: AUDIOLOGIST-HEARING AID FITTER

## 2022-01-11 PROCEDURE — 99999 PR PBB SHADOW E&M-EST. PATIENT-LVL I: CPT | Mod: PBBFAC,,, | Performed by: AUDIOLOGIST-HEARING AID FITTER

## 2022-01-11 NOTE — PROGRESS NOTES
Jessica Perez was seen 01/11/2022 for an audiological evaluation.  Patient complains of dizziness. The dizziness is described as a spinning sensation. The dizziness occurs in episodes lasting seconds in duration. The dizziness has occurred when laying in bed and changing positions. The last audiogram was 1/4/17.    Results reveal normal hearing 250-8000 Hz for the right ear, and normal hearing 250-8000 Hz for the left ear.   Speech Reception Thresholds were  10 dBHL for the right ear and 10 dBHL for the left ear.   Word recognition scores were excellent for the right ear and excellent for the left ear.   Tympanograms were Type A for the right ear and Type A for the left ear.    No significant change from 1/4/17.     DHP- positive DHP to the right. Epley performed and patient given instructions for limited head movement for the next 24 hours. RTC one week for recheck.     Otoscopy was unremarkable bilaterally.     Patient was counseled on the above findings.

## 2022-01-11 NOTE — TELEPHONE ENCOUNTER
1/11 Outgoing call to pt regarding Chuck opening. Pt gave permission to apply for pt on pt's behalf    Chuck renewal info:    BIN: [id number]  PCN: [id number]  Group: [id number]  ID: [id number]  Effective dates: 1/11/22-12/11/22    PTL

## 2022-01-12 ENCOUNTER — TELEPHONE (OUTPATIENT)
Dept: INTERNAL MEDICINE | Facility: CLINIC | Age: 77
End: 2022-01-12
Payer: MEDICARE

## 2022-01-12 NOTE — TELEPHONE ENCOUNTER
----- Message from Davidson Jang MD sent at 1/10/2022 12:16 PM CST -----  Excellent cholesterol CBC looks good.  Keep up the good work  Parathyroid hormone is elevated, do she seen nephrology, if not please send referral for me to sign for Nephrology.

## 2022-01-13 ENCOUNTER — TELEPHONE (OUTPATIENT)
Dept: INTERNAL MEDICINE | Facility: CLINIC | Age: 77
End: 2022-01-13
Payer: MEDICARE

## 2022-01-18 ENCOUNTER — HOSPITAL ENCOUNTER (OUTPATIENT)
Dept: RADIOLOGY | Facility: HOSPITAL | Age: 77
Discharge: HOME | End: 2022-01-18
Attending: FAMILY MEDICINE
Payer: MEDICARE

## 2022-01-18 ENCOUNTER — CLINICAL SUPPORT (OUTPATIENT)
Dept: AUDIOLOGY | Facility: CLINIC | Age: 77
End: 2022-01-18
Payer: MEDICARE

## 2022-01-18 DIAGNOSIS — Z12.31 SCREENING MAMMOGRAM, ENCOUNTER FOR: ICD-10-CM

## 2022-01-18 DIAGNOSIS — H81.11 BPPV (BENIGN PAROXYSMAL POSITIONAL VERTIGO), RIGHT: Primary | ICD-10-CM

## 2022-01-18 PROCEDURE — 77063 BREAST TOMOSYNTHESIS BI: CPT | Mod: TC

## 2022-01-18 PROCEDURE — 77063 BREAST TOMOSYNTHESIS BI: CPT | Mod: 26,,, | Performed by: RADIOLOGY

## 2022-01-18 PROCEDURE — 77067 SCR MAMMO BI INCL CAD: CPT | Mod: 26,,, | Performed by: RADIOLOGY

## 2022-01-18 PROCEDURE — 77067 SCR MAMMO BI INCL CAD: CPT | Mod: TC

## 2022-01-18 PROCEDURE — 77067 MAMMO DIGITAL SCREENING BILAT WITH TOMO: ICD-10-PCS | Mod: 26,,, | Performed by: RADIOLOGY

## 2022-01-18 PROCEDURE — 77063 MAMMO DIGITAL SCREENING BILAT WITH TOMO: ICD-10-PCS | Mod: 26,,, | Performed by: RADIOLOGY

## 2022-01-18 NOTE — PROGRESS NOTES
Jessica Perez was seen 01/18/2022 for re-check BPPV.   She obtained Epley 01/11/2022 for right BPPV.    Patient reports spinning dizziness has resolved. She reports that she does have some dizziness when going from sitting to standing; however, this is not described as spinning. She reports that she has always had low blood pressure.       Darrick-Hallpike Head-hanging Right: Negative for BPPV - absent for nystagmus or dizziness.  Darrick-Hallpike Head-hanging Left: Negative for BPPV - absent for nystagmus or dizziness.    Summary: BPPV has resolved. Patient may resume regular activity. Patient to contact her PCP about symptoms that occur when going from sitting to standing as these may be related to blood pressure.

## 2022-01-19 NOTE — PROGRESS NOTES
Impression:   No mammographic evidence of malignancy.     BI-RADS Category 1: Negative     Recommendation:  Routine screening mammogram in 1 year is recommended.

## 2022-01-20 ENCOUNTER — TELEPHONE (OUTPATIENT)
Dept: INTERNAL MEDICINE | Facility: CLINIC | Age: 77
End: 2022-01-20
Payer: MEDICARE

## 2022-01-20 NOTE — TELEPHONE ENCOUNTER
----- Message from Davidson Jang MD sent at 1/19/2022  1:31 PM CST -----  Impression:   No mammographic evidence of malignancy.     BI-RADS Category 1: Negative     Recommendation:  Routine screening mammogram in 1 year is recommended.

## 2022-02-01 ENCOUNTER — TELEPHONE (OUTPATIENT)
Dept: INTERNAL MEDICINE | Facility: CLINIC | Age: 77
End: 2022-02-01
Payer: MEDICARE

## 2022-02-01 NOTE — TELEPHONE ENCOUNTER
----- Message from Adrienne Rowell sent at 2/1/2022 11:11 AM CST -----  Regarding: Handicap Tag   Pt called this morning asking if handicap information can be faxed to [phone].If you have any question you can contact pt at [phone].

## 2022-02-04 ENCOUNTER — TELEPHONE (OUTPATIENT)
Dept: INTERNAL MEDICINE | Facility: CLINIC | Age: 77
End: 2022-02-04
Payer: MEDICARE

## 2022-02-04 NOTE — TELEPHONE ENCOUNTER
----- Message from Randee Barrios sent at 2/4/2022 10:52 AM CST -----  Regarding: COPY ORIGINAL HANDICAP FORM  Pt said she picked up a copy of her handicap form but she said they said they need the original signed form. If the nurse could please call pt back at [phone] once the original is ready at the  to .

## 2022-02-22 DIAGNOSIS — Z91.89 AT RISK FOR SLEEP APNEA: ICD-10-CM

## 2022-02-22 DIAGNOSIS — T46.6X5A STATIN MYOPATHY: ICD-10-CM

## 2022-02-22 DIAGNOSIS — E78.00 PURE HYPERCHOLESTEROLEMIA: ICD-10-CM

## 2022-02-22 DIAGNOSIS — R06.02 SOB (SHORTNESS OF BREATH): ICD-10-CM

## 2022-02-22 DIAGNOSIS — G72.0 STATIN MYOPATHY: ICD-10-CM

## 2022-02-22 DIAGNOSIS — E78.5 HYPERLIPIDEMIA, UNSPECIFIED HYPERLIPIDEMIA TYPE: ICD-10-CM

## 2022-02-22 DIAGNOSIS — G89.29 CHRONIC CHEST PAIN: ICD-10-CM

## 2022-02-22 DIAGNOSIS — R07.9 CHEST PAIN, MODERATE CORONARY ARTERY RISK: ICD-10-CM

## 2022-02-22 DIAGNOSIS — Z82.49 FAMILY HISTORY OF ARTERIOSCLEROTIC CARDIOVASCULAR DISEASE: ICD-10-CM

## 2022-02-22 DIAGNOSIS — Z79.899 ENCOUNTER FOR LONG-TERM CURRENT USE OF MEDICATION: ICD-10-CM

## 2022-02-22 DIAGNOSIS — R07.9 CHRONIC CHEST PAIN: ICD-10-CM

## 2022-02-22 DIAGNOSIS — Z76.89 ENCOUNTER TO ESTABLISH CARE: ICD-10-CM

## 2022-02-22 RX ORDER — EZETIMIBE 10 MG/1
10 TABLET ORAL DAILY
Qty: 90 TABLET | Refills: 3 | Status: SHIPPED | OUTPATIENT
Start: 2022-02-22 | End: 2023-08-22

## 2022-02-28 ENCOUNTER — SPECIALTY PHARMACY (OUTPATIENT)
Dept: PHARMACY | Facility: CLINIC | Age: 77
End: 2022-02-28
Payer: MEDICARE

## 2022-02-28 DIAGNOSIS — E78.00 PURE HYPERCHOLESTEROLEMIA: ICD-10-CM

## 2022-02-28 RX ORDER — EVOLOCUMAB 140 MG/ML
140 INJECTION, SOLUTION SUBCUTANEOUS
Qty: 2 ML | Refills: 6 | Status: SHIPPED | OUTPATIENT
Start: 2022-02-28 | End: 2022-05-11 | Stop reason: SDUPTHER

## 2022-02-28 NOTE — TELEPHONE ENCOUNTER
Incoming call from pt for refill for Repatha. Has 1 injection left and due to inject on Sat, 3/5. Notified her that it is early to refill but a rx for refills is needed.     Only has 2 mLs left on rx and usually gets 6 mL (3 month supply). Now seeing Dr. Abdullahi Obrien and not Dr. Mackay.    Will send refill request to new provider and route GI/derm team

## 2022-03-09 NOTE — TELEPHONE ENCOUNTER
Specialty Pharmacy - Refill Coordination  Specialty Pharmacy - Medication/Referral Authorization    Specialty Medication Orders Linked to Encounter    Flowsheet Row Most Recent Value   Medication #1 evolocumab (REPATHA SURECLICK) 140 mg/mL PnIj (Order#[id number], Rx#[id number])          Refill Questions - Documented Responses    Flowsheet Row Most Recent Value   Patient Availability and HIPAA Verification    Does patient want to proceed with activity? Yes   HIPAA/medical authority confirmed? Yes   Relationship to patient of person spoken to? Self   Refill Screening Questions    Would patient like to speak to a pharmacist? No   When does the patient need to receive the medication? 03/24/22   Refill Delivery Questions    How will the patient receive the medication? Delivery Manjula   When does the patient need to receive the medication? 03/24/22   Shipping Address Home   Address in [facility] confirmed and updated if neccessary? Yes   Expected Copay ($) 0   Is the patient able to afford the medication copay? Yes   Payment Method zero copay   Days supply of Refill 84   Refill activity completed? Yes   Refill activity plan Refill scheduled   Shipment/Pickup Date: 03/15/22          Current Outpatient Medications   Medication Sig    amitriptyline (ELAVIL) 25 MG tablet Take 1 tablet (25 mg total) by mouth nightly as needed for Insomnia for 7 days, THEN 2 tablets (50 mg total) nightly as needed for Insomnia.    artificial tears,hypromellose,,GENTEAL/SUSTANE, 0.3 % Gel     aspirin (ECOTRIN) 81 MG EC tablet Take 81 mg by mouth once daily.    azelastine (ASTELIN) 137 mcg (0.1 %) nasal spray 1 spray (137 mcg total) by Nasal route 2 (two) times daily.    b complex vitamins capsule Take 1 capsule by mouth once daily.    calcium carbonate (CALCIUM 500 ORAL) Take by mouth.    cetirizine (ZYRTEC) 10 MG tablet Take 10 mg by mouth once daily.    cyanocobalamin, vitamin B-12, (VITAMIN B-12 ORAL) Take by mouth.    denosumab  (PROLIA SUBQ) Inject into the skin.    etodolac (LODINE) 400 MG tablet Take 1 tablet (400 mg total) by mouth 2 (two) times daily.    evolocumab (REPATHA SURECLICK) 140 mg/mL PnIj Inject 1 pen (140 mg total) into the skin every 14 (fourteen) days.    ezetimibe (ZETIA) 10 mg tablet Take 1 tablet (10 mg total) by mouth once daily.    fexofenadine (ALLEGRA) 180 MG tablet Take 1 tablet (180 mg total) by mouth once daily.    fluticasone (FLONASE) 50 mcg/actuation nasal spray 2 sprays by Each Nare route once daily.    gabapentin (NEURONTIN) 100 MG capsule Take 1 capsule (100 mg total) by mouth every evening for 1 day, THEN 2 capsules (200 mg total) every evening for 2 days, THEN 3 capsules (300 mg total) every evening. (Patient not taking: Reported on 9/10/2021)    krill oil-omega-3-dha-epa 150-450 mg CpDR Take by mouth.    loratadine/pseudoephedrine (CLARITIN-D 12 HOUR ORAL) Take by mouth. As needed    MAGNESIUM ORAL Take by mouth.    methylPREDNISolone (MEDROL DOSEPACK) 4 mg tablet use as directed    multivitamin capsule Take 1 capsule by mouth Daily.    nutritional supplement/fiber (QUINOA-KALE-HEMP ORAL) Take by mouth.    omeprazole (PRILOSEC OTC) 20 MG tablet Take 1 tablet (20 mg total) by mouth once daily. (Patient not taking: Reported on 1/6/2022)    traMADoL (ULTRAM) 50 mg tablet Take 1 tablet (50 mg total) by mouth every 6 (six) hours as needed for Pain.    UNABLE TO FIND CBD Oil    vitamin E 100 UNIT capsule Take 100 Units by mouth once daily.    zoledronic acid/mannitol-water (RECLAST IV) Inject into the vein.   Last reviewed on 1/10/2022  1:50 PM by Marika Pichardo LPN    Review of patient's allergies indicates:   Allergen Reactions    Augmentin [amoxicillin-pot clavulanate] Other (See Comments)     Diarrhea, yeast    Bactrim  [sulfamethoxazole-trimethoprim]      Other reaction(s): Unknown    Celebrex [celecoxib] Other (See Comments)     Stomach pain, gas     Lipitor  [atorvastatin]       Other reaction(s): Unknown    Pravachol  [pravastatin]      Other reaction(s): Unknown    Statins-hmg-coa reductase inhibitors      Other reaction(s): Muscle pain    Sulfa (sulfonamide antibiotics)      Other reaction(s): Swelling    Zoster vaccine live     Last reviewed on  1/10/2022 1:49 PM by Marika Pichardo      Tasks added this encounter   5/31/2022 - Refill Call (Auto Added)   Tasks due within next 3 months   No tasks due.     Melba Nassar, PharmD  [facility]  [address] [place] [id number]  Phone: [phone]  Fax: [phone]

## 2022-03-17 DIAGNOSIS — M25.569 KNEE PAIN, UNSPECIFIED CHRONICITY, UNSPECIFIED LATERALITY: Primary | ICD-10-CM

## 2022-03-23 ENCOUNTER — OFFICE VISIT (OUTPATIENT)
Dept: OPHTHALMOLOGY | Facility: CLINIC | Age: 77
End: 2022-03-23
Payer: MEDICARE

## 2022-03-23 DIAGNOSIS — M35.01 KERATITIS SICCA, BILATERAL: ICD-10-CM

## 2022-03-23 DIAGNOSIS — H40.013 OPEN ANGLE WITH BORDERLINE FINDINGS, LOW RISK, BILATERAL: Primary | ICD-10-CM

## 2022-03-23 DIAGNOSIS — Z96.1 PSEUDOPHAKIA OF BOTH EYES: ICD-10-CM

## 2022-03-23 PROCEDURE — 99999 PR PBB SHADOW E&M-EST. PATIENT-LVL III: ICD-10-PCS | Mod: PBBFAC,,, | Performed by: OPHTHALMOLOGY

## 2022-03-23 PROCEDURE — 1160F PR REVIEW ALL MEDS BY PRESCRIBER/CLIN PHARMACIST DOCUMENTED: ICD-10-PCS | Mod: CPTII,S$GLB,, | Performed by: OPHTHALMOLOGY

## 2022-03-23 PROCEDURE — 1160F RVW MEDS BY RX/DR IN RCRD: CPT | Mod: CPTII,S$GLB,, | Performed by: OPHTHALMOLOGY

## 2022-03-23 PROCEDURE — 99214 PR OFFICE/OUTPT VISIT, EST, LEVL IV, 30-39 MIN: ICD-10-PCS | Mod: S$GLB,,, | Performed by: OPHTHALMOLOGY

## 2022-03-23 PROCEDURE — 99999 PR PBB SHADOW E&M-EST. PATIENT-LVL III: CPT | Mod: PBBFAC,,, | Performed by: OPHTHALMOLOGY

## 2022-03-23 PROCEDURE — 1159F PR MEDICATION LIST DOCUMENTED IN MEDICAL RECORD: ICD-10-PCS | Mod: CPTII,S$GLB,, | Performed by: OPHTHALMOLOGY

## 2022-03-23 PROCEDURE — 1159F MED LIST DOCD IN RCRD: CPT | Mod: CPTII,S$GLB,, | Performed by: OPHTHALMOLOGY

## 2022-03-23 PROCEDURE — 99214 OFFICE O/P EST MOD 30 MIN: CPT | Mod: S$GLB,,, | Performed by: OPHTHALMOLOGY

## 2022-03-23 NOTE — PROGRESS NOTES
HPI     Follow-up     Comments: IOP check with [facility]    Patient states up close VA has decreased over the last several months even   with her glasses.              Comments     PCP: Dr. Lemus   1. COAG SUSP   SLT OD 3/26/15   SLT OS 5/15   2. Dry Eyes   3. PCIOL OD 3/4/2019 (+28.0 SN60WF) CDE 10.38 -NEAR   PCIOL OS +25.5 SN60WF / CDE: 9.46 / 2-4-19 2-5-19       GCL: 32/31        Pataday PRN   Systane BID   In the past Pt wore cls for monovision- then changed to MF cls          Last edited by Janelle Griffin, Patient Care Assistant on 3/23/2022  3:17   PM. (History)            Assessment /Plan     For exam results, see Encounter Report.      ICD-10-CM ICD-9-CM    1. Open angle with borderline findings, low risk, bilateral  H40.013 365.01 Doing well - intraocular pressure is within acceptable range relative to target pressure with no evidence of progression.   Continue current treatment.  Reviewed importance of continued compliance with treatment and follow up.      2. Keratitis sicca, bilateral  M35.01 370.8 Appears stable    3. Pseudophakia of both eyes  Z96.1 V43.1 Doing well        RETURN TO CLINIC 6 month IOP and GOCT , HVF       Pataday PRN   Systane BID

## 2022-04-11 PROBLEM — Z00.00 ROUTINE GENERAL MEDICAL EXAMINATION AT A HEALTH CARE FACILITY: Status: RESOLVED | Noted: 2022-01-06 | Resolved: 2022-04-11

## 2022-04-18 ENCOUNTER — PATIENT MESSAGE (OUTPATIENT)
Dept: ADMINISTRATIVE | Facility: OTHER | Age: 77
End: 2022-04-18
Payer: MEDICARE

## 2022-04-27 ENCOUNTER — LAB VISIT (OUTPATIENT)
Dept: LAB | Facility: HOSPITAL | Age: 77
End: 2022-04-27
Attending: FAMILY MEDICINE
Payer: MEDICARE

## 2022-04-27 DIAGNOSIS — M81.0 AGE-RELATED OSTEOPOROSIS WITHOUT CURRENT PATHOLOGICAL FRACTURE: ICD-10-CM

## 2022-04-27 LAB
ALBUMIN SERPL BCP-MCNC: 4 G/DL (ref 3.5–5.2)
ALP SERPL-CCNC: 75 U/L (ref 55–135)
ALT SERPL W/O P-5'-P-CCNC: 17 U/L (ref 10–44)
ANION GAP SERPL CALC-SCNC: 9 MMOL/L (ref 8–16)
AST SERPL-CCNC: 23 U/L (ref 10–40)
BILIRUB SERPL-MCNC: 0.5 MG/DL (ref 0.1–1)
BUN SERPL-MCNC: 8 MG/DL (ref 8–23)
CALCIUM SERPL-MCNC: 9.8 MG/DL (ref 8.7–10.5)
CHLORIDE SERPL-SCNC: 107 MMOL/L (ref 95–110)
CO2 SERPL-SCNC: 24 MMOL/L (ref 23–29)
CREAT SERPL-MCNC: 0.8 MG/DL (ref 0.5–1.4)
EST. GFR  (AFRICAN AMERICAN): >60 ML/MIN/1.73 M^2
EST. GFR  (NON AFRICAN AMERICAN): >60 ML/MIN/1.73 M^2
GLUCOSE SERPL-MCNC: 84 MG/DL (ref 70–110)
POTASSIUM SERPL-SCNC: 3.9 MMOL/L (ref 3.5–5.1)
PROT SERPL-MCNC: 6.9 G/DL (ref 6–8.4)
SODIUM SERPL-SCNC: 140 MMOL/L (ref 136–145)

## 2022-04-27 PROCEDURE — 80053 COMPREHEN METABOLIC PANEL: CPT | Performed by: INTERNAL MEDICINE

## 2022-04-27 PROCEDURE — 36415 COLL VENOUS BLD VENIPUNCTURE: CPT | Mod: PO | Performed by: INTERNAL MEDICINE

## 2022-05-03 ENCOUNTER — OFFICE VISIT (OUTPATIENT)
Dept: RHEUMATOLOGY | Facility: CLINIC | Age: 77
End: 2022-05-03
Payer: MEDICARE

## 2022-05-03 VITALS
BODY MASS INDEX: 32.05 KG/M2 | HEIGHT: 62 IN | DIASTOLIC BLOOD PRESSURE: 75 MMHG | HEART RATE: 85 BPM | SYSTOLIC BLOOD PRESSURE: 127 MMHG | WEIGHT: 174.19 LBS

## 2022-05-03 DIAGNOSIS — G89.29 CHRONIC LOW BACK PAIN, UNSPECIFIED BACK PAIN LATERALITY, UNSPECIFIED WHETHER SCIATICA PRESENT: ICD-10-CM

## 2022-05-03 DIAGNOSIS — M81.0 AGE-RELATED OSTEOPOROSIS WITHOUT CURRENT PATHOLOGICAL FRACTURE: Primary | ICD-10-CM

## 2022-05-03 DIAGNOSIS — M15.9 PRIMARY OSTEOARTHRITIS INVOLVING MULTIPLE JOINTS: ICD-10-CM

## 2022-05-03 DIAGNOSIS — Z71.89 COUNSELING ON HEALTH PROMOTION AND DISEASE PREVENTION: ICD-10-CM

## 2022-05-03 DIAGNOSIS — M17.0 PRIMARY OSTEOARTHRITIS OF BOTH KNEES: ICD-10-CM

## 2022-05-03 DIAGNOSIS — M54.50 CHRONIC LOW BACK PAIN, UNSPECIFIED BACK PAIN LATERALITY, UNSPECIFIED WHETHER SCIATICA PRESENT: ICD-10-CM

## 2022-05-03 PROCEDURE — 20610 LARGE JOINT ASPIRATION/INJECTION: L KNEE: ICD-10-PCS | Mod: LT,S$GLB,, | Performed by: INTERNAL MEDICINE

## 2022-05-03 PROCEDURE — 1125F PR PAIN SEVERITY QUANTIFIED, PAIN PRESENT: ICD-10-PCS | Mod: CPTII,S$GLB,, | Performed by: INTERNAL MEDICINE

## 2022-05-03 PROCEDURE — 99214 PR OFFICE/OUTPT VISIT, EST, LEVL IV, 30-39 MIN: ICD-10-PCS | Mod: 25,S$GLB,, | Performed by: INTERNAL MEDICINE

## 2022-05-03 PROCEDURE — 20610 DRAIN/INJ JOINT/BURSA W/O US: CPT | Mod: LT,S$GLB,, | Performed by: INTERNAL MEDICINE

## 2022-05-03 PROCEDURE — 99999 PR PBB SHADOW E&M-EST. PATIENT-LVL IV: ICD-10-PCS | Mod: PBBFAC,,, | Performed by: INTERNAL MEDICINE

## 2022-05-03 PROCEDURE — 1125F AMNT PAIN NOTED PAIN PRSNT: CPT | Mod: CPTII,S$GLB,, | Performed by: INTERNAL MEDICINE

## 2022-05-03 PROCEDURE — 99999 PR PBB SHADOW E&M-EST. PATIENT-LVL IV: CPT | Mod: PBBFAC,,, | Performed by: INTERNAL MEDICINE

## 2022-05-03 PROCEDURE — 99214 OFFICE O/P EST MOD 30 MIN: CPT | Mod: 25,S$GLB,, | Performed by: INTERNAL MEDICINE

## 2022-05-03 PROCEDURE — 1159F PR MEDICATION LIST DOCUMENTED IN MEDICAL RECORD: ICD-10-PCS | Mod: CPTII,S$GLB,, | Performed by: INTERNAL MEDICINE

## 2022-05-03 PROCEDURE — 1159F MED LIST DOCD IN RCRD: CPT | Mod: CPTII,S$GLB,, | Performed by: INTERNAL MEDICINE

## 2022-05-03 RX ORDER — TIZANIDINE 4 MG/1
2 TABLET ORAL NIGHTLY PRN
Qty: 30 TABLET | Refills: 1 | Status: SHIPPED | OUTPATIENT
Start: 2022-05-03 | End: 2022-05-13

## 2022-05-03 RX ORDER — TRIAMCINOLONE ACETONIDE 40 MG/ML
40 INJECTION, SUSPENSION INTRA-ARTICULAR; INTRAMUSCULAR
Status: DISCONTINUED | OUTPATIENT
Start: 2022-05-03 | End: 2022-05-03 | Stop reason: HOSPADM

## 2022-05-03 RX ADMIN — TRIAMCINOLONE ACETONIDE 40 MG: 40 INJECTION, SUSPENSION INTRA-ARTICULAR; INTRAMUSCULAR at 10:05

## 2022-05-03 NOTE — PROGRESS NOTES
RHEUMATOLOGY OUTPATIENT CLINIC NOTE    5/3/2022    Attending Rheumatologist: Dileep Mendoza  Primary Care Provider/Physician Requesting Consultation: Davidson Jang MD   Chief Complaint/Reason For Consultation:  Osteoporosis and Osteoarthritis      Subjective:     Jessica Perez is a 77 y.o. White female with osteoporosis for follow up visit.    Main complaint of left knee pain with mechanical pattern. No falls or fractures since last encounter.    Review of Systems   Constitutional: Negative for fever and malaise/fatigue.   Respiratory: Negative for shortness of breath.    Genitourinary: Negative for dysuria and urgency.   Musculoskeletal: Positive for joint pain.   Skin: Negative for rash.   Neurological: Negative for focal weakness.       Chronic comorbid conditions affecting medical decision making today:  Past Medical History:   Diagnosis Date    Acute pain of left knee 3/22/2021    Arthritis     Arthritis of knee 2/9/2021    At risk for sleep apnea 9/19/2019    Bilateral primary osteoarthritis of knee 4/4/2018    Chest pain, moderate coronary artery risk 8/8/2019    Chronic pain of both knees 3/23/2021    Costochondritis 1/20/2020    Depression, major, recurrent, mild     Difficulty walking 11/30/2020    Difficulty walking 11/30/2020    LANRE (generalized anxiety disorder) 3/22/2021    Generalized weakness 11/30/2020    Generalized weakness 11/30/2020    Glaucoma     HLD (hyperlipidemia)     Muscle cramps 5/23/2018    Osteopenia of multiple sites 4/2/2018    Primary osteoarthritis of right knee 05/30/2018    Primary snoring     Sleep related leg cramps 9/7/2010    SOB (shortness of breath) 8/8/2019    Unspecified gastritis and gastroduodenitis without mention of hemorrhage 11/28/2010     Dx updated per 2019 IMO Load    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 Benign.    CATARACT  EXTRACTION W/  INTRAOCULAR LENS IMPLANT Left 02/04/2019    CATARACT EXTRACTION W/  INTRAOCULAR LENS IMPLANT Right 03/04/2019    CHOLECYSTECTOMY      DILATION AND CURETTAGE OF UTERUS      finger surgery      middle finger on right hand    GALLBLADDER SURGERY      INJECTION OF ANESTHETIC AGENT AROUND NERVE Bilateral 10/1/2021    Procedure: Bilateral Genicular nerve block -;  Surgeon: Primo Bond MD;  Location: [facility];  Service: Pain Management;  Laterality: Bilateral;    RADIOFREQUENCY THERMOCOAGULATION Left 11/5/2021    Procedure: Left Genicular Nerve RFA (COOLIEF) with RN IV sedation;  Surgeon: Primo Bond MD;  Location: [facility];  Service: Pain Management;  Laterality: Left;    TONSILLECTOMY, ADENOIDECTOMY      TUBAL LIGATION       Family History   Problem Relation Age of Onset    Diabetes Father     Cancer Father         prostate ca    Hypertension Father     Cancer Sister         cervical ca    Hepatitis Sister     Dementia Mother     Osteoporosis Mother     Breast cancer Paternal Aunt      Social History     Substance and Sexual Activity   Alcohol Use Yes    Comment: occasionally     Social History     Tobacco Use   Smoking Status Former Smoker    Packs/day: 0.50    Years: 12.00    Pack years: 6.00    Types: Cigarettes   Smokeless Tobacco Never Used     Social History     Substance and Sexual Activity   Drug Use No       Current Outpatient Medications:     acetaminophen/diphenhydramine (TYLENOL PM ORAL), Take by mouth., Disp: , Rfl:     artificial tears,hypromellose,,GENTEAL/SUSTANE, 0.3 % Gel, , Disp: , Rfl:     aspirin (ECOTRIN) 81 MG EC tablet, Take 81 mg by mouth once daily., Disp: , Rfl:     azelastine (ASTELIN) 137 mcg (0.1 %) nasal spray, 1 spray (137 mcg total) by Nasal route 2 (two) times daily., Disp: 30 mL, Rfl: 1    b complex vitamins capsule, Take 1 capsule by mouth once daily., Disp: , Rfl:     calcium carbonate (CALCIUM 500 ORAL), Take by mouth., Disp:  , Rfl:     cetirizine (ZYRTEC) 10 MG tablet, Take 10 mg by mouth once daily., Disp: , Rfl:     cyanocobalamin, vitamin B-12, (VITAMIN B-12 ORAL), Take by mouth., Disp: , Rfl:     denosumab (PROLIA SUBQ), Inject into the skin., Disp: , Rfl:     evolocumab (REPATHA SURECLICK) 140 mg/mL PnIj, Inject 1 pen (140 mg total) into the skin every 14 (fourteen) days., Disp: 2 mL, Rfl: 6    ezetimibe (ZETIA) 10 mg tablet, Take 1 tablet (10 mg total) by mouth once daily., Disp: 90 tablet, Rfl: 3    fexofenadine (ALLEGRA) 180 MG tablet, Take 1 tablet (180 mg total) by mouth once daily., Disp: 30 tablet, Rfl: 0    fluticasone (FLONASE) 50 mcg/actuation nasal spray, 2 sprays by Each Nare route once daily., Disp: 1 Bottle, Rfl: 12    krill oil-omega-3-dha-epa 150-450 mg CpDR, Take by mouth., Disp: , Rfl:     loratadine/pseudoephedrine (CLARITIN-D 12 HOUR ORAL), Take by mouth. As needed, Disp: , Rfl:     MAGNESIUM ORAL, Take by mouth., Disp: , Rfl:     multivitamin capsule, Take 1 capsule by mouth Daily., Disp: , Rfl:     TURMERIC ORAL, Take by mouth., Disp: , Rfl:     UNABLE TO FIND, CBD Oil topical, Disp: , Rfl:     ZINC ORAL, Take by mouth., Disp: , Rfl:     Current Facility-Administered Medications:     triamcinolone acetonide injection 32 mg, 32 mg, Intra-articular, 1 time in Clinic/HOD, Dileep Mendoza MD     Objective:     Vitals:    05/03/22 1004   BP: 127/75   Pulse: 85     Physical Exam   Eyes: Conjunctivae are normal.   Pulmonary/Chest: Effort normal. No respiratory distress.   Musculoskeletal:         General: No swelling. Normal range of motion.   Neurological: She displays no weakness.   Skin: No rash noted.       Reviewed available old and all outside pertinent medical records available.    All lab results personally reviewed and interpreted by me.  Lab Results   Component Value Date    WBC 6.79 01/07/2022    HGB 13.1 01/07/2022    HCT 40.1 01/07/2022    MCV 98 01/07/2022    RDW 13.3 01/07/2022      01/07/2022       Lab Results   Component Value Date     04/27/2022    K 3.9 04/27/2022     04/27/2022    CO2 24 04/27/2022    GLU 84 04/27/2022    BUN 8 04/27/2022    CALCIUM 9.8 04/27/2022    PROT 6.9 04/27/2022    ALBUMIN 4.0 04/27/2022    BILITOT 0.5 04/27/2022    AST 23 04/27/2022    ALKPHOS 75 04/27/2022    ALT 17 04/27/2022       Lab Results   Component Value Date    COLORU Litzy 01/06/2022    APPEARANCEUA Cloudy (A) 01/06/2022    SPECGRAV 1.025 01/06/2022    PHUR 5.0 01/06/2022    PROTEINUA Negative 01/06/2022    KETONESU Negative 01/06/2022    LEUKOCYTESUR Negative 01/06/2022    NITRITE Negative 01/06/2022    UROBILINOGEN Normal 11/18/2019       Lab Results   Component Value Date    PTH 87.5 (H) 01/07/2022       Lab Results   Component Value Date    URICACID 4.9 04/24/2004       Lab Results   Component Value Date    CRP 1.9 05/23/2018       No results found for: ANATITER    No components found for: TSPOTTB,  QUANTIFERON     ASSESSMENT:     History osteoporosis for follow-up visit.  Suboptimal response to Reclast x3, repeat densitometry October of last year with osteopenia and high FRAX score along with statistically significant decline on T-scores on T spine.  Started Prolia 10/2021, due for repeat dose.  Will continue Prolia every 6 months for fragility risk reduction from osteoporosis.  Recommend CMP within 2 weeks receiving bone antiresorptive agent to ensure kidney function stable and no hypocalcemia.  Continue adequate calcium and vitamin-D dietary intake, may continue with supplementation.    Will provide knee corticosteroid injection today for symptomatic relief for knee OA.  Referral to physical therapy provided for range of motion, flexibility, and strengthening exercises.  Will provide Zanaflex to take p.r.n. for mechanical back pain, written literature provided on muscle relaxant.    PLAN:     1. Age-related osteoporosis without current pathological fracture    - Comprehensive  Metabolic Panel; Standing  - PTH, Intact; Standing  - Vitamin D; Standing    2. Counseling on health promotion and disease prevention      3. Primary osteoarthritis involving multiple joints    Procedures    Large Joint Aspiration/Injection: L knee    Date/Time: 5/3/2022 10:00 AM  Performed by: Dileep Mendoza MD  Authorized by: Dileep Mendoza MD     Consent Done?:  Yes (Verbal)  Indications:  Pain  Site marked: the procedure site was marked    Timeout: prior to procedure the correct patient, procedure, and site was verified    Prep: patient was prepped and draped in usual sterile fashion    Local anesthetic:  Lidocaine 2% without epinephrine    Details:  Needle Size:  25 G  Ultrasonic Guidance for needle placement?: No    Approach:  Anterolateral  Location:  Knee  Site:  L knee  Medications:  40 mg triamcinolone acetonide 40 mg/mL  Patient tolerance:  Patient tolerated the procedure well with no immediate complications          Follow up in about 6 months (around 11/3/2022).      Disclaimer: This note is prepared using voice recognition software and as such is likely to have errors and has not been proof read. Please contact me for questions.       Dileep Mendoza M.D.

## 2022-05-03 NOTE — PROCEDURES
Large Joint Aspiration/Injection: L knee    Date/Time: 5/3/2022 10:00 AM  Performed by: Dileep Mendoza MD  Authorized by: Dileep Mendoza MD     Consent Done?:  Yes (Verbal)  Indications:  Pain  Site marked: the procedure site was marked    Timeout: prior to procedure the correct patient, procedure, and site was verified    Prep: patient was prepped and draped in usual sterile fashion    Local anesthetic:  Lidocaine 2% without epinephrine    Details:  Needle Size:  25 G  Ultrasonic Guidance for needle placement?: No    Approach:  Anterolateral  Location:  Knee  Site:  L knee  Medications:  40 mg triamcinolone acetonide 40 mg/mL  Patient tolerance:  Patient tolerated the procedure well with no immediate complications

## 2022-05-04 ENCOUNTER — CLINICAL SUPPORT (OUTPATIENT)
Dept: REHABILITATION | Facility: HOSPITAL | Age: 77
End: 2022-05-04
Attending: INTERNAL MEDICINE
Payer: MEDICARE

## 2022-05-04 DIAGNOSIS — M54.50 CHRONIC LOW BACK PAIN, UNSPECIFIED BACK PAIN LATERALITY, UNSPECIFIED WHETHER SCIATICA PRESENT: ICD-10-CM

## 2022-05-04 DIAGNOSIS — M17.0 PRIMARY OSTEOARTHRITIS OF BOTH KNEES: ICD-10-CM

## 2022-05-04 DIAGNOSIS — G89.29 CHRONIC LOW BACK PAIN, UNSPECIFIED BACK PAIN LATERALITY, UNSPECIFIED WHETHER SCIATICA PRESENT: ICD-10-CM

## 2022-05-04 PROBLEM — R29.898 LOWER EXTREMITY WEAKNESS: Status: ACTIVE | Noted: 2021-10-21

## 2022-05-04 PROCEDURE — 97162 PT EVAL MOD COMPLEX 30 MIN: CPT | Mod: PN

## 2022-05-04 PROCEDURE — 97110 THERAPEUTIC EXERCISES: CPT | Mod: PN

## 2022-05-05 NOTE — PLAN OF CARE
"[facility]  Physical Therapy Initial Evaluation    Date: 5/4/2022   Name: Jessica Perez  Clinic Number: [id number]    Therapy Diagnosis:   Encounter Diagnoses   Name Primary?    Chronic low back pain, unspecified back pain laterality, unspecified whether sciatica present     Primary osteoarthritis of both knees      Physician: Dileep Mendoza MD    Physician Orders: PT Eval and Treat   Medical Diagnosis from Referral:   M54.50,G89.29 (ICD-10-CM) - Chronic low back pain, unspecified back pain laterality, unspecified whether sciatica present   M17.0 (ICD-10-CM) - Primary osteoarthritis of both knees     Evaluation Date: 5/4/2022  Authorization Period Expiration: 12/31/2022  Plan of Care Expiration: 8/4/2022  Visit # / Visits authorized: 1/ 1    PN DUE: 5/4/2022    Time In: 9:06  Time Out: 9:51  Total Appointment Time (timed & untimed codes): 45 minutes    Precautions: Standard    Subjective   Date of onset: Patient reports back pain for about 6 months and knee pain for years.         History of current condition - Jessica reports: She got a shot in left knee yesterday, but has not had any releif. Her spin bike aggravates her knees. She did a nerve block on right knee and fixed it, but nerve block on left knee only last 1.5 weeks. She did "nerve burn" again and it was horrible. MD gave her muscle relaxer's which helped last night. She has back pain after trying to clean house for 5 minutes. She has to sit because back hurts with standing. Her  isn't showing up, so she has been having to do things in the yard. Her arms are sore now from yard work. She has got better with therapy in the past. She also reports urinary incontinece and diarrhea which she was educated a pelvic floor therapy could help her with and recommended getting a referral from her MD.        Medical History:   Past Medical History:   Diagnosis Date    Acute pain of left knee 3/22/2021    Arthritis     " Arthritis of knee 2/9/2021    At risk for sleep apnea 9/19/2019    Bilateral primary osteoarthritis of knee 4/4/2018    Chest pain, moderate coronary artery risk 8/8/2019    Chronic pain of both knees 3/23/2021    Costochondritis 1/20/2020    Depression, major, recurrent, mild     Difficulty walking 11/30/2020    Difficulty walking 11/30/2020    LANRE (generalized anxiety disorder) 3/22/2021    Generalized weakness 11/30/2020    Generalized weakness 11/30/2020    Glaucoma     HLD (hyperlipidemia)     Muscle cramps 5/23/2018    Osteopenia of multiple sites 4/2/2018    Primary osteoarthritis of right knee 05/30/2018    Primary snoring     Sleep related leg cramps 9/7/2010    SOB (shortness of breath) 8/8/2019    Unspecified gastritis and gastroduodenitis without mention of hemorrhage 11/28/2010     Dx updated per 2019 IMO Load    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    Medications:   Jessica has a current medication list which includes the following prescription(s): acetaminophen/diphenhydramine, artificial tears(hypromellose)(genteal/sustane), aspirin, azelastine, b complex vitamins, calcium carbonate, cetirizine, cyanocobalamin (vitamin b-12), denosumab, repatha sureclick, ezetimibe, fexofenadine, fluticasone propionate, krill oil-omega-3-dha-epa, loratadine/pseudoephedrine, magnesium, multivitamin, tizanidine, turmeric, UNABLE TO FIND, and zinc, and the following Facility-Administered Medications:  triamcinolone acetonide.    Allergies:   Review of patient's allergies indicates:   Allergen Reactions    Augmentin [amoxicillin-pot clavulanate] Other (See Comments)     Diarrhea, yeast    Bactrim  [sulfamethoxazole-trimethoprim]      Other reaction(s): Unknown    Celebrex [celecoxib] Other (See Comments)     Stomach pain, gas     Lipitor  [atorvastatin]      Other reaction(s): Unknown    Pravachol  [pravastatin]      Other reaction(s): Unknown    Statins-hmg-coa reductase inhibitors      Other reaction(s): Muscle pain    Sulfa (sulfonamide antibiotics)      Other reaction(s): Swelling    Zoster vaccine live         Imaging, none: listed in chart review     Prior Therapy: yes   Social History:  lives alone  Occupation: retired teacher   Prior Level of Function: independent with all functional mobility and ADLs  Current Level of Function: modified independent with all functional mobility and ADLs secondary to increased time required and limited by pain    Pain:  BACK: Current 2/10 , worst 10/10, best 2/10; KNEE: Current 1/10 , worst 10/10, best 1/10   Location: bilateral back and L knee    Description: sharp Aching- back; bruise ache- knee   Aggravating Factors: Standing and and sit to stand   Easing Factors: pain medication, rest and sitting     Patients goals: Patient reports she would like to decrease her pain and get back to normal activity without pain.    Objective       Observation: pt pleasant and appropriate throughout evaluation; A&O x 3     Posture:  Slouched seated posture favoring right side secondary to left knee pain    Lumbar Range of Motion:    % of normal motion Pain   Flexion 100%    None with these movements         Extension 100%            Left Side Bending 75%         Right Side Bending 100%         Left rotation   100%         Right Rotation   100%              Lower Extremity Strength  Right LE  Left LE    Knee extension: 4/5 Knee extension: 4/5   Knee flexion: 4/5 Knee flexion: 4/5  "  Hip flexion: 4/5 Hip flexion: 4/5   Hip abduction: 3+/5 Hip abduction: 3+/5   Hip adduction: 3+/5 Hip adduction 3+/5   Ankle dorsiflexion: 4+/5 Ankle dorsiflexion: 4+/5   Ankle plantarflexion: 4+/5 Ankle plantarflexion: 4+/5       Special Tests:  -Repeated Flexion: negative   -Repeated Ext: negative   -Piriformis Test: negative     Joint Mobility: decreased bilateral patella mobility into all planes of motion     Palpation: tender to palpation of bilateral medial knee musculature, bilateral musculature over iliac crest and lumbar paraspinals     Sensation: denies numbness and tingling of bilateral lower extremities     Flexibility: decreased bilateral hamstring and tensor fascia latae muscle length (L more limited than R)    Range of Motion:   Knee Left active   Flexion 135   Extension -4     Knee Right active   Flexion 132   Extension -5       Special Tests:   Right Left   Patellar Grind Test Negative Positive   Joint line tenderness  Positive-medially   Positive          TREATMENT   Treatment Time In: 9:36  Treatment Time Out: 9:51  Total Treatment time (time-based codes) separate from Evaluation: 15 minutes    Jessica received therapeutic exercises to develop ROM and flexibility for 10 minutes including:  Supine hamstring stretch 3 x 15"   QS 2 x 10   Supine SLR x 10   Seated LAQ 2 x 10     Jessica received the following manual therapy techniques: Joint mobilizations were applied to the: L knee for 5 minutes, including:  Patella distraction and mobilization     Home Exercises and Patient Education Provided    Education provided:   - - PT POC  - HEP  - PT prognosis and diagnosis  - all questions and concerns answered       Written Home Exercises Provided: yes.  Exercises were reviewed and Jessica was able to demonstrate them prior to the end of the session.  Jessica demonstrated good  understanding of the education provided.     See EMR under Patient Instructions for exercises provided " 5/4/2022.    Assessment   Jessica is a 77 y.o. female referred to outpatient Physical Therapy with a medical diagnosis of  M54.50,G89.29 (ICD-10-CM) - Chronic low back pain, unspecified back pain laterality, unspecified whether sciatica present   M17.0 (ICD-10-CM) - Primary osteoarthritis of both knees   The patient presents with impairments which include decreased ROM, decreased strength, decreased joint mobility, decreased muscle length, postural abnormalities and decreased overall function.  These impairments are limiting patient's ability to functional and desired task without pain or modifcations. Pt prognosis is Fair due to personal factors and co-morbidities listed below. Pt will benefit from skilled outpatient Physical Therapy to address the deficits stated above and in the chart below, provide pt/family education, and to maximize pt's level of independence.     Patient prognosis is Fair.   Patientt will benefit from skilled outpatient Physical Therapy to address the deficits stated above and in the chart below, provide patient /family education, and to maximize patientt's level of independence.     Plan of care discussed with patient: Yes  Patient's spiritual, cultural and educational needs considered and patient is agreeable to the plan of care and goals as stated below:     Anticipated Barriers for therapy: none     Medical Necessity is demonstrated by the following  History  Co-morbidities and personal factors that may impact the plan of care Co-morbidities:   See above    Personal Factors:   age     moderate   Examination  Body Structures and Functions, activity limitations and participation restrictions that may impact the plan of care Body Regions:   back  lower extremities    Body Systems:    ROM  strength    Participation Restrictions:   Chronic pain    Activity limitations:   Learning and applying knowledge  no deficits    General Tasks and Commands  no deficits    Communication  no  deficits    Mobility  lifting and carrying objects  walking    Self care  toileting    Domestic Life  shopping  cooking  doing house work (cleaning house, washing dishes, laundry)    Interactions/Relationships  no deficits    Life Areas  no deficits    Community and Social Life  community life  recreation and leisure         moderate   Clinical Presentation unstable clinical presentation with unpredictable characteristics moderate   Decision Making/ Complexity Score: moderate     Goals:  Short Term Goals:  4 weeks   1. Pain: Pt will demonstrate improved pain by reports of less than or equal to 7/10 worst pain on the verbal rating scale in order to progress toward maximal functional ability and improve QOL.   2.  Mobility: Patient will present with full knee extension range of motion in order to return to maximal functional potential and improve quality of life.   3.  HEP: Patient will demonstrate independence with HEP in order to progress toward functional independence.      Long Term Goals:  8 weeks   1. Pain: Pt will demonstrate improved pain by reports of less than or equal to 5/10 worst pain on the verbal rating scale in order to progress toward maximal functional ability and improve QOL.     2. Function: Patient will demonstrate improved function as indicated by a functional limitation score of 54% on the FOTO.   3. Strength: Patient will improve strength to +4/5 in bilateral lower extremities in order to improve functional independence and quality of life.   4. Patient will return to normal ADL's, IADL's, community involvement, recreational activities, and work-related activities with no pain and maximal function.       Goals Key:  PC= progressing/continue;        PM= partially met;             DC= discontinue    Plan   Plan of care Certification: 5/4/2022 to 8/4/2022.    Outpatient Physical Therapy 2 times weekly for 10 weeks to include the following interventions: Cervical/Lumbar Traction, Electrical  Stimulation IFC, Gait Training, Manual Therapy, Moist Heat/ Ice, Neuromuscular Re-ed, Patient Education, Self Care, Therapeutic Activities, Therapeutic Exercise and Ultrasound. And any other therapies and modalities as clinically indicated and appropriate, including but not limited to FDN and telehealth. Pt may be seen by PTA as a part of pt's care team.       Sharon Villegas, PT, DPT, PPCES  5/5/2022

## 2022-05-06 ENCOUNTER — INFUSION (OUTPATIENT)
Dept: INFUSION THERAPY | Facility: HOSPITAL | Age: 77
End: 2022-05-06
Attending: INTERNAL MEDICINE
Payer: MEDICARE

## 2022-05-06 VITALS
RESPIRATION RATE: 18 BRPM | TEMPERATURE: 97 F | DIASTOLIC BLOOD PRESSURE: 71 MMHG | HEART RATE: 61 BPM | SYSTOLIC BLOOD PRESSURE: 119 MMHG | OXYGEN SATURATION: 98 %

## 2022-05-06 DIAGNOSIS — M81.0 AGE-RELATED OSTEOPOROSIS WITHOUT CURRENT PATHOLOGICAL FRACTURE: Primary | ICD-10-CM

## 2022-05-06 PROCEDURE — 96372 THER/PROPH/DIAG INJ SC/IM: CPT

## 2022-05-06 PROCEDURE — 63600175 PHARM REV CODE 636 W HCPCS: Mod: JG | Performed by: INTERNAL MEDICINE

## 2022-05-06 RX ADMIN — DENOSUMAB 60 MG: 60 INJECTION SUBCUTANEOUS at 09:05

## 2022-05-06 NOTE — DISCHARGE INSTRUCTIONS
FALL PREVENTION   Falls often occur due to slipping, tripping or losing your balance. Here are ways to reduce your risk of falling again.   Was there anything that caused your fall that can be fixed, removed or replaced?   Make your home safe by keeping walkways clear of objects you may trip over.   Use non-slip pads under rugs.   Do not walk in poorly lit areas.   Do not stand on chairs or wobbly ladders.   Use caution when reaching overhead or looking upward. This position can cause a loss of balance.   Be sure your shoes fit properly, have non-slip bottoms and are in good condition.   Be cautious when going up and down stairs, curbs, and when walking on uneven sidewalks.   If your balance is poor, consider using a cane or walker.   If your fall was related to alcohol use, stop or limit alcohol intake.   If your fall was related to use of sleeping medicines, talk to your doctor about this. You may need to reduce your dosage at bedtime if you awaken during the night to go to the bathroom.   To reduce the need for nighttime bathroom trips:   Avoid drinking fluids for several hours before going to bed   Empty your bladder before going to bed   Men can keep a urinal at the bedside   © 2343-9690 [employer] [facility], [address] [place] [address], [place] [address]. All rights reserved. This information is not intended as a substitute for professional medical care. Always follow your healthcare professional's instructions.

## 2022-05-06 NOTE — NURSING
Last Prolia:  10/27/2021    Labs reviewed: Cr and Ca WNL    Patient denied any recent or upcoming dental work that would prevent pt from receiving Prolia today.    Patient stated she takes calcium and Vit D supplements at home:   Yes      Prolia injection given without difficulty to right lower abd.   Band-Aid applied to site.  Patient instructed to stay in the clinic for 15 minutes.  Patient verbalized understanding and will notify nurse with any complaints.  NAD noted upon discharge.

## 2022-05-09 NOTE — PROGRESS NOTES
"[facility]   Physical Therapy Treatment Note     Name: Jessica Perez  Clinic Number: [id number]    Therapy Diagnosis:   Encounter Diagnosis   Name Primary?    Decreased ROM of lumbar spine      Physician: Dileep Mendoza MD    Visit Date: 5/10/2022    Physician Orders: PT Eval and Treat   Medical Diagnosis from Referral:   M54.50,G89.29 (ICD-10-CM) - Chronic low back pain, unspecified back pain laterality, unspecified whether sciatica present   M17.0 (ICD-10-CM) - Primary osteoarthritis of both knees      Evaluation Date: 5/4/2022  Authorization Period Expiration: 12/31/2022  Plan of Care Expiration: 8/4/2022  Visit # / Visits authorized: 1/ 20     PN DUE: 6/4/2022     Precautions: Standard    PTA Visit #: 0/5     Time In: 3:50  Time Out: 4:35  Total Billable Time: 45 minutes    SUBJECTIVE     Pt reports: her knees are feeling better after initial eval and doing HEP. Her back is about the same.   She was compliant with home exercise program.  Response to previous treatment: decreased pain  Functional change: increased functional tolerance      Pain: 3/10 bilateral knees; 3.5-4/5 back     OBJECTIVE     Objective Measures updated at progress report unless specified.     Treatment     Jessica received the treatments listed below:      (exercises performed today are bolded)    therapeutic exercises to develop strength, endurance, ROM, flexibility, posture and core stabilization for 30 minutes including:    Recumbent bike for increased lower extremity range of motion, strength, and endurance for 5 minutes   Slant board gastroc stretch 3 x 30"  Bridges 2 x 10   SL clams 2 x 10 with red theraband   Supine hamstring stretch 5 x 15"   QS 2 x 10   Supine SLR x 10   Seated LAQ 2 x 10     tailgaters   Steam boats   SAQ  Open books   DKTC   Piriformis stretch   Seated forward flex   Standing IT band stretch     manual therapy techniques: Joint mobilizations and Myofacial release were applied to " the: bilateral knee and low back for 15 minutes, including:  bilateral Patella distraction and mobilization   right MFR of right lumbar paraspinals       neuromuscular re-education activities to improve: Balance and Proprioception for 0 minutes. The following activities were included:  none      Patient Education and Home Exercises     Home Exercises Provided and Patient Education Provided     Education provided:   - Education/Self-Care provided:   · Patient educated on the impairments noted above and the effects of physical therapy intervention to improve overall condition and QOL.   · Patient was educated on all the above exercise prior/during/after for proper posture, positioning, and execution for safe performance with home exercise program.   · Patient educated on the importance of improved core and upper extremity strength in order to improve alignment of the spine and upper extremities with static positions and dynamic movement.   · Patient educated on the importance of strong core and lower extremity musculature in order to improve both static and dynamic balance, improve gait mechanics, reduce fall risk and improve household and community mobility      Written Home Exercises Provided: Patient instructed to cont prior HEP. Exercises were reviewed and Jessica was able to demonstrate them prior to the end of the session.  Jessica demonstrated good  understanding of the education provided. See EMR under Patient Instructions for exercises provided during therapy sessions    ASSESSMENT     Patient presents with decreased knee pain and continue back pain. Hamstring stretch was performed for optional length tension relationship of low back musculature. Single leg raises and quad sets were performed for optimal patella femoral tracking. Core and hip strengthening exercises were performed for increased postural stability to better support low back musculature and knees. Patient tolerated all exercises well with no  complaints. Continued right low back musculature hypertonicity noted.     Jessica Is progressing well towards her goals.   Pt prognosis is Good.     Pt will continue to benefit from skilled outpatient physical therapy to address the deficits listed in the problem list box on initial evaluation, provide pt/family education and to maximize pt's level of independence in the home and community environment.     Pt's spiritual, cultural and educational needs considered and pt agreeable to plan of care and goals.     Anticipated barriers to physical therapy: pain and decreased exercise tolerance     Goals:   Short Term Goals:  4 weeks   1. Pain: Pt will demonstrate improved pain by reports of less than or equal to 7/10 worst pain on the verbal rating scale in order to progress toward maximal functional ability and improve QOL. PC   2.  Mobility: Patient will present with full knee extension range of motion in order to return to maximal functional potential and improve quality of life. PC   3.  HEP: Patient will demonstrate independence with HEP in order to progress toward functional independence. PC      Long Term Goals:  8 weeks   1. Pain: Pt will demonstrate improved pain by reports of less than or equal to 5/10 worst pain on the verbal rating scale in order to progress toward maximal functional ability and improve QOL.  PC   2. Function: Patient will demonstrate improved function as indicated by a functional limitation score of 54% on the FOTO. PC   3. Strength: Patient will improve strength to +4/5 in bilateral lower extremities in order to improve functional independence and quality of life. PC   4. Patient will return to normal ADL's, IADL's, community involvement, recreational activities, and work-related activities with no pain and maximal function. PC      Goals Key:  PC= progressing/continue;        PM= partially met;             DC= discontinue      PLAN     Plan of care Certification: 5/4/2022 to 8/4/2022.      Cont PT per POC and progress pt as tolerated and appropriate.    Sharon Villegas, PT, DPT, PPCES        05/11/2022

## 2022-05-10 ENCOUNTER — CLINICAL SUPPORT (OUTPATIENT)
Dept: REHABILITATION | Facility: HOSPITAL | Age: 77
End: 2022-05-10
Attending: INTERNAL MEDICINE
Payer: MEDICARE

## 2022-05-10 DIAGNOSIS — M53.86 DECREASED ROM OF LUMBAR SPINE: ICD-10-CM

## 2022-05-10 PROCEDURE — 97140 MANUAL THERAPY 1/> REGIONS: CPT | Mod: PN

## 2022-05-10 PROCEDURE — 97110 THERAPEUTIC EXERCISES: CPT | Mod: PN

## 2022-05-11 ENCOUNTER — LAB VISIT (OUTPATIENT)
Dept: LAB | Facility: HOSPITAL | Age: 77
End: 2022-05-11
Attending: FAMILY MEDICINE
Payer: MEDICARE

## 2022-05-11 ENCOUNTER — OFFICE VISIT (OUTPATIENT)
Dept: INTERNAL MEDICINE | Facility: CLINIC | Age: 77
End: 2022-05-11
Payer: MEDICARE

## 2022-05-11 VITALS
DIASTOLIC BLOOD PRESSURE: 72 MMHG | OXYGEN SATURATION: 99 % | HEART RATE: 78 BPM | WEIGHT: 172.19 LBS | BODY MASS INDEX: 31.49 KG/M2 | TEMPERATURE: 99 F | SYSTOLIC BLOOD PRESSURE: 124 MMHG

## 2022-05-11 DIAGNOSIS — R19.7 DIARRHEA, UNSPECIFIED TYPE: ICD-10-CM

## 2022-05-11 DIAGNOSIS — R21 RASH: ICD-10-CM

## 2022-05-11 DIAGNOSIS — J30.2 SEASONAL ALLERGIC RHINITIS, UNSPECIFIED TRIGGER: ICD-10-CM

## 2022-05-11 DIAGNOSIS — R19.7 DIARRHEA, UNSPECIFIED TYPE: Primary | ICD-10-CM

## 2022-05-11 DIAGNOSIS — E21.3 HYPERPARATHYROIDISM: ICD-10-CM

## 2022-05-11 DIAGNOSIS — E78.00 PURE HYPERCHOLESTEROLEMIA: ICD-10-CM

## 2022-05-11 PROBLEM — N39.0 URINARY TRACT INFECTION WITHOUT HEMATURIA: Status: RESOLVED | Noted: 2022-01-06 | Resolved: 2022-05-11

## 2022-05-11 PROBLEM — M53.86 DECREASED ROM OF LUMBAR SPINE: Status: ACTIVE | Noted: 2022-05-11

## 2022-05-11 LAB — ERYTHROCYTE [SEDIMENTATION RATE] IN BLOOD BY WESTERGREN METHOD: 11 MM/HR (ref 0–20)

## 2022-05-11 PROCEDURE — 1160F RVW MEDS BY RX/DR IN RCRD: CPT | Mod: CPTII,S$GLB,, | Performed by: FAMILY MEDICINE

## 2022-05-11 PROCEDURE — 86140 C-REACTIVE PROTEIN: CPT | Performed by: FAMILY MEDICINE

## 2022-05-11 PROCEDURE — 99999 PR PBB SHADOW E&M-EST. PATIENT-LVL V: CPT | Mod: PBBFAC,,, | Performed by: FAMILY MEDICINE

## 2022-05-11 PROCEDURE — 82977 ASSAY OF GGT: CPT | Mod: GA | Performed by: FAMILY MEDICINE

## 2022-05-11 PROCEDURE — 82306 VITAMIN D 25 HYDROXY: CPT | Mod: GA | Performed by: FAMILY MEDICINE

## 2022-05-11 PROCEDURE — 80053 COMPREHEN METABOLIC PANEL: CPT | Performed by: FAMILY MEDICINE

## 2022-05-11 PROCEDURE — 82150 ASSAY OF AMYLASE: CPT | Performed by: FAMILY MEDICINE

## 2022-05-11 PROCEDURE — 83540 ASSAY OF IRON: CPT | Mod: GA | Performed by: FAMILY MEDICINE

## 2022-05-11 PROCEDURE — 1101F PT FALLS ASSESS-DOCD LE1/YR: CPT | Mod: CPTII,S$GLB,, | Performed by: FAMILY MEDICINE

## 2022-05-11 PROCEDURE — 85025 COMPLETE CBC W/AUTO DIFF WBC: CPT | Performed by: FAMILY MEDICINE

## 2022-05-11 PROCEDURE — 99214 PR OFFICE/OUTPT VISIT, EST, LEVL IV, 30-39 MIN: ICD-10-PCS | Mod: S$GLB,,, | Performed by: FAMILY MEDICINE

## 2022-05-11 PROCEDURE — 1126F PR PAIN SEVERITY QUANTIFIED, NO PAIN PRESENT: ICD-10-PCS | Mod: CPTII,S$GLB,, | Performed by: FAMILY MEDICINE

## 2022-05-11 PROCEDURE — 36415 COLL VENOUS BLD VENIPUNCTURE: CPT | Mod: PO | Performed by: FAMILY MEDICINE

## 2022-05-11 PROCEDURE — 82746 ASSAY OF FOLIC ACID SERUM: CPT | Mod: GA | Performed by: FAMILY MEDICINE

## 2022-05-11 PROCEDURE — 1160F PR REVIEW ALL MEDS BY PRESCRIBER/CLIN PHARMACIST DOCUMENTED: ICD-10-PCS | Mod: CPTII,S$GLB,, | Performed by: FAMILY MEDICINE

## 2022-05-11 PROCEDURE — 82657 ENZYME CELL ACTIVITY: CPT | Performed by: FAMILY MEDICINE

## 2022-05-11 PROCEDURE — 99214 OFFICE O/P EST MOD 30 MIN: CPT | Mod: S$GLB,,, | Performed by: FAMILY MEDICINE

## 2022-05-11 PROCEDURE — 3074F SYST BP LT 130 MM HG: CPT | Mod: CPTII,S$GLB,, | Performed by: FAMILY MEDICINE

## 2022-05-11 PROCEDURE — 85651 RBC SED RATE NONAUTOMATED: CPT | Performed by: FAMILY MEDICINE

## 2022-05-11 PROCEDURE — 84439 ASSAY OF FREE THYROXINE: CPT | Performed by: FAMILY MEDICINE

## 2022-05-11 PROCEDURE — 83690 ASSAY OF LIPASE: CPT | Performed by: FAMILY MEDICINE

## 2022-05-11 PROCEDURE — 3288F PR FALLS RISK ASSESSMENT DOCUMENTED: ICD-10-PCS | Mod: CPTII,S$GLB,, | Performed by: FAMILY MEDICINE

## 2022-05-11 PROCEDURE — 86706 HEP B SURFACE ANTIBODY: CPT | Performed by: FAMILY MEDICINE

## 2022-05-11 PROCEDURE — 84443 ASSAY THYROID STIM HORMONE: CPT | Performed by: FAMILY MEDICINE

## 2022-05-11 PROCEDURE — 3074F PR MOST RECENT SYSTOLIC BLOOD PRESSURE < 130 MM HG: ICD-10-PCS | Mod: CPTII,S$GLB,, | Performed by: FAMILY MEDICINE

## 2022-05-11 PROCEDURE — 84630 ASSAY OF ZINC: CPT | Performed by: FAMILY MEDICINE

## 2022-05-11 PROCEDURE — 3288F FALL RISK ASSESSMENT DOCD: CPT | Mod: CPTII,S$GLB,, | Performed by: FAMILY MEDICINE

## 2022-05-11 PROCEDURE — 83516 IMMUNOASSAY NONANTIBODY: CPT | Mod: 59 | Performed by: FAMILY MEDICINE

## 2022-05-11 PROCEDURE — 1101F PR PT FALLS ASSESS DOC 0-1 FALLS W/OUT INJ PAST YR: ICD-10-PCS | Mod: CPTII,S$GLB,, | Performed by: FAMILY MEDICINE

## 2022-05-11 PROCEDURE — 99999 PR PBB SHADOW E&M-EST. PATIENT-LVL V: ICD-10-PCS | Mod: PBBFAC,,, | Performed by: FAMILY MEDICINE

## 2022-05-11 PROCEDURE — 1126F AMNT PAIN NOTED NONE PRSNT: CPT | Mod: CPTII,S$GLB,, | Performed by: FAMILY MEDICINE

## 2022-05-11 PROCEDURE — 3078F DIAST BP <80 MM HG: CPT | Mod: CPTII,S$GLB,, | Performed by: FAMILY MEDICINE

## 2022-05-11 PROCEDURE — 1159F MED LIST DOCD IN RCRD: CPT | Mod: CPTII,S$GLB,, | Performed by: FAMILY MEDICINE

## 2022-05-11 PROCEDURE — 3078F PR MOST RECENT DIASTOLIC BLOOD PRESSURE < 80 MM HG: ICD-10-PCS | Mod: CPTII,S$GLB,, | Performed by: FAMILY MEDICINE

## 2022-05-11 PROCEDURE — 82607 VITAMIN B-12: CPT | Mod: GA | Performed by: FAMILY MEDICINE

## 2022-05-11 PROCEDURE — 1159F PR MEDICATION LIST DOCUMENTED IN MEDICAL RECORD: ICD-10-PCS | Mod: CPTII,S$GLB,, | Performed by: FAMILY MEDICINE

## 2022-05-11 RX ORDER — DIPHENHYDRAMINE HCL 12.5MG/5ML
ELIXIR ORAL
COMMUNITY
End: 2022-05-11

## 2022-05-11 RX ORDER — EVOLOCUMAB 140 MG/ML
INJECTION, SOLUTION SUBCUTANEOUS
COMMUNITY

## 2022-05-11 RX ORDER — TRIAMCINOLONE ACETONIDE 1 MG/G
CREAM TOPICAL 2 TIMES DAILY
Qty: 45 G | Refills: 0 | Status: SHIPPED | OUTPATIENT
Start: 2022-05-11

## 2022-05-11 RX ORDER — FLAXSEED OIL 1000 MG
CAPSULE ORAL
COMMUNITY

## 2022-05-11 RX ORDER — HYDROXYZINE PAMOATE 25 MG/1
25 CAPSULE ORAL EVERY 8 HOURS PRN
Qty: 30 CAPSULE | Refills: 0 | Status: SHIPPED | OUTPATIENT
Start: 2022-05-11 | End: 2023-04-25

## 2022-05-11 RX ORDER — CETIRIZINE HYDROCHLORIDE 10 MG/1
TABLET ORAL
COMMUNITY
End: 2022-05-11

## 2022-05-11 NOTE — PROGRESS NOTES
Subjective:       Patient ID: Jessica Perez is a 77 y.o. female.    Chief Complaint: Abdominal Pain    Awakening at night with Diarrhea  +GERD.    Diarrhea   This is a recurrent problem. The current episode started more than 1 month ago. The problem has been gradually worsening. Pertinent negatives include no abdominal pain, arthralgias or fever.     Review of Systems   Constitutional: Negative for fever.   Gastrointestinal: Positive for diarrhea. Negative for abdominal pain.   Musculoskeletal: Negative for arthralgias.       Objective:      Physical Exam  Vitals and nursing note reviewed.   Constitutional:       General: She is not in acute distress.     Appearance: Normal appearance. She is well-developed. She is not diaphoretic.   HENT:      Head: Normocephalic and atraumatic.   Pulmonary:      Effort: Pulmonary effort is normal. No respiratory distress.      Breath sounds: Normal breath sounds. No wheezing.   Skin:     General: Skin is warm and dry.      Findings: No erythema or rash.   Neurological:      Mental Status: She is alert.         Assessment:       1. Diarrhea, unspecified type    2. Pure hypercholesterolemia    3. Rash    4. Seasonal allergic rhinitis, unspecified trigger    5. Hyperparathyroidism        Plan:     Problem List Items Addressed This Visit        ENT    Seasonal allergic rhinitis       Derm    Rash    Relevant Medications    triamcinolone acetonide 0.1% (KENALOG) 0.1 % cream    hydrOXYzine pamoate (VISTARIL) 25 MG Cap    Other Relevant Orders    Ambulatory referral/consult to Dermatology       Cardiac/Vascular    HLD (hyperlipidemia)    Overview     Chronic, Stable, cont zetia              Endocrine    Hyperparathyroidism    Overview     elevated pth - 121 as of 11/18/2020              GI    Diarrhea - Primary    Relevant Orders    TISSUE TRANSGLUTAMINASE ABS, IGA/IGG    CBC auto differential    Comprehensive metabolic panel    Sedimentation rate    C-reactive protein    TSH     T4, free    Gamma GT    Vitamin B12    Vitamin D    Folate    Iron    Zinc    Hepatitis B surface antibody    Thiopurine Methyltrans, RBC (TMPT)    Amylase    Lipase    Ambulatory referral/consult to Gastroenterology

## 2022-05-12 LAB
25(OH)D3+25(OH)D2 SERPL-MCNC: 60 NG/ML (ref 30–96)
ALBUMIN SERPL BCP-MCNC: 4.1 G/DL (ref 3.5–5.2)
ALP SERPL-CCNC: 92 U/L (ref 55–135)
ALT SERPL W/O P-5'-P-CCNC: 19 U/L (ref 10–44)
AMYLASE SERPL-CCNC: 66 U/L (ref 20–110)
ANION GAP SERPL CALC-SCNC: 9 MMOL/L (ref 8–16)
AST SERPL-CCNC: 21 U/L (ref 10–40)
BASOPHILS # BLD AUTO: 0.04 K/UL (ref 0–0.2)
BASOPHILS NFR BLD: 0.4 % (ref 0–1.9)
BILIRUB SERPL-MCNC: 0.5 MG/DL (ref 0.1–1)
BUN SERPL-MCNC: 12 MG/DL (ref 8–23)
CALCIUM SERPL-MCNC: 9.5 MG/DL (ref 8.7–10.5)
CHLORIDE SERPL-SCNC: 107 MMOL/L (ref 95–110)
CO2 SERPL-SCNC: 23 MMOL/L (ref 23–29)
CREAT SERPL-MCNC: 0.8 MG/DL (ref 0.5–1.4)
CRP SERPL-MCNC: 2.8 MG/L (ref 0–8.2)
DIFFERENTIAL METHOD: ABNORMAL
EOSINOPHIL # BLD AUTO: 0.2 K/UL (ref 0–0.5)
EOSINOPHIL NFR BLD: 2.3 % (ref 0–8)
ERYTHROCYTE [DISTWIDTH] IN BLOOD BY AUTOMATED COUNT: 14.1 % (ref 11.5–14.5)
EST. GFR  (AFRICAN AMERICAN): >60 ML/MIN/1.73 M^2
EST. GFR  (NON AFRICAN AMERICAN): >60 ML/MIN/1.73 M^2
FOLATE SERPL-MCNC: >40 NG/ML (ref 4–24)
GGT SERPL-CCNC: 29 U/L (ref 8–55)
GLUCOSE SERPL-MCNC: 77 MG/DL (ref 70–110)
HCT VFR BLD AUTO: 44.4 % (ref 37–48.5)
HGB BLD-MCNC: 13.9 G/DL (ref 12–16)
IMM GRANULOCYTES # BLD AUTO: 0.04 K/UL (ref 0–0.04)
IMM GRANULOCYTES NFR BLD AUTO: 0.4 % (ref 0–0.5)
IRON SERPL-MCNC: 137 UG/DL (ref 30–160)
LIPASE SERPL-CCNC: 22 U/L (ref 4–60)
LYMPHOCYTES # BLD AUTO: 2.8 K/UL (ref 1–4.8)
LYMPHOCYTES NFR BLD: 29.6 % (ref 18–48)
MCH RBC QN AUTO: 30.5 PG (ref 27–31)
MCHC RBC AUTO-ENTMCNC: 31.3 G/DL (ref 32–36)
MCV RBC AUTO: 97 FL (ref 82–98)
MONOCYTES # BLD AUTO: 0.5 K/UL (ref 0.3–1)
MONOCYTES NFR BLD: 5.1 % (ref 4–15)
NEUTROPHILS # BLD AUTO: 5.8 K/UL (ref 1.8–7.7)
NEUTROPHILS NFR BLD: 62.2 % (ref 38–73)
NRBC BLD-RTO: 0 /100 WBC
PLATELET # BLD AUTO: 205 K/UL (ref 150–450)
PMV BLD AUTO: 10.7 FL (ref 9.2–12.9)
POTASSIUM SERPL-SCNC: 4.3 MMOL/L (ref 3.5–5.1)
PROT SERPL-MCNC: 6.6 G/DL (ref 6–8.4)
RBC # BLD AUTO: 4.56 M/UL (ref 4–5.4)
SODIUM SERPL-SCNC: 139 MMOL/L (ref 136–145)
T4 FREE SERPL-MCNC: 0.84 NG/DL (ref 0.71–1.51)
TSH SERPL DL<=0.005 MIU/L-ACNC: 2.07 UIU/ML (ref 0.4–4)
VIT B12 SERPL-MCNC: >2000 PG/ML (ref 210–950)
WBC # BLD AUTO: 9.39 K/UL (ref 3.9–12.7)

## 2022-05-16 LAB
TTG IGA SER-ACNC: 5 UNITS
TTG IGG SER-ACNC: 3 UNITS
ZINC SERPL-MCNC: 125 UG/DL (ref 60–130)

## 2022-05-17 LAB
6-METHYLMERCAPTOPURINE RIBOSIDE: 5.68 NMOL/ML/H (ref 5.04–9.57)
6-METHYLMERCAPTOPURINE: 3.3 NMOL/ML/H (ref 3–6.66)
6-METHYLTHIOGUANINE RIBOSIDE: 3.69 NMOL/ML/H (ref 2.7–5.84)
HBV SURFACE AB SER-ACNC: NEGATIVE M[IU]/ML
TPMT INTERPRETATION: NORMAL
TPMT REVIEWED BY: NORMAL

## 2022-05-17 NOTE — PROGRESS NOTES
"[facility]   Physical Therapy Treatment Note     Name: Jessica Perez  Clinic Number: [id number]    Therapy Diagnosis:   Encounter Diagnosis   Name Primary?    Decreased ROM of lumbar spine Yes     Physician: Dileep Mendoza MD    Visit Date: 5/19/2022    Physician Orders: PT Eval and Treat   Medical Diagnosis from Referral:   M54.50,G89.29 (ICD-10-CM) - Chronic low back pain, unspecified back pain laterality, unspecified whether sciatica present   M17.0 (ICD-10-CM) - Primary osteoarthritis of both knees      Evaluation Date: 5/4/2022  Authorization Period Expiration: 12/31/2022  Plan of Care Expiration: 8/4/2022  Visit # / Visits authorized: 2/ 20     PN DUE: 6/4/2022     Precautions: Standard    PTA Visit #: 0/5     Time In: 1:01  Time Out: 1:57  Total Billable Time: 56 minutes    SUBJECTIVE     Pt reports: her knees are better and only occasionally tweaks her back.   She was compliant with home exercise program.  Response to previous treatment: decreased pain  Functional change: increased functional tolerance      Pain: 0/10 bilateral knees; 4-5/10 back     OBJECTIVE     Objective Measures updated at progress report unless specified.     Treatment     Jessica received the treatments listed below:      (exercises performed today are bolded)    therapeutic exercises to develop strength, endurance, ROM, flexibility, posture and core stabilization for 40 minutes including:    Recumbent bike for increased lower extremity range of motion, strength, and endurance for 6 minutes   Slant board gastroc stretch 3 x 30"  Bridges 2 x 10    SL clams 2 x 10 with red theraband   Supine hamstring stretch 5 x 15"   QS 2 x 10   Supine SLR x 10   Seated LAQ 2 x 10     tailgaters   + Steam boats x 10   SAQ  Open books   DKTC   + Piriformis stretch 5 x 15"   Seated forward flex   + Standing IT band stretch 3 x 15"     manual therapy techniques: Joint mobilizations and Myofacial release were applied to " the: bilateral knee and low back for 16 minutes, including:  bilateral Patella distraction and mobilization   bilateral MFR of bilateral lumbar paraspinals and musculature over iliac crest       neuromuscular re-education activities to improve: Balance and Proprioception for 0 minutes. The following activities were included:  none      Patient Education and Home Exercises     Home Exercises Provided and Patient Education Provided     Education provided:   - Education/Self-Care provided:   · Patient educated on the impairments noted above and the effects of physical therapy intervention to improve overall condition and QOL.   · Patient was educated on all the above exercise prior/during/after for proper posture, positioning, and execution for safe performance with home exercise program.   · Patient educated on the importance of improved core and upper extremity strength in order to improve alignment of the spine and upper extremities with static positions and dynamic movement.   · Patient educated on the importance of strong core and lower extremity musculature in order to improve both static and dynamic balance, improve gait mechanics, reduce fall risk and improve household and community mobility      Written Home Exercises Provided: Patient instructed to cont prior HEP. Exercises were reviewed and Jessica was able to demonstrate them prior to the end of the session.  Jessica demonstrated good  understanding of the education provided. See EMR under Patient Instructions for exercises provided during therapy sessions    ASSESSMENT   Reviewed proper lifting, functional movement, and bed mobility form for decreased back pain and strain on low back musculature. Steams boats were performed for increased functional hip stability and single leg stance balance. IT band stretch and piriformis stretch was performed for increased muscle extensibility. Patient tolerated all exercises well with no complaints. Patient reports  relief from myofascial release on low back musculature.     Jessica Is progressing well towards her goals.   Pt prognosis is Good.     Pt will continue to benefit from skilled outpatient physical therapy to address the deficits listed in the problem list box on initial evaluation, provide pt/family education and to maximize pt's level of independence in the home and community environment.     Pt's spiritual, cultural and educational needs considered and pt agreeable to plan of care and goals.     Anticipated barriers to physical therapy: pain and decreased exercise tolerance     Goals:   Short Term Goals:  4 weeks   1. Pain: Pt will demonstrate improved pain by reports of less than or equal to 7/10 worst pain on the verbal rating scale in order to progress toward maximal functional ability and improve QOL. MET 5/19/2022   2.  Mobility: Patient will present with full knee extension range of motion in order to return to maximal functional potential and improve quality of life. PC   3.  HEP: Patient will demonstrate independence with HEP in order to progress toward functional independence. MET 5/19/2022      Long Term Goals:  8 weeks   1. Pain: Pt will demonstrate improved pain by reports of less than or equal to 5/10 worst pain on the verbal rating scale in order to progress toward maximal functional ability and improve QOL.  PC   2. Function: Patient will demonstrate improved function as indicated by a functional limitation score of 54% on the FOTO. PC   3. Strength: Patient will improve strength to +4/5 in bilateral lower extremities in order to improve functional independence and quality of life. PC   4. Patient will return to normal ADL's, IADL's, community involvement, recreational activities, and work-related activities with no pain and maximal function. PC      Goals Key:  PC= progressing/continue;        PM= partially met;             DC= discontinue      PLAN     Plan of care Certification: 5/4/2022 to  8/4/2022.     Cont PT per POC and progress pt as tolerated and appropriate.    Sharon Villegas, PT, DPT, PPCES    05/19/2022

## 2022-05-18 ENCOUNTER — OFFICE VISIT (OUTPATIENT)
Dept: DERMATOLOGY | Facility: CLINIC | Age: 77
End: 2022-05-18
Payer: MEDICARE

## 2022-05-18 DIAGNOSIS — R21 RASH: ICD-10-CM

## 2022-05-18 DIAGNOSIS — D18.01 CHERRY ANGIOMA: ICD-10-CM

## 2022-05-18 DIAGNOSIS — L82.1 SEBORRHEIC KERATOSES: ICD-10-CM

## 2022-05-18 DIAGNOSIS — L21.9 SEBORRHEIC DERMATITIS OF SCALP: Primary | ICD-10-CM

## 2022-05-18 PROCEDURE — 99999 PR PBB SHADOW E&M-EST. PATIENT-LVL IV: ICD-10-PCS | Mod: PBBFAC,,, | Performed by: PHYSICIAN ASSISTANT

## 2022-05-18 PROCEDURE — 1160F RVW MEDS BY RX/DR IN RCRD: CPT | Mod: CPTII,S$GLB,, | Performed by: PHYSICIAN ASSISTANT

## 2022-05-18 PROCEDURE — 1101F PR PT FALLS ASSESS DOC 0-1 FALLS W/OUT INJ PAST YR: ICD-10-PCS | Mod: CPTII,S$GLB,, | Performed by: PHYSICIAN ASSISTANT

## 2022-05-18 PROCEDURE — 1159F PR MEDICATION LIST DOCUMENTED IN MEDICAL RECORD: ICD-10-PCS | Mod: CPTII,S$GLB,, | Performed by: PHYSICIAN ASSISTANT

## 2022-05-18 PROCEDURE — 3288F PR FALLS RISK ASSESSMENT DOCUMENTED: ICD-10-PCS | Mod: CPTII,S$GLB,, | Performed by: PHYSICIAN ASSISTANT

## 2022-05-18 PROCEDURE — 1159F MED LIST DOCD IN RCRD: CPT | Mod: CPTII,S$GLB,, | Performed by: PHYSICIAN ASSISTANT

## 2022-05-18 PROCEDURE — 99203 OFFICE O/P NEW LOW 30 MIN: CPT | Mod: S$GLB,,, | Performed by: PHYSICIAN ASSISTANT

## 2022-05-18 PROCEDURE — 1160F PR REVIEW ALL MEDS BY PRESCRIBER/CLIN PHARMACIST DOCUMENTED: ICD-10-PCS | Mod: CPTII,S$GLB,, | Performed by: PHYSICIAN ASSISTANT

## 2022-05-18 PROCEDURE — 1101F PT FALLS ASSESS-DOCD LE1/YR: CPT | Mod: CPTII,S$GLB,, | Performed by: PHYSICIAN ASSISTANT

## 2022-05-18 PROCEDURE — 3288F FALL RISK ASSESSMENT DOCD: CPT | Mod: CPTII,S$GLB,, | Performed by: PHYSICIAN ASSISTANT

## 2022-05-18 PROCEDURE — 99203 PR OFFICE/OUTPT VISIT, NEW, LEVL III, 30-44 MIN: ICD-10-PCS | Mod: S$GLB,,, | Performed by: PHYSICIAN ASSISTANT

## 2022-05-18 PROCEDURE — 99999 PR PBB SHADOW E&M-EST. PATIENT-LVL IV: CPT | Mod: PBBFAC,,, | Performed by: PHYSICIAN ASSISTANT

## 2022-05-18 RX ORDER — KETOCONAZOLE 20 MG/ML
SHAMPOO, SUSPENSION TOPICAL
Qty: 120 ML | Refills: 5 | Status: SHIPPED | OUTPATIENT
Start: 2022-05-18

## 2022-05-18 RX ORDER — FLUOCINOLONE ACETONIDE 0.1 MG/ML
SOLUTION TOPICAL
Qty: 60 ML | Refills: 1 | Status: SHIPPED | OUTPATIENT
Start: 2022-05-18

## 2022-05-18 NOTE — PROGRESS NOTES
Subjective:       Patient ID:  Jessica Perez is a 77 y.o. female who presents for   Chief Complaint   Patient presents with    Skin Check     UBSE    Itching     C/o itching all over     History of Present Illness: The patient presents with chief complaint of spots.  Location: back  Duration: several years, but really unsure  Signs/Symptoms: raised, dark in color. She is unsure of appearance, but notes massage therapist noticed them.    Prior treatments: none    C/o scattered bumps of body that are itchy and dry, general body, prescribed trial of TAC 0.1% cream (helpful) and hyrdoxyzine (helpful) by PCP on 5/11/22. Onset about 1-2 years ago. Denies new soaps or detergents or meds.     C/o red bumps of chest, +raised, scattered.    C/o itching and dandruff of scalp. Current tx: otc dandruff shampoo.     Denies personal or FHX skin CA       Review of Systems   Constitutional: Negative for fever and chills.   Gastrointestinal: Negative for nausea and vomiting.   Skin: Positive for itching, dry skin and activity-related sunscreen use. Negative for rash, sun sensitivity, daily sunscreen use, recent sunburn, dry lips and abscesses.   Hematologic/Lymphatic: Does not bruise/bleed easily.        Objective:    Physical Exam   Constitutional: She appears well-developed and well-nourished. No distress.   Neurological: She is alert and oriented to person, place, and time. She is not disoriented.   Psychiatric: She has a normal mood and affect.   Skin:   Areas Examined (abnormalities noted in diagram):   Head / Face Inspection Performed  Neck Inspection Performed  Chest / Axilla Inspection Performed  Abdomen Inspection Performed  Back Inspection Performed  RUE Inspected  LUE Inspection Performed  RLE Inspected  LLE Inspection Performed                   Diagram Legend     Erythematous scaling macule/papule c/w actinic keratosis       Vascular papule c/w angioma      Pigmented verrucoid papule/plaque c/w seborrheic  keratosis      Yellow umbilicated papule c/w sebaceous hyperplasia      Irregularly shaped tan macule c/w lentigo     1-2 mm smooth white papules consistent with Milia      Movable subcutaneous cyst with punctum c/w epidermal inclusion cyst      Subcutaneous movable cyst c/w pilar cyst      Firm pink to brown papule c/w dermatofibroma      Pedunculated fleshy papule(s) c/w skin tag(s)      Evenly pigmented macule c/w junctional nevus     Mildly variegated pigmented, slightly irregular-bordered macule c/w mildly atypical nevus      Flesh colored to evenly pigmented papule c/w intradermal nevus       Pink pearly papule/plaque c/w basal cell carcinoma      Erythematous hyperkeratotic cursted plaque c/w SCC      Surgical scar with no sign of skin cancer recurrence      Open and closed comedones      Inflammatory papules and pustules      Verrucoid papule consistent consistent with wart     Erythematous eczematous patches and plaques     Dystrophic onycholytic nail with subungual debris c/w onychomycosis     Umbilicated papule    Erythematous-base heme-crusted tan verrucoid plaque consistent with inflamed seborrheic keratosis     Erythematous Silvery Scaling Plaque c/w Psoriasis     See annotation      Assessment / Plan:        Seborrheic dermatitis of scalp  -     ketoconazole (NIZORAL) 2 % shampoo; Apply topically every 7 days. Shampoo scalp 1-2 times per week. Lather x 5 minutes, rinse well.  Dispense: 120 mL; Refill: 5  -     fluocinolone (SYNALAR) 0.01 % external solution; AAA of scalp twice a day PRN scalp itching or eczema flares.  Dispense: 60 mL; Refill: 1  Ddx: r/o ACD vs. Sunburn vs. Other  Trial of above rx meds. Advised trial of Vanicream Shampoo and Conditioner. Encouraged to wear hat when outdoors.    Seborrheic Keratoses  Reassurance given.  Lesions are benign.    Champion Angiomas  Reassurance given.  Lesions are benign.    Rash  -     Ambulatory referral/consult to Dermatology         Follow up in about 8  months (around 1/18/2023).

## 2022-05-19 ENCOUNTER — CLINICAL SUPPORT (OUTPATIENT)
Dept: REHABILITATION | Facility: HOSPITAL | Age: 77
End: 2022-05-19
Attending: INTERNAL MEDICINE
Payer: MEDICARE

## 2022-05-19 DIAGNOSIS — M53.86 DECREASED ROM OF LUMBAR SPINE: Primary | ICD-10-CM

## 2022-05-19 PROCEDURE — 97140 MANUAL THERAPY 1/> REGIONS: CPT | Mod: PN

## 2022-05-19 PROCEDURE — 97110 THERAPEUTIC EXERCISES: CPT | Mod: PN

## 2022-05-24 NOTE — PROGRESS NOTES
"[facility]   Physical Therapy Treatment Note     Name: Jessica Perez  Clinic Number: [id number]    Therapy Diagnosis:   Encounter Diagnosis   Name Primary?    Decreased ROM of lumbar spine Yes     Physician: Dileep Mendoza MD    Visit Date: 5/25/2022    Physician Orders: PT Eval and Treat   Medical Diagnosis from Referral:   M54.50,G89.29 (ICD-10-CM) - Chronic low back pain, unspecified back pain laterality, unspecified whether sciatica present   M17.0 (ICD-10-CM) - Primary osteoarthritis of both knees      Evaluation Date: 5/4/2022  Authorization Period Expiration: 12/31/2022  Plan of Care Expiration: 8/4/2022  Visit # / Visits authorized: 3/ 20     PN DUE: 6/4/2022     Precautions: Standard    PTA Visit #: 1/5     Time In: 12:20pm  Time Out: 1:15pm  Total Billable Time: 55 minutes    SUBJECTIVE     Pt reports: Back is hurting today, knees and back are worse with the bad weather.  She was compliant with home exercise program.  Response to previous treatment: decreased pain  Functional change: increased functional tolerance      Pain: 0/10 bilateral knees; 4-5/10 back     OBJECTIVE     Objective Measures updated at progress report unless specified.     Treatment     Jessica received the treatments listed below:      (exercises performed today are bolded)    therapeutic exercises to develop strength, endurance, ROM, flexibility, posture and core stabilization for 35 minutes including:    Recumbent bike for increased lower extremity range of motion, strength, and endurance for 6 minutes   Slant board gastroc stretch 3 x 30"  Bridges 2 x 10    SL clams 2 x 10 with red theraband   Supine hamstring stretch 5 x 15"   QS 2 x 10   Supine SLR x 10   Seated LAQ 2 x 10     tailgaters   Steam boats x 10   SAQ  Open books x 10   DKTC 1 x 10 with clinician overpressure  Piriformis stretch 5 x 15"   Seated forward flex   Standing IT band stretch 3 x 15"     manual therapy techniques: Joint " mobilizations and Myofacial release were applied to the: bilateral knee and low back for 20 minutes, including:    bilateral Patella distraction and mobilization   bilateral MFR of bilateral lumbar paraspinals and musculature over iliac crest       neuromuscular re-education activities to improve: Balance and Proprioception for 0 minutes. The following activities were included:  none      Patient Education and Home Exercises     Home Exercises Provided and Patient Education Provided     Education provided:   - Education/Self-Care provided:   · Patient educated on the impairments noted above and the effects of physical therapy intervention to improve overall condition and QOL.   · Patient was educated on all the above exercise prior/during/after for proper posture, positioning, and execution for safe performance with home exercise program.   · Patient educated on the importance of improved core and upper extremity strength in order to improve alignment of the spine and upper extremities with static positions and dynamic movement.   · Patient educated on the importance of strong core and lower extremity musculature in order to improve both static and dynamic balance, improve gait mechanics, reduce fall risk and improve household and community mobility      Written Home Exercises Provided: Patient instructed to cont prior HEP. Exercises were reviewed and Jessica was able to demonstrate them prior to the end of the session.  Jessica demonstrated good  understanding of the education provided. See EMR under Patient Instructions for exercises provided during therapy sessions    ASSESSMENT   Pt presents today reporting ongoing pain, worsened by the weather. Continued with established exercises to good tolerance. Progressed exercises by adding open books and double knee to chest, pt responded well to each exercise. Will continue to work on progression pt's trunk rotation range of motion. Pt responded well to manual therapy  with good reduction in knee pain, noted adhesions along adductors. Plan to use cupping for myofascial gliding, and educate pt on balance exercises next visit.    Jessica Is progressing well towards her goals.   Pt prognosis is Good.     Pt will continue to benefit from skilled outpatient physical therapy to address the deficits listed in the problem list box on initial evaluation, provide pt/family education and to maximize pt's level of independence in the home and community environment.     Pt's spiritual, cultural and educational needs considered and pt agreeable to plan of care and goals.     Anticipated barriers to physical therapy: pain and decreased exercise tolerance     Goals:   Short Term Goals:  4 weeks   1. Pain: Pt will demonstrate improved pain by reports of less than or equal to 7/10 worst pain on the verbal rating scale in order to progress toward maximal functional ability and improve QOL. MET 5/19/2022   2.  Mobility: Patient will present with full knee extension range of motion in order to return to maximal functional potential and improve quality of life. PC   3.  HEP: Patient will demonstrate independence with HEP in order to progress toward functional independence. MET 5/19/2022      Long Term Goals:  8 weeks   1. Pain: Pt will demonstrate improved pain by reports of less than or equal to 5/10 worst pain on the verbal rating scale in order to progress toward maximal functional ability and improve QOL.  PC   2. Function: Patient will demonstrate improved function as indicated by a functional limitation score of 54% on the FOTO. PC   3. Strength: Patient will improve strength to +4/5 in bilateral lower extremities in order to improve functional independence and quality of life. PC   4. Patient will return to normal ADL's, IADL's, community involvement, recreational activities, and work-related activities with no pain and maximal function. PC      Goals Key:  PC= progressing/continue;        PM=  partially met;             DC= discontinue      PLAN     Plan of care Certification: 5/4/2022 to 8/4/2022.     Cont PT per POC and progress pt as tolerated and appropriate.    Chcuk Ornelas, MAKENZIE   05/25/2022

## 2022-05-25 ENCOUNTER — CLINICAL SUPPORT (OUTPATIENT)
Dept: REHABILITATION | Facility: HOSPITAL | Age: 77
End: 2022-05-25
Attending: INTERNAL MEDICINE
Payer: MEDICARE

## 2022-05-25 DIAGNOSIS — M53.86 DECREASED ROM OF LUMBAR SPINE: Primary | ICD-10-CM

## 2022-05-25 PROCEDURE — 97110 THERAPEUTIC EXERCISES: CPT | Mod: PN,CQ

## 2022-05-25 PROCEDURE — 97140 MANUAL THERAPY 1/> REGIONS: CPT | Mod: PN,CQ

## 2022-05-25 NOTE — PROGRESS NOTES
"[facility]   Physical Therapy Treatment Note     Name: Jessica Perez  Clinic Number: [id number]    Therapy Diagnosis:   Encounter Diagnosis   Name Primary?    Decreased ROM of lumbar spine Yes     Physician: Dileep Mendoza MD    Visit Date: 5/26/2022    Physician Orders: PT Eval and Treat   Medical Diagnosis from Referral:   M54.50,G89.29 (ICD-10-CM) - Chronic low back pain, unspecified back pain laterality, unspecified whether sciatica present   M17.0 (ICD-10-CM) - Primary osteoarthritis of both knees      Evaluation Date: 5/4/2022  Authorization Period Expiration: 12/31/2022  Plan of Care Expiration: 8/4/2022  Visit # / Visits authorized: 4/ 20     PN DUE: 6/4/2022     Precautions: Standard    PTA Visit #: 2/5     Time In: 12:45pm  Time Out: 1:30pm  Total Billable Time: 45 minutes    SUBJECTIVE     Pt reports: Very sore after tx session yesterday, medial knee pain  She was compliant with home exercise program.  Response to previous treatment: decreased pain  Functional change: increased functional tolerance      Pain: 0/10 bilateral knees; 4-5/10 back     OBJECTIVE     Objective Measures updated at progress report unless specified.     Treatment     Jessica received the treatments listed below:      (exercises performed today are bolded)    therapeutic exercises to develop strength, endurance, ROM, flexibility, posture and core stabilization for 15 minutes including:    Recumbent bike seat 3 for increased lower extremity range of motion, strength, and endurance for 6 minutes   Slant board gastroc stretch 3 x 30"  Bridges 2 x 10    SL clams 2 x 10 with red theraband   Supine hamstring stretch 5 x 15"   QS 2 x 10   Supine SLR x 10   Seated LAQ 2 x 10     tailgaters   Steam boats x 10   SAQ  Open books x 10   DKTC 1 x 10 with clinician overpressure  Piriformis stretch 5 x 15"   Seated forward flex   Standing IT band stretch 3 x 15"     manual therapy techniques: Joint mobilizations " "and Myofacial release were applied to the: bilateral knee and low back for 15 minutes, including:    bilateral Patella distraction and mobilization   bilateral MFR of bilateral lumbar paraspinals and musculature over iliac crest       neuromuscular re-education activities to improve: Balance and Proprioception for 15 minutes. The following activities were included:    Romberg stance on foam 3 x 30" eyes closed  Tandem walks x 5 laps  Tandem stance 2 x 30" each        Patient Education and Home Exercises     Home Exercises Provided and Patient Education Provided     Education provided:   - Education/Self-Care provided:   · Patient educated on the impairments noted above and the effects of physical therapy intervention to improve overall condition and QOL.   · Patient was educated on all the above exercise prior/during/after for proper posture, positioning, and execution for safe performance with home exercise program.   · Patient educated on the importance of improved core and upper extremity strength in order to improve alignment of the spine and upper extremities with static positions and dynamic movement.   · Patient educated on the importance of strong core and lower extremity musculature in order to improve both static and dynamic balance, improve gait mechanics, reduce fall risk and improve household and community mobility      Written Home Exercises Provided: Patient instructed to cont prior HEP. Exercises were reviewed and Jessica was able to demonstrate them prior to the end of the session.  Jessica demonstrated good  understanding of the education provided. See EMR under Patient Instructions for exercises provided during therapy sessions    ASSESSMENT   Pt presents today reporting soreness after yesterday's tx session, decreased pain at medial thigh and knee. Continued with exercises from prior visits to good tolerance. Provided red theraband for pt to continue exercises at home. Pt responded well to " manual therapy applied to lumbar musculature with decrease in pain and improvement in soft tissue extensibility.    Jessica Is progressing well towards her goals.   Pt prognosis is Good.     Pt will continue to benefit from skilled outpatient physical therapy to address the deficits listed in the problem list box on initial evaluation, provide pt/family education and to maximize pt's level of independence in the home and community environment.     Pt's spiritual, cultural and educational needs considered and pt agreeable to plan of care and goals.     Anticipated barriers to physical therapy: pain and decreased exercise tolerance     Goals:   Short Term Goals:  4 weeks   1. Pain: Pt will demonstrate improved pain by reports of less than or equal to 7/10 worst pain on the verbal rating scale in order to progress toward maximal functional ability and improve QOL. MET 5/19/2022   2.  Mobility: Patient will present with full knee extension range of motion in order to return to maximal functional potential and improve quality of life. PC   3.  HEP: Patient will demonstrate independence with HEP in order to progress toward functional independence. MET 5/19/2022      Long Term Goals:  8 weeks   1. Pain: Pt will demonstrate improved pain by reports of less than or equal to 5/10 worst pain on the verbal rating scale in order to progress toward maximal functional ability and improve QOL.  PC   2. Function: Patient will demonstrate improved function as indicated by a functional limitation score of 54% on the FOTO. PC   3. Strength: Patient will improve strength to +4/5 in bilateral lower extremities in order to improve functional independence and quality of life. PC   4. Patient will return to normal ADL's, IADL's, community involvement, recreational activities, and work-related activities with no pain and maximal function. PC      Goals Key:  PC= progressing/continue;        PM= partially met;             DC=  discontinue      PLAN     Plan of care Certification: 5/4/2022 to 8/4/2022.     Cont PT per POC and progress pt as tolerated and appropriate.    Chuck Ornelas, PTA   05/26/2022

## 2022-05-26 ENCOUNTER — CLINICAL SUPPORT (OUTPATIENT)
Dept: REHABILITATION | Facility: HOSPITAL | Age: 77
End: 2022-05-26
Attending: INTERNAL MEDICINE
Payer: MEDICARE

## 2022-05-26 DIAGNOSIS — M53.86 DECREASED ROM OF LUMBAR SPINE: Primary | ICD-10-CM

## 2022-05-26 PROCEDURE — 97140 MANUAL THERAPY 1/> REGIONS: CPT | Mod: PN,CQ

## 2022-05-26 PROCEDURE — 97112 NEUROMUSCULAR REEDUCATION: CPT | Mod: PN,CQ

## 2022-05-26 PROCEDURE — 97110 THERAPEUTIC EXERCISES: CPT | Mod: PN,CQ

## 2022-05-31 ENCOUNTER — SPECIALTY PHARMACY (OUTPATIENT)
Dept: PHARMACY | Facility: CLINIC | Age: 77
End: 2022-05-31
Payer: MEDICARE

## 2022-05-31 DIAGNOSIS — E78.00 PURE HYPERCHOLESTEROLEMIA: ICD-10-CM

## 2022-05-31 RX ORDER — EVOLOCUMAB 140 MG/ML
140 INJECTION, SOLUTION SUBCUTANEOUS
Qty: 2 ML | Refills: 6 | Status: SHIPPED | OUTPATIENT
Start: 2022-05-31 | End: 2022-12-07 | Stop reason: SDUPTHER

## 2022-05-31 NOTE — TELEPHONE ENCOUNTER
Spoke with patient regarding repatha refill. She has two injections on hand. She will need next shipment for 7/1. Will follow up on 6/24.

## 2022-06-01 ENCOUNTER — OFFICE VISIT (OUTPATIENT)
Dept: GASTROENTEROLOGY | Facility: CLINIC | Age: 77
End: 2022-06-01
Payer: MEDICARE

## 2022-06-01 VITALS
WEIGHT: 173.75 LBS | HEART RATE: 85 BPM | SYSTOLIC BLOOD PRESSURE: 110 MMHG | HEIGHT: 62 IN | BODY MASS INDEX: 31.97 KG/M2 | DIASTOLIC BLOOD PRESSURE: 70 MMHG

## 2022-06-01 DIAGNOSIS — R10.84 GENERALIZED ABDOMINAL PAIN: ICD-10-CM

## 2022-06-01 DIAGNOSIS — K52.9 CHRONIC DIARRHEA: Primary | ICD-10-CM

## 2022-06-01 PROCEDURE — 3078F PR MOST RECENT DIASTOLIC BLOOD PRESSURE < 80 MM HG: ICD-10-PCS | Mod: CPTII,S$GLB,, | Performed by: NURSE PRACTITIONER

## 2022-06-01 PROCEDURE — 1101F PT FALLS ASSESS-DOCD LE1/YR: CPT | Mod: CPTII,S$GLB,, | Performed by: NURSE PRACTITIONER

## 2022-06-01 PROCEDURE — 3074F PR MOST RECENT SYSTOLIC BLOOD PRESSURE < 130 MM HG: ICD-10-PCS | Mod: CPTII,S$GLB,, | Performed by: NURSE PRACTITIONER

## 2022-06-01 PROCEDURE — 1159F MED LIST DOCD IN RCRD: CPT | Mod: CPTII,S$GLB,, | Performed by: NURSE PRACTITIONER

## 2022-06-01 PROCEDURE — 99214 PR OFFICE/OUTPT VISIT, EST, LEVL IV, 30-39 MIN: ICD-10-PCS | Mod: S$GLB,,, | Performed by: NURSE PRACTITIONER

## 2022-06-01 PROCEDURE — 1159F PR MEDICATION LIST DOCUMENTED IN MEDICAL RECORD: ICD-10-PCS | Mod: CPTII,S$GLB,, | Performed by: NURSE PRACTITIONER

## 2022-06-01 PROCEDURE — 99999 PR PBB SHADOW E&M-EST. PATIENT-LVL III: ICD-10-PCS | Mod: PBBFAC,,, | Performed by: NURSE PRACTITIONER

## 2022-06-01 PROCEDURE — 3288F FALL RISK ASSESSMENT DOCD: CPT | Mod: CPTII,S$GLB,, | Performed by: NURSE PRACTITIONER

## 2022-06-01 PROCEDURE — 1101F PR PT FALLS ASSESS DOC 0-1 FALLS W/OUT INJ PAST YR: ICD-10-PCS | Mod: CPTII,S$GLB,, | Performed by: NURSE PRACTITIONER

## 2022-06-01 PROCEDURE — 3288F PR FALLS RISK ASSESSMENT DOCUMENTED: ICD-10-PCS | Mod: CPTII,S$GLB,, | Performed by: NURSE PRACTITIONER

## 2022-06-01 PROCEDURE — 1126F AMNT PAIN NOTED NONE PRSNT: CPT | Mod: CPTII,S$GLB,, | Performed by: NURSE PRACTITIONER

## 2022-06-01 PROCEDURE — 1160F PR REVIEW ALL MEDS BY PRESCRIBER/CLIN PHARMACIST DOCUMENTED: ICD-10-PCS | Mod: CPTII,S$GLB,, | Performed by: NURSE PRACTITIONER

## 2022-06-01 PROCEDURE — 3074F SYST BP LT 130 MM HG: CPT | Mod: CPTII,S$GLB,, | Performed by: NURSE PRACTITIONER

## 2022-06-01 PROCEDURE — 1126F PR PAIN SEVERITY QUANTIFIED, NO PAIN PRESENT: ICD-10-PCS | Mod: CPTII,S$GLB,, | Performed by: NURSE PRACTITIONER

## 2022-06-01 PROCEDURE — 99214 OFFICE O/P EST MOD 30 MIN: CPT | Mod: S$GLB,,, | Performed by: NURSE PRACTITIONER

## 2022-06-01 PROCEDURE — 99999 PR PBB SHADOW E&M-EST. PATIENT-LVL III: CPT | Mod: PBBFAC,,, | Performed by: NURSE PRACTITIONER

## 2022-06-01 PROCEDURE — 3078F DIAST BP <80 MM HG: CPT | Mod: CPTII,S$GLB,, | Performed by: NURSE PRACTITIONER

## 2022-06-01 PROCEDURE — 1160F RVW MEDS BY RX/DR IN RCRD: CPT | Mod: CPTII,S$GLB,, | Performed by: NURSE PRACTITIONER

## 2022-06-01 RX ORDER — FAMOTIDINE 40 MG/1
40 TABLET, FILM COATED ORAL NIGHTLY
Qty: 30 TABLET | Refills: 11 | Status: SHIPPED | OUTPATIENT
Start: 2022-06-01 | End: 2023-10-03

## 2022-06-01 RX ORDER — SODIUM, POTASSIUM,MAG SULFATES 17.5-3.13G
SOLUTION, RECONSTITUTED, ORAL ORAL
Qty: 354 ML | Refills: 0 | Status: SHIPPED | OUTPATIENT
Start: 2022-06-01 | End: 2022-07-07 | Stop reason: ALTCHOICE

## 2022-06-01 RX ORDER — CHOLESTYRAMINE 4 G/9G
4 POWDER, FOR SUSPENSION ORAL DAILY PRN
Qty: 30 PACKET | Refills: 5 | Status: SHIPPED | OUTPATIENT
Start: 2022-06-01 | End: 2023-10-03

## 2022-06-01 NOTE — PROGRESS NOTES
Clinic Consult:  [facility] Consultation Note    Reason for Consult:  The primary encounter diagnosis was Chronic diarrhea. A diagnosis of Generalized abdominal pain was also pertinent to this visit.    PCP: Davidson Jang   [id number] [facility] / [place] [id number]    HPI:  This is a 77 y.o. female here for evaluation of diarrhea.   Onset: 6-8 months ago  Stool frequency: 2-4 per day; diarrhea is mostly in the morning time and lessens as the day goes on.   Stool consistency: more formed in the beginning and becomes more watery   Nocturnal diarrhea: yes- but very infrequent   Rectal bleeding: no  Associated symptoms: abdominal pain- when bowel movements are urgent. Sugar alcohols make diarrhea worse   NSAID use: yes, ASA 81 mg daily   Recent antibiotic use: no  Prior workup: stool studies- negative for infection as well as pancreatic insufficiency. Stool oval and parasites was not completed for unknown reasons.   Treatments tried: imodium- effective  Family hx: negative     She is also suffering with regurgitation. Symptoms are worse at night. She takes Tums at night.     Review of Systems   Constitutional: Negative for fever, malaise/fatigue and weight loss.   HENT: Negative for sore throat.    Respiratory: Negative for cough and wheezing.    Cardiovascular: Negative for chest pain and palpitations.   Gastrointestinal: Positive for abdominal pain and diarrhea. Negative for blood in stool, constipation, heartburn, melena, nausea and vomiting.   Genitourinary: Negative for dysuria and frequency.   Musculoskeletal: Negative for back pain, joint pain, myalgias and neck pain.   Skin: Negative for itching and rash.   Neurological: Negative for dizziness, speech change, seizures, loss of consciousness and headaches.   Psychiatric/Behavioral: Negative for depression and substance abuse. The patient is not nervous/anxious.        Medical History:  has a past medical history of Acute pain of left knee  (3/22/2021), Arthritis, Arthritis of knee (2/9/2021), At risk for sleep apnea (9/19/2019), Bilateral primary osteoarthritis of knee (4/4/2018), Chest pain, moderate coronary artery risk (8/8/2019), Chronic pain of both knees (3/23/2021), Costochondritis (1/20/2020), Depression, major, recurrent, mild, Difficulty walking (11/30/2020), Difficulty walking (11/30/2020), LANRE (generalized anxiety disorder) (3/22/2021), Generalized weakness (11/30/2020), Generalized weakness (11/30/2020), Glaucoma, HLD (hyperlipidemia), Muscle cramps (5/23/2018), Osteopenia of multiple sites (4/2/2018), Primary osteoarthritis of right knee (05/30/2018), Primary snoring, Sleep related leg cramps (9/7/2010), SOB (shortness of breath) (8/8/2019), Unspecified gastritis and gastroduodenitis without mention of hemorrhage (11/28/2010), 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    Surgical History:  has a past surgical history that includes Tubal ligation; TONSILLECTOMY, ADENOIDECTOMY; Dilation and curettage of uterus; Cholecystectomy; finger surgery; Gallbladder surgery; Cataract extraction w/  intraocular lens implant (Left, 02/04/2019); Cataract extraction w/  intraocular lens implant (Right, 03/04/2019); Injection of anesthetic agent around nerve (Bilateral, 10/1/2021); Radiofrequency thermocoagulation (Left, 11/5/2021); and Breast biopsy (Left).    Family History: family history includes Breast cancer in her paternal aunt; Cancer in her father and sister; Dementia in her mother; Diabetes in her father; Hepatitis in her sister; Hypertension in her father; Osteoporosis in her mother..     Social History:  reports that she has quit smoking. Her smoking use included cigarettes. She has a 6.00 pack-year smoking history. She has never used smokeless tobacco. She reports current alcohol use. She reports that she does not use drugs.    Allergies: Reviewed    Home Medications:   Current  Outpatient Medications on File Prior to Visit   Medication Sig Dispense Refill    acetaminophen/diphenhydramine (TYLENOL PM ORAL) Take by mouth.      artificial tears,hypromellose,,GENTEAL/SUSTANE, 0.3 % Gel       aspirin (ECOTRIN) 81 MG EC tablet Take 81 mg by mouth once daily.      b complex vitamins capsule Take 1 capsule by mouth once daily.      calcium carbonate (CALCIUM 500 ORAL) Take by mouth.      cyanocobalamin, vitamin B-12, (VITAMIN B-12 ORAL) Take by mouth.      denosumab (PROLIA SUBQ) Inject into the skin.      evolocumab (REPATHA SURECLICK) 140 mg/mL PnIj Repatha SureClick Take No date recorded No form recorded No frequency recorded No route recorded No set duration recorded No set duration amount recorded active No dosage strength recorded No dosage strength units of measure recorded      evolocumab (REPATHA SURECLICK) 140 mg/mL PnIj Inject 1 pen (140 mg total) into the skin every 14 (fourteen) days. 2 mL 6    ezetimibe (ZETIA) 10 mg tablet Take 1 tablet (10 mg total) by mouth once daily. 90 tablet 3    flaxseed oiL 1,000 mg Cap flaxseed oil Take No date recorded No form recorded No frequency recorded No route recorded No set duration recorded No set duration amount recorded suspended No dosage strength recorded No dosage strength units of measure recorded      fluocinolone (SYNALAR) 0.01 % external solution AAA of scalp twice a day PRN scalp itching or eczema flares. 60 mL 1    fluticasone (FLONASE) 50 mcg/actuation nasal spray 2 sprays by Each Nare route once daily. 1 Bottle 12    hydrOXYzine pamoate (VISTARIL) 25 MG Cap Take 1 capsule (25 mg total) by mouth every 8 (eight) hours as needed (itching). 30 capsule 0    ketoconazole (NIZORAL) 2 % shampoo Apply topically every 7 days. Shampoo scalp 1-2 times per week. Lather x 5 minutes, rinse well. 120 mL 5    krill oil-omega-3-dha-epa 150-450 mg CpDR Take by mouth.      MAGNESIUM ORAL Take by mouth.      multivitamin capsule Take 1  "capsule by mouth Daily.      triamcinolone acetonide 0.1% (KENALOG) 0.1 % cream Apply topically 2 (two) times daily. 45 g 0    TURMERIC ORAL Take by mouth.      UNABLE TO FIND CBD Oil topical      ZINC ORAL Take by mouth.      azelastine (ASTELIN) 137 mcg (0.1 %) nasal spray 1 spray (137 mcg total) by Nasal route 2 (two) times daily. 30 mL 1    fexofenadine (ALLEGRA) 180 MG tablet Take 1 tablet (180 mg total) by mouth once daily. 30 tablet 0     Current Facility-Administered Medications on File Prior to Visit   Medication Dose Route Frequency Provider Last Rate Last Admin    triamcinolone acetonide injection 32 mg  32 mg Intra-articular 1 time in Clinic/HOD Dileep Mendoza MD           Physical Exam:  /70 (BP Location: Left arm, Patient Position: Sitting, BP Method: Medium (Manual))   Pulse 85   Ht 5' 2" (1.575 m)   Wt 78.8 kg (173 lb 11.6 oz)   BMI 31.77 kg/m²   Body mass index is 31.77 kg/m².  Physical Exam  Constitutional:       General: She is not in acute distress.  HENT:      Head: Normocephalic and atraumatic.   Eyes:      General: No scleral icterus.     Conjunctiva/sclera: Conjunctivae normal.   Cardiovascular:      Rate and Rhythm: Normal rate and regular rhythm.      Heart sounds: No murmur heard.  Pulmonary:      Effort: Pulmonary effort is normal. No respiratory distress.      Breath sounds: Normal breath sounds. No wheezing.   Abdominal:      General: Abdomen is flat. Bowel sounds are normal.      Palpations: Abdomen is soft.      Tenderness: There is no abdominal tenderness.   Skin:     General: Skin is warm and dry.   Neurological:      General: No focal deficit present.      Mental Status: She is alert and oriented to person, place, and time.      Cranial Nerves: No cranial nerve deficit.   Psychiatric:         Mood and Affect: Mood normal.         Judgment: Judgment normal.       Labs: Pertinent labs reviewed.  CRC Screening: up to date.     Assessment:  1. Chronic diarrhea    2. " Generalized abdominal pain        Recommendations:   - colonoscopy to evaluate for microscopic colitis  - start questran   - start pepcid.     Chronic diarrhea  -     Ambulatory referral/consult to Gastroenterology  -     Case Request Endoscopy: COLONOSCOPY  -     SUPREP BOWEL PREP KIT 17.5-3.13-1.6 gram SolR; Use as directed  Dispense: 354 mL; Refill: 0  -     cholestyramine (QUESTRAN) 4 gram packet; Take 1 packet (4 g total) by mouth daily as needed (diarrhea).  Dispense: 30 packet; Refill: 5    Generalized abdominal pain    Other orders  -     famotidine (PEPCID) 40 MG tablet; Take 1 tablet (40 mg total) by mouth every evening.  Dispense: 30 tablet; Refill: 11      Follow up to be determined after results/ procedure(s).    Thank you so much for allowing me to participate in the care of DELFINA Barksdale

## 2022-06-01 NOTE — PROGRESS NOTES
Location Screening:    If answers yes to either of the following, [facility]. If No, OK for either location.    1. Is procedure for esophageal banding ([facility])? no Yes, select [facility]  2. Is this an Advanced Endoscopic procedure (Dr Briones)?  no Yes, select [facility] (except for bariatric procedures - schedule those at the [facility])  3. Is the BMI > 50? no Yes, select [facility]  4. Does the patient have chronic hypoxic respiratory failure, as evidenced by symptoms below? no Yes, select [facility]  a. O2 Saturation <92%  b. Is the patient on supplemental O2  c. History of moderate to severe PFT's  d. Unable to complete a 6 min walk test    COVID Screening    1. Are you COVID vaccinated? yes    2. Are you experiencing shortness of breath, cough, muscle aches, loss of taste or loss of smell?  no    If answered yes to #2 then the patient must seek medical attention with their PCP, urgent care or ED.  Do not schedule the procedure.       ENDO screening    1. Have you been admitted for cardiac, kidney or pulmonary causes to the hospital in the past 3 months? no   If yes, schedule an appointment with PCP before scheduling endoscopic procedure  Unless patient has been seen by Nephrology, Cardiologist or in the GI department within 3 months.      2. Have you had a stent placed in the last 12 months? no: If yes, have one of our providers review the chart and determine if they need to be seen in clinic.      3. Have you had a stroke or heart attack in the past 6 months? no If patient has not been seen by PCP, Neurology or Cardiology within 3 months, have one of our providers review the chart and determine if they need to be seen in clinic.        4. Have you had any chest pain in the past 3 months? no   If yes, have you been evaluated by your PCP and/or cardiologist and was it determined to not be heart related? no   If No, Pt needs to be seen by PCP or Cardiologist.  Pt can be scheduled once cardiac clearance obtained by  "either of those providers.     5. Do you take prescription weight loss medications?  no   If yes, must stop weight loss medication for 2 weeks prior to procedure.     6. Have you been diagnosed with diverticulitis within the past 3 months? no   If yes, must have been seen by GI within the last 3 months, if not schedule with GI [facility].    If Pt has been seen by GI, schedule procedure 8-12 weeks post antibiotic treatment.     7. Are you on Dialysis? no  If yes, schedule procedure for the day AFTER dialysis.  Appt time should be 9am or later, patient arrival time is 2 hours prior to procedure, for labs.  Nulytely or miralax prep must be used for all patients with kidney disease.     8. Are you diabetic?  no   If yes, schedule morning appt. Advise Pt to hold all diabetic meds day of procedure.     9. All patients 80 years of age and older must be seen in the GI clinic - please schedule an [facility] appointment - Do not schedule a scope procedure appointment.     10. Is patient on a "high risk" medication (blood thinner/antiplatelet agent)?  no   If yes, has cardiac clearance been obtained within the last 60 days? No   If no, a new cardiac clearance must be obtained.     Final Questions:    1.I have reviewed the last colonoscopy for recommendations regarding next procedure bowel prep.  yes  2. I have reviewed medications and allergies.  yes  3. I have verified the pharmacy information and appropriate prep sent if needed. yes  4. Prep instructions have been mailed or sent to portal per patient request. yes        All schedulers will have ability to reach out to the ordering GI provider to clarify any issues.           "

## 2022-06-02 ENCOUNTER — CLINICAL SUPPORT (OUTPATIENT)
Dept: REHABILITATION | Facility: HOSPITAL | Age: 77
End: 2022-06-02
Payer: MEDICARE

## 2022-06-02 DIAGNOSIS — M53.86 DECREASED ROM OF LUMBAR SPINE: Primary | ICD-10-CM

## 2022-06-02 PROCEDURE — 97112 NEUROMUSCULAR REEDUCATION: CPT | Mod: PN

## 2022-06-02 PROCEDURE — 97140 MANUAL THERAPY 1/> REGIONS: CPT | Mod: PN

## 2022-06-02 PROCEDURE — 97110 THERAPEUTIC EXERCISES: CPT | Mod: PN

## 2022-06-02 NOTE — PROGRESS NOTES
"[facility]   Physical Therapy Treatment Note     Name: Jessica Perez  Clinic Number: [id number]    Therapy Diagnosis:   Encounter Diagnosis   Name Primary?    Decreased ROM of lumbar spine Yes     Physician: Dileep Mendoza MD    Visit Date: 6/2/2022    Physician Orders: PT Eval and Treat   Medical Diagnosis from Referral:   M54.50,G89.29 (ICD-10-CM) - Chronic low back pain, unspecified back pain laterality, unspecified whether sciatica present   M17.0 (ICD-10-CM) - Primary osteoarthritis of both knees      Evaluation Date: 5/4/2022  Authorization Period Expiration: 12/31/2022  Plan of Care Expiration: 8/4/2022  Visit # / Visits authorized: 5/ 20     PN DUE: 6/4/2022     Precautions: Standard    PTA Visit #: 0/5     Time In: 1:36 pm  Time Out: 2:30 pm  Total Billable Time: 54 minutes    SUBJECTIVE     Pt reports: her back is feeling good, but her knees are bothering her. She wants the "" today.   She was compliant with home exercise program.  Response to previous treatment: decreased pain  Functional change: increased functional tolerance      Pain: 5/10 bilateral knees; 0/10 back     OBJECTIVE     Objective Measures updated at progress report unless specified.     Treatment     Jessica received the treatments listed below:      (exercises performed today are bolded)    therapeutic exercises to develop strength, endurance, ROM, flexibility, posture and core stabilization for 30 minutes including:    Recumbent bike seat 3 for increased lower extremity range of motion, strength, and endurance for 6 minutes   Slant board gastroc stretch 3 x 30"  Bridges 2 x 10  + with green theraband   SL clams 2 x 10 with green theraband   Supine hamstring stretch 5 x 15"   QS 2 x 10   Supine SLR 2 x 10   Seated LAQ 2 x 10     tailgaters   Steam boats x 10   SAQ  Open books x 10   DKTC 1 x 10 with clinician overpressure  Piriformis stretch 5 x 15"   Seated forward flex   Standing IT band " "stretch 3 x 15"   + green thera band inversion 2 x 10     manual therapy techniques: Joint mobilizations and Myofacial release were applied to the: bilateral knee and low back for 9 minutes, including:    bilateral Patella distraction and mobilization   myofascial release of bilateral hip adductors at distal site of knee   bilateral MFR of bilateral lumbar paraspinals and musculature over iliac crest       neuromuscular re-education activities to improve: Balance and Proprioception for 15 minutes. The following activities were included:    Romberg stance on foam 3 x 30" eyes closed   Tandem walks x 5 laps   Tandem stance 2 x 30" each   + Side stepping over cones x 8 laps   + Marching on foam 2 x 10   + ankle inversion on foam 2 x 20    Patient Education and Home Exercises     Home Exercises Provided and Patient Education Provided     Education provided:   - Education/Self-Care provided:   · Patient educated on the impairments noted above and the effects of physical therapy intervention to improve overall condition and QOL.   · Patient was educated on all the above exercise prior/during/after for proper posture, positioning, and execution for safe performance with home exercise program.   · Patient educated on the importance of improved core and upper extremity strength in order to improve alignment of the spine and upper extremities with static positions and dynamic movement.   · Patient educated on the importance of strong core and lower extremity musculature in order to improve both static and dynamic balance, improve gait mechanics, reduce fall risk and improve household and community mobility      Written Home Exercises Provided: Patient instructed to cont prior HEP. Exercises were reviewed and Jessica was able to demonstrate them prior to the end of the session.  Jessica demonstrated good  understanding of the education provided. See EMR under Patient Instructions for exercises provided during therapy " sessions    ASSESSMENT   Marching on foam, standing ankle inversion, and side stepping were performed for increased functional hip stability, ankle stability, and single leg stance balance. Patient tolerated all exercises well with no complaints. Patient reports relief from patella distraction and mobs. Noted hypertonicity of hip adductors at distal point at knee. Plan to target this musculature more next visit from decreased medial knee compression and pain.     Jessica Is progressing well towards her goals.   Pt prognosis is Good.     Pt will continue to benefit from skilled outpatient physical therapy to address the deficits listed in the problem list box on initial evaluation, provide pt/family education and to maximize pt's level of independence in the home and community environment.     Pt's spiritual, cultural and educational needs considered and pt agreeable to plan of care and goals.     Anticipated barriers to physical therapy: pain and decreased exercise tolerance     Goals:   Short Term Goals:  4 weeks   1. Pain: Pt will demonstrate improved pain by reports of less than or equal to 7/10 worst pain on the verbal rating scale in order to progress toward maximal functional ability and improve QOL. MET 5/19/2022   2.  Mobility: Patient will present with full knee extension range of motion in order to return to maximal functional potential and improve quality of life. PC   3.  HEP: Patient will demonstrate independence with HEP in order to progress toward functional independence. MET 5/19/2022      Long Term Goals:  8 weeks   1. Pain: Pt will demonstrate improved pain by reports of less than or equal to 5/10 worst pain on the verbal rating scale in order to progress toward maximal functional ability and improve QOL.  PC   2. Function: Patient will demonstrate improved function as indicated by a functional limitation score of 54% on the FOTO. PC   3. Strength: Patient will improve strength to +4/5 in  bilateral lower extremities in order to improve functional independence and quality of life. PC   4. Patient will return to normal ADL's, IADL's, community involvement, recreational activities, and work-related activities with no pain and maximal function. PC      Goals Key:  PC= progressing/continue;        PM= partially met;             DC= discontinue      PLAN     Plan of care Certification: 5/4/2022 to 8/4/2022.     Cont PT per POC and progress pt as tolerated and appropriate.    Sharon Villegas, PT   06/02/2022

## 2022-06-06 NOTE — PROGRESS NOTES
"[facility]   Physical Therapy Treatment Note     Name: Jessica Perez  Clinic Number: [id number]    Therapy Diagnosis:   Encounter Diagnosis   Name Primary?    Decreased ROM of lumbar spine Yes     Physician: Dileep Mendoza MD    Visit Date: 6/7/2022    Physician Orders: PT Eval and Treat   Medical Diagnosis from Referral:   M54.50,G89.29 (ICD-10-CM) - Chronic low back pain, unspecified back pain laterality, unspecified whether sciatica present   M17.0 (ICD-10-CM) - Primary osteoarthritis of both knees      Evaluation Date: 5/4/2022  Authorization Period Expiration: 12/31/2022  Plan of Care Expiration: 8/4/2022  Visit # / Visits authorized: 6/ 20     PN DUE: 6/4/2022     Precautions: Standard    PTA Visit #: 0/5     Time In: 8:36 am  Time Out: 9:16 am  Total Billable Time: 40 minutes    SUBJECTIVE     Pt reports: her back is really bothering her today.  She was compliant with home exercise program.  Response to previous treatment: decreased pain  Functional change: increased functional tolerance      Pain: 0/10 bilateral knees; 8.5-9/10 back     OBJECTIVE     Objective Measures updated at progress report unless specified.     Postponed progress note secondary to increased severity of pain     Treatment     Jessica received the treatments listed below:      (exercises performed today are bolded)    therapeutic exercises to develop strength, endurance, ROM, flexibility, posture and core stabilization for 12 minutes including:    Recumbent bike seat 3 for increased lower extremity range of motion, strength, and endurance for 6 minutes   Slant board gastroc stretch 3 x 30"  Bridges 2 x 10  + with green theraband   SL clams 2 x 10 with green theraband   Supine hamstring stretch 3 x 15"   QS 2 x 10   Supine SLR 2 x 10   Seated LAQ 2 x 10     tailgaters   Steam boats x 10   SAQ  Open books x 10  DKTC 1 x 10 with clinician overpressure  Piriformis stretch 5 x 15"   Seated forward flex " "  Standing IT band stretch 3 x 15"   green thera band inversion 2 x 10   + side lying reverse clams     manual therapy techniques: Joint mobilizations and Myofacial release were applied to the: bilateral knee and low back for 28 minutes, including:    bilateral Patella distraction and mobilization   myofascial release of bilateral hip adductors at distal site of knee   bilateral MFR of bilateral lumbar paraspinals and musculature over iliac crest with and without passive hip internal/external rotation      neuromuscular re-education activities to improve: Balance and Proprioception for 0 minutes. The following activities were included:    Romberg stance on foam 3 x 30" eyes closed   Tandem walks x 5 laps   Tandem stance 2 x 30" each   Side stepping over cones x 8 laps   Marching on foam 2 x 10   ankle inversion on foam 2 x 20  + Transverse abdominus contraction   + Transverse abdominus with march     Patient Education and Home Exercises     Home Exercises Provided and Patient Education Provided     Education provided:   - Education/Self-Care provided:   · Patient educated on the impairments noted above and the effects of physical therapy intervention to improve overall condition and QOL.   · Patient was educated on all the above exercise prior/during/after for proper posture, positioning, and execution for safe performance with home exercise program.   · Patient educated on the importance of improved core and upper extremity strength in order to improve alignment of the spine and upper extremities with static positions and dynamic movement.   · Patient educated on the importance of strong core and lower extremity musculature in order to improve both static and dynamic balance, improve gait mechanics, reduce fall risk and improve household and community mobility      Written Home Exercises Provided: Patient instructed to cont prior HEP. Exercises were reviewed and Jessica was able to demonstrate them prior to the " end of the session.  Jessica demonstrated good  understanding of the education provided. See EMR under Patient Instructions for exercises provided during therapy sessions    ASSESSMENT     Patient presents with significant increase in low back pain. Increased lumbar spine muscular hypertonicity and tenderness to palpation notes during manual therapy. Side lying reverse clams were performed for increased hip stability and increased extensibility of piriformis. Patient given an updated HEP to perform exercises at home for increased pain relief. Lifting form reviewed with patient for decreased exacerbation of symptoms and proper form.  Postponed progress note secondary to increased severity of pain. Plan to perform progress note test and measures next visit.     Jessica Is progressing well towards her goals.   Pt prognosis is Good.     Pt will continue to benefit from skilled outpatient physical therapy to address the deficits listed in the problem list box on initial evaluation, provide pt/family education and to maximize pt's level of independence in the home and community environment.     Pt's spiritual, cultural and educational needs considered and pt agreeable to plan of care and goals.     Anticipated barriers to physical therapy: pain and decreased exercise tolerance     Goals:   Short Term Goals:  4 weeks   1. Pain: Pt will demonstrate improved pain by reports of less than or equal to 7/10 worst pain on the verbal rating scale in order to progress toward maximal functional ability and improve QOL. MET 5/19/2022   2.  Mobility: Patient will present with full knee extension range of motion in order to return to maximal functional potential and improve quality of life. PC   3.  HEP: Patient will demonstrate independence with HEP in order to progress toward functional independence. MET 5/19/2022      Long Term Goals:  8 weeks   1. Pain: Pt will demonstrate improved pain by reports of less than or equal to 5/10  worst pain on the verbal rating scale in order to progress toward maximal functional ability and improve QOL.  PC   2. Function: Patient will demonstrate improved function as indicated by a functional limitation score of 54% on the FOTO. PC   3. Strength: Patient will improve strength to +4/5 in bilateral lower extremities in order to improve functional independence and quality of life. PC   4. Patient will return to normal ADL's, IADL's, community involvement, recreational activities, and work-related activities with no pain and maximal function. PC      Goals Key:  PC= progressing/continue;        PM= partially met;             DC= discontinue      PLAN     Plan of care Certification: 5/4/2022 to 8/4/2022.     Cont PT per POC and progress pt as tolerated and appropriate.    Sharon Villegas, PT, DPT, PPCES   06/07/2022

## 2022-06-07 ENCOUNTER — CLINICAL SUPPORT (OUTPATIENT)
Dept: REHABILITATION | Facility: HOSPITAL | Age: 77
End: 2022-06-07
Payer: MEDICARE

## 2022-06-07 DIAGNOSIS — M53.86 DECREASED ROM OF LUMBAR SPINE: Primary | ICD-10-CM

## 2022-06-07 PROCEDURE — 97110 THERAPEUTIC EXERCISES: CPT | Mod: PN

## 2022-06-07 PROCEDURE — 97140 MANUAL THERAPY 1/> REGIONS: CPT | Mod: PN

## 2022-06-08 NOTE — PROGRESS NOTES
[facility]   Physical Therapy Treatment Note     Name: Jessica Perez  Clinic Number: [id number]    Therapy Diagnosis:   Encounter Diagnosis   Name Primary?    Decreased ROM of lumbar spine Yes     Physician: Dileep Mendoza MD    Visit Date: 6/9/2022    Physician Orders: PT Eval and Treat   Medical Diagnosis from Referral:   M54.50,G89.29 (ICD-10-CM) - Chronic low back pain, unspecified back pain laterality, unspecified whether sciatica present   M17.0 (ICD-10-CM) - Primary osteoarthritis of both knees      Evaluation Date: 5/4/2022  Authorization Period Expiration: 12/31/2022  Plan of Care Expiration: 8/4/2022  Visit # / Visits authorized: 7/ 20     PN DUE: 7/9//2022     Precautions: Standard    PTA Visit #: 1/5     Time In: 10:30am  Time Out: 11:15am  Total Billable Time: 45 minutes    SUBJECTIVE     Pt reports: Back is feeling better today, getting some spasm feeling at the sides. Much better after last visit. Knee pain is provoked when she goes down stairs, landing on her L LE.  She was compliant with home exercise program.  Response to previous treatment: decreased pain  Functional change: increased functional tolerance      Pain: 0/10 bilateral knees; 0/10 back at rest    OBJECTIVE     Objective Measures updated at progress report unless specified.     Lumbar Range of Motion:     % of normal motion Pain   Flexion 100%     None with these movements          Extension 100%               Left Side Bending 100%           Right Side Bending 100%           Left rotation    100%           Right Rotation    100%                 Lower Extremity Strength  Right LE   Left LE     Knee extension: 5/5 Knee extension: 5/5   Knee flexion: 5/5 Knee flexion: 4/5   Hip flexion: 4+/5 Hip flexion: 4+/5   Hip abduction: 4+/5 Hip abduction: 4+/5   Hip adduction: 4+/5 Hip adduction 4+/5   Ankle dorsiflexion: 4+/5 Ankle dorsiflexion: 4+/5   Ankle plantarflexion: 5/5 Ankle plantarflexion: 5/5     Range  "of Motion:   Knee Left active   Flexion 135   Extension -4      Knee Right active   Flexion 132   Extension -5     FOTO: 64% LIMITATION    Treatment     Jessica received the treatments listed below:      (exercises performed today are bolded)    therapeutic exercises to develop strength, endurance, ROM, flexibility, posture and core stabilization for 35 minutes including:    PN Tests and measures    Recumbent bike seat 3 for increased lower extremity range of motion, strength, and endurance for 6 minutes   Slant board gastroc stretch 3 x 30"  Bridges 2 x 10  + with green theraband   SL clams 2 x 10 with green theraband   Supine hamstring stretch 3 x 15"   QS 2 x 10   Supine SLR 2 x 10   Seated LAQ 2 x 10   +Double knee to chest with overpressure 2 x 10 (increased pain)  +Prone on elbows 2 x 2 minutes (decreased pain)    tailgaters   Steam boats x 10   SAQ  Open books x 10  DKTC 1 x 10 with clinician overpressure  Piriformis stretch 5 x 15"   Seated forward flex   Standing IT band stretch 3 x 15"   green thera band inversion 2 x 10   side lying reverse clams     manual therapy techniques: Joint mobilizations and Myofacial release were applied to the: bilateral knee and low back for 10 minutes, including:    bilateral Patella distraction and mobilization   myofascial release of bilateral hip adductors at distal site of knee   bilateral MFR of bilateral lumbar paraspinals and musculature over iliac crest with and without passive hip internal/external rotation  Myofascial release of lumbar paraspinals bilaterally    neuromuscular re-education activities to improve: Balance and Proprioception for 0 minutes. The following activities were included:    Romberg stance on foam 3 x 30" eyes closed   Tandem walks x 5 laps   Tandem stance 2 x 30" each   Side stepping over cones x 8 laps   Marching on foam 2 x 10   ankle inversion on foam 2 x 20  + Transverse abdominus contraction   + Transverse abdominus with march     Patient " Education and Home Exercises     Home Exercises Provided and Patient Education Provided     Education provided:   - Education/Self-Care provided:   · Patient educated on the impairments noted above and the effects of physical therapy intervention to improve overall condition and QOL.   · Patient was educated on all the above exercise prior/during/after for proper posture, positioning, and execution for safe performance with home exercise program.   · Patient educated on the importance of improved core and upper extremity strength in order to improve alignment of the spine and upper extremities with static positions and dynamic movement.   · Patient educated on the importance of strong core and lower extremity musculature in order to improve both static and dynamic balance, improve gait mechanics, reduce fall risk and improve household and community mobility      Written Home Exercises Provided: Patient instructed to cont prior HEP. Exercises were reviewed and Jessica was able to demonstrate them prior to the end of the session.  Jessica demonstrated good  understanding of the education provided. See EMR under Patient Instructions for exercises provided during therapy sessions    ASSESSMENT     Pt presents today with good relief of pain following her last tx session. Tested flexion and extension biased exercises today. Pt had an increase in low back pain with seated flexion and with supine flexion while prone extension relieved symptoms. Educated pt to begin performing light extension exercises at home when she notices symptoms begin. Pt has made good progress with therapy thus far, she has met 2 more goals today, and has met 4/7 goals total. Pt is continuing to progress well with and will continue to benefit from skilled PT in order to reduce pain, improve function and return to her prior level of function.    *PT agrees with PTA assessment listed above. Patient has met 4 out of 7 goals and will continue to  benefit from therapy to continue to progress toward goals.      Jessica Is progressing well towards her goals.   Pt prognosis is Good.     Pt will continue to benefit from skilled outpatient physical therapy to address the deficits listed in the problem list box on initial evaluation, provide pt/family education and to maximize pt's level of independence in the home and community environment.     Pt's spiritual, cultural and educational needs considered and pt agreeable to plan of care and goals.     Anticipated barriers to physical therapy: pain and decreased exercise tolerance     Goals:   Short Term Goals:  4 weeks   1. Pain: Pt will demonstrate improved pain by reports of less than or equal to 7/10 worst pain on the verbal rating scale in order to progress toward maximal functional ability and improve QOL. MET 5/19/2022   2.  Mobility: Patient will present with full knee extension range of motion in order to return to maximal functional potential and improve quality of life. Met 6/9/2022   3.  HEP: Patient will demonstrate independence with HEP in order to progress toward functional independence. MET 5/19/2022      Long Term Goals:  8 weeks   1. Pain: Pt will demonstrate improved pain by reports of less than or equal to 5/10 worst pain on the verbal rating scale in order to progress toward maximal functional ability and improve QOL.  PC   2. Function: Patient will demonstrate improved function as indicated by a functional limitation score of 54% on the FOTO. PC   3. Strength: Patient will improve strength to +4/5 in bilateral lower extremities in order to improve functional independence and quality of life. Met 6/9/2022   4. Patient will return to normal ADL's, IADL's, community involvement, recreational activities, and work-related activities with no pain and maximal function. PC      Goals Key:  PC= progressing/continue;        PM= partially met;             DC= discontinue      PLAN     Plan of care  Certification: 5/4/2022 to 8/4/2022.     Cont PT per POC and progress pt as tolerated and appropriate.    Chuck Ornelas, PTA  06/09/2022     I agree with measures and assessment above   Sharon Villegas, PT, DPT, PPCES  06/09/2022

## 2022-06-09 ENCOUNTER — CLINICAL SUPPORT (OUTPATIENT)
Dept: REHABILITATION | Facility: HOSPITAL | Age: 77
End: 2022-06-09
Payer: MEDICARE

## 2022-06-09 DIAGNOSIS — M53.86 DECREASED ROM OF LUMBAR SPINE: Primary | ICD-10-CM

## 2022-06-09 PROCEDURE — 97140 MANUAL THERAPY 1/> REGIONS: CPT | Mod: PN,CQ

## 2022-06-09 PROCEDURE — 97110 THERAPEUTIC EXERCISES: CPT | Mod: PN,CQ

## 2022-06-13 NOTE — PROGRESS NOTES
"[facility]   Physical Therapy Treatment Note     Name: Jessica Perez  Clinic Number: [id number]    Therapy Diagnosis:   Encounter Diagnosis   Name Primary?    Decreased ROM of lumbar spine Yes     Physician: Dileep Mendoza MD    Visit Date: 6/14/2022    Physician Orders: PT Eval and Treat   Medical Diagnosis from Referral:   M54.50,G89.29 (ICD-10-CM) - Chronic low back pain, unspecified back pain laterality, unspecified whether sciatica present   M17.0 (ICD-10-CM) - Primary osteoarthritis of both knees      Evaluation Date: 5/4/2022  Authorization Period Expiration: 12/31/2022  Plan of Care Expiration: 8/4/2022  Visit # / Visits authorized: 8/ 20     PN DUE: 7/9//2022     Precautions: Standard    PTA Visit #: 2/5     Time In: 9:00am  Time Out: 9:45am  Total Billable Time: 45 minutes    SUBJECTIVE     Pt reports: Back is feeling much better today, only a couple twinges  She was compliant with home exercise program.  Response to previous treatment: decreased pain  Functional change: increased functional tolerance      Pain: 0/10 bilateral knees; 0/10 back at rest    OBJECTIVE     Objective Measures updated at progress report unless specified.       Treatment     Jessica received the treatments listed below:      (exercises performed today are bolded)    therapeutic exercises to develop strength, endurance, ROM, flexibility, posture and core stabilization for 30 minutes including:    Recumbent bike seat 3 for increased lower extremity range of motion, strength, and endurance for 6 minutes   Slant board gastroc stretch 3 x 30"  Bridges 2 x 10  + with green theraband   SL clams 2 x 10 with green theraband   Supine hamstring stretch 3 x 15"   QS 2 x 10   Supine SLR 2 x 10   Seated LAQ 2 x 10   Double knee to chest with overpressure 2 x 10  Prone on elbows 2 x 2 minutes (decreased pain)    tailgaters x 2 minutes #5  Steam boats x 10   SAQ  Open books x 10  DKTC 1 x 10 with clinician " "overpressure  Piriformis stretch 5 x 15"   Seated forward flex   Standing IT band stretch 3 x 15"   green thera band inversion 2 x 10   side lying reverse clams 2 x 10    manual therapy techniques: Joint mobilizations and Myofacial release were applied to the: bilateral knee and low back for 15 minutes, including:    bilateral Patella distraction and mobilization   myofascial release of bilateral hip adductors at distal site of knee   bilateral MFR of bilateral lumbar paraspinals and musculature over iliac crest with and without passive hip internal/external rotation  Myofascial release of lumbar paraspinals bilaterally  Manual L knee joint distraction with patellar mobilizations  L knee joint mobilizations anterior/posterior  soft tissue mobilization to L adductors    neuromuscular re-education activities to improve: Balance and Proprioception for 0 minutes. The following activities were included:    Romberg stance on foam 3 x 30" eyes closed   Tandem walks x 5 laps   Tandem stance 2 x 30" each   Side stepping over cones x 8 laps   Marching on foam 2 x 10   ankle inversion on foam 2 x 20  + Transverse abdominus contraction   + Transverse abdominus with march     Patient Education and Home Exercises     Home Exercises Provided and Patient Education Provided     Education provided:   - Education/Self-Care provided:   · Patient educated on the impairments noted above and the effects of physical therapy intervention to improve overall condition and QOL.   · Patient was educated on all the above exercise prior/during/after for proper posture, positioning, and execution for safe performance with home exercise program.   · Patient educated on the importance of improved core and upper extremity strength in order to improve alignment of the spine and upper extremities with static positions and dynamic movement.   · Patient educated on the importance of strong core and lower extremity musculature in order to improve both " static and dynamic balance, improve gait mechanics, reduce fall risk and improve household and community mobility      Written Home Exercises Provided: Patient instructed to cont prior HEP. Exercises were reviewed and Jessica was able to demonstrate them prior to the end of the session.  Jessica demonstrated good  understanding of the education provided. See EMR under Patient Instructions for exercises provided during therapy sessions    ASSESSMENT     Pt presents today reporting minimal pain in her back. Knee pain is ongoing. Applied knee joint distraction and added tailgaiters to mobilize the knee and allow for synovial circulation. Pt responded very well to joint distraction with good relief of her pain. Pt had a good response to soft tissue mobilization to her adductors as well with a good decrease in medial knee pain and improvement in soft tissue extensibility.    Jessica Is progressing well towards her goals.   Pt prognosis is Good.     Pt will continue to benefit from skilled outpatient physical therapy to address the deficits listed in the problem list box on initial evaluation, provide pt/family education and to maximize pt's level of independence in the home and community environment.     Pt's spiritual, cultural and educational needs considered and pt agreeable to plan of care and goals.     Anticipated barriers to physical therapy: pain and decreased exercise tolerance     Goals:   Short Term Goals:  4 weeks   1. Pain: Pt will demonstrate improved pain by reports of less than or equal to 7/10 worst pain on the verbal rating scale in order to progress toward maximal functional ability and improve QOL. MET 5/19/2022   2.  Mobility: Patient will present with full knee extension range of motion in order to return to maximal functional potential and improve quality of life. Met 6/9/2022   3.  HEP: Patient will demonstrate independence with HEP in order to progress toward functional independence. MET  5/19/2022      Long Term Goals:  8 weeks   1. Pain: Pt will demonstrate improved pain by reports of less than or equal to 5/10 worst pain on the verbal rating scale in order to progress toward maximal functional ability and improve QOL.  PC   2. Function: Patient will demonstrate improved function as indicated by a functional limitation score of 54% on the FOTO. PC   3. Strength: Patient will improve strength to +4/5 in bilateral lower extremities in order to improve functional independence and quality of life. Met 6/9/2022   4. Patient will return to normal ADL's, IADL's, community involvement, recreational activities, and work-related activities with no pain and maximal function. PC      Goals Key:  PC= progressing/continue;        PM= partially met;             DC= discontinue      PLAN     Plan of care Certification: 5/4/2022 to 8/4/2022.     Cont PT per POC and progress pt as tolerated and appropriate.    Chuck Ornelas, MAKENZIE  06/14/2022

## 2022-06-14 ENCOUNTER — CLINICAL SUPPORT (OUTPATIENT)
Dept: REHABILITATION | Facility: HOSPITAL | Age: 77
End: 2022-06-14
Payer: MEDICARE

## 2022-06-14 DIAGNOSIS — M53.86 DECREASED ROM OF LUMBAR SPINE: Primary | ICD-10-CM

## 2022-06-14 PROCEDURE — 97140 MANUAL THERAPY 1/> REGIONS: CPT | Mod: PN,CQ

## 2022-06-14 PROCEDURE — 97110 THERAPEUTIC EXERCISES: CPT | Mod: PN,CQ

## 2022-06-15 NOTE — PROGRESS NOTES
"[facility]   Physical Therapy Treatment Note     Name: Jessica Perez  Clinic Number: [id number]    Therapy Diagnosis:   Encounter Diagnosis   Name Primary?    Decreased ROM of lumbar spine Yes     Physician: Dileep Mendoza MD    Visit Date: 6/16/2022    Physician Orders: PT Eval and Treat   Medical Diagnosis from Referral:   M54.50,G89.29 (ICD-10-CM) - Chronic low back pain, unspecified back pain laterality, unspecified whether sciatica present   M17.0 (ICD-10-CM) - Primary osteoarthritis of both knees      Evaluation Date: 5/4/2022  Authorization Period Expiration: 12/31/2022  Plan of Care Expiration: 8/4/2022  Visit # / Visits authorized: 9/ 20     PN DUE: 7/9//2022     Precautions: Standard    PTA Visit #: 0/5     Time In: 2:25 am  Time Out: 3:10 am  Total Billable Time: 45 minutes    SUBJECTIVE     Pt reports: she is feeling mush better.   She was compliant with home exercise program.  Response to previous treatment: decreased pain  Functional change: increased functional tolerance      Pain: 0/10 bilateral knees; 0/10 back at rest    OBJECTIVE     Objective Measures updated at progress report unless specified.       Treatment     Jessica received the treatments listed below:      (exercises performed today are bolded)    therapeutic exercises to develop strength, endurance, ROM, flexibility, posture and core stabilization for 30 minutes including:    Recumbent bike seat 3 for increased lower extremity range of motion, strength, and endurance for 6 minutes   Slant board gastroc stretch 3 x 30"  Bridges 2 x 10  with green theraband   SL clams 2 x 10 with green theraband   Supine hamstring stretch 3 x 15"   QS 2 x 10   Supine SLR 2 x 10   Seated LAQ 2 x 10   Double knee to chest with overpressure 2 x 10  Prone on elbows 2 x 2 minutes (decreased pain)     tailgaters x 2 minutes #5   Steam boats x 10   SAQ  Open books x 10  DKTC 1 x 10 with clinician overpressure  Piriformis " "stretch 5 x 15"   Seated forward flex   Standing IT band stretch 3 x 15"   green thera band inversion 2 x 10   side lying reverse clams 2 x 10     manual therapy techniques: Joint mobilizations and Myofacial release were applied to the: bilateral knee and low back for 15 minutes, including:    bilateral Patella distraction and mobilization   myofascial release of bilateral hip adductors at distal site of knee   bilateral MFR of bilateral lumbar paraspinals and musculature over iliac crest with and without passive hip internal/external rotation  Myofascial release of lumbar paraspinals bilaterally  Manual L knee joint distraction with patellar mobilizations  L knee joint mobilizations anterior/posterior  soft tissue mobilization to L adductors    neuromuscular re-education activities to improve: Balance and Proprioception for 0 minutes. The following activities were included:    Romberg stance on foam 3 x 30" eyes closed   Tandem walks x 5 laps   Tandem stance 2 x 30" each   Side stepping over cones x 8 laps   Marching on foam 2 x 10   ankle inversion on foam 2 x 20  + Transverse abdominus contraction   + Transverse abdominus with march     Patient Education and Home Exercises     Home Exercises Provided and Patient Education Provided     Education provided:   - Education/Self-Care provided:   · Patient educated on the impairments noted above and the effects of physical therapy intervention to improve overall condition and QOL.   · Patient was educated on all the above exercise prior/during/after for proper posture, positioning, and execution for safe performance with home exercise program.   · Patient educated on the importance of improved core and upper extremity strength in order to improve alignment of the spine and upper extremities with static positions and dynamic movement.   · Patient educated on the importance of strong core and lower extremity musculature in order to improve both static and dynamic balance, " improve gait mechanics, reduce fall risk and improve household and community mobility      Written Home Exercises Provided: Patient instructed to cont prior HEP. Exercises were reviewed and Jessica was able to demonstrate them prior to the end of the session.  Jessica demonstrated good  understanding of the education provided. See EMR under Patient Instructions for exercises provided during therapy sessions    ASSESSMENT     Pt presents today reporting no back and knee pain. Patient tolerated all exercises and manuals with minimal complaints. Plan to progress exercise program to more functional exercises next visit if she continues to present with no pain.     Jessica Is progressing well towards her goals.   Pt prognosis is Good.     Pt will continue to benefit from skilled outpatient physical therapy to address the deficits listed in the problem list box on initial evaluation, provide pt/family education and to maximize pt's level of independence in the home and community environment.     Pt's spiritual, cultural and educational needs considered and pt agreeable to plan of care and goals.     Anticipated barriers to physical therapy: pain and decreased exercise tolerance     Goals:   Short Term Goals:  4 weeks   1. Pain: Pt will demonstrate improved pain by reports of less than or equal to 7/10 worst pain on the verbal rating scale in order to progress toward maximal functional ability and improve QOL. MET 5/19/2022   2.  Mobility: Patient will present with full knee extension range of motion in order to return to maximal functional potential and improve quality of life. Met 6/9/2022   3.  HEP: Patient will demonstrate independence with HEP in order to progress toward functional independence. MET 5/19/2022      Long Term Goals:  8 weeks   1. Pain: Pt will demonstrate improved pain by reports of less than or equal to 5/10 worst pain on the verbal rating scale in order to progress toward maximal functional ability  and improve QOL.  MET 6/16/2022   2. Function: Patient will demonstrate improved function as indicated by a functional limitation score of 54% on the FOTO. PC   3. Strength: Patient will improve strength to +4/5 in bilateral lower extremities in order to improve functional independence and quality of life. Met 6/9/2022   4. Patient will return to normal ADL's, IADL's, community involvement, recreational activities, and work-related activities with no pain and maximal function. PC      Goals Key:  PC= progressing/continue;        PM= partially met;             DC= discontinue      PLAN     Plan of care Certification: 5/4/2022 to 8/4/2022.     Cont PT per POC and progress pt as tolerated and appropriate.    Sharon Villegas, PT, DPT, PPCES  06/16/2022

## 2022-06-16 ENCOUNTER — CLINICAL SUPPORT (OUTPATIENT)
Dept: REHABILITATION | Facility: HOSPITAL | Age: 77
End: 2022-06-16
Payer: MEDICARE

## 2022-06-16 DIAGNOSIS — M53.86 DECREASED ROM OF LUMBAR SPINE: Primary | ICD-10-CM

## 2022-06-16 PROCEDURE — 97110 THERAPEUTIC EXERCISES: CPT | Mod: PN

## 2022-06-16 PROCEDURE — 97140 MANUAL THERAPY 1/> REGIONS: CPT | Mod: PN

## 2022-06-20 NOTE — TELEPHONE ENCOUNTER
Specialty Pharmacy - Refill Coordination    Specialty Medication Orders Linked to Encounter    Flowsheet Row Most Recent Value   Medication #1 evolocumab (REPATHA SURECLICK) 140 mg/mL PnIj (Order#[id number], Rx#)        Pt requested early fill and delivery as she will not be home to receive package next week.    Refill Questions - Documented Responses    Flowsheet Row Most Recent Value   Patient Availability and HIPAA Verification    Does patient want to proceed with activity? Yes   HIPAA/medical authority confirmed? Yes   Relationship to patient of person spoken to? Self   Refill Screening Questions    Would patient like to speak to a pharmacist? No   When does the patient need to receive the medication? 06/30/22   Refill Delivery Questions    How will the patient receive the medication? Delivery [facility]   When does the patient need to receive the medication? 06/30/22   Shipping Address Home   Address in [facility] confirmed and updated if neccessary? Yes   Expected Copay ($) 0   Is the patient able to afford the medication copay? Yes   Payment Method zero copay   Days supply of Refill 84   Supplies needed? No supplies needed   Refill activity completed? Yes   Refill activity plan Refill scheduled   Shipment/Pickup Date: 06/22/22          Current Outpatient Medications   Medication Sig    acetaminophen/diphenhydramine (TYLENOL PM ORAL) Take by mouth.    artificial tears,hypromellose,,GENTEAL/SUSTANE, 0.3 % Gel     aspirin (ECOTRIN) 81 MG EC tablet Take 81 mg by mouth once daily.    azelastine (ASTELIN) 137 mcg (0.1 %) nasal spray 1 spray (137 mcg total) by Nasal route 2 (two) times daily.    b complex vitamins capsule Take 1 capsule by mouth once daily.    calcium carbonate (CALCIUM 500 ORAL) Take by mouth.    cholestyramine (QUESTRAN) 4 gram packet Take 1 packet (4 g total) by mouth daily as needed (diarrhea).    cyanocobalamin, vitamin B-12, (VITAMIN B-12 ORAL) Take by mouth.    denosumab (PROLIA  SUBQ) Inject into the skin.    evolocumab (REPATHA SURECLICK) 140 mg/mL PnIj Repatha SureClick Take No date recorded No form recorded No frequency recorded No route recorded No set duration recorded No set duration amount recorded active No dosage strength recorded No dosage strength units of measure recorded    evolocumab (REPATHA SURECLICK) 140 mg/mL PnIj Inject 1 pen (140 mg total) into the skin every 14 (fourteen) days.    ezetimibe (ZETIA) 10 mg tablet Take 1 tablet (10 mg total) by mouth once daily.    famotidine (PEPCID) 40 MG tablet Take 1 tablet (40 mg total) by mouth every evening.    fexofenadine (ALLEGRA) 180 MG tablet Take 1 tablet (180 mg total) by mouth once daily.    flaxseed oiL 1,000 mg Cap flaxseed oil Take No date recorded No form recorded No frequency recorded No route recorded No set duration recorded No set duration amount recorded suspended No dosage strength recorded No dosage strength units of measure recorded    fluocinolone (SYNALAR) 0.01 % external solution AAA of scalp twice a day PRN scalp itching or eczema flares.    fluticasone (FLONASE) 50 mcg/actuation nasal spray 2 sprays by Each Nare route once daily.    hydrOXYzine pamoate (VISTARIL) 25 MG Cap Take 1 capsule (25 mg total) by mouth every 8 (eight) hours as needed (itching).    ketoconazole (NIZORAL) 2 % shampoo Apply topically every 7 days. Shampoo scalp 1-2 times per week. Lather x 5 minutes, rinse well.    krill oil-omega-3-dha-epa 150-450 mg CpDR Take by mouth.    MAGNESIUM ORAL Take by mouth.    multivitamin capsule Take 1 capsule by mouth Daily.    SUPREP BOWEL PREP KIT 17.5-3.13-1.6 gram SolR Use as directed    triamcinolone acetonide 0.1% (KENALOG) 0.1 % cream Apply topically 2 (two) times daily.    TURMERIC ORAL Take by mouth.    UNABLE TO FIND CBD Oil topical    ZINC ORAL Take by mouth.   Last reviewed on 6/6/2022 11:37 AM by Adrienne Campbell NP    Review of patient's allergies indicates:   Allergen  Reactions    Augmentin [amoxicillin-pot clavulanate] Other (See Comments)     Diarrhea, yeast    Bactrim  [sulfamethoxazole-trimethoprim]      Other reaction(s): Unknown    Celebrex [celecoxib] Other (See Comments)     Stomach pain, gas     Lipitor  [atorvastatin]      Other reaction(s): Unknown    Pravachol  [pravastatin]      Other reaction(s): Unknown    Statins-hmg-coa reductase inhibitors      Other reaction(s): Muscle pain    Sulfa (sulfonamide antibiotics)      Other reaction(s): Swelling    Zoster vaccine live     Last reviewed on  6/6/2022 11:37 AM by Adrienne Campbell      Tasks added this encounter   9/11/2022 - Refill Call (Auto Added)   Tasks due within next 3 months   No tasks due.     [facility]  [address] [facility] [address]  Phone: [phone]  Fax: [phone]

## 2022-06-21 ENCOUNTER — CLINICAL SUPPORT (OUTPATIENT)
Dept: REHABILITATION | Facility: HOSPITAL | Age: 77
End: 2022-06-21
Payer: MEDICARE

## 2022-06-21 DIAGNOSIS — M53.86 DECREASED ROM OF LUMBAR SPINE: Primary | ICD-10-CM

## 2022-06-21 PROCEDURE — 97110 THERAPEUTIC EXERCISES: CPT | Mod: PN,CQ

## 2022-06-21 PROCEDURE — 97140 MANUAL THERAPY 1/> REGIONS: CPT | Mod: PN,CQ

## 2022-06-21 NOTE — PROGRESS NOTES
"[facility]   Physical Therapy Treatment Note     Name: Jessica Perez  Clinic Number: [id number]    Therapy Diagnosis:   Encounter Diagnosis   Name Primary?    Decreased ROM of lumbar spine Yes     Physician: Dileep Mendoza MD    Visit Date: 6/23/2022    Physician Orders: PT Eval and Treat   Medical Diagnosis from Referral:   M54.50,G89.29 (ICD-10-CM) - Chronic low back pain, unspecified back pain laterality, unspecified whether sciatica present   M17.0 (ICD-10-CM) - Primary osteoarthritis of both knees      Evaluation Date: 5/4/2022  Authorization Period Expiration: 12/31/2022  Plan of Care Expiration: 8/4/2022  Visit # / Visits authorized: 11/ 20     PN DUE: 7/9//2022     Precautions: Standard    PTA Visit #: 0/5     Time In: 1:45 pm  Time Out: 2:30 pm   Total Billable Time: 45 minutes    SUBJECTIVE     Pt reports: she is doing well. She has no pain with functional activities as long as she uses proper form. She still has some tenderness to palpation. She would like to vacuum her house and weed wack without pain.      She was compliant with home exercise program.  Response to previous treatment: decreased pain  Functional change: increased functional tolerance      Pain: 0/10 bilateral knees; 0/10 back at rest    OBJECTIVE     Objective Measures updated at progress report unless specified.       Treatment     Jessica received the treatments listed below:      (exercises performed today are bolded)    therapeutic exercises to develop strength, endurance, ROM, flexibility, posture and core stabilization for 45 minutes including:    Recumbent bike seat 3 for increased lower extremity range of motion, strength, and endurance for 6 minutes   Slant board gastroc stretch 3 x 30"  Bridges 2 x 10  with green theraband   SL clams 3 x 10 with green theraband   Supine hamstring stretch 3 x 15"   QS 2 x 10   Supine SLR 2 x 10   Seated LAQ 2 x 10   Double knee to chest with overpressure 2 x " "10  Prone on elbows 2 x 2 minutes (decreased pain)    tailgaters x 2 minutes #5   Steam boats x 10   SAQ  Open books x 10  DKTC 1 x 10 with clinician overpressure  Piriformis stretch 5 x 15"   Seated forward flex   Standing IT band stretch 3 x 15"   green thera band inversion 2 x 10   side lying reverse clams 2 x 10   + sit to stand 2 x 10     manual therapy techniques: Joint mobilizations and Myofacial release were applied to the: bilateral knee and low back for 0 minutes, including:    bilateral Patella distraction and mobilization   myofascial release of bilateral hip adductors at distal site of knee   bilateral MFR of bilateral lumbar paraspinals and musculature over iliac crest with and without passive hip internal/external rotation  Myofascial release of lumbar paraspinals bilaterally  Manual L knee joint distraction with patellar mobilizations   L knee joint mobilizations anterior/posterior   soft tissue mobilization to L adductors    neuromuscular re-education activities to improve: Balance and Proprioception for 0 minutes. The following activities were included:    Romberg stance on foam 3 x 30" eyes closed   Tandem walks x 5 laps   Tandem stance 2 x 30" each   Side stepping over cones x 8 laps   Marching on foam 2 x 10   ankle inversion on foam 2 x 20  + Transverse abdominus contraction   + Transverse abdominus with march     Patient Education and Home Exercises     Home Exercises Provided and Patient Education Provided     Education provided:   - Education/Self-Care provided:   · Patient educated on the impairments noted above and the effects of physical therapy intervention to improve overall condition and QOL.   · Patient was educated on all the above exercise prior/during/after for proper posture, positioning, and execution for safe performance with home exercise program.   · Patient educated on the importance of improved core and upper extremity strength in order to improve alignment of the spine and " upper extremities with static positions and dynamic movement.   · Patient educated on the importance of strong core and lower extremity musculature in order to improve both static and dynamic balance, improve gait mechanics, reduce fall risk and improve household and community mobility      Written Home Exercises Provided: Patient instructed to cont prior HEP. Exercises were reviewed and Jessica was able to demonstrate them prior to the end of the session.  Jessica demonstrated good  understanding of the education provided. See EMR under Patient Instructions for exercises provided during therapy sessions    ASSESSMENT     Pt presents today reporting no back and knee pain. Sit to stands were performed for increased functional lower extremity strength. No manual therapy was performed today to progress patient towards discharge. Patient tolerated all exercises well with no complaints. Plan to continue to progress patient to return to prior level of function. Plan to practice swiffering next visit to see patient's vacuuming form.      Jessica Is progressing well towards her goals.   Pt prognosis is Good.     Pt will continue to benefit from skilled outpatient physical therapy to address the deficits listed in the problem list box on initial evaluation, provide pt/family education and to maximize pt's level of independence in the home and community environment.     Pt's spiritual, cultural and educational needs considered and pt agreeable to plan of care and goals.     Anticipated barriers to physical therapy: pain and decreased exercise tolerance     Goals:   Short Term Goals:  4 weeks   1. Pain: Pt will demonstrate improved pain by reports of less than or equal to 7/10 worst pain on the verbal rating scale in order to progress toward maximal functional ability and improve QOL. MET 5/19/2022   2.  Mobility: Patient will present with full knee extension range of motion in order to return to maximal functional  potential and improve quality of life. Met 6/9/2022   3.  HEP: Patient will demonstrate independence with HEP in order to progress toward functional independence. MET 5/19/2022      Long Term Goals:  8 weeks   1. Pain: Pt will demonstrate improved pain by reports of less than or equal to 5/10 worst pain on the verbal rating scale in order to progress toward maximal functional ability and improve QOL.  MET 6/16/2022   2. Function: Patient will demonstrate improved function as indicated by a functional limitation score of 54% on the FOTO. PC   3. Strength: Patient will improve strength to +4/5 in bilateral lower extremities in order to improve functional independence and quality of life. Met 6/9/2022   4. Patient will return to normal ADL's, IADL's, community involvement, recreational activities, and work-related activities with no pain and maximal function. PC      Goals Key:  PC= progressing/continue;        PM= partially met;             DC= discontinue      PLAN     Plan of care Certification: 5/4/2022 to 8/4/2022.     Cont PT per POC and progress pt as tolerated and appropriate.    Sharon Villegas, PT, DPT, PPCES  06/23/2022

## 2022-06-21 NOTE — PROGRESS NOTES
"[facility]   Physical Therapy Treatment Note     Name: Jessica Perez  Clinic Number: [id number]    Therapy Diagnosis:   Encounter Diagnosis   Name Primary?    Decreased ROM of lumbar spine Yes     Physician: Dileep Mendoza MD    Visit Date: 6/21/2022    Physician Orders: PT Eval and Treat   Medical Diagnosis from Referral:   M54.50,G89.29 (ICD-10-CM) - Chronic low back pain, unspecified back pain laterality, unspecified whether sciatica present   M17.0 (ICD-10-CM) - Primary osteoarthritis of both knees      Evaluation Date: 5/4/2022  Authorization Period Expiration: 12/31/2022  Plan of Care Expiration: 8/4/2022  Visit # / Visits authorized: 10/ 20     PN DUE: 7/9//2022     Precautions: Standard    PTA Visit #: 1/5     Time In: 9:00 am  Time Out: 9:45 am  Total Billable Time: 45 minutes    SUBJECTIVE     Pt reports: Back is doing pretty good, she had one instance where her back hurt while bending and twisting to  her dog.  She was compliant with home exercise program.  Response to previous treatment: decreased pain  Functional change: increased functional tolerance      Pain: 0/10 bilateral knees; 0/10 back at rest    OBJECTIVE     Objective Measures updated at progress report unless specified.       Treatment     Jessica received the treatments listed below:      (exercises performed today are bolded)    therapeutic exercises to develop strength, endurance, ROM, flexibility, posture and core stabilization for 35 minutes including:    Recumbent bike seat 3 for increased lower extremity range of motion, strength, and endurance for 7 minutes   Slant board gastroc stretch 3 x 30"  Bridges 3 x 10  with green theraband   SL clams 3 x 10 with green theraband   Supine hamstring stretch 3 x 15"   QS 2 x 10   Supine SLR 2 x 10   Seated LAQ 2 x 10   Double knee to chest with overpressure 2 x 10  Prone on elbows 2 x 2 minutes (decreased pain)     tailgaters x 3 minutes #5   Steam boats " "x 10   SAQ  Open books x 10  DKTC 1 x 10 with clinician overpressure  Piriformis stretch 5 x 15"   Seated forward flex   Standing IT band stretch 3 x 15"   green thera band inversion 2 x 10   side lying reverse clams 2 x 10     manual therapy techniques: Joint mobilizations and Myofacial release were applied to the: bilateral knee and low back for 10 minutes, including:    bilateral Patella distraction and mobilization   myofascial release of bilateral hip adductors at distal site of knee   bilateral MFR of bilateral lumbar paraspinals and musculature over iliac crest with and without passive hip internal/external rotation  Myofascial release of lumbar paraspinals bilaterally  Manual L knee joint distraction with patellar mobilizations  L knee joint mobilizations anterior/posterior  soft tissue mobilization to L adductors    neuromuscular re-education activities to improve: Balance and Proprioception for 0 minutes. The following activities were included:    Romberg stance on foam 3 x 30" eyes closed   Tandem walks x 5 laps   Tandem stance 2 x 30" each   Side stepping over cones x 8 laps   Marching on foam 2 x 10   ankle inversion on foam 2 x 20  + Transverse abdominus contraction   + Transverse abdominus with march     Patient Education and Home Exercises     Home Exercises Provided and Patient Education Provided     Education provided:   - Education/Self-Care provided:   · Patient educated on the impairments noted above and the effects of physical therapy intervention to improve overall condition and QOL.   · Patient was educated on all the above exercise prior/during/after for proper posture, positioning, and execution for safe performance with home exercise program.   · Patient educated on the importance of improved core and upper extremity strength in order to improve alignment of the spine and upper extremities with static positions and dynamic movement.   · Patient educated on the importance of strong core and " lower extremity musculature in order to improve both static and dynamic balance, improve gait mechanics, reduce fall risk and improve household and community mobility      Written Home Exercises Provided: Patient instructed to cont prior HEP. Exercises were reviewed and Jessica was able to demonstrate them prior to the end of the session.  Jessica demonstrated good  understanding of the education provided. See EMR under Patient Instructions for exercises provided during therapy sessions    ASSESSMENT     Pt presents today reporting greatly reduced pain in her low back and knee, with only one flare up of low back pain while twisting and picking up her dog. Continued with extension biased exercises to improved tolerance today and further decrease in pain. Progressed reps and sets for LE strengthening exercises to increased fatigue but no increases in pain. Pt's adductors continue to present with tension and trigger points which improve greatly with manual therapy with decreased pain at medial knee and improved soft tissue extensibility.     Jessica Is progressing well towards her goals.   Pt prognosis is Good.     Pt will continue to benefit from skilled outpatient physical therapy to address the deficits listed in the problem list box on initial evaluation, provide pt/family education and to maximize pt's level of independence in the home and community environment.     Pt's spiritual, cultural and educational needs considered and pt agreeable to plan of care and goals.     Anticipated barriers to physical therapy: pain and decreased exercise tolerance     Goals:   Short Term Goals:  4 weeks   1. Pain: Pt will demonstrate improved pain by reports of less than or equal to 7/10 worst pain on the verbal rating scale in order to progress toward maximal functional ability and improve QOL. MET 5/19/2022   2.  Mobility: Patient will present with full knee extension range of motion in order to return to maximal functional  potential and improve quality of life. Met 6/9/2022   3.  HEP: Patient will demonstrate independence with HEP in order to progress toward functional independence. MET 5/19/2022      Long Term Goals:  8 weeks   1. Pain: Pt will demonstrate improved pain by reports of less than or equal to 5/10 worst pain on the verbal rating scale in order to progress toward maximal functional ability and improve QOL.  MET 6/16/2022   2. Function: Patient will demonstrate improved function as indicated by a functional limitation score of 54% on the FOTO. PC   3. Strength: Patient will improve strength to +4/5 in bilateral lower extremities in order to improve functional independence and quality of life. Met 6/9/2022   4. Patient will return to normal ADL's, IADL's, community involvement, recreational activities, and work-related activities with no pain and maximal function. PC      Goals Key:  PC= progressing/continue;        PM= partially met;             DC= discontinue      PLAN     Plan of care Certification: 5/4/2022 to 8/4/2022.     Cont PT per POC and progress pt as tolerated and appropriate.    Chuck Ornelas, MAKENZIE  06/21/2022

## 2022-06-22 ENCOUNTER — TELEPHONE (OUTPATIENT)
Dept: PREADMISSION TESTING | Facility: HOSPITAL | Age: 77
End: 2022-06-22
Payer: MEDICARE

## 2022-06-22 NOTE — TELEPHONE ENCOUNTER
PAT call completed.  Patient educated on procedure instructions.  Medical history discussed and patient informed of arrival time of 11:00 AM on Wednesday, June 29, 2022 at the [facility], and was made aware of the limited-visitor policy, and that  is to remain during the entire visit.  All questions and concerns addressed.  Endoscopy instructions reviewed. Instructed no solid food, only clear liquids, the day before the procedure, then at 6 PM, the day before the procedure, drink the first half of the bowel prep, then at 6 AM, the morning of procedure, drink the second half of the prep, then do not eat or drink anything until after the procedure.  Pre-procedure Covid testing not needed, patient is vaccinated. Patient verbalized understanding of all instructions.

## 2022-06-22 NOTE — PRE-PROCEDURE INSTRUCTIONS
PAT call completed.  Patient educated on procedure instructions.  Medical history discussed and patient informed of arrival time of 11:00 AM on Wednesday, June 29, 2022 at the [facility], and was made aware of the limited-visitor policy, and that  is to remain during the entire visit.  All questions and concerns addressed.  Endoscopy instructions reviewed. Instructed no solid food, only clear liquids, the day before the procedure, then at 6 PM, the day before the procedure, drink the first half of the bowel prep, then at 6 AM, the morning of procedure, drink the second half of the prep, then do not eat or drink anything until after the procedure.  Pre-procedure Covid testing not needed, patient is vaccinated. Patient verbalized understanding of all instructions.

## 2022-06-23 ENCOUNTER — CLINICAL SUPPORT (OUTPATIENT)
Dept: REHABILITATION | Facility: HOSPITAL | Age: 77
End: 2022-06-23
Payer: MEDICARE

## 2022-06-23 DIAGNOSIS — M53.86 DECREASED ROM OF LUMBAR SPINE: Primary | ICD-10-CM

## 2022-06-23 PROCEDURE — 97110 THERAPEUTIC EXERCISES: CPT | Mod: PN

## 2022-06-27 ENCOUNTER — ANESTHESIA EVENT (OUTPATIENT)
Dept: ENDOSCOPY | Facility: HOSPITAL | Age: 77
End: 2022-06-27
Payer: MEDICARE

## 2022-06-27 ENCOUNTER — TELEPHONE (OUTPATIENT)
Dept: PREADMISSION TESTING | Facility: HOSPITAL | Age: 77
End: 2022-06-27
Payer: MEDICARE

## 2022-06-27 NOTE — ANESTHESIA PREPROCEDURE EVALUATION
06/27/2022  Jessica Perez is a 77 y.o., female.  Past Medical History:   Diagnosis Date    Acute pain of left knee 3/22/2021    Arthritis     Arthritis of knee 2/9/2021    At risk for sleep apnea 9/19/2019    Bilateral primary osteoarthritis of knee 4/4/2018    Chest pain, moderate coronary artery risk 8/8/2019    Chronic pain of both knees 3/23/2021    Costochondritis 1/20/2020    Depression, major, recurrent, mild     Difficulty walking 11/30/2020    Difficulty walking 11/30/2020    LANRE (generalized anxiety disorder) 3/22/2021    Generalized weakness 11/30/2020    Generalized weakness 11/30/2020    Glaucoma     HLD (hyperlipidemia)     Muscle cramps 5/23/2018    Osteopenia of multiple sites 4/2/2018    Primary osteoarthritis of right knee 05/30/2018    Primary snoring     Sleep related leg cramps 9/7/2010    SOB (shortness of breath) 8/8/2019    Unspecified gastritis and gastroduodenitis without mention of hemorrhage 11/28/2010     Dx updated per 2019 IMO Load    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 Benign.    CATARACT EXTRACTION W/  INTRAOCULAR LENS IMPLANT Left 02/04/2019    CATARACT EXTRACTION W/  INTRAOCULAR LENS IMPLANT Right 03/04/2019    CHOLECYSTECTOMY      DILATION AND CURETTAGE OF UTERUS      finger surgery      middle finger on right hand    GALLBLADDER SURGERY      INJECTION OF ANESTHETIC AGENT AROUND NERVE Bilateral 10/1/2021    Procedure: Bilateral Genicular nerve block -;  Surgeon: Primo Bond MD;  Location: [facility];  Service: Pain Management;  Laterality: Bilateral;    RADIOFREQUENCY THERMOCOAGULATION Left 11/5/2021    Procedure: Left Genicular Nerve RFA (COOLIEF) with RN IV sedation;   "Surgeon: Primo Bond MD;  Location: [facility];  Service: Pain Management;  Laterality: Left;    TONSILLECTOMY, ADENOIDECTOMY      TUBAL LIGATION       8/2019 Echo   CONCLUSIONS     1 - Concentric remodeling.     2 - No wall motion abnormalities.     3 - Normal left ventricular systolic function (EF 60-65%).     4 - Impaired LV relaxation, normal LAP (grade 1 diastolic dysfunction).     5 - Normal right ventricular systolic function .     6 - The estimated PA systolic pressure is greater than 28 mmHg.        Pre-op Assessment    I have reviewed the Patient Summary Reports.     I have reviewed the Nursing Notes. I have reviewed the NPO Status.   I have reviewed the Medications.     Review of Systems  Anesthesia Hx:  No problems with previous Anesthesia  Denies Family Hx of Anesthesia complications.   Denies Personal Hx of Anesthesia complications.   Social:  Former Smoker, Alcohol Use    Hematology/Oncology:  Hematology Normal   Oncology Normal     EENT/Dental:EENT/Dental Normal   Cardiovascular:   Denies MI.  Denies CAD.    Denies Dysrhythmias.  hyperlipidemia MONROY ECG has been reviewed. 2019 had Echo w nml EF and neg stress test. EKG 2020 sinus arrhythmia. Last cards visit 2/2022 chronic chest pain "stable". F/U 3-6 mos. Pt denies recent CP and SOB. EKG currently SR w PACs.   Pulmonary:   Pt denies h/o MARC    Renal/:  Renal/ Normal     Hepatic/GI:   Bowel Prep. H/o gastritis, diarrhea    Musculoskeletal:   Arthritis  Statin myopathy    Endocrine:   Hyperparathyroidism  Obesity / BMI > 30  Dermatological:  Skin Normal    Psych:   Psychiatric History depression          Physical Exam  General: Well nourished, Cooperative, Alert and Oriented    Airway:  Mallampati: II   Mouth Opening: Normal  TM Distance: Normal  Tongue: Normal  Neck ROM: Normal ROM    Dental:  Caps / Implants, Intact    Chest/Lungs:  Clear to auscultation, Normal Respiratory Rate    Heart:  Rate: Normal  Rhythm: Regular " Rhythm        Anesthesia Plan  Type of Anesthesia, risks & benefits discussed:    Anesthesia Type: Gen Natural Airway  Intra-op Monitoring Plan: Standard ASA Monitors  Post Op Pain Control Plan: multimodal analgesia  Induction:  IV  Informed Consent: Informed consent signed with the Patient and all parties understand the risks and agree with anesthesia plan.  All questions answered. Patient consented to blood products? No  ASA Score: 3  Day of Surgery Review of History & Physical: H&P Update referred to the surgeon/provider.    Ready For Surgery From Anesthesia Perspective.     .

## 2022-06-27 NOTE — TELEPHONE ENCOUNTER
----- Message from Abdullahi Obrien MD sent at 6/27/2022  1:06 PM CDT -----  Regarding: RE: Cardiac Clearance  There were no cardiac issues as of 01/10/2022  ----- Message -----  From: Ibrahima Arrington RN  Sent: 6/27/2022  12:51 PM CDT  To: Abdullahi Obrien MD  Subject: Cardiac Clearance                                Good afternoon, Ms. Perez is scheduled for a Colonoscopy on Wednesday, June 29, we will need cardiac clearance before proceeding with procedure. Please advise, thank you.

## 2022-06-29 ENCOUNTER — HOSPITAL ENCOUNTER (OUTPATIENT)
Facility: HOSPITAL | Age: 77
Discharge: HOME | End: 2022-06-29
Attending: INTERNAL MEDICINE | Admitting: INTERNAL MEDICINE
Payer: MEDICARE

## 2022-06-29 ENCOUNTER — ANESTHESIA (OUTPATIENT)
Dept: ENDOSCOPY | Facility: HOSPITAL | Age: 77
End: 2022-06-29
Payer: MEDICARE

## 2022-06-29 VITALS
RESPIRATION RATE: 16 BRPM | HEIGHT: 62 IN | SYSTOLIC BLOOD PRESSURE: 104 MMHG | TEMPERATURE: 97 F | DIASTOLIC BLOOD PRESSURE: 59 MMHG | WEIGHT: 169.63 LBS | HEART RATE: 71 BPM | OXYGEN SATURATION: 100 % | BODY MASS INDEX: 31.21 KG/M2

## 2022-06-29 DIAGNOSIS — Z01.818 PREOP TESTING: ICD-10-CM

## 2022-06-29 DIAGNOSIS — R19.7 DIARRHEA, UNSPECIFIED TYPE: Primary | ICD-10-CM

## 2022-06-29 PROCEDURE — 88305 TISSUE EXAM BY PATHOLOGIST: ICD-10-PCS | Mod: 26,,, | Performed by: PATHOLOGY

## 2022-06-29 PROCEDURE — 93005 ELECTROCARDIOGRAM TRACING: CPT

## 2022-06-29 PROCEDURE — 37000008 HC ANESTHESIA 1ST 15 MINUTES: Performed by: INTERNAL MEDICINE

## 2022-06-29 PROCEDURE — 93010 ELECTROCARDIOGRAM REPORT: CPT | Mod: ,,, | Performed by: INTERNAL MEDICINE

## 2022-06-29 PROCEDURE — D9220A PRA ANESTHESIA: ICD-10-PCS | Mod: CRNA,,, | Performed by: NURSE ANESTHETIST, CERTIFIED REGISTERED

## 2022-06-29 PROCEDURE — 45380 PR COLONOSCOPY,BIOPSY: ICD-10-PCS | Mod: ,,, | Performed by: INTERNAL MEDICINE

## 2022-06-29 PROCEDURE — 88305 TISSUE EXAM BY PATHOLOGIST: CPT | Performed by: PATHOLOGY

## 2022-06-29 PROCEDURE — 63600175 PHARM REV CODE 636 W HCPCS: Performed by: NURSE ANESTHETIST, CERTIFIED REGISTERED

## 2022-06-29 PROCEDURE — 63600175 PHARM REV CODE 636 W HCPCS: Performed by: INTERNAL MEDICINE

## 2022-06-29 PROCEDURE — 88305 TISSUE EXAM BY PATHOLOGIST: CPT | Mod: 26,,, | Performed by: PATHOLOGY

## 2022-06-29 PROCEDURE — 45380 COLONOSCOPY AND BIOPSY: CPT | Performed by: INTERNAL MEDICINE

## 2022-06-29 PROCEDURE — D9220A PRA ANESTHESIA: Mod: ANES,,, | Performed by: ANESTHESIOLOGY

## 2022-06-29 PROCEDURE — 37000009 HC ANESTHESIA EA ADD 15 MINS: Performed by: INTERNAL MEDICINE

## 2022-06-29 PROCEDURE — 25000003 PHARM REV CODE 250: Performed by: NURSE ANESTHETIST, CERTIFIED REGISTERED

## 2022-06-29 PROCEDURE — 93010 EKG 12-LEAD: ICD-10-PCS | Mod: ,,, | Performed by: INTERNAL MEDICINE

## 2022-06-29 PROCEDURE — 45380 COLONOSCOPY AND BIOPSY: CPT | Mod: ,,, | Performed by: INTERNAL MEDICINE

## 2022-06-29 PROCEDURE — D9220A PRA ANESTHESIA: Mod: CRNA,,, | Performed by: NURSE ANESTHETIST, CERTIFIED REGISTERED

## 2022-06-29 PROCEDURE — D9220A PRA ANESTHESIA: ICD-10-PCS | Mod: ANES,,, | Performed by: ANESTHESIOLOGY

## 2022-06-29 PROCEDURE — 27201012 HC FORCEPS, HOT/COLD, DISP: Performed by: INTERNAL MEDICINE

## 2022-06-29 RX ORDER — SODIUM CHLORIDE, SODIUM LACTATE, POTASSIUM CHLORIDE, CALCIUM CHLORIDE 600; 310; 30; 20 MG/100ML; MG/100ML; MG/100ML; MG/100ML
INJECTION, SOLUTION INTRAVENOUS CONTINUOUS
Status: DISCONTINUED | OUTPATIENT
Start: 2022-06-29 | End: 2022-06-29 | Stop reason: HOSPADM

## 2022-06-29 RX ORDER — LIDOCAINE HYDROCHLORIDE 20 MG/ML
INJECTION, SOLUTION EPIDURAL; INFILTRATION; INTRACAUDAL; PERINEURAL
Status: DISCONTINUED | OUTPATIENT
Start: 2022-06-29 | End: 2022-06-29

## 2022-06-29 RX ORDER — PROPOFOL 10 MG/ML
VIAL (ML) INTRAVENOUS
Status: DISCONTINUED | OUTPATIENT
Start: 2022-06-29 | End: 2022-06-29

## 2022-06-29 RX ADMIN — PROPOFOL 20 MG: 10 INJECTION, EMULSION INTRAVENOUS at 12:06

## 2022-06-29 RX ADMIN — LIDOCAINE HYDROCHLORIDE 60 MG: 20 INJECTION, SOLUTION EPIDURAL; INFILTRATION; INTRACAUDAL; PERINEURAL at 12:06

## 2022-06-29 RX ADMIN — PROPOFOL 30 MG: 10 INJECTION, EMULSION INTRAVENOUS at 12:06

## 2022-06-29 RX ADMIN — PROPOFOL 80 MG: 10 INJECTION, EMULSION INTRAVENOUS at 12:06

## 2022-06-29 RX ADMIN — SODIUM CHLORIDE, SODIUM LACTATE, POTASSIUM CHLORIDE, AND CALCIUM CHLORIDE: .6; .31; .03; .02 INJECTION, SOLUTION INTRAVENOUS at 11:06

## 2022-06-29 NOTE — ANESTHESIA POSTPROCEDURE EVALUATION
Anesthesia Post Evaluation    Patient: Jessica Perez    Procedure(s) Performed: Procedure(s) (LRB):  COLONOSCOPY (N/A)    Final Anesthesia Type: general      Patient location during evaluation: PACU  Patient participation: Yes- Able to Participate  Level of consciousness: awake and alert and oriented  Post-procedure vital signs: reviewed and stable  Pain management: adequate  Airway patency: patent    PONV status at discharge: No PONV  Anesthetic complications: no      Cardiovascular status: blood pressure returned to baseline, stable and hemodynamically stable  Respiratory status: unassisted  Hydration status: euvolemic  Follow-up not needed.          Vitals Value Taken Time   /59 06/29/22 1303   Temp 36.2 °C (97.2 °F) 06/29/22 1241   Pulse 71 06/29/22 1303   Resp 16 06/29/22 1303   SpO2 100 % 06/29/22 1303         Event Time   Out of Recovery 13:11:00         Pain/Marj Score: Marj Score: 10 (6/29/2022  1:08 PM)

## 2022-06-29 NOTE — H&P
PRE PROCEDURE H&P    Patient Name: Jessica Perez  MRN: [id number]  : 1945  Date of Procedure:  2022  Referring Physician: Adrienne Campbell NP  Primary Physician: Davidson Jang MD  Procedure Physician: Nichole Kelly MD       Planned Procedure: Colonoscopy  Diagnosis: diarrhea   Chief Complaint: Same as above    HPI: Patient is an 77 y.o. female is here for the above.     Last colonoscopy: unknown  Family history: negative   Anticoagulation: none     Past Medical History:   Past Medical History:   Diagnosis Date    Acute pain of left knee 3/22/2021    Arthritis     Arthritis of knee 2021    At risk for sleep apnea 2019    Bilateral primary osteoarthritis of knee 2018    Chest pain, moderate coronary artery risk 2019    Chronic pain of both knees 3/23/2021    Costochondritis 2020    Depression, major, recurrent, mild     Difficulty walking 2020    Difficulty walking 2020    LANRE (generalized anxiety disorder) 3/22/2021    Generalized weakness 2020    Generalized weakness 2020    Glaucoma     HLD (hyperlipidemia)     Muscle cramps 2018    Osteopenia of multiple sites 2018    Primary osteoarthritis of right knee 2018    Primary snoring     Sleep related leg cramps 2010    SOB (shortness of breath) 2019    Unspecified gastritis and gastroduodenitis without mention of hemorrhage 2010     Dx updated per 2019 IMO Load    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 Benign.    CATARACT EXTRACTION W/  INTRAOCULAR LENS IMPLANT Left 2019    CATARACT EXTRACTION W/  INTRAOCULAR LENS IMPLANT Right 2019    CHOLECYSTECTOMY      DILATION AND CURETTAGE OF UTERUS      finger surgery      middle finger on right hand    GALLBLADDER  SURGERY      INJECTION OF ANESTHETIC AGENT AROUND NERVE Bilateral 10/1/2021    Procedure: Bilateral Genicular nerve block -;  Surgeon: Primo Bond MD;  [facility];  Service: Pain Management;  Laterality: Bilateral;    RADIOFREQUENCY THERMOCOAGULATION Left 11/5/2021    Procedure: Left Genicular Nerve RFA (COOLIEF) with RN IV sedation;  Surgeon: Primo Bond MD;  Location: [facility];  Service: Pain Management;  Laterality: Left;    TONSILLECTOMY, ADENOIDECTOMY      TUBAL LIGATION          Home Medications:  Prior to Admission medications    Medication Sig Start Date End Date Taking? Authorizing Provider   acetaminophen/diphenhydramine (TYLENOL PM ORAL) Take by mouth.   Yes Historical Provider   b complex vitamins capsule Take 1 capsule by mouth once daily.   Yes Historical Provider   calcium carbonate (CALCIUM 500 ORAL) Take by mouth.   Yes Historical Provider   cholestyramine (QUESTRAN) 4 gram packet Take 1 packet (4 g total) by mouth daily as needed (diarrhea). 6/1/22 6/1/23 Yes Adrienne Campbell NP   cyanocobalamin, vitamin B-12, (VITAMIN B-12 ORAL) Take by mouth.   Yes Historical Provider   evolocumab (REPATHA SURECLICK) 140 mg/mL PnIj Repatha SureClick Take No date recorded No form recorded No frequency recorded No route recorded No set duration recorded No set duration amount recorded active No dosage strength recorded No dosage strength units of measure recorded   Yes Historical Provider   evolocumab (REPATHA SURECLICK) 140 mg/mL PnIj Inject 1 pen (140 mg total) into the skin every 14 (fourteen) days. 5/31/22  Yes Abdullahi Obrien MD   hydrOXYzine pamoate (VISTARIL) 25 MG Cap Take 1 capsule (25 mg total) by mouth every 8 (eight) hours as needed (itching). 5/11/22  Yes Davidson Jang MD   krill gfz-rmmcj-6-dha-epa 150-450 mg CpDR Take by mouth.   Yes Historical Provider   MAGNESIUM ORAL Take by mouth.   Yes Historical Provider   multivitamin capsule Take 1 capsule by mouth Daily.  5/11/12  Yes Historical Provider   SUPREP BOWEL PREP KIT 17.5-3.13-1.6 gram SolR Use as directed 6/1/22  Yes Adrienne Campbell NP   TURMERIC ORAL Take by mouth.   Yes Historical Provider   ZINC ORAL Take by mouth.   Yes Historical Provider   artificial tears,hypromellose,,GENTEAL/SUSTANE, 0.3 % Gel  5/11/12   Historical Provider   aspirin (ECOTRIN) 81 MG EC tablet Take 81 mg by mouth once daily.    Historical Provider   azelastine (ASTELIN) 137 mcg (0.1 %) nasal spray 1 spray (137 mcg total) by Nasal route 2 (two) times daily. 1/30/20 5/3/22  Davidson Jang MD   denosumab (PROLIA SUBQ) Inject into the skin.    Historical Provider   ezetimibe (ZETIA) 10 mg tablet Take 1 tablet (10 mg total) by mouth once daily. 2/22/22   Abdullahi Obrien MD   famotidine (PEPCID) 40 MG tablet Take 1 tablet (40 mg total) by mouth every evening. 6/1/22 6/1/23  Adrienne Campbell NP   fexofenadine (ALLEGRA) 180 MG tablet Take 1 tablet (180 mg total) by mouth once daily. 8/12/21 5/3/22  Christel Stark PA-C   flaxseed oiL 1,000 mg Cap flaxseed oil Take No date recorded No form recorded No frequency recorded No route recorded No set duration recorded No set duration amount recorded suspended No dosage strength recorded No dosage strength units of measure recorded    Historical Provider   fluocinolone (SYNALAR) 0.01 % external solution AAA of scalp twice a day PRN scalp itching or eczema flares. 5/18/22   Kacy Guallpa PA-C   fluticasone (FLONASE) 50 mcg/actuation nasal spray 2 sprays by Each Nare route once daily. 10/13/15   Rolanda Coyle MD   ketoconazole (NIZORAL) 2 % shampoo Apply topically every 7 days. Shampoo scalp 1-2 times per week. Lather x 5 minutes, rinse well. 5/18/22   Kacy Guallpa PA-C   triamcinolone acetonide 0.1% (KENALOG) 0.1 % cream Apply topically 2 (two) times daily. 5/11/22   Davidson Jang MD   UNABLE TO FIND CBD Oil topical    Historical Provider        Allergies:  Review of patient's  "allergies indicates:   Allergen Reactions    Augmentin [amoxicillin-pot clavulanate] Other (See Comments)     Diarrhea, yeast    Bactrim  [sulfamethoxazole-trimethoprim]      Other reaction(s): Unknown    Celebrex [celecoxib] Other (See Comments)     Stomach pain, gas     Lipitor  [atorvastatin]      Other reaction(s): Unknown    Pravachol  [pravastatin]      Other reaction(s): Unknown    Statins-hmg-coa reductase inhibitors      Other reaction(s): Muscle pain    Sulfa (sulfonamide antibiotics)      Other reaction(s): Swelling    Zoster vaccine live         Social History:   Social History     Socioeconomic History    Marital status:    Tobacco Use    Smoking status: Former Smoker     Packs/day: 0.50     Years: 12.00     Pack years: 6.00     Types: Cigarettes    Smokeless tobacco: Never Used   Substance and Sexual Activity    Alcohol use: Yes     Comment: occasionally    Drug use: No    Sexual activity: Yes       Family History:  Family History   Problem Relation Age of Onset    Diabetes Father     Cancer Father         prostate ca    Hypertension Father     Cancer Sister         cervical ca    Hepatitis Sister     Dementia Mother     Osteoporosis Mother     Breast cancer Paternal Aunt        ROS: No acute cardiac events, no acute respiratory complaints.     Physical Exam (all patients):    BP (!) 148/72 (Patient Position: Sitting)   Pulse 95   Temp 97.6 °F (36.4 °C) (Temporal)   Resp 18   Ht 5' 2" (1.575 m)   Wt 76.9 kg (169 lb 10.3 oz)   SpO2 96%   BMI 31.03 kg/m²   Lungs: Clear to auscultation bilaterally, respirations unlabored  Heart: Regular rate and rhythm, S1 and S2 normal, no obvious murmurs  Abdomen:         Soft, non-tender, bowel sounds normal, no masses, no organomegaly    Lab Results   Component Value Date    WBC 9.39 05/11/2022    MCV 97 05/11/2022    RDW 14.1 05/11/2022     05/11/2022    GLU 77 05/11/2022    BUN 12 05/11/2022     05/11/2022    K 4.3 " 05/11/2022     05/11/2022        SEDATION PLAN: per anesthesia      History reviewed, vital signs satisfactory, cardiopulmonary status satisfactory, sedation options, risks and plans have been discussed with the patient  All their questions were answered and the patient agrees to the sedation procedures as planned and the patient is deemed an appropriate candidate for the sedation as planned.    Procedure explained to patient, informed consent obtained and placed in chart.    Nichole Kelly  6/29/2022  12:19 PM

## 2022-06-29 NOTE — PROVATION PATIENT INSTRUCTIONS
Discharge Summary/Instructions after an Endoscopic Procedure  Patient Name: Jessica Perez  Patient MRN: [id number]  Patient YOB: 1945 Wednesday, June 29, 2022  Nichole Kelly MD  Dear patient,  As a result of recent federal legislation (The Federal Cures Act), you may   receive lab or pathology results from your procedure in your [facility]   account before your physician is able to contact you. Your physician or   their representative will relay the results to you with their   recommendations at their soonest availability.  Thank you,  RESTRICTIONS:  During your procedure today, you received medications for sedation.  These   medications may affect your judgment, balance and coordination.  Therefore,   for 24 hours, you have the following restrictions:   - DO NOT drive a car, operate machinery, make legal/financial decisions,   sign important papers or drink alcohol.    ACTIVITY:  Today: no heavy lifting, straining or running due to procedural   sedation/anesthesia.  The following day: return to full activity including work.  DIET:  Eat and drink normally unless instructed otherwise.     TREATMENT FOR COMMON SIDE EFFECTS:  - Mild abdominal pain, nausea, belching, bloating or excessive gas:  rest,   eat lightly and use a heating pad.  - Sore Throat: treat with throat lozenges and/or gargle with warm salt   water.  - Because air was used during the procedure, expelling large amounts of air   from your rectum or belching is normal.  - If a bowel prep was taken, you may not have a bowel movement for 1-3 days.    This is normal.  SYMPTOMS TO WATCH FOR AND REPORT TO YOUR PHYSICIAN:  1. Abdominal pain or bloating, other than gas cramps.  2. Chest pain.  3. Back pain.  4. Signs of infection such as: chills or fever occurring within 24 hours   after the procedure.  5. Rectal bleeding, which would show as bright red, maroon, or black stools.   (A tablespoon of blood from the rectum is not serious, especially  if   hemorrhoids are present.)  6. Vomiting.  7. Weakness or dizziness.  GO DIRECTLY TO THE NEAREST EMERGENCY ROOM IF YOU HAVE ANY OF THE FOLLOWING:      Difficulty breathing              Chills and/or fever over 101 F   Persistent vomiting and/or vomiting blood   Severe abdominal pain   Severe chest pain   Black, tarry stools   Bleeding- more than one tablespoon   Any other symptom or condition that you feel may need urgent attention  Your doctor recommends these additional instructions:  If any biopsies were taken, your doctors clinic will contact you in 1 to 2   weeks with any results.  - Patient has a contact number available for emergencies.  The signs and   symptoms of potential delayed complications were discussed with the   patient.  Return to normal activities tomorrow.  Written discharge   instructions were provided to the patient.   - Discharge patient to home (via wheelchair).   - Resume previous diet today.   - Continue present medications.   - Await pathology results.   - Repeat colonoscopy is not recommended due to current age (66 years or   older) for screening purposes unless polyps are adenomatous.  For questions, problems or results please call your physician Nichole Kelly MD at Work:  [phone]  If you have any questions about the above instructions, call the GI   department at [phone] or call the endoscopy unit at [phone]   from 7am until 3 pm.  [facility] [place], EMERGENCY ROOM PHONE NUMBER:   [phone]  IF A COMPLICATION OR EMERGENCY SITUATION ARISES AND YOU ARE UNABLE TO REACH   YOUR PHYSICIAN - GO DIRECTLY TO THE EMERGENCY ROOM.  I have read or have had read to me these discharge instructions for my   procedure and have received a written copy.  I understand these   instructions and will follow-up with my physician if I have any questions.     __________________________________       _____________________________________  Nurse Signature                                           Patient/Designated   Responsible Party Signature  MD Nichole Miner MD  6/29/2022 12:43:35 PM  This report has been verified and signed electronically.  Dear patient,  As a result of recent federal legislation (The Federal Cures Act), you may   receive lab or pathology results from your procedure in your [facility]   account before your physician is able to contact you. Your physician or   their representative will relay the results to you with their   recommendations at their soonest availability.  Thank you,  PROVATION

## 2022-06-29 NOTE — PLAN OF CARE
Discharge instructions reviewed with patient and visitor. Handouts given & verbalized understanding with no further questions at this time.  spoke to pt at bedside, reviewed procedure and findings, answered questions. Made aware they are awaiting biopsy results with MD telephone number provided per AVS sheet. VSS on RA, no pain or nausea noted, tolerating po fluids, no complaints noted. Fall precautions reviewed, consents in chart, PIV removed at this time.

## 2022-06-29 NOTE — TRANSFER OF CARE
"Anesthesia Transfer of Care Note    Patient: Jessica Perez    Procedure(s) Performed: Procedure(s) (LRB):  COLONOSCOPY (N/A)    Patient location: PACU    Anesthesia Type: general    Transport from OR: Transported from OR on room air with adequate spontaneous ventilation    Post pain: adequate analgesia    Post assessment: no apparent anesthetic complications    Post vital signs: stable    Level of consciousness: sedated    Nausea/Vomiting: no nausea/vomiting    Complications: none    Transfer of care protocol was followed      Last vitals:   Visit Vitals  BP (!) 148/72 (Patient Position: Sitting)   Pulse 95   Temp 36.4 °C (97.6 °F) (Temporal)   Resp 18   Ht 5' 2" (1.575 m)   Wt 76.9 kg (169 lb 10.3 oz)   SpO2 96%   BMI 31.03 kg/m²     "

## 2022-07-05 NOTE — PROGRESS NOTES
"[facility]   Physical Therapy Treatment Note     Name: Jessica Perez  Clinic Number: [id number]    Therapy Diagnosis:   Encounter Diagnosis   Name Primary?    Decreased ROM of lumbar spine Yes     Physician: Dileep Mendoza MD    Visit Date: 7/7/2022    Physician Orders: PT Eval and Treat   Medical Diagnosis from Referral:   M54.50,G89.29 (ICD-10-CM) - Chronic low back pain, unspecified back pain laterality, unspecified whether sciatica present   M17.0 (ICD-10-CM) - Primary osteoarthritis of both knees      Evaluation Date: 5/4/2022  Authorization Period Expiration: 12/31/2022  Plan of Care Expiration: 8/4/2022  Visit # / Visits authorized: 12/ 20     PN DUE: 7/9//2022     Precautions: Standard    PTA Visit #: 0/5     Time In: 1:45 pm  Time Out: 2:30 pm   Total Billable Time: 45 minutes    SUBJECTIVE     Pt reports: increased pain after moving groceries yesterday.  She was compliant with home exercise program.  Response to previous treatment: decreased pain  Functional change: increased functional tolerance      Pain: 4/10 bilateral knees; 6/10 back at rest    OBJECTIVE     Objective Measures updated at progress report unless specified.       Treatment     Jessica received the treatments listed below:      (exercises performed today are bolded)    therapeutic exercises to develop strength, endurance, ROM, flexibility, posture and core stabilization for 30 minutes including:    Recumbent bike seat 3 for increased lower extremity range of motion, strength, and endurance for 6 minutes   Slant board gastroc stretch 3 x 30"  Bridges 2 x 10  with green theraband   SL clams 3 x 10 with green theraband   Supine hamstring stretch 3 x 15"   QS 2 x 10   Supine SLR 2 x 10   Seated LAQ 2 x 10   Double knee to chest with overpressure 2 x 10  Prone on elbows 2 x 2 minutes (decreased pain)     tailgaters x 2 minutes #5   Steam boats x 10   SAQ  Open books x 10  DKTC 1 x 10 with clinician " "overpressure  Piriformis stretch 5 x 15"   Seated forward flex   Standing IT band stretch 3 x 15"   green thera band inversion 2 x 10   side lying reverse clams 2 x 10   sit to stand 2 x 10   + side stepping with yellow theraband     manual therapy techniques: Joint mobilizations and Myofacial release were applied to the: bilateral knee and low back for 0 minutes, including:    bilateral Patella distraction and mobilization   myofascial release of bilateral hip adductors at distal site of knee   bilateral MFR of bilateral lumbar paraspinals and musculature over iliac crest with and without passive hip internal/external rotation  Myofascial release of lumbar paraspinals bilaterally  Manual L knee joint distraction with patellar mobilizations   L knee joint mobilizations anterior/posterior   soft tissue mobilization to L adductors    neuromuscular re-education activities to improve: Balance and Proprioception for 10 minutes. The following activities were included:    Romberg stance on foam 3 x 30" eyes closed   Tandem walks x 5 laps   Tandem stance 2 x 30" each   Side stepping over cones x 8 laps   Marching on foam 2 x 10   ankle inversion on foam 2 x 20  + Transverse abdominus contraction 5" x 10   + Transverse abdominus with march 2 x 10     Patient Education and Home Exercises     Home Exercises Provided and Patient Education Provided     Education provided:   - Education/Self-Care provided:   · Patient educated on the impairments noted above and the effects of physical therapy intervention to improve overall condition and QOL.   · Patient was educated on all the above exercise prior/during/after for proper posture, positioning, and execution for safe performance with home exercise program.   · Patient educated on the importance of improved core and upper extremity strength in order to improve alignment of the spine and upper extremities with static positions and dynamic movement.   · Patient educated on the " importance of strong core and lower extremity musculature in order to improve both static and dynamic balance, improve gait mechanics, reduce fall risk and improve household and community mobility      Written Home Exercises Provided: Patient instructed to cont prior HEP. Exercises were reviewed and Jessica was able to demonstrate them prior to the end of the session.  Jessica demonstrated good  understanding of the education provided. See EMR under Patient Instructions for exercises provided during therapy sessions    ASSESSMENT     Pt presents today reporting increased back and knee pain. Side stepping and steam boats were performed for increased hip stability to better support low back musculature. Transverse abdominis contractions were performed for increased core and postural stability. Reviewed lifting form and introduced Transverse abdominis contractions with functional task for decreased lumbar strain. Plan to continue to progress patient to return to prior level of function. Plan to practice swiffering next visit to see patient's vacuuming form.      Jessica Is progressing well towards her goals.   Pt prognosis is Good.     Pt will continue to benefit from skilled outpatient physical therapy to address the deficits listed in the problem list box on initial evaluation, provide pt/family education and to maximize pt's level of independence in the home and community environment.     Pt's spiritual, cultural and educational needs considered and pt agreeable to plan of care and goals.     Anticipated barriers to physical therapy: pain and decreased exercise tolerance     Goals:   Short Term Goals:  4 weeks   1. Pain: Pt will demonstrate improved pain by reports of less than or equal to 7/10 worst pain on the verbal rating scale in order to progress toward maximal functional ability and improve QOL. MET 5/19/2022   2.  Mobility: Patient will present with full knee extension range of motion in order to return  to maximal functional potential and improve quality of life. Met 6/9/2022   3.  HEP: Patient will demonstrate independence with HEP in order to progress toward functional independence. MET 5/19/2022      Long Term Goals:  8 weeks   1. Pain: Pt will demonstrate improved pain by reports of less than or equal to 5/10 worst pain on the verbal rating scale in order to progress toward maximal functional ability and improve QOL.  MET 6/16/2022   2. Function: Patient will demonstrate improved function as indicated by a functional limitation score of 54% on the FOTO. PC   3. Strength: Patient will improve strength to +4/5 in bilateral lower extremities in order to improve functional independence and quality of life. Met 6/9/2022   4. Patient will return to normal ADL's, IADL's, community involvement, recreational activities, and work-related activities with no pain and maximal function. PC      Goals Key:  PC= progressing/continue;        PM= partially met;             DC= discontinue      PLAN     Plan of care Certification: 5/4/2022 to 8/4/2022.     Cont PT per POC and progress pt as tolerated and appropriate.    Sharon Villegas, PT, DPT, PPCES  07/07/2022

## 2022-07-07 ENCOUNTER — CLINICAL SUPPORT (OUTPATIENT)
Dept: REHABILITATION | Facility: HOSPITAL | Age: 77
End: 2022-07-07
Payer: MEDICARE

## 2022-07-07 ENCOUNTER — OFFICE VISIT (OUTPATIENT)
Dept: INTERNAL MEDICINE | Facility: CLINIC | Age: 77
End: 2022-07-07
Payer: MEDICARE

## 2022-07-07 VITALS
BODY MASS INDEX: 31.53 KG/M2 | SYSTOLIC BLOOD PRESSURE: 120 MMHG | WEIGHT: 172.38 LBS | TEMPERATURE: 98 F | HEART RATE: 78 BPM | OXYGEN SATURATION: 98 % | DIASTOLIC BLOOD PRESSURE: 64 MMHG

## 2022-07-07 DIAGNOSIS — R32 URINARY INCONTINENCE, UNSPECIFIED TYPE: ICD-10-CM

## 2022-07-07 DIAGNOSIS — E78.00 PURE HYPERCHOLESTEROLEMIA: Primary | ICD-10-CM

## 2022-07-07 DIAGNOSIS — M35.01 KERATITIS SICCA, BILATERAL: ICD-10-CM

## 2022-07-07 DIAGNOSIS — M53.86 DECREASED ROM OF LUMBAR SPINE: Primary | ICD-10-CM

## 2022-07-07 PROBLEM — R19.7 DIARRHEA: Status: RESOLVED | Noted: 2022-05-11 | Resolved: 2022-07-07

## 2022-07-07 PROBLEM — G89.29 CHRONIC MIDLINE LOW BACK PAIN WITHOUT SCIATICA: Status: RESOLVED | Noted: 2020-11-30 | Resolved: 2022-07-07

## 2022-07-07 PROBLEM — R26.89 GAIT, ANTALGIC: Status: RESOLVED | Noted: 2021-10-21 | Resolved: 2022-07-07

## 2022-07-07 PROBLEM — R21 RASH: Status: RESOLVED | Noted: 2022-05-11 | Resolved: 2022-07-07

## 2022-07-07 PROBLEM — R26.89 BALANCE PROBLEM: Status: RESOLVED | Noted: 2021-10-21 | Resolved: 2022-07-07

## 2022-07-07 PROBLEM — M54.50 CHRONIC MIDLINE LOW BACK PAIN WITHOUT SCIATICA: Status: RESOLVED | Noted: 2020-11-30 | Resolved: 2022-07-07

## 2022-07-07 PROBLEM — R29.898 LOWER EXTREMITY WEAKNESS: Status: RESOLVED | Noted: 2021-10-21 | Resolved: 2022-07-07

## 2022-07-07 LAB
FINAL PATHOLOGIC DIAGNOSIS: NORMAL
GROSS: NORMAL
Lab: NORMAL

## 2022-07-07 PROCEDURE — 1159F PR MEDICATION LIST DOCUMENTED IN MEDICAL RECORD: ICD-10-PCS | Mod: CPTII,S$GLB,, | Performed by: FAMILY MEDICINE

## 2022-07-07 PROCEDURE — 97110 THERAPEUTIC EXERCISES: CPT | Mod: PN

## 2022-07-07 PROCEDURE — 3078F PR MOST RECENT DIASTOLIC BLOOD PRESSURE < 80 MM HG: ICD-10-PCS | Mod: CPTII,S$GLB,, | Performed by: FAMILY MEDICINE

## 2022-07-07 PROCEDURE — 3078F DIAST BP <80 MM HG: CPT | Mod: CPTII,S$GLB,, | Performed by: FAMILY MEDICINE

## 2022-07-07 PROCEDURE — 3288F PR FALLS RISK ASSESSMENT DOCUMENTED: ICD-10-PCS | Mod: CPTII,S$GLB,, | Performed by: FAMILY MEDICINE

## 2022-07-07 PROCEDURE — 99999 PR PBB SHADOW E&M-EST. PATIENT-LVL III: ICD-10-PCS | Mod: PBBFAC,,, | Performed by: FAMILY MEDICINE

## 2022-07-07 PROCEDURE — 3074F SYST BP LT 130 MM HG: CPT | Mod: CPTII,S$GLB,, | Performed by: FAMILY MEDICINE

## 2022-07-07 PROCEDURE — 99999 PR PBB SHADOW E&M-EST. PATIENT-LVL III: CPT | Mod: PBBFAC,,, | Performed by: FAMILY MEDICINE

## 2022-07-07 PROCEDURE — 99214 OFFICE O/P EST MOD 30 MIN: CPT | Mod: S$GLB,,, | Performed by: FAMILY MEDICINE

## 2022-07-07 PROCEDURE — 1101F PR PT FALLS ASSESS DOC 0-1 FALLS W/OUT INJ PAST YR: ICD-10-PCS | Mod: CPTII,S$GLB,, | Performed by: FAMILY MEDICINE

## 2022-07-07 PROCEDURE — 1160F RVW MEDS BY RX/DR IN RCRD: CPT | Mod: CPTII,S$GLB,, | Performed by: FAMILY MEDICINE

## 2022-07-07 PROCEDURE — 1126F PR PAIN SEVERITY QUANTIFIED, NO PAIN PRESENT: ICD-10-PCS | Mod: CPTII,S$GLB,, | Performed by: FAMILY MEDICINE

## 2022-07-07 PROCEDURE — 1160F PR REVIEW ALL MEDS BY PRESCRIBER/CLIN PHARMACIST DOCUMENTED: ICD-10-PCS | Mod: CPTII,S$GLB,, | Performed by: FAMILY MEDICINE

## 2022-07-07 PROCEDURE — 99214 PR OFFICE/OUTPT VISIT, EST, LEVL IV, 30-39 MIN: ICD-10-PCS | Mod: S$GLB,,, | Performed by: FAMILY MEDICINE

## 2022-07-07 PROCEDURE — 1126F AMNT PAIN NOTED NONE PRSNT: CPT | Mod: CPTII,S$GLB,, | Performed by: FAMILY MEDICINE

## 2022-07-07 PROCEDURE — 97140 MANUAL THERAPY 1/> REGIONS: CPT | Mod: PN

## 2022-07-07 PROCEDURE — 3288F FALL RISK ASSESSMENT DOCD: CPT | Mod: CPTII,S$GLB,, | Performed by: FAMILY MEDICINE

## 2022-07-07 PROCEDURE — 3074F PR MOST RECENT SYSTOLIC BLOOD PRESSURE < 130 MM HG: ICD-10-PCS | Mod: CPTII,S$GLB,, | Performed by: FAMILY MEDICINE

## 2022-07-07 PROCEDURE — 1101F PT FALLS ASSESS-DOCD LE1/YR: CPT | Mod: CPTII,S$GLB,, | Performed by: FAMILY MEDICINE

## 2022-07-07 PROCEDURE — 1159F MED LIST DOCD IN RCRD: CPT | Mod: CPTII,S$GLB,, | Performed by: FAMILY MEDICINE

## 2022-07-07 NOTE — PROGRESS NOTES
Subjective:       Patient ID: Jessica Perez is a 77 y.o. female.    Chief Complaint: Thyroid Problem    Here for follow-up of chronic issues.    Review of Systems   Respiratory: Negative for shortness of breath.    Cardiovascular: Negative for chest pain.   Gastrointestinal: Negative for abdominal pain.       Objective:      Physical Exam  Vitals and nursing note reviewed.   Constitutional:       General: She is not in acute distress.     Appearance: Normal appearance. She is well-developed. She is not diaphoretic.   HENT:      Head: Normocephalic and atraumatic.   Pulmonary:      Effort: Pulmonary effort is normal. No respiratory distress.      Breath sounds: Normal breath sounds. No wheezing.   Skin:     General: Skin is warm and dry.      Findings: No erythema or rash.   Neurological:      Mental Status: She is alert.         Assessment:       1. Pure hypercholesterolemia    2. Keratitis sicca, bilateral        Plan:     Problem List Items Addressed This Visit        Ophtho    Keratitis sicca, bilateral    Overview     Cont Genteal drops at bedtime  Followed by Dr. Palomino              Cardiac/Vascular    HLD (hyperlipidemia) - Primary    Overview     Chronic, Stable, cont zetia

## 2022-07-08 ENCOUNTER — OFFICE VISIT (OUTPATIENT)
Dept: UROLOGY | Facility: CLINIC | Age: 77
End: 2022-07-08
Payer: MEDICARE

## 2022-07-08 VITALS
HEIGHT: 62 IN | SYSTOLIC BLOOD PRESSURE: 122 MMHG | BODY MASS INDEX: 32.46 KG/M2 | DIASTOLIC BLOOD PRESSURE: 68 MMHG | WEIGHT: 176.38 LBS

## 2022-07-08 DIAGNOSIS — R31.29 MICROHEMATURIA: ICD-10-CM

## 2022-07-08 DIAGNOSIS — R32 URINARY INCONTINENCE, UNSPECIFIED TYPE: ICD-10-CM

## 2022-07-08 DIAGNOSIS — N39.46 MIXED INCONTINENCE: Primary | ICD-10-CM

## 2022-07-08 DIAGNOSIS — R15.9 INCONTINENCE OF FECES, UNSPECIFIED FECAL INCONTINENCE TYPE: ICD-10-CM

## 2022-07-08 LAB
BACTERIA #/AREA URNS AUTO: NORMAL /HPF
MICROSCOPIC COMMENT: NORMAL
RBC #/AREA URNS AUTO: 4 /HPF (ref 0–4)
SQUAMOUS #/AREA URNS AUTO: 1 /HPF
WBC #/AREA URNS AUTO: 1 /HPF (ref 0–5)

## 2022-07-08 PROCEDURE — 81001 URINALYSIS AUTO W/SCOPE: CPT | Performed by: UROLOGY

## 2022-07-08 PROCEDURE — 99204 PR OFFICE/OUTPT VISIT, NEW, LEVL IV, 45-59 MIN: ICD-10-PCS | Mod: S$GLB,,, | Performed by: UROLOGY

## 2022-07-08 PROCEDURE — 3288F FALL RISK ASSESSMENT DOCD: CPT | Mod: CPTII,S$GLB,, | Performed by: UROLOGY

## 2022-07-08 PROCEDURE — 51798 US URINE CAPACITY MEASURE: CPT | Mod: S$GLB,,, | Performed by: UROLOGY

## 2022-07-08 PROCEDURE — 87086 URINE CULTURE/COLONY COUNT: CPT | Performed by: UROLOGY

## 2022-07-08 PROCEDURE — 1126F PR PAIN SEVERITY QUANTIFIED, NO PAIN PRESENT: ICD-10-PCS | Mod: CPTII,S$GLB,, | Performed by: UROLOGY

## 2022-07-08 PROCEDURE — 1159F PR MEDICATION LIST DOCUMENTED IN MEDICAL RECORD: ICD-10-PCS | Mod: CPTII,S$GLB,, | Performed by: UROLOGY

## 2022-07-08 PROCEDURE — 1101F PT FALLS ASSESS-DOCD LE1/YR: CPT | Mod: CPTII,S$GLB,, | Performed by: UROLOGY

## 2022-07-08 PROCEDURE — 1126F AMNT PAIN NOTED NONE PRSNT: CPT | Mod: CPTII,S$GLB,, | Performed by: UROLOGY

## 2022-07-08 PROCEDURE — 51798 PR MEAS,POST-VOID RES,US,NON-IMAGING: ICD-10-PCS | Mod: S$GLB,,, | Performed by: UROLOGY

## 2022-07-08 PROCEDURE — 99999 PR PBB SHADOW E&M-EST. PATIENT-LVL V: CPT | Mod: PBBFAC,,, | Performed by: UROLOGY

## 2022-07-08 PROCEDURE — 3074F SYST BP LT 130 MM HG: CPT | Mod: CPTII,S$GLB,, | Performed by: UROLOGY

## 2022-07-08 PROCEDURE — 3078F DIAST BP <80 MM HG: CPT | Mod: CPTII,S$GLB,, | Performed by: UROLOGY

## 2022-07-08 PROCEDURE — 99999 PR PBB SHADOW E&M-EST. PATIENT-LVL V: ICD-10-PCS | Mod: PBBFAC,,, | Performed by: UROLOGY

## 2022-07-08 PROCEDURE — 1101F PR PT FALLS ASSESS DOC 0-1 FALLS W/OUT INJ PAST YR: ICD-10-PCS | Mod: CPTII,S$GLB,, | Performed by: UROLOGY

## 2022-07-08 PROCEDURE — 3078F PR MOST RECENT DIASTOLIC BLOOD PRESSURE < 80 MM HG: ICD-10-PCS | Mod: CPTII,S$GLB,, | Performed by: UROLOGY

## 2022-07-08 PROCEDURE — 3288F PR FALLS RISK ASSESSMENT DOCUMENTED: ICD-10-PCS | Mod: CPTII,S$GLB,, | Performed by: UROLOGY

## 2022-07-08 PROCEDURE — 3074F PR MOST RECENT SYSTOLIC BLOOD PRESSURE < 130 MM HG: ICD-10-PCS | Mod: CPTII,S$GLB,, | Performed by: UROLOGY

## 2022-07-08 PROCEDURE — 1159F MED LIST DOCD IN RCRD: CPT | Mod: CPTII,S$GLB,, | Performed by: UROLOGY

## 2022-07-08 PROCEDURE — 99204 OFFICE O/P NEW MOD 45 MIN: CPT | Mod: S$GLB,,, | Performed by: UROLOGY

## 2022-07-08 RX ORDER — TROSPIUM CHLORIDE ER 60 MG/1
60 CAPSULE ORAL DAILY
Qty: 30 CAPSULE | Refills: 3 | Status: SHIPPED | OUTPATIENT
Start: 2022-07-08 | End: 2022-08-02 | Stop reason: SDUPTHER

## 2022-07-08 NOTE — PROGRESS NOTES
Chief Complaint   Patient presents with    Urinary Incontinence       Referring Provider: Dr. Davidson Jang      History of Present Illness:   Jessica Perez is a 77 y.o. female here for evaluation of Urinary Incontinence    7/8/22- She reports that she has been wearing pads for 10 years. She states that when she stands up, she pours out urine. She reports that she has had loose stools regularly and was taking Immodium when she would go anywhere. It used to be just KRISH with sneeze, cough and laugh.   Wears about 5 pads per day. During the night if she wakes, she doesn't have UUI. No daytime frequency. +Daytime urgency and UUI.   No gross hematuria or dysuria.         Review of Systems   Constitutional: Negative for chills and fever.   Respiratory: Positive for shortness of breath (with exertion).    Cardiovascular: Negative for chest pain.   Genitourinary: Positive for urgency. Negative for dysuria and hematuria.   Musculoskeletal: Positive for back pain.   Neurological: Negative for numbness.   All other systems reviewed and are negative.        Past Medical History:   Diagnosis Date    Acute pain of left knee 3/22/2021    Arthritis     Arthritis of knee 2/9/2021    At risk for sleep apnea 9/19/2019    Balance problem 10/21/2021    Bilateral primary osteoarthritis of knee 4/4/2018    Chest pain, moderate coronary artery risk 8/8/2019    Chronic midline low back pain without sciatica 11/30/2020    Chronic pain of both knees 3/23/2021    Costochondritis 1/20/2020    Depression, major, recurrent, mild     Diarrhea 5/11/2022    Difficulty walking 11/30/2020    Difficulty walking 11/30/2020    LANRE (generalized anxiety disorder) 3/22/2021    Gait, antalgic 10/21/2021    Generalized weakness 11/30/2020    Generalized weakness 11/30/2020    Glaucoma     HLD (hyperlipidemia)     Lower extremity weakness 10/21/2021    Muscle cramps 5/23/2018    Osteopenia of multiple sites 4/2/2018    Primary  osteoarthritis of right knee 05/30/2018    Primary snoring     Rash 5/11/2022    Sleep related leg cramps 9/7/2010    SOB (shortness of breath) 8/8/2019    Unspecified gastritis and gastroduodenitis without mention of hemorrhage 11/28/2010     Dx updated per 2019 IMO Load    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 Benign.    CATARACT EXTRACTION W/  INTRAOCULAR LENS IMPLANT Left 02/04/2019    CATARACT EXTRACTION W/  INTRAOCULAR LENS IMPLANT Right 03/04/2019    CHOLECYSTECTOMY      COLONOSCOPY N/A 6/29/2022    Procedure: COLONOSCOPY;  Surgeon: Nichole Kelly MD;  Location: [facility] ENDO;  Service: Endoscopy;  Laterality: N/A;    DILATION AND CURETTAGE OF UTERUS      finger surgery      middle finger on right hand    GALLBLADDER SURGERY      INJECTION OF ANESTHETIC AGENT AROUND NERVE Bilateral 10/1/2021    Procedure: Bilateral Genicular nerve block -;  Surgeon: Primo Bond MD;  Location: [facility] PAIN MGT;  Service: Pain Management;  Laterality: Bilateral;    RADIOFREQUENCY THERMOCOAGULATION Left 11/5/2021    Procedure: Left Genicular Nerve RFA (COOLIEF) with RN IV sedation;  Surgeon: Primo Bond MD;  Location: [facility] PAIN MGT;  Service: Pain Management;  Laterality: Left;    TONSILLECTOMY, ADENOIDECTOMY      TUBAL LIGATION         Family History   Problem Relation Age of Onset    Diabetes Father     Cancer Father         prostate ca    Hypertension Father     Cancer Sister         cervical ca    Hepatitis Sister     Dementia Mother     Osteoporosis Mother     Breast cancer Paternal Aunt        Social History     Tobacco Use    Smoking status: Former Smoker     Packs/day: 0.50     Years: 12.00     Pack years: 6.00     Types: Cigarettes    Smokeless tobacco: Never Used   Substance Use Topics    Alcohol use: Yes     Comment: occasionally     Drug use: No       Current Outpatient Medications   Medication Sig Dispense Refill    acetaminophen/diphenhydramine (TYLENOL PM ORAL) Take by mouth.      artificial tears,hypromellose,,GENTEAL/SUSTANE, 0.3 % Gel       aspirin (ECOTRIN) 81 MG EC tablet Take 81 mg by mouth once daily.      b complex vitamins capsule Take 1 capsule by mouth once daily.      calcium carbonate (CALCIUM 500 ORAL) Take by mouth.      cholestyramine (QUESTRAN) 4 gram packet Take 1 packet (4 g total) by mouth daily as needed (diarrhea). 30 packet 5    cyanocobalamin, vitamin B-12, (VITAMIN B-12 ORAL) Take by mouth.      denosumab (PROLIA SUBQ) Inject into the skin.      evolocumab (REPATHA SURECLICK) 140 mg/mL PnIj Repatha SureClick Take No date recorded No form recorded No frequency recorded No route recorded No set duration recorded No set duration amount recorded active No dosage strength recorded No dosage strength units of measure recorded      evolocumab (REPATHA SURECLICK) 140 mg/mL PnIj Inject 1 pen (140 mg total) into the skin every 14 (fourteen) days. 2 mL 6    ezetimibe (ZETIA) 10 mg tablet Take 1 tablet (10 mg total) by mouth once daily. 90 tablet 3    famotidine (PEPCID) 40 MG tablet Take 1 tablet (40 mg total) by mouth every evening. 30 tablet 11    flaxseed oiL 1,000 mg Cap flaxseed oil Take No date recorded No form recorded No frequency recorded No route recorded No set duration recorded No set duration amount recorded suspended No dosage strength recorded No dosage strength units of measure recorded      fluocinolone (SYNALAR) 0.01 % external solution AAA of scalp twice a day PRN scalp itching or eczema flares. 60 mL 1    fluticasone (FLONASE) 50 mcg/actuation nasal spray 2 sprays by Each Nare route once daily. 1 Bottle 12    hydrOXYzine pamoate (VISTARIL) 25 MG Cap Take 1 capsule (25 mg total) by mouth every 8 (eight) hours as needed (itching). 30 capsule 0    ketoconazole (NIZORAL) 2 % shampoo Apply  topically every 7 days. Shampoo scalp 1-2 times per week. Lather x 5 minutes, rinse well. 120 mL 5    krill oil-omega-3-dha-epa 150-450 mg CpDR Take by mouth.      multivitamin capsule Take 1 capsule by mouth Daily.      triamcinolone acetonide 0.1% (KENALOG) 0.1 % cream Apply topically 2 (two) times daily. 45 g 0    TURMERIC ORAL Take by mouth.      UNABLE TO FIND CBD Oil topical      ZINC ORAL Take by mouth.      azelastine (ASTELIN) 137 mcg (0.1 %) nasal spray 1 spray (137 mcg total) by Nasal route 2 (two) times daily. 30 mL 1    fexofenadine (ALLEGRA) 180 MG tablet Take 1 tablet (180 mg total) by mouth once daily. 30 tablet 0    trospium (SANCTURA XR) 60 mg Cp24 capsule Take 1 capsule (60 mg total) by mouth once daily. 30 capsule 3     Current Facility-Administered Medications   Medication Dose Route Frequency Provider Last Rate Last Admin    triamcinolone acetonide injection 32 mg  32 mg Intra-articular 1 time in Clinic/HOD Dileep Mendoza MD           Review of patient's allergies indicates:   Allergen Reactions    Augmentin [amoxicillin-pot clavulanate] Other (See Comments)     Diarrhea, yeast    Bactrim  [sulfamethoxazole-trimethoprim]      Other reaction(s): Unknown    Celebrex [celecoxib] Other (See Comments)     Stomach pain, gas     Lipitor  [atorvastatin]      Other reaction(s): Unknown    Pravachol  [pravastatin]      Other reaction(s): Unknown    Statins-hmg-coa reductase inhibitors      Other reaction(s): Muscle pain    Sulfa (sulfonamide antibiotics)      Other reaction(s): Swelling    Zoster vaccine live        Physical Exam  Vitals:    07/08/22 1000   BP: 122/68     General: Well-developed, well-nourished, in no acute distress  HEENT: Normocephalic, atraumatic, extraocular movements intact  Neck: Supple, no supraclavicular or cervical lymphadenopathy, trachea midline  Respirations: even and unlabored  Back: midline spine, No CVA tenderness  Abdomen: soft, Non-tender,  non-distended, no palpable masses, no rebound or guarding  : Normal external female genitalia without lesions. Orthotopic urethral meatus with caruncle. atrophic vaginal mucosa. No significant prolapse,  negative cough stress test, no urethral hypermobility  Extremities: moves all equally, no clubbing, cyanosis or edema  Skin: Warm and dry. No lesions  Psych: normal affect  Neuro: Alert and oriented x 3. Cranial nerves II-XII intact    PVR: 31cc    Urinalysis  50 blood, otherwise negative    Assessment:  1. Mixed incontinence  Ambulatory referral/consult to Physical/Occupational Therapy   2. Urinary incontinence, unspecified type  Ambulatory referral/consult to Urology   3. Microhematuria  Urinalysis Microscopic    Urine culture   4. Incontinence of feces, unspecified fecal incontinence type           Plan:   Mixed incontinence  -     Ambulatory referral/consult to Physical/Occupational Therapy; Future; Expected date: 07/15/2022    Urinary incontinence, unspecified type  -     Ambulatory referral/consult to Urology    Microhematuria  -     Urinalysis Microscopic  -     Urine culture    Incontinence of feces, unspecified fecal incontinence type    Other orders  -     trospium (SANCTURA XR) 60 mg Cp24 capsule; Take 1 capsule (60 mg total) by mouth once daily.  Dispense: 30 capsule; Refill: 3    Discussed bladder pathway.   Literature provided regarding Interstim, given bowel symptoms as well      Follow up in about 2 months (around 9/8/2022).

## 2022-07-10 LAB
BACTERIA UR CULT: NORMAL
BACTERIA UR CULT: NORMAL

## 2022-07-11 ENCOUNTER — OFFICE VISIT (OUTPATIENT)
Dept: CARDIOLOGY | Facility: CLINIC | Age: 77
End: 2022-07-11
Payer: MEDICARE

## 2022-07-11 VITALS
SYSTOLIC BLOOD PRESSURE: 104 MMHG | RESPIRATION RATE: 16 BRPM | HEART RATE: 101 BPM | BODY MASS INDEX: 32.25 KG/M2 | OXYGEN SATURATION: 99 % | HEIGHT: 62 IN | WEIGHT: 175.25 LBS | DIASTOLIC BLOOD PRESSURE: 70 MMHG

## 2022-07-11 DIAGNOSIS — Z82.49 FAMILY HISTORY OF ARTERIOSCLEROTIC CARDIOVASCULAR DISEASE: Primary | ICD-10-CM

## 2022-07-11 DIAGNOSIS — E78.00 PURE HYPERCHOLESTEROLEMIA: ICD-10-CM

## 2022-07-11 PROCEDURE — 1101F PT FALLS ASSESS-DOCD LE1/YR: CPT | Mod: CPTII,S$GLB,, | Performed by: INTERNAL MEDICINE

## 2022-07-11 PROCEDURE — 3288F PR FALLS RISK ASSESSMENT DOCUMENTED: ICD-10-PCS | Mod: CPTII,S$GLB,, | Performed by: INTERNAL MEDICINE

## 2022-07-11 PROCEDURE — 3078F DIAST BP <80 MM HG: CPT | Mod: CPTII,S$GLB,, | Performed by: INTERNAL MEDICINE

## 2022-07-11 PROCEDURE — 99214 PR OFFICE/OUTPT VISIT, EST, LEVL IV, 30-39 MIN: ICD-10-PCS | Mod: S$GLB,,, | Performed by: INTERNAL MEDICINE

## 2022-07-11 PROCEDURE — 99214 OFFICE O/P EST MOD 30 MIN: CPT | Mod: S$GLB,,, | Performed by: INTERNAL MEDICINE

## 2022-07-11 PROCEDURE — 1126F AMNT PAIN NOTED NONE PRSNT: CPT | Mod: CPTII,S$GLB,, | Performed by: INTERNAL MEDICINE

## 2022-07-11 PROCEDURE — 1126F PR PAIN SEVERITY QUANTIFIED, NO PAIN PRESENT: ICD-10-PCS | Mod: CPTII,S$GLB,, | Performed by: INTERNAL MEDICINE

## 2022-07-11 PROCEDURE — 99999 PR PBB SHADOW E&M-EST. PATIENT-LVL V: CPT | Mod: PBBFAC,,, | Performed by: INTERNAL MEDICINE

## 2022-07-11 PROCEDURE — 3078F PR MOST RECENT DIASTOLIC BLOOD PRESSURE < 80 MM HG: ICD-10-PCS | Mod: CPTII,S$GLB,, | Performed by: INTERNAL MEDICINE

## 2022-07-11 PROCEDURE — 1160F RVW MEDS BY RX/DR IN RCRD: CPT | Mod: CPTII,S$GLB,, | Performed by: INTERNAL MEDICINE

## 2022-07-11 PROCEDURE — 3288F FALL RISK ASSESSMENT DOCD: CPT | Mod: CPTII,S$GLB,, | Performed by: INTERNAL MEDICINE

## 2022-07-11 PROCEDURE — 1101F PR PT FALLS ASSESS DOC 0-1 FALLS W/OUT INJ PAST YR: ICD-10-PCS | Mod: CPTII,S$GLB,, | Performed by: INTERNAL MEDICINE

## 2022-07-11 PROCEDURE — 99999 PR PBB SHADOW E&M-EST. PATIENT-LVL V: ICD-10-PCS | Mod: PBBFAC,,, | Performed by: INTERNAL MEDICINE

## 2022-07-11 PROCEDURE — 1159F PR MEDICATION LIST DOCUMENTED IN MEDICAL RECORD: ICD-10-PCS | Mod: CPTII,S$GLB,, | Performed by: INTERNAL MEDICINE

## 2022-07-11 PROCEDURE — 1159F MED LIST DOCD IN RCRD: CPT | Mod: CPTII,S$GLB,, | Performed by: INTERNAL MEDICINE

## 2022-07-11 PROCEDURE — 3074F SYST BP LT 130 MM HG: CPT | Mod: CPTII,S$GLB,, | Performed by: INTERNAL MEDICINE

## 2022-07-11 PROCEDURE — 3074F PR MOST RECENT SYSTOLIC BLOOD PRESSURE < 130 MM HG: ICD-10-PCS | Mod: CPTII,S$GLB,, | Performed by: INTERNAL MEDICINE

## 2022-07-11 PROCEDURE — 1160F PR REVIEW ALL MEDS BY PRESCRIBER/CLIN PHARMACIST DOCUMENTED: ICD-10-PCS | Mod: CPTII,S$GLB,, | Performed by: INTERNAL MEDICINE

## 2022-07-11 NOTE — PROGRESS NOTES
Subjective:   Patient ID:  Jessica Perez is a 77 y.o. female who presents for follow-up of No chief complaint on file.  Patient is stable on the Zetia.  Improvement overall.  Will continue to follow up cholesterol profiles into the future.  Patient also complains of palpitations particularly at night and a sense of vibration.  Will do a Holter monitor in follow-up.  No chest pain noted.Patient is here for re-evaluation overall cholesterol profile.  Improvement in overall profile including triglycerides.     Patient is stable on the Zetia.  Improvement overall.  Will continue to follow up cholesterol profiles into the future.  Patient also complains of palpitations particularly at night and a sense of vibration.  Will do a Holter monitor in follow-up.  No chest pain noted.  Patient doing well or stable continue current medication other changes.  She continues Repatha , follow-up cholesterol profile 6 months will see her in 6 months.        This visit finds patient feeling well she stable current medications cholesterol profiles have greatly improved on Repatha and doing well this time.  NO FOCAL CNS SYMPTOMS OR SIGNS TO SUGGEST TIA OR STROKE  NO ANGINA OR EQUIVALENT  NO UNUSUAL MONROY. NO ORTHOPNEA OR PND  NO PALPITATIONS  NO NEAR SYNCOPE OR SYNCOPE  NO EDEMA. NO CALVE TENDERNESS  Overall patient doing well stable on Repatha.  No other changes.  No acute symptoms chest pain shortness breath heart blood pressure stable.      Review of Systems   Constitutional: Negative for chills, diaphoresis, night sweats, weight gain and weight loss.   HENT: Negative for congestion, hoarse voice, sore throat and stridor.    Eyes: Negative for double vision and pain.   Cardiovascular: Negative for chest pain, claudication, cyanosis, dyspnea on exertion, irregular heartbeat, leg swelling, near-syncope, orthopnea, palpitations, paroxysmal nocturnal dyspnea and syncope.   Respiratory: Negative for cough, hemoptysis, shortness of  breath, sleep disturbances due to breathing, snoring, sputum production and wheezing.    Endocrine: Negative for cold intolerance, heat intolerance and polydipsia.   Hematologic/Lymphatic: Negative for bleeding problem. Does not bruise/bleed easily.   Skin: Negative for color change, dry skin and rash.   Musculoskeletal: Negative for joint swelling and muscle cramps.   Gastrointestinal: Negative for bloating, abdominal pain, constipation, diarrhea, dysphagia, melena, nausea and vomiting.   Genitourinary: Negative for flank pain and urgency.   Neurological: Negative for dizziness, focal weakness, headaches, light-headedness, loss of balance, seizures and weakness.   Psychiatric/Behavioral: Negative for altered mental status and memory loss. The patient is not nervous/anxious.      Family History   Problem Relation Age of Onset    Diabetes Father     Cancer Father         prostate ca    Hypertension Father     Cancer Sister         cervical ca    Hepatitis Sister     Dementia Mother     Osteoporosis Mother     Breast cancer Paternal Aunt      Past Medical History:   Diagnosis Date    Acute pain of left knee 3/22/2021    Arthritis     Arthritis of knee 2/9/2021    At risk for sleep apnea 9/19/2019    Balance problem 10/21/2021    Bilateral primary osteoarthritis of knee 4/4/2018    Chest pain, moderate coronary artery risk 8/8/2019    Chronic midline low back pain without sciatica 11/30/2020    Chronic pain of both knees 3/23/2021    Costochondritis 1/20/2020    Depression, major, recurrent, mild     Diarrhea 5/11/2022    Difficulty walking 11/30/2020    Difficulty walking 11/30/2020    LANRE (generalized anxiety disorder) 3/22/2021    Gait, antalgic 10/21/2021    Generalized weakness 11/30/2020    Generalized weakness 11/30/2020    Glaucoma     HLD (hyperlipidemia)     Lower extremity weakness 10/21/2021    Muscle cramps 5/23/2018    Osteopenia of multiple sites 4/2/2018    Primary  osteoarthritis of right knee 05/30/2018    Primary snoring     Rash 5/11/2022    Sleep related leg cramps 9/7/2010    SOB (shortness of breath) 8/8/2019    Unspecified gastritis and gastroduodenitis without mention of hemorrhage 11/28/2010     Dx updated per 2019 IMO Load    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      artificial tears,hypromellose,,GENTEAL/SUSTANE, 0.3 % Gel       aspirin (ECOTRIN) 81 MG EC tablet Take 81 mg by mouth once daily.      azelastine (ASTELIN) 137 mcg (0.1 %) nasal spray 1 spray (137 mcg total) by Nasal route 2 (two) times daily. 30 mL 1    b complex vitamins capsule Take 1 capsule by mouth once daily.      calcium carbonate (CALCIUM 500 ORAL) Take by mouth.      cholestyramine (QUESTRAN) 4 gram packet Take 1 packet (4 g total) by mouth daily as needed (diarrhea). 30 packet 5    cyanocobalamin, vitamin B-12, (VITAMIN B-12 ORAL) Take by mouth.      denosumab (PROLIA SUBQ) Inject into the skin.      evolocumab (REPATHA SURECLICK) 140 mg/mL PnIj Repatha SureClick Take No date recorded No form recorded No frequency recorded No route recorded No set duration recorded No set duration amount recorded active No dosage strength recorded No dosage strength units of measure recorded      evolocumab (REPATHA SURECLICK) 140 mg/mL PnIj Inject 1 pen (140 mg total) into the skin every 14 (fourteen) days. 2 mL 6    ezetimibe (ZETIA)  10 mg tablet Take 1 tablet (10 mg total) by mouth once daily. 90 tablet 3    famotidine (PEPCID) 40 MG tablet Take 1 tablet (40 mg total) by mouth every evening. 30 tablet 11    fexofenadine (ALLEGRA) 180 MG tablet Take 1 tablet (180 mg total) by mouth once daily. 30 tablet 0    flaxseed oiL 1,000 mg Cap flaxseed oil Take No date recorded No form recorded No frequency recorded No route recorded No set duration recorded No set duration amount recorded suspended No dosage strength recorded No dosage strength units of measure recorded      fluocinolone (SYNALAR) 0.01 % external solution AAA of scalp twice a day PRN scalp itching or eczema flares. 60 mL 1    fluticasone (FLONASE) 50 mcg/actuation nasal spray 2 sprays by Each Nare route once daily. 1 Bottle 12    hydrOXYzine pamoate (VISTARIL) 25 MG Cap Take 1 capsule (25 mg total) by mouth every 8 (eight) hours as needed (itching). 30 capsule 0    ketoconazole (NIZORAL) 2 % shampoo Apply topically every 7 days. Shampoo scalp 1-2 times per week. Lather x 5 minutes, rinse well. 120 mL 5    krill oil-omega-3-dha-epa 150-450 mg CpDR Take by mouth.      multivitamin capsule Take 1 capsule by mouth Daily.      triamcinolone acetonide 0.1% (KENALOG) 0.1 % cream Apply topically 2 (two) times daily. 45 g 0    trospium (SANCTURA XR) 60 mg Cp24 capsule Take 1 capsule (60 mg total) by mouth once daily. 30 capsule 3    TURMERIC ORAL Take by mouth.      UNABLE TO FIND CBD Oil topical      ZINC ORAL Take by mouth.       Current Facility-Administered Medications on File Prior to Visit   Medication Dose Route Frequency Provider Last Rate Last Admin    triamcinolone acetonide injection 32 mg  32 mg Intra-articular 1 time in Clinic/HOD Dileep Mendoza MD         Review of patient's allergies indicates:   Allergen Reactions    Augmentin [amoxicillin-pot clavulanate] Other (See Comments)     Diarrhea, yeast    Bactrim  [sulfamethoxazole-trimethoprim]      Other reaction(s):  Unknown    Celebrex [celecoxib] Other (See Comments)     Stomach pain, gas     Lipitor  [atorvastatin]      Other reaction(s): Unknown    Pravachol  [pravastatin]      Other reaction(s): Unknown    Statins-hmg-coa reductase inhibitors      Other reaction(s): Muscle pain    Sulfa (sulfonamide antibiotics)      Other reaction(s): Swelling    Zoster vaccine live        Objective:     Physical Exam  Eyes:      Pupils: Pupils are equal, round, and reactive to light.   Neck:      Trachea: No tracheal deviation.   Cardiovascular:      Rate and Rhythm: Normal rate and regular rhythm.      Pulses: Intact distal pulses.           Carotid pulses are 2+ on the right side and 2+ on the left side.       Radial pulses are 2+ on the right side and 2+ on the left side.        Femoral pulses are 2+ on the right side and 2+ on the left side.       Popliteal pulses are 2+ on the right side and 2+ on the left side.        Dorsalis pedis pulses are 2+ on the right side and 2+ on the left side.        Posterior tibial pulses are 2+ on the right side and 2+ on the left side.      Heart sounds: Normal heart sounds. No murmur heard.    No friction rub. No gallop.   Pulmonary:      Effort: Pulmonary effort is normal. No respiratory distress.      Breath sounds: Normal breath sounds. No stridor. No wheezing or rales.   Chest:      Chest wall: No tenderness.   Abdominal:      General: There is no distension.      Tenderness: There is no abdominal tenderness. There is no rebound.   Musculoskeletal:         General: No tenderness.      Cervical back: Normal range of motion.   Skin:     General: Skin is warm and dry.   Neurological:      Mental Status: She is alert and oriented to person, place, and time.      1 Follow-up Encounter     1  Topic    Component Ref Range & Units 6 mo ago   (1/7/22) 1 yr ago   (3/16/21) 1 yr ago   (1/4/21) 1 yr ago   (11/18/20) 1 yr ago   (7/31/20) 3 yr ago   (7/2/19) 4 yr ago   (4/11/18)   Cholesterol 120 -  199 mg/dL 123  159 CM  137 CM  149 CM  149 CM  281 High  CM  282 High  CM    Comment: The National Cholesterol Education Program (NCEP) has set the   following guidelines (reference ranges) for Cholesterol:   Optimal.....................<200 mg/dL   Borderline High.............200-239 mg/dL   High........................> or = 240 mg/dL    Triglycerides 30 - 150 mg/dL 187 High   161 High  CM  202 High  CM  153 High  CM  259 High  CM  315 High  CM  231 High  CM    Comment: The National Cholesterol Education Program (NCEP) has set the   following guidelines (reference values) for triglycerides:   Normal......................<150 mg/dL   Borderline High.............150-199 mg/dL   High........................200-499 mg/dL    HDL 40 - 75 mg/dL 58  79 High  CM  58 CM  54 CM  53 CM  51 CM  54 CM    Comment: The National Cholesterol Education Program (NCEP) has set the   following guidelines (reference values) for HDL Cholesterol:   Low...............<40 mg/dL   Optimal...........>60 mg/dL    LDL Cholesterol 63.0 - 159.0 mg/dL 27.6 Low   47.8 Low  CM  38.6 Low  CM  64.4 CM  44.2 Low  CM  167.0 High  CM  181.8 High  CM    Comment: The National Cholesterol Education Program (NCEP) has set the   following guidelines (reference values) for LDL Cholesterol:   Optimal.......................<130 mg/dL   Borderline High...............130-159 mg/dL   High..........................160-189 mg/dL   Very High.....................>190 mg/dL             Assessment:     1. Family history of arteriosclerotic cardiovascular disease    2. Pure hypercholesterolemia        Plan:     Family history of arteriosclerotic cardiovascular disease    Pure hypercholesterolemia      Impression 1 hyperlipidemia patient doing well Repatha stable no recurrence symptoms chest pain shortness of breath no other symptoms noted patient doing well this time follow-up evaluation again in 6 months sooner if acute recurrence symptoms

## 2022-07-11 NOTE — PROGRESS NOTES
[facility]   Physical Therapy Treatment Note     Name: Jessica Perez  Clinic Number: [id number]    Therapy Diagnosis:   Encounter Diagnosis   Name Primary?    Decreased ROM of lumbar spine Yes     Physician: Dileep Mendoza MD    Visit Date: 7/13/2022    Physician Orders: PT Eval and Treat   Medical Diagnosis from Referral:   M54.50,G89.29 (ICD-10-CM) - Chronic low back pain, unspecified back pain laterality, unspecified whether sciatica present   M17.0 (ICD-10-CM) - Primary osteoarthritis of both knees      Evaluation Date: 5/4/2022  Authorization Period Expiration: 12/31/2022  Plan of Care Expiration: 8/4/2022  Visit # / Visits authorized: 13/ 20     PN DUE: 8/13//2022     Precautions: Standard    PTA Visit #: 1/5     Time In: 12:15 pm  Time Out: 1:00 pm   Total Billable Time: 45 minutes    SUBJECTIVE     Pt reports: Back is doing good overall, just giving her problems when she cleans her home. Knees are here and there, some days no problem at all.  She was compliant with home exercise program.  Response to previous treatment: decreased pain  Functional change: increased functional tolerance      Pain: 4/10 bilateral knees; 6/10 back at rest    OBJECTIVE     Objective Measures updated at progress report unless specified.     Lumbar Range of Motion:     % of normal motion Pain   Flexion 100%     None with these movements          Extension 100%               Left Side Bending 100%           Right Side Bending 100%           Left rotation    100%           Right Rotation    100%                 Lower Extremity Strength  Right LE   Left LE     Knee extension: 5/5 Knee extension: 5/5   Knee flexion: 5/5 Knee flexion: 5/5   Hip flexion: 5/5 Hip flexion: 5/5   Hip abduction: 5/5 Hip abduction: 5/5   Hip adduction: 5/5 Hip adduction 5/5   Ankle dorsiflexion: 5/5 Ankle dorsiflexion: 5/5   Ankle plantarflexion: 5/5 Ankle plantarflexion: 5/5      Range of Motion:   Knee Left active   Flexion  "135   Extension -4      Knee Right active   Flexion 132   Extension -5      FOTO: GIVE FOTO NEXT VISIT      Treatment     Jessica received the treatments listed below:      (exercises performed today are bolded)    therapeutic exercises to develop strength, endurance, ROM, flexibility, posture and core stabilization for 40 minutes including:    Recumbent bike seat 3 for increased lower extremity range of motion, strength, and endurance for 6 minutes   Slant board gastroc stretch 3 x 30"  Bridges 2 x 10  with green theraband   SL clams 3 x 10 with green theraband   Supine hamstring stretch 3 x 15"   QS 2 x 10   Supine SLR 2 x 10   Seated LAQ 2 x 10   Double knee to chest with overpressure 2 x 10  Prone on elbows 2 x 2 minutes (decreased pain)     tailgaters x 2 minutes #5   Steam boats x 10   SAQ  Open books x 10  DKTC 1 x 10 with clinician overpressure  Piriformis stretch 5 x 15"   Seated forward flex   Standing IT band stretch 3 x 15"   green thera band inversion 2 x 10   side lying reverse clams 2 x 10   sit to stand 2 x 10   + side stepping with yellow theraband     +Practice with swiffer, vacuum and mop for education on posture and management of her back pain during ADLs.  +Education for keeping cleaning tools close to her body with cleaning the house    manual therapy techniques: Joint mobilizations and Myofacial release were applied to the: bilateral knee and low back for 5 minutes, including:    bilateral Patella distraction and mobilization   myofascial release of L hip adductors at distal site of knee   bilateral MFR of bilateral lumbar paraspinals and musculature over iliac crest with and without passive hip internal/external rotation  Myofascial release of lumbar paraspinals bilaterally  Manual L knee joint distraction with patellar mobilizations   L knee joint mobilizations anterior/posterior   soft tissue mobilization to L adductors    neuromuscular re-education activities to improve: Balance and " "Proprioception for 0 minutes. The following activities were included:    Romberg stance on foam 3 x 30" eyes closed   Tandem walks x 5 laps   Tandem stance 2 x 30" each   Side stepping over cones x 8 laps   Marching on foam 2 x 10   ankle inversion on foam 2 x 20  + Transverse abdominus contraction 5" x 10   + Transverse abdominus with march 2 x 10     Patient Education and Home Exercises     Home Exercises Provided and Patient Education Provided     Education provided:   - Education/Self-Care provided:   · Patient educated on the impairments noted above and the effects of physical therapy intervention to improve overall condition and QOL.   · Patient was educated on all the above exercise prior/during/after for proper posture, positioning, and execution for safe performance with home exercise program.   · Patient educated on the importance of improved core and upper extremity strength in order to improve alignment of the spine and upper extremities with static positions and dynamic movement.   · Patient educated on the importance of strong core and lower extremity musculature in order to improve both static and dynamic balance, improve gait mechanics, reduce fall risk and improve household and community mobility      Written Home Exercises Provided: Patient instructed to cont prior HEP. Exercises were reviewed and Jessica was able to demonstrate them prior to the end of the session.  Jessica demonstrated good  understanding of the education provided. See EMR under Patient Instructions for exercises provided during therapy sessions    ASSESSMENT     Pt presents today reporting improvement in her low back, but knee pain is recurring. Pt has not met any new goals today due to low back pain being aggravated during her ADL's of cleaning the house, utilized equipment in the clinic to simulate cleaning to better evaluate pt's body mechanics. Educated pt on correction of body mechanics to decrease the load placed on her " back, and educated pt to clean half the house one day and half the house the other day instead of spending a prolonged period of time cleaning. Pt has shown good strength progress today with 5/5 scores for each motion tested. Pt's range of motion is excellent as well showing no relevant limitations. Pt will cont to benefit from skilled PT and further training on body mechanics and activity modification.    * PT agrees with PTA assessment listed above. Patient has met 5 out of 7 goals and will continue to benefit from therapy to continue to progress toward goals.        Jessica Is progressing well towards her goals.   Pt prognosis is Good.     Pt will continue to benefit from skilled outpatient physical therapy to address the deficits listed in the problem list box on initial evaluation, provide pt/family education and to maximize pt's level of independence in the home and community environment.     Pt's spiritual, cultural and educational needs considered and pt agreeable to plan of care and goals.     Anticipated barriers to physical therapy: pain and decreased exercise tolerance     Goals:   Short Term Goals:  4 weeks   1. Pain: Pt will demonstrate improved pain by reports of less than or equal to 7/10 worst pain on the verbal rating scale in order to progress toward maximal functional ability and improve QOL. MET 5/19/2022   2.  Mobility: Patient will present with full knee extension range of motion in order to return to maximal functional potential and improve quality of life. Met 6/9/2022   3.  HEP: Patient will demonstrate independence with HEP in order to progress toward functional independence. MET 5/19/2022      Long Term Goals:  8 weeks   1. Pain: Pt will demonstrate improved pain by reports of less than or equal to 5/10 worst pain on the verbal rating scale in order to progress toward maximal functional ability and improve QOL.  MET 6/16/2022   2. Function: Patient will demonstrate improved function as  indicated by a functional limitation score of 54% on the FOTO. PC   3. Strength: Patient will improve strength to +4/5 in bilateral lower extremities in order to improve functional independence and quality of life. Met 6/9/2022   4. Patient will return to normal ADL's, IADL's, community involvement, recreational activities, and work-related activities with no pain and maximal function. PC      Goals Key:  PC= progressing/continue;        PM= partially met;             DC= discontinue      PLAN     Plan of care Certification: 5/4/2022 to 8/4/2022.     Cont PT per POC and progress pt as tolerated and appropriate.    Chuck Ornelas, MAKENZIE  07/13/2022     *I agree with measures and assessment above   Sharon Villegas, PT, DPT

## 2022-07-13 ENCOUNTER — CLINICAL SUPPORT (OUTPATIENT)
Dept: REHABILITATION | Facility: HOSPITAL | Age: 77
End: 2022-07-13
Payer: MEDICARE

## 2022-07-13 DIAGNOSIS — M53.86 DECREASED ROM OF LUMBAR SPINE: Primary | ICD-10-CM

## 2022-07-13 PROCEDURE — 97110 THERAPEUTIC EXERCISES: CPT | Mod: PN,CQ

## 2022-07-15 ENCOUNTER — TELEPHONE (OUTPATIENT)
Dept: UROLOGY | Facility: CLINIC | Age: 77
End: 2022-07-15
Payer: MEDICARE

## 2022-07-15 DIAGNOSIS — R31.29 MICROHEMATURIA: Primary | ICD-10-CM

## 2022-07-18 ENCOUNTER — CLINICAL SUPPORT (OUTPATIENT)
Dept: REHABILITATION | Facility: HOSPITAL | Age: 77
End: 2022-07-18
Attending: UROLOGY
Payer: MEDICARE

## 2022-07-18 DIAGNOSIS — N39.46 MIXED INCONTINENCE: ICD-10-CM

## 2022-07-18 DIAGNOSIS — M62.89 PELVIC FLOOR DYSFUNCTION IN FEMALE: ICD-10-CM

## 2022-07-18 PROCEDURE — 97530 THERAPEUTIC ACTIVITIES: CPT | Mod: PN

## 2022-07-18 PROCEDURE — 97162 PT EVAL MOD COMPLEX 30 MIN: CPT | Mod: PN

## 2022-07-18 NOTE — PLAN OF CARE
[facility]   Physical Therapy Initial Evaluation     Date: 2022   Name: Jessica Perez  Clinic Number: [id number]    Therapy Diagnosis:   Encounter Diagnoses   Name Primary?    Mixed incontinence     Pelvic floor dysfunction in female      Physician: Jennifer Graves MD    Physician Orders: PT Eval and Treat   Medical Diagnosis from Referral: N39.46 (ICD-10-CM) - Mixed incontinence  Evaluation Date: 2022  Authorization Period Expiration: 2022  Plan of Care Expiration: 10/18/2022  Progress Note Due: 2022  Visit # / Visits authorized:    FOTO: 1/3    Precautions: Standard and Diabetes     Time In: 12:30 pm  Time Out: 1:45 pm  Total Appointment Time (timed & untimed codes): 75 minutes    SUBJECTIVE     Date of onset: Patient reports urinary incontinence for 10 years which has only got worse over the years. She recently went to the urologist and started two new medications.     History of current condition - Jessica reports: Before starting a new medication for urinary incontiennce she was going through about 4-5 pads a day. Patient also reports loose stool with occasional fecal incontinence and used to take imodium when she has to go out in public. She was now prescribed a powder to decrease loose bowel. She also reports mucousy poops. With new bowel medication she varies between constipation and loose stool.      OB/GYN History: , vaginal delivery, episiotomy, perineal laceration, menopause and pelvic pain  Using vaginal estrogen cream: No  Sexually active? No  Pain with vaginal exams, intercourse or tampon use? none reported    Bladder/Bowel History: trouble emptying bladder completely, urinary incontinence, constipation/straining for movement and trouble holding back gas/feces   Frequency of urination:   Daytime: about 4-5 times a day            Nighttime: 0; not since new medicine    Difficulty initiating urine stream: No   Urine stream:  strong   Complete emptying: No   Bladder leakage: Yes   Activities that cause leakage: urge and stress    Frequency of incidents: 3 times a day (down from 5 pads a day)   Amount leaked (urine): full emptying   Urinary Urgency: Yes.  Able to delay the urge for at least 0-5 minute(s).   Pain with delaying the urge to urinate: No      Frequency of bowel movements: once a day to once every other day    Difficulty initiating BM: Yes   Quality/Shape of BM: hard to diarrhea    Does Patient Feel Empty after BM? No   Bowel Urgency: Yes.  Able to delay the urge for at least 0-5 minute(s).   Fiber Supplements or Laxative Use? No    Pain with BM: No    Bleeding with BM: No    Colon leakage: Yes   Frequency of incidents: before new medicines 3-4 times a week; now twice a week    Amount leaked (bowels): it depends         Form of protection: pad   Number of pads required in 24 hours: 3 pads a day now that on new medicine     Pain:  Location: none      Medical History: Jessica  has a past medical history of Acute pain of left knee (3/22/2021), Arthritis, Arthritis of knee (2/9/2021), At risk for sleep apnea (9/19/2019), Balance problem (10/21/2021), Bilateral primary osteoarthritis of knee (4/4/2018), Chest pain, moderate coronary artery risk (8/8/2019), Chronic midline low back pain without sciatica (11/30/2020), Chronic pain of both knees (3/23/2021), Costochondritis (1/20/2020), Depression, major, recurrent, mild, Diarrhea (5/11/2022), Difficulty walking (11/30/2020), Difficulty walking (11/30/2020), LANRE (generalized anxiety disorder) (3/22/2021), Gait, antalgic (10/21/2021), Generalized weakness (11/30/2020), Generalized weakness (11/30/2020), Glaucoma, HLD (hyperlipidemia), Lower extremity weakness (10/21/2021), Muscle cramps (5/23/2018), Osteopenia of multiple sites (4/2/2018), Primary osteoarthritis of right knee (05/30/2018), Primary snoring, Rash (5/11/2022), Sleep related leg cramps (9/7/2010), SOB  (shortness of breath) (8/8/2019), Unspecified gastritis and gastroduodenitis without mention of hemorrhage (11/28/2010), 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     Surgical History: Jessica Perez  has a past surgical history that includes Tubal ligation; TONSILLECTOMY, ADENOIDECTOMY; Dilation and curettage of uterus; Cholecystectomy; finger surgery; Gallbladder surgery; Cataract extraction w/  intraocular lens implant (Left, 02/04/2019); Cataract extraction w/  intraocular lens implant (Right, 03/04/2019); Injection of anesthetic agent around nerve (Bilateral, 10/1/2021); Radiofrequency thermocoagulation (Left, 11/5/2021); Breast biopsy (Left); and Colonoscopy (N/A, 6/29/2022).    Medications: Jessica has a current medication list which includes the following prescription(s): acetaminophen/diphenhydramine, cider vinegar, artificial tears(hypromellose)(genteal/sustane), aspirin, azelastine, b complex vitamins, calcium carbonate, cholestyramine, cyanocobalamin (vitamin b-12), denosumab, ergocalciferol (vitamin d2), repatha sureclick, repatha sureclick, ezetimibe, famotidine, fexofenadine, flaxseed oil, fluocinolone, fluticasone propionate, hydroxyzine pamoate, ketoconazole, krill oil-omega-3-dha-epa, multivitamin, triamcinolone acetonide 0.1%, trospium, turmeric, UNABLE TO FIND, and zinc, and the following Facility-Administered Medications: triamcinolone acetonide.    Allergies:   Review of patient's allergies indicates:   Allergen Reactions    Augmentin [amoxicillin-pot clavulanate] Other (See Comments)     Diarrhea, yeast    Bactrim  [sulfamethoxazole-trimethoprim]      Other reaction(s): Unknown    Celebrex [celecoxib] Other (See Comments)     Stomach pain, gas     Lipitor  [atorvastatin]      Other reaction(s): Unknown    Pravachol  [pravastatin]      Other reaction(s): Unknown    Statins-hmg-coa reductase inhibitors      Other reaction(s):  Muscle pain    Sulfa (sulfonamide antibiotics)      Other reaction(s): Swelling    Zoster vaccine live         Prior Therapy/Previous treatment included: not pelvic therapy   Social History: lives alone   Occupation: retired   Prior Level of Function: Pt was independent with all ADLs and iADLs without pain, no reports of incontinence of bowel or bladder.  Current Level of Function:  independent with all ADLs and iADLs without pain, occasional reports of urinary and fecal incontinence    Types of fluid intake: Water: 5-6 bottles /day, Coffee: 1/day, Soda, Juice, Caffeinated hot tea, Milk and liquid IV  Diet: Standard  Habitus: well developed, well nourished  Abuse/Neglect: Pt denies a history of physical or emotional abuse at this visit.       Pts goals: Patient would like to get off of medications and get rid of incontinence.      OBJECTIVE     See EMR under MEDIA for written consent provided 7/18/2022  Chaperone: declined    ORTHO SCREEN  Posture in sitting: slouched   Posture in standing: forward and rounded shoulders   Pelvic alignment: Not assessed today    Adductor Palpation: not assessed    ABDOMINAL WALL ASSESSMENT  Diastasis: not assessed      BREATHING MECHANICS ASSESSMENT   Thorax Assessment During Quiet Respiration: Decreased excursion of abdominal wall  and Decreased excursion bilaterally of lateral ribs   Thorax Assessment During Deep Respiration: Decreased excursion of abdominal wall  and Decreased excursion bilaterally of lateral ribs     VAGINAL PELVIC FLOOR EXAM    EXTERNAL ASSESSMENT  Introitus: WNL  Skin condition: redness noted  Scarring: did not formally assess today    Sensation: WNL   Pain: none  Voluntary contraction: visible lift  Voluntary relaxation: WNL  Involuntary contraction: not assessed  Bearing down: nil  Perineal descent: absent      INTERNAL ASSESSMENT  Pain: none   Sensation: able to localized pressure appropriately   Vaginal vault: stenotic layer one, laxity layer 2, with in  normal limits layer 3    Muscle Bulk: atrophy   Muscle Power: 4/5; layer two weaker/poor contraction   Muscle Endurance: 10 sec  # Reps To Fatigue: 2    Fast Contractions in 10 seconds: 8     Quality of contraction: WNL   Specificity: WNL   Coordination: WNL   Prolapse check: Did not assess today secondary to patient being unable to fully relax/ bulge   Does Pelvic Floor drop and relax with a diaphragmatic breath? no        TREATMENT     Treatment Time In: 1:30  Treatment Time Out: 1:45  Total Treatment time (time-based codes) separate from Evaluation: 15 minutes    Therapeutic Activity Patient participated in dynamic functional therapeutic activities to improve functional performance for 15 minutes. Including: Education as described below.     Patient Education provided:   general anatomy/physiology of urinary/ bowel  system, benefits of treatment, risks of treatment and alternative methods of treatment was discussed with the pt. Additionally, bladder irritants, anatomy/physiology of pelvic floor, bladder retraining, diaphragmatic breathing, double voiding techniques, kegels, fluid intake/dietary modifications and behavior modifications was reviewed.     Home Exercises provided:  Written Home Exercises provided: yes.  Exercises were reviewed and Jessica was able to demonstrate them prior to the end of the session.    Jessica demonstrated good  understanding of the education provided.     See EMR under Patient Instructions for exercises provided 7/18/2022.    ASSESSMENT     Jessica is a 77 y.o. female referred to outpatient Physical Therapy with a medical diagnosis of N39.46 (ICD-10-CM) - Mixed incontinence. Pt presents with altered posture, decreased pelvic muscle strength, decreased endurance of the pelvic muscles, decreased phasic ability of the pelvic muscles, increased tension of the pelvic muscles, poor coordination of pelvic floor muscles during ADL's leading to urinary or fecal leakage, poor fluid intake  and incomplete urination. Patient presents with signs and symptoms consistent with pelvic floor dysfunction.     Pt prognosis is Good.   Pt will benefit from skilled outpatient Physical Therapy to address the deficits stated above and in the chart below, provide pt/family education, and to maximize pt's level of independence.     Plan of care discussed with patient: Yes  Pt's spiritual, cultural and educational needs considered and patient is agreeable to the plan of care and goals as stated below:     Anticipated Barriers for therapy: none    Medical Necessity is demonstrated by the following:    History  Co-morbidities and personal factors that may impact the plan of care Co-morbidities   Listed above under medical history     Personal Factors  age     moderate   Examination  Body structures and functions, activity limitations and participation restrictions that may impact the plan of care Body Regions/Systems/Functions:  altered posture, decreased pelvic muscle strength, decreased endurance of the pelvic muscles, decreased phasic ability of the pelvic muscles, increased tension of the pelvic muscles, poor coordination of pelvic floor muscles during ADL's leading to urinary or fecal leakage, poor fluid intake and incomplete urination     Activity Limitations:  delaying urge to urinate or have a BM, full bladder emptying and incontinence with ADLs    Participation Restrictions:  all ADLs/iADLs uninterrupted by urinary incontinence/urgency/frequency, all ADLs/iADLs uninterrupted by fecal incontinence/urgency/frequency, social activities with friends/family and exercise restrictions due to incontinence     Activity limitations:   Learning and applying knowledge  no deficits    General Tasks and Commands  no deficits    Communication  no deficits    Mobility  no deficits    Self care  toileting    Domestic Life  shopping  cooking  doing house work (cleaning house, washing dishes,  laundry)    Interactions/Relationships  no deficits    Life Areas  no deficits    Community and Social Life  community life  recreation and leisure       moderate   Clinical Presentation evolving clinical presentation with changing clinical characteristics moderate   Decision Making/ Complexity Score: moderate       Goals:  Short Term Goals: 4 weeks   Pt to be edu pelvic muscle bracing and be able to consistently perform correctly and quickly to help decrease incontinence with cough/laugh/sneeze.  Pt to report a decrease in pad usage to no more than 1 a day to demonstrate improving pelvic floor function needed for continence.  Pt to demonstrate proper diaphragmatic breathing to help with calming the nervous system in order to decrease pain and to improve abdominal wall musculature extensibility.       Long Term Goals: 12 weeks   Pt to be discharged with home plan for carry over after discharge.    Pt to report a decrease in pad usage to 0 pads a day to demonstrate improving pelvic floor muscle controls as evidenced by decreased episodes of incontinence needed to improve confidence in social situations.  Pt to report being able to delay the urge to have a BM at least 10 minutes when out in public to decrease episodes of incontinence while looking for a bathroom or waiting in line at the bathroom.       PLAN   Plan of care Certification: 7/18/2022 to 10/18/2022.    Outpatient Physical Therapy 1 times weekly for 12 weeks to include the following interventions: therapeutic exercises, therapeutic activity, neuromuscular re-education, manual therapy, modalities PRN, patient/family education and self care/home management    Sharon Villegas, PT, DPT, PPCES      I CERTIFY THE NEED FOR THESE SERVICES FURNISHED UNDER THIS PLAN OF TREATMENT AND WHILE UNDER MY CARE   Physician's comments:     Physician's Signature: ___________________________________________________

## 2022-07-18 NOTE — PROGRESS NOTES
"[facility]   Physical Therapy Treatment Note     Name: Jessica Perez  Clinic Number: [id number]    Therapy Diagnosis:   Encounter Diagnosis   Name Primary?    Decreased ROM of lumbar spine Yes     Physician: Dileep Mendoza MD    Visit Date: 7/20/2022    Physician Orders: PT Eval and Treat   Medical Diagnosis from Referral:   M54.50,G89.29 (ICD-10-CM) - Chronic low back pain, unspecified back pain laterality, unspecified whether sciatica present   M17.0 (ICD-10-CM) - Primary osteoarthritis of both knees      Evaluation Date: 5/4/2022  Authorization Period Expiration: 12/31/2022  Plan of Care Expiration: 8/4/2022  Visit # / Visits authorized: 15/ 20     PN DUE: 8/13/2022     Precautions: Standard    PTA Visit #: 0/5     Time In: 1:30  Time Out: 2:10  Total Billable Time: 40 minutes    SUBJECTIVE     Pt reports: Her knees are bothering her today after running a bunch of errands. Patient reports she thinks she is just going to have to live with this pain. Patient would like to put therapy for her back and knees on hold while she gets pelvic floor therapy.   She was compliant with home exercise program.  Response to previous treatment: decreased pain  Functional change: increased functional tolerance      Pain: 5/10 bilateral knees; 6/10 back at rest    OBJECTIVE     Objective Measures updated at progress report unless specified.       Treatment     Jessica received the treatments listed below:      (exercises performed today are bolded)    therapeutic exercises to develop strength, endurance, ROM, flexibility, posture and core stabilization for 30 minutes including:    Recumbent bike seat 3 for increased lower extremity range of motion, strength, and endurance for 6 minutes   Slant board gastroc stretch 3 x 30"  Bridges 2 x 10   SL clams 3 x 10 with green theraband   Supine hamstring stretch 3 x 30" each   QS 2 x 10   Supine SLR 2 x 10   Seated LAQ 2 x 10   Double knee to chest with " "overpressure 2 x 10  Prone on elbows 2 x 2 minutes (decreased pain)     tailgaters x 2 minutes #5   Steam boats x 10   SAQ  Open books x 10  DKTC 1 x 10 with clinician overpressure  Piriformis stretch 5 x 15"   Seated forward flex   Standing IT band stretch 3 x 15"   green thera band inversion 2 x 10   side lying reverse clams 2 x 10   sit to stand 2 x 10   side stepping with yellow theraband     +Practice with swiffer, vacuum and mop for education on posture and management of her back pain during ADLs.  +Education for keeping cleaning tools close to her body with cleaning the house    manual therapy techniques: Joint mobilizations and Myofacial release were applied to the: bilateral knee and low back for 10 minutes, including:    bilateral Patella distraction and mobilization   myofascial release of L hip adductors at distal site of knee   bilateral MFR of bilateral lumbar paraspinals and musculature over iliac crest with and without passive hip internal/external rotation  Myofascial release of lumbar paraspinals bilaterally  Manual L knee joint distraction with patellar mobilizations   L knee joint mobilizations anterior/posterior   soft tissue mobilization to L adductors    neuromuscular re-education activities to improve: Balance and Proprioception for 0 minutes. The following activities were included:    Romberg stance on foam 3 x 30" eyes closed   Tandem walks x 5 laps   Tandem stance 2 x 30" each   Side stepping over cones x 8 laps   Marching on foam 2 x 10   ankle inversion on foam 2 x 20  + Transverse abdominus contraction 5" x 10   + Transverse abdominus with march 2 x 10     Patient Education and Home Exercises     Home Exercises Provided and Patient Education Provided     Education provided:   - Education/Self-Care provided:   · Patient educated on the impairments noted above and the effects of physical therapy intervention to improve overall condition and QOL.   · Patient was educated on all the above " exercise prior/during/after for proper posture, positioning, and execution for safe performance with home exercise program.   · Patient educated on the importance of improved core and upper extremity strength in order to improve alignment of the spine and upper extremities with static positions and dynamic movement.   · Patient educated on the importance of strong core and lower extremity musculature in order to improve both static and dynamic balance, improve gait mechanics, reduce fall risk and improve household and community mobility      Written Home Exercises Provided: yes. Exercises were reviewed and Jessica was able to demonstrate them prior to the end of the session.  Jessica demonstrated good  understanding of the education provided. See EMR under Patient Instructions for exercises provided during therapy sessions    ASSESSMENT     Patient presents with continued knee and back pain, but patient would like to put therapy on hold for her back and knee to receive pelvic floor therapy. Patient tolerated exercise well today with no complaints. Patient reports decreased pain with tailgaters and patella mobs. Plan to follow up with treatment after pelvic floor dysfunction is resolved.     Jessica Is progressing well towards her goals.   Pt prognosis is Good.     Pt will continue to benefit from skilled outpatient physical therapy to address the deficits listed in the problem list box on initial evaluation, provide pt/family education and to maximize pt's level of independence in the home and community environment.     Pt's spiritual, cultural and educational needs considered and pt agreeable to plan of care and goals.     Anticipated barriers to physical therapy: pain and decreased exercise tolerance     Goals:   Short Term Goals:  4 weeks   1. Pain: Pt will demonstrate improved pain by reports of less than or equal to 7/10 worst pain on the verbal rating scale in order to progress toward maximal functional  ability and improve QOL. MET 5/19/2022   2.  Mobility: Patient will present with full knee extension range of motion in order to return to maximal functional potential and improve quality of life. Met 6/9/2022   3.  HEP: Patient will demonstrate independence with HEP in order to progress toward functional independence. MET 5/19/2022      Long Term Goals:  8 weeks   1. Pain: Pt will demonstrate improved pain by reports of less than or equal to 5/10 worst pain on the verbal rating scale in order to progress toward maximal functional ability and improve QOL.  MET 6/16/2022   2. Function: Patient will demonstrate improved function as indicated by a functional limitation score of 54% on the FOTO. PC   3. Strength: Patient will improve strength to +4/5 in bilateral lower extremities in order to improve functional independence and quality of life. Met 6/9/2022   4. Patient will return to normal ADL's, IADL's, community involvement, recreational activities, and work-related activities with no pain and maximal function. PC      Goals Key:  PC= progressing/continue;        PM= partially met;             DC= discontinue      PLAN     Plan of care Certification: 5/4/2022 to 8/4/2022.     Cont PT per POC and progress pt as tolerated and appropriate.    Sharon Villegas, PT, DPT, PPCES  07/20/2022

## 2022-07-18 NOTE — PATIENT INSTRUCTIONS
Home Exercise Program: 07/18/2022      BLADDER HEALTH      WHAT IS CONSIDERED NORMAL?  The average bladder can hold about 2 cups of urine before it needs to be emptied.  The normal range of voiding urine is 6 to 8 times during a 24 hour period, or every 3-4 hours. As we get older, our bladder capacity can get smaller and we may need to pass urine more frequently but usually not more than every 2 hours.  Urine should flow easily without discomfort in a good, steady stream until the bladder is empty. No pushing or straining is necessary to empty the bladder.  An urge is a normal signal that you feel as the bladder stretches to fill with urine. Urges can be felt even if the bladder is not full. Urges are not commands to go to the toilet, merely a signal and can be controlled.    WHAT ARE GOOD BLADDER HABITS?  Take your time when emptying your bladder. Dont strain or push to empty your bladder. Make sure you empty your bladder completely each time you pass urine.  Dont wait too long - consistently ignoring the urge to go (waiting more than 4 hours between toileting) or urinating too infrequently may be convenient but not healthy for your bladder. You can actually overstretch your bladder beyond its normal size.   Dont go too often - avoid going to the toilet just in case (more often than every 2 hours). It is usually not necessary to go when you feel the first urge. Try to go only when your bladder is full. Dont let your bladder control your life.    DIET CAN AFFECT THE BLADDER  Maintain a healthy fluid intake. Many people decrease their intake of liquids in hopes that they will need to urinate less frequently or have less urinary leakage. While a decrease in liquid intake does result in a decrease in the volume of urine, the smaller amount of urine may be more highly concentrated. Highly concentrated, dark yellow urine is irritating to the bladder surface and may actually cause you to go to the bathroom more  frequently. It also encourages the growth of bacteria, which may lead to infections resulting in incontinence.  Depending on your body size and environment, drink 4-8 cups (8 oz each) of fluid per day, spread throughout the entire day, unless otherwise advised by your doctor.    Avoid of use the following acidic food/beverages in moderation:  Alcoholic beverages    Tomato-based products  Vinegar  Coffee (regular and decaf)  Tea (regular and decaf)  Citrus fruits and juices  Spicy foods, herrmann  Caffeinated beverages & soda   Milk  Food colorings and flavorings  Artificial sweeteners  Chocolate    Try using these dietary substitutions:  Water: grape juice, apple juice  Fruit: pears, apricots, papaya, watermelon  Coffee: KAVA®, Postum®, Fátima®, Kaffree Natacha®  Tea: Non-citrus herbal, sun-brewed tea  Vitamin C: Calcium carbonate co-buffered with calcium ascorbate    Avoid constipation by maintaining a balanced diet of dietary fiber. Typical dietary recommendations for fiber are between 25-35 grams per day. You should discuss your fiber needs with your physician, pharmacist or nutritionist.    Stop smoking. Smoking is irritating to the bladder surface and is associated with bladder cancer. In addition, the coughing associated with smoking may lead to increased incontinent episodes.     Home Exercise Program: 07/18/2022    CONTROLLING URINARY / FECAL URGENCY    What to do when you experience a strong urge to urinate or defecate:     FIRST  Stop activity, stand quietly or sit down. Try to stay very still to maintain control. Avoid rushing to the toilet.    SECOND Begin Quick Flicks (1 second LIFT of pelvic floor muscles, 4 second DROP). Pelvic floor contractions send a message to the bladder to relax and hold urine.     THIRD Relax. Do not rush to the toilet. Take a deep belly or diaphragmatic breath and let it out slowly. Let the urge to urinate pass by using distraction techniques and positive thoughts. Try not to think  about going to the bathroom.     FINALLY If the urge returns, repeat the above steps to regain control. When you feel the urge subside, walk normally to the bathroom. You can urinate once the urge has subsided.        The urge feeling strikes!   Stop, breathe, and be still and then begin Quick Flicks     Do Not rush to the toilet.     Think positively and distract yourself.     Home Exercise Program: 07/18/2022    DOUBLE VOIDING - Double voiding is a technique that may assist the bladder to empty more effectively when  urine is left in the bladder. It involves passing  urine more than once each time that you go to  the toilet. This makes sure that the bladder is  completely empty.    Here are 3 strategies you can try to fully empty your bladder.  You do not have to do all of these things every single time. Find which ones work best for you.     Check to make sure your pelvic floor is relaxed   Do a body scan - make sure your legs, buttocks, and abdominals are relaxed.  Take a couple deep, slow breaths to encourage your pelvic floor muscles to DROP (ie. Try Diaphragmatic Breathing).  Gently apply pressure over your bladder.  Change your pelvic position: lean forward, rock your pelvis forward and backward, stand up then sit back down.     Wait at least 30 seconds to 1 minute to see if a second urine stream begins.     Do not stop your urine stream or push/strain!      Home Program: 07/18/2022    YOGA POSES    These poses can be used to help improve pelvic floor muscle Drop/Relaxation.    Maintain each position for 1 minute, 1-2 times per day.       Deep Squat    Happy Baby            Child's Pose

## 2022-07-20 ENCOUNTER — CLINICAL SUPPORT (OUTPATIENT)
Dept: REHABILITATION | Facility: HOSPITAL | Age: 77
End: 2022-07-20
Payer: MEDICARE

## 2022-07-20 ENCOUNTER — LAB VISIT (OUTPATIENT)
Dept: LAB | Facility: HOSPITAL | Age: 77
End: 2022-07-20
Attending: UROLOGY
Payer: MEDICARE

## 2022-07-20 DIAGNOSIS — M53.86 DECREASED ROM OF LUMBAR SPINE: Primary | ICD-10-CM

## 2022-07-20 DIAGNOSIS — R31.29 MICROHEMATURIA: ICD-10-CM

## 2022-07-20 LAB
BUN SERPL-MCNC: 12 MG/DL (ref 8–23)
CREAT SERPL-MCNC: 0.8 MG/DL (ref 0.5–1.4)
EST. GFR  (AFRICAN AMERICAN): >60 ML/MIN/1.73 M^2
EST. GFR  (NON AFRICAN AMERICAN): >60 ML/MIN/1.73 M^2

## 2022-07-20 PROCEDURE — 84520 ASSAY OF UREA NITROGEN: CPT | Performed by: UROLOGY

## 2022-07-20 PROCEDURE — 82565 ASSAY OF CREATININE: CPT | Performed by: UROLOGY

## 2022-07-20 PROCEDURE — 97140 MANUAL THERAPY 1/> REGIONS: CPT | Mod: PN

## 2022-07-20 PROCEDURE — 36415 COLL VENOUS BLD VENIPUNCTURE: CPT | Mod: PN | Performed by: UROLOGY

## 2022-07-20 PROCEDURE — 97110 THERAPEUTIC EXERCISES: CPT | Mod: PN

## 2022-07-21 ENCOUNTER — HOSPITAL ENCOUNTER (OUTPATIENT)
Dept: RADIOLOGY | Facility: HOSPITAL | Age: 77
Discharge: HOME | End: 2022-07-21
Attending: UROLOGY
Payer: MEDICARE

## 2022-07-21 DIAGNOSIS — R31.29 MICROHEMATURIA: ICD-10-CM

## 2022-07-21 PROCEDURE — 74178 CT ABD&PLV WO CNTR FLWD CNTR: CPT | Mod: TC,PN

## 2022-07-21 PROCEDURE — 25500020 PHARM REV CODE 255: Mod: PN | Performed by: UROLOGY

## 2022-07-21 RX ADMIN — IOHEXOL 75 ML: 350 INJECTION, SOLUTION INTRAVENOUS at 09:07

## 2022-07-26 ENCOUNTER — TELEPHONE (OUTPATIENT)
Dept: UROLOGY | Facility: CLINIC | Age: 77
End: 2022-07-26
Payer: MEDICARE

## 2022-07-26 NOTE — TELEPHONE ENCOUNTER
Attempted to contact pt. NO answer. LVM.       ----- Message from Cristina Pagan sent at 7/26/2022  8:31 AM CDT -----  States she needs to know if she needs to have someone drive her for her procedure on 8/2. Please call pt [phone]. If she doesn't answer please leave her a voice mail one way or the other.  Thank you

## 2022-08-01 NOTE — PROGRESS NOTES
"  Physical Therapy Daily Treatment Note     Name: Jessica Perez  Clinic Number: [id number]    Therapy Diagnosis:   Encounter Diagnosis   Name Primary?    Pelvic floor dysfunction in female Yes     Physician: Jennifer Graves MD    Visit Date: 8/3/2022    Physician Orders: PT Eval and Treat   Medical Diagnosis from Referral: N39.46 (ICD-10-CM) - Mixed incontinence  Evaluation Date: 7/18/2022  Authorization Period Expiration: 12/31/2022  Plan of Care Expiration: 10/18/2022  Progress Note Due: 8/18/2022  Visit # / Visits authorized: 1/ 20  FOTO: 1/3     Precautions: Standard and Diabetes     Time In: 12:47  Time Out: 1:47  Total Billable Time: 60 minutes    Precautions: Standard    Subjective     Pt reports: cystoscope, CT, and colostomy all came back fine/normal. Medicine Dr. Graves gave her has helped. She is only using 1-2 pads a day. Patient reports her knee has been preventing her from doing some of the exercises. Better emptying with double voiding. Leaking less with urge.   She was compliant with home exercise program.  Response to previous treatment: decreased incontinence and improved bladder emptying   Functional change: decreased having to change pad    Pain: 7/10  Location: left knee    Constitutional Symptoms Review: The patient denies having any constitutional symptoms.     Objective   No intravaginal treatment performed today.  Signed consent form already on file.     Therapeutic Exercise to develop  strength, endurance, ROM, flexibility, posture and core stabilization for 25 minutes including:   Seated ally pose 10" x 5  Sit to stands with add ball 2 x 10   HL hip add with ball 10 x 10 seconds   DKTC 10" x 10   Seated opp arm/ leg raises   SL hip abd 2 x 10   Anti rotations x 10 7#   posterior pelvic tilts 10 x 10 seconds   side stepping     45 degree 1/2 kneel lung with PF relaxation   Supine modified butterfly stretch   Happy baby stretch   Medial hamstring stretch 3 x 15"    Neuromuscular " "Re-education to develop Coordination, Posture and Proprioception for 10 minutes including:   TA contraction 5" x 10               With march x 10               With knee fall out x 10               With kegel x 10   diaphragmatic breathing x 1 minutes   kegel with posterior pelvic tilt     Manual Therapy to develop flexibility, extensibility and desensitization for 15 minutes including:   myofascial release of hip adductors and flexors     Therapeutic Activity Patient participated in dynamic functional therapeutic activities to improve functional performance for 10 minutes. Including: Education as described below.    Home Exercises Provided and Patient Education Provided     Education provided:   bladder irritants, anatomy/physiology of pelvic floor, posture/body mechanices, bladder retraining, diaphragmatic breathing, double voiding techniques, isometric abdominal exercises, kegels, proper bearing down techniques, fluid intake/dietary modifications, behavior modifications and Coordination of kegels with functional activities such as cough, laugh, sneeze, lift, etc.   Discussed progression of plan of care with patient; educated pt in activity modification; reviewed HEP with pt. Pt demonstrated and verbalized understanding of all instruction and was provided with a handout of HEP (see Patient Instructions).    Written Home Exercises Provided: Patient instructed to cont prior HEP.  Exercises were reviewed and Jessica was able to demonstrate them prior to the end of the session.  Jessica demonstrated good  understanding of the education provided.     See EMR under Patient Instructions for exercises provided prior visit.    Assessment     Patient presents with decreased urinary incontinence and improved bladder emptying. Patient was started on pelvic floor muscle strengthening and stretching for optional length tension relationship of muscles. Core and hip strengthening exercises were performed for increased postural " stability to better support pelvic floor musculature. Patient tolerated all exercises well with no complaints. Hip flexor and adductor hypertonicity noted during manual therapy, but patient reported decreased tension at end of session. Pt will continue to benefit from skilled outpatient physical therapy to address the deficits listed in the problem list box on initial evaluation, provide pt/family education and to maximize pt's level of independence in the home and community environment.       Jessica Is progressing well towards her goals.   Pt prognosis is Good.     Anticipated barriers to physical therapy: none     Progress towards goals:  good    Goals:   Short Term Goals: 4 weeks   Pt to be edu pelvic muscle bracing and be able to consistently perform correctly and quickly to help decrease incontinence with cough/laugh/sneeze.  Pt to report a decrease in pad usage to no more than 1 a day to demonstrate improving pelvic floor function needed for continence.  Pt to demonstrate proper diaphragmatic breathing to help with calming the nervous system in order to decrease pain and to improve abdominal wall musculature extensibility.         Long Term Goals: 12 weeks   Pt to be discharged with home plan for carry over after discharge.    Pt to report a decrease in pad usage to 0 pads a day to demonstrate improving pelvic floor muscle controls as evidenced by decreased episodes of incontinence needed to improve confidence in social situations.  Pt to report being able to delay the urge to have a BM at least 10 minutes when out in public to decrease episodes of incontinence while looking for a bathroom or waiting in line at the bathroom.       New/Revised goals: Continue with current established goals at this time.      Pt's spiritual, cultural and educational needs considered and pt agreeable to plan of care and goals.    Plan     Plan of care Certification: 7/18/2022 to 10/18/2022.     Continue with established Plan of  Care, working toward established PT goals.    Sharon Villegas, PT, DPT, PPCES        8/3/2022

## 2022-08-02 ENCOUNTER — PROCEDURE VISIT (OUTPATIENT)
Dept: UROLOGY | Facility: CLINIC | Age: 77
End: 2022-08-02
Payer: MEDICARE

## 2022-08-02 VITALS
SYSTOLIC BLOOD PRESSURE: 110 MMHG | WEIGHT: 177.25 LBS | BODY MASS INDEX: 32.62 KG/M2 | HEIGHT: 62 IN | DIASTOLIC BLOOD PRESSURE: 62 MMHG

## 2022-08-02 DIAGNOSIS — N39.46 MIXED INCONTINENCE: ICD-10-CM

## 2022-08-02 DIAGNOSIS — R31.29 MICROHEMATURIA: Primary | ICD-10-CM

## 2022-08-02 DIAGNOSIS — N32.81 OAB (OVERACTIVE BLADDER): ICD-10-CM

## 2022-08-02 PROCEDURE — 52000 PR CYSTOURETHROSCOPY: ICD-10-PCS | Mod: S$GLB,,, | Performed by: UROLOGY

## 2022-08-02 PROCEDURE — 96372 PR INJECTION,THERAP/PROPH/DIAG2ST, IM OR SUBCUT: ICD-10-PCS | Mod: 59,S$GLB,, | Performed by: UROLOGY

## 2022-08-02 PROCEDURE — 96372 THER/PROPH/DIAG INJ SC/IM: CPT | Mod: 59,S$GLB,, | Performed by: UROLOGY

## 2022-08-02 PROCEDURE — 52000 CYSTOURETHROSCOPY: CPT | Mod: S$GLB,,, | Performed by: UROLOGY

## 2022-08-02 RX ORDER — CEFTRIAXONE 1 G/1
1 INJECTION, POWDER, FOR SOLUTION INTRAMUSCULAR; INTRAVENOUS
Status: COMPLETED | OUTPATIENT
Start: 2022-08-02 | End: 2022-08-02

## 2022-08-02 RX ORDER — LIDOCAINE HYDROCHLORIDE 20 MG/ML
JELLY TOPICAL
Status: COMPLETED | OUTPATIENT
Start: 2022-08-02 | End: 2022-08-02

## 2022-08-02 RX ORDER — TIZANIDINE 4 MG/1
2 TABLET ORAL NIGHTLY PRN
COMMUNITY
Start: 2022-07-12 | End: 2023-05-02 | Stop reason: SDUPTHER

## 2022-08-02 RX ORDER — TROSPIUM CHLORIDE ER 60 MG/1
60 CAPSULE ORAL DAILY
Qty: 30 CAPSULE | Refills: 6 | Status: SHIPPED | OUTPATIENT
Start: 2022-08-02 | End: 2023-02-07 | Stop reason: SDUPTHER

## 2022-08-02 RX ADMIN — CEFTRIAXONE 1 G: 1 INJECTION, POWDER, FOR SOLUTION INTRAMUSCULAR; INTRAVENOUS at 10:08

## 2022-08-02 RX ADMIN — LIDOCAINE HYDROCHLORIDE 10 ML: 20 JELLY TOPICAL at 10:08

## 2022-08-02 NOTE — PROCEDURES
Chief Complaint   Patient presents with    Other     cysto       History of Present Illness:   Jessica Perez is a 77 y.o. female here for evaluation of Other (cysto)    8/2/22- on trospium and significantly improved. Wearing 2 pads per day now. Here for cysto. CT urogram showed no stones, renal mass or hydronephrosis. Doing pelvic floor PT  7/8/22- She reports that she has been wearing pads for 10 years. She states that when she stands up, she pours out urine. She reports that she has had loose stools regularly and was taking Immodium when she would go anywhere. It used to be just KRISH with sneeze, cough and laugh.   Wears about 5 pads per day. During the night if she wakes, she doesn't have UUI. No daytime frequency. +Daytime urgency and UUI.   No gross hematuria or dysuria.         Review of Systems   Constitutional: Negative for chills and fever.   Respiratory: Negative for shortness of breath.    Cardiovascular: Negative for chest pain.   Genitourinary: Negative for difficulty urinating.   All other systems reviewed and are negative.        Past Medical History:   Diagnosis Date    Acute pain of left knee 3/22/2021    Arthritis     Arthritis of knee 2/9/2021    At risk for sleep apnea 9/19/2019    Balance problem 10/21/2021    Bilateral primary osteoarthritis of knee 4/4/2018    Chest pain, moderate coronary artery risk 8/8/2019    Chronic midline low back pain without sciatica 11/30/2020    Chronic pain of both knees 3/23/2021    Costochondritis 1/20/2020    Depression, major, recurrent, mild     Diarrhea 5/11/2022    Difficulty walking 11/30/2020    Difficulty walking 11/30/2020    LANRE (generalized anxiety disorder) 3/22/2021    Gait, antalgic 10/21/2021    Generalized weakness 11/30/2020    Generalized weakness 11/30/2020    Glaucoma     HLD (hyperlipidemia)     Lower extremity weakness 10/21/2021    Muscle cramps 5/23/2018    Osteopenia of multiple sites 4/2/2018    Primary  osteoarthritis of right knee 05/30/2018    Primary snoring     Rash 5/11/2022    Sleep related leg cramps 9/7/2010    SOB (shortness of breath) 8/8/2019    Unspecified gastritis and gastroduodenitis without mention of hemorrhage 11/28/2010     Dx updated per 2019 IMO Load    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 Benign.    CATARACT EXTRACTION W/  INTRAOCULAR LENS IMPLANT Left 02/04/2019    CATARACT EXTRACTION W/  INTRAOCULAR LENS IMPLANT Right 03/04/2019    CHOLECYSTECTOMY      COLONOSCOPY N/A 6/29/2022    Procedure: COLONOSCOPY;  Surgeon: Nichole Kelly MD;  Location: [facility] ENDO;  Service: Endoscopy;  Laterality: N/A;    DILATION AND CURETTAGE OF UTERUS      finger surgery      middle finger on right hand    GALLBLADDER SURGERY      INJECTION OF ANESTHETIC AGENT AROUND NERVE Bilateral 10/1/2021    Procedure: Bilateral Genicular nerve block -;  Surgeon: Primo Bond MD;  Location: [facility] PAIN MGT;  Service: Pain Management;  Laterality: Bilateral;    RADIOFREQUENCY THERMOCOAGULATION Left 11/5/2021    Procedure: Left Genicular Nerve RFA (COOLIEF) with RN IV sedation;  Surgeon: Primo Bond MD;  Location: [facility] PAIN [facility]GT;  Service: Pain Management;  Laterality: Left;    TONSILLECTOMY, ADENOIDECTOMY      TUBAL LIGATION         Family History   Problem Relation Age of Onset    Diabetes Father     Cancer Father         prostate ca    Hypertension Father     Cancer Sister         cervical ca    Hepatitis Sister     Dementia Mother     Osteoporosis Mother     Breast cancer Paternal Aunt        Social History     Tobacco Use    Smoking status: Former Smoker     Packs/day: 0.50     Years: 12.00     Pack years: 6.00     Types: Cigarettes    Smokeless tobacco: Never Used   Substance Use Topics    Alcohol use: Yes     Comment: occasionally     Drug use: No       Current Outpatient Medications   Medication Sig Dispense Refill    acetaminophen/diphenhydramine (TYLENOL PM ORAL) Take by mouth.      APPLE CIDER VINEGAR ORAL Take by mouth.      artificial tears,hypromellose,,GENTEAL/SUSTANE, 0.3 % Gel       aspirin (ECOTRIN) 81 MG EC tablet Take 81 mg by mouth once daily.      b complex vitamins capsule Take 1 capsule by mouth once daily.      calcium carbonate (CALCIUM 500 ORAL) Take by mouth.      cholestyramine (QUESTRAN) 4 gram packet Take 1 packet (4 g total) by mouth daily as needed (diarrhea). 30 packet 5    cyanocobalamin, vitamin B-12, (VITAMIN B-12 ORAL) Take by mouth.      denosumab (PROLIA SUBQ) Inject into the skin.      ergocalciferol, vitamin D2, (VITAMIN D ORAL) Take by mouth.      evolocumab (REPATHA SURECLICK) 140 mg/mL PnIj Repatha SureClick Take No date recorded No form recorded No frequency recorded No route recorded No set duration recorded No set duration amount recorded active No dosage strength recorded No dosage strength units of measure recorded      evolocumab (REPATHA SURECLICK) 140 mg/mL PnIj Inject 1 pen (140 mg total) into the skin every 14 (fourteen) days. 2 mL 6    ezetimibe (ZETIA) 10 mg tablet Take 1 tablet (10 mg total) by mouth once daily. 90 tablet 3    famotidine (PEPCID) 40 MG tablet Take 1 tablet (40 mg total) by mouth every evening. 30 tablet 11    flaxseed oiL 1,000 mg Cap flaxseed oil Take No date recorded No form recorded No frequency recorded No route recorded No set duration recorded No set duration amount recorded suspended No dosage strength recorded No dosage strength units of measure recorded      fluocinolone (SYNALAR) 0.01 % external solution AAA of scalp twice a day PRN scalp itching or eczema flares. 60 mL 1    fluticasone (FLONASE) 50 mcg/actuation nasal spray 2 sprays by Each Nare route once daily. 1 Bottle 12    hydrOXYzine pamoate (VISTARIL) 25 MG Cap Take 1 capsule (25 mg total) by  mouth every 8 (eight) hours as needed (itching). 30 capsule 0    ketoconazole (NIZORAL) 2 % shampoo Apply topically every 7 days. Shampoo scalp 1-2 times per week. Lather x 5 minutes, rinse well. 120 mL 5    krill oil-omega-3-dha-epa 150-450 mg CpDR Take by mouth.      multivitamin capsule Take 1 capsule by mouth Daily.      triamcinolone acetonide 0.1% (KENALOG) 0.1 % cream Apply topically 2 (two) times daily. 45 g 0    TURMERIC ORAL Take by mouth.      UNABLE TO FIND CBD Oil topical      ZINC ORAL Take by mouth.      azelastine (ASTELIN) 137 mcg (0.1 %) nasal spray 1 spray (137 mcg total) by Nasal route 2 (two) times daily. 30 mL 1    fexofenadine (ALLEGRA) 180 MG tablet Take 1 tablet (180 mg total) by mouth once daily. 30 tablet 0    tiZANidine (ZANAFLEX) 4 MG tablet Take 2 mg by mouth nightly as needed.      trospium (SANCTURA XR) 60 mg Cp24 capsule Take 1 capsule (60 mg total) by mouth once daily. 30 capsule 6     Current Facility-Administered Medications   Medication Dose Route Frequency Provider Last Rate Last Admin    triamcinolone acetonide injection 32 mg  32 mg Intra-articular 1 time in Clinic/HOD Dileep Mendoza MD           Review of patient's allergies indicates:   Allergen Reactions    Augmentin [amoxicillin-pot clavulanate] Other (See Comments)     Diarrhea, yeast    Bactrim  [sulfamethoxazole-trimethoprim]      Other reaction(s): Unknown    Celebrex [celecoxib] Other (See Comments)     Stomach pain, gas     Lipitor  [atorvastatin]      Other reaction(s): Unknown    Pravachol  [pravastatin]      Other reaction(s): Unknown    Statins-hmg-coa reductase inhibitors      Other reaction(s): Muscle pain    Sulfa (sulfonamide antibiotics)      Other reaction(s): Swelling    Zoster vaccine live        Physical Exam  Vitals:    08/02/22 0947   BP: 110/62     General: Well-developed, well-nourished, in no acute distress  HEENT: Normocephalic, atraumatic, extraocular movements intact  Neck:  Supple, no supraclavicular or cervical lymphadenopathy, trachea midline  Respirations: even and unlabored  Back: midline spine, No CVA tenderness  Abdomen: soft, Non-tender, non-distended, no palpable masses, no rebound or guarding  : 7/8/22-Normal external female genitalia without lesions. Orthotopic urethral meatus with caruncle. atrophic vaginal mucosa. No significant prolapse,  negative cough stress test, no urethral hypermobility  Extremities: moves all equally, no clubbing, cyanosis or edema  Skin: Warm and dry. No lesions  Psych: normal affect  Neuro: Alert and oriented x 3. Cranial nerves II-XII intact    PVR: 31cc 7/8/22    Urinalysis  trace blood, otherwise negative    Procedure: Cystoscopy    Procedure in detail:   Informed consent was obtained. The patient's genitalia was prepped and draped in the usual sterile fashion. Lidocaine jelly was administered per urethra. I utilized the flexible cystoscope and advanced it into the urethral meatus. Bladder access was obtained. Systematic review of the bladder was performed, noting no stones, foreign bodies or masses. Bilateral ureteral orifices were visualized and noted to be effluxing clear urine. Retroflexion was utilized to visualize the bladder neck, noting no lesions. The scope was slowly backed out of the urethra, noting no urethral lesions. The patient tolerated the procedure well. Estimated blood loss was 0cc.     Assessment:  1. Microhematuria     2. OAB (overactive bladder)     3. Mixed incontinence           Plan:   Microhematuria    OAB (overactive bladder)    Mixed incontinence    Other orders  -     cefTRIAXone injection 1 g  -     LIDOcaine HCl 2% urojet  -     trospium (SANCTURA XR) 60 mg Cp24 capsule; Take 1 capsule (60 mg total) by mouth once daily.  Dispense: 30 capsule; Refill: 6    continue pelvic floor PT    Follow up in about 6 months (around 2/2/2023).

## 2022-08-02 NOTE — PROGRESS NOTES
Per Dr. Graves IM rocephin given to pt via right ventrogluteal using aseptic technique. Pt tolerated well. Pt instructed to remain in clinic for 15 minutes after injection to monitor for signs & symptoms of adverse reaction. Pt verbalized understanding.

## 2022-08-03 ENCOUNTER — CLINICAL SUPPORT (OUTPATIENT)
Dept: REHABILITATION | Facility: HOSPITAL | Age: 77
End: 2022-08-03
Payer: MEDICARE

## 2022-08-03 DIAGNOSIS — M62.89 PELVIC FLOOR DYSFUNCTION IN FEMALE: Primary | ICD-10-CM

## 2022-08-03 PROCEDURE — 97140 MANUAL THERAPY 1/> REGIONS: CPT | Mod: PN

## 2022-08-03 PROCEDURE — 97112 NEUROMUSCULAR REEDUCATION: CPT | Mod: PN

## 2022-08-03 PROCEDURE — 97110 THERAPEUTIC EXERCISES: CPT | Mod: PN

## 2022-08-08 NOTE — PROGRESS NOTES
"  Physical Therapy Daily Treatment Note     Name: Jessica Perez  Clinic Number: [id number]    Therapy Diagnosis:   Encounter Diagnosis   Name Primary?    Pelvic floor dysfunction in female Yes     Physician: Jennifer Graves MD    Visit Date: 8/10/2022    Physician Orders: PT Eval and Treat   Medical Diagnosis from Referral: N39.46 (ICD-10-CM) - Mixed incontinence  Evaluation Date: 7/18/2022  Authorization Period Expiration: 12/31/2022  Plan of Care Expiration: 10/18/2022  Progress Note Due: 8/18/2022  Visit # / Visits authorized: 2/ 20 (+eval)  FOTO: 1/3     Precautions: Standard and Diabetes     Time In: 1:35  Time Out: 2:33  Total Billable Time: 58 minutes    Precautions: Standard    Subjective     Pt reports: only leaking with extreme laughing. She can cough, light laugh, and sneeze without leaking. Her goals is to get to a very small thin pad.   She was compliant with home exercise program.  Response to previous treatment: decreased incontinence   Functional change: decreased having to change pad    Pain: 4/10 left knee, back and right knee no pain    Constitutional Symptoms Review: The patient denies having any constitutional symptoms.     Objective   No intravaginal treatment performed today.  Signed consent form already on file.     Therapeutic Exercise to develop  strength, endurance, ROM, flexibility, posture and core stabilization for 35 minutes including:   Seated ally pose 10" x 5   Sit to stands with add ball 2 x 10   HL hip add with ball 10 x 10 seconds   DKTC 10" x 10   Seated opp arm/ leg raises   SL hip abd 2 x 10   Anti rotations x 10 7#   posterior pelvic tilts 10 x 10 seconds   side stepping     45 degree 1/2 kneel lung with PF relaxation   Supine modified butterfly stretch   Happy baby stretch   Medial hamstring stretch 3 x 15"     Neuromuscular Re-education to develop Coordination, Posture and Proprioception for 15 minutes including:   TA contraction 5" x 10               With march x " 10               With knee fall out x 10               With kegel x 10   diaphragmatic breathing x 1 minutes  kegel with posterior pelvic tilt 2 x 10     Manual Therapy to develop flexibility, extensibility and desensitization for 8 minutes including:   myofascial release of hip adductors and flexors     Therapeutic Activity Patient participated in dynamic functional therapeutic activities to improve functional performance for 0 minutes. Including: Education as described below.    Home Exercises Provided and Patient Education Provided     Education provided:   bladder irritants, anatomy/physiology of pelvic floor, posture/body mechanices, bladder retraining, diaphragmatic breathing, double voiding techniques, isometric abdominal exercises, kegels, proper bearing down techniques, fluid intake/dietary modifications, behavior modifications and Coordination of kegels with functional activities such as cough, laugh, sneeze, lift, etc.   Discussed progression of plan of care with patient; educated pt in activity modification; reviewed HEP with pt. Pt demonstrated and verbalized understanding of all instruction and was provided with a handout of HEP (see Patient Instructions).    Written Home Exercises Provided: Patient instructed to cont prior HEP.  Exercises were reviewed and Jessica was able to demonstrate them prior to the end of the session.  Jessica demonstrated good  understanding of the education provided.     See EMR under Patient Instructions for exercises provided prior visit.    Assessment     Patient presents with no new complaints and almost complete resolution of urinary incontinence. Patient tolerated all exercises well with no complaints. Patient is progressing well with therapy and has met 3 out of 6 goals. Pt will continue to benefit from skilled outpatient physical therapy to address the deficits listed in the problem list box on initial evaluation, provide pt/family education and to maximize pt's  level of independence in the home and community environment.       Jessica Is progressing well towards her goals.   Pt prognosis is Good.     Anticipated barriers to physical therapy: none     Progress towards goals:  good    Goals:   Short Term Goals: 4 weeks   1. Pt to be edu pelvic muscle bracing and be able to consistently perform correctly and quickly to help decrease incontinence with cough/laugh/sneeze. PM 8/10/2022  2. Pt to report a decrease in pad usage to no more than 1 a day to demonstrate improving pelvic floor function needed for continence. MET 8/10/2022  3. Pt to demonstrate proper diaphragmatic breathing to help with calming the nervous system in order to decrease pain and to improve abdominal wall musculature extensibility. MET 8/10/2022        Long Term Goals: 12 weeks   1. Pt to be discharged with home plan for carry over after discharge.  MET 8/10/2022  2. Pt to report a decrease in pad usage to 0 pads a day to demonstrate improving pelvic floor muscle controls as evidenced by decreased episodes of incontinence needed to improve confidence in social situations. PM 8/10/2022  3. Pt to report being able to delay the urge to have a BM at least 10 minutes when out in public to decrease episodes of incontinence while looking for a bathroom or waiting in line at the bathroom. PC 8/10/2022    Goals Key:  PC= progressing/continue;        PM= partially met;             DC= discontinue    New/Revised goals: Continue with current established goals at this time.      Pt's spiritual, cultural and educational needs considered and pt agreeable to plan of care and goals.    Plan     Plan of care Certification: 7/18/2022 to 10/18/2022.     Continue with established Plan of Care, working toward established PT goals.    Sharon Villegas, PT, DPT, PPCES        8/10/2022

## 2022-08-10 ENCOUNTER — CLINICAL SUPPORT (OUTPATIENT)
Dept: REHABILITATION | Facility: HOSPITAL | Age: 77
End: 2022-08-10
Payer: MEDICARE

## 2022-08-10 DIAGNOSIS — M62.89 PELVIC FLOOR DYSFUNCTION IN FEMALE: Primary | ICD-10-CM

## 2022-08-10 PROCEDURE — 97140 MANUAL THERAPY 1/> REGIONS: CPT | Mod: PN

## 2022-08-10 PROCEDURE — 97110 THERAPEUTIC EXERCISES: CPT | Mod: PN

## 2022-08-10 PROCEDURE — 97112 NEUROMUSCULAR REEDUCATION: CPT | Mod: PN

## 2022-08-16 NOTE — PROGRESS NOTES
"  Physical Therapy Daily Treatment Note     Name: Jessica Perez  Clinic Number: [id number]    Therapy Diagnosis:   Encounter Diagnosis   Name Primary?    Pelvic floor dysfunction in female Yes     Physician: Jennifer Graves MD    Visit Date: 8/17/2022    Physician Orders: PT Eval and Treat   Medical Diagnosis from Referral: N39.46 (ICD-10-CM) - Mixed incontinence  Evaluation Date: 7/18/2022  Authorization Period Expiration: 12/31/2022  Plan of Care Expiration: 10/18/2022  Progress Note Due: 9/17/2022  Visit # / Visits authorized: 3/ 20 (+eval)  FOTO: 1/3     Precautions: Standard and Diabetes     Time In: 11:00  Time Out: 11:55  Total Billable Time: 55 minutes    Precautions: Standard    Subjective     Pt reports: if she watches her diet she doesn't have trouble. Only leaks a few drops of urine at night. Patient reports better control of bowels, but wants more control. She is still scared to fly on an air plane to see her daughter due to bowel urgency. Urinary incontinence is resolved. Last month had incontinence of bowel. Patient reports she is going to talk to her doctor about a knee replacement.   She was compliant with home exercise program.  Response to previous treatment: decreased incontinence   Functional change: decreased having to change pad    Pain: 4/10 left knee, back and right knee no pain    Constitutional Symptoms Review: The patient denies having any constitutional symptoms.     Objective   No intravaginal treatment performed today.  Signed consent form already on file.         Therapeutic Exercise to develop  strength, endurance, ROM, flexibility, posture and core stabilization for 25 minutes including:   Seated ally pose 10" x 5   Sit to stands with add ball 2 x 10   HL hip add with ball 10 x 10 seconds   DKTC 10" x 10   Seated opp arm/ leg raises   SL hip abd 2 x 10   Anti rotations x 10 7#   posterior pelvic tilts 10 x 10 seconds   side stepping   45 degree 1/2 kneel lung with PF " "relaxation   Supine modified butterfly stretch   Happy baby stretch   Medial hamstring stretch 3 x 15"     Neuromuscular Re-education to develop Coordination, Posture and Proprioception for 5 minutes including:   TA contraction 5" x 10               With march x 10               With knee fall out x 10               With kegel x 10   diaphragmatic breathing x 1 minutes   kegel with posterior pelvic tilt 2 x 10     Manual Therapy to develop flexibility, extensibility and desensitization for 10 minutes including:   myofascial release of hip adductors and flexors     Therapeutic Activity Patient participated in dynamic functional therapeutic activities to improve functional performance for 15 minutes. Including: Education as described below.    Home Exercises Provided and Patient Education Provided     Education provided:   bladder irritants, anatomy/physiology of pelvic floor, posture/body mechanices, bladder retraining, diaphragmatic breathing, double voiding techniques, isometric abdominal exercises, kegels, proper bearing down techniques, fluid intake/dietary modifications, behavior modifications and Coordination of kegels with functional activities such as cough, laugh, sneeze, lift, etc.   Discussed progression of plan of care with patient; educated pt in activity modification; reviewed HEP with pt. Pt demonstrated and verbalized understanding of all instruction and was provided with a handout of HEP (see Patient Instructions).  -reinforced diets effects on bowel urgency, urge delay strategies, and how to travel with bowel urgency     Written Home Exercises Provided: Patient instructed to cont prior HEP.  Exercises were reviewed and Jessica was able to demonstrate them prior to the end of the session.  Jessica demonstrated good  understanding of the education provided.     See EMR under Patient Instructions for exercises provided prior visit.    Assessment     Patient presents reporting continued bowel urgency " which she would like to work on more. Patient was educated on urge delay strategies for bowel urgency. Patient presents with right iliac crest elevated and favoring weight on right lower extremity. Patient was educated on standing posture and equal weightbearing. Patient tolerated all exercises well with no complaints. Pt will continue to benefit from skilled outpatient physical therapy to address the deficits listed in the problem list box on initial evaluation, provide pt/family education and to maximize pt's level of independence in the home and community environment.       Jessica Is progressing well towards her goals.   Pt prognosis is Good.     Anticipated barriers to physical therapy: none     Progress towards goals:  good    Goals:   Short Term Goals: 4 weeks   1. Pt to be edu pelvic muscle bracing and be able to consistently perform correctly and quickly to help decrease incontinence with cough/laugh/sneeze. PM 8/10/2022  2. Pt to report a decrease in pad usage to no more than 1 a day to demonstrate improving pelvic floor function needed for continence. MET 8/10/2022  3. Pt to demonstrate proper diaphragmatic breathing to help with calming the nervous system in order to decrease pain and to improve abdominal wall musculature extensibility. MET 8/10/2022        Long Term Goals: 12 weeks   1. Pt to be discharged with home plan for carry over after discharge.  MET 8/10/2022  2. Pt to report a decrease in pad usage to 0 pads a day to demonstrate improving pelvic floor muscle controls as evidenced by decreased episodes of incontinence needed to improve confidence in social situations. PM 8/10/2022  3. Pt to report being able to delay the urge to have a BM at least 10 minutes when out in public to decrease episodes of incontinence while looking for a bathroom or waiting in line at the bathroom. PC 8/10/2022    Goals Key:  PC= progressing/continue;        PM= partially met;             DC=  discontinue    New/Revised goals: Continue with current established goals at this time.      Pt's spiritual, cultural and educational needs considered and pt agreeable to plan of care and goals.    Plan     Plan of care Certification: 7/18/2022 to 10/18/2022.     Continue with established Plan of Care, working toward established PT goals.    Sharon Villegas, PT, DPT, PPCES        8/17/2022

## 2022-08-17 ENCOUNTER — CLINICAL SUPPORT (OUTPATIENT)
Dept: REHABILITATION | Facility: HOSPITAL | Age: 77
End: 2022-08-17
Payer: MEDICARE

## 2022-08-17 DIAGNOSIS — M62.89 PELVIC FLOOR DYSFUNCTION IN FEMALE: Primary | ICD-10-CM

## 2022-08-17 PROCEDURE — 97140 MANUAL THERAPY 1/> REGIONS: CPT | Mod: PN

## 2022-08-17 PROCEDURE — 97110 THERAPEUTIC EXERCISES: CPT | Mod: PN

## 2022-08-17 PROCEDURE — 97530 THERAPEUTIC ACTIVITIES: CPT | Mod: PN

## 2022-08-29 NOTE — PROGRESS NOTES
"  Physical Therapy Daily Treatment Note     Name: Jessica Perez  Clinic Number: [id number]    Therapy Diagnosis:   Encounter Diagnosis   Name Primary?    Decreased ROM of lumbar spine Yes     Physician: Jennifer Graves MD    Visit Date: 8/31/2022    Physician Orders: PT Eval and Treat   Medical Diagnosis from Referral: N39.46 (ICD-10-CM) - Mixed incontinence  Evaluation Date: 7/18/2022  Authorization Period Expiration: 12/31/2022  Plan of Care Expiration: 10/18/2022  Progress Note Due: 9/17/2022  Visit # / Visits authorized: 3/ 20 (+eval)  FOTO: 1/3     Precautions: Standard and Diabetes     Time In: 1:35  Time Out: 2:30  Total Billable Time: 55 minutes    Precautions: Standard    Subjective     Pt reports: she is doing so much better. Patient reports only having urinary incontinence with coughing and sneezing. She cant remember the last time she had fecal incontinence.     She was compliant with home exercise program.  Response to previous treatment: decreased incontinence   Functional change: decreased having to change pad    Pain: 4/10 left knee, back and right knee no pain    Constitutional Symptoms Review: The patient denies having any constitutional symptoms.     Objective       No intravaginal treatment performed today.  Signed consent form already on file.     Therapeutic Exercise to develop  strength, endurance, ROM, flexibility, posture and core stabilization for 30 minutes including:   Seated ally pose 10" x 5   Sit to stands with add ball 2 x 10   HL hip add with ball 10 x 10 seconds   DKTC 10" x 10   Seated opp arm/ leg raises   SL hip abd 2 x 10   Anti rotations x 10 7#   posterior pelvic tilts 10 x 10 seconds   side stepping   45 degree 1/2 kneel lung with PF relaxation   Supine modified butterfly stretch   Happy baby stretch   Medial hamstring stretch 3 x 15"   + physio ball pelvic circles 10 cw/ccw     Neuromuscular Re-education to develop Coordination, Posture and Proprioception for 15 " "minutes including:   TA contraction 5" x 10               With march x 10               With knee fall out x 10               With kegel x 10   diaphragmatic breathing x 1 minutes   kegel with posterior pelvic tilt 2 x 10 + feet on wall  + kegel with squat x 10   + kegel with march   + kegel over towel     Manual Therapy to develop flexibility, extensibility and desensitization for 0 minutes including:   myofascial release of hip adductors and flexors     Therapeutic Activity Patient participated in dynamic functional therapeutic activities to improve functional performance for 10 minutes. Including: Education as described below.    Home Exercises Provided and Patient Education Provided     Education provided:   bladder irritants, anatomy/physiology of pelvic floor, posture/body mechanices, bladder retraining, diaphragmatic breathing, double voiding techniques, isometric abdominal exercises, kegels, proper bearing down techniques, fluid intake/dietary modifications, behavior modifications and Coordination of kegels with functional activities such as cough, laugh, sneeze, lift, etc.   Discussed progression of plan of care with patient; educated pt in activity modification; reviewed HEP with pt. Pt demonstrated and verbalized understanding of all instruction and was provided with a handout of HEP (see Patient Instructions).  -reinforced diets effects on bowel urgency, urge delay strategies, and how to travel with bowel urgency     Written Home Exercises Provided: Patient instructed to cont prior HEP.  Exercises were reviewed and Jessica was able to demonstrate them prior to the end of the session.  Jessica demonstrated good  understanding of the education provided.     See EMR under Patient Instructions for exercises provided prior visit.    Assessment     Patient has met all of her goals for pelvic floor therapy. Patient has been able to return to prior level of function. Patient has no farther need for outpatient " pelvic floor physical therapy at this time.     Goals:   Short Term Goals: 4 weeks   1. Pt to be edu pelvic muscle bracing and be able to consistently perform correctly and quickly to help decrease incontinence with cough/laugh/sneeze. MET 8/10/2022  2. Pt to report a decrease in pad usage to no more than 1 a day to demonstrate improving pelvic floor function needed for continence. MET 8/10/2022  3. Pt to demonstrate proper diaphragmatic breathing to help with calming the nervous system in order to decrease pain and to improve abdominal wall musculature extensibility. MET 8/10/2022        Long Term Goals: 12 weeks   1. Pt to be discharged with home plan for carry over after discharge.  MET 8/10/2022  2. Pt to report a decrease in pad usage to 0 pads a day to demonstrate improving pelvic floor muscle controls as evidenced by decreased episodes of incontinence needed to improve confidence in social situations. PM 8/10/2022  3. Pt to report being able to delay the urge to have a BM at least 10 minutes when out in public to decrease episodes of incontinence while looking for a bathroom or waiting in line at the bathroom. MET 8/31/2022    Goals Key:  PC= progressing/continue;        PM= partially met;             DC= discontinue    Pt's spiritual, cultural and educational needs considered and pt agreeable to plan of care and goals.    Plan     Plan of care Certification: 7/18/2022 to 10/18/2022.     Discharge from pelvic floor physcial therapy     Sharon Villegas, PT, DPT, PPCES        8/31/2022

## 2022-08-31 ENCOUNTER — CLINICAL SUPPORT (OUTPATIENT)
Dept: REHABILITATION | Facility: HOSPITAL | Age: 77
End: 2022-08-31
Payer: MEDICARE

## 2022-08-31 DIAGNOSIS — M53.86 DECREASED ROM OF LUMBAR SPINE: Primary | ICD-10-CM

## 2022-08-31 PROCEDURE — 97112 NEUROMUSCULAR REEDUCATION: CPT | Mod: PN

## 2022-08-31 PROCEDURE — 97110 THERAPEUTIC EXERCISES: CPT | Mod: PN

## 2022-08-31 PROCEDURE — 97530 THERAPEUTIC ACTIVITIES: CPT | Mod: PN

## 2022-08-31 NOTE — PLAN OF CARE
[facility]   Discharge Note    Name: Jessica Perez  Clinic Number: [id number]    Therapy Diagnosis:   Encounter Diagnosis   Name Primary?    Decreased ROM of lumbar spine Yes     Physician: Jennifer Graves MD    Physician Orders: PT Eval and Treat   Medical Diagnosis from Referral: N39.46 (ICD-10-CM) - Mixed incontinence  Evaluation Date: 7/18/2022    Date of Last visit: 8/31/2022  Total Visits Received: 5    ASSESSMENT      Discharge reason: Patient is now asymptomatic and Patient has met all of his/her goals    Goals: Short Term Goals: 4 weeks   1. Pt to be edu pelvic muscle bracing and be able to consistently perform correctly and quickly to help decrease incontinence with cough/laugh/sneeze. MET 8/10/2022  2. Pt to report a decrease in pad usage to no more than 1 a day to demonstrate improving pelvic floor function needed for continence. MET 8/10/2022  3. Pt to demonstrate proper diaphragmatic breathing to help with calming the nervous system in order to decrease pain and to improve abdominal wall musculature extensibility. MET 8/10/2022        Long Term Goals: 12 weeks   1. Pt to be discharged with home plan for carry over after discharge.  MET 8/10/2022  2. Pt to report a decrease in pad usage to 0 pads a day to demonstrate improving pelvic floor muscle controls as evidenced by decreased episodes of incontinence needed to improve confidence in social situations. PM 8/10/2022  3. Pt to report being able to delay the urge to have a BM at least 10 minutes when out in public to decrease episodes of incontinence while looking for a bathroom or waiting in line at the bathroom. MET 8/31/2022    PLAN   This patient is discharged from Physical Therapy      Sharon Villegas, PT, DPT, PPCES  08/31/2022

## 2022-09-09 NOTE — PROGRESS NOTES
"[facility]   Physical Therapy Treatment Note     Name: Jessica Perez  Clinic Number: [id number]    Therapy Diagnosis:   Encounter Diagnosis   Name Primary?    Decreased ROM of lumbar spine Yes       Physician: Dileep Mendoza MD    Visit Date: 9/12/2022    Physician Orders: PT Eval and Treat   Medical Diagnosis from Referral:   M54.50,G89.29 (ICD-10-CM) - Chronic low back pain, unspecified back pain laterality, unspecified whether sciatica present   M17.0 (ICD-10-CM) - Primary osteoarthritis of both knees      Evaluation Date: 5/4/2022  Authorization Period Expiration: 12/31/2022  Plan of Care Expiration: 8/4/2022  Visit # / Visits authorized: 15/ 20     PN DUE: 8/13/2022     Precautions: Standard    PTA Visit #: 0/5     Time In: 3:00  Time Out: 3:55  Total Billable Time: 55 minutes    SUBJECTIVE     Pt reports: she can't squat because it hurts her knees. Patient reports her back and knees are flared up today.     She was compliant with home exercise program.  Response to previous treatment: no change reported   Functional change: no change reported     Pain: 5/10 bilateral knees; 6.5/10 back at rest    OBJECTIVE     Objective Measures updated at progress report unless specified.     UPDATE MEASURES NEXT VISIT     Treatment     Jessica received the treatments listed below:      (exercises performed today are bolded)    therapeutic exercises to develop strength, endurance, ROM, flexibility, posture and core stabilization for 30 minutes including:    Recumbent bike seat 3 for increased lower extremity range of motion, strength, and endurance for 5 minutes   Slant board gastroc stretch 3 x 30"  Bridges 2 x 10  SL clams 3 x 10 with green theraband   Supine hamstring stretch 3 x 30" each   QS 2 x 10   Supine SLR 2 x 10   Seated LAQ 2 x 10   Double knee to chest with overpressure 2 x 10  Prone on elbows 2 x 2 minutes (decreased pain)     tailgaters x 2 minutes #5   Steam boats x 10 " "  SAQ  Open books x 10   DKTC 10 x 10"  Piriformis stretch 3 x 30"  Seated forward flex   Standing IT band stretch 3 x 15"   green thera band inversion 2 x 10   side lying reverse clams 2 x 10   sit to stand 2 x 10   side stepping with yellow theraband     +Practice with swiffer, vacuum and mop for education on posture and management of her back pain during ADLs.  +Education for keeping cleaning tools close to her body with cleaning the house    manual therapy techniques: Joint mobilizations and Myofacial release were applied to the: bilateral knee and low back for 25 minutes, including:    bilateral Patella distraction and mobilization   myofascial release of L hip adductors at distal site of knee   bilateral MFR of bilateral lumbar paraspinals and musculature over iliac crest with and without passive hip internal/external rotation  Myofascial release of lumbar paraspinals bilaterally  Side lying manual quadratus lumborum stretch and lumbar distraction   Manual L knee joint distraction with patellar mobilizations   L knee joint mobilizations anterior/posterior   soft tissue mobilization to L adductors    neuromuscular re-education activities to improve: Balance and Proprioception for 0 minutes. The following activities were included:    Romberg stance on foam 3 x 30" eyes closed   Tandem walks x 5 laps   Tandem stance 2 x 30" each   Side stepping over cones x 8 laps   Marching on foam 2 x 10   ankle inversion on foam 2 x 20  + Transverse abdominus contraction 5" x 10   + Transverse abdominus with march 2 x 10     Patient Education and Home Exercises     Home Exercises Provided and Patient Education Provided     Education provided:   - Education/Self-Care provided:   Patient educated on the impairments noted above and the effects of physical therapy intervention to improve overall condition and QOL.   Patient was educated on all the above exercise prior/during/after for proper posture, positioning, and execution for " safe performance with home exercise program.   Patient educated on the importance of improved core and upper extremity strength in order to improve alignment of the spine and upper extremities with static positions and dynamic movement.   Patient educated on the importance of strong core and lower extremity musculature in order to improve both static and dynamic balance, improve gait mechanics, reduce fall risk and improve household and community mobility      Written Home Exercises Provided: yes. Exercises were reviewed and Jessica was able to demonstrate them prior to the end of the session.  Jessica demonstrated good  understanding of the education provided. See EMR under Patient Instructions for exercises provided during therapy sessions    ASSESSMENT     Patient presents with continued knee and back pain, but patient has not received therapy for her knee and back pain in a while due to being treatment for pelvic floor dysfunction. Patient tolerated exercise well today with no complaints. Patient reports decreased pain with recumbent bike, tailgaters, patella mobs, and side lying manual quadratus lumborum stretch/lumbar distraction.     Jessica Is progressing well towards her goals.   Pt prognosis is Good.     Pt will continue to benefit from skilled outpatient physical therapy to address the deficits listed in the problem list box on initial evaluation, provide pt/family education and to maximize pt's level of independence in the home and community environment.     Pt's spiritual, cultural and educational needs considered and pt agreeable to plan of care and goals.     Anticipated barriers to physical therapy: pain and decreased exercise tolerance     Goals:   Short Term Goals:  4 weeks   Pain: Pt will demonstrate improved pain by reports of less than or equal to 7/10 worst pain on the verbal rating scale in order to progress toward maximal functional ability and improve QOL. MET 5/19/2022   2.  Mobility:  Patient will present with full knee extension range of motion in order to return to maximal functional potential and improve quality of life. Met 6/9/2022   3.  HEP: Patient will demonstrate independence with HEP in order to progress toward functional independence. MET 5/19/2022      Long Term Goals:  8 weeks   Pain: Pt will demonstrate improved pain by reports of less than or equal to 5/10 worst pain on the verbal rating scale in order to progress toward maximal functional ability and improve QOL.  MET 6/16/2022   Function: Patient will demonstrate improved function as indicated by a functional limitation score of 54% on the FOTO. PC   3. Strength: Patient will improve strength to +4/5 in bilateral lower extremities in order to improve functional independence and quality of life. Met 6/9/2022   4. Patient will return to normal ADL's, IADL's, community involvement, recreational activities, and work-related activities with no pain and maximal function. PC      Goals Key:  PC= progressing/continue;        PM= partially met;             DC= discontinue      PLAN     Plan of care Certification: 5/4/2022 to 8/4/2022.     Cont PT per POC and progress pt as tolerated and appropriate.    Sharon Villegas, PT, DPT, PPCES  09/12/2022

## 2022-09-12 ENCOUNTER — CLINICAL SUPPORT (OUTPATIENT)
Dept: REHABILITATION | Facility: HOSPITAL | Age: 77
End: 2022-09-12
Payer: MEDICARE

## 2022-09-12 DIAGNOSIS — M53.86 DECREASED ROM OF LUMBAR SPINE: Primary | ICD-10-CM

## 2022-09-12 PROCEDURE — 97140 MANUAL THERAPY 1/> REGIONS: CPT | Mod: PN

## 2022-09-12 PROCEDURE — 97110 THERAPEUTIC EXERCISES: CPT | Mod: PN

## 2022-09-14 NOTE — PROGRESS NOTES
[facility]   Physical Therapy Treatment Note     Name: Jessica Perez  Clinic Number: [id number]    Therapy Diagnosis:   Encounter Diagnosis   Name Primary?    Decreased ROM of lumbar spine Yes     Physician: Dileep Mendoza MD    Visit Date: 9/15/2022    Physician Orders: PT Eval and Treat   Medical Diagnosis from Referral:   M54.50,G89.29 (ICD-10-CM) - Chronic low back pain, unspecified back pain laterality, unspecified whether sciatica present   M17.0 (ICD-10-CM) - Primary osteoarthritis of both knees      Evaluation Date: 5/4/2022  Authorization Period Expiration: 12/31/2022  Plan of Care Expiration: 8/4/2022  Visit # / Visits authorized: 17/ 20     PN DUE: 8/13/2022     Precautions: Standard    PTA Visit #: 0/5     Time In: 10:55  Time Out: 11:35  Total Billable Time: 40 minutes  + 10 minutes of cold pack (not included)    SUBJECTIVE     Pt reports: she can't squat because it hurts her knees. Patient reports her back and knees are flared up today.     She was compliant with home exercise program.  Response to previous treatment: no change reported   Functional change: no change reported     Pain: 8/10 left knee     OBJECTIVE     Objective Measures updated at progress report unless specified.     Lumbar Range of Motion:     % of normal motion Pain   Flexion 100%     None with these movements          Extension 100%               Left Side Bending 100%           Right Side Bending 100%           Left rotation    100%           Right Rotation    100%                 Lower Extremity Strength  Right LE   Left LE     Knee extension: 5/5 Knee extension: 5/5   Knee flexion: 5/5 Knee flexion: 5/5   Hip flexion: 5/5 Hip flexion: 5/5   Hip abduction: 5/5 Hip abduction: 5/5   Hip adduction: 5/5 Hip adduction 5/5   Ankle dorsiflexion: 5/5 Ankle dorsiflexion: 5/5   Ankle plantarflexion: 5/5 Ankle plantarflexion: 5/5      FOTO:        Treatment     Jessica received the treatments listed below:   "    (exercises performed today are bolded)    therapeutic exercises to develop strength, endurance, ROM, flexibility, posture and core stabilization for 25 minutes including:    Recumbent bike seat 3 for increased lower extremity range of motion, strength, and endurance for 5 minutes   Slant board gastroc stretch 3 x 30"  Bridges 2 x 10   SL clams 3 x 10 with green theraband   Supine hamstring stretch 3 x 30" each   QS 2 x 10   Supine SLR 2 x 10   Seated LAQ 2 x 10   Double knee to chest with overpressure 2 x 10  Prone on elbows 2 x 2 minutes (decreased pain)     tailgaters x 2 minutes #5   Steam boats x 10   SAQ   Open books x 10   DKTC 10 x 10"   Piriformis stretch 3 x 30"   Seated forward flex   Standing IT band stretch 3 x 15"   green thera band inversion 2 x 10   side lying hip abduction 2 x 10   sit to stand 2 x 10   side stepping with yellow theraband     +Practice with swiffer, vacuum and mop for education on posture and management of her back pain during ADLs.  +Education for keeping cleaning tools close to her body with cleaning the house    manual therapy techniques: Joint mobilizations and Myofacial release were applied to the: bilateral knee and low back for 15 minutes, including:    bilateral Patella distraction and mobilization   myofascial release of L hip adductors at distal site of knee   bilateral MFR of bilateral lumbar paraspinals and musculature over iliac crest with and without passive hip internal/external rotation  Myofascial release of lumbar paraspinals bilaterally  Side lying manual quadratus lumborum stretch and lumbar distraction   Manual seated knee joint distraction   L knee joint mobilizations anterior/posterior   soft tissue mobilization to L adductors    neuromuscular re-education activities to improve: Balance and Proprioception for 0 minutes. The following activities were included:    Romberg stance on foam 3 x 30" eyes closed   Tandem walks x 5 laps   Tandem stance 2 x 30" each " "  Side stepping over cones x 8 laps   Marching on foam 2 x 10   ankle inversion on foam 2 x 20  + Transverse abdominus contraction 5" x 10   + Transverse abdominus with march 2 x 10     + 10 minutes of cold pack to bilateral knees (not charged or included in treatment time)    Patient Education and Home Exercises     Home Exercises Provided and Patient Education Provided     Education provided:   - Education/Self-Care provided:   Patient educated on the impairments noted above and the effects of physical therapy intervention to improve overall condition and QOL.   Patient was educated on all the above exercise prior/during/after for proper posture, positioning, and execution for safe performance with home exercise program.   Patient educated on the importance of improved core and upper extremity strength in order to improve alignment of the spine and upper extremities with static positions and dynamic movement.   Patient educated on the importance of strong core and lower extremity musculature in order to improve both static and dynamic balance, improve gait mechanics, reduce fall risk and improve household and community mobility      Written Home Exercises Provided: yes. Exercises were reviewed and Jessica was able to demonstrate them prior to the end of the session.  Jessica demonstrated good  understanding of the education provided. See EMR under Patient Instructions for exercises provided during therapy sessions    ASSESSMENT     Patient presents with increased knee pain and swelling today. Exercises program was not progressed secondary to increased pain and all exercises were performed on the mat besides the bike. Patient reports significant decrease in pain with knee joint distraction. Patient tolerated all exercises well with no complaints. Despite increased pain patient is progressing well with therapy and FOTO limitation has decreased signifying return to prior level of function. Patient has met 6 out of " 7 goals.     Jessica Is progressing well towards her goals.   Pt prognosis is Good.     Pt will continue to benefit from skilled outpatient physical therapy to address the deficits listed in the problem list box on initial evaluation, provide pt/family education and to maximize pt's level of independence in the home and community environment.     Pt's spiritual, cultural and educational needs considered and pt agreeable to plan of care and goals.     Anticipated barriers to physical therapy: pain and decreased exercise tolerance     Goals:   Short Term Goals:  4 weeks   Pain: Pt will demonstrate improved pain by reports of less than or equal to 7/10 worst pain on the verbal rating scale in order to progress toward maximal functional ability and improve QOL. MET 5/19/2022   2.  Mobility: Patient will present with full knee extension range of motion in order to return to maximal functional potential and improve quality of life. Met 6/9/2022   3.  HEP: Patient will demonstrate independence with HEP in order to progress toward functional independence. MET 5/19/2022      Long Term Goals:  8 weeks   Pain: Pt will demonstrate improved pain by reports of less than or equal to 5/10 worst pain on the verbal rating scale in order to progress toward maximal functional ability and improve QOL.  MET 6/16/2022   Function: Patient will demonstrate improved function as indicated by a functional limitation score of 54% on the FOTO. MET 9/15/2022   3. Strength: Patient will improve strength to +4/5 in bilateral lower extremities in order to improve functional independence and quality of life. Met 6/9/2022   4. Patient will return to normal ADL's, IADL's, community involvement, recreational activities, and work-related activities with no pain and maximal function. PC      Goals Key:  PC= progressing/continue;        PM= partially met;             DC= discontinue      PLAN     Plan of care Certification: 5/4/2022 to 8/4/2022.     Cont  PT per POC and progress pt as tolerated and appropriate.    Sharon Villegas, PT, DPT, PPCES  09/15/2022

## 2022-09-15 ENCOUNTER — CLINICAL SUPPORT (OUTPATIENT)
Dept: REHABILITATION | Facility: HOSPITAL | Age: 77
End: 2022-09-15
Payer: MEDICARE

## 2022-09-15 ENCOUNTER — SPECIALTY PHARMACY (OUTPATIENT)
Dept: PHARMACY | Facility: CLINIC | Age: 77
End: 2022-09-15
Payer: MEDICARE

## 2022-09-15 DIAGNOSIS — M53.86 DECREASED ROM OF LUMBAR SPINE: Primary | ICD-10-CM

## 2022-09-15 PROCEDURE — 97140 MANUAL THERAPY 1/> REGIONS: CPT | Mod: PN

## 2022-09-15 PROCEDURE — 97110 THERAPEUTIC EXERCISES: CPT | Mod: PN

## 2022-09-15 NOTE — TELEPHONE ENCOUNTER
Specialty Pharmacy - Refill Coordination    Specialty Medication Orders Linked to Encounter      Flowsheet Row Most Recent Value   Medication #1 evolocumab (REPATHA SURECLICK) 140 mg/mL PnIj (Order#[id number], Rx#[id number])            Refill Questions - Documented Responses      Flowsheet Row Most Recent Value   Patient Availability and HIPAA Verification    Does patient want to proceed with activity? Yes   HIPAA/medical authority confirmed? Yes   Relationship to patient of person spoken to? Self   Refill Screening Questions    Would patient like to speak to a pharmacist? No   When does the patient need to receive the medication? 09/23/22   Refill Delivery Questions    How will the patient receive the medication? Delivery [facility]   When does the patient need to receive the medication? 09/23/22   Shipping Address Home   Address in [facility] confirmed and updated if neccessary? Yes   Expected Copay ($) 0   Is the patient able to afford the medication copay? Yes   Payment Method zero copay   Days supply of Refill 84   Supplies needed? No supplies needed   Refill activity completed? Yes   Refill activity plan Refill scheduled   Shipment/Pickup Date: 09/21/22            Current Outpatient Medications   Medication Sig    acetaminophen/diphenhydramine (TYLENOL PM ORAL) Take by mouth.    APPLE CIDER VINEGAR ORAL Take by mouth.    artificial tears,hypromellose,,GENTEAL/SUSTANE, 0.3 % Gel     aspirin (ECOTRIN) 81 MG EC tablet Take 81 mg by mouth once daily.    azelastine (ASTELIN) 137 mcg (0.1 %) nasal spray 1 spray (137 mcg total) by Nasal route 2 (two) times daily.    b complex vitamins capsule Take 1 capsule by mouth once daily.    calcium carbonate (CALCIUM 500 ORAL) Take by mouth.    cholestyramine (QUESTRAN) 4 gram packet Take 1 packet (4 g total) by mouth daily as needed (diarrhea).    cyanocobalamin, vitamin B-12, (VITAMIN B-12 ORAL) Take by mouth.    denosumab (PROLIA SUBQ) Inject into the skin.     ergocalciferol, vitamin D2, (VITAMIN D ORAL) Take by mouth.    evolocumab (REPATHA SURECLICK) 140 mg/mL PnIj Repatha SureClick Take No date recorded No form recorded No frequency recorded No route recorded No set duration recorded No set duration amount recorded active No dosage strength recorded No dosage strength units of measure recorded    evolocumab (REPATHA SURECLICK) 140 mg/mL PnIj Inject 1 pen (140 mg total) into the skin every 14 (fourteen) days.    ezetimibe (ZETIA) 10 mg tablet Take 1 tablet (10 mg total) by mouth once daily.    famotidine (PEPCID) 40 MG tablet Take 1 tablet (40 mg total) by mouth every evening.    fexofenadine (ALLEGRA) 180 MG tablet Take 1 tablet (180 mg total) by mouth once daily.    flaxseed oiL 1,000 mg Cap flaxseed oil Take No date recorded No form recorded No frequency recorded No route recorded No set duration recorded No set duration amount recorded suspended No dosage strength recorded No dosage strength units of measure recorded    fluocinolone (SYNALAR) 0.01 % external solution AAA of scalp twice a day PRN scalp itching or eczema flares.    fluticasone (FLONASE) 50 mcg/actuation nasal spray 2 sprays by Each Nare route once daily.    hydrOXYzine pamoate (VISTARIL) 25 MG Cap Take 1 capsule (25 mg total) by mouth every 8 (eight) hours as needed (itching).    ketoconazole (NIZORAL) 2 % shampoo Apply topically every 7 days. Shampoo scalp 1-2 times per week. Lather x 5 minutes, rinse well.    krill oil-omega-3-dha-epa 150-450 mg CpDR Take by mouth.    multivitamin capsule Take 1 capsule by mouth Daily.    tiZANidine (ZANAFLEX) 4 MG tablet Take 2 mg by mouth nightly as needed.    triamcinolone acetonide 0.1% (KENALOG) 0.1 % cream Apply topically 2 (two) times daily.    trospium (SANCTURA XR) 60 mg Cp24 capsule Take 1 capsule (60 mg total) by mouth once daily.    TURMERIC ORAL Take by mouth.    UNABLE TO FIND CBD Oil topical    ZINC ORAL Take by mouth.   Last reviewed on 8/2/2022   9:49 AM by Jazzy Poon LPN    Review of patient's allergies indicates:   Allergen Reactions    Augmentin [amoxicillin-pot clavulanate] Other (See Comments)     Diarrhea, yeast    Bactrim  [sulfamethoxazole-trimethoprim]      Other reaction(s): Unknown    Celebrex [celecoxib] Other (See Comments)     Stomach pain, gas     Lipitor  [atorvastatin]      Other reaction(s): Unknown    Pravachol  [pravastatin]      Other reaction(s): Unknown    Statins-hmg-coa reductase inhibitors      Other reaction(s): Muscle pain    Sulfa (sulfonamide antibiotics)      Other reaction(s): Swelling    Zoster vaccine live     Last reviewed on  8/2/2022 9:48 AM by Jazzy Poon      Tasks added this encounter   12/7/2022 - Refill Call (Auto Added)   Tasks due within next 3 months   No tasks due.     [facility]  [address]  [facility] [address]  Phone: [phone]  Fax: [phone]

## 2022-09-20 NOTE — PROGRESS NOTES
"[facility]   Physical Therapy Treatment Note     Name: Jessica Perez  Clinic Number: [id number]    Therapy Diagnosis:   Encounter Diagnosis   Name Primary?    Decreased ROM of lumbar spine Yes       Physician: Dileep Mendoza MD    Visit Date: 9/21/2022    Physician Orders: PT Eval and Treat   Medical Diagnosis from Referral:   M54.50,G89.29 (ICD-10-CM) - Chronic low back pain, unspecified back pain laterality, unspecified whether sciatica present   M17.0 (ICD-10-CM) - Primary osteoarthritis of both knees      Evaluation Date: 5/4/2022  Authorization Period Expiration: 12/31/2022  Plan of Care Expiration: 8/4/2022  Visit # / Visits authorized: 18/ 20     PN DUE: 8/13/2022     Precautions: Standard    PTA Visit #: 1/5     Time In: 8:33am  Time Out: 9:15am  Total Billable Time: 42 minutes  + 0 minutes of cold pack (not included)    SUBJECTIVE     Pt reports: Back was killing her after using the shovel and weedwhacking. Back is worse than her knee right now, knee is more consistent and she's more used to that pain.    She was compliant with home exercise program.  Response to previous treatment: no change reported   Functional change: no change reported     Pain: 8/10 left knee     OBJECTIVE     Objective Measures updated at progress report unless specified.       Treatment     Jessica received the treatments listed below:      (exercises performed today are bolded)    therapeutic exercises to develop strength, endurance, ROM, flexibility, posture and core stabilization for 40 minutes including:    Recumbent bike seat 3 for increased lower extremity range of motion, strength, and endurance for 5 minutes   Slant board gastroc stretch 3 x 30"  Bridges 2 x 10   SL clams 3 x 10 with green theraband   Supine hamstring stretch 3 x 30" each   QS 2 x 10   Supine SLR 2 x 10   Seated LAQ 2 x 10   Double knee to chest with overpressure 2 x 10  Prone on elbows 2 x 2 minutes (decreased pain)   CHINEDU " "deadlift from 4 inch block 2 x 10 #20    tailgaters x 2 minutes #5   Steam boats x 10   SAQ   Open books x 10   DKTC 10 x 10"   Piriformis stretch 3 x 30"   Seated forward flex   Standing IT band stretch 3 x 15"   green thera band inversion 2 x 10   side lying hip abduction 2 x 10   sit to stand 2 x 10   side stepping with yellow theraband     +Practice with swiffer, vacuum and mop for education on posture and management of her back pain during ADLs.  +Education for keeping cleaning tools close to her body with cleaning the house    manual therapy techniques: Joint mobilizations and Myofacial release were applied to the: bilateral knee and low back for 5 minutes, including:    bilateral Patella distraction and mobilization   myofascial release of L hip adductors at distal site of knee   bilateral MFR of bilateral lumbar paraspinals and musculature over iliac crest with and without passive hip internal/external rotation  Myofascial release of lumbar paraspinals bilaterally  Side lying manual quadratus lumborum stretch and lumbar distraction   Manual seated knee joint distraction   L knee joint mobilizations anterior/posterior   soft tissue mobilization to L adductors    neuromuscular re-education activities to improve: Balance and Proprioception for 0 minutes. The following activities were included:    Romberg stance on foam 3 x 30" eyes closed   Tandem walks x 5 laps   Tandem stance 2 x 30" each   Side stepping over cones x 8 laps   Marching on foam 2 x 10   ankle inversion on foam 2 x 20  + Transverse abdominus contraction 5" x 10   + Transverse abdominus with march 2 x 10     + 0 minutes of cold pack to bilateral knees (not charged or included in treatment time)    Patient Education and Home Exercises     Home Exercises Provided and Patient Education Provided     Education provided:   - Education/Self-Care provided:   Patient educated on the impairments noted above and the effects of physical therapy intervention " to improve overall condition and QOL.   Patient was educated on all the above exercise prior/during/after for proper posture, positioning, and execution for safe performance with home exercise program.   Patient educated on the importance of improved core and upper extremity strength in order to improve alignment of the spine and upper extremities with static positions and dynamic movement.   Patient educated on the importance of strong core and lower extremity musculature in order to improve both static and dynamic balance, improve gait mechanics, reduce fall risk and improve household and community mobility      Written Home Exercises Provided: yes. Exercises were reviewed and Jessica was able to demonstrate them prior to the end of the session.  Jessica demonstrated good  understanding of the education provided. See EMR under Patient Instructions for exercises provided during therapy sessions    ASSESSMENT     Pt presents today with increased low back pain. Continued with established exercises to good tolerance. Added deadlifts to progress both hamstring and low back extension strength during a functional movement. Plan to continue to emphasize on more aggressive strengthening interventions in order to increase pt's functional capacity and decrease stresses put on knee and facet joints during daily tasks. Pt responded well to manual therapy with decreased pain in her lower back and improvement in soft tissue extensibility throughout her lumbar paraspinals.    Jessica Is progressing well towards her goals.   Pt prognosis is Good.     Pt will continue to benefit from skilled outpatient physical therapy to address the deficits listed in the problem list box on initial evaluation, provide pt/family education and to maximize pt's level of independence in the home and community environment.     Pt's spiritual, cultural and educational needs considered and pt agreeable to plan of care and goals.     Anticipated  barriers to physical therapy: pain and decreased exercise tolerance     Goals:   Short Term Goals:  4 weeks   Pain: Pt will demonstrate improved pain by reports of less than or equal to 7/10 worst pain on the verbal rating scale in order to progress toward maximal functional ability and improve QOL. MET 5/19/2022   2.  Mobility: Patient will present with full knee extension range of motion in order to return to maximal functional potential and improve quality of life. Met 6/9/2022   3.  HEP: Patient will demonstrate independence with HEP in order to progress toward functional independence. MET 5/19/2022      Long Term Goals:  8 weeks   Pain: Pt will demonstrate improved pain by reports of less than or equal to 5/10 worst pain on the verbal rating scale in order to progress toward maximal functional ability and improve QOL.  MET 6/16/2022   Function: Patient will demonstrate improved function as indicated by a functional limitation score of 54% on the FOTO. MET 9/15/2022   3. Strength: Patient will improve strength to +4/5 in bilateral lower extremities in order to improve functional independence and quality of life. Met 6/9/2022   4. Patient will return to normal ADL's, IADL's, community involvement, recreational activities, and work-related activities with no pain and maximal function. PC      Goals Key:  PC= progressing/continue;        PM= partially met;             DC= discontinue      PLAN     Plan of care Certification: 5/4/2022 to 8/4/2022.     Cont PT per POC and progress pt as tolerated and appropriate.    Chuck Ornelas, PTA  09/21/2022

## 2022-09-21 ENCOUNTER — CLINICAL SUPPORT (OUTPATIENT)
Dept: REHABILITATION | Facility: HOSPITAL | Age: 77
End: 2022-09-21
Payer: MEDICARE

## 2022-09-21 ENCOUNTER — OFFICE VISIT (OUTPATIENT)
Dept: OPHTHALMOLOGY | Facility: CLINIC | Age: 77
End: 2022-09-21
Payer: MEDICARE

## 2022-09-21 DIAGNOSIS — Z96.1 PSEUDOPHAKIA OF BOTH EYES: ICD-10-CM

## 2022-09-21 DIAGNOSIS — M35.01 KERATITIS SICCA, BILATERAL: ICD-10-CM

## 2022-09-21 DIAGNOSIS — H40.013 OPEN ANGLE WITH BORDERLINE FINDINGS, LOW RISK, BILATERAL: Primary | ICD-10-CM

## 2022-09-21 DIAGNOSIS — M53.86 DECREASED ROM OF LUMBAR SPINE: Primary | ICD-10-CM

## 2022-09-21 PROCEDURE — 99999 PR PBB SHADOW E&M-EST. PATIENT-LVL III: ICD-10-PCS | Mod: PBBFAC,,, | Performed by: OPHTHALMOLOGY

## 2022-09-21 PROCEDURE — 1160F RVW MEDS BY RX/DR IN RCRD: CPT | Mod: CPTII,S$GLB,, | Performed by: OPHTHALMOLOGY

## 2022-09-21 PROCEDURE — 92083 HUMPHREY VISUAL FIELD - OU - BOTH EYES: ICD-10-PCS | Mod: S$GLB,,, | Performed by: OPHTHALMOLOGY

## 2022-09-21 PROCEDURE — 1159F PR MEDICATION LIST DOCUMENTED IN MEDICAL RECORD: ICD-10-PCS | Mod: CPTII,S$GLB,, | Performed by: OPHTHALMOLOGY

## 2022-09-21 PROCEDURE — 99214 PR OFFICE/OUTPT VISIT, EST, LEVL IV, 30-39 MIN: ICD-10-PCS | Mod: S$GLB,,, | Performed by: OPHTHALMOLOGY

## 2022-09-21 PROCEDURE — 92083 EXTENDED VISUAL FIELD XM: CPT | Mod: S$GLB,,, | Performed by: OPHTHALMOLOGY

## 2022-09-21 PROCEDURE — 99214 OFFICE O/P EST MOD 30 MIN: CPT | Mod: S$GLB,,, | Performed by: OPHTHALMOLOGY

## 2022-09-21 PROCEDURE — 92133 CPTRZD OPH DX IMG PST SGM ON: CPT | Mod: S$GLB,,, | Performed by: OPHTHALMOLOGY

## 2022-09-21 PROCEDURE — 92133 POSTERIOR SEGMENT OCT OPTIC NERVE(OCULAR COHERENCE TOMOGRAPHY) - OU - BOTH EYES: ICD-10-PCS | Mod: S$GLB,,, | Performed by: OPHTHALMOLOGY

## 2022-09-21 PROCEDURE — 99999 PR PBB SHADOW E&M-EST. PATIENT-LVL III: CPT | Mod: PBBFAC,,, | Performed by: OPHTHALMOLOGY

## 2022-09-21 PROCEDURE — 1160F PR REVIEW ALL MEDS BY PRESCRIBER/CLIN PHARMACIST DOCUMENTED: ICD-10-PCS | Mod: CPTII,S$GLB,, | Performed by: OPHTHALMOLOGY

## 2022-09-21 PROCEDURE — 1159F MED LIST DOCD IN RCRD: CPT | Mod: CPTII,S$GLB,, | Performed by: OPHTHALMOLOGY

## 2022-09-21 PROCEDURE — 97110 THERAPEUTIC EXERCISES: CPT | Mod: PN,CQ

## 2022-09-21 NOTE — PROGRESS NOTES
[facility]   Physical Therapy Treatment Note     Name: Jessica Perez  Clinic Number: [id number]    Therapy Diagnosis:   Encounter Diagnosis   Name Primary?    Decreased ROM of lumbar spine Yes       Physician: Dileep Mendoza MD    Visit Date: 9/23/2022    Physician Orders: PT Eval and Treat   Medical Diagnosis from Referral:   M54.50,G89.29 (ICD-10-CM) - Chronic low back pain, unspecified back pain laterality, unspecified whether sciatica present   M17.0 (ICD-10-CM) - Primary osteoarthritis of both knees      Evaluation Date: 5/4/2022  Authorization Period Expiration: 12/31/2022  Plan of Care Expiration: 12/23/2022  Visit # / Visits authorized: 18/ 20     PN DUE: 10/15/2022     Precautions: Standard    PTA Visit #: 0/5     Time In: 8:16 am  Time Out: 9:00 am  Total Billable Time: 44 minutes  + 0 minutes of cold pack (not included)    SUBJECTIVE     Pt reports: her left knee is bothering her today, but her back feel better after last visit.     She was compliant with home exercise program.  Response to previous treatment: decreased back pain  Functional change: no change reported     Pain: 8/10 left knee     OBJECTIVE     Objective Measures updated at progress report unless specified.     Lumbar Range of Motion:     % of normal motion Pain   Flexion 100%     None with these movements          Extension 100%               Left Side Bending 100%           Right Side Bending 100%           Left rotation    100%           Right Rotation    100%                 Lower Extremity Strength  Right LE   Left LE     Knee extension: 5/5 Knee extension: 5/5   Knee flexion: 5/5 Knee flexion: 5/5   Hip flexion: 5/5 Hip flexion: 5/5   Hip abduction: 5/5 Hip abduction: 5/5   Hip adduction: 5/5 Hip adduction 5/5   Ankle dorsiflexion: 5/5 Ankle dorsiflexion: 5/5   Ankle plantarflexion: 5/5 Ankle plantarflexion: 5/5        Treatment     Jessica received the treatments listed below:      (exercises  "performed today are bolded)    therapeutic exercises to develop strength, endurance, ROM, flexibility, posture and core stabilization for 30 minutes including:    Recumbent bike seat 3 for increased lower extremity range of motion, strength, and endurance for 5 minutes   Slant board gastroc stretch 3 x 30"  Bridges 2 x 10   SL clams 3 x 10 with green theraband   Supine hamstring stretch 3 x 30" each   QS 2 x 10   Supine SLR 2 x 10   Seated LAQ 2 x 10   Double knee to chest with overpressure 2 x 10  Prone on elbows 2 x 2 minutes (decreased pain)   KB deadlift from 4 inch block 2 x 10 #20    tailgaters x 3 minutes #5   Steam boats x 10   SAQ   Open books x 10   DKTC 10 x 10"   Piriformis stretch 3 x 30"   Seated forward flex   Standing IT band stretch 3 x 15"   green thera band inversion 2 x 10   side lying hip abduction 2 x 10   sit to stand 2 x 10   side stepping with yellow theraband     +Practice with swiffer, vacuum and mop for education on posture and management of her back pain during ADLs.  +Education for keeping cleaning tools close to her body with cleaning the house    manual therapy techniques: Joint mobilizations and Myofacial release were applied to the: bilateral knee and low back for 14 minutes, including:    bilateral Patella distraction and mobilization   myofascial release of L hip adductors at distal site of knee   bilateral MFR of bilateral lumbar paraspinals and musculature over iliac crest with and without passive hip internal/external rotation  Myofascial release of lumbar paraspinals bilaterally  Side lying manual quadratus lumborum stretch and lumbar distraction   Manual seated knee joint distraction   L knee joint mobilizations anterior/posterior   soft tissue mobilization to L adductors    neuromuscular re-education activities to improve: Balance and Proprioception for 0 minutes. The following activities were included:    Romberg stance on foam 3 x 30" eyes closed   Tandem walks x 5 laps " "  Tandem stance 2 x 30" each   Side stepping over cones x 8 laps   Marching on foam 2 x 10   ankle inversion on foam 2 x 20  + Transverse abdominus contraction 5" x 10   + Transverse abdominus with march 2 x 10     + 0 minutes of cold pack to bilateral knees (not charged or included in treatment time)    Patient Education and Home Exercises     Home Exercises Provided and Patient Education Provided     Education provided:   - Education/Self-Care provided:   Patient educated on the impairments noted above and the effects of physical therapy intervention to improve overall condition and QOL.   Patient was educated on all the above exercise prior/during/after for proper posture, positioning, and execution for safe performance with home exercise program.   Patient educated on the importance of improved core and upper extremity strength in order to improve alignment of the spine and upper extremities with static positions and dynamic movement.   Patient educated on the importance of strong core and lower extremity musculature in order to improve both static and dynamic balance, improve gait mechanics, reduce fall risk and improve household and community mobility      Written Home Exercises Provided: yes. Exercises were reviewed and Jessica was able to demonstrate them prior to the end of the session.  Jessica demonstrated good  understanding of the education provided. See EMR under Patient Instructions for exercises provided during therapy sessions    ASSESSMENT     Pt presents today with decreased low back pain, but increased left knee. Patient 's knee pain was the main focus today. Seated knee joint distraction and patella mobs were performed for increased joint compression and decreased pain. Patient is progressing well with therapy and has met 6 out of 7 goals. Patient report decreased pain after every therapy session and would like to continue therapy. Therapy plan of care is being extended 3 more months. "     Jessica Is progressing well towards her goals.   Pt prognosis is Good.     Pt will continue to benefit from skilled outpatient physical therapy to address the deficits listed in the problem list box on initial evaluation, provide pt/family education and to maximize pt's level of independence in the home and community environment.     Pt's spiritual, cultural and educational needs considered and pt agreeable to plan of care and goals.     Anticipated barriers to physical therapy: pain and decreased exercise tolerance     Goals:   Short Term Goals:  4 weeks   Pain: Pt will demonstrate improved pain by reports of less than or equal to 7/10 worst pain on the verbal rating scale in order to progress toward maximal functional ability and improve QOL. MET 5/19/2022   2.  Mobility: Patient will present with full knee extension range of motion in order to return to maximal functional potential and improve quality of life. Met 6/9/2022   3.  HEP: Patient will demonstrate independence with HEP in order to progress toward functional independence. MET 5/19/2022      Long Term Goals:  8 weeks   Pain: Pt will demonstrate improved pain by reports of less than or equal to 5/10 worst pain on the verbal rating scale in order to progress toward maximal functional ability and improve QOL.  MET 6/16/2022   Function: Patient will demonstrate improved function as indicated by a functional limitation score of 54% on the FOTO. MET 9/15/2022   3. Strength: Patient will improve strength to +4/5 in bilateral lower extremities in order to improve functional independence and quality of life. Met 6/9/2022   4. Patient will return to normal ADL's, IADL's, community involvement, recreational activities, and work-related activities with no pain and maximal function. PC      Goals Key:  PC= progressing/continue;        PM= partially met;             DC= discontinue      PLAN     Plan of care Certification: 5/4/2022 to 12/23/2022.     Cont PT per  POC and progress pt as tolerated and appropriate.    Sharon Villegas, PT, DPT, PPCES  09/23/2022

## 2022-09-21 NOTE — PROGRESS NOTES
HPI     Glaucoma            Comments: 6m HVF, GOCT, and dil. Denies any pain or irritation. Noted in   OS, watery eyes and itchy, with occasional headaches and pressure behind   OS. Used Pataday and systane prn that helped. Va stable.           Comments    PCP: Dr. Lemus   1. COAG SUSP   SLT OD 3/26/15   SLT OS 5/15   2. Dry Eyes   3. PCIOL OD 3/4/2019 (+28.0 SN60WF) CDE 10.38 -NEAR   PCIOL OS +25.5 SN60WF / CDE: 9.46 / 2-4-19 2-5-19       GCL: 32/31        Pataday PRN   Systane BID   In the past Pt wore cls for monovision- then changed to MF cls            Last edited by Ramiro Calderón on 9/21/2022  1:36 PM.            Assessment /Plan     For exam results, see Encounter Report.      ICD-10-CM ICD-9-CM    1. Open angle with borderline findings, low risk, bilateral  H40.013 365.01 Posterior Segment OCT Optic Nerve- Both eyes      Kumar Visual Field - OU - Extended - Both Eyes    Doing well - intraocular pressure is within acceptable range relative to target pressure with no evidence of progression.   Continue current treatment.  Reviewed importance of continued compliance with treatment and follow up.       2. Keratitis sicca, bilateral  M35.01 370.8 Findings and symptoms consistent with mild dry eyes.   Recommend regular use of Artificial Tears. These should be thought of as Ocular Surface Moisturizers similar to skin moisturizers in that regular use is required to achieve maximum benefit.  Specifically, I recommend the following:    Systane Ultra or Refresh Optive Joe 3 : 3-4 times a day.   The first dose upon awakening is most important because we do not make tears at night.  Avoid generic products as they contain Benzalkonium Chloride as a preservative. This is a very irritating chemical and can make your eyes worse.    Omega 3 Fish Oils  1000 to 2000 mgs per day of Nordic Naturals or PRN Dry Eye formula [employer]            3. Pseudophakia of both eyes  Z96.1 V43.1 Stable- montior           Return to  clinic yearly for HVF and GOCT

## 2022-09-23 ENCOUNTER — CLINICAL SUPPORT (OUTPATIENT)
Dept: REHABILITATION | Facility: HOSPITAL | Age: 77
End: 2022-09-23
Payer: MEDICARE

## 2022-09-23 DIAGNOSIS — M53.86 DECREASED ROM OF LUMBAR SPINE: Primary | ICD-10-CM

## 2022-09-23 PROCEDURE — 97110 THERAPEUTIC EXERCISES: CPT | Mod: PN

## 2022-09-23 PROCEDURE — 97140 MANUAL THERAPY 1/> REGIONS: CPT | Mod: PN

## 2022-09-23 NOTE — PLAN OF CARE
[facility]   Physical Updated Plan of Care Note     Name: Jessica Perez  Clinic Number: [id number]    Therapy Diagnosis:   Encounter Diagnosis   Name Primary?    Decreased ROM of lumbar spine Yes       Physician: Dileep Mendoza MD    Visit Date: 9/23/2022    Physician Orders: PT Eval and Treat   Medical Diagnosis from Referral:   M54.50,G89.29 (ICD-10-CM) - Chronic low back pain, unspecified back pain laterality, unspecified whether sciatica present   M17.0 (ICD-10-CM) - Primary osteoarthritis of both knees      Evaluation Date: 5/4/2022  Authorization Period Expiration: 12/31/2022  Plan of Care Expiration: 12/23/2022  Visit # / Visits authorized: 18/ 20     PN DUE: 10/15/2022     Precautions: Standard    PTA Visit #: 1/5     Time In: 8:16 am  Time Out: 9:00 am  Total Billable Time: 44 minutes  + 0 minutes of cold pack (not included)    SUBJECTIVE     Pt reports: her left knee is bothering her today, but her back feel better after last visit.     She was compliant with home exercise program.  Response to previous treatment: decreased back pain  Functional change: no change reported     Pain: 8/10 left knee     OBJECTIVE     Objective Measures updated at progress report unless specified.     Lumbar Range of Motion:     % of normal motion Pain   Flexion 100%     None with these movements          Extension 100%               Left Side Bending 100%           Right Side Bending 100%           Left rotation    100%           Right Rotation    100%                 Lower Extremity Strength  Right LE   Left LE     Knee extension: 5/5 Knee extension: 5/5   Knee flexion: 5/5 Knee flexion: 5/5   Hip flexion: 5/5 Hip flexion: 5/5   Hip abduction: 5/5 Hip abduction: 5/5   Hip adduction: 5/5 Hip adduction 5/5   Ankle dorsiflexion: 5/5 Ankle dorsiflexion: 5/5   Ankle plantarflexion: 5/5 Ankle plantarflexion: 5/5        Treatment     Jessica received the treatments listed below:      (exercises  "performed today are bolded)    therapeutic exercises to develop strength, endurance, ROM, flexibility, posture and core stabilization for 30 minutes including:    Recumbent bike seat 3 for increased lower extremity range of motion, strength, and endurance for 5 minutes   Slant board gastroc stretch 3 x 30"  Bridges 2 x 10   SL clams 3 x 10 with green theraband   Supine hamstring stretch 3 x 30" each   QS 2 x 10   Supine SLR 2 x 10   Seated LAQ 2 x 10   Double knee to chest with overpressure 2 x 10  Prone on elbows 2 x 2 minutes (decreased pain)   KB deadlift from 4 inch block 2 x 10 #20    tailgaters x 3 minutes #5   Steam boats x 10   SAQ   Open books x 10   DKTC 10 x 10"   Piriformis stretch 3 x 30"   Seated forward flex   Standing IT band stretch 3 x 15"   green thera band inversion 2 x 10   side lying hip abduction 2 x 10   sit to stand 2 x 10   side stepping with yellow theraband     +Practice with swiffer, vacuum and mop for education on posture and management of her back pain during ADLs.  +Education for keeping cleaning tools close to her body with cleaning the house    manual therapy techniques: Joint mobilizations and Myofacial release were applied to the: bilateral knee and low back for 14 minutes, including:    bilateral Patella distraction and mobilization   myofascial release of L hip adductors at distal site of knee   bilateral MFR of bilateral lumbar paraspinals and musculature over iliac crest with and without passive hip internal/external rotation  Myofascial release of lumbar paraspinals bilaterally  Side lying manual quadratus lumborum stretch and lumbar distraction   Manual seated knee joint distraction   L knee joint mobilizations anterior/posterior   soft tissue mobilization to L adductors    neuromuscular re-education activities to improve: Balance and Proprioception for 0 minutes. The following activities were included:    Romberg stance on foam 3 x 30" eyes closed   Tandem walks x 5 laps " "  Tandem stance 2 x 30" each   Side stepping over cones x 8 laps   Marching on foam 2 x 10   ankle inversion on foam 2 x 20  + Transverse abdominus contraction 5" x 10   + Transverse abdominus with march 2 x 10     + 0 minutes of cold pack to bilateral knees (not charged or included in treatment time)    Patient Education and Home Exercises     Home Exercises Provided and Patient Education Provided     Education provided:   - Education/Self-Care provided:   Patient educated on the impairments noted above and the effects of physical therapy intervention to improve overall condition and QOL.   Patient was educated on all the above exercise prior/during/after for proper posture, positioning, and execution for safe performance with home exercise program.   Patient educated on the importance of improved core and upper extremity strength in order to improve alignment of the spine and upper extremities with static positions and dynamic movement.   Patient educated on the importance of strong core and lower extremity musculature in order to improve both static and dynamic balance, improve gait mechanics, reduce fall risk and improve household and community mobility      Written Home Exercises Provided: yes. Exercises were reviewed and Jessica was able to demonstrate them prior to the end of the session.  Jessica demonstrated good  understanding of the education provided. See EMR under Patient Instructions for exercises provided during therapy sessions    ASSESSMENT     Pt presents today with decreased low back pain, but increased left knee. Patient 's knee pain was the main focus today. Seated knee joint distraction and patella mobs were performed for increased joint compression and decreased pain. Patient is progressing well with therapy and has met 6 out of 7 goals. Patient report decreased pain after every therapy session and would like to continue therapy. Therapy plan of care is being extended 3 more months. "     Jessica Is progressing well towards her goals.   Pt prognosis is Good.     Pt will continue to benefit from skilled outpatient physical therapy to address the deficits listed in the problem list box on initial evaluation, provide pt/family education and to maximize pt's level of independence in the home and community environment.     Pt's spiritual, cultural and educational needs considered and pt agreeable to plan of care and goals.     Anticipated barriers to physical therapy: pain and decreased exercise tolerance     Goals:   Short Term Goals:  4 weeks   Pain: Pt will demonstrate improved pain by reports of less than or equal to 7/10 worst pain on the verbal rating scale in order to progress toward maximal functional ability and improve QOL. MET 5/19/2022   2.  Mobility: Patient will present with full knee extension range of motion in order to return to maximal functional potential and improve quality of life. Met 6/9/2022   3.  HEP: Patient will demonstrate independence with HEP in order to progress toward functional independence. MET 5/19/2022      Long Term Goals:  8 weeks   Pain: Pt will demonstrate improved pain by reports of less than or equal to 5/10 worst pain on the verbal rating scale in order to progress toward maximal functional ability and improve QOL.  MET 6/16/2022   Function: Patient will demonstrate improved function as indicated by a functional limitation score of 54% on the FOTO. MET 9/15/2022   3. Strength: Patient will improve strength to +4/5 in bilateral lower extremities in order to improve functional independence and quality of life. Met 6/9/2022   4. Patient will return to normal ADL's, IADL's, community involvement, recreational activities, and work-related activities with no pain and maximal function. PC      Goals Key:  PC= progressing/continue;        PM= partially met;             DC= discontinue      PLAN     Plan of care Certification: 5/4/2022 to 12/23/2022.     Cont PT per  POC and progress pt as tolerated and appropriate.    Sharon Villegas, PT, DPT, PPCES  09/23/2022

## 2022-09-26 ENCOUNTER — CLINICAL SUPPORT (OUTPATIENT)
Dept: REHABILITATION | Facility: HOSPITAL | Age: 77
End: 2022-09-26
Payer: MEDICARE

## 2022-09-26 DIAGNOSIS — M53.86 DECREASED ROM OF LUMBAR SPINE: Primary | ICD-10-CM

## 2022-09-26 PROCEDURE — 97110 THERAPEUTIC EXERCISES: CPT | Mod: PN,CQ

## 2022-09-26 NOTE — PROGRESS NOTES
"[facility]   Physical Therapy Treatment Note     Name: Jessica Perez  Clinic Number: [id number]    Therapy Diagnosis:   Encounter Diagnosis   Name Primary?    Decreased ROM of lumbar spine Yes         Physician: Dileep Mendoza MD    Visit Date: 9/26/2022    Physician Orders: PT Eval and Treat   Medical Diagnosis from Referral:   M54.50,G89.29 (ICD-10-CM) - Chronic low back pain, unspecified back pain laterality, unspecified whether sciatica present   M17.0 (ICD-10-CM) - Primary osteoarthritis of both knees      Evaluation Date: 5/4/2022  Authorization Period Expiration: 12/31/2022  Plan of Care Expiration: 12/23/2022  Visit # / Visits authorized: 20/ 20 (+eval)     PN DUE: 10/15/2022     Precautions: Standard    PTA Visit #: 1/5     Time In: 8:16 am  Time Out: 9:00 am  Total Billable Time: 44 minutes  + 0 minutes of cold pack (not included)    SUBJECTIVE     Pt reports: Doing good today. Feeling great, except for her knee but the pain is normal.    She was compliant with home exercise program.  Response to previous treatment: decreased back pain  Functional change: no change reported     Pain: 8/10 left knee     OBJECTIVE     Objective Measures updated at progress report unless specified.       Treatment     Jessica received the treatments listed below:      (exercises performed today are bolded)    therapeutic exercises to develop strength, endurance, ROM, flexibility, posture and core stabilization for 40 minutes including:    Recumbent bike seat 3 for increased lower extremity range of motion, strength, and endurance for 5 minutes   Slant board gastroc stretch 3 x 30"  Bridges 3 x 10   Hamstring bridges on swiss ball 3 x 10  SL clams 3 x 10 with green theraband   Supine hamstring stretch 3 x 30" each   QS 2 x 10   Supine SLR 2 x 10   Seated LAQ 3 x 10 #4  +Prone HS curls 3 x 10 #4  Double knee to chest with overpressure 2 x 10  Prone on elbows 2 x 2 minutes (decreased pain)   KB " "deadlift from 4 inch block 2 x 10 #20  Backwards walkouts with 20lbs 2 x 5  Sidestepping with red theraband at knees x 2 laps of gym track    tailgaters x 3 minutes #10   Steam boats x 10   SAQ   Open books x 10   DKTC 10 x 10"   Piriformis stretch 3 x 30"   Seated forward flex   Standing IT band stretch 3 x 15"   green thera band inversion 2 x 10   side lying hip abduction 2 x 10   sit to stand 2 x 10     +Practice with swiffer, vacuum and mop for education on posture and management of her back pain during ADLs.  +Education for keeping cleaning tools close to her body with cleaning the house    manual therapy techniques: Joint mobilizations and Myofacial release were applied to the: bilateral knee and low back for 5 minutes, including:    bilateral Patella distraction and mobilization   myofascial release of L hip adductors at distal site of knee   bilateral MFR of bilateral lumbar paraspinals and musculature over iliac crest with and without passive hip internal/external rotation  Myofascial release of lumbar paraspinals bilaterally  Side lying manual quadratus lumborum stretch and lumbar distraction   Manual seated knee joint distraction   L knee joint mobilizations anterior/posterior   soft tissue mobilization to L adductors    neuromuscular re-education activities to improve: Balance and Proprioception for 0 minutes. The following activities were included:    Romberg stance on foam 3 x 30" eyes closed   Tandem walks x 5 laps   Tandem stance 2 x 30" each   Side stepping over cones x 8 laps   Marching on foam 2 x 10   ankle inversion on foam 2 x 20  + Transverse abdominus contraction 5" x 10   + Transverse abdominus with march 2 x 10     + 0 minutes of cold pack to bilateral knees (not charged or included in treatment time)    Patient Education and Home Exercises     Home Exercises Provided and Patient Education Provided     Education provided:   - Education/Self-Care provided:   Patient educated on the " impairments noted above and the effects of physical therapy intervention to improve overall condition and QOL.   Patient was educated on all the above exercise prior/during/after for proper posture, positioning, and execution for safe performance with home exercise program.   Patient educated on the importance of improved core and upper extremity strength in order to improve alignment of the spine and upper extremities with static positions and dynamic movement.   Patient educated on the importance of strong core and lower extremity musculature in order to improve both static and dynamic balance, improve gait mechanics, reduce fall risk and improve household and community mobility      Written Home Exercises Provided: yes. Exercises were reviewed and Jessica was able to demonstrate them prior to the end of the session.  Jessica demonstrated good  understanding of the education provided. See EMR under Patient Instructions for exercises provided during therapy sessions    ASSESSMENT     Pt presents today reporting ongoing improvement in her low back pain, but knee pain is persistent. Emphasized LE strengthening today with both closed kinetic chain and open kinetic chain exercises to better strengthen musculature more thoroughly. Pt tolerated exercises well today without increase in pain, except for during backwards walkouts. Pain improved significantly once knee joint distraction was applied afterwards. Will continue to progress strengthening in order to improve dynamic stability and decrease pressure put on joint and ligamentous structures.    Jessica Is progressing well towards her goals.   Pt prognosis is Good.     Pt will continue to benefit from skilled outpatient physical therapy to address the deficits listed in the problem list box on initial evaluation, provide pt/family education and to maximize pt's level of independence in the home and community environment.     Pt's spiritual, cultural and educational  needs considered and pt agreeable to plan of care and goals.     Anticipated barriers to physical therapy: pain and decreased exercise tolerance     Goals:   Short Term Goals:  4 weeks   Pain: Pt will demonstrate improved pain by reports of less than or equal to 7/10 worst pain on the verbal rating scale in order to progress toward maximal functional ability and improve QOL. MET 5/19/2022   2.  Mobility: Patient will present with full knee extension range of motion in order to return to maximal functional potential and improve quality of life. Met 6/9/2022   3.  HEP: Patient will demonstrate independence with HEP in order to progress toward functional independence. MET 5/19/2022      Long Term Goals:  8 weeks   Pain: Pt will demonstrate improved pain by reports of less than or equal to 5/10 worst pain on the verbal rating scale in order to progress toward maximal functional ability and improve QOL.  MET 6/16/2022   Function: Patient will demonstrate improved function as indicated by a functional limitation score of 54% on the FOTO. MET 9/15/2022   3. Strength: Patient will improve strength to +4/5 in bilateral lower extremities in order to improve functional independence and quality of life. Met 6/9/2022   4. Patient will return to normal ADL's, IADL's, community involvement, recreational activities, and work-related activities with no pain and maximal function. PC      Goals Key:  PC= progressing/continue;        PM= partially met;             DC= discontinue      PLAN     Plan of care Certification: 5/4/2022 to 12/23/2022.     Cont PT per POC and progress pt as tolerated and appropriate.    Chuck Ornelas, PTA  09/26/2022

## 2022-09-27 NOTE — PROGRESS NOTES
"[facility]   Physical Therapy Treatment Note     Name: Jessica Perez  Clinic Number: [id number]    Therapy Diagnosis:   Encounter Diagnosis   Name Primary?    Decreased ROM of lumbar spine Yes       Physician: Dileep Mendoza MD    Visit Date: 9/28/2022    Physician Orders: PT Eval and Treat   Medical Diagnosis from Referral:   M54.50,G89.29 (ICD-10-CM) - Chronic low back pain, unspecified back pain laterality, unspecified whether sciatica present   M17.0 (ICD-10-CM) - Primary osteoarthritis of both knees      Evaluation Date: 5/4/2022  Authorization Period Expiration: 12/31/2022  Plan of Care Expiration: 12/23/2022  Visit # / Visits authorized: 21/ 25 (+eval)     PN DUE: 10/15/2022     Precautions: Standard    PTA Visit #: 2/5     Time In: 3:15pm  Time Out: 3:55pm  Total Billable Time: 40 minutes  + 0 minutes of cold pack (not included)    SUBJECTIVE     Pt reports: Doing pretty well, knee is still hurting but she has been exercises and taking care of her back at home.    She was compliant with home exercise program.  Response to previous treatment: decreased back pain  Functional change: no change reported     Pain: 8/10 left knee     OBJECTIVE     Objective Measures updated at progress report unless specified.       Treatment     Jessica received the treatments listed below:      (exercises performed today are bolded)    therapeutic exercises to develop strength, endurance, ROM, flexibility, posture and core stabilization for 38 minutes including:    Recumbent bike seat 3 for increased lower extremity range of motion, strength, and endurance for 5 minutes   Slant board gastroc stretch 3 x 30"  Bridges 3 x 10   Hamstring bridges on swiss ball 3 x 10  SL clams 3 x 10 with green theraband   Supine hamstring stretch 3 x 30" each   QS 2 x 10   Supine SLR 2 x 10   Seated LAQ 3 x 10 #4  Prone HS curls 3 x 10 #4  Double knee to chest with overpressure 2 x 10  Prone on elbows 2 x 2 minutes " "(decreased pain)   KB deadlift from 4 inch block 2 x 10 #20  Backwards walkouts with 20lbs 2 x 5  Sidestepping with red theraband at knees x 2 laps of gym track    tailgaters x 3 minutes #10   Steam boats x 10   SAQ   Open books x 10   DKTC 10 x 10"   Piriformis stretch 3 x 30"   Seated forward flex   Standing IT band stretch 3 x 15"   green thera band inversion 2 x 10   side lying hip abduction 2 x 10   sit to stand 2 x 10     +Practice with swiffer, vacuum and mop for education on posture and management of her back pain during ADLs.  +Education for keeping cleaning tools close to her body with cleaning the house    manual therapy techniques: Joint mobilizations and Myofacial release were applied to the: bilateral knee and low back for 2 minutes, including:    bilateral Patella distraction and mobilization   myofascial release of L hip adductors at distal site of knee   bilateral MFR of bilateral lumbar paraspinals and musculature over iliac crest with and without passive hip internal/external rotation  Myofascial release of lumbar paraspinals bilaterally  Side lying manual quadratus lumborum stretch and lumbar distraction   Manual seated knee joint distraction   L knee joint mobilizations anterior/posterior   soft tissue mobilization to L adductors    neuromuscular re-education activities to improve: Balance and Proprioception for 0 minutes. The following activities were included:    Romberg stance on foam 3 x 30" eyes closed   Tandem walks x 5 laps   Tandem stance 2 x 30" each   Side stepping over cones x 8 laps   Marching on foam 2 x 10   ankle inversion on foam 2 x 20  + Transverse abdominus contraction 5" x 10   + Transverse abdominus with march 2 x 10     + 0 minutes of cold pack to bilateral knees (not charged or included in treatment time)    Patient Education and Home Exercises     Home Exercises Provided and Patient Education Provided     Education provided:   - Education/Self-Care provided:   Patient " educated on the impairments noted above and the effects of physical therapy intervention to improve overall condition and QOL.   Patient was educated on all the above exercise prior/during/after for proper posture, positioning, and execution for safe performance with home exercise program.   Patient educated on the importance of improved core and upper extremity strength in order to improve alignment of the spine and upper extremities with static positions and dynamic movement.   Patient educated on the importance of strong core and lower extremity musculature in order to improve both static and dynamic balance, improve gait mechanics, reduce fall risk and improve household and community mobility      Written Home Exercises Provided: yes. Exercises were reviewed and Jessica was able to demonstrate them prior to the end of the session.  Jessica demonstrated good  understanding of the education provided. See EMR under Patient Instructions for exercises provided during therapy sessions    ASSESSMENT     Pt presents today reporting ongoing improvement in her low back pain with performance of her HEP. Emphasized primarily exercises and strengthening today to good tolerance. Pt continues to respond very well to knee joint distraction. Increased weight during tailgaiters to 10 lbs for better distraction effect before weightbearing exercises. Overall pt tolerated session well with some increase in pain during weightbearing exercises, relieved by manual joint distraction.    Jessica Is progressing well towards her goals.   Pt prognosis is Good.     Pt will continue to benefit from skilled outpatient physical therapy to address the deficits listed in the problem list box on initial evaluation, provide pt/family education and to maximize pt's level of independence in the home and community environment.     Pt's spiritual, cultural and educational needs considered and pt agreeable to plan of care and goals.     Anticipated  barriers to physical therapy: pain and decreased exercise tolerance     Goals:   Short Term Goals:  4 weeks   Pain: Pt will demonstrate improved pain by reports of less than or equal to 7/10 worst pain on the verbal rating scale in order to progress toward maximal functional ability and improve QOL. MET 5/19/2022   2.  Mobility: Patient will present with full knee extension range of motion in order to return to maximal functional potential and improve quality of life. Met 6/9/2022   3.  HEP: Patient will demonstrate independence with HEP in order to progress toward functional independence. MET 5/19/2022      Long Term Goals:  8 weeks   Pain: Pt will demonstrate improved pain by reports of less than or equal to 5/10 worst pain on the verbal rating scale in order to progress toward maximal functional ability and improve QOL.  MET 6/16/2022   Function: Patient will demonstrate improved function as indicated by a functional limitation score of 54% on the FOTO. MET 9/15/2022   3. Strength: Patient will improve strength to +4/5 in bilateral lower extremities in order to improve functional independence and quality of life. Met 6/9/2022   4. Patient will return to normal ADL's, IADL's, community involvement, recreational activities, and work-related activities with no pain and maximal function. PC      Goals Key:  PC= progressing/continue;        PM= partially met;             DC= discontinue      PLAN     Plan of care Certification: 5/4/2022 to 12/23/2022.     Cont PT per POC and progress pt as tolerated and appropriate.    Chuck Ornelas, PTA  09/28/2022

## 2022-09-28 ENCOUNTER — CLINICAL SUPPORT (OUTPATIENT)
Dept: REHABILITATION | Facility: HOSPITAL | Age: 77
End: 2022-09-28
Payer: MEDICARE

## 2022-09-28 DIAGNOSIS — M53.86 DECREASED ROM OF LUMBAR SPINE: Primary | ICD-10-CM

## 2022-09-28 PROCEDURE — 97110 THERAPEUTIC EXERCISES: CPT | Mod: PN,CQ

## 2022-09-30 NOTE — PROGRESS NOTES
"[facility]   Physical Therapy Treatment Note     Name: Jessica Perez  Clinic Number: [id number]    Therapy Diagnosis:   Encounter Diagnosis   Name Primary?    Decreased ROM of lumbar spine Yes         Physician: Ramiro Palomino MD    Visit Date: 10/3/2022    Physician Orders: PT Eval and Treat   Medical Diagnosis from Referral:   M54.50,G89.29 (ICD-10-CM) - Chronic low back pain, unspecified back pain laterality, unspecified whether sciatica present   M17.0 (ICD-10-CM) - Primary osteoarthritis of both knees      Evaluation Date: 5/4/2022  Authorization Period Expiration: 12/31/2022  Plan of Care Expiration: 12/23/2022  Visit # / Visits authorized: 22/ 25 (+eval)     PN DUE: 10/15/2022     Precautions: Standard    PTA Visit #: 3/5     Time In: 8:30am  Time Out: 9:15am  Total Billable Time: 45 minutes  + 0 minutes of cold pack (not included)    SUBJECTIVE     Pt reports: The weather changes haven't been kind to her arthritis    She was compliant with home exercise program.  Response to previous treatment: decreased back pain  Functional change: no change reported     Pain: 8/10 left knee     OBJECTIVE     Objective Measures updated at progress report unless specified.       Treatment     Jessica received the treatments listed below:      (exercises performed today are bolded)    therapeutic exercises to develop strength, endurance, ROM, flexibility, posture and core stabilization for 40 minutes including:    Recumbent bike seat 3 for increased lower extremity range of motion, strength, and endurance for 5 minutes   Slant board gastroc stretch 3 x 30"  Bridges 3 x 10   Hamstring bridges on swiss ball 3 x 10  SL clams 3 x 10 with green theraband   Supine hamstring stretch 3 x 30" each   Supine adductor and medial HS stretch 3 x 30" each  QS 2 x 10   Supine SLR 2 x 10   Seated LAQ 3 x 10 #4  Prone HS curls 3 x 10 #4  Double knee to chest with overpressure 2 x 10  Prone on elbows 2 x 2 " "minutes (decreased pain)   KB deadlift from 4 inch block 2 x 10 #20  Backwards walkouts with 20lbs 2 x 5  Sidestepping with red theraband at knees x 2 laps of gym track    tailgaters x 3 minutes #10   Steam boats x 10   SAQ   Open books x 10   DKTC 10 x 10"   Piriformis stretch 3 x 30"   Seated forward flex   Standing IT band stretch 3 x 15"   green thera band inversion 2 x 10   side lying hip abduction 2 x 10   sit to stand 2 x 10     +Practice with swiffer, vacuum and mop for education on posture and management of her back pain during ADLs.  +Education for keeping cleaning tools close to her body with cleaning the house    manual therapy techniques: Joint mobilizations and Myofacial release were applied to the: bilateral knee and low back for 5 minutes, including:    bilateral Patella distraction and mobilization   myofascial release of L hip adductors at distal site of knee   bilateral MFR of bilateral lumbar paraspinals and musculature over iliac crest with and without passive hip internal/external rotation  Myofascial release of lumbar paraspinals bilaterally  Side lying manual quadratus lumborum stretch and lumbar distraction   Manual seated knee joint distraction   L knee joint mobilizations anterior/posterior   soft tissue mobilization to L adductors    neuromuscular re-education activities to improve: Balance and Proprioception for 0 minutes. The following activities were included:    Romberg stance on foam 3 x 30" eyes closed   Tandem walks x 5 laps   Tandem stance 2 x 30" each   Side stepping over cones x 8 laps   Marching on foam 2 x 10   ankle inversion on foam 2 x 20  + Transverse abdominus contraction 5" x 10   + Transverse abdominus with march 2 x 10     + 0 minutes of cold pack to bilateral knees (not charged or included in treatment time)    Patient Education and Home Exercises     Home Exercises Provided and Patient Education Provided     Education provided:   - Education/Self-Care provided: "   Patient educated on the impairments noted above and the effects of physical therapy intervention to improve overall condition and QOL.   Patient was educated on all the above exercise prior/during/after for proper posture, positioning, and execution for safe performance with home exercise program.   Patient educated on the importance of improved core and upper extremity strength in order to improve alignment of the spine and upper extremities with static positions and dynamic movement.   Patient educated on the importance of strong core and lower extremity musculature in order to improve both static and dynamic balance, improve gait mechanics, reduce fall risk and improve household and community mobility      Written Home Exercises Provided: yes. Exercises were reviewed and Jessica was able to demonstrate them prior to the end of the session.  Jessica demonstrated good  understanding of the education provided. See EMR under Patient Instructions for exercises provided during therapy sessions    ASSESSMENT     Pt presents today reporting increased pain due to weather changes. Continued with strengthening exercises to good tolerance and low fatigue, pt will benefit from further progression of strengthening exercises. Pt responded well to manual knee joint distraction with improvement in gait and pain.     Jessica Is progressing well towards her goals.   Pt prognosis is Good.     Pt will continue to benefit from skilled outpatient physical therapy to address the deficits listed in the problem list box on initial evaluation, provide pt/family education and to maximize pt's level of independence in the home and community environment.     Pt's spiritual, cultural and educational needs considered and pt agreeable to plan of care and goals.     Anticipated barriers to physical therapy: pain and decreased exercise tolerance     Goals:   Short Term Goals:  4 weeks   Pain: Pt will demonstrate improved pain by reports of  less than or equal to 7/10 worst pain on the verbal rating scale in order to progress toward maximal functional ability and improve QOL. MET 5/19/2022   2.  Mobility: Patient will present with full knee extension range of motion in order to return to maximal functional potential and improve quality of life. Met 6/9/2022   3.  HEP: Patient will demonstrate independence with HEP in order to progress toward functional independence. MET 5/19/2022      Long Term Goals:  8 weeks   Pain: Pt will demonstrate improved pain by reports of less than or equal to 5/10 worst pain on the verbal rating scale in order to progress toward maximal functional ability and improve QOL.  MET 6/16/2022   Function: Patient will demonstrate improved function as indicated by a functional limitation score of 54% on the FOTO. MET 9/15/2022   3. Strength: Patient will improve strength to +4/5 in bilateral lower extremities in order to improve functional independence and quality of life. Met 6/9/2022   4. Patient will return to normal ADL's, IADL's, community involvement, recreational activities, and work-related activities with no pain and maximal function. PC      Goals Key:  PC= progressing/continue;        PM= partially met;             DC= discontinue      PLAN     Plan of care Certification: 5/4/2022 to 12/23/2022.     Cont PT per POC and progress pt as tolerated and appropriate.    Chuck Ornelas PTA  10/03/2022

## 2022-10-03 ENCOUNTER — CLINICAL SUPPORT (OUTPATIENT)
Dept: REHABILITATION | Facility: HOSPITAL | Age: 77
End: 2022-10-03
Payer: MEDICARE

## 2022-10-03 DIAGNOSIS — M53.86 DECREASED ROM OF LUMBAR SPINE: Primary | ICD-10-CM

## 2022-10-03 PROCEDURE — 97110 THERAPEUTIC EXERCISES: CPT | Mod: PN,CQ

## 2022-10-04 NOTE — PROGRESS NOTES
"[facility]   Physical Therapy Treatment Note     Name: Jessica Perez  Clinic Number: [id number]    Therapy Diagnosis:   Encounter Diagnosis   Name Primary?    Decreased ROM of lumbar spine Yes     Physician: Jennifer Graves MD    Visit Date: 10/5/2022    Physician Orders: PT Eval and Treat   Medical Diagnosis from Referral:   M54.50,G89.29 (ICD-10-CM) - Chronic low back pain, unspecified back pain laterality, unspecified whether sciatica present   M17.0 (ICD-10-CM) - Primary osteoarthritis of both knees      Evaluation Date: 5/4/2022  Authorization Period Expiration: 12/31/2022  Plan of Care Expiration: 12/23/2022  Visit # / Visits authorized: 23/ 25 (+eval)     PN DUE: 10/15/2022     Precautions: Standard    PTA Visit #: 4/5     Time In: 8:30am  Time Out: 9:15am  Total Billable Time: 45 minutes  + 0 minutes of cold pack (not included)    SUBJECTIVE     Pt reports: Feeling better than she has felt in a long time. Knee is doing fantastic today, she has been doing her exercises for it and trying tumeric tea.    She was compliant with home exercise program.  Response to previous treatment: decreased back pain  Functional change: no change reported     Pain: none-mild/10 left knee     OBJECTIVE     Objective Measures updated at progress report unless specified.       Treatment     Jessica received the treatments listed below:      (exercises performed today are bolded)    therapeutic exercises to develop strength, endurance, ROM, flexibility, posture and core stabilization for 40 minutes including:    Recumbent bike seat 3 for increased lower extremity range of motion, strength, and endurance for 5 minutes   Slant board gastroc stretch 3 x 30"  Bridges 3 x 10   Hamstring bridges on swiss ball 3 x 10  SL clams 3 x 10 with green theraband   Supine hamstring stretch 3 x 30" each   Supine adductor and medial HS stretch 3 x 30" each  QS 2 x 10   Supine SLR 3 x 10   Seated LAQ 3 x 10 " "#4  Prone HS curls 3 x 10 #4  Double knee to chest with overpressure 2 x 10  Prone on elbows 2 x 2 minutes (decreased pain)   KB deadlift from 4 inch block 2 x 10 #20  Backwards walkouts with 20lbs 2 x 5  Sidestepping with red theraband at knees x 2 laps of gym track    tailgaters x 3 minutes #10   Steam boats x 10   SAQ   Open books x 10   DKTC 10 x 10"   Piriformis stretch 3 x 30"   Seated forward flex   Standing IT band stretch 3 x 15"   green thera band inversion 2 x 10   side lying hip abduction 2 x 10   sit to stand 2 x 10     +Practice with swiffer, vacuum and mop for education on posture and management of her back pain during ADLs.  +Education for keeping cleaning tools close to her body with cleaning the house    manual therapy techniques: Joint mobilizations and Myofacial release were applied to the: bilateral knee and low back for 5 minutes, including:    bilateral Patella distraction and mobilization   myofascial release of L hip adductors at distal site of knee   bilateral MFR of bilateral lumbar paraspinals and musculature over iliac crest with and without passive hip internal/external rotation  Myofascial release of lumbar paraspinals bilaterally  Side lying manual quadratus lumborum stretch and lumbar distraction   Manual seated knee joint distraction   L knee joint mobilizations anterior/posterior   soft tissue mobilization to L adductors    neuromuscular re-education activities to improve: Balance and Proprioception for 0 minutes. The following activities were included:    Romberg stance on foam 3 x 30" eyes closed   Tandem walks x 5 laps   Tandem stance 2 x 30" each   Side stepping over cones x 8 laps   Marching on foam 2 x 10   ankle inversion on foam 2 x 20  + Transverse abdominus contraction 5" x 10   + Transverse abdominus with march 2 x 10     + 0 minutes of cold pack to bilateral knees (not charged or included in treatment time)    Patient Education and Home Exercises     Home Exercises " Provided and Patient Education Provided     Education provided:   - Education/Self-Care provided:   Patient educated on the impairments noted above and the effects of physical therapy intervention to improve overall condition and QOL.   Patient was educated on all the above exercise prior/during/after for proper posture, positioning, and execution for safe performance with home exercise program.   Patient educated on the importance of improved core and upper extremity strength in order to improve alignment of the spine and upper extremities with static positions and dynamic movement.   Patient educated on the importance of strong core and lower extremity musculature in order to improve both static and dynamic balance, improve gait mechanics, reduce fall risk and improve household and community mobility      Written Home Exercises Provided: yes. Exercises were reviewed and Jessica was able to demonstrate them prior to the end of the session.  Jessica demonstrated good  understanding of the education provided. See EMR under Patient Instructions for exercises provided during therapy sessions    ASSESSMENT     Pt presents today reporting very good relief of her pain today, soreness after last visit, but pain free this morning. Continued with established exercises to good tolerance. Pt reported some onset of low back pain/fatigue during deadlifts. Pt is responding well to strengthening focused exercises with less pain and improved form during exercises with less fatigue. Will continue to progress strengthening as tolerated and indicated. Applied knee joint distraction at end of session, pt reports very good relief of pain afterwards.    Jessica Is progressing well towards her goals.   Pt prognosis is Good.     Pt will continue to benefit from skilled outpatient physical therapy to address the deficits listed in the problem list box on initial evaluation, provide pt/family education and to maximize pt's level of  independence in the home and community environment.     Pt's spiritual, cultural and educational needs considered and pt agreeable to plan of care and goals.     Anticipated barriers to physical therapy: pain and decreased exercise tolerance     Goals:   Short Term Goals:  4 weeks   Pain: Pt will demonstrate improved pain by reports of less than or equal to 7/10 worst pain on the verbal rating scale in order to progress toward maximal functional ability and improve QOL. MET 5/19/2022   2.  Mobility: Patient will present with full knee extension range of motion in order to return to maximal functional potential and improve quality of life. Met 6/9/2022   3.  HEP: Patient will demonstrate independence with HEP in order to progress toward functional independence. MET 5/19/2022      Long Term Goals:  8 weeks   Pain: Pt will demonstrate improved pain by reports of less than or equal to 5/10 worst pain on the verbal rating scale in order to progress toward maximal functional ability and improve QOL.  MET 6/16/2022   Function: Patient will demonstrate improved function as indicated by a functional limitation score of 54% on the FOTO. MET 9/15/2022   3. Strength: Patient will improve strength to +4/5 in bilateral lower extremities in order to improve functional independence and quality of life. Met 6/9/2022   4. Patient will return to normal ADL's, IADL's, community involvement, recreational activities, and work-related activities with no pain and maximal function. PC      Goals Key:  PC= progressing/continue;        PM= partially met;             DC= discontinue      PLAN     Plan of care Certification: 5/4/2022 to 12/23/2022.     Cont PT per POC and progress pt as tolerated and appropriate.    Chuck Ornelas PTA  10/05/2022

## 2022-10-05 ENCOUNTER — CLINICAL SUPPORT (OUTPATIENT)
Dept: REHABILITATION | Facility: HOSPITAL | Age: 77
End: 2022-10-05
Payer: MEDICARE

## 2022-10-05 DIAGNOSIS — M53.86 DECREASED ROM OF LUMBAR SPINE: Primary | ICD-10-CM

## 2022-10-05 PROCEDURE — 97110 THERAPEUTIC EXERCISES: CPT | Mod: PN,CQ

## 2022-10-06 NOTE — PROGRESS NOTES
"[facility]   Physical Therapy Treatment Note     Name: Jessica Perez  Clinic Number: [id number]    Therapy Diagnosis:   Encounter Diagnosis   Name Primary?    Decreased ROM of lumbar spine Yes       Physician: Jennifer Graves MD    Visit Date: 10/10/2022    Physician Orders: PT Eval and Treat   Medical Diagnosis from Referral:   M54.50,G89.29 (ICD-10-CM) - Chronic low back pain, unspecified back pain laterality, unspecified whether sciatica present   M17.0 (ICD-10-CM) - Primary osteoarthritis of both knees      Evaluation Date: 5/4/2022  Authorization Period Expiration: 12/31/2022  Plan of Care Expiration: 12/23/2022  Visit # / Visits authorized: 24/ 25 (+eval)     PN DUE: 10/15/2022     Precautions: Standard    PTA Visit #: 5/5     Time In: 3:40pm  Time Out: 4:25pm  Total Billable Time: 45 minutes  + 0 minutes of cold pack (not included)    SUBJECTIVE     Pt reports: Knee does well once she gets going, but has trouble getting up after sitting or lying for a while. Sitting for 30 mins- 1 hour will provoke the pain.    She was compliant with home exercise program.  Response to previous treatment: decreased back pain  Functional change: no change reported     Pain: none-mild/10 left knee     OBJECTIVE     Objective Measures updated at progress report unless specified.       Treatment     Jessica received the treatments listed below:      (exercises performed today are bolded)    therapeutic exercises to develop strength, endurance, ROM, flexibility, posture and core stabilization for 42 minutes including:    Recumbent bike seat 3 for increased lower extremity range of motion, strength, and endurance for 5 minutes   Slant board gastroc stretch 3 x 30"  Bridges 3 x 10   Hamstring bridges on swiss ball 3 x 10  SL clams 3 x 10 with green theraband   Supine hamstring stretch 3 x 30" each   Supine adductor and medial HS stretch 3 x 30" each  QS 2 x 10   Supine SLR 3 x 7 #1.5  Seated LAQ " "3 x 10 #4  Prone HS curls 3 x 10 #4  Double knee to chest with overpressure 2 x 10  Prone on elbows 2 x 2 minutes (decreased pain)   KB straight leg deadlift 2 x 10 #20  Backwards walkouts with 20lbs 2 x 5  Sidestepping with red theraband at knees x 2 laps of gym track    tailgaters x 3 minutes #10   Steam boats x 10   SAQ   Open books x 10   DKTC 10 x 10"   Piriformis stretch 3 x 30"   Seated forward flex   Standing IT band stretch 3 x 15"   green thera band inversion 2 x 10   side lying hip abduction 2 x 10   sit to stand 2 x 10     +Practice with swiffer, vacuum and mop for education on posture and management of her back pain during ADLs.  +Education for keeping cleaning tools close to her body with cleaning the house    manual therapy techniques: Joint mobilizations and Myofacial release were applied to the: bilateral knee and low back for 3 minutes, including:    bilateral Patella distraction and mobilization   myofascial release of L hip adductors at distal site of knee   bilateral MFR of bilateral lumbar paraspinals and musculature over iliac crest with and without passive hip internal/external rotation  Myofascial release of lumbar paraspinals bilaterally  Side lying manual quadratus lumborum stretch and lumbar distraction   Manual seated knee joint distraction   L knee joint mobilizations anterior/posterior   soft tissue mobilization to L adductors    neuromuscular re-education activities to improve: Balance and Proprioception for 0 minutes. The following activities were included:    Romberg stance on foam 3 x 30" eyes closed   Tandem walks x 5 laps   Tandem stance 2 x 30" each   Side stepping over cones x 8 laps   Marching on foam 2 x 10   ankle inversion on foam 2 x 20  + Transverse abdominus contraction 5" x 10   + Transverse abdominus with march 2 x 10     + 0 minutes of cold pack to bilateral knees (not charged or included in treatment time)    Patient Education and Home Exercises     Home Exercises " Provided and Patient Education Provided     Education provided:   - Education/Self-Care provided:   Patient educated on the impairments noted above and the effects of physical therapy intervention to improve overall condition and QOL.   Patient was educated on all the above exercise prior/during/after for proper posture, positioning, and execution for safe performance with home exercise program.   Patient educated on the importance of improved core and upper extremity strength in order to improve alignment of the spine and upper extremities with static positions and dynamic movement.   Patient educated on the importance of strong core and lower extremity musculature in order to improve both static and dynamic balance, improve gait mechanics, reduce fall risk and improve household and community mobility      Written Home Exercises Provided: yes. Exercises were reviewed and Jessica was able to demonstrate them prior to the end of the session.  Jessica demonstrated good  understanding of the education provided. See EMR under Patient Instructions for exercises provided during therapy sessions    ASSESSMENT     Pt presents today reporting ongoing overall improvement in her knee. Continued with strengthening exercises to good tolerance with less pain and minimal fatigue. Pt continues to respond very well to knee joint distraction at the end of the session with good relief of pain.    Jessica Is progressing well towards her goals.   Pt prognosis is Good.     Pt will continue to benefit from skilled outpatient physical therapy to address the deficits listed in the problem list box on initial evaluation, provide pt/family education and to maximize pt's level of independence in the home and community environment.     Pt's spiritual, cultural and educational needs considered and pt agreeable to plan of care and goals.     Anticipated barriers to physical therapy: pain and decreased exercise tolerance     Goals:   Short Term  Goals:  4 weeks   Pain: Pt will demonstrate improved pain by reports of less than or equal to 7/10 worst pain on the verbal rating scale in order to progress toward maximal functional ability and improve QOL. MET 5/19/2022   2.  Mobility: Patient will present with full knee extension range of motion in order to return to maximal functional potential and improve quality of life. Met 6/9/2022   3.  HEP: Patient will demonstrate independence with HEP in order to progress toward functional independence. MET 5/19/2022      Long Term Goals:  8 weeks   Pain: Pt will demonstrate improved pain by reports of less than or equal to 5/10 worst pain on the verbal rating scale in order to progress toward maximal functional ability and improve QOL.  MET 6/16/2022   Function: Patient will demonstrate improved function as indicated by a functional limitation score of 54% on the FOTO. MET 9/15/2022   3. Strength: Patient will improve strength to +4/5 in bilateral lower extremities in order to improve functional independence and quality of life. Met 6/9/2022   4. Patient will return to normal ADL's, IADL's, community involvement, recreational activities, and work-related activities with no pain and maximal function. PC      Goals Key:  PC= progressing/continue;        PM= partially met;             DC= discontinue      PLAN     Plan of care Certification: 5/4/2022 to 12/23/2022.     Cont PT per POC and progress pt as tolerated and appropriate.    Chuck Ornelas, MAKENZIE  10/10/2022

## 2022-10-10 ENCOUNTER — CLINICAL SUPPORT (OUTPATIENT)
Dept: REHABILITATION | Facility: HOSPITAL | Age: 77
End: 2022-10-10
Payer: MEDICARE

## 2022-10-10 DIAGNOSIS — M53.86 DECREASED ROM OF LUMBAR SPINE: Primary | ICD-10-CM

## 2022-10-10 PROCEDURE — 97110 THERAPEUTIC EXERCISES: CPT | Mod: PN,CQ

## 2022-10-10 NOTE — PROGRESS NOTES
"[facility]   Physical Therapy Treatment Note     Name: Jessica Perez  Clinic Number: [id number]    Therapy Diagnosis:   Encounter Diagnosis   Name Primary?    Decreased ROM of lumbar spine Yes         Physician: Jennifer Graves MD    Visit Date: 10/12/2022    Physician Orders: PT Eval and Treat   Medical Diagnosis from Referral:   M54.50,G89.29 (ICD-10-CM) - Chronic low back pain, unspecified back pain laterality, unspecified whether sciatica present   M17.0 (ICD-10-CM) - Primary osteoarthritis of both knees      Evaluation Date: 5/4/2022  Authorization Period Expiration: 12/31/2022  Plan of Care Expiration: 12/23/2022  Visit # / Visits authorized: 26/ 25 (+eval)     PN DUE: 10/15/2022     Precautions: Standard    PTA Visit #: 0/5     Time In: 3:15pm  Time Out: 4:005pm  Total Billable Time: 45 minutes  + 0 minutes of cold pack (not included)    SUBJECTIVE     Pt reports: Knee does well once she gets going, but has trouble getting up after sitting or lying for a while. Sitting for 30 mins- 1 hour will provoke the pain.    She was compliant with home exercise program.  Response to previous treatment: decreased back pain  Functional change: no change reported     Pain: none-mild/10 left knee     OBJECTIVE     Objective Measures updated at progress report unless specified.       Treatment     Jessica received the treatments listed below:      (exercises performed today are bolded)    therapeutic exercises to develop strength, endurance, ROM, flexibility, posture and core stabilization for 20 minutes including:    Recumbent bike seat 3 for increased lower extremity range of motion, strength, and endurance for 8 minutes   Slant board gastroc stretch 3 x 30"  Pt education for proper body mechanics for ADL's vacumming, raking, weed waking, back school  Bridges 3 x 10   Hamstring bridges on swiss ball 3 x 10  SL clams 3 x 10 with green theraband   Supine hamstring stretch 3 x 30" each " "  Supine adductor and medial HS stretch 3 x 30" each  QS 2 x 10   Supine SLR 3 x 7 #1.5  Seated LAQ 3 x 10 #4  Prone HS curls 3 x 10 #4  Double knee to chest with overpressure 2 x 10  Prone on elbows 2 x 2 minutes (decreased pain)   KB straight leg deadlift 2 x 10 #20  Backwards walkouts with 20lbs 2 x 5  Sidestepping with red theraband at knees x 2 laps of gym track    tailgaters x 3 minutes #10   Steam boats x 10   SAQ   Open books x 10   DKTC 10 x 10"   Piriformis stretch 3 x 30"   Seated forward flex   Standing IT band stretch 3 x 15"   green thera band inversion 2 x 10   side lying hip abduction 2 x 10   sit to stand 2 x 10     +Practice with swiffer, vacuum and mop for education on posture and management of her back pain during ADLs.  +Education for keeping cleaning tools close to her body with cleaning the house    manual therapy techniques: Joint mobilizations and Myofacial release were applied to the: bilateral knee and low back for 25 minutes, including:    bilateral Patella distraction and mobilization   myofascial release of L hip adductors at distal site of knee   bilateral MFR of bilateral lumbar paraspinals and musculature over iliac crest with and without passive hip internal/external rotation  Myofascial release of lumbar paraspinals bilaterally  Side lying manual quadratus lumborum stretch and lumbar distraction   L knee joint mobilizations anterior/posterior   soft tissue mobilization to L adductors    FDN to left Quads, Right QL  Cupping Right lumbar paraspinals    neuromuscular re-education activities to improve: Balance and Proprioception for 0 minutes. The following activities were included:    Romberg stance on foam 3 x 30" eyes closed   Tandem walks x 5 laps   Tandem stance 2 x 30" each   Side stepping over cones x 8 laps   Marching on foam 2 x 10   ankle inversion on foam 2 x 20  + Transverse abdominus contraction 5" x 10   + Transverse abdominus with march 2 x 10     + 0 minutes of cold " pack to bilateral knees (not charged or included in treatment time)    Patient Education and Home Exercises     Home Exercises Provided and Patient Education Provided     Education provided:   - Education/Self-Care provided:   Patient educated on the impairments noted above and the effects of physical therapy intervention to improve overall condition and QOL.   Patient was educated on all the above exercise prior/during/after for proper posture, positioning, and execution for safe performance with home exercise program.   Patient educated on the importance of improved core and upper extremity strength in order to improve alignment of the spine and upper extremities with static positions and dynamic movement.   Patient educated on the importance of strong core and lower extremity musculature in order to improve both static and dynamic balance, improve gait mechanics, reduce fall risk and improve household and community mobility      Written Home Exercises Provided: yes. Exercises were reviewed and Jessica was able to demonstrate them prior to the end of the session.  Jessica demonstrated good  understanding of the education provided. See EMR under Patient Instructions for exercises provided during therapy sessions    ASSESSMENT     Good response to FDN quads with report of no pain in knee post Rx, Good response to FDN to QL and cupping with increase tissue mobility     Jessica Is progressing well towards her goals.   Pt prognosis is Good.     Pt will continue to benefit from skilled outpatient physical therapy to address the deficits listed in the problem list box on initial evaluation, provide pt/family education and to maximize pt's level of independence in the home and community environment.     Pt's spiritual, cultural and educational needs considered and pt agreeable to plan of care and goals.     Anticipated barriers to physical therapy: pain and decreased exercise tolerance     Goals:   Short Term Goals:  4  weeks   Pain: Pt will demonstrate improved pain by reports of less than or equal to 7/10 worst pain on the verbal rating scale in order to progress toward maximal functional ability and improve QOL. MET 5/19/2022   2.  Mobility: Patient will present with full knee extension range of motion in order to return to maximal functional potential and improve quality of life. Met 6/9/2022   3.  HEP: Patient will demonstrate independence with HEP in order to progress toward functional independence. MET 5/19/2022      Long Term Goals:  8 weeks   Pain: Pt will demonstrate improved pain by reports of less than or equal to 5/10 worst pain on the verbal rating scale in order to progress toward maximal functional ability and improve QOL.  MET 6/16/2022   Function: Patient will demonstrate improved function as indicated by a functional limitation score of 54% on the FOTO. MET 9/15/2022   3. Strength: Patient will improve strength to +4/5 in bilateral lower extremities in order to improve functional independence and quality of life. Met 6/9/2022   4. Patient will return to normal ADL's, IADL's, community involvement, recreational activities, and work-related activities with no pain and maximal function. PC      Goals Key:  PC= progressing/continue;        PM= partially met;             DC= discontinue      PLAN     Plan of care Certification: 5/4/2022 to 12/23/2022.     Cont PT per POC and progress pt as tolerated and appropriate.    Zonia Chery, PT  10/12/2022

## 2022-10-11 ENCOUNTER — TELEPHONE (OUTPATIENT)
Dept: INTERNAL MEDICINE | Facility: CLINIC | Age: 77
End: 2022-10-11
Payer: MEDICARE

## 2022-10-11 NOTE — TELEPHONE ENCOUNTER
ISAELOV for pt to call back. If she hasn't seen Dr Jang for this current Ortho problem, she will need to make an appt to see him

## 2022-10-11 NOTE — TELEPHONE ENCOUNTER
----- Message from Kenny Singh sent at 10/11/2022 12:51 PM CDT -----  Contact: patient  Type:  Patient Requesting Referral    Who Called:Jessica Perez   Does the patient already have the specialty appointment scheduled?: no   If yes, what is the date of that appointment?: n/a   Referral to What Specialty: Ortho   Reason for Referral: knee pain   Does the patient want the referral with a specific physician?: call back   [facility]   Patient Requesting a Response?: yes   Would the patient rather a call back or a response via [facility]?  Call back   Best Call Back Number: [phone]   Additional Information:

## 2022-10-12 ENCOUNTER — CLINICAL SUPPORT (OUTPATIENT)
Dept: REHABILITATION | Facility: HOSPITAL | Age: 77
End: 2022-10-12
Payer: MEDICARE

## 2022-10-12 DIAGNOSIS — M53.86 DECREASED ROM OF LUMBAR SPINE: Primary | ICD-10-CM

## 2022-10-12 PROCEDURE — 97140 MANUAL THERAPY 1/> REGIONS: CPT | Mod: PN

## 2022-10-12 PROCEDURE — 97110 THERAPEUTIC EXERCISES: CPT | Mod: PN

## 2022-10-13 NOTE — PROGRESS NOTES
"[facility]   Physical Therapy Treatment Note     Name: Jessica Perez  Clinic Number: [id number]    Therapy Diagnosis:   Encounter Diagnosis   Name Primary?    Decreased ROM of lumbar spine Yes           Physician: Jennifer Graves MD    Visit Date: 10/19/2022    Physician Orders: PT Eval and Treat   Medical Diagnosis from Referral:   M54.50,G89.29 (ICD-10-CM) - Chronic low back pain, unspecified back pain laterality, unspecified whether sciatica present   M17.0 (ICD-10-CM) - Primary osteoarthritis of both knees      Evaluation Date: 5/4/2022  Authorization Period Expiration: 12/31/2022  Plan of Care Expiration: 12/23/2022  Visit # / Visits authorized: 26/ 25 (+eval)     PN DUE: 10/15/2022     Precautions: Standard    PTA Visit #: 0/5     Time In: 3:15pm  Time Out: 4:005pm  Total Billable Time: 45 minutes  + 0 minutes of cold pack (not included)    SUBJECTIVE     Pt reports: Knee does well once she gets going, but has trouble getting up after sitting or lying for a while. Sitting for 30 mins- 1 hour will provoke the pain.    She was compliant with home exercise program.  Response to previous treatment: decreased back pain  Functional change: no change reported     Pain: none-mild/10 left knee     OBJECTIVE     Objective Measures updated at progress report unless specified.       Treatment     Jessica received the treatments listed below:      (exercises performed today are bolded)    therapeutic exercises to develop strength, endurance, ROM, flexibility, posture and core stabilization for 30 minutes including:    Recumbent bike seat 3 for increased lower extremity range of motion, strength, and endurance for 8 minutes   Slant board gastroc stretch 3 x 30"  SK2C  Pelvic tilt with ball/band  3 X 15  Bridge with ball squeeze 3 X 15      manual therapy techniques: Joint mobilizations and Myofacial release were applied to the: bilateral knee and low back for 15 minutes, " including:    FDN to left Quads, Right /left QL   Cupping Right lumbar paraspinals  Pf mobs distraction    Patient Education and Home Exercises     Home Exercises Provided and Patient Education Provided     Education provided:   - Education/Self-Care provided:   Patient educated on the impairments noted above and the effects of physical therapy intervention to improve overall condition and QOL.   Patient was educated on all the above exercise prior/during/after for proper posture, positioning, and execution for safe performance with home exercise program.   Patient educated on the importance of improved core and upper extremity strength in order to improve alignment of the spine and upper extremities with static positions and dynamic movement.   Patient educated on the importance of strong core and lower extremity musculature in order to improve both static and dynamic balance, improve gait mechanics, reduce fall risk and improve household and community mobility      Written Home Exercises Provided: yes. Exercises were reviewed and Jessica was able to demonstrate them prior to the end of the session.  Jessica demonstrated good  understanding of the education provided. See EMR under Patient Instructions for exercises provided during therapy sessions    ASSESSMENT     Good response to FDN quads with report of no pain in knee post Rx, Good response to FDN to QL and cupping with increase tissue mobility     Jessica Is progressing well towards her goals.   Pt prognosis is Good.     Pt will continue to benefit from skilled outpatient physical therapy to address the deficits listed in the problem list box on initial evaluation, provide pt/family education and to maximize pt's level of independence in the home and community environment.     Pt's spiritual, cultural and educational needs considered and pt agreeable to plan of care and goals.     Anticipated barriers to physical therapy: pain and decreased exercise  tolerance     Goals:   Short Term Goals:  4 weeks   Pain: Pt will demonstrate improved pain by reports of less than or equal to 7/10 worst pain on the verbal rating scale in order to progress toward maximal functional ability and improve QOL. MET 5/19/2022   2.  Mobility: Patient will present with full knee extension range of motion in order to return to maximal functional potential and improve quality of life. Met 6/9/2022   3.  HEP: Patient will demonstrate independence with HEP in order to progress toward functional independence. MET 5/19/2022      Long Term Goals:  8 weeks   Pain: Pt will demonstrate improved pain by reports of less than or equal to 5/10 worst pain on the verbal rating scale in order to progress toward maximal functional ability and improve QOL.  MET 6/16/2022   Function: Patient will demonstrate improved function as indicated by a functional limitation score of 54% on the FOTO. MET 9/15/2022   3. Strength: Patient will improve strength to +4/5 in bilateral lower extremities in order to improve functional independence and quality of life. Met 6/9/2022   4. Patient will return to normal ADL's, IADL's, community involvement, recreational activities, and work-related activities with no pain and maximal function. PC      Goals Key:  PC= progressing/continue;        PM= partially met;             DC= discontinue      PLAN     Plan of care Certification: 5/4/2022 to 12/23/2022.     Cont PT per POC and progress pt as tolerated and appropriate.    Zonia Chery, PT, DPT  10/19/2022

## 2022-10-19 ENCOUNTER — CLINICAL SUPPORT (OUTPATIENT)
Dept: REHABILITATION | Facility: HOSPITAL | Age: 77
End: 2022-10-19
Payer: MEDICARE

## 2022-10-19 DIAGNOSIS — M53.86 DECREASED ROM OF LUMBAR SPINE: Primary | ICD-10-CM

## 2022-10-19 PROCEDURE — 97140 MANUAL THERAPY 1/> REGIONS: CPT | Mod: PN

## 2022-10-19 PROCEDURE — 97110 THERAPEUTIC EXERCISES: CPT | Mod: PN

## 2022-10-19 NOTE — PROGRESS NOTES
"[facility]   Physical Therapy Treatment Note     Name: Jessica Perez  Clinic Number: [id number]    Therapy Diagnosis:   Encounter Diagnosis   Name Primary?    Decreased ROM of lumbar spine Yes             Physician: Jennifer Graves MD    Visit Date: 10/20/2022    Physician Orders: PT Eval and Treat   Medical Diagnosis from Referral:   M54.50,G89.29 (ICD-10-CM) - Chronic low back pain, unspecified back pain laterality, unspecified whether sciatica present   M17.0 (ICD-10-CM) - Primary osteoarthritis of both knees      Evaluation Date: 5/4/2022  Authorization Period Expiration: 12/31/2022  Plan of Care Expiration: 12/23/2022  Visit # / Visits authorized: 26/ 25 (+eval)     PN DUE: 10/15/2022     Precautions: Standard    PTA Visit #: 0/5     Time In: 4:15pm  Time Out: 5:00pm  Total Billable Time: 45 minutes  + 0 minutes of cold pack (not included)    SUBJECTIVE     Pt reports: Back is doing great. Knee has been sore today.     She was compliant with home exercise program.  Response to previous treatment: decreased back pain  Functional change: no change reported     Pain: none-mild/10 left knee     OBJECTIVE     Objective Measures updated at progress report unless specified.       Treatment     Jessica received the treatments listed below:      (exercises performed today are bolded)    therapeutic exercises to develop strength, endurance, ROM, flexibility, posture and core stabilization for 30 minutes including:    Recumbent bike seat 3 for increased lower extremity range of motion, strength, and endurance for 8 minutes   Slant board gastroc stretch 3 x 30"  SK2C  Pelvic tilt with ball/band  3 X 15  Bridge with ball squeeze 3 X 15      manual therapy techniques: Joint mobilizations and Myofacial release were applied to the: bilateral knee and low back for 15 minutes, including:    FDN to left Quads, Right /left QL   Cupping Right lumbar paraspinals  Pf mobs distraction    Patient " Education and Home Exercises     Home Exercises Provided and Patient Education Provided     Education provided:   - Education/Self-Care provided:   Patient educated on the impairments noted above and the effects of physical therapy intervention to improve overall condition and QOL.   Patient was educated on all the above exercise prior/during/after for proper posture, positioning, and execution for safe performance with home exercise program.   Patient educated on the importance of improved core and upper extremity strength in order to improve alignment of the spine and upper extremities with static positions and dynamic movement.   Patient educated on the importance of strong core and lower extremity musculature in order to improve both static and dynamic balance, improve gait mechanics, reduce fall risk and improve household and community mobility      Written Home Exercises Provided: yes. Exercises were reviewed and Jessica was able to demonstrate them prior to the end of the session.  Jessica demonstrated good  understanding of the education provided. See EMR under Patient Instructions for exercises provided during therapy sessions    ASSESSMENT     Good response to FDN quads with report of no pain in knee post Rx, Good response to FDN to QL and cupping with increase tissue mobility     Jessica Is progressing well towards her goals.   Pt prognosis is Good.     Pt will continue to benefit from skilled outpatient physical therapy to address the deficits listed in the problem list box on initial evaluation, provide pt/family education and to maximize pt's level of independence in the home and community environment.     Pt's spiritual, cultural and educational needs considered and pt agreeable to plan of care and goals.     Anticipated barriers to physical therapy: pain and decreased exercise tolerance     Goals:   Short Term Goals:  4 weeks   Pain: Pt will demonstrate improved pain by reports of less than or  equal to 7/10 worst pain on the verbal rating scale in order to progress toward maximal functional ability and improve QOL. MET 5/19/2022   2.  Mobility: Patient will present with full knee extension range of motion in order to return to maximal functional potential and improve quality of life. Met 6/9/2022   3.  HEP: Patient will demonstrate independence with HEP in order to progress toward functional independence. MET 5/19/2022      Long Term Goals:  8 weeks   Pain: Pt will demonstrate improved pain by reports of less than or equal to 5/10 worst pain on the verbal rating scale in order to progress toward maximal functional ability and improve QOL.  MET 6/16/2022   Function: Patient will demonstrate improved function as indicated by a functional limitation score of 54% on the FOTO. MET 9/15/2022   3. Strength: Patient will improve strength to +4/5 in bilateral lower extremities in order to improve functional independence and quality of life. Met 6/9/2022   4. Patient will return to normal ADL's, IADL's, community involvement, recreational activities, and work-related activities with no pain and maximal function. PC      Goals Key:  PC= progressing/continue;        PM= partially met;             DC= discontinue      PLAN     Plan of care Certification: 5/4/2022 to 12/23/2022.     Cont PT per POC and progress pt as tolerated and appropriate.    Zonia Chery, PT, DPT  10/20/2022

## 2022-10-20 ENCOUNTER — CLINICAL SUPPORT (OUTPATIENT)
Dept: REHABILITATION | Facility: HOSPITAL | Age: 77
End: 2022-10-20
Payer: MEDICARE

## 2022-10-20 DIAGNOSIS — M53.86 DECREASED ROM OF LUMBAR SPINE: Primary | ICD-10-CM

## 2022-10-20 PROCEDURE — 97140 MANUAL THERAPY 1/> REGIONS: CPT | Mod: PN

## 2022-10-20 PROCEDURE — 97110 THERAPEUTIC EXERCISES: CPT | Mod: PN

## 2022-10-20 NOTE — PROGRESS NOTES
"[facility]   Physical Therapy Treatment Note     Name: Jessica Perez  Clinic Number: [id number]    Therapy Diagnosis:   Encounter Diagnosis   Name Primary?    Decreased ROM of lumbar spine Yes         Physician: Jennifer Graves MD    Visit Date: 10/24/2022    Physician Orders: PT Eval and Treat   Medical Diagnosis from Referral:   M54.50,G89.29 (ICD-10-CM) - Chronic low back pain, unspecified back pain laterality, unspecified whether sciatica present   M17.0 (ICD-10-CM) - Primary osteoarthritis of both knees      Evaluation Date: 5/4/2022  Authorization Period Expiration: 12/31/2022  Plan of Care Expiration: 12/23/2022  Visit # / Visits authorized: 26/ 25 (+eval)     PN DUE: 10/15/2022     Precautions: Standard    PTA Visit #: 0/5     Time In: 4:15pm  Time Out: 5:00pm  Total Billable Time: 45 minutes    SUBJECTIVE     Pt reports: Back and knees are doing significantly better. Very pleased with progress     She was compliant with home exercise program.  Response to previous treatment: decreased back pain  Functional change: no change reported     Pain: none-mild/10 left knee     OBJECTIVE     Objective Measures updated at progress report unless specified.       Treatment     Jessica received the treatments listed below:      (exercises performed today are bolded)    therapeutic exercises to develop strength, endurance, ROM, flexibility, posture and core stabilization for 30 minutes including:    Recumbent bike seat 3 for increased lower extremity range of motion, strength, and endurance for 8 minutes   Slant board gastroc stretch 3 x 30"  SK2C  Pelvic tilt with ball/band  3 X 15  Bridge with ball squeeze 3 X 15      manual therapy techniques: Joint mobilizations and Myofacial release were applied to the: bilateral knee and low back for 15 minutes, including:    FDN to left Quads, Right /left QL , Multifidi L3,4,5, Bladder 4,5  Cupping Right lumbar paraspinals  Pf mobs " distraction    Patient Education and Home Exercises     Home Exercises Provided and Patient Education Provided     Education provided:   - Education/Self-Care provided:   Patient educated on the impairments noted above and the effects of physical therapy intervention to improve overall condition and QOL.   Patient was educated on all the above exercise prior/during/after for proper posture, positioning, and execution for safe performance with home exercise program.   Patient educated on the importance of improved core and upper extremity strength in order to improve alignment of the spine and upper extremities with static positions and dynamic movement.   Patient educated on the importance of strong core and lower extremity musculature in order to improve both static and dynamic balance, improve gait mechanics, reduce fall risk and improve household and community mobility      Written Home Exercises Provided: yes. Exercises were reviewed and Jessica was able to demonstrate them prior to the end of the session.  Jessica demonstrated good  understanding of the education provided. See EMR under Patient Instructions for exercises provided during therapy sessions    ASSESSMENT     Good response to FDN quads with report of no pain in knee post Rx, Good response to FDN to QL and cupping with increase tissue mobility     Jessica Is progressing well towards her goals.   Pt prognosis is Good.     Pt will continue to benefit from skilled outpatient physical therapy to address the deficits listed in the problem list box on initial evaluation, provide pt/family education and to maximize pt's level of independence in the home and community environment.     Pt's spiritual, cultural and educational needs considered and pt agreeable to plan of care and goals.     Anticipated barriers to physical therapy: pain and decreased exercise tolerance     Goals:   Short Term Goals:  4 weeks   Pain: Pt will demonstrate improved pain by  reports of less than or equal to 7/10 worst pain on the verbal rating scale in order to progress toward maximal functional ability and improve QOL. MET 5/19/2022   2.  Mobility: Patient will present with full knee extension range of motion in order to return to maximal functional potential and improve quality of life. Met 6/9/2022   3.  HEP: Patient will demonstrate independence with HEP in order to progress toward functional independence. MET 5/19/2022      Long Term Goals:  8 weeks   Pain: Pt will demonstrate improved pain by reports of less than or equal to 5/10 worst pain on the verbal rating scale in order to progress toward maximal functional ability and improve QOL.  MET 6/16/2022   Function: Patient will demonstrate improved function as indicated by a functional limitation score of 54% on the FOTO. MET 9/15/2022   3. Strength: Patient will improve strength to +4/5 in bilateral lower extremities in order to improve functional independence and quality of life. Met 6/9/2022   4. Patient will return to normal ADL's, IADL's, community involvement, recreational activities, and work-related activities with no pain and maximal function. PC      Goals Key:  PC= progressing/continue;        PM= partially met;             DC= discontinue      PLAN     Plan of care Certification: 5/4/2022 to 12/23/2022.     Cont PT per POC and progress pt as tolerated and appropriate.    Zonia Chery, PT, DPT  10/24/2022

## 2022-10-24 ENCOUNTER — CLINICAL SUPPORT (OUTPATIENT)
Dept: REHABILITATION | Facility: HOSPITAL | Age: 77
End: 2022-10-24
Payer: MEDICARE

## 2022-10-24 DIAGNOSIS — M53.86 DECREASED ROM OF LUMBAR SPINE: Primary | ICD-10-CM

## 2022-10-24 PROCEDURE — 97140 MANUAL THERAPY 1/> REGIONS: CPT | Mod: PN

## 2022-10-24 PROCEDURE — 97110 THERAPEUTIC EXERCISES: CPT | Mod: PN

## 2022-10-25 ENCOUNTER — OFFICE VISIT (OUTPATIENT)
Dept: INTERNAL MEDICINE | Facility: CLINIC | Age: 77
End: 2022-10-25
Payer: MEDICARE

## 2022-10-25 ENCOUNTER — LAB VISIT (OUTPATIENT)
Dept: LAB | Facility: HOSPITAL | Age: 77
End: 2022-10-25
Attending: FAMILY MEDICINE
Payer: MEDICARE

## 2022-10-25 VITALS — WEIGHT: 174.19 LBS | TEMPERATURE: 97 F | BODY MASS INDEX: 31.85 KG/M2 | HEART RATE: 76 BPM

## 2022-10-25 DIAGNOSIS — E21.3 HYPERPARATHYROIDISM: ICD-10-CM

## 2022-10-25 DIAGNOSIS — E78.00 PURE HYPERCHOLESTEROLEMIA: Primary | ICD-10-CM

## 2022-10-25 DIAGNOSIS — M25.561 CHRONIC PAIN OF BOTH KNEES: ICD-10-CM

## 2022-10-25 DIAGNOSIS — G89.29 CHRONIC PAIN OF BOTH KNEES: ICD-10-CM

## 2022-10-25 DIAGNOSIS — E78.00 PURE HYPERCHOLESTEROLEMIA: ICD-10-CM

## 2022-10-25 DIAGNOSIS — M25.562 CHRONIC PAIN OF BOTH KNEES: ICD-10-CM

## 2022-10-25 DIAGNOSIS — R21 RASH: ICD-10-CM

## 2022-10-25 PROCEDURE — 1101F PR PT FALLS ASSESS DOC 0-1 FALLS W/OUT INJ PAST YR: ICD-10-PCS | Mod: CPTII,S$GLB,, | Performed by: FAMILY MEDICINE

## 2022-10-25 PROCEDURE — 1160F RVW MEDS BY RX/DR IN RCRD: CPT | Mod: CPTII,S$GLB,, | Performed by: FAMILY MEDICINE

## 2022-10-25 PROCEDURE — 1101F PT FALLS ASSESS-DOCD LE1/YR: CPT | Mod: CPTII,S$GLB,, | Performed by: FAMILY MEDICINE

## 2022-10-25 PROCEDURE — 1126F AMNT PAIN NOTED NONE PRSNT: CPT | Mod: CPTII,S$GLB,, | Performed by: FAMILY MEDICINE

## 2022-10-25 PROCEDURE — 84466 ASSAY OF TRANSFERRIN: CPT | Performed by: FAMILY MEDICINE

## 2022-10-25 PROCEDURE — 3288F PR FALLS RISK ASSESSMENT DOCUMENTED: ICD-10-PCS | Mod: CPTII,S$GLB,, | Performed by: FAMILY MEDICINE

## 2022-10-25 PROCEDURE — 1159F PR MEDICATION LIST DOCUMENTED IN MEDICAL RECORD: ICD-10-PCS | Mod: CPTII,S$GLB,, | Performed by: FAMILY MEDICINE

## 2022-10-25 PROCEDURE — 85025 COMPLETE CBC W/AUTO DIFF WBC: CPT | Performed by: FAMILY MEDICINE

## 2022-10-25 PROCEDURE — 36415 COLL VENOUS BLD VENIPUNCTURE: CPT | Mod: PO | Performed by: FAMILY MEDICINE

## 2022-10-25 PROCEDURE — 99214 PR OFFICE/OUTPT VISIT, EST, LEVL IV, 30-39 MIN: ICD-10-PCS | Mod: 25,S$GLB,, | Performed by: FAMILY MEDICINE

## 2022-10-25 PROCEDURE — 83036 HEMOGLOBIN GLYCOSYLATED A1C: CPT | Mod: GA | Performed by: FAMILY MEDICINE

## 2022-10-25 PROCEDURE — 99214 OFFICE O/P EST MOD 30 MIN: CPT | Mod: 25,S$GLB,, | Performed by: FAMILY MEDICINE

## 2022-10-25 PROCEDURE — 3288F FALL RISK ASSESSMENT DOCD: CPT | Mod: CPTII,S$GLB,, | Performed by: FAMILY MEDICINE

## 2022-10-25 PROCEDURE — 84443 ASSAY THYROID STIM HORMONE: CPT | Performed by: FAMILY MEDICINE

## 2022-10-25 PROCEDURE — 1160F PR REVIEW ALL MEDS BY PRESCRIBER/CLIN PHARMACIST DOCUMENTED: ICD-10-PCS | Mod: CPTII,S$GLB,, | Performed by: FAMILY MEDICINE

## 2022-10-25 PROCEDURE — 99999 PR PBB SHADOW E&M-EST. PATIENT-LVL V: CPT | Mod: PBBFAC,,, | Performed by: FAMILY MEDICINE

## 2022-10-25 PROCEDURE — 90694 FLU VACCINE - QUADRIVALENT - ADJUVANTED: ICD-10-PCS | Mod: S$GLB,,, | Performed by: FAMILY MEDICINE

## 2022-10-25 PROCEDURE — 99999 PR PBB SHADOW E&M-EST. PATIENT-LVL V: ICD-10-PCS | Mod: PBBFAC,,, | Performed by: FAMILY MEDICINE

## 2022-10-25 PROCEDURE — 1126F PR PAIN SEVERITY QUANTIFIED, NO PAIN PRESENT: ICD-10-PCS | Mod: CPTII,S$GLB,, | Performed by: FAMILY MEDICINE

## 2022-10-25 PROCEDURE — G0008 ADMIN INFLUENZA VIRUS VAC: HCPCS | Mod: S$GLB,,, | Performed by: FAMILY MEDICINE

## 2022-10-25 PROCEDURE — 80061 LIPID PANEL: CPT | Performed by: FAMILY MEDICINE

## 2022-10-25 PROCEDURE — 1159F MED LIST DOCD IN RCRD: CPT | Mod: CPTII,S$GLB,, | Performed by: FAMILY MEDICINE

## 2022-10-25 PROCEDURE — 80053 COMPREHEN METABOLIC PANEL: CPT | Performed by: FAMILY MEDICINE

## 2022-10-25 PROCEDURE — 82728 ASSAY OF FERRITIN: CPT | Mod: GA | Performed by: FAMILY MEDICINE

## 2022-10-25 PROCEDURE — 90694 VACC AIIV4 NO PRSRV 0.5ML IM: CPT | Mod: S$GLB,,, | Performed by: FAMILY MEDICINE

## 2022-10-25 PROCEDURE — G0008 FLU VACCINE - QUADRIVALENT - ADJUVANTED: ICD-10-PCS | Mod: S$GLB,,, | Performed by: FAMILY MEDICINE

## 2022-10-25 NOTE — PROGRESS NOTES
Subjective:       Patient ID: Jessica Perez is a 77 y.o. female.    Chief Complaint: Annual Exam    Hyperlipidemia  This is a chronic problem. The current episode started more than 1 year ago. The problem is controlled. Recent lipid tests were reviewed and are normal. There are no known factors aggravating her hyperlipidemia. Pertinent negatives include no chest pain or shortness of breath.   Review of Systems   Respiratory:  Negative for shortness of breath.    Cardiovascular:  Negative for chest pain.   Gastrointestinal:  Negative for abdominal pain.     Objective:      Physical Exam  Vitals and nursing note reviewed.   Constitutional:       General: She is not in acute distress.     Appearance: Normal appearance. She is well-developed. She is not diaphoretic.   HENT:      Head: Normocephalic and atraumatic.   Pulmonary:      Effort: Pulmonary effort is normal. No respiratory distress.      Breath sounds: Normal breath sounds. No wheezing.   Skin:     General: Skin is warm and dry.      Findings: No erythema or rash.   Neurological:      Mental Status: She is alert.       Assessment:       1. Pure hypercholesterolemia    2. Rash    3. Hyperparathyroidism    4. Chronic pain of both knees          Plan:     Problem List Items Addressed This Visit          Cardiac/Vascular    HLD (hyperlipidemia) - Primary    Overview     Chronic, Stable, cont zetia, repatha         Relevant Orders    CBC Auto Differential    Comprehensive Metabolic Panel    Ferritin    Iron and TIBC    Lipid Panel    TSH    Hemoglobin A1C       Endocrine    Hyperparathyroidism    Overview     Chronic, Stable  elevated pth - 121 as of 11/18/2020            Orthopedic    Chronic pain of both knees    Relevant Orders    Ambulatory referral/consult to Orthopedics     Other Visit Diagnoses       Rash

## 2022-10-25 NOTE — PROGRESS NOTES
"  [facility]   Physical Therapy Treatment Note     Name: Jessica Perez  Clinic Number: [id number]    Therapy Diagnosis:   Encounter Diagnosis   Name Primary?    Decreased ROM of lumbar spine Yes           Physician: Jennifer Graves MD    Visit Date: 10/26/2022    Physician Orders: PT Eval and Treat   Medical Diagnosis from Referral:   M54.50,G89.29 (ICD-10-CM) - Chronic low back pain, unspecified back pain laterality, unspecified whether sciatica present   M17.0 (ICD-10-CM) - Primary osteoarthritis of both knees      Evaluation Date: 5/4/2022  Authorization Period Expiration: 12/31/2022  Plan of Care Expiration: 12/23/2022  Visit # / Visits authorized: 26/ 25 (+eval)     PN DUE: 10/15/2022     Precautions: Standard    PTA Visit #: 0/5     Time In: 4:15pm  Time Out: 5:00pm  Total Billable Time: 45 minutes    SUBJECTIVE     Pt reports: Back and knees are doing significantly better. Very pleased with progress     She was compliant with home exercise program.  Response to previous treatment: decreased back pain  Functional change: no change reported     Pain: none-mild/10 left knee     OBJECTIVE     Objective Measures updated at progress report unless specified.       Treatment     Jessica received the treatments listed below:      (exercises performed today are bolded)    therapeutic exercises to develop strength, endurance, ROM, flexibility, posture and core stabilization for 30 minutes including:    Recumbent bike seat 3 for increased lower extremity range of motion, strength, and endurance for 8 minutes   Slant board gastroc stretch 3 x 30"  SK2C 10 sec X 2  LTR 10 sec x 2  Pelvic tilt with ball/band  3 X 15  Bridge with ball squeeze 3 X 15      manual therapy techniques: Joint mobilizations and Myofacial release were applied to the: bilateral knee and low back for 15 minutes, including:    FDN to left Quads, Right /left QL , Multifidi L3,4,5, Bladder 4,5  Cupping Right lumbar " paraspinals  Pf mobs distraction    Patient Education and Home Exercises     Home Exercises Provided and Patient Education Provided     Education provided:   - Education/Self-Care provided:   Patient educated on the impairments noted above and the effects of physical therapy intervention to improve overall condition and QOL.   Patient was educated on all the above exercise prior/during/after for proper posture, positioning, and execution for safe performance with home exercise program.   Patient educated on the importance of improved core and upper extremity strength in order to improve alignment of the spine and upper extremities with static positions and dynamic movement.   Patient educated on the importance of strong core and lower extremity musculature in order to improve both static and dynamic balance, improve gait mechanics, reduce fall risk and improve household and community mobility      Written Home Exercises Provided: yes. Exercises were reviewed and Jessica was able to demonstrate them prior to the end of the session.  Jessica demonstrated good  understanding of the education provided. See EMR under Patient Instructions for exercises provided during therapy sessions    ASSESSMENT     Good response to FDN quads with report of no pain in knee post Rx, Good response to FDN to QL and cupping with increase tissue mobility     Jessica Is progressing well towards her goals.   Pt prognosis is Good.     Pt will continue to benefit from skilled outpatient physical therapy to address the deficits listed in the problem list box on initial evaluation, provide pt/family education and to maximize pt's level of independence in the home and community environment.     Pt's spiritual, cultural and educational needs considered and pt agreeable to plan of care and goals.     Anticipated barriers to physical therapy: pain and decreased exercise tolerance     Goals:   Short Term Goals:  4 weeks   Pain: Pt will demonstrate  improved pain by reports of less than or equal to 7/10 worst pain on the verbal rating scale in order to progress toward maximal functional ability and improve QOL. MET 5/19/2022   2.  Mobility: Patient will present with full knee extension range of motion in order to return to maximal functional potential and improve quality of life. Met 6/9/2022   3.  HEP: Patient will demonstrate independence with HEP in order to progress toward functional independence. MET 5/19/2022      Long Term Goals:  8 weeks   Pain: Pt will demonstrate improved pain by reports of less than or equal to 5/10 worst pain on the verbal rating scale in order to progress toward maximal functional ability and improve QOL.  MET 6/16/2022   Function: Patient will demonstrate improved function as indicated by a functional limitation score of 54% on the FOTO. MET 9/15/2022   3. Strength: Patient will improve strength to +4/5 in bilateral lower extremities in order to improve functional independence and quality of life. Met 6/9/2022   4. Patient will return to normal ADL's, IADL's, community involvement, recreational activities, and work-related activities with no pain and maximal function. PC      Goals Key:  PC= progressing/continue;        PM= partially met;             DC= discontinue      PLAN     Plan of care Certification: 5/4/2022 to 12/23/2022.     D/C with HEP    Zonia Chery, PT, DPT  10/26/2022

## 2022-10-26 ENCOUNTER — DOCUMENTATION ONLY (OUTPATIENT)
Dept: REHABILITATION | Facility: HOSPITAL | Age: 77
End: 2022-10-26

## 2022-10-26 ENCOUNTER — CLINICAL SUPPORT (OUTPATIENT)
Dept: REHABILITATION | Facility: HOSPITAL | Age: 77
End: 2022-10-26
Payer: MEDICARE

## 2022-10-26 DIAGNOSIS — M53.86 DECREASED ROM OF LUMBAR SPINE: Primary | ICD-10-CM

## 2022-10-26 LAB
ALBUMIN SERPL BCP-MCNC: 4.2 G/DL (ref 3.5–5.2)
ALP SERPL-CCNC: 95 U/L (ref 55–135)
ALT SERPL W/O P-5'-P-CCNC: 34 U/L (ref 10–44)
ANION GAP SERPL CALC-SCNC: 13 MMOL/L (ref 8–16)
AST SERPL-CCNC: 33 U/L (ref 10–40)
BASOPHILS # BLD AUTO: 0.06 K/UL (ref 0–0.2)
BASOPHILS NFR BLD: 0.7 % (ref 0–1.9)
BILIRUB SERPL-MCNC: 0.3 MG/DL (ref 0.1–1)
BUN SERPL-MCNC: 11 MG/DL (ref 8–23)
CALCIUM SERPL-MCNC: 9.9 MG/DL (ref 8.7–10.5)
CHLORIDE SERPL-SCNC: 110 MMOL/L (ref 95–110)
CHOLEST SERPL-MCNC: 126 MG/DL (ref 120–199)
CHOLEST/HDLC SERPL: 2 {RATIO} (ref 2–5)
CO2 SERPL-SCNC: 19 MMOL/L (ref 23–29)
CREAT SERPL-MCNC: 0.8 MG/DL (ref 0.5–1.4)
DIFFERENTIAL METHOD: NORMAL
EOSINOPHIL # BLD AUTO: 0.1 K/UL (ref 0–0.5)
EOSINOPHIL NFR BLD: 1.6 % (ref 0–8)
ERYTHROCYTE [DISTWIDTH] IN BLOOD BY AUTOMATED COUNT: 13 % (ref 11.5–14.5)
EST. GFR  (NO RACE VARIABLE): >60 ML/MIN/1.73 M^2
ESTIMATED AVG GLUCOSE: 103 MG/DL (ref 68–131)
FERRITIN SERPL-MCNC: 38 NG/ML (ref 20–300)
GLUCOSE SERPL-MCNC: 97 MG/DL (ref 70–110)
HBA1C MFR BLD: 5.2 % (ref 4–5.6)
HCT VFR BLD AUTO: 43.6 % (ref 37–48.5)
HDLC SERPL-MCNC: 64 MG/DL (ref 40–75)
HDLC SERPL: 50.8 % (ref 20–50)
HGB BLD-MCNC: 14.2 G/DL (ref 12–16)
IMM GRANULOCYTES # BLD AUTO: 0.03 K/UL (ref 0–0.04)
IMM GRANULOCYTES NFR BLD AUTO: 0.4 % (ref 0–0.5)
IRON SERPL-MCNC: 74 UG/DL (ref 30–160)
LDLC SERPL CALC-MCNC: 24.2 MG/DL (ref 63–159)
LYMPHOCYTES # BLD AUTO: 3.1 K/UL (ref 1–4.8)
LYMPHOCYTES NFR BLD: 35.9 % (ref 18–48)
MCH RBC QN AUTO: 30.7 PG (ref 27–31)
MCHC RBC AUTO-ENTMCNC: 32.6 G/DL (ref 32–36)
MCV RBC AUTO: 94 FL (ref 82–98)
MONOCYTES # BLD AUTO: 0.5 K/UL (ref 0.3–1)
MONOCYTES NFR BLD: 6.2 % (ref 4–15)
NEUTROPHILS # BLD AUTO: 4.7 K/UL (ref 1.8–7.7)
NEUTROPHILS NFR BLD: 55.2 % (ref 38–73)
NONHDLC SERPL-MCNC: 62 MG/DL
NRBC BLD-RTO: 0 /100 WBC
PLATELET # BLD AUTO: 223 K/UL (ref 150–450)
PMV BLD AUTO: 12.2 FL (ref 9.2–12.9)
POTASSIUM SERPL-SCNC: 4.1 MMOL/L (ref 3.5–5.1)
PROT SERPL-MCNC: 7.1 G/DL (ref 6–8.4)
RBC # BLD AUTO: 4.63 M/UL (ref 4–5.4)
SATURATED IRON: 17 % (ref 20–50)
SODIUM SERPL-SCNC: 142 MMOL/L (ref 136–145)
TOTAL IRON BINDING CAPACITY: 438 UG/DL (ref 250–450)
TRANSFERRIN SERPL-MCNC: 296 MG/DL (ref 200–375)
TRIGL SERPL-MCNC: 189 MG/DL (ref 30–150)
TSH SERPL DL<=0.005 MIU/L-ACNC: 1.76 UIU/ML (ref 0.4–4)
WBC # BLD AUTO: 8.56 K/UL (ref 3.9–12.7)

## 2022-10-26 PROCEDURE — 97140 MANUAL THERAPY 1/> REGIONS: CPT | Mod: PN

## 2022-10-26 PROCEDURE — 97110 THERAPEUTIC EXERCISES: CPT | Mod: PN

## 2022-10-26 NOTE — PROGRESS NOTES
[facility]  PT Discharge Note    Name: Jessica Perez  Clinic Number: [id number]    Therapy Diagnosis:        Encounter Diagnosis   Name Primary?    Decreased ROM of lumbar spine Yes               Physician: Jennifer Graves MD     Visit Date: 10/26/2022     Physician Orders: PT Eval and Treat   Medical Diagnosis from Referral:   M54.50,G89.29 (ICD-10-CM) - Chronic low back pain, unspecified back pain laterality, unspecified whether sciatica present   M17.0 (ICD-10-CM) - Primary osteoarthritis of both knees      Evaluation Date: 5/4/2022      Date of Last visit: 10/26/2022  Total Visits Received: 27    ASSESSMENT      Good response to FDN quads with report of no pain in knee post Rx, Good response to FDN to QL and cupping with increase tissue mobility        Discharge reason: Patient has reached the maximum rehab potential for the present time    Goals:   Short Term Goals:  4 weeks   Pain: Pt will demonstrate improved pain by reports of less than or equal to 7/10 worst pain on the verbal rating scale in order to progress toward maximal functional ability and improve QOL. MET 5/19/2022   2.  Mobility: Patient will present with full knee extension range of motion in order to return to maximal functional potential and improve quality of life. Met 6/9/2022   3.  HEP: Patient will demonstrate independence with HEP in order to progress toward functional independence. MET 5/19/2022      Long Term Goals:  8 weeks   Pain: Pt will demonstrate improved pain by reports of less than or equal to 5/10 worst pain on the verbal rating scale in order to progress toward maximal functional ability and improve QOL.  MET 6/16/2022   Function: Patient will demonstrate improved function as indicated by a functional limitation score of 54% on the FOTO. MET 9/15/2022   3. Strength: Patient will improve strength to +4/5 in bilateral lower extremities in order to improve functional independence and quality of  life. Met 6/9/2022   4. Patient will return to normal ADL's, IADL's, community involvement, recreational activities, and work-related activities with no pain and maximal function. PC      Goals Key:  PC= progressing/continue;        PM= partially met;             DC= discontinue       PLAN   This patient is discharged from Physical Therapy      Zonia Chery, PT

## 2022-10-27 ENCOUNTER — TELEPHONE (OUTPATIENT)
Dept: ORTHOPEDICS | Facility: CLINIC | Age: 77
End: 2022-10-27
Payer: MEDICARE

## 2022-10-27 ENCOUNTER — TELEPHONE (OUTPATIENT)
Dept: SPORTS MEDICINE | Facility: CLINIC | Age: 77
End: 2022-10-27
Payer: MEDICARE

## 2022-10-27 DIAGNOSIS — M25.561 CHRONIC PAIN OF BOTH KNEES: Primary | ICD-10-CM

## 2022-10-27 DIAGNOSIS — G89.29 CHRONIC PAIN OF BOTH KNEES: Primary | ICD-10-CM

## 2022-10-27 DIAGNOSIS — M25.562 CHRONIC PAIN OF BOTH KNEES: Primary | ICD-10-CM

## 2022-10-27 NOTE — TELEPHONE ENCOUNTER
Scheduled patient from referral , and asked her to arrive 30 minutes early for x-rays. Patient verbalized understanding.

## 2022-10-31 ENCOUNTER — HOSPITAL ENCOUNTER (OUTPATIENT)
Dept: RADIOLOGY | Facility: HOSPITAL | Age: 77
Discharge: HOME | End: 2022-10-31
Attending: STUDENT IN AN ORGANIZED HEALTH CARE EDUCATION/TRAINING PROGRAM
Payer: MEDICARE

## 2022-10-31 ENCOUNTER — OFFICE VISIT (OUTPATIENT)
Dept: SPORTS MEDICINE | Facility: CLINIC | Age: 77
End: 2022-10-31
Payer: MEDICARE

## 2022-10-31 VITALS — WEIGHT: 174.19 LBS | BODY MASS INDEX: 31.85 KG/M2

## 2022-10-31 DIAGNOSIS — M25.561 CHRONIC PAIN OF BOTH KNEES: ICD-10-CM

## 2022-10-31 DIAGNOSIS — G89.29 CHRONIC PAIN OF BOTH KNEES: ICD-10-CM

## 2022-10-31 DIAGNOSIS — M25.562 CHRONIC PAIN OF BOTH KNEES: ICD-10-CM

## 2022-10-31 DIAGNOSIS — M17.0 BILATERAL PRIMARY OSTEOARTHRITIS OF KNEE: Primary | ICD-10-CM

## 2022-10-31 PROCEDURE — 1159F PR MEDICATION LIST DOCUMENTED IN MEDICAL RECORD: ICD-10-PCS | Mod: CPTII,S$GLB,, | Performed by: STUDENT IN AN ORGANIZED HEALTH CARE EDUCATION/TRAINING PROGRAM

## 2022-10-31 PROCEDURE — 1101F PT FALLS ASSESS-DOCD LE1/YR: CPT | Mod: CPTII,S$GLB,, | Performed by: STUDENT IN AN ORGANIZED HEALTH CARE EDUCATION/TRAINING PROGRAM

## 2022-10-31 PROCEDURE — 1125F PR PAIN SEVERITY QUANTIFIED, PAIN PRESENT: ICD-10-PCS | Mod: CPTII,S$GLB,, | Performed by: STUDENT IN AN ORGANIZED HEALTH CARE EDUCATION/TRAINING PROGRAM

## 2022-10-31 PROCEDURE — 3044F PR MOST RECENT HEMOGLOBIN A1C LEVEL <7.0%: ICD-10-PCS | Mod: CPTII,S$GLB,, | Performed by: STUDENT IN AN ORGANIZED HEALTH CARE EDUCATION/TRAINING PROGRAM

## 2022-10-31 PROCEDURE — 73564 X-RAY EXAM KNEE 4 OR MORE: CPT | Mod: TC,50,PN

## 2022-10-31 PROCEDURE — 1125F AMNT PAIN NOTED PAIN PRSNT: CPT | Mod: CPTII,S$GLB,, | Performed by: STUDENT IN AN ORGANIZED HEALTH CARE EDUCATION/TRAINING PROGRAM

## 2022-10-31 PROCEDURE — 3288F FALL RISK ASSESSMENT DOCD: CPT | Mod: CPTII,S$GLB,, | Performed by: STUDENT IN AN ORGANIZED HEALTH CARE EDUCATION/TRAINING PROGRAM

## 2022-10-31 PROCEDURE — 1159F MED LIST DOCD IN RCRD: CPT | Mod: CPTII,S$GLB,, | Performed by: STUDENT IN AN ORGANIZED HEALTH CARE EDUCATION/TRAINING PROGRAM

## 2022-10-31 PROCEDURE — 1101F PR PT FALLS ASSESS DOC 0-1 FALLS W/OUT INJ PAST YR: ICD-10-PCS | Mod: CPTII,S$GLB,, | Performed by: STUDENT IN AN ORGANIZED HEALTH CARE EDUCATION/TRAINING PROGRAM

## 2022-10-31 PROCEDURE — 1160F RVW MEDS BY RX/DR IN RCRD: CPT | Mod: CPTII,S$GLB,, | Performed by: STUDENT IN AN ORGANIZED HEALTH CARE EDUCATION/TRAINING PROGRAM

## 2022-10-31 PROCEDURE — 99203 PR OFFICE/OUTPT VISIT, NEW, LEVL III, 30-44 MIN: ICD-10-PCS | Mod: S$GLB,,, | Performed by: STUDENT IN AN ORGANIZED HEALTH CARE EDUCATION/TRAINING PROGRAM

## 2022-10-31 PROCEDURE — 99999 PR PBB SHADOW E&M-EST. PATIENT-LVL V: CPT | Mod: PBBFAC,,, | Performed by: STUDENT IN AN ORGANIZED HEALTH CARE EDUCATION/TRAINING PROGRAM

## 2022-10-31 PROCEDURE — 3044F HG A1C LEVEL LT 7.0%: CPT | Mod: CPTII,S$GLB,, | Performed by: STUDENT IN AN ORGANIZED HEALTH CARE EDUCATION/TRAINING PROGRAM

## 2022-10-31 PROCEDURE — 1160F PR REVIEW ALL MEDS BY PRESCRIBER/CLIN PHARMACIST DOCUMENTED: ICD-10-PCS | Mod: CPTII,S$GLB,, | Performed by: STUDENT IN AN ORGANIZED HEALTH CARE EDUCATION/TRAINING PROGRAM

## 2022-10-31 PROCEDURE — 3288F PR FALLS RISK ASSESSMENT DOCUMENTED: ICD-10-PCS | Mod: CPTII,S$GLB,, | Performed by: STUDENT IN AN ORGANIZED HEALTH CARE EDUCATION/TRAINING PROGRAM

## 2022-10-31 PROCEDURE — 99203 OFFICE O/P NEW LOW 30 MIN: CPT | Mod: S$GLB,,, | Performed by: STUDENT IN AN ORGANIZED HEALTH CARE EDUCATION/TRAINING PROGRAM

## 2022-10-31 PROCEDURE — 99999 PR PBB SHADOW E&M-EST. PATIENT-LVL V: ICD-10-PCS | Mod: PBBFAC,,, | Performed by: STUDENT IN AN ORGANIZED HEALTH CARE EDUCATION/TRAINING PROGRAM

## 2022-10-31 NOTE — PATIENT INSTRUCTIONS
Assessment:  Jessica Perez is a 77 y.o. female with a chief complaint of Pain of the Left Knee and Pain of the Right Knee      Encounter Diagnoses   Name Primary?    Chronic pain of both knees     Bilateral primary osteoarthritis of knee Yes      Plan:  XR reviewed today, stable degenerative findings of both knees  The patient's history, clinical exam, and imaging findings are consistent with bilateral knee osteoarthritis.  Patient defers cortisone injection today since they have not been very effective in the past.  Order viscosupplementation for bilateral knees  MEDICAL NECESSITY FOR VISCOSUPPLEMENTATION: After thorough evaluation of the patient, I have determined that visco-supplementation is medically necessary. The patient has painful DJD of the knee with failure of conservative therapy including lifestyle modifications and rehabilitation exercises. Oral analgesis/NSAIDs have not adequately controlled symptoms and there is radiographic evidence of joint space narrowing, subchondral sclerosis, and some early osteophytic changes, or in lack of radiographic evidence, there is arthroscopic or other evidence of chondrosis.  Kellgren- Gigi grade 2 or greater.    Will refer to Ortho Joint to discuss TKA    Follow-up: 4 weeks for viscosupplementation or sooner if there are any problems between now and then.    Thank you for choosing [facility] and Dr. vAery Matos for your orthopedic & sports medicine care. It is our goal to provide you with exceptional care that will help keep you healthy, active, and get you back in the game.    Please do not hesitate to reach out to us via email, phone, or MyChart with any questions, concerns, or feedback.    If you are experiencing pain/discomfort ,or have questions after 5pm and would like to be connected to the [facility] on-call team, please call this number and specify which Sports Medicine provider is  treating you: [phone]

## 2022-10-31 NOTE — PROGRESS NOTES
Patient ID: Jessica Perez  YOB: 1945  MRN: [id number]    Chief Complaint: Pain of the Left Knee and Pain of the Right Knee    Referred By: Dr. Jang    Occupation: Retired teacher    History of Present Illness: Jessica Perez is a 77 y.o. female who presents today with bilateral knee pain.     She has been treated in the past for bilateral knee osteoarthritis by Dr. Ponce and Dr. Hamilton.  Cortisone injections, NSAIDs, 1-2 rounds of viscosupplementation, most recently in October 2021.  Cortisone was not effective for very long. She would get 3 months of relief from the viscosupplementation.  She has also had GNB in the past, with good relief of R knee, but little relief of L knee.  She has done physical therapy at [facility] with dry needling which has been helpful.      She has been told that she should undergo total knee replacement.  She is concerned about her ability to recover since she lives alone and doesn't have a good support system locally.     Past Medical History:   Past Medical History:   Diagnosis Date    Acute pain of left knee 3/22/2021    Arthritis     Arthritis of knee 2/9/2021    At risk for sleep apnea 9/19/2019    Balance problem 10/21/2021    Bilateral primary osteoarthritis of knee 4/4/2018    Chest pain, moderate coronary artery risk 8/8/2019    Chronic midline low back pain without sciatica 11/30/2020    Chronic pain of both knees 3/23/2021    Costochondritis 1/20/2020    Depression, major, recurrent, mild     Diarrhea 5/11/2022    Difficulty walking 11/30/2020    Difficulty walking 11/30/2020    LANRE (generalized anxiety disorder) 3/22/2021    Gait, antalgic 10/21/2021    Generalized weakness 11/30/2020    Generalized weakness 11/30/2020    Glaucoma     HLD (hyperlipidemia)     Lower extremity weakness 10/21/2021    Muscle cramps 5/23/2018    Osteopenia of multiple sites 4/2/2018    Primary osteoarthritis of right knee 05/30/2018    Primary snoring      Rash 5/11/2022    Sleep related leg cramps 9/7/2010    SOB (shortness of breath) 8/8/2019    Unspecified gastritis and gastroduodenitis without mention of hemorrhage 11/28/2010     Dx updated per 2019 IMO Load    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 Benign.    CATARACT EXTRACTION W/  INTRAOCULAR LENS IMPLANT Left 02/04/2019    CATARACT EXTRACTION W/  INTRAOCULAR LENS IMPLANT Right 03/04/2019    CHOLECYSTECTOMY      COLONOSCOPY N/A 6/29/2022    Procedure: COLONOSCOPY;  Surgeon: Nichole Kelly MD;  Location: [facility] ENDO;  Service: Endoscopy;  Laterality: N/A;    DILATION AND CURETTAGE OF UTERUS      finger surgery      middle finger on right hand    GALLBLADDER SURGERY      INJECTION OF ANESTHETIC AGENT AROUND NERVE Bilateral 10/1/2021    Procedure: Bilateral Genicular nerve block -;  Surgeon: Primo Bond MD;  Location: [facility] PAIN MGT;  Service: Pain Management;  Laterality: Bilateral;    RADIOFREQUENCY THERMOCOAGULATION Left 11/5/2021    Procedure: Left Genicular Nerve RFA (COOLIEF) with RN IV sedation;  Surgeon: Primo Bond MD;  Location: [facility] PAIN MGT;  Service: Pain Management;  Laterality: Left;    TONSILLECTOMY, ADENOIDECTOMY      TUBAL LIGATION       Family History   Problem Relation Age of Onset    Diabetes Father     Cancer Father         prostate ca    Hypertension Father     Cancer Sister         cervical ca    Hepatitis Sister     Dementia Mother     Osteoporosis Mother     Breast cancer Paternal Aunt      Social History     Socioeconomic History    Marital status:    Tobacco Use    Smoking status: Former     Packs/day: 0.50     Years: 12.00     Pack years: 6.00     Types: Cigarettes    Smokeless tobacco: Never   Substance and Sexual Activity    Alcohol use: Yes     Comment: occasionally    Drug use: No    Sexual activity: Yes      Medication List with Changes/Refills   Current Medications    ACETAMINOPHEN/DIPHENHYDRAMINE (TYLENOL PM ORAL)    Take by mouth.    APPLE CIDER VINEGAR ORAL    Take by mouth.    ARTIFICIAL TEARS,HYPROMELLOSE,,GENTEAL/SUSTANE, 0.3 % GEL        ASPIRIN (ECOTRIN) 81 MG EC TABLET    Take 81 mg by mouth once daily.    AZELASTINE (ASTELIN) 137 MCG (0.1 %) NASAL SPRAY    1 spray (137 mcg total) by Nasal route 2 (two) times daily.    B COMPLEX VITAMINS CAPSULE    Take 1 capsule by mouth once daily.    CALCIUM CARBONATE (CALCIUM 500 ORAL)    Take by mouth.    CHOLESTYRAMINE (QUESTRAN) 4 GRAM PACKET    Take 1 packet (4 g total) by mouth daily as needed (diarrhea).    CYANOCOBALAMIN, VITAMIN B-12, (VITAMIN B-12 ORAL)    Take by mouth.    DENOSUMAB (PROLIA SUBQ)    Inject into the skin.    ERGOCALCIFEROL, VITAMIN D2, (VITAMIN D ORAL)    Take by mouth.    EVOLOCUMAB (REPATHA SURECLICK) 140 MG/ML PNIJ    Repatha SureClick Take No date recorded No form recorded No frequency recorded No route recorded No set duration recorded No set duration amount recorded active No dosage strength recorded No dosage strength units of measure recorded    EVOLOCUMAB (REPATHA SURECLICK) 140 MG/ML PNIJ    Inject 1 pen (140 mg total) into the skin every 14 (fourteen) days.    EZETIMIBE (ZETIA) 10 MG TABLET    Take 1 tablet (10 mg total) by mouth once daily.    FAMOTIDINE (PEPCID) 40 MG TABLET    Take 1 tablet (40 mg total) by mouth every evening.    FEXOFENADINE (ALLEGRA) 180 MG TABLET    Take 1 tablet (180 mg total) by mouth once daily.    FLAXSEED OIL 1,000 MG CAP    flaxseed oil Take No date recorded No form recorded No frequency recorded No route recorded No set duration recorded No set duration amount recorded suspended No dosage strength recorded No dosage strength units of measure recorded    FLUOCINOLONE (SYNALAR) 0.01 % EXTERNAL SOLUTION    AAA of scalp twice a day PRN scalp itching or eczema flares.    FLUTICASONE (FLONASE) 50  MCG/ACTUATION NASAL SPRAY    2 sprays by Each Nare route once daily.    HYDROXYZINE PAMOATE (VISTARIL) 25 MG CAP    Take 1 capsule (25 mg total) by mouth every 8 (eight) hours as needed (itching).    KETOCONAZOLE (NIZORAL) 2 % SHAMPOO    Apply topically every 7 days. Shampoo scalp 1-2 times per week. Lather x 5 minutes, rinse well.    KRILL OIL-OMEGA-3-DHA--450 MG CPDR    Take by mouth.    MULTIVITAMIN CAPSULE    Take 1 capsule by mouth Daily.    TIZANIDINE (ZANAFLEX) 4 MG TABLET    Take 2 mg by mouth nightly as needed.    TRIAMCINOLONE ACETONIDE 0.1% (KENALOG) 0.1 % CREAM    Apply topically 2 (two) times daily.    TROSPIUM (SANCTURA XR) 60 MG CP24 CAPSULE    Take 1 capsule (60 mg total) by mouth once daily.    TURMERIC ORAL    Take by mouth.    UNABLE TO FIND    CBD Oil topical    ZINC ORAL    Take by mouth.     Review of patient's allergies indicates:   Allergen Reactions    Augmentin [amoxicillin-pot clavulanate] Other (See Comments)     Diarrhea, yeast    Bactrim  [sulfamethoxazole-trimethoprim]      Other reaction(s): Unknown    Celebrex [celecoxib] Other (See Comments)     Stomach pain, gas     Lipitor  [atorvastatin]      Other reaction(s): Unknown    Pravachol  [pravastatin]      Other reaction(s): Unknown    Statins-hmg-coa reductase inhibitors      Other reaction(s): Muscle pain    Sulfa (sulfonamide antibiotics)      Other reaction(s): Swelling    Zoster vaccine live        Physical Exam:   Body mass index is 31.85 kg/m².    Physical Exam  Constitutional:       General: She is not in acute distress.     Appearance: Normal appearance.   HENT:      Head: Normocephalic and atraumatic.   Eyes:      Extraocular Movements: Extraocular movements intact.      Conjunctiva/sclera: Conjunctivae normal.   Pulmonary:      Effort: Pulmonary effort is normal. No respiratory distress.   Skin:     General: Skin is warm and dry.   Neurological:      General: No focal deficit present.      Mental Status: She is alert  and oriented to person, place, and time.   Psychiatric:         Mood and Affect: Mood normal.     Detailed MSK exam:     Right Knee:  Inspection: No effusion, erythema, or ecchymosis   Palpation tenderness: medial joint line  Range of motion: 0 deg extension - 135 deg flexion  Strength:  5/5 Extension    5/5 Flexion  Stability: Stable ACL/Lachman      Stable Posterior Drawer      1+ with firm endpoint to Valgus Stress      Stable Varus Stress  Patella Exam: Negative J-sign   Negative Patellar apprehension   Positive Patellar crepitus   N/V Exam:  Tibial:    Normal sensory (plantar foot)  Normal motor (FHL)    Sup Peroneal:   Normal sensory (dorsal foot)  Normal motor (Peroneals)            Deep Peroneal:   Normal sensory (1st web space)  Normal motor (EHL)    Sural:   Normal sensory (lateral foot)   Saphenous:   Normal sensory (medial lower leg)   Normal pedal pulses, warm and well perfused with capillary refill < 2 sec     Left Knee:  Inspection: No effusion, erythema, or ecchymosis   Palpation tenderness: medial joint line  Range of motion: 0 deg extension - 135 deg flexion  Strength:  5/5 Extension    5/5 Flexion  Stability: Stable ACL/Lachman      Stable Posterior Drawer      1+ with firm endpoint to Valgus Stress      Stable Varus Stress  Patella Exam: Negative J-sign   Negative Patellar apprehension   Positive Patellar crepitus   N/V Exam:  Tibial:    Normal sensory (plantar foot)  Normal motor (FHL)    Sup Peroneal:   Normal sensory (dorsal foot)  Normal motor (Peroneals)            Deep Peroneal:   Normal sensory (1st web space)  Normal motor (EHL)    Sural:   Normal sensory (lateral foot)   Saphenous:   Normal sensory (medial lower leg)   Normal pedal pulses, warm and well perfused with capillary refill < 2 sec         Imaging:  X-ray Knee Ortho Bilateral with Flexion  Narrative: EXAMINATION:  XR KNEE ORTHO BILAT WITH FLEXION    CLINICAL HISTORY:  Pain in right knee    COMPARISON:  02/05/2021  Impression:  FINDINGS/  Mild medial compartment narrowing and sclerosis, left greater than right.  Mild osteopenia.  New fracture or dislocation.  Suspect small joint effusion left knee.  No significant right knee joint effusion.    Electronically signed by: Cory Saenz MD  Date:    10/31/2022  Time:    10:17      Relevant imaging results were reviewed and interpreted by me and per my read: Stable tricompartmental degenerative changes of both knees. Normal alignment. No fractures or other acute abnormalities.    This was discussed with the patient and / or family today.       Patient Instructions   Assessment:  Jessica Perez is a 77 y.o. female with a chief complaint of Pain of the Left Knee and Pain of the Right Knee      Encounter Diagnoses   Name Primary?    Chronic pain of both knees     Bilateral primary osteoarthritis of knee Yes      Plan:  XR reviewed today, stable degenerative findings of both knees  The patient's history, clinical exam, and imaging findings are consistent with bilateral knee osteoarthritis.  Patient defers cortisone injection today since they have not been very effective in the past.  Order viscosupplementation for bilateral knees  MEDICAL NECESSITY FOR VISCOSUPPLEMENTATION: After thorough evaluation of the patient, I have determined that visco-supplementation is medically necessary. The patient has painful DJD of the knee with failure of conservative therapy including lifestyle modifications and rehabilitation exercises. Oral analgesis/NSAIDs have not adequately controlled symptoms and there is radiographic evidence of joint space narrowing, subchondral sclerosis, and some early osteophytic changes, or in lack of radiographic evidence, there is arthroscopic or other evidence of chondrosis.  Kellgren- Gigi grade 2 or greater.    Will refer to Ortho Joint to discuss TKA    Follow-up: 4 weeks for viscosupplementation or sooner if there are any problems between now and then.    Thank you for  choosing [facility] and Dr. Avery Matos for your orthopedic & sports medicine care. It is our goal to provide you with exceptional care that will help keep you healthy, active, and get you back in the game.    Please do not hesitate to reach out to us via email, phone, or MyChart with any questions, concerns, or feedback.    If you are experiencing pain/discomfort ,or have questions after 5pm and would like to be connected to the [facility] on-call team, please call this number and specify which Sports Medicine provider is treating you: [phone]          A copy of today's visit note has been sent to the referring provider.       Avery Matos MD  Primary Care Sports Medicine        Disclaimer: This note was prepared using a voice recognition system and is likely to have sound alike errors within the text.

## 2022-11-01 ENCOUNTER — LAB VISIT (OUTPATIENT)
Dept: LAB | Facility: HOSPITAL | Age: 77
End: 2022-11-01
Attending: INTERNAL MEDICINE
Payer: MEDICARE

## 2022-11-01 DIAGNOSIS — M81.0 AGE-RELATED OSTEOPOROSIS WITHOUT CURRENT PATHOLOGICAL FRACTURE: ICD-10-CM

## 2022-11-01 LAB
25(OH)D3+25(OH)D2 SERPL-MCNC: 65 NG/ML (ref 30–96)
ALBUMIN SERPL BCP-MCNC: 4 G/DL (ref 3.5–5.2)
ALP SERPL-CCNC: 92 U/L (ref 55–135)
ALT SERPL W/O P-5'-P-CCNC: 32 U/L (ref 10–44)
ANION GAP SERPL CALC-SCNC: 8 MMOL/L (ref 8–16)
AST SERPL-CCNC: 29 U/L (ref 10–40)
BILIRUB SERPL-MCNC: 0.4 MG/DL (ref 0.1–1)
BUN SERPL-MCNC: 9 MG/DL (ref 8–23)
CALCIUM SERPL-MCNC: 9.5 MG/DL (ref 8.7–10.5)
CHLORIDE SERPL-SCNC: 109 MMOL/L (ref 95–110)
CO2 SERPL-SCNC: 23 MMOL/L (ref 23–29)
CREAT SERPL-MCNC: 0.8 MG/DL (ref 0.5–1.4)
EST. GFR  (NO RACE VARIABLE): >60 ML/MIN/1.73 M^2
GLUCOSE SERPL-MCNC: 83 MG/DL (ref 70–110)
POTASSIUM SERPL-SCNC: 4.2 MMOL/L (ref 3.5–5.1)
PROT SERPL-MCNC: 6.9 G/DL (ref 6–8.4)
PTH-INTACT SERPL-MCNC: 62 PG/ML (ref 9–77)
SODIUM SERPL-SCNC: 140 MMOL/L (ref 136–145)

## 2022-11-01 PROCEDURE — 83970 ASSAY OF PARATHORMONE: CPT | Performed by: INTERNAL MEDICINE

## 2022-11-01 PROCEDURE — 80053 COMPREHEN METABOLIC PANEL: CPT | Performed by: INTERNAL MEDICINE

## 2022-11-01 PROCEDURE — 36415 COLL VENOUS BLD VENIPUNCTURE: CPT | Mod: PO | Performed by: INTERNAL MEDICINE

## 2022-11-01 PROCEDURE — 82306 VITAMIN D 25 HYDROXY: CPT | Performed by: INTERNAL MEDICINE

## 2022-11-08 ENCOUNTER — OFFICE VISIT (OUTPATIENT)
Dept: RHEUMATOLOGY | Facility: CLINIC | Age: 77
End: 2022-11-08
Payer: MEDICARE

## 2022-11-08 VITALS
BODY MASS INDEX: 31.16 KG/M2 | HEART RATE: 79 BPM | HEIGHT: 62 IN | WEIGHT: 169.31 LBS | SYSTOLIC BLOOD PRESSURE: 141 MMHG | DIASTOLIC BLOOD PRESSURE: 79 MMHG

## 2022-11-08 DIAGNOSIS — Z51.81 MEDICATION MONITORING ENCOUNTER: ICD-10-CM

## 2022-11-08 DIAGNOSIS — M81.0 AGE-RELATED OSTEOPOROSIS WITHOUT CURRENT PATHOLOGICAL FRACTURE: Primary | ICD-10-CM

## 2022-11-08 PROCEDURE — 3078F DIAST BP <80 MM HG: CPT | Mod: CPTII,S$GLB,, | Performed by: INTERNAL MEDICINE

## 2022-11-08 PROCEDURE — 1101F PR PT FALLS ASSESS DOC 0-1 FALLS W/OUT INJ PAST YR: ICD-10-PCS | Mod: CPTII,S$GLB,, | Performed by: INTERNAL MEDICINE

## 2022-11-08 PROCEDURE — 1159F MED LIST DOCD IN RCRD: CPT | Mod: CPTII,S$GLB,, | Performed by: INTERNAL MEDICINE

## 2022-11-08 PROCEDURE — 3288F FALL RISK ASSESSMENT DOCD: CPT | Mod: CPTII,S$GLB,, | Performed by: INTERNAL MEDICINE

## 2022-11-08 PROCEDURE — 99214 PR OFFICE/OUTPT VISIT, EST, LEVL IV, 30-39 MIN: ICD-10-PCS | Mod: S$GLB,,, | Performed by: INTERNAL MEDICINE

## 2022-11-08 PROCEDURE — 1101F PT FALLS ASSESS-DOCD LE1/YR: CPT | Mod: CPTII,S$GLB,, | Performed by: INTERNAL MEDICINE

## 2022-11-08 PROCEDURE — 99214 OFFICE O/P EST MOD 30 MIN: CPT | Mod: S$GLB,,, | Performed by: INTERNAL MEDICINE

## 2022-11-08 PROCEDURE — 99999 PR PBB SHADOW E&M-EST. PATIENT-LVL IV: CPT | Mod: PBBFAC,,, | Performed by: INTERNAL MEDICINE

## 2022-11-08 PROCEDURE — 3077F PR MOST RECENT SYSTOLIC BLOOD PRESSURE >= 140 MM HG: ICD-10-PCS | Mod: CPTII,S$GLB,, | Performed by: INTERNAL MEDICINE

## 2022-11-08 PROCEDURE — 3288F PR FALLS RISK ASSESSMENT DOCUMENTED: ICD-10-PCS | Mod: CPTII,S$GLB,, | Performed by: INTERNAL MEDICINE

## 2022-11-08 PROCEDURE — 3044F HG A1C LEVEL LT 7.0%: CPT | Mod: CPTII,S$GLB,, | Performed by: INTERNAL MEDICINE

## 2022-11-08 PROCEDURE — 3078F PR MOST RECENT DIASTOLIC BLOOD PRESSURE < 80 MM HG: ICD-10-PCS | Mod: CPTII,S$GLB,, | Performed by: INTERNAL MEDICINE

## 2022-11-08 PROCEDURE — 3044F PR MOST RECENT HEMOGLOBIN A1C LEVEL <7.0%: ICD-10-PCS | Mod: CPTII,S$GLB,, | Performed by: INTERNAL MEDICINE

## 2022-11-08 PROCEDURE — 3077F SYST BP >= 140 MM HG: CPT | Mod: CPTII,S$GLB,, | Performed by: INTERNAL MEDICINE

## 2022-11-08 PROCEDURE — 99999 PR PBB SHADOW E&M-EST. PATIENT-LVL IV: ICD-10-PCS | Mod: PBBFAC,,, | Performed by: INTERNAL MEDICINE

## 2022-11-08 PROCEDURE — 1159F PR MEDICATION LIST DOCUMENTED IN MEDICAL RECORD: ICD-10-PCS | Mod: CPTII,S$GLB,, | Performed by: INTERNAL MEDICINE

## 2022-11-08 NOTE — PROGRESS NOTES
RHEUMATOLOGY OUTPATIENT CLINIC NOTE    11/8/2022    Attending Rheumatologist: Dileep Mendoza  Primary Care Provider/Physician Requesting Consultation: Davidson Jang MD   Chief Complaint/Reason For Consultation:  Osteoporosis and Osteoarthritis      Subjective:     Jessica ePrez is a 77 y.o. White female with osteoporosis for follow-up encounter.    No acute complaints at present.    Review of Systems   Constitutional:  Negative for fever.   Eyes:  Negative for pain and discharge.   Musculoskeletal:  Negative for back pain and falls.   Skin:  Negative for rash.   Neurological:  Negative for focal weakness.     Chronic comorbid conditions affecting medical decision making today:  Past Medical History:   Diagnosis Date    Acute pain of left knee 3/22/2021    Arthritis     Arthritis of knee 2/9/2021    At risk for sleep apnea 9/19/2019    Balance problem 10/21/2021    Bilateral primary osteoarthritis of knee 4/4/2018    Chest pain, moderate coronary artery risk 8/8/2019    Chronic midline low back pain without sciatica 11/30/2020    Chronic pain of both knees 3/23/2021    Costochondritis 1/20/2020    Depression, major, recurrent, mild     Diarrhea 5/11/2022    Difficulty walking 11/30/2020    Difficulty walking 11/30/2020    LANRE (generalized anxiety disorder) 3/22/2021    Gait, antalgic 10/21/2021    Generalized weakness 11/30/2020    Generalized weakness 11/30/2020    Glaucoma     HLD (hyperlipidemia)     Lower extremity weakness 10/21/2021    Muscle cramps 5/23/2018    Osteopenia of multiple sites 4/2/2018    Primary osteoarthritis of right knee 05/30/2018    Primary snoring     Rash 5/11/2022    Sleep related leg cramps 9/7/2010    SOB (shortness of breath) 8/8/2019    Unspecified gastritis and gastroduodenitis without mention of hemorrhage 11/28/2010     Dx updated per 2019 IMO Load    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 Benign.    CATARACT EXTRACTION W/  INTRAOCULAR LENS IMPLANT Left 02/04/2019    CATARACT EXTRACTION W/  INTRAOCULAR LENS IMPLANT Right 03/04/2019    CHOLECYSTECTOMY      COLONOSCOPY N/A 6/29/2022    Procedure: COLONOSCOPY;  Surgeon: Nichole Kelly MD;  Location: [facility] ENDO;  Service: Endoscopy;  Laterality: N/A;    DILATION AND CURETTAGE OF UTERUS      finger surgery      middle finger on right hand    GALLBLADDER SURGERY      INJECTION OF ANESTHETIC AGENT AROUND NERVE Bilateral 10/1/2021    Procedure: Bilateral Genicular nerve block -;  Surgeon: Primo Bond MD;  Location: [facility] PAIN MGT;  Service: Pain Management;  Laterality: Bilateral;    RADIOFREQUENCY THERMOCOAGULATION Left 11/5/2021    Procedure: Left Genicular Nerve RFA (COOLIEF) with RN IV sedation;  Surgeon: Primo Bond MD;  Location: [facility] PAIN MGT;  Service: Pain Management;  Laterality: Left;    TONSILLECTOMY, ADENOIDECTOMY      TUBAL LIGATION       Family History   Problem Relation Age of Onset    Diabetes Father     Cancer Father         prostate ca    Hypertension Father     Cancer Sister         cervical ca    Hepatitis Sister     Dementia Mother     Osteoporosis Mother     Breast cancer Paternal Aunt      Social History     Tobacco Use   Smoking Status Former    Packs/day: 0.50    Years: 12.00    Pack years: 6.00    Types: Cigarettes   Smokeless Tobacco Never       Current Outpatient Medications:     acetaminophen/diphenhydramine (TYLENOL PM ORAL), Take by mouth., Disp: , Rfl:     APPLE CIDER VINEGAR ORAL, Take by mouth., Disp: , Rfl:     artificial tears,hypromellose,,GENTEAL/SUSTANE, 0.3 % Gel, , Disp: , Rfl:     aspirin (ECOTRIN) 81 MG EC tablet, Take 81 mg by mouth once daily., Disp: , Rfl:     b complex vitamins capsule, Take 1 capsule by mouth once daily., Disp: , Rfl:     calcium carbonate (CALCIUM 500 ORAL), Take by mouth., Disp: , Rfl:     cholestyramine (QUESTRAN)  4 gram packet, Take 1 packet (4 g total) by mouth daily as needed (diarrhea)., Disp: 30 packet, Rfl: 5    cyanocobalamin, vitamin B-12, (VITAMIN B-12 ORAL), Take by mouth., Disp: , Rfl:     denosumab (PROLIA SUBQ), Inject into the skin., Disp: , Rfl:     ergocalciferol, vitamin D2, (VITAMIN D ORAL), Take by mouth., Disp: , Rfl:     evolocumab (REPATHA SURECLICK) 140 mg/mL PnIj, Repatha SureClick Take No date recorded No form recorded No frequency recorded No route recorded No set duration recorded No set duration amount recorded active No dosage strength recorded No dosage strength units of measure recorded, Disp: , Rfl:     evolocumab (REPATHA SURECLICK) 140 mg/mL PnIj, Inject 1 pen (140 mg total) into the skin every 14 (fourteen) days., Disp: 2 mL, Rfl: 6    ezetimibe (ZETIA) 10 mg tablet, Take 1 tablet (10 mg total) by mouth once daily., Disp: 90 tablet, Rfl: 3    famotidine (PEPCID) 40 MG tablet, Take 1 tablet (40 mg total) by mouth every evening., Disp: 30 tablet, Rfl: 11    flaxseed oiL 1,000 mg Cap, flaxseed oil Take No date recorded No form recorded No frequency recorded No route recorded No set duration recorded No set duration amount recorded suspended No dosage strength recorded No dosage strength units of measure recorded, Disp: , Rfl:     fluocinolone (SYNALAR) 0.01 % external solution, AAA of scalp twice a day PRN scalp itching or eczema flares., Disp: 60 mL, Rfl: 1    fluticasone (FLONASE) 50 mcg/actuation nasal spray, 2 sprays by Each Nare route once daily., Disp: 1 Bottle, Rfl: 12    hydrOXYzine pamoate (VISTARIL) 25 MG Cap, Take 1 capsule (25 mg total) by mouth every 8 (eight) hours as needed (itching)., Disp: 30 capsule, Rfl: 0    ketoconazole (NIZORAL) 2 % shampoo, Apply topically every 7 days. Shampoo scalp 1-2 times per week. Lather x 5 minutes, rinse well., Disp: 120 mL, Rfl: 5    krill oil-omega-3-dha-epa 150-450 mg CpDR, Take by mouth., Disp: , Rfl:     multivitamin capsule, Take 1 capsule  by mouth Daily., Disp: , Rfl:     tiZANidine (ZANAFLEX) 4 MG tablet, Take 2 mg by mouth nightly as needed., Disp: , Rfl:     triamcinolone acetonide 0.1% (KENALOG) 0.1 % cream, Apply topically 2 (two) times daily., Disp: 45 g, Rfl: 0    trospium (SANCTURA XR) 60 mg Cp24 capsule, Take 1 capsule (60 mg total) by mouth once daily., Disp: 30 capsule, Rfl: 6    TURMERIC ORAL, Take by mouth., Disp: , Rfl:     UNABLE TO FIND, CBD Oil topical, Disp: , Rfl:     ZINC ORAL, Take by mouth., Disp: , Rfl:     azelastine (ASTELIN) 137 mcg (0.1 %) nasal spray, 1 spray (137 mcg total) by Nasal route 2 (two) times daily., Disp: 30 mL, Rfl: 1    fexofenadine (ALLEGRA) 180 MG tablet, Take 1 tablet (180 mg total) by mouth once daily., Disp: 30 tablet, Rfl: 0    Current Facility-Administered Medications:     triamcinolone acetonide injection 32 mg, 32 mg, Intra-articular, 1 time in Clinic/HOD, Dileep Mendoza MD     Objective:     Vitals:    11/08/22 1135   BP: (!) 141/79   Pulse: 79     Physical Exam   Eyes: Conjunctivae are normal.   Pulmonary/Chest: Effort normal. No respiratory distress.   Musculoskeletal:         General: No swelling. Normal range of motion.   Neurological: Gait normal.   Skin: No rash noted.     Reviewed available old and all outside pertinent medical records available.    All lab results personally reviewed and interpreted by me.       ASSESSMENT:     Age-related osteoporosis without current pathological fracture    Medication monitoring encounter    Osteoarthritis    PLAN:     Follow-up encounter for osteoporosis per densitometry criteria.  Statistically significant decline of T-scores while on Reclast, recommended course of Prolia for total of 4 doses prior repeating densitometry test and reassess fragility fracture risk and need for long-term therapy.  Continue adequate calcium and vitamin-D dietary intake.  Avoid immobility, fall prevention.  CMP to monitor for hypocalcemia within 2 weeks of Prolia shots.   Repeat with vitamin-D level and densitometry test prior follow-up encounter.    Follow up in about 1 year (around 11/8/2023).      Disclaimer: This note is prepared using voice recognition software and as such is likely to have errors and has not been proof read. Please contact me for questions.       Dileep Mendoza M.D.

## 2022-11-21 ENCOUNTER — OFFICE VISIT (OUTPATIENT)
Dept: SPORTS MEDICINE | Facility: CLINIC | Age: 77
End: 2022-11-21
Payer: MEDICARE

## 2022-11-21 VITALS — BODY MASS INDEX: 31.1 KG/M2 | HEIGHT: 62 IN | WEIGHT: 169 LBS

## 2022-11-21 DIAGNOSIS — M25.562 CHRONIC PAIN OF BOTH KNEES: ICD-10-CM

## 2022-11-21 DIAGNOSIS — G89.29 CHRONIC PAIN OF BOTH KNEES: ICD-10-CM

## 2022-11-21 DIAGNOSIS — M17.0 BILATERAL PRIMARY OSTEOARTHRITIS OF KNEE: Primary | ICD-10-CM

## 2022-11-21 DIAGNOSIS — M25.561 CHRONIC PAIN OF BOTH KNEES: ICD-10-CM

## 2022-11-21 PROCEDURE — 3288F PR FALLS RISK ASSESSMENT DOCUMENTED: ICD-10-PCS | Mod: CPTII,S$GLB,, | Performed by: STUDENT IN AN ORGANIZED HEALTH CARE EDUCATION/TRAINING PROGRAM

## 2022-11-21 PROCEDURE — 99999 PR PBB SHADOW E&M-EST. PATIENT-LVL IV: ICD-10-PCS | Mod: PBBFAC,,, | Performed by: STUDENT IN AN ORGANIZED HEALTH CARE EDUCATION/TRAINING PROGRAM

## 2022-11-21 PROCEDURE — 99999 PR PBB SHADOW E&M-EST. PATIENT-LVL IV: CPT | Mod: PBBFAC,,, | Performed by: STUDENT IN AN ORGANIZED HEALTH CARE EDUCATION/TRAINING PROGRAM

## 2022-11-21 PROCEDURE — 1125F PR PAIN SEVERITY QUANTIFIED, PAIN PRESENT: ICD-10-PCS | Mod: CPTII,S$GLB,, | Performed by: STUDENT IN AN ORGANIZED HEALTH CARE EDUCATION/TRAINING PROGRAM

## 2022-11-21 PROCEDURE — 1125F AMNT PAIN NOTED PAIN PRSNT: CPT | Mod: CPTII,S$GLB,, | Performed by: STUDENT IN AN ORGANIZED HEALTH CARE EDUCATION/TRAINING PROGRAM

## 2022-11-21 PROCEDURE — 1159F MED LIST DOCD IN RCRD: CPT | Mod: CPTII,S$GLB,, | Performed by: STUDENT IN AN ORGANIZED HEALTH CARE EDUCATION/TRAINING PROGRAM

## 2022-11-21 PROCEDURE — 3044F PR MOST RECENT HEMOGLOBIN A1C LEVEL <7.0%: ICD-10-PCS | Mod: CPTII,S$GLB,, | Performed by: STUDENT IN AN ORGANIZED HEALTH CARE EDUCATION/TRAINING PROGRAM

## 2022-11-21 PROCEDURE — 20610 LARGE JOINT ASPIRATION/INJECTION: BILATERAL KNEE: ICD-10-PCS | Mod: 50,S$GLB,, | Performed by: STUDENT IN AN ORGANIZED HEALTH CARE EDUCATION/TRAINING PROGRAM

## 2022-11-21 PROCEDURE — 3044F HG A1C LEVEL LT 7.0%: CPT | Mod: CPTII,S$GLB,, | Performed by: STUDENT IN AN ORGANIZED HEALTH CARE EDUCATION/TRAINING PROGRAM

## 2022-11-21 PROCEDURE — 3288F FALL RISK ASSESSMENT DOCD: CPT | Mod: CPTII,S$GLB,, | Performed by: STUDENT IN AN ORGANIZED HEALTH CARE EDUCATION/TRAINING PROGRAM

## 2022-11-21 PROCEDURE — 1159F PR MEDICATION LIST DOCUMENTED IN MEDICAL RECORD: ICD-10-PCS | Mod: CPTII,S$GLB,, | Performed by: STUDENT IN AN ORGANIZED HEALTH CARE EDUCATION/TRAINING PROGRAM

## 2022-11-21 PROCEDURE — 99499 UNLISTED E&M SERVICE: CPT | Mod: S$GLB,,, | Performed by: STUDENT IN AN ORGANIZED HEALTH CARE EDUCATION/TRAINING PROGRAM

## 2022-11-21 PROCEDURE — 1160F PR REVIEW ALL MEDS BY PRESCRIBER/CLIN PHARMACIST DOCUMENTED: ICD-10-PCS | Mod: CPTII,S$GLB,, | Performed by: STUDENT IN AN ORGANIZED HEALTH CARE EDUCATION/TRAINING PROGRAM

## 2022-11-21 PROCEDURE — 20610 DRAIN/INJ JOINT/BURSA W/O US: CPT | Mod: 50,S$GLB,, | Performed by: STUDENT IN AN ORGANIZED HEALTH CARE EDUCATION/TRAINING PROGRAM

## 2022-11-21 PROCEDURE — 1101F PT FALLS ASSESS-DOCD LE1/YR: CPT | Mod: CPTII,S$GLB,, | Performed by: STUDENT IN AN ORGANIZED HEALTH CARE EDUCATION/TRAINING PROGRAM

## 2022-11-21 PROCEDURE — 99499 NO LOS: ICD-10-PCS | Mod: S$GLB,,, | Performed by: STUDENT IN AN ORGANIZED HEALTH CARE EDUCATION/TRAINING PROGRAM

## 2022-11-21 PROCEDURE — 1101F PR PT FALLS ASSESS DOC 0-1 FALLS W/OUT INJ PAST YR: ICD-10-PCS | Mod: CPTII,S$GLB,, | Performed by: STUDENT IN AN ORGANIZED HEALTH CARE EDUCATION/TRAINING PROGRAM

## 2022-11-21 PROCEDURE — 1160F RVW MEDS BY RX/DR IN RCRD: CPT | Mod: CPTII,S$GLB,, | Performed by: STUDENT IN AN ORGANIZED HEALTH CARE EDUCATION/TRAINING PROGRAM

## 2022-11-21 NOTE — PROCEDURES
Large Joint Aspiration/Injection: bilateral knee    Date/Time: 11/21/2022 10:30 AM  Performed by: Avery Matos MD  Authorized by: Avery Matos MD     Consent Done?:  Yes (Verbal)  Indications:  Arthritis and pain  Site marked: the procedure site was marked    Timeout: prior to procedure the correct patient, procedure, and site was verified    Prep: patient was prepped and draped in usual sterile fashion      Local anesthesia used?: Yes    Local anesthetic:  Topical anesthetic    Details:  Needle Size:  22 G  Ultrasonic Guidance for needle placement?: No    Approach:  Anterolateral  Location:  Knee  Laterality:  Bilateral  Site:  Bilateral knee  Medications (Right):  60 mg hyaluronate sodium, stabilized 60 mg/3 mL  Medications (Left):  60 mg hyaluronate sodium, stabilized 60 mg/3 mL  Patient tolerance:  Patient tolerated the procedure well with no immediate complications     We discussed the proper protocols after the injection such as no submerging pools, baths tubs, or hot tubs for 48 hr.  Showering is okay today.  We also discussed that blood sugars can be elevated after an injection and asked patient to properly check their sugars over the next few days and contact their PCP if there are any concerns.  We discussed red flags such as fevers, chills, red, warm, tender joint at the area of injection to please seek medical care immediately.

## 2022-12-07 ENCOUNTER — SPECIALTY PHARMACY (OUTPATIENT)
Dept: PHARMACY | Facility: CLINIC | Age: 77
End: 2022-12-07
Payer: MEDICARE

## 2022-12-07 DIAGNOSIS — E78.00 PURE HYPERCHOLESTEROLEMIA: ICD-10-CM

## 2022-12-07 NOTE — TELEPHONE ENCOUNTER
Outgoing call: patient is due to inject on 12/16, I informed her that a refill request was sent to her doctor and once approved [facility] will follow up.

## 2022-12-08 RX ORDER — EVOLOCUMAB 140 MG/ML
140 INJECTION, SOLUTION SUBCUTANEOUS
Qty: 2 ML | Refills: 6 | Status: SHIPPED | OUTPATIENT
Start: 2022-12-08 | End: 2023-04-25

## 2022-12-08 NOTE — TELEPHONE ENCOUNTER
Specialty Pharmacy - Refill Coordination    Specialty Medication Orders Linked to Encounter      Flowsheet Row Most Recent Value   Medication #1 evolocumab (REPATHA SURECLICK) 140 mg/mL PnIj (Order#[id number], Rx#)            Refill Questions - Documented Responses      Flowsheet Row Most Recent Value   Patient Availability and HIPAA Verification    Does patient want to proceed with activity? Yes   HIPAA/medical authority confirmed? Yes   Relationship to patient of person spoken to? Self   Refill Screening Questions    Would patient like to speak to a pharmacist? No   When does the patient need to receive the medication? 12/16/22   Refill Delivery Questions    How will the patient receive the medication? [facility]   When does the patient need to receive the medication? 12/16/22   Shipping Address Home   Address in [facility] confirmed and updated if neccessary? Yes   Expected Copay ($) 0   Is the patient able to afford the medication copay? Yes   Payment Method zero copay   Days supply of Refill 28   Supplies needed? No supplies needed   Refill activity completed? Yes   Refill activity plan Refill scheduled   Shipment/Pickup Date: 12/12/22            Current Outpatient Medications   Medication Sig    acetaminophen/diphenhydramine (TYLENOL PM ORAL) Take by mouth.    APPLE CIDER VINEGAR ORAL Take by mouth.    artificial tears,hypromellose,,GENTEAL/SUSTANE, 0.3 % Gel     aspirin (ECOTRIN) 81 MG EC tablet Take 81 mg by mouth once daily.    azelastine (ASTELIN) 137 mcg (0.1 %) nasal spray 1 spray (137 mcg total) by Nasal route 2 (two) times daily.    b complex vitamins capsule Take 1 capsule by mouth once daily.    calcium carbonate (CALCIUM 500 ORAL) Take by mouth.    cholestyramine (QUESTRAN) 4 gram packet Take 1 packet (4 g total) by mouth daily as needed (diarrhea).    cyanocobalamin, vitamin B-12, (VITAMIN B-12 ORAL) Take by mouth.    denosumab (PROLIA SUBQ) Inject into the skin.    ergocalciferol, vitamin D2,  (VITAMIN D ORAL) Take by mouth.    evolocumab (REPATHA SURECLICK) 140 mg/mL PnIj Repatha SureClick Take No date recorded No form recorded No frequency recorded No route recorded No set duration recorded No set duration amount recorded active No dosage strength recorded No dosage strength units of measure recorded    evolocumab (REPATHA SURECLICK) 140 mg/mL PnIj Inject 1 pen (140 mg total) into the skin every 14 (fourteen) days.    ezetimibe (ZETIA) 10 mg tablet Take 1 tablet (10 mg total) by mouth once daily.    famotidine (PEPCID) 40 MG tablet Take 1 tablet (40 mg total) by mouth every evening.    fexofenadine (ALLEGRA) 180 MG tablet Take 1 tablet (180 mg total) by mouth once daily.    flaxseed oiL 1,000 mg Cap flaxseed oil Take No date recorded No form recorded No frequency recorded No route recorded No set duration recorded No set duration amount recorded suspended No dosage strength recorded No dosage strength units of measure recorded    fluocinolone (SYNALAR) 0.01 % external solution AAA of scalp twice a day PRN scalp itching or eczema flares.    fluticasone (FLONASE) 50 mcg/actuation nasal spray 2 sprays by Each Nare route once daily.    hydrOXYzine pamoate (VISTARIL) 25 MG Cap Take 1 capsule (25 mg total) by mouth every 8 (eight) hours as needed (itching).    ketoconazole (NIZORAL) 2 % shampoo Apply topically every 7 days. Shampoo scalp 1-2 times per week. Lather x 5 minutes, rinse well.    krill oil-omega-3-dha-epa 150-450 mg CpDR Take by mouth.    multivitamin capsule Take 1 capsule by mouth Daily.    tiZANidine (ZANAFLEX) 4 MG tablet Take 2 mg by mouth nightly as needed.    triamcinolone acetonide 0.1% (KENALOG) 0.1 % cream Apply topically 2 (two) times daily.    trospium (SANCTURA XR) 60 mg Cp24 capsule Take 1 capsule (60 mg total) by mouth once daily.    TURMERIC ORAL Take by mouth.    UNABLE TO FIND CBD Oil topical    ZINC ORAL Take by mouth.   Last reviewed on 11/21/2022 10:30 AM by Avery Matos  MD    Review of patient's allergies indicates:   Allergen Reactions    Augmentin [amoxicillin-pot clavulanate] Other (See Comments)     Diarrhea, yeast    Bactrim  [sulfamethoxazole-trimethoprim]      Other reaction(s): Unknown    Celebrex [celecoxib] Other (See Comments)     Stomach pain, gas     Lipitor  [atorvastatin]      Other reaction(s): Unknown    Pravachol  [pravastatin]      Other reaction(s): Unknown    Statins-hmg-coa reductase inhibitors      Other reaction(s): Muscle pain    Sulfa (sulfonamide antibiotics)      Other reaction(s): Swelling    Zoster vaccine live     Last reviewed on  11/21/2022 10:30 AM by Avery Matos      Tasks added this encounter   No tasks added.   Tasks due within next 3 months   12/7/2022 - Refill Call (Auto Added)     [facility]  [address] [place] [id number]  Phone: [phone]  Fax: [phone]

## 2022-12-19 ENCOUNTER — OFFICE VISIT (OUTPATIENT)
Dept: INTERNAL MEDICINE | Facility: CLINIC | Age: 77
End: 2022-12-19
Payer: MEDICARE

## 2022-12-19 VITALS
WEIGHT: 166.69 LBS | HEART RATE: 80 BPM | HEIGHT: 62 IN | BODY MASS INDEX: 30.67 KG/M2 | OXYGEN SATURATION: 98 % | SYSTOLIC BLOOD PRESSURE: 110 MMHG | TEMPERATURE: 99 F | DIASTOLIC BLOOD PRESSURE: 70 MMHG

## 2022-12-19 DIAGNOSIS — U07.1 COVID-19: Primary | ICD-10-CM

## 2022-12-19 DIAGNOSIS — J02.9 SORE THROAT: ICD-10-CM

## 2022-12-19 PROCEDURE — 1125F AMNT PAIN NOTED PAIN PRSNT: CPT | Mod: CPTII,S$GLB,, | Performed by: NURSE PRACTITIONER

## 2022-12-19 PROCEDURE — 1159F MED LIST DOCD IN RCRD: CPT | Mod: CPTII,S$GLB,, | Performed by: NURSE PRACTITIONER

## 2022-12-19 PROCEDURE — 3074F SYST BP LT 130 MM HG: CPT | Mod: CPTII,S$GLB,, | Performed by: NURSE PRACTITIONER

## 2022-12-19 PROCEDURE — 99999 PR PBB SHADOW E&M-EST. PATIENT-LVL V: ICD-10-PCS | Mod: PBBFAC,,, | Performed by: NURSE PRACTITIONER

## 2022-12-19 PROCEDURE — 3044F HG A1C LEVEL LT 7.0%: CPT | Mod: CPTII,S$GLB,, | Performed by: NURSE PRACTITIONER

## 2022-12-19 PROCEDURE — 1160F RVW MEDS BY RX/DR IN RCRD: CPT | Mod: CPTII,S$GLB,, | Performed by: NURSE PRACTITIONER

## 2022-12-19 PROCEDURE — 3044F PR MOST RECENT HEMOGLOBIN A1C LEVEL <7.0%: ICD-10-PCS | Mod: CPTII,S$GLB,, | Performed by: NURSE PRACTITIONER

## 2022-12-19 PROCEDURE — 99213 PR OFFICE/OUTPT VISIT, EST, LEVL III, 20-29 MIN: ICD-10-PCS | Mod: S$GLB,,, | Performed by: NURSE PRACTITIONER

## 2022-12-19 PROCEDURE — 3078F PR MOST RECENT DIASTOLIC BLOOD PRESSURE < 80 MM HG: ICD-10-PCS | Mod: CPTII,S$GLB,, | Performed by: NURSE PRACTITIONER

## 2022-12-19 PROCEDURE — 1160F PR REVIEW ALL MEDS BY PRESCRIBER/CLIN PHARMACIST DOCUMENTED: ICD-10-PCS | Mod: CPTII,S$GLB,, | Performed by: NURSE PRACTITIONER

## 2022-12-19 PROCEDURE — 3074F PR MOST RECENT SYSTOLIC BLOOD PRESSURE < 130 MM HG: ICD-10-PCS | Mod: CPTII,S$GLB,, | Performed by: NURSE PRACTITIONER

## 2022-12-19 PROCEDURE — 99999 PR PBB SHADOW E&M-EST. PATIENT-LVL V: CPT | Mod: PBBFAC,,, | Performed by: NURSE PRACTITIONER

## 2022-12-19 PROCEDURE — 99213 OFFICE O/P EST LOW 20 MIN: CPT | Mod: S$GLB,,, | Performed by: NURSE PRACTITIONER

## 2022-12-19 PROCEDURE — 1159F PR MEDICATION LIST DOCUMENTED IN MEDICAL RECORD: ICD-10-PCS | Mod: CPTII,S$GLB,, | Performed by: NURSE PRACTITIONER

## 2022-12-19 PROCEDURE — 1125F PR PAIN SEVERITY QUANTIFIED, PAIN PRESENT: ICD-10-PCS | Mod: CPTII,S$GLB,, | Performed by: NURSE PRACTITIONER

## 2022-12-19 PROCEDURE — 3078F DIAST BP <80 MM HG: CPT | Mod: CPTII,S$GLB,, | Performed by: NURSE PRACTITIONER

## 2022-12-19 NOTE — PROGRESS NOTES
"Subjective:       Patient ID: Jessica Perez is a 77 y.o. female.    Chief Complaint: COVID-19 Concerns    Pt presents to clinic today for COVID follow up  Dx with COVID on Saturday at Urgent Care  Started with symptoms on Thursday  Body aches, chills, fever, coughing, ear pain   Today still with sore throat and coughing   Feeling some pressure in her right ear  Taking OTC cough meds with some relief  No chest pain or shortness of breath  Able to eat/drink   No fever today but did wake up sweaty           /70   Pulse 80   Temp 99.1 °F (37.3 °C) (Temporal)   Ht 5' 2" (1.575 m)   Wt 75.6 kg (166 lb 10.7 oz)   SpO2 98%   BMI 30.48 kg/m²     Review of Systems   Constitutional:  Negative for activity change, appetite change, chills, diaphoresis, fatigue, fever and unexpected weight change.   HENT:  Positive for congestion, ear pain, postnasal drip, rhinorrhea, sinus pressure, sneezing and sore throat. Negative for dental problem, drooling, ear discharge, hearing loss, mouth sores, nosebleeds, tinnitus, trouble swallowing and voice change.    Eyes:  Negative for pain, discharge and redness.   Respiratory:  Positive for cough. Negative for choking, chest tightness, shortness of breath and wheezing.    Cardiovascular:  Negative for chest pain, palpitations and leg swelling.   Gastrointestinal:  Negative for abdominal pain, diarrhea, nausea and vomiting.   Endocrine: Negative for cold intolerance and heat intolerance.   Genitourinary:  Negative for dysuria.   Musculoskeletal:  Negative for arthralgias, joint swelling, myalgias and neck pain.   Skin:  Negative for rash.   Allergic/Immunologic: Negative for environmental allergies, food allergies and immunocompromised state.   Neurological:  Negative for syncope, light-headedness and headaches.     Objective:      Physical Exam  Vitals and nursing note reviewed.   Constitutional:       Appearance: She is well-developed. She is not diaphoretic.   HENT:      " Head: Normocephalic and atraumatic.      Right Ear: Tympanic membrane, ear canal and external ear normal.      Left Ear: Tympanic membrane, ear canal and external ear normal.      Nose: Mucosal edema and rhinorrhea present.      Right Sinus: No maxillary sinus tenderness or frontal sinus tenderness.      Left Sinus: No maxillary sinus tenderness or frontal sinus tenderness.      Mouth/Throat:      Pharynx: Uvula midline. No oropharyngeal exudate or posterior oropharyngeal erythema.   Eyes:      General:         Right eye: No discharge.         Left eye: No discharge.      Conjunctiva/sclera: Conjunctivae normal.   Cardiovascular:      Rate and Rhythm: Normal rate and regular rhythm.      Heart sounds: Normal heart sounds. No murmur heard.  Pulmonary:      Effort: Pulmonary effort is normal. No accessory muscle usage or respiratory distress.      Breath sounds: Normal breath sounds. No decreased breath sounds, wheezing, rhonchi or rales.   Chest:      Chest wall: No tenderness.   Abdominal:      General: There is no distension.      Palpations: Abdomen is soft.   Musculoskeletal:         General: Normal range of motion.      Cervical back: Normal range of motion.   Skin:     General: Skin is warm and dry.   Neurological:      Mental Status: She is alert and oriented to person, place, and time.   Psychiatric:         Behavior: Behavior normal.       Assessment:       1. COVID-19    2. Sore throat    3. BMI 30.0-30.9,adult        Plan:       Jessica was seen today for covid-19 concerns.    Diagnoses and all orders for this visit:    COVID-19       - Supportive treatment discussed. Monitor symptoms. RTC for any increased breathing or worsening symptoms   Sore throat       - Otc throat  lozenges and sprays     BMI 30.0-30.9,adult      Patient Instructions   Flonase twice daily  Tylenol for sore throat/body aches  Throat spray/lozenges     recommended rest, hydration, supportive care, deep breathing  CDC recommended  quarantine discussed  Infection precautions discussed  Vitamins c, d, zinc discussed  mucinex with lots of fluids  Emergency Room if shortness of breath, issues breathing

## 2023-01-06 ENCOUNTER — SPECIALTY PHARMACY (OUTPATIENT)
Dept: PHARMACY | Facility: CLINIC | Age: 78
End: 2023-01-06
Payer: MEDICARE

## 2023-01-06 NOTE — TELEPHONE ENCOUNTER
Specialty Pharmacy - Refill Coordination    Specialty Medication Orders Linked to Encounter      Flowsheet Row Most Recent Value   Medication #1 evolocumab (REPATHA SURECLICK) 140 mg/mL PnIj (Order#[id number], Rx#[id number])            Refill Questions - Documented Responses      Flowsheet Row Most Recent Value   Patient Availability and HIPAA Verification    Does patient want to proceed with activity? Yes   HIPAA/medical authority confirmed? Yes   Relationship to patient of person spoken to? Self   Refill Screening Questions    Would patient like to speak to a pharmacist? No   When does the patient need to receive the medication? 01/13/23   Refill Delivery Questions    How will the patient receive the medication? MEDRx   When does the patient need to receive the medication? 01/13/23   Shipping Address Home   Address in [facility] confirmed and updated if neccessary? Yes   Expected Copay ($) 0   Is the patient able to afford the medication copay? Yes   Payment Method zero copay   Days supply of Refill 28   Supplies needed? No supplies needed   Refill activity completed? Yes   Refill activity plan Refill scheduled   Shipment/Pickup Date: 01/11/23            Current Outpatient Medications   Medication Sig    acetaminophen/diphenhydramine (TYLENOL PM ORAL) Take by mouth.    APPLE CIDER VINEGAR ORAL Take by mouth.    artificial tears,hypromellose,,GENTEAL/SUSTANE, 0.3 % Gel     aspirin (ECOTRIN) 81 MG EC tablet Take 81 mg by mouth once daily.    azelastine (ASTELIN) 137 mcg (0.1 %) nasal spray 1 spray (137 mcg total) by Nasal route 2 (two) times daily.    b complex vitamins capsule Take 1 capsule by mouth once daily.    calcium carbonate (CALCIUM 500 ORAL) Take by mouth.    cholestyramine (QUESTRAN) 4 gram packet Take 1 packet (4 g total) by mouth daily as needed (diarrhea).    cyanocobalamin, vitamin B-12, (VITAMIN B-12 ORAL) Take by mouth.    denosumab (PROLIA SUBQ) Inject into the skin.    ergocalciferol,  vitamin D2, (VITAMIN D ORAL) Take by mouth.    evolocumab (REPATHA SURECLICK) 140 mg/mL PnIj Repatha SureClick Take No date recorded No form recorded No frequency recorded No route recorded No set duration recorded No set duration amount recorded active No dosage strength recorded No dosage strength units of measure recorded    evolocumab (REPATHA SURECLICK) 140 mg/mL PnIj Inject 1 pen (140 mg total) into the skin every 14 (fourteen) days.    ezetimibe (ZETIA) 10 mg tablet Take 1 tablet (10 mg total) by mouth once daily.    famotidine (PEPCID) 40 MG tablet Take 1 tablet (40 mg total) by mouth every evening.    fexofenadine (ALLEGRA) 180 MG tablet Take 1 tablet (180 mg total) by mouth once daily.    flaxseed oiL 1,000 mg Cap flaxseed oil Take No date recorded No form recorded No frequency recorded No route recorded No set duration recorded No set duration amount recorded suspended No dosage strength recorded No dosage strength units of measure recorded    fluocinolone (SYNALAR) 0.01 % external solution AAA of scalp twice a day PRN scalp itching or eczema flares.    fluticasone (FLONASE) 50 mcg/actuation nasal spray 2 sprays by Each Nare route once daily.    hydrOXYzine pamoate (VISTARIL) 25 MG Cap Take 1 capsule (25 mg total) by mouth every 8 (eight) hours as needed (itching).    ketoconazole (NIZORAL) 2 % shampoo Apply topically every 7 days. Shampoo scalp 1-2 times per week. Lather x 5 minutes, rinse well.    krill oil-omega-3-dha-epa 150-450 mg CpDR Take by mouth.    multivitamin capsule Take 1 capsule by mouth Daily.    tiZANidine (ZANAFLEX) 4 MG tablet Take 2 mg by mouth nightly as needed.    triamcinolone acetonide 0.1% (KENALOG) 0.1 % cream Apply topically 2 (two) times daily.    trospium (SANCTURA XR) 60 mg Cp24 capsule Take 1 capsule (60 mg total) by mouth once daily.    TURMERIC ORAL Take by mouth.    UNABLE TO FIND CBD Oil topical    ZINC ORAL Take by mouth.   Last reviewed on 12/19/2022 11:22 AM by Benton  GLORIA Terrell    Review of patient's allergies indicates:   Allergen Reactions    Augmentin [amoxicillin-pot clavulanate] Other (See Comments)     Diarrhea, yeast    Bactrim  [sulfamethoxazole-trimethoprim]      Other reaction(s): Unknown    Celebrex [celecoxib] Other (See Comments)     Stomach pain, gas     Lipitor  [atorvastatin]      Other reaction(s): Unknown    Pravachol  [pravastatin]      Other reaction(s): Unknown    Statins-hmg-coa reductase inhibitors      Other reaction(s): Muscle pain    Sulfa (sulfonamide antibiotics)      Other reaction(s): Swelling    Zoster vaccine live     Last reviewed on  12/19/2022 11:22 AM by Benton Terrell      Tasks added this encounter   2/3/2023 - Refill Call (Auto Added)   Tasks due within next 3 months   No tasks due.     [facility]  [address] [facility] [address]  Phone: [phone]  Fax: [phone]

## 2023-01-06 NOTE — TELEPHONE ENCOUNTER
Repatha zoila info:  CARD #: [id number]  BIN: [id number]  PCN: [id number]  GROUP: [id number]  Effective 12/12/22 - 12/11/23

## 2023-01-11 ENCOUNTER — OFFICE VISIT (OUTPATIENT)
Dept: CARDIOLOGY | Facility: CLINIC | Age: 78
End: 2023-01-11
Payer: MEDICARE

## 2023-01-11 ENCOUNTER — TELEPHONE (OUTPATIENT)
Dept: INTERNAL MEDICINE | Facility: CLINIC | Age: 78
End: 2023-01-11
Payer: MEDICARE

## 2023-01-11 VITALS
HEART RATE: 84 BPM | BODY MASS INDEX: 31.03 KG/M2 | DIASTOLIC BLOOD PRESSURE: 60 MMHG | HEIGHT: 62 IN | SYSTOLIC BLOOD PRESSURE: 116 MMHG | WEIGHT: 168.63 LBS | OXYGEN SATURATION: 99 %

## 2023-01-11 DIAGNOSIS — R00.2 PALPITATIONS: ICD-10-CM

## 2023-01-11 DIAGNOSIS — Z76.89 ENCOUNTER TO ESTABLISH CARE: ICD-10-CM

## 2023-01-11 DIAGNOSIS — Z12.31 SCREENING MAMMOGRAM, ENCOUNTER FOR: Primary | ICD-10-CM

## 2023-01-11 DIAGNOSIS — Z82.49 FAMILY HISTORY OF ARTERIOSCLEROTIC CARDIOVASCULAR DISEASE: ICD-10-CM

## 2023-01-11 DIAGNOSIS — E78.00 PURE HYPERCHOLESTEROLEMIA: ICD-10-CM

## 2023-01-11 DIAGNOSIS — E78.5 HYPERLIPIDEMIA, UNSPECIFIED HYPERLIPIDEMIA TYPE: Primary | ICD-10-CM

## 2023-01-11 DIAGNOSIS — G89.29 CHRONIC CHEST PAIN: ICD-10-CM

## 2023-01-11 DIAGNOSIS — R07.9 CHRONIC CHEST PAIN: ICD-10-CM

## 2023-01-11 DIAGNOSIS — R06.02 SOB (SHORTNESS OF BREATH): ICD-10-CM

## 2023-01-11 PROCEDURE — 3074F PR MOST RECENT SYSTOLIC BLOOD PRESSURE < 130 MM HG: ICD-10-PCS | Mod: CPTII,S$GLB,, | Performed by: INTERNAL MEDICINE

## 2023-01-11 PROCEDURE — 99214 PR OFFICE/OUTPT VISIT, EST, LEVL IV, 30-39 MIN: ICD-10-PCS | Mod: S$GLB,,, | Performed by: INTERNAL MEDICINE

## 2023-01-11 PROCEDURE — 3288F PR FALLS RISK ASSESSMENT DOCUMENTED: ICD-10-PCS | Mod: CPTII,S$GLB,, | Performed by: INTERNAL MEDICINE

## 2023-01-11 PROCEDURE — 1159F PR MEDICATION LIST DOCUMENTED IN MEDICAL RECORD: ICD-10-PCS | Mod: CPTII,S$GLB,, | Performed by: INTERNAL MEDICINE

## 2023-01-11 PROCEDURE — 3078F PR MOST RECENT DIASTOLIC BLOOD PRESSURE < 80 MM HG: ICD-10-PCS | Mod: CPTII,S$GLB,, | Performed by: INTERNAL MEDICINE

## 2023-01-11 PROCEDURE — 1125F AMNT PAIN NOTED PAIN PRSNT: CPT | Mod: CPTII,S$GLB,, | Performed by: INTERNAL MEDICINE

## 2023-01-11 PROCEDURE — 99999 PR PBB SHADOW E&M-EST. PATIENT-LVL V: ICD-10-PCS | Mod: PBBFAC,,, | Performed by: INTERNAL MEDICINE

## 2023-01-11 PROCEDURE — 1101F PT FALLS ASSESS-DOCD LE1/YR: CPT | Mod: CPTII,S$GLB,, | Performed by: INTERNAL MEDICINE

## 2023-01-11 PROCEDURE — 99214 OFFICE O/P EST MOD 30 MIN: CPT | Mod: S$GLB,,, | Performed by: INTERNAL MEDICINE

## 2023-01-11 PROCEDURE — 3078F DIAST BP <80 MM HG: CPT | Mod: CPTII,S$GLB,, | Performed by: INTERNAL MEDICINE

## 2023-01-11 PROCEDURE — 1159F MED LIST DOCD IN RCRD: CPT | Mod: CPTII,S$GLB,, | Performed by: INTERNAL MEDICINE

## 2023-01-11 PROCEDURE — 3288F FALL RISK ASSESSMENT DOCD: CPT | Mod: CPTII,S$GLB,, | Performed by: INTERNAL MEDICINE

## 2023-01-11 PROCEDURE — 1101F PR PT FALLS ASSESS DOC 0-1 FALLS W/OUT INJ PAST YR: ICD-10-PCS | Mod: CPTII,S$GLB,, | Performed by: INTERNAL MEDICINE

## 2023-01-11 PROCEDURE — 3074F SYST BP LT 130 MM HG: CPT | Mod: CPTII,S$GLB,, | Performed by: INTERNAL MEDICINE

## 2023-01-11 PROCEDURE — 99999 PR PBB SHADOW E&M-EST. PATIENT-LVL V: CPT | Mod: PBBFAC,,, | Performed by: INTERNAL MEDICINE

## 2023-01-11 PROCEDURE — 1125F PR PAIN SEVERITY QUANTIFIED, PAIN PRESENT: ICD-10-PCS | Mod: CPTII,S$GLB,, | Performed by: INTERNAL MEDICINE

## 2023-01-11 NOTE — TELEPHONE ENCOUNTER
----- Message from Dayana Briones sent at 1/11/2023  9:25 AM CST -----  Pt is requesting an order for a mammogram be put in. Call back number [phone]

## 2023-01-11 NOTE — TELEPHONE ENCOUNTER
Called pt to advise that order has been sent to Dr Jang to approve.    Pt requesting annual mammo order.    Mammo pended

## 2023-01-11 NOTE — PROGRESS NOTES
Subjective:   Patient ID:  Jessica Perez is a 77 y.o. female who presents for follow-up of No chief complaint on file.  Patient is stable on the Zetia.  Improvement overall.  Will continue to follow up cholesterol profiles into the future.  Patient also complains of palpitations particularly at night and a sense of vibration.  Will do a Holter monitor in follow-up.  No chest pain noted.Patient is here for re-evaluation overall cholesterol profile.  Improvement in overall profile including triglycerides.     Patient is stable on the Zetia.  Improvement overall.  Will continue to follow up cholesterol profiles into the future.  Patient also complains of palpitations particularly at night and a sense of vibration.  Will do a Holter monitor in follow-up.  No chest pain noted.  Patient doing well or stable continue current medication other changes.  She continues Repatha , follow-up cholesterol profile 6 months will see her in 6 months.        This visit finds patient feeling well she stable current medications cholesterol profiles have greatly improved on Repatha and doing well this time.  NO FOCAL CNS SYMPTOMS OR SIGNS TO SUGGEST TIA OR STROKE  NO ANGINA OR EQUIVALENT  NO UNUSUAL MONROY. NO ORTHOPNEA OR PND  NO PALPITATIONS  NO NEAR SYNCOPE OR SYNCOPE  NO EDEMA. NO CALVE TENDERNESS  Overall patient doing well stable on Repatha.  No other changes.  No acute symptoms chest pain shortness breath heart blood pressure stable.    01/11/2023:   Overall patient doing well cholesterol profiles are excellent Repatha doing well continues on Zetia no other changes repeat lipid profile in 5-6 months follow-up evaluation 6 months all questions answered today.      Review of Systems   Constitutional: Negative for chills, diaphoresis, night sweats, weight gain and weight loss.   HENT:  Negative for congestion, hoarse voice, sore throat and stridor.    Eyes:  Negative for double vision and pain.   Cardiovascular:  Negative for chest  pain, claudication, cyanosis, dyspnea on exertion, irregular heartbeat, leg swelling, near-syncope, orthopnea, palpitations, paroxysmal nocturnal dyspnea and syncope.   Respiratory:  Negative for cough, hemoptysis, shortness of breath, sleep disturbances due to breathing, snoring, sputum production and wheezing.    Endocrine: Negative for cold intolerance, heat intolerance and polydipsia.   Hematologic/Lymphatic: Negative for bleeding problem. Does not bruise/bleed easily.   Skin:  Negative for color change, dry skin and rash.   Musculoskeletal:  Negative for joint swelling and muscle cramps.   Gastrointestinal:  Negative for bloating, abdominal pain, constipation, diarrhea, dysphagia, melena, nausea and vomiting.   Genitourinary:  Negative for flank pain and urgency.   Neurological:  Negative for dizziness, focal weakness, headaches, light-headedness, loss of balance, seizures and weakness.   Psychiatric/Behavioral:  Negative for altered mental status and memory loss. The patient is not nervous/anxious.    Family History   Problem Relation Age of Onset    Diabetes Father     Cancer Father         prostate ca    Hypertension Father     Cancer Sister         cervical ca    Hepatitis Sister     Dementia Mother     Osteoporosis Mother     Breast cancer Paternal Aunt      Past Medical History:   Diagnosis Date    Acute pain of left knee 3/22/2021    Arthritis     Arthritis of knee 2/9/2021    At risk for sleep apnea 9/19/2019    Balance problem 10/21/2021    Bilateral primary osteoarthritis of knee 4/4/2018    Chest pain, moderate coronary artery risk 8/8/2019    Chronic midline low back pain without sciatica 11/30/2020    Chronic pain of both knees 3/23/2021    Costochondritis 1/20/2020    Depression, major, recurrent, mild     Diarrhea 5/11/2022    Difficulty walking 11/30/2020    Difficulty walking 11/30/2020    LANRE (generalized anxiety disorder) 3/22/2021    Gait, antalgic 10/21/2021    Generalized weakness  11/30/2020    Generalized weakness 11/30/2020    Glaucoma     HLD (hyperlipidemia)     Lower extremity weakness 10/21/2021    Muscle cramps 5/23/2018    Osteopenia of multiple sites 4/2/2018    Primary osteoarthritis of right knee 05/30/2018    Primary snoring     Rash 5/11/2022    Sleep related leg cramps 9/7/2010    SOB (shortness of breath) 8/8/2019    Unspecified gastritis and gastroduodenitis without mention of hemorrhage 11/28/2010     Dx updated per 2019 IMO Load    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      APPLE CIDER VINEGAR ORAL Take by mouth.      artificial tears,hypromellose,,GENTEAL/SUSTANE, 0.3 % Gel       aspirin (ECOTRIN) 81 MG EC tablet Take 81 mg by mouth once daily.      azelastine (ASTELIN) 137 mcg (0.1 %) nasal spray 1 spray (137 mcg total) by Nasal route 2 (two) times daily. 30 mL 1    b complex vitamins capsule Take 1 capsule by mouth once daily.      calcium carbonate (CALCIUM 500 ORAL) Take by mouth.      cholestyramine (QUESTRAN) 4 gram packet Take 1 packet (4 g total) by mouth daily as needed (diarrhea). 30 packet 5    cyanocobalamin, vitamin B-12, (VITAMIN B-12 ORAL) Take by mouth.      denosumab (PROLIA SUBQ) Inject into the skin.      ergocalciferol, vitamin D2, (VITAMIN D ORAL) Take by mouth.      evolocumab (REPATHA SURECLICK) 140 mg/mL PnIj Repatha SureClick Take No date recorded No form recorded No frequency recorded No route recorded No set  duration recorded No set duration amount recorded active No dosage strength recorded No dosage strength units of measure recorded      evolocumab (REPATHA SURECLICK) 140 mg/mL PnIj Inject 1 pen (140 mg total) into the skin every 14 (fourteen) days. 2 mL 6    ezetimibe (ZETIA) 10 mg tablet Take 1 tablet (10 mg total) by mouth once daily. 90 tablet 3    famotidine (PEPCID) 40 MG tablet Take 1 tablet (40 mg total) by mouth every evening. 30 tablet 11    fexofenadine (ALLEGRA) 180 MG tablet Take 1 tablet (180 mg total) by mouth once daily. 30 tablet 0    flaxseed oiL 1,000 mg Cap flaxseed oil Take No date recorded No form recorded No frequency recorded No route recorded No set duration recorded No set duration amount recorded suspended No dosage strength recorded No dosage strength units of measure recorded      fluocinolone (SYNALAR) 0.01 % external solution AAA of scalp twice a day PRN scalp itching or eczema flares. 60 mL 1    fluticasone (FLONASE) 50 mcg/actuation nasal spray 2 sprays by Each Nare route once daily. 1 Bottle 12    hydrOXYzine pamoate (VISTARIL) 25 MG Cap Take 1 capsule (25 mg total) by mouth every 8 (eight) hours as needed (itching). 30 capsule 0    ketoconazole (NIZORAL) 2 % shampoo Apply topically every 7 days. Shampoo scalp 1-2 times per week. Lather x 5 minutes, rinse well. 120 mL 5    krill oil-omega-3-dha-epa 150-450 mg CpDR Take by mouth.      multivitamin capsule Take 1 capsule by mouth Daily.      tiZANidine (ZANAFLEX) 4 MG tablet Take 2 mg by mouth nightly as needed.      triamcinolone acetonide 0.1% (KENALOG) 0.1 % cream Apply topically 2 (two) times daily. 45 g 0    trospium (SANCTURA XR) 60 mg Cp24 capsule Take 1 capsule (60 mg total) by mouth once daily. 30 capsule 6    TURMERIC ORAL Take by mouth.      UNABLE TO FIND CBD Oil topical      ZINC ORAL Take by mouth.       Current Facility-Administered Medications on File Prior to Visit   Medication Dose Route Frequency Provider Last Rate  Last Admin    triamcinolone acetonide injection 32 mg  32 mg Intra-articular 1 time in Clinic/HOD Dileep Mendoza MD         Review of patient's allergies indicates:   Allergen Reactions    Augmentin [amoxicillin-pot clavulanate] Other (See Comments)     Diarrhea, yeast    Bactrim  [sulfamethoxazole-trimethoprim]      Other reaction(s): Unknown    Celebrex [celecoxib] Other (See Comments)     Stomach pain, gas     Lipitor  [atorvastatin]      Other reaction(s): Unknown    Pravachol  [pravastatin]      Other reaction(s): Unknown    Statins-hmg-coa reductase inhibitors      Other reaction(s): Muscle pain    Sulfa (sulfonamide antibiotics)      Other reaction(s): Swelling    Zoster vaccine live        Objective:     Physical Exam  Eyes:      Pupils: Pupils are equal, round, and reactive to light.   Neck:      Trachea: No tracheal deviation.   Cardiovascular:      Rate and Rhythm: Normal rate and regular rhythm.      Pulses: Intact distal pulses.           Carotid pulses are 2+ on the right side and 2+ on the left side.       Radial pulses are 2+ on the right side and 2+ on the left side.        Femoral pulses are 2+ on the right side and 2+ on the left side.       Popliteal pulses are 2+ on the right side and 2+ on the left side.        Dorsalis pedis pulses are 2+ on the right side and 2+ on the left side.        Posterior tibial pulses are 2+ on the right side and 2+ on the left side.      Heart sounds: Normal heart sounds. No murmur heard.    No friction rub. No gallop.   Pulmonary:      Effort: Pulmonary effort is normal. No respiratory distress.      Breath sounds: Normal breath sounds. No stridor. No wheezing or rales.   Chest:      Chest wall: No tenderness.   Abdominal:      General: There is no distension.      Tenderness: There is no abdominal tenderness. There is no rebound.   Musculoskeletal:         General: No tenderness.      Cervical back: Normal range of motion.   Skin:     General: Skin is warm and  dry.   Neurological:      Mental Status: She is alert and oriented to person, place, and time.     & Units 2 mo ago   (10/25/22) 1 yr ago   (1/7/22) 1 yr ago   (3/16/21) 2 yr ago   (1/4/21) 2 yr ago   (11/18/20) 2 yr ago   (7/31/20) 3 yr ago   (7/2/19)    Cholesterol 120 - 199 mg/dL 126  123 CM  159 CM  137 CM  149 CM  149 CM  281 High  CM    Comment: The National Cholesterol Education Program (NCEP) has set the   following guidelines (reference ranges) for Cholesterol:   Optimal.....................<200 mg/dL   Borderline High.............200-239 mg/dL   High........................> or = 240 mg/dL    Triglycerides 30 - 150 mg/dL 189 High   187 High  CM  161 High  CM  202 High  CM  153 High  CM  259 High  CM  315 High  CM    Comment: The National Cholesterol Education Program (NCEP) has set the   following guidelines (reference values) for triglycerides:   Normal......................<150 mg/dL   Borderline High.............150-199 mg/dL   High........................200-499 mg/dL    HDL 40 - 75 mg/dL 64  58 CM  79 High  CM  58 CM  54 CM  53 CM  51 CM    Comment: The National Cholesterol Education Program (NCEP) has set the   following guidelines (reference values) for HDL Cholesterol:   Low...............<40 mg/dL   Optimal...........>60 mg/dL    LDL Cholesterol 63.0 - 159.0 mg/dL 24.2 Low   27.6 Low  CM  47.8 Low  CM  38.6 Low  CM  64.4 CM  44.2 Low  CM  167.0 High  CM    Comment: The National Cholesterol Education Program (NCEP) has set the      Assessment:     1. Family history of arteriosclerotic cardiovascular disease    2. Hyperlipidemia, unspecified hyperlipidemia type    3. Encounter to establish care    4. Pure hypercholesterolemia    5. SOB (shortness of breath)    6. Palpitations    7. Chronic chest pain        Plan:     Family history of arteriosclerotic cardiovascular disease    Hyperlipidemia, unspecified hyperlipidemia type    Encounter to establish care    Pure hypercholesterolemia    SOB  (shortness of breath)    Palpitations    Chronic chest pain    Impression 1 hyperlipidemia stable on Repatha doing quite well no other changes repeat lipid profile in 6 months.  2. Palpitations stable   All questions answered follow-up evaluation 6 months sooner if acute recurrence symptoms all medications reviewed as necessary.

## 2023-01-25 ENCOUNTER — HOSPITAL ENCOUNTER (OUTPATIENT)
Dept: RADIOLOGY | Facility: HOSPITAL | Age: 78
Discharge: HOME | End: 2023-01-25
Attending: FAMILY MEDICINE
Payer: MEDICARE

## 2023-01-25 DIAGNOSIS — Z12.31 SCREENING MAMMOGRAM, ENCOUNTER FOR: ICD-10-CM

## 2023-01-25 PROCEDURE — 77063 BREAST TOMOSYNTHESIS BI: CPT | Mod: 26,GA,, | Performed by: RADIOLOGY

## 2023-01-25 PROCEDURE — 77067 SCR MAMMO BI INCL CAD: CPT | Mod: 26,GA,, | Performed by: RADIOLOGY

## 2023-01-25 PROCEDURE — 77063 MAMMO DIGITAL SCREENING BILAT WITH TOMO: ICD-10-PCS | Mod: 26,GA,, | Performed by: RADIOLOGY

## 2023-01-25 PROCEDURE — 77067 MAMMO DIGITAL SCREENING BILAT WITH TOMO: ICD-10-PCS | Mod: 26,GA,, | Performed by: RADIOLOGY

## 2023-01-25 PROCEDURE — 77067 SCR MAMMO BI INCL CAD: CPT | Mod: GA,TC,PO

## 2023-02-07 ENCOUNTER — OFFICE VISIT (OUTPATIENT)
Dept: UROLOGY | Facility: CLINIC | Age: 78
End: 2023-02-07
Payer: MEDICARE

## 2023-02-07 VITALS
SYSTOLIC BLOOD PRESSURE: 139 MMHG | HEIGHT: 62 IN | BODY MASS INDEX: 31.08 KG/M2 | WEIGHT: 168.88 LBS | HEART RATE: 65 BPM | DIASTOLIC BLOOD PRESSURE: 85 MMHG

## 2023-02-07 DIAGNOSIS — N95.8 GENITOURINARY SYNDROME OF MENOPAUSE: ICD-10-CM

## 2023-02-07 DIAGNOSIS — N39.46 MIXED INCONTINENCE: ICD-10-CM

## 2023-02-07 DIAGNOSIS — R35.0 URINARY FREQUENCY: Primary | ICD-10-CM

## 2023-02-07 DIAGNOSIS — N32.81 OAB (OVERACTIVE BLADDER): ICD-10-CM

## 2023-02-07 LAB
BILIRUB SERPL-MCNC: NORMAL MG/DL
BLOOD URINE, POC: NORMAL
CLARITY, POC UA: CLEAR
COLOR, POC UA: YELLOW
GLUCOSE UR QL STRIP: NORMAL
KETONES UR QL STRIP: NORMAL
LEUKOCYTE ESTERASE URINE, POC: NORMAL
NITRITE, POC UA: NORMAL
PH, POC UA: 5
PROTEIN, POC: NORMAL
SPECIFIC GRAVITY, POC UA: 1.02
UROBILINOGEN, POC UA: NORMAL

## 2023-02-07 PROCEDURE — 3079F PR MOST RECENT DIASTOLIC BLOOD PRESSURE 80-89 MM HG: ICD-10-PCS | Mod: CPTII,S$GLB,, | Performed by: UROLOGY

## 2023-02-07 PROCEDURE — 1101F PT FALLS ASSESS-DOCD LE1/YR: CPT | Mod: CPTII,S$GLB,, | Performed by: UROLOGY

## 2023-02-07 PROCEDURE — 3288F FALL RISK ASSESSMENT DOCD: CPT | Mod: CPTII,S$GLB,, | Performed by: UROLOGY

## 2023-02-07 PROCEDURE — 3075F PR MOST RECENT SYSTOLIC BLOOD PRESS GE 130-139MM HG: ICD-10-PCS | Mod: CPTII,S$GLB,, | Performed by: UROLOGY

## 2023-02-07 PROCEDURE — 99999 PR PBB SHADOW E&M-EST. PATIENT-LVL III: ICD-10-PCS | Mod: PBBFAC,,, | Performed by: UROLOGY

## 2023-02-07 PROCEDURE — 1159F PR MEDICATION LIST DOCUMENTED IN MEDICAL RECORD: ICD-10-PCS | Mod: CPTII,S$GLB,, | Performed by: UROLOGY

## 2023-02-07 PROCEDURE — 99214 PR OFFICE/OUTPT VISIT, EST, LEVL IV, 30-39 MIN: ICD-10-PCS | Mod: S$GLB,,, | Performed by: UROLOGY

## 2023-02-07 PROCEDURE — 1159F MED LIST DOCD IN RCRD: CPT | Mod: CPTII,S$GLB,, | Performed by: UROLOGY

## 2023-02-07 PROCEDURE — 1126F AMNT PAIN NOTED NONE PRSNT: CPT | Mod: CPTII,S$GLB,, | Performed by: UROLOGY

## 2023-02-07 PROCEDURE — 81002 URINALYSIS NONAUTO W/O SCOPE: CPT | Mod: S$GLB,,, | Performed by: UROLOGY

## 2023-02-07 PROCEDURE — 1101F PR PT FALLS ASSESS DOC 0-1 FALLS W/OUT INJ PAST YR: ICD-10-PCS | Mod: CPTII,S$GLB,, | Performed by: UROLOGY

## 2023-02-07 PROCEDURE — 3075F SYST BP GE 130 - 139MM HG: CPT | Mod: CPTII,S$GLB,, | Performed by: UROLOGY

## 2023-02-07 PROCEDURE — 3079F DIAST BP 80-89 MM HG: CPT | Mod: CPTII,S$GLB,, | Performed by: UROLOGY

## 2023-02-07 PROCEDURE — 87086 URINE CULTURE/COLONY COUNT: CPT | Performed by: UROLOGY

## 2023-02-07 PROCEDURE — 3288F PR FALLS RISK ASSESSMENT DOCUMENTED: ICD-10-PCS | Mod: CPTII,S$GLB,, | Performed by: UROLOGY

## 2023-02-07 PROCEDURE — 1126F PR PAIN SEVERITY QUANTIFIED, NO PAIN PRESENT: ICD-10-PCS | Mod: CPTII,S$GLB,, | Performed by: UROLOGY

## 2023-02-07 PROCEDURE — 99214 OFFICE O/P EST MOD 30 MIN: CPT | Mod: S$GLB,,, | Performed by: UROLOGY

## 2023-02-07 PROCEDURE — 81002 POCT URINE DIPSTICK WITHOUT MICROSCOPE: ICD-10-PCS | Mod: S$GLB,,, | Performed by: UROLOGY

## 2023-02-07 PROCEDURE — 99999 PR PBB SHADOW E&M-EST. PATIENT-LVL III: CPT | Mod: PBBFAC,,, | Performed by: UROLOGY

## 2023-02-07 RX ORDER — TROSPIUM CHLORIDE ER 60 MG/1
60 CAPSULE ORAL DAILY
Qty: 30 CAPSULE | Refills: 6 | Status: SHIPPED | OUTPATIENT
Start: 2023-02-07 | End: 2024-02-21

## 2023-02-07 NOTE — PROGRESS NOTES
Chief Complaint   Patient presents with    Follow-up       History of Present Illness:   Jessica Perez is a 78 y.o. female here for evaluation of Follow-up    2/7/23-on trospium, and it seems to work okay. Only having stress incontinence and it is much improved. She thinks she may have cystitis and there is slight discomfort and she is having frequency and small volumes. Took a bubble bath 3 days ago. Not sexually active.   8/2/22- on trospium and significantly improved. Wearing 2 pads per day now. Here for cysto. CT urogram showed no stones, renal mass or hydronephrosis. Doing pelvic floor PT  7/8/22- She reports that she has been wearing pads for 10 years. She states that when she stands up, she pours out urine. She reports that she has had loose stools regularly and was taking Immodium when she would go anywhere. It used to be just KRISH with sneeze, cough and laugh.   Wears about 5 pads per day. During the night if she wakes, she doesn't have UUI. No daytime frequency. +Daytime urgency and UUI.   No gross hematuria or dysuria.         Review of Systems   Constitutional:  Negative for chills and fever.   Respiratory:  Negative for shortness of breath.    Cardiovascular:  Negative for chest pain.   Genitourinary:  Negative for difficulty urinating.   All other systems reviewed and are negative.      Past Medical History:   Diagnosis Date    Acute pain of left knee 3/22/2021    Arthritis     Arthritis of knee 2/9/2021    At risk for sleep apnea 9/19/2019    Balance problem 10/21/2021    Bilateral primary osteoarthritis of knee 4/4/2018    Chest pain, moderate coronary artery risk 8/8/2019    Chronic midline low back pain without sciatica 11/30/2020    Chronic pain of both knees 3/23/2021    Costochondritis 1/20/2020    Depression, major, recurrent, mild     Diarrhea 5/11/2022    Difficulty walking 11/30/2020    Difficulty walking 11/30/2020    LANRE (generalized anxiety disorder) 3/22/2021    Gait, antalgic  10/21/2021    Generalized weakness 11/30/2020    Generalized weakness 11/30/2020    Glaucoma     HLD (hyperlipidemia)     Lower extremity weakness 10/21/2021    Muscle cramps 5/23/2018    Osteopenia of multiple sites 4/2/2018    Primary osteoarthritis of right knee 05/30/2018    Primary snoring     Rash 5/11/2022    Sleep related leg cramps 9/7/2010    SOB (shortness of breath) 8/8/2019    Unspecified gastritis and gastroduodenitis without mention of hemorrhage 11/28/2010     Dx updated per 2019 IMO Load    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 Benign.    CATARACT EXTRACTION W/  INTRAOCULAR LENS IMPLANT Left 02/04/2019    CATARACT EXTRACTION W/  INTRAOCULAR LENS IMPLANT Right 03/04/2019    CHOLECYSTECTOMY      COLONOSCOPY N/A 06/29/2022    Procedure: COLONOSCOPY;  Surgeon: Nichole Kelly MD;  Location: [facility] ENDO;  Service: Endoscopy;  Laterality: N/A;    DILATION AND CURETTAGE OF UTERUS      finger surgery      middle finger on right hand    GALLBLADDER SURGERY      INJECTION OF ANESTHETIC AGENT AROUND NERVE Bilateral 10/01/2021    Procedure: Bilateral Genicular nerve block -;  Surgeon: Primo Bond MD;  Location: [facility] PAIN MGT;  Service: Pain Management;  Laterality: Bilateral;    RADIOFREQUENCY THERMOCOAGULATION Left 11/05/2021    Procedure: Left Genicular Nerve RFA (COOLIEF) with RN IV sedation;  Surgeon: Primo Bond MD;  Location: [facility] PAIN MGT;  Service: Pain Management;  Laterality: Left;    TONSILLECTOMY, ADENOIDECTOMY      TUBAL LIGATION         Family History   Problem Relation Age of Onset    Diabetes Father     Cancer Father         prostate ca    Hypertension Father     Cancer Sister         cervical ca    Hepatitis Sister     Dementia Mother     Osteoporosis Mother     Breast cancer Paternal Aunt        Social History     Tobacco Use    Smoking  status: Former     Packs/day: 0.50     Years: 12.00     Pack years: 6.00     Types: Cigarettes    Smokeless tobacco: Never   Substance Use Topics    Alcohol use: Yes     Comment: occasionally    Drug use: No       Current Outpatient Medications   Medication Sig Dispense Refill    acetaminophen/diphenhydramine (TYLENOL PM ORAL) Take by mouth.      APPLE CIDER VINEGAR ORAL Take by mouth.      artificial tears,hypromellose,,GENTEAL/SUSTANE, 0.3 % Gel       aspirin (ECOTRIN) 81 MG EC tablet Take 81 mg by mouth once daily.      b complex vitamins capsule Take 1 capsule by mouth once daily.      calcium carbonate (CALCIUM 500 ORAL) Take by mouth.      cholestyramine (QUESTRAN) 4 gram packet Take 1 packet (4 g total) by mouth daily as needed (diarrhea). 30 packet 5    cyanocobalamin, vitamin B-12, (VITAMIN B-12 ORAL) Take by mouth.      denosumab (PROLIA SUBQ) Inject into the skin.      ergocalciferol, vitamin D2, (VITAMIN D ORAL) Take by mouth.      evolocumab (REPATHA SURECLICK) 140 mg/mL PnIj Repatha SureClick Take No date recorded No form recorded No frequency recorded No route recorded No set duration recorded No set duration amount recorded active No dosage strength recorded No dosage strength units of measure recorded      evolocumab (REPATHA SURECLICK) 140 mg/mL PnIj Inject 1 pen (140 mg total) into the skin every 14 (fourteen) days. 2 mL 6    ezetimibe (ZETIA) 10 mg tablet Take 1 tablet (10 mg total) by mouth once daily. 90 tablet 3    famotidine (PEPCID) 40 MG tablet Take 1 tablet (40 mg total) by mouth every evening. 30 tablet 11    flaxseed oiL 1,000 mg Cap flaxseed oil Take No date recorded No form recorded No frequency recorded No route recorded No set duration recorded No set duration amount recorded suspended No dosage strength recorded No dosage strength units of measure recorded      fluocinolone (SYNALAR) 0.01 % external solution AAA of scalp twice a day PRN scalp itching or eczema flares. 60 mL 1     fluticasone (FLONASE) 50 mcg/actuation nasal spray 2 sprays by Each Nare route once daily. 1 Bottle 12    hydrOXYzine pamoate (VISTARIL) 25 MG Cap Take 1 capsule (25 mg total) by mouth every 8 (eight) hours as needed (itching). 30 capsule 0    ketoconazole (NIZORAL) 2 % shampoo Apply topically every 7 days. Shampoo scalp 1-2 times per week. Lather x 5 minutes, rinse well. 120 mL 5    krill oil-omega-3-dha-epa 150-450 mg CpDR Take by mouth.      multivitamin capsule Take 1 capsule by mouth Daily.      tiZANidine (ZANAFLEX) 4 MG tablet Take 2 mg by mouth nightly as needed.      triamcinolone acetonide 0.1% (KENALOG) 0.1 % cream Apply topically 2 (two) times daily. 45 g 0    TURMERIC ORAL Take by mouth.      UNABLE TO FIND CBD Oil topical      ZINC ORAL Take by mouth.      azelastine (ASTELIN) 137 mcg (0.1 %) nasal spray 1 spray (137 mcg total) by Nasal route 2 (two) times daily. 30 mL 1    fexofenadine (ALLEGRA) 180 MG tablet Take 1 tablet (180 mg total) by mouth once daily. 30 tablet 0    trospium (SANCTURA XR) 60 mg Cp24 capsule Take 1 capsule (60 mg total) by mouth once daily. 30 capsule 6     Current Facility-Administered Medications   Medication Dose Route Frequency Provider Last Rate Last Admin    triamcinolone acetonide injection 32 mg  32 mg Intra-articular 1 time in Clinic/HOD Dileep Mendoza MD           Review of patient's allergies indicates:   Allergen Reactions    Augmentin [amoxicillin-pot clavulanate] Other (See Comments)     Diarrhea, yeast    Bactrim  [sulfamethoxazole-trimethoprim]      Other reaction(s): Unknown    Celebrex [celecoxib] Other (See Comments)     Stomach pain, gas     Lipitor  [atorvastatin]      Other reaction(s): Unknown    Pravachol  [pravastatin]      Other reaction(s): Unknown    Statins-hmg-coa reductase inhibitors      Other reaction(s): Muscle pain    Sulfa (sulfonamide antibiotics)      Other reaction(s): Swelling    Zoster vaccine live        Physical Exam  Vitals:     02/07/23 0923   BP: 139/85   Pulse: 65       General: Well-developed, well-nourished, in no acute distress  HEENT: Normocephalic, atraumatic, extraocular movements intact  Neck: Supple, no supraclavicular or cervical lymphadenopathy, trachea midline  Respirations: even and unlabored  Back: midline spine, No CVA tenderness  Abdomen: soft, Non-tender, non-distended, no palpable masses, no rebound or guarding  : 7/8/22-Normal external female genitalia without lesions. Orthotopic urethral meatus with caruncle. atrophic vaginal mucosa. No significant prolapse,  negative cough stress test, no urethral hypermobility  Extremities: moves all equally, no clubbing, cyanosis or edema  Skin: Warm and dry. No lesions  Psych: normal affect  Neuro: Alert and oriented x 3. Cranial nerves II-XII intact    PVR: 31cc 7/8/22    Urinalysis  Negative for blood, LE, nit      Assessment:  1. Urinary frequency  Urine culture      2. Genitourinary syndrome of menopause        3. OAB (overactive bladder)        4. Mixed incontinence                Plan:   Urinary frequency  -     Urine culture    Genitourinary syndrome of menopause    OAB (overactive bladder)    Mixed incontinence    Other orders  -     trospium (SANCTURA XR) 60 mg Cp24 capsule; Take 1 capsule (60 mg total) by mouth once daily.  Dispense: 30 capsule; Refill: 6        Discussed vag est. She would like to wait and see if she has a UTI first. She will contact us if she has persist vaginal discomfort.   Pt understands to avoid bubble bath  Continue pelvic floor exercises.   Follow up in about 6 months (around 8/7/2023).

## 2023-02-08 LAB
BACTERIA UR CULT: NORMAL
BACTERIA UR CULT: NORMAL

## 2023-03-06 ENCOUNTER — SPECIALTY PHARMACY (OUTPATIENT)
Dept: PHARMACY | Facility: CLINIC | Age: 78
End: 2023-03-06
Payer: MEDICARE

## 2023-03-06 NOTE — TELEPHONE ENCOUNTER
Specialty Pharmacy - Refill Coordination    Specialty Medication Orders Linked to Encounter      Flowsheet Row Most Recent Value   Medication #1 evolocumab (REPATHA SURECLICK) 140 mg/mL PnIj (Order#[id number], Rx#[id number])            Refill Questions - Documented Responses      Flowsheet Row Most Recent Value   Patient Availability and HIPAA Verification    Does patient want to proceed with activity? Yes   HIPAA/medical authority confirmed? Yes   Relationship to patient of person spoken to? Self   Refill Screening Questions    Would patient like to speak to a pharmacist? No   When does the patient need to receive the medication? 03/10/23   Refill Delivery Questions    How will the patient receive the medication? MEDRx   When does the patient need to receive the medication? 03/10/23   Shipping Address Home   Address in [facility] confirmed and updated if neccessary? Yes   Expected Copay ($) 0   Is the patient able to afford the medication copay? Yes   Payment Method zero copay   Days supply of Refill 28   Supplies needed? No supplies needed   Refill activity completed? Yes   Refill activity plan Refill scheduled   Shipment/Pickup Date: 03/08/23            Current Outpatient Medications   Medication Sig    acetaminophen/diphenhydramine (TYLENOL PM ORAL) Take by mouth.    APPLE CIDER VINEGAR ORAL Take by mouth.    artificial tears,hypromellose,,GENTEAL/SUSTANE, 0.3 % Gel     aspirin (ECOTRIN) 81 MG EC tablet Take 81 mg by mouth once daily.    azelastine (ASTELIN) 137 mcg (0.1 %) nasal spray 1 spray (137 mcg total) by Nasal route 2 (two) times daily.    b complex vitamins capsule Take 1 capsule by mouth once daily.    calcium carbonate (CALCIUM 500 ORAL) Take by mouth.    cholestyramine (QUESTRAN) 4 gram packet Take 1 packet (4 g total) by mouth daily as needed (diarrhea).    cyanocobalamin, vitamin B-12, (VITAMIN B-12 ORAL) Take by mouth.    denosumab (PROLIA SUBQ) Inject into the skin.    ergocalciferol,  vitamin D2, (VITAMIN D ORAL) Take by mouth.    evolocumab (REPATHA SURECLICK) 140 mg/mL PnIj Repatha SureClick Take No date recorded No form recorded No frequency recorded No route recorded No set duration recorded No set duration amount recorded active No dosage strength recorded No dosage strength units of measure recorded    evolocumab (REPATHA SURECLICK) 140 mg/mL PnIj Inject 1 pen (140 mg total) into the skin every 14 (fourteen) days.    ezetimibe (ZETIA) 10 mg tablet Take 1 tablet (10 mg total) by mouth once daily.    famotidine (PEPCID) 40 MG tablet Take 1 tablet (40 mg total) by mouth every evening.    fexofenadine (ALLEGRA) 180 MG tablet Take 1 tablet (180 mg total) by mouth once daily.    flaxseed oiL 1,000 mg Cap flaxseed oil Take No date recorded No form recorded No frequency recorded No route recorded No set duration recorded No set duration amount recorded suspended No dosage strength recorded No dosage strength units of measure recorded    fluocinolone (SYNALAR) 0.01 % external solution AAA of scalp twice a day PRN scalp itching or eczema flares.    fluticasone (FLONASE) 50 mcg/actuation nasal spray 2 sprays by Each Nare route once daily.    hydrOXYzine pamoate (VISTARIL) 25 MG Cap Take 1 capsule (25 mg total) by mouth every 8 (eight) hours as needed (itching).    ketoconazole (NIZORAL) 2 % shampoo Apply topically every 7 days. Shampoo scalp 1-2 times per week. Lather x 5 minutes, rinse well.    krill oil-omega-3-dha-epa 150-450 mg CpDR Take by mouth.    multivitamin capsule Take 1 capsule by mouth Daily.    tiZANidine (ZANAFLEX) 4 MG tablet Take 2 mg by mouth nightly as needed.    triamcinolone acetonide 0.1% (KENALOG) 0.1 % cream Apply topically 2 (two) times daily.    trospium (SANCTURA XR) 60 mg Cp24 capsule Take 1 capsule (60 mg total) by mouth once daily.    TURMERIC ORAL Take by mouth.    UNABLE TO FIND CBD Oil topical    ZINC ORAL Take by mouth.   Last reviewed on 2/7/2023  9:23 AM by Whitney  RAFAT Petersen    Review of patient's allergies indicates:   Allergen Reactions    Augmentin [amoxicillin-pot clavulanate] Other (See Comments)     Diarrhea, yeast    Bactrim  [sulfamethoxazole-trimethoprim]      Other reaction(s): Unknown    Celebrex [celecoxib] Other (See Comments)     Stomach pain, gas     Lipitor  [atorvastatin]      Other reaction(s): Unknown    Pravachol  [pravastatin]      Other reaction(s): Unknown    Statins-hmg-coa reductase inhibitors      Other reaction(s): Muscle pain    Sulfa (sulfonamide antibiotics)      Other reaction(s): Swelling    Zoster vaccine live     Last reviewed on  2/7/2023 9:23 AM by Whitney Petersen      Tasks added this encounter   3/31/2023 - Refill Call (Auto Added)   Tasks due within next 3 months   No tasks due.     [facility]  [address] [facility] [address]  Phone: [phone]  Fax: [phone]

## 2023-03-31 ENCOUNTER — SPECIALTY PHARMACY (OUTPATIENT)
Dept: PHARMACY | Facility: CLINIC | Age: 78
End: 2023-03-31
Payer: MEDICARE

## 2023-03-31 NOTE — TELEPHONE ENCOUNTER
Specialty Pharmacy - Refill Coordination    Specialty Medication Orders Linked to Encounter      Flowsheet Row Most Recent Value   Medication #1 evolocumab (REPATHA SURECLICK) 140 mg/mL PnIj (Order#[id number], Rx#[id number])            Refill Questions - Documented Responses      Flowsheet Row Most Recent Value   Patient Availability and HIPAA Verification    Does patient want to proceed with activity? Yes   HIPAA/medical authority confirmed? Yes   Relationship to patient of person spoken to? Self   Refill Screening Questions    Would patient like to speak to a pharmacist? No   When does the patient need to receive the medication? 04/07/23   Refill Delivery Questions    How will the patient receive the medication? MEDRx   When does the patient need to receive the medication? 04/07/23   Shipping Address Home   Address in [facility] confirmed and updated if neccessary? Yes   Expected Copay ($) 0   Is the patient able to afford the medication copay? Yes   Payment Method zero copay   Days supply of Refill 28   Refill activity completed? Yes   Refill activity plan Refill scheduled   Shipment/Pickup Date: 04/04/23            Current Outpatient Medications   Medication Sig    acetaminophen/diphenhydramine (TYLENOL PM ORAL) Take by mouth.    APPLE CIDER VINEGAR ORAL Take by mouth.    artificial tears,hypromellose,,GENTEAL/SUSTANE, 0.3 % Gel     aspirin (ECOTRIN) 81 MG EC tablet Take 81 mg by mouth once daily.    azelastine (ASTELIN) 137 mcg (0.1 %) nasal spray 1 spray (137 mcg total) by Nasal route 2 (two) times daily.    b complex vitamins capsule Take 1 capsule by mouth once daily.    calcium carbonate (CALCIUM 500 ORAL) Take by mouth.    cholestyramine (QUESTRAN) 4 gram packet Take 1 packet (4 g total) by mouth daily as needed (diarrhea).    cyanocobalamin, vitamin B-12, (VITAMIN B-12 ORAL) Take by mouth.    denosumab (PROLIA SUBQ) Inject into the skin.    ergocalciferol, vitamin D2, (VITAMIN D ORAL) Take by  mouth.    evolocumab (REPATHA SURECLICK) 140 mg/mL PnIj Repatha SureClick Take No date recorded No form recorded No frequency recorded No route recorded No set duration recorded No set duration amount recorded active No dosage strength recorded No dosage strength units of measure recorded    evolocumab (REPATHA SURECLICK) 140 mg/mL PnIj Inject 1 pen (140 mg total) into the skin every 14 (fourteen) days.    ezetimibe (ZETIA) 10 mg tablet Take 1 tablet (10 mg total) by mouth once daily.    famotidine (PEPCID) 40 MG tablet Take 1 tablet (40 mg total) by mouth every evening.    fexofenadine (ALLEGRA) 180 MG tablet Take 1 tablet (180 mg total) by mouth once daily.    flaxseed oiL 1,000 mg Cap flaxseed oil Take No date recorded No form recorded No frequency recorded No route recorded No set duration recorded No set duration amount recorded suspended No dosage strength recorded No dosage strength units of measure recorded    fluocinolone (SYNALAR) 0.01 % external solution AAA of scalp twice a day PRN scalp itching or eczema flares.    fluticasone (FLONASE) 50 mcg/actuation nasal spray 2 sprays by Each Nare route once daily.    hydrOXYzine pamoate (VISTARIL) 25 MG Cap Take 1 capsule (25 mg total) by mouth every 8 (eight) hours as needed (itching).    ketoconazole (NIZORAL) 2 % shampoo Apply topically every 7 days. Shampoo scalp 1-2 times per week. Lather x 5 minutes, rinse well.    krill oil-omega-3-dha-epa 150-450 mg CpDR Take by mouth.    multivitamin capsule Take 1 capsule by mouth Daily.    tiZANidine (ZANAFLEX) 4 MG tablet Take 2 mg by mouth nightly as needed.    triamcinolone acetonide 0.1% (KENALOG) 0.1 % cream Apply topically 2 (two) times daily.    trospium (SANCTURA XR) 60 mg Cp24 capsule Take 1 capsule (60 mg total) by mouth once daily.    TURMERIC ORAL Take by mouth.    UNABLE TO FIND CBD Oil topical    ZINC ORAL Take by mouth.   Last reviewed on 2/7/2023  9:23 AM by Whitney Petersen MA    Review of patient's  allergies indicates:   Allergen Reactions    Augmentin [amoxicillin-pot clavulanate] Other (See Comments)     Diarrhea, yeast    Bactrim  [sulfamethoxazole-trimethoprim]      Other reaction(s): Unknown    Celebrex [celecoxib] Other (See Comments)     Stomach pain, gas     Lipitor  [atorvastatin]      Other reaction(s): Unknown    Pravachol  [pravastatin]      Other reaction(s): Unknown    Statins-hmg-coa reductase inhibitors      Other reaction(s): Muscle pain    Sulfa (sulfonamide antibiotics)      Other reaction(s): Swelling    Zoster vaccine live     Last reviewed on  2/7/2023 9:23 AM by Whitney Petersen      Tasks added this encounter   4/28/2023 - Refill Call (Auto Added)   Tasks due within next 3 months   No tasks due.     [facility] - [facility] Pharmacy  [address] [facility] [address]  Phone: [phone]  Fax: [phone]

## 2023-04-25 ENCOUNTER — OFFICE VISIT (OUTPATIENT)
Dept: INTERNAL MEDICINE | Facility: CLINIC | Age: 78
End: 2023-04-25
Payer: MEDICARE

## 2023-04-25 VITALS
TEMPERATURE: 98 F | HEART RATE: 83 BPM | BODY MASS INDEX: 31.4 KG/M2 | HEIGHT: 62 IN | DIASTOLIC BLOOD PRESSURE: 78 MMHG | SYSTOLIC BLOOD PRESSURE: 122 MMHG | WEIGHT: 170.63 LBS

## 2023-04-25 DIAGNOSIS — L98.9 SKIN LESION: ICD-10-CM

## 2023-04-25 DIAGNOSIS — E78.00 PURE HYPERCHOLESTEROLEMIA: Primary | ICD-10-CM

## 2023-04-25 DIAGNOSIS — M35.01 KERATITIS SICCA, BILATERAL: ICD-10-CM

## 2023-04-25 DIAGNOSIS — M79.10 MYALGIA: ICD-10-CM

## 2023-04-25 DIAGNOSIS — E21.3 HYPERPARATHYROIDISM: ICD-10-CM

## 2023-04-25 PROCEDURE — 1101F PT FALLS ASSESS-DOCD LE1/YR: CPT | Mod: CPTII,S$GLB,, | Performed by: FAMILY MEDICINE

## 2023-04-25 PROCEDURE — 3288F PR FALLS RISK ASSESSMENT DOCUMENTED: ICD-10-PCS | Mod: CPTII,S$GLB,, | Performed by: FAMILY MEDICINE

## 2023-04-25 PROCEDURE — 1101F PR PT FALLS ASSESS DOC 0-1 FALLS W/OUT INJ PAST YR: ICD-10-PCS | Mod: CPTII,S$GLB,, | Performed by: FAMILY MEDICINE

## 2023-04-25 PROCEDURE — 99999 PR PBB SHADOW E&M-EST. PATIENT-LVL V: ICD-10-PCS | Mod: PBBFAC,,, | Performed by: FAMILY MEDICINE

## 2023-04-25 PROCEDURE — 3078F PR MOST RECENT DIASTOLIC BLOOD PRESSURE < 80 MM HG: ICD-10-PCS | Mod: CPTII,S$GLB,, | Performed by: FAMILY MEDICINE

## 2023-04-25 PROCEDURE — 99999 PR PBB SHADOW E&M-EST. PATIENT-LVL V: CPT | Mod: PBBFAC,,, | Performed by: FAMILY MEDICINE

## 2023-04-25 PROCEDURE — 99214 PR OFFICE/OUTPT VISIT, EST, LEVL IV, 30-39 MIN: ICD-10-PCS | Mod: S$GLB,,, | Performed by: FAMILY MEDICINE

## 2023-04-25 PROCEDURE — 3074F PR MOST RECENT SYSTOLIC BLOOD PRESSURE < 130 MM HG: ICD-10-PCS | Mod: CPTII,S$GLB,, | Performed by: FAMILY MEDICINE

## 2023-04-25 PROCEDURE — 3078F DIAST BP <80 MM HG: CPT | Mod: CPTII,S$GLB,, | Performed by: FAMILY MEDICINE

## 2023-04-25 PROCEDURE — 3288F FALL RISK ASSESSMENT DOCD: CPT | Mod: CPTII,S$GLB,, | Performed by: FAMILY MEDICINE

## 2023-04-25 PROCEDURE — 1126F PR PAIN SEVERITY QUANTIFIED, NO PAIN PRESENT: ICD-10-PCS | Mod: CPTII,S$GLB,, | Performed by: FAMILY MEDICINE

## 2023-04-25 PROCEDURE — 1159F PR MEDICATION LIST DOCUMENTED IN MEDICAL RECORD: ICD-10-PCS | Mod: CPTII,S$GLB,, | Performed by: FAMILY MEDICINE

## 2023-04-25 PROCEDURE — 1126F AMNT PAIN NOTED NONE PRSNT: CPT | Mod: CPTII,S$GLB,, | Performed by: FAMILY MEDICINE

## 2023-04-25 PROCEDURE — 1159F MED LIST DOCD IN RCRD: CPT | Mod: CPTII,S$GLB,, | Performed by: FAMILY MEDICINE

## 2023-04-25 PROCEDURE — 3074F SYST BP LT 130 MM HG: CPT | Mod: CPTII,S$GLB,, | Performed by: FAMILY MEDICINE

## 2023-04-25 PROCEDURE — 99214 OFFICE O/P EST MOD 30 MIN: CPT | Mod: S$GLB,,, | Performed by: FAMILY MEDICINE

## 2023-04-25 NOTE — PROGRESS NOTES
Subjective:       Patient ID: Jessica Perez is a 78 y.o. female.    Chief Complaint: Hyperlipidemia    Hyperlipidemia  This is a chronic problem. The current episode started more than 1 year ago. The problem is controlled. She has no history of hypothyroidism. Pertinent negatives include no chest pain or shortness of breath.   Review of Systems   Respiratory:  Negative for shortness of breath.    Cardiovascular:  Negative for chest pain.   Gastrointestinal:  Negative for abdominal pain.     Objective:      Physical Exam  Vitals and nursing note reviewed.   Constitutional:       General: She is not in acute distress.     Appearance: Normal appearance. She is well-developed. She is not diaphoretic.   HENT:      Head: Normocephalic and atraumatic.   Pulmonary:      Effort: Pulmonary effort is normal. No respiratory distress.      Breath sounds: Normal breath sounds. No wheezing.   Skin:     General: Skin is warm and dry.      Findings: No erythema or rash.      Comments: Poor Turgor   Neurological:      Mental Status: She is alert.       Assessment:       1. Pure hypercholesterolemia    2. Skin lesion    3. Hyperparathyroidism    4. Keratitis sicca, bilateral    5. Myalgia        Plan:     Problem List Items Addressed This Visit          Ophtho    Keratitis sicca, bilateral    Overview     Cont Genteal drops at bedtime  Followed by Dr. Palomino              Derm    Skin lesion       Cardiac/Vascular    HLD (hyperlipidemia) - Primary    Overview     Chronic, Stable, cont zetia, repatha              Endocrine    Hyperparathyroidism    Overview     Chronic, Stable  elevated pth - 121 as of 11/18/2020            Other Visit Diagnoses       Myalgia        Relevant Orders    Ambulatory referral/consult to Rheumatology

## 2023-04-28 ENCOUNTER — SPECIALTY PHARMACY (OUTPATIENT)
Dept: PHARMACY | Facility: CLINIC | Age: 78
End: 2023-04-28
Payer: MEDICARE

## 2023-04-28 DIAGNOSIS — E78.00 PURE HYPERCHOLESTEROLEMIA: ICD-10-CM

## 2023-04-28 RX ORDER — EVOLOCUMAB 140 MG/ML
140 INJECTION, SOLUTION SUBCUTANEOUS
Qty: 2 ML | Refills: 6 | Status: ACTIVE | OUTPATIENT
Start: 2023-04-28 | End: 2023-11-08 | Stop reason: SDUPTHER

## 2023-04-28 NOTE — TELEPHONE ENCOUNTER
Outgoing call regarding Repatha refill. PT is due to inject on 5/5/23. Informed pt refill request has been sent to provider and will follow up on 5/1/23. Pt verbalized understanding.

## 2023-05-02 ENCOUNTER — OFFICE VISIT (OUTPATIENT)
Dept: RHEUMATOLOGY | Facility: CLINIC | Age: 78
End: 2023-05-02
Payer: MEDICARE

## 2023-05-02 ENCOUNTER — LAB VISIT (OUTPATIENT)
Dept: LAB | Facility: HOSPITAL | Age: 78
End: 2023-05-02
Attending: INTERNAL MEDICINE
Payer: MEDICARE

## 2023-05-02 VITALS
DIASTOLIC BLOOD PRESSURE: 73 MMHG | SYSTOLIC BLOOD PRESSURE: 112 MMHG | HEIGHT: 62 IN | BODY MASS INDEX: 31.21 KG/M2 | HEART RATE: 79 BPM

## 2023-05-02 DIAGNOSIS — G25.81 RESTLESS LEG SYNDROME: ICD-10-CM

## 2023-05-02 DIAGNOSIS — T46.6X5A STATIN MYOPATHY: ICD-10-CM

## 2023-05-02 DIAGNOSIS — M15.9 PRIMARY OSTEOARTHRITIS INVOLVING MULTIPLE JOINTS: ICD-10-CM

## 2023-05-02 DIAGNOSIS — Z51.81 MEDICATION MONITORING ENCOUNTER: ICD-10-CM

## 2023-05-02 DIAGNOSIS — M81.0 AGE-RELATED OSTEOPOROSIS WITHOUT CURRENT PATHOLOGICAL FRACTURE: Primary | ICD-10-CM

## 2023-05-02 DIAGNOSIS — J30.2 SEASONAL ALLERGIC RHINITIS, UNSPECIFIED TRIGGER: ICD-10-CM

## 2023-05-02 DIAGNOSIS — M79.10 MYALGIA: ICD-10-CM

## 2023-05-02 DIAGNOSIS — R25.2 CRAMPS, MUSCLE, GENERAL: ICD-10-CM

## 2023-05-02 DIAGNOSIS — M35.01 KERATITIS SICCA, BILATERAL: ICD-10-CM

## 2023-05-02 DIAGNOSIS — E66.9 OBESITY (BMI 30.0-34.9): ICD-10-CM

## 2023-05-02 DIAGNOSIS — H40.019 OPEN ANGLE WITH BORDERLINE FINDINGS, LOW RISK: ICD-10-CM

## 2023-05-02 DIAGNOSIS — G72.0 STATIN MYOPATHY: ICD-10-CM

## 2023-05-02 LAB
ALBUMIN SERPL BCP-MCNC: 4 G/DL (ref 3.5–5.2)
ALP SERPL-CCNC: 99 U/L (ref 55–135)
ALT SERPL W/O P-5'-P-CCNC: 19 U/L (ref 10–44)
ANION GAP SERPL CALC-SCNC: 10 MMOL/L (ref 8–16)
AST SERPL-CCNC: 25 U/L (ref 10–40)
BILIRUB SERPL-MCNC: 0.4 MG/DL (ref 0.1–1)
BUN SERPL-MCNC: 8 MG/DL (ref 8–23)
CALCIUM SERPL-MCNC: 9.4 MG/DL (ref 8.7–10.5)
CHLORIDE SERPL-SCNC: 109 MMOL/L (ref 95–110)
CK SERPL-CCNC: 123 U/L (ref 20–180)
CK SERPL-CCNC: 123 U/L (ref 20–180)
CO2 SERPL-SCNC: 24 MMOL/L (ref 23–29)
CREAT SERPL-MCNC: 0.8 MG/DL (ref 0.5–1.4)
EST. GFR  (NO RACE VARIABLE): >60 ML/MIN/1.73 M^2
GLUCOSE SERPL-MCNC: 59 MG/DL (ref 70–110)
POTASSIUM SERPL-SCNC: 3.9 MMOL/L (ref 3.5–5.1)
PROT SERPL-MCNC: 7.5 G/DL (ref 6–8.4)
SODIUM SERPL-SCNC: 143 MMOL/L (ref 136–145)

## 2023-05-02 PROCEDURE — 3074F PR MOST RECENT SYSTOLIC BLOOD PRESSURE < 130 MM HG: ICD-10-PCS | Mod: CPTII,S$GLB,, | Performed by: INTERNAL MEDICINE

## 2023-05-02 PROCEDURE — 99214 PR OFFICE/OUTPT VISIT, EST, LEVL IV, 30-39 MIN: ICD-10-PCS | Mod: S$GLB,,, | Performed by: INTERNAL MEDICINE

## 2023-05-02 PROCEDURE — 3078F DIAST BP <80 MM HG: CPT | Mod: CPTII,S$GLB,, | Performed by: INTERNAL MEDICINE

## 2023-05-02 PROCEDURE — 82550 ASSAY OF CK (CPK): CPT | Performed by: INTERNAL MEDICINE

## 2023-05-02 PROCEDURE — 83516 IMMUNOASSAY NONANTIBODY: CPT | Performed by: INTERNAL MEDICINE

## 2023-05-02 PROCEDURE — 99999 PR PBB SHADOW E&M-EST. PATIENT-LVL IV: CPT | Mod: PBBFAC,,, | Performed by: INTERNAL MEDICINE

## 2023-05-02 PROCEDURE — 1159F PR MEDICATION LIST DOCUMENTED IN MEDICAL RECORD: ICD-10-PCS | Mod: CPTII,S$GLB,, | Performed by: INTERNAL MEDICINE

## 2023-05-02 PROCEDURE — 80053 COMPREHEN METABOLIC PANEL: CPT | Performed by: INTERNAL MEDICINE

## 2023-05-02 PROCEDURE — 99999 PR PBB SHADOW E&M-EST. PATIENT-LVL IV: ICD-10-PCS | Mod: PBBFAC,,, | Performed by: INTERNAL MEDICINE

## 2023-05-02 PROCEDURE — 82397 CHEMILUMINESCENT ASSAY: CPT | Performed by: INTERNAL MEDICINE

## 2023-05-02 PROCEDURE — 99214 OFFICE O/P EST MOD 30 MIN: CPT | Mod: S$GLB,,, | Performed by: INTERNAL MEDICINE

## 2023-05-02 PROCEDURE — 3078F PR MOST RECENT DIASTOLIC BLOOD PRESSURE < 80 MM HG: ICD-10-PCS | Mod: CPTII,S$GLB,, | Performed by: INTERNAL MEDICINE

## 2023-05-02 PROCEDURE — 1125F PR PAIN SEVERITY QUANTIFIED, PAIN PRESENT: ICD-10-PCS | Mod: CPTII,S$GLB,, | Performed by: INTERNAL MEDICINE

## 2023-05-02 PROCEDURE — 82085 ASSAY OF ALDOLASE: CPT | Performed by: INTERNAL MEDICINE

## 2023-05-02 PROCEDURE — 1125F AMNT PAIN NOTED PAIN PRSNT: CPT | Mod: CPTII,S$GLB,, | Performed by: INTERNAL MEDICINE

## 2023-05-02 PROCEDURE — 83516 IMMUNOASSAY NONANTIBODY: CPT | Mod: 59 | Performed by: INTERNAL MEDICINE

## 2023-05-02 PROCEDURE — 86235 NUCLEAR ANTIGEN ANTIBODY: CPT | Mod: 59 | Performed by: INTERNAL MEDICINE

## 2023-05-02 PROCEDURE — 1159F MED LIST DOCD IN RCRD: CPT | Mod: CPTII,S$GLB,, | Performed by: INTERNAL MEDICINE

## 2023-05-02 PROCEDURE — 3074F SYST BP LT 130 MM HG: CPT | Mod: CPTII,S$GLB,, | Performed by: INTERNAL MEDICINE

## 2023-05-02 RX ORDER — TIZANIDINE 4 MG/1
2 TABLET ORAL NIGHTLY PRN
Qty: 40 TABLET | Refills: 2 | Status: SHIPPED | OUTPATIENT
Start: 2023-05-02 | End: 2023-10-03 | Stop reason: SDUPTHER

## 2023-05-02 NOTE — TELEPHONE ENCOUNTER
Specialty Pharmacy - Refill Coordination    Specialty Medication Orders Linked to Encounter      Flowsheet Row Most Recent Value   Medication #1 evolocumab (REPATHA SURECLICK) 140 mg/mL PnIj (Order#[id number], Rx#[id number])            Refill Questions - Documented Responses      Flowsheet Row Most Recent Value   Refill Screening Questions    Would patient like to speak to a pharmacist? No   When does the patient need to receive the medication? 05/05/23   Refill Delivery Questions    How will the patient receive the medication? MEDRx   When does the patient need to receive the medication? 05/05/23   Shipping Address Home   Address in [facility] confirmed and updated if neccessary? Yes   Expected Copay ($) 0   Is the patient able to afford the medication copay? Yes   Payment Method zero copay   Days supply of Refill 28   Supplies needed? No supplies needed   Refill activity completed? Yes   Refill activity plan Refill scheduled   Shipment/Pickup Date: 05/03/23            Current Outpatient Medications   Medication Sig    acetaminophen/diphenhydramine (TYLENOL PM ORAL) Take by mouth.    APPLE CIDER VINEGAR ORAL Take by mouth.    artificial tears,hypromellose,,GENTEAL/SUSTANE, 0.3 % Gel     aspirin (ECOTRIN) 81 MG EC tablet Take 81 mg by mouth once daily.    azelastine (ASTELIN) 137 mcg (0.1 %) nasal spray 1 spray (137 mcg total) by Nasal route 2 (two) times daily.    b complex vitamins capsule Take 1 capsule by mouth once daily.    calcium carbonate (CALCIUM 500 ORAL) Take by mouth.    cholestyramine (QUESTRAN) 4 gram packet Take 1 packet (4 g total) by mouth daily as needed (diarrhea).    cyanocobalamin, vitamin B-12, (VITAMIN B-12 ORAL) Take by mouth.    denosumab (PROLIA SUBQ) Inject into the skin.    ergocalciferol, vitamin D2, (VITAMIN D ORAL) Take by mouth.    evolocumab (REPATHA SURECLICK) 140 mg/mL PnIj Repatha SureClick Take No date recorded No form recorded No frequency recorded No route recorded No  set duration recorded No set duration amount recorded active No dosage strength recorded No dosage strength units of measure recorded    evolocumab (REPATHA SURECLICK) 140 mg/mL PnIj Inject 1 pen (140 mg total) into the skin every 14 (fourteen) days.    ezetimibe (ZETIA) 10 mg tablet Take 1 tablet (10 mg total) by mouth once daily.    famotidine (PEPCID) 40 MG tablet Take 1 tablet (40 mg total) by mouth every evening.    flaxseed oiL 1,000 mg Cap flaxseed oil Take No date recorded No form recorded No frequency recorded No route recorded No set duration recorded No set duration amount recorded suspended No dosage strength recorded No dosage strength units of measure recorded    fluocinolone (SYNALAR) 0.01 % external solution AAA of scalp twice a day PRN scalp itching or eczema flares.    fluticasone (FLONASE) 50 mcg/actuation nasal spray 2 sprays by Each Nare route once daily.    ketoconazole (NIZORAL) 2 % shampoo Apply topically every 7 days. Shampoo scalp 1-2 times per week. Lather x 5 minutes, rinse well.    krill oil-omega-3-dha-epa 150-450 mg CpDR Take by mouth.    multivitamin capsule Take 1 capsule by mouth Daily.    tiZANidine (ZANAFLEX) 4 MG tablet Take 2 mg by mouth nightly as needed.    triamcinolone acetonide 0.1% (KENALOG) 0.1 % cream Apply topically 2 (two) times daily.    trospium (SANCTURA XR) 60 mg Cp24 capsule Take 1 capsule (60 mg total) by mouth once daily.    TURMERIC ORAL Take by mouth.    UNABLE TO FIND CBD Oil topical    ZINC ORAL Take by mouth.   Last reviewed on 4/25/2023  1:56 PM by Monalisa Malik MA    Review of patient's allergies indicates:   Allergen Reactions    Augmentin [amoxicillin-pot clavulanate] Other (See Comments)     Diarrhea, yeast    Bactrim  [sulfamethoxazole-trimethoprim]      Other reaction(s): Unknown    Celebrex [celecoxib] Other (See Comments)     Stomach pain, gas     Lipitor  [atorvastatin]      Other reaction(s): Unknown    Pravachol  [pravastatin]       Other reaction(s): Unknown    Statins-hmg-coa reductase inhibitors      Other reaction(s): Muscle pain    Sulfa (sulfonamide antibiotics)      Other reaction(s): Swelling    Zoster vaccine live     Last reviewed on  4/25/2023 1:47 PM by Monalisa Malik      Tasks added this encounter   No tasks added.   Tasks due within next 3 months   5/4/2023 - Refill Coordination Outreach (1 time occurrence)     Aileen Howard, PharmD  [facility]  [address] [place], [place] [address]  [facility] [address]  Phone: [phone]  Fax: [phone]

## 2023-05-04 LAB
ALDOLASE SERPL-CCNC: 4.1 U/L (ref 1.2–7.6)
HMGCR IGG SER IA-ACNC: <20 CU

## 2023-05-23 ENCOUNTER — PATIENT MESSAGE (OUTPATIENT)
Dept: RESEARCH | Facility: HOSPITAL | Age: 78
End: 2023-05-23
Payer: MEDICARE

## 2023-05-23 LAB
ANTI-IBM ANTIBODY (ANTI-CN1A): <20 UNITS
ANTI-PM/SCL AB: <20 UNITS
ANTI-PM/SCL AB: <20 UNITS
ANTI-SS-A 52 KD AB, IGG: <20 UNITS
ANTI-SS-A 52 KD AB, IGG: <20 UNITS
EJ AB SER QL: NEGATIVE
EJ AB SER QL: NEGATIVE
ENA JO1 AB SER IA-ACNC: <20 UNITS
ENA JO1 AB SER IA-ACNC: <20 UNITS
ENA SM+RNP AB SER IA-ACNC: <20 UNITS
ENA SM+RNP AB SER IA-ACNC: <20 UNITS
FIBRILLARIN (U3 RNP): NEGATIVE
FIBRILLARIN (U3 RNP): NEGATIVE
KU AB SER QL: NEGATIVE
KU AB SER QL: NEGATIVE
MDA-5 (P140): <20 UNITS
MDA-5 (P140): <20 UNITS
MI2 AB SER QL: NEGATIVE
MI2 AB SER QL: NEGATIVE
NXP-2 (P140): <20 UNITS
NXP-2 (P140): <20 UNITS
OJ AB SER QL: NEGATIVE
OJ AB SER QL: NEGATIVE
PL12 AB SER QL: NEGATIVE
PL12 AB SER QL: NEGATIVE
PL7 AB SER QL: NEGATIVE
PL7 AB SER QL: NEGATIVE
SRP AB SERPL QL: NEGATIVE
SRP AB SERPL QL: NEGATIVE
TIF1 GAMMA (P155/140): <20 UNITS
TIF1 GAMMA (P155/140): <20 UNITS
U2 SNRNP: NEGATIVE
U2 SNRNP: NEGATIVE

## 2023-05-24 ENCOUNTER — SPECIALTY PHARMACY (OUTPATIENT)
Dept: PHARMACY | Facility: CLINIC | Age: 78
End: 2023-05-24
Payer: MEDICARE

## 2023-05-24 NOTE — TELEPHONE ENCOUNTER
Specialty Pharmacy - Refill Coordination    Specialty Medication Orders Linked to Encounter      Flowsheet Row Most Recent Value   Medication #1 evolocumab (REPATHA SURECLICK) 140 mg/mL PnIj (Order#[id number], Rx#[id number])            Refill Questions - Documented Responses      Flowsheet Row Most Recent Value   Patient Availability and HIPAA Verification    Does patient want to proceed with activity? Yes   HIPAA/medical authority confirmed? Yes   Relationship to patient of person spoken to? Self   Refill Screening Questions    Would patient like to speak to a pharmacist? No   When does the patient need to receive the medication? 06/02/23   Refill Delivery Questions    How will the patient receive the medication? MEDRx   When does the patient need to receive the medication? 06/02/23   Shipping Address Home   Address in [facility] confirmed and updated if neccessary? Yes   Expected Copay ($) 0   Is the patient able to afford the medication copay? Yes   Payment Method zero copay   Days supply of Refill 28   Supplies needed? No supplies needed   Refill activity completed? Yes   Refill activity plan Refill scheduled   Shipment/Pickup Date: 05/30/23            Current Outpatient Medications   Medication Sig    acetaminophen/diphenhydramine (TYLENOL PM ORAL) Take by mouth.    APPLE CIDER VINEGAR ORAL Take by mouth.    artificial tears,hypromellose,,GENTEAL/SUSTANE, 0.3 % Gel     aspirin (ECOTRIN) 81 MG EC tablet Take 81 mg by mouth once daily.    azelastine (ASTELIN) 137 mcg (0.1 %) nasal spray 1 spray (137 mcg total) by Nasal route 2 (two) times daily.    b complex vitamins capsule Take 1 capsule by mouth once daily.    calcium carbonate (CALCIUM 500 ORAL) Take by mouth.    cholestyramine (QUESTRAN) 4 gram packet Take 1 packet (4 g total) by mouth daily as needed (diarrhea).    cyanocobalamin, vitamin B-12, (VITAMIN B-12 ORAL) Take by mouth.    denosumab (PROLIA SUBQ) Inject into the skin.    ergocalciferol,  vitamin D2, (VITAMIN D ORAL) Take by mouth.    evolocumab (REPATHA SURECLICK) 140 mg/mL PnIj Repatha SureClick Take No date recorded No form recorded No frequency recorded No route recorded No set duration recorded No set duration amount recorded active No dosage strength recorded No dosage strength units of measure recorded    evolocumab (REPATHA SURECLICK) 140 mg/mL PnIj Inject 1 pen (140 mg total) into the skin every 14 (fourteen) days.    ezetimibe (ZETIA) 10 mg tablet Take 1 tablet (10 mg total) by mouth once daily.    famotidine (PEPCID) 40 MG tablet Take 1 tablet (40 mg total) by mouth every evening.    flaxseed oiL 1,000 mg Cap flaxseed oil Take No date recorded No form recorded No frequency recorded No route recorded No set duration recorded No set duration amount recorded suspended No dosage strength recorded No dosage strength units of measure recorded    fluocinolone (SYNALAR) 0.01 % external solution AAA of scalp twice a day PRN scalp itching or eczema flares.    fluticasone (FLONASE) 50 mcg/actuation nasal spray 2 sprays by Each Nare route once daily.    ketoconazole (NIZORAL) 2 % shampoo Apply topically every 7 days. Shampoo scalp 1-2 times per week. Lather x 5 minutes, rinse well.    krill oil-omega-3-dha-epa 150-450 mg CpDR Take by mouth.    MAGNESIUM CITRATE ORAL Take by mouth.    multivitamin capsule Take 1 capsule by mouth Daily.    tiZANidine (ZANAFLEX) 4 MG tablet Take 0.5 tablets (2 mg total) by mouth nightly as needed (back pain / stiffness / cramps).    triamcinolone acetonide 0.1% (KENALOG) 0.1 % cream Apply topically 2 (two) times daily.    trospium (SANCTURA XR) 60 mg Cp24 capsule Take 1 capsule (60 mg total) by mouth once daily.    TURMERIC ORAL Take by mouth.    UNABLE TO FIND CBD Oil topical    ZINC ORAL Take by mouth.   Last reviewed on 5/2/2023 11:38 AM by Marah Mayorga CMA    Review of patient's allergies indicates:   Allergen Reactions    Augmentin [amoxicillin-pot  clavulanate] Other (See Comments)     Diarrhea, yeast    Bactrim  [sulfamethoxazole-trimethoprim]      Other reaction(s): Unknown    Celebrex [celecoxib] Other (See Comments)     Stomach pain, gas     Lipitor  [atorvastatin]      Other reaction(s): Unknown    Pravachol  [pravastatin]      Other reaction(s): Unknown    Statins-hmg-coa reductase inhibitors      Other reaction(s): Muscle pain    Sulfa (sulfonamide antibiotics)      Other reaction(s): Swelling    Zoster vaccine live     Last reviewed on  5/2/2023 11:31 AM by Marah Mayorga      Tasks added this encounter   No tasks added.   Tasks due within next 3 months   5/24/2023 - Refill Coordination Outreach (1 time occurrence)     [facility]  [address]  [facility] [address]  Phone: [phone]  Fax: [phone]

## 2023-06-05 ENCOUNTER — CLINICAL SUPPORT (OUTPATIENT)
Dept: REHABILITATION | Facility: HOSPITAL | Age: 78
End: 2023-06-05
Attending: INTERNAL MEDICINE
Payer: MEDICARE

## 2023-06-05 DIAGNOSIS — R29.898 WEAKNESS OF BOTH LOWER EXTREMITIES: ICD-10-CM

## 2023-06-05 DIAGNOSIS — M79.10 MYALGIA: ICD-10-CM

## 2023-06-05 PROCEDURE — 97162 PT EVAL MOD COMPLEX 30 MIN: CPT | Mod: PN

## 2023-06-05 PROCEDURE — 97110 THERAPEUTIC EXERCISES: CPT | Mod: PN

## 2023-06-05 NOTE — PLAN OF CARE
[facility]  Physical Therapy Initial Evaluation     Date: 6/5/2023   Name: Jessica Perez  Clinic Number: [id number]    Therapy Diagnosis:   Encounter Diagnoses   Name Primary?    Myalgia     Weakness of both lower extremities      Physician: Dileep Mendoza MD    Physician Orders: PT Eval and Treat  Medical Diagnosis from Referral: M79.10 (ICD-10-CM) - Myalgia  Evaluation Date: 6/5/2023  Authorization Period Expiration: 12/29/2023  Plan of Care Expiration: 7/5/2023  Progress Note Due: 7/5/2023  Visit # / Visits authorized: 1/1   FOTO: 1/3     Time In: 8:00am  Time Out: 8:45am  Total Appointment Time (timed & untimed codes): 45 minutes    Precautions: Standard    Surgery: None    SUBJECTIVE   Date of onset: around a month ago  History of current condition - Jessica reports: She gets cramps in her mid and lower back. She states she has been through therapy multiple times and it helps but it always comes back. She states she was seen months back for this same issue. She states she has been needled and cupped and it has helped. She states she has been limited with bending over, cleaning, and doing activities for long periods. She states it isn't always pain, sometimes it is just getting stuck and being weak. She states she does go to exercise classes but the classes aren't very challenging.     Imaging, none    Prior Therapy: yes, helpful   Social History: lives alone  Occupation: None  Prior Level of Function: sweeping, mopping, caring for dogs, light yard work  Current Level of Function: limited with strenuous activity like sweeping, mopping, picking up after dogs    Pain:  Current 0/10, worst 8/10, best 0/10   Location: bilateral mid to lower back   Description: Aching, Dull, and Grabbing  Aggravating Factors: Bending, Walking, and Lifting  Easing Factors: ice and rest    Pts goals: get stronger and prevent flare ups      Medical History:   Past Medical History:   Diagnosis Date     Acute pain of left knee 3/22/2021    Arthritis     Arthritis of knee 2/9/2021    At risk for sleep apnea 9/19/2019    Balance problem 10/21/2021    Bilateral primary osteoarthritis of knee 4/4/2018    Chest pain, moderate coronary artery risk 8/8/2019    Chronic midline low back pain without sciatica 11/30/2020    Chronic pain of both knees 3/23/2021    Costochondritis 1/20/2020    Depression, major, recurrent, mild     Diarrhea 5/11/2022    Difficulty walking 11/30/2020    Difficulty walking 11/30/2020    LANRE (generalized anxiety disorder) 3/22/2021    Gait, antalgic 10/21/2021    Generalized weakness 11/30/2020    Generalized weakness 11/30/2020    Glaucoma     HLD (hyperlipidemia)     Lower extremity weakness 10/21/2021    Muscle cramps 5/23/2018    Osteopenia of multiple sites 4/2/2018    Primary osteoarthritis of right knee 05/30/2018    Primary snoring     Rash 5/11/2022    Sleep related leg cramps 9/7/2010    SOB (shortness of breath) 8/8/2019    Unspecified gastritis and gastroduodenitis without mention of hemorrhage 11/28/2010     Dx updated per 2019 IMO Load    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    Medications:   Jessica has a current medication list which includes the following prescription(s): acetaminophen/diphenhydramine, cider vinegar, artificial tears(hypromellose)(genteal/sustane), aspirin, azelastine, b complex  vitamins, calcium carbonate, cholestyramine, cyanocobalamin (vitamin b-12), denosumab, ergocalciferol (vitamin d2), repatha sureclick, repatha sureclick, ezetimibe, famotidine, flaxseed oil, fluocinolone, fluticasone propionate, ketoconazole, krill oil-omega-3-dha-epa, magnesium citrate, multivitamin, tizanidine, triamcinolone acetonide 0.1%, trospium, turmeric, UNABLE TO FIND, and zinc, and the following Facility-Administered Medications: triamcinolone acetonide.    Allergies:   Review of patient's allergies indicates:   Allergen Reactions    Augmentin [amoxicillin-pot clavulanate] Other (See Comments)     Diarrhea, yeast    Bactrim  [sulfamethoxazole-trimethoprim]      Other reaction(s): Unknown    Celebrex [celecoxib] Other (See Comments)     Stomach pain, gas     Lipitor  [atorvastatin]      Other reaction(s): Unknown    Pravachol  [pravastatin]      Other reaction(s): Unknown    Statins-hmg-coa reductase inhibitors      Other reaction(s): Muscle pain    Sulfa (sulfonamide antibiotics)      Other reaction(s): Swelling    Zoster vaccine live         OBJECTIVE     Sensation:  Sensation is intact to light touch    (WFL: Within Functional Limits)     ROM   Right (degrees) Left (degrees)   Lumbar Flexion  100% 100%   Lumbar Extension 100% tight 100% tight   Lumbar Sidebending 100% tight 100% tight   Lumbar Rotation 100% 100%     Hip Flexion (125) WFL WFL   Hip Extension (15) WFL WFL   Hip Internal Rotation (45) WFL WFL   Hip External Rotation (45) WFL WFL   Hip Abduction (45) WFL WFL     Joint Mobility   Right Left    Lumbar PA Glide Normal Normal   Lumbar Rotation Normal Normal   Hip Distraction Normal Normal   Hip Inferior Glide Normal Normal   Hip Medial Glide Normal Normal   Hip Lateral Glide Normal Normal   Posterior Innominate Rotation Normal Normal   Anterior Innominate Rotation Normal Normal   Innominate IR  Normal Normal   Innominate ER  Normal Normal     Strength   Right    Left   Gluteus Luca 4/5 4/5    Gluteus Medius 4-/5 4-/5   Hip Adductors 4/5 4/5   Psoas 4/5 4/5   Quadriceps 4+/5 4+/5   Hamstrings 4+/5 4+/5   Anterior Tibialis 4+/5 4+/5   Gastroc/Soleus 4+/5 4+/5   Transverse Abdominis fair     Special Tests:   Test Right Left   SLR Test negative negative   SLUMP  negative negative   ASIS Compression negative negative   ASIS Distraction negative negative   Posterior Shear Gap negative negative     Muscle Length: normal hamstring length bilaterally     Palpation: tender at bilateral erectors of mid thoracic down into lumbar spine      Movement Analysis:   Test Right Left   Sit to Stand Normal   Step Up Normal Normal   Single Leg Balance Unstable Unstable     Gait Analysis: The patient ambulated with the use of none and presents with the following gait abnormalities:  normal    Other: Pt presents with postural abnormalities which include: forward head and rounded shoulders     Function:     CMS Impairment/Limitation/Restriction for FOTO Arthropathies Survey    Therapist reviewed FOTO scores for Jessica Perez on 6/5/2023.   FOTO documents entered into [employer] - see Media section.    Limitation Score: 56%     TREATMENT     Jessica received therapeutic exercises to develop strength, endurance, ROM, flexibility, posture, and core stabilization for 15 minutes including:  HEP education   Bridges 3x10  Sidelying Clams 3x10  Lumbar Flexion Stretch [id number]    Jessica received the following manual therapy techniques: Joint mobilizations, Myofacial release, and Soft tissue Mobilization were applied to the: low back for 0 minutes, including:    Jessica participated in neuromuscular re-education activities to improve: Balance, Coordination, Proprioception, and Posture for 0 minutes. The following activities were included:    Jessica participated in dynamic functional therapeutic activities to improve functional performance for 0 minutes, including:    PATIENT EDUCATION AND HOME EXERCISES     Education provided:   -  HEP daily  - Importance of strength and endurance   - Functional Lifting     Written Home Exercises Provided: yes. Exercises were reviewed and Jessica was able to demonstrate them prior to the end of the session.  Jessica demonstrated good  understanding of the education provided. See EMR under Patient Instructions for exercises provided during therapy sessions.    ASSESSMENT   Jessica is a 78 y.o. female referred to outpatient Physical Therapy with a medical diagnosis of M79.10 (ICD-10-CM) - Myalgia. The patient presents with impairments which include impairments list: ROM, strength, endurance, muscle length, balance, posture, gait mechanics, core strength and stability, functional movement patterns, post-operative precautions, scar tissue, and coordination.  These impairments are limiting patient's ability to lift, bend over, perform heavy ADLs, clean house, care for dogs, and do yard work. Pt prognosis is Good due to personal factors and co-morbidities listed below. Pt will benefit from skilled outpatient Physical Therapy to address the deficits stated above and in the chart below, provide pt/family education, and to maximize pt's level of independence.     Plan of care discussed with patient: Yes  Pt's spiritual, cultural and educational needs considered and patient is agreeable to the plan of care and goals as stated below:     Anticipated Barriers for therapy: co-morbidities     Medical Necessity is demonstrated by the following  History  Co-morbidities and personal factors that may impact the plan of care [] LOW: no personal factors / co-morbidities  [x] MODERATE: 1-2 personal factors / co-morbidities  [] HIGH: 3+ personal factors / co-morbidities    Moderate / High Support Documentation:   Co-morbidities affecting plan of care: advanced age, coping style/mechanism, high BMI, and level of undertstanding of current condition    Personal Factors:   age  coping style  lifestyle     Examination  Body  Structures and Functions, activity limitations and participation restrictions that may impact the plan of care [] LOW: addressing 1-2 elements  [x] MODERATE: 3+ elements  [] HIGH: 4+ elements (please support below)    Moderate / High Support Documentation:     Participation Restrictions:   ADLs  Yard Work  Exercise     Mobility  lifting and carrying objects  walking    Self care  no deficits    Life Areas  no deficits    Community and Social Life  community life  recreation and leisure     Clinical Presentation [] LOW: stable  [x] MODERATE: Evolving  [] HIGH: Unstable     Decision Making/ Complexity Score: moderate       Goals:  Short Term Goals: 2 weeks   1. Recent signs and systems trend is improving in order to progress towards LTG's.  2. Patient will improve lumbar ROM to 100% in order to perform all ADLs.  3. Patient will be independent with HEP in order to further progress and return to maximal function.  4. Pain rating at Worst: 3/10 in order to progress towards increased independence with activity.    Long Term Goals: 4 weeks   1. Patient will improve glute med strength to 4/5 in order to perform all yard work.  2. Patient will improve glute max and psoas strength to 4+/5 in order to lift from floor.  3. Patient will self report improvement to 50% limitation on the FOTO Low Back Survey.     PLAN   Plan of care Certification: 6/5/2023 to 7/5/2023.    Outpatient Physical Therapy 2 times weekly for 4 weeks to include the following interventions: Gait Training, Manual Therapy, Moist Heat/ Ice, Neuromuscular Re-ed, Patient Education, Therapeutic Activities, Therapeutic Exercise, and Dry Needling .     Oseas Geller, PT, DPT    I CERTIFY THE NEED FOR THESE SERVICES FURNISHED UNDER THIS PLAN OF TREATMENT AND WHILE UNDER MY CARE   Physician's comments:     Physician's Signature: ___________________________________________________

## 2023-06-12 ENCOUNTER — CLINICAL SUPPORT (OUTPATIENT)
Dept: REHABILITATION | Facility: HOSPITAL | Age: 78
End: 2023-06-12
Payer: MEDICARE

## 2023-06-12 DIAGNOSIS — R29.898 WEAKNESS OF BOTH LOWER EXTREMITIES: Primary | ICD-10-CM

## 2023-06-12 PROCEDURE — 97530 THERAPEUTIC ACTIVITIES: CPT | Mod: PN,CQ

## 2023-06-12 PROCEDURE — 97110 THERAPEUTIC EXERCISES: CPT | Mod: PN,CQ

## 2023-06-12 NOTE — PROGRESS NOTES
[facility]   Physical Therapy Treatment Note      Name: Jessica Perez  Clinic Number: [id number]    Therapy Diagnosis:   Encounter Diagnosis   Name Primary?    Weakness of both lower extremities Yes     Physician: Dileep Mendoza MD    Visit Date: 6/12/2023    Physician Orders: PT Eval and Treat  Medical Diagnosis from Referral: M79.10 (ICD-10-CM) - Myalgia  Evaluation Date: 6/5/2023  Authorization Period Expiration: 12/29/2023  Plan of Care Expiration: 7/5/2023  Progress Note Due: 7/5/2023  Visit # / Visits authorized: 1/1   FOTO: 1/3     PTA Visit #: 1/5     Time In: 9:00  Time Out: 9:45  Total Billable Time: 45 minutes    Subjective     Pt reports: mild back pain.  She was compliant with home exercise program.  Response to previous treatment: first follow up  Functional change:     Pain:  Current 0/10, worst 8/10, best 0/10   Location: bilateral mid to lower back   Description: Aching, Dull, and Grabbing  Aggravating Factors: Bending, Walking, and Lifting  Easing Factors: ice and rest    Objective      Objective Measures updated at progress report unless specified.     Treatment     Jessica received the treatments listed below:      Jessica received therapeutic exercises to develop strength, endurance, ROM, flexibility, posture, and core stabilization for 25 minutes including:  HEP education   Stationary bike, lvl5, 6'  Gastroc stretch on slant board, lvl3, 3x1'  Heel raises, 3x10  Seated lumbar flexion stretch, 3 position, 3x10  DKTC w/ SB, 3x10  Bridges on SB 3x10  Sidelying Clams 3x10       Jessica received the following manual therapy techniques: Joint mobilizations, Myofacial release, and Soft tissue Mobilization were applied to the: low back for 0 minutes, including:     Jessica participated in neuromuscular re-education activities to improve: Balance, Coordination, Proprioception, and Posture for 0 minutes. The following activities were included:     Jessica  participated in dynamic functional therapeutic activities to improve functional performance for 20 minutes, including:  Hip 3-way, 2x10  Standing marches on foam pad, 2'  Deadbugs, 2x20  Bird dogs, 1x10  Repeated sit to stands w/ blue weighted ball 3x10    Patient Education and Home Exercises       Education provided:   - rationale for treatment    Written Home Exercises Provided: Patient instructed to cont prior HEP. Exercises were reviewed and Jessica was able to demonstrate them prior to the end of the session.  Jessica demonstrated good  understanding of the education provided. See EMR under Patient Instructions for exercises provided during therapy sessions    Assessment     Patient reports to therapy for first follow up after evaluation.  She presents with mild pain at the moment and she feels a little stiff at the moment.  Initiated POC with interventions targeting strength and ROM for core, hips, and BLE.  She was able to perform all interventions without an increase in pain.  Encouraged continued participation in HEP.  Will inquire response to treatment next visit.     Jessica Is progressing well towards her goals.   Pt prognosis is Good.     Pt will continue to benefit from skilled outpatient physical therapy to address the deficits listed in the problem list box on initial evaluation, provide pt/family education and to maximize pt's level of independence in the home and community environment.     Pt's spiritual, cultural and educational needs considered and pt agreeable to plan of care and goals.     Anticipated barriers to physical therapy: age    Goals: Short Term Goals: 2 weeks   1. Recent signs and systems trend is improving in order to progress towards LTG's.  2. Patient will improve lumbar ROM to 100% in order to perform all ADLs.  3. Patient will be independent with HEP in order to further progress and return to maximal function.  4. Pain rating at Worst: 3/10 in order to progress towards  increased independence with activity.     Long Term Goals: 4 weeks   1. Patient will improve glute med strength to 4/5 in order to perform all yard work.  2. Patient will improve glute max and psoas strength to 4+/5 in order to lift from floor.  3. Patient will self report improvement to 50% limitation on the FOTO Low Back Survey.    Plan     Plan of care Certification: 6/5/2023 to 7/5/2023.     Outpatient Physical Therapy 2 times weekly for 4 weeks to include the following interventions: Gait Training, Manual Therapy, Moist Heat/ Ice, Neuromuscular Re-ed, Patient Education, Therapeutic Activities, Therapeutic Exercise, and Dry Needling .     Lonny Padilla, PTA

## 2023-06-14 ENCOUNTER — CLINICAL SUPPORT (OUTPATIENT)
Dept: REHABILITATION | Facility: HOSPITAL | Age: 78
End: 2023-06-14
Payer: MEDICARE

## 2023-06-14 DIAGNOSIS — R29.898 WEAKNESS OF BOTH LOWER EXTREMITIES: Primary | ICD-10-CM

## 2023-06-14 PROCEDURE — 97530 THERAPEUTIC ACTIVITIES: CPT | Mod: PN,CQ

## 2023-06-14 PROCEDURE — 97110 THERAPEUTIC EXERCISES: CPT | Mod: PN,CQ

## 2023-06-14 NOTE — PROGRESS NOTES
"[facility]   Physical Therapy Treatment Note      Name: Jessica Perez  Clinic Number: [id number]    Therapy Diagnosis:   Encounter Diagnosis   Name Primary?    Weakness of both lower extremities Yes     Physician: Dileep Mendoza MD    Visit Date: 6/14/2023    Physician Orders: PT Eval and Treat  Medical Diagnosis from Referral: M79.10 (ICD-10-CM) - Myalgia  Evaluation Date: 6/5/2023  Authorization Period Expiration: 12/29/2023  Plan of Care Expiration: 7/5/2023  Progress Note Due: 7/5/2023  Visit # / Visits authorized: 2/50(+eval)   FOTO: 1/3     PTA Visit #: 2/5     Time In: 9:00  Time Out: 9:45  Total Billable Time: 44 minutes    Subjective     Pt reports: very mild back pain.  She was compliant with home exercise program.  Response to previous treatment: first follow up  Functional change:     Pain:  Current 0/10, worst 8/10, best 0/10   Location: bilateral mid to lower back   Description: Aching, Dull, and Grabbing  Aggravating Factors: Bending, Walking, and Lifting  Easing Factors: ice and rest    Objective      Objective Measures updated at progress report unless specified.     Treatment     Jessica received the treatments listed below:      Jessica received therapeutic exercises to develop strength, endurance, ROM, flexibility, posture, and core stabilization for 25 minutes including:  HEP education   Stationary bike, lvl5, 6'  Gastroc stretch on slant board, lvl3, 3x1'  Heel raises, 3x15  Seated lumbar flexion stretch, 3 position, 3x10  DKTC w/ SB, 3x10  Bridges on SB 3x10  Sidelying Clams 3x10  Supine hamstring stretch, 3x45"  F4 LTR, 1x15       Jessica received the following manual therapy techniques: Joint mobilizations, Myofacial release, and Soft tissue Mobilization were applied to the: low back for 0 minutes, including:     Jessica participated in neuromuscular re-education activities to improve: Balance, Coordination, Proprioception, and Posture for 0 minutes. The " following activities were included:     Jessica participated in dynamic functional therapeutic activities to improve functional performance for 20 minutes, including:  Hip 3-way, 2x10  Standing marches on foam pad, 2'  Deadbugs, 2x20  Bird dogs, 1x10  Repeated sit to stands w/ blue weighted ball 3x10  Paloff Press, 7#, 2x10  3D axial seperation in flexion (R) demonstration and education    Patient Education and Home Exercises       Education provided:   - rationale for treatment    Written Home Exercises Provided: Patient instructed to cont prior HEP. Exercises were reviewed and Jessica was able to demonstrate them prior to the end of the session.  Jessica demonstrated good  understanding of the education provided. See EMR under Patient Instructions for exercises provided during therapy sessions    Assessment     Patient reports to therapy feeling good.  She presents with no pain at the moment, but she feels a little stiff at the moment.  Continued POC with interventions targeting strength and ROM for core, hips, and BLE.  She was able to perform all interventions without an increase in pain.  She had a good response to 3D axial separation in flexion.  Education provided on rationale for 3D axial separation.  Printout for deadbugs and 3D axial given.  She did not require as long of rest breaks as last therapy session.  Encouraged continued participation in HEP.  Will inquire response to treatment next visit.     Jessica Is progressing well towards her goals.   Pt prognosis is Good.     Pt will continue to benefit from skilled outpatient physical therapy to address the deficits listed in the problem list box on initial evaluation, provide pt/family education and to maximize pt's level of independence in the home and community environment.     Pt's spiritual, cultural and educational needs considered and pt agreeable to plan of care and goals.     Anticipated barriers to physical therapy: age    Goals: Short Term  Goals: 2 weeks   1. Recent signs and systems trend is improving in order to progress towards LTG's.  2. Patient will improve lumbar ROM to 100% in order to perform all ADLs.  3. Patient will be independent with HEP in order to further progress and return to maximal function.  4. Pain rating at Worst: 3/10 in order to progress towards increased independence with activity.     Long Term Goals: 4 weeks   1. Patient will improve glute med strength to 4/5 in order to perform all yard work.  2. Patient will improve glute max and psoas strength to 4+/5 in order to lift from floor.  3. Patient will self report improvement to 50% limitation on the FOTO Low Back Survey.    Plan     Plan of care Certification: 6/5/2023 to 7/5/2023.     Outpatient Physical Therapy 2 times weekly for 4 weeks to include the following interventions: Gait Training, Manual Therapy, Moist Heat/ Ice, Neuromuscular Re-ed, Patient Education, Therapeutic Activities, Therapeutic Exercise, and Dry Needling .     Lonny Padilla, PTA

## 2023-06-19 ENCOUNTER — CLINICAL SUPPORT (OUTPATIENT)
Dept: REHABILITATION | Facility: HOSPITAL | Age: 78
End: 2023-06-19
Payer: MEDICARE

## 2023-06-19 DIAGNOSIS — R29.898 WEAKNESS OF BOTH LOWER EXTREMITIES: Primary | ICD-10-CM

## 2023-06-19 PROCEDURE — 97110 THERAPEUTIC EXERCISES: CPT | Mod: PN,CQ

## 2023-06-19 PROCEDURE — 97530 THERAPEUTIC ACTIVITIES: CPT | Mod: PN,CQ

## 2023-06-19 NOTE — PROGRESS NOTES
"[facility]   Physical Therapy Treatment Note      Name: Jessica Perez  Clinic Number: [id number]    Therapy Diagnosis:   Encounter Diagnosis   Name Primary?    Weakness of both lower extremities Yes     Physician: Dileep Mendoza MD    Visit Date: 6/19/2023    Physician Orders: PT Eval and Treat  Medical Diagnosis from Referral: M79.10 (ICD-10-CM) - Myalgia  Evaluation Date: 6/5/2023  Authorization Period Expiration: 12/29/2023  Plan of Care Expiration: 7/5/2023  Progress Note Due: 7/5/2023  Visit # / Visits authorized: 2/50(+eval)   FOTO: 1/3     PTA Visit #: 2/5     Time In: 9:00  Time Out: 9:45  Total Billable Time: 44 minutes    Subjective     Pt reports: very mild back pain.  She was compliant with home exercise program.  Response to previous treatment: first follow up  Functional change:     Pain:  Current 0/10, worst 8/10, best 0/10   Location: bilateral mid to lower back   Description: Aching, Dull, and Grabbing  Aggravating Factors: Bending, Walking, and Lifting  Easing Factors: ice and rest    Objective      Objective Measures updated at progress report unless specified.     Treatment     Jessica received the treatments listed below:      Jessica received therapeutic exercises to develop strength, endurance, ROM, flexibility, posture, and core stabilization for 24 minutes including:  HEP education   Stationary bike, lvl5, 6'  Gastroc stretch on slant board, lvl3, 3x1'  Heel raises, 3x15  Seated lumbar flexion stretch, 3 position, 3x10  DKTC w/ SB, 3x10  Bridges on SB 3x10  Sidelying Clams 3x10  Supine hamstring stretch, 3x45"  F4 LTR, 1x15       Jessica received the following manual therapy techniques: Joint mobilizations, Myofacial release, and Soft tissue Mobilization were applied to the: low back for 0 minutes, including:     Jessica participated in neuromuscular re-education activities to improve: Balance, Coordination, Proprioception, and Posture for 0 minutes. The " following activities were included:     Jessica participated in dynamic functional therapeutic activities to improve functional performance for 20 minutes, including:  Hip 3-way, 2x10  Standing marches on foam pad, 2'  Deadbugs, 2x20  Bird dogs, 1x10  Repeated sit to stands w/ blue weighted ball 3x10  Ball walk w/ yellow weighted ball, 1lap  Paloff Press, 7#, 2x10  3D axial seperation in flexion (R) demonstration and education    Patient Education and Home Exercises       Education provided:   - rationale for treatment    Written Home Exercises Provided: Patient instructed to cont prior HEP. Exercises were reviewed and Jessica was able to demonstrate them prior to the end of the session.  Jessica demonstrated good  understanding of the education provided. See EMR under Patient Instructions for exercises provided during therapy sessions    Assessment     Patient reports to therapy feeling good.  She presents with some lower back tightness.  Continued POC with interventions targeting strength and ROM for core, hips, and BLE.  She was able to perform all interventions without an increase in pain.    She did not require as long of rest breaks as last therapy session.  Encouraged continued participation in HEP.  Will inquire response to treatment next visit.     Jessica Is progressing well towards her goals.   Pt prognosis is Good.     Pt will continue to benefit from skilled outpatient physical therapy to address the deficits listed in the problem list box on initial evaluation, provide pt/family education and to maximize pt's level of independence in the home and community environment.     Pt's spiritual, cultural and educational needs considered and pt agreeable to plan of care and goals.     Anticipated barriers to physical therapy: age    Goals: Short Term Goals: 2 weeks   1. Recent signs and systems trend is improving in order to progress towards LTG's.  2. Patient will improve lumbar ROM to 100% in order to  perform all ADLs.  3. Patient will be independent with HEP in order to further progress and return to maximal function.  4. Pain rating at Worst: 3/10 in order to progress towards increased independence with activity.     Long Term Goals: 4 weeks   1. Patient will improve glute med strength to 4/5 in order to perform all yard work.  2. Patient will improve glute max and psoas strength to 4+/5 in order to lift from floor.  3. Patient will self report improvement to 50% limitation on the FOTO Low Back Survey.    Plan     Plan of care Certification: 6/5/2023 to 7/5/2023.     Outpatient Physical Therapy 2 times weekly for 4 weeks to include the following interventions: Gait Training, Manual Therapy, Moist Heat/ Ice, Neuromuscular Re-ed, Patient Education, Therapeutic Activities, Therapeutic Exercise, and Dry Needling .     Lonny Padilla, PTA

## 2023-06-20 ENCOUNTER — SPECIALTY PHARMACY (OUTPATIENT)
Dept: PHARMACY | Facility: CLINIC | Age: 78
End: 2023-06-20
Payer: MEDICARE

## 2023-06-20 NOTE — TELEPHONE ENCOUNTER
Specialty Pharmacy - Refill Coordination    Specialty Medication Orders Linked to Encounter      Flowsheet Row Most Recent Value   Medication #1 evolocumab (REPATHA SURECLICK) 140 mg/mL PnIj (Order#[id number], Rx#[id number])            Refill Questions - Documented Responses      Flowsheet Row Most Recent Value   Patient Availability and HIPAA Verification    Does patient want to proceed with activity? Yes   HIPAA/medical authority confirmed? Yes   Relationship to patient of person spoken to? Self   Refill Screening Questions    Would patient like to speak to a pharmacist? No   When does the patient need to receive the medication? 06/30/23   Refill Delivery Questions    How will the patient receive the medication? MEDRx   When does the patient need to receive the medication? 06/30/23   Shipping Address Home   Address in [facility] confirmed and updated if neccessary? Yes   Expected Copay ($) 0   Is the patient able to afford the medication copay? Yes   Payment Method zero copay   Days supply of Refill 28   Supplies needed? No supplies needed   Refill activity completed? Yes   Refill activity plan Refill scheduled   Shipment/Pickup Date: 06/27/23            Current Outpatient Medications   Medication Sig    acetaminophen/diphenhydramine (TYLENOL PM ORAL) Take by mouth.    APPLE CIDER VINEGAR ORAL Take by mouth.    artificial tears,hypromellose,,GENTEAL/SUSTANE, 0.3 % Gel     aspirin (ECOTRIN) 81 MG EC tablet Take 81 mg by mouth once daily.    azelastine (ASTELIN) 137 mcg (0.1 %) nasal spray 1 spray (137 mcg total) by Nasal route 2 (two) times daily.    b complex vitamins capsule Take 1 capsule by mouth once daily.    calcium carbonate (CALCIUM 500 ORAL) Take by mouth.    cholestyramine (QUESTRAN) 4 gram packet Take 1 packet (4 g total) by mouth daily as needed (diarrhea).    cyanocobalamin, vitamin B-12, (VITAMIN B-12 ORAL) Take by mouth.    denosumab (PROLIA SUBQ) Inject into the skin.    ergocalciferol,  vitamin D2, (VITAMIN D ORAL) Take by mouth.    evolocumab (REPATHA SURECLICK) 140 mg/mL PnIj Repatha SureClick Take No date recorded No form recorded No frequency recorded No route recorded No set duration recorded No set duration amount recorded active No dosage strength recorded No dosage strength units of measure recorded    evolocumab (REPATHA SURECLICK) 140 mg/mL PnIj Inject 1 pen (140 mg total) into the skin every 14 (fourteen) days.    ezetimibe (ZETIA) 10 mg tablet Take 1 tablet (10 mg total) by mouth once daily.    famotidine (PEPCID) 40 MG tablet Take 1 tablet (40 mg total) by mouth every evening.    flaxseed oiL 1,000 mg Cap flaxseed oil Take No date recorded No form recorded No frequency recorded No route recorded No set duration recorded No set duration amount recorded suspended No dosage strength recorded No dosage strength units of measure recorded    fluocinolone (SYNALAR) 0.01 % external solution AAA of scalp twice a day PRN scalp itching or eczema flares.    fluticasone (FLONASE) 50 mcg/actuation nasal spray 2 sprays by Each Nare route once daily.    ketoconazole (NIZORAL) 2 % shampoo Apply topically every 7 days. Shampoo scalp 1-2 times per week. Lather x 5 minutes, rinse well.    krill oil-omega-3-dha-epa 150-450 mg CpDR Take by mouth.    MAGNESIUM CITRATE ORAL Take by mouth.    multivitamin capsule Take 1 capsule by mouth Daily.    tiZANidine (ZANAFLEX) 4 MG tablet Take 0.5 tablets (2 mg total) by mouth nightly as needed (back pain / stiffness / cramps).    triamcinolone acetonide 0.1% (KENALOG) 0.1 % cream Apply topically 2 (two) times daily.    trospium (SANCTURA XR) 60 mg Cp24 capsule Take 1 capsule (60 mg total) by mouth once daily.    TURMERIC ORAL Take by mouth.    UNABLE TO FIND CBD Oil topical    ZINC ORAL Take by mouth.   Last reviewed on 5/2/2023 11:38 AM by Marah Mayorga CMA    Review of patient's allergies indicates:   Allergen Reactions    Augmentin [amoxicillin-pot  clavulanate] Other (See Comments)     Diarrhea, yeast    Bactrim  [sulfamethoxazole-trimethoprim]      Other reaction(s): Unknown    Celebrex [celecoxib] Other (See Comments)     Stomach pain, gas     Lipitor  [atorvastatin]      Other reaction(s): Unknown    Pravachol  [pravastatin]      Other reaction(s): Unknown    Statins-hmg-coa reductase inhibitors      Other reaction(s): Muscle pain    Sulfa (sulfonamide antibiotics)      Other reaction(s): Swelling    Zoster vaccine live     Last reviewed on  5/2/2023 11:31 AM by Marah Mayorga      Tasks added this encounter   No tasks added.   Tasks due within next 3 months   6/20/2023 - Refill Coordination Outreach (1 time occurrence)     [facility]  [address]  [facility] [address]  Phone: [phone]  Fax: [phone]

## 2023-06-21 ENCOUNTER — CLINICAL SUPPORT (OUTPATIENT)
Dept: REHABILITATION | Facility: HOSPITAL | Age: 78
End: 2023-06-21
Payer: MEDICARE

## 2023-06-21 DIAGNOSIS — R29.898 WEAKNESS OF BOTH LOWER EXTREMITIES: Primary | ICD-10-CM

## 2023-06-21 PROCEDURE — 97110 THERAPEUTIC EXERCISES: CPT | Mod: PN

## 2023-06-21 PROCEDURE — 97112 NEUROMUSCULAR REEDUCATION: CPT | Mod: PN

## 2023-06-21 NOTE — PROGRESS NOTES
"[facility]   Physical Therapy Treatment Note      Name: Jessica Perez  Clinic Number: [id number]    Therapy Diagnosis:   Encounter Diagnosis   Name Primary?    Weakness of both lower extremities Yes     Physician: Dileep Mendoza MD    Visit Date: 6/21/2023    Physician Orders: PT Eval and Treat  Medical Diagnosis from Referral: M79.10 (ICD-10-CM) - Myalgia  Evaluation Date: 6/5/2023  Authorization Period Expiration: 12/31/2023  Plan of Care Expiration: 7/5/2023  Progress Note Due: 7/5/2023  Visit # / Visits authorized: 4/50 (+eval)   FOTO: 1/3     PTA Visit #: 0/5     Time In: 8:00am  Time Out: 9:00am  Total Billable Time: 60 minutes    Subjective     Pt reports: She is feeling pretty good, her back is a little tight.   She was compliant with home exercise program.  Response to previous treatment: first follow up  Functional change: none noted    Pain:  Current 0/10, worst 8/10, best 0/10   Location: bilateral mid to lower back   Description: Aching, Dull, and Grabbing  Aggravating Factors: Bending, Walking, and Lifting  Easing Factors: ice and rest    Objective      Objective Measures updated at progress report unless specified.     Treatment     Jessica received the treatments listed below:      Jessica received therapeutic exercises to develop strength, endurance, ROM, flexibility, posture, and core stabilization for 40 minutes including:  Stationary bike, lvl5, 6'  Gastroc stretch on slant board, lvl3, 3x1'  Heel raises, 3x15  Seated lumbar flexion stretch, 3 position, 3x10  DKTC w/ SB, 3x10  Bridges on SB 3x10  Sidelying Clams 3x10  Reverse Clams with ball 3x10  Supine hamstring stretch, 3x45"  F4 LTR, 1x15    Jessica received the following manual therapy techniques: Joint mobilizations, Myofacial release, and Soft tissue Mobilization were applied to the: low back for 0 minutes, including:     Jessica participated in neuromuscular re-education activities to improve: Balance, " Coordination, Proprioception, and Posture for 0 minutes. The following activities were included:     Jessica participated in dynamic functional therapeutic activities to improve functional performance for 20 minutes, including:  Hip 3-way, 2x10  Standing marches on foam pad, 2'  Deadbugs, 3x10  Bird dogs, 1x10  Repeated sit to stands w/ blue weighted ball 3x10  Ball walk w/ yellow weighted ball, 1lap  Paloff Press, 7#, 2x10  3D axial seperation in flexion (R) demonstration and education    Patient Education and Home Exercises       Education provided:   - rationale for treatment    Written Home Exercises Provided: Patient instructed to cont prior HEP. Exercises were reviewed and Jessica was able to demonstrate them prior to the end of the session.  Jessica demonstrated good  understanding of the education provided. See EMR under Patient Instructions for exercises provided during therapy sessions    Assessment     Reverse clams were added today to work on more hip control. Ms Eli has fatigue but overall tolerated session very well. She was advised to keep working on her HEP daily.     Jessica Is progressing well towards her goals.   Pt prognosis is Good.     Pt will continue to benefit from skilled outpatient physical therapy to address the deficits listed in the problem list box on initial evaluation, provide pt/family education and to maximize pt's level of independence in the home and community environment.     Pt's spiritual, cultural and educational needs considered and pt agreeable to plan of care and goals.     Anticipated barriers to physical therapy: age    Goals: Short Term Goals: 2 weeks   1. Recent signs and systems trend is improving in order to progress towards LTG's.  2. Patient will improve lumbar ROM to 100% in order to perform all ADLs.  3. Patient will be independent with HEP in order to further progress and return to maximal function.  4. Pain rating at Worst: 3/10 in order to progress  towards increased independence with activity.     Long Term Goals: 4 weeks   1. Patient will improve glute med strength to 4/5 in order to perform all yard work.  2. Patient will improve glute max and psoas strength to 4+/5 in order to lift from floor.  3. Patient will self report improvement to 50% limitation on the FOTO Low Back Survey.    Plan     Plan of care Certification: 6/5/2023 to 7/5/2023.     Outpatient Physical Therapy 2 times weekly for 4 weeks to include the following interventions: Gait Training, Manual Therapy, Moist Heat/ Ice, Neuromuscular Re-ed, Patient Education, Therapeutic Activities, Therapeutic Exercise, and Dry Needling .     Oseas Geller, PT, DPT

## 2023-06-22 ENCOUNTER — OFFICE VISIT (OUTPATIENT)
Dept: DERMATOLOGY | Facility: CLINIC | Age: 78
End: 2023-06-22
Payer: MEDICARE

## 2023-06-22 ENCOUNTER — TELEPHONE (OUTPATIENT)
Dept: RHEUMATOLOGY | Facility: CLINIC | Age: 78
End: 2023-06-22
Payer: MEDICARE

## 2023-06-22 ENCOUNTER — APPOINTMENT (OUTPATIENT)
Dept: RADIOLOGY | Facility: HOSPITAL | Age: 78
End: 2023-06-22
Attending: INTERNAL MEDICINE
Payer: MEDICARE

## 2023-06-22 ENCOUNTER — PATIENT MESSAGE (OUTPATIENT)
Dept: RHEUMATOLOGY | Facility: CLINIC | Age: 78
End: 2023-06-22
Payer: MEDICARE

## 2023-06-22 DIAGNOSIS — Z12.83 SKIN CANCER SCREENING: ICD-10-CM

## 2023-06-22 DIAGNOSIS — L81.4 SOLAR LENTIGO: ICD-10-CM

## 2023-06-22 DIAGNOSIS — L82.1 SEBORRHEIC KERATOSES: ICD-10-CM

## 2023-06-22 DIAGNOSIS — B35.1 ONYCHOMYCOSIS: Primary | ICD-10-CM

## 2023-06-22 DIAGNOSIS — D18.00 HEMANGIOMA, UNSPECIFIED SITE: ICD-10-CM

## 2023-06-22 DIAGNOSIS — L91.8 ACROCHORDON: ICD-10-CM

## 2023-06-22 DIAGNOSIS — M81.0 AGE-RELATED OSTEOPOROSIS WITHOUT CURRENT PATHOLOGICAL FRACTURE: ICD-10-CM

## 2023-06-22 PROCEDURE — 99214 OFFICE O/P EST MOD 30 MIN: CPT | Mod: S$GLB,,, | Performed by: STUDENT IN AN ORGANIZED HEALTH CARE EDUCATION/TRAINING PROGRAM

## 2023-06-22 PROCEDURE — 1159F PR MEDICATION LIST DOCUMENTED IN MEDICAL RECORD: ICD-10-PCS | Mod: CPTII,S$GLB,, | Performed by: STUDENT IN AN ORGANIZED HEALTH CARE EDUCATION/TRAINING PROGRAM

## 2023-06-22 PROCEDURE — 99999 PR PBB SHADOW E&M-EST. PATIENT-LVL I: CPT | Mod: PBBFAC,,, | Performed by: STUDENT IN AN ORGANIZED HEALTH CARE EDUCATION/TRAINING PROGRAM

## 2023-06-22 PROCEDURE — 99214 PR OFFICE/OUTPT VISIT, EST, LEVL IV, 30-39 MIN: ICD-10-PCS | Mod: S$GLB,,, | Performed by: STUDENT IN AN ORGANIZED HEALTH CARE EDUCATION/TRAINING PROGRAM

## 2023-06-22 PROCEDURE — 1159F MED LIST DOCD IN RCRD: CPT | Mod: CPTII,S$GLB,, | Performed by: STUDENT IN AN ORGANIZED HEALTH CARE EDUCATION/TRAINING PROGRAM

## 2023-06-22 PROCEDURE — 1160F RVW MEDS BY RX/DR IN RCRD: CPT | Mod: CPTII,S$GLB,, | Performed by: STUDENT IN AN ORGANIZED HEALTH CARE EDUCATION/TRAINING PROGRAM

## 2023-06-22 PROCEDURE — 77080 DXA BONE DENSITY AXIAL: CPT | Mod: 26,,, | Performed by: RADIOLOGY

## 2023-06-22 PROCEDURE — 99999 PR PBB SHADOW E&M-EST. PATIENT-LVL I: ICD-10-PCS | Mod: PBBFAC,,, | Performed by: STUDENT IN AN ORGANIZED HEALTH CARE EDUCATION/TRAINING PROGRAM

## 2023-06-22 PROCEDURE — 77080 DEXA BONE DENSITY SPINE HIP: ICD-10-PCS | Mod: 26,,, | Performed by: RADIOLOGY

## 2023-06-22 PROCEDURE — 77080 DXA BONE DENSITY AXIAL: CPT | Mod: TC

## 2023-06-22 PROCEDURE — 1160F PR REVIEW ALL MEDS BY PRESCRIBER/CLIN PHARMACIST DOCUMENTED: ICD-10-PCS | Mod: CPTII,S$GLB,, | Performed by: STUDENT IN AN ORGANIZED HEALTH CARE EDUCATION/TRAINING PROGRAM

## 2023-06-22 RX ORDER — CICLOPIROX 80 MG/ML
SOLUTION TOPICAL NIGHTLY
Qty: 6.6 ML | Refills: 11 | Status: SHIPPED | OUTPATIENT
Start: 2023-06-22

## 2023-06-22 NOTE — TELEPHONE ENCOUNTER
----- Message from Ct Patterson sent at 6/22/2023  2:48 PM CDT -----  Contact: Jessica Willams was returning the phone call. Please call her back at [phone].    Thanks  TS

## 2023-06-22 NOTE — PROGRESS NOTES
Patient Information  Name: Jessica Perez  : 1945  MRN: [id number]     Referring Physician:  Dr. Rogers ref. provider found   Primary Care Physician:  Dr. Davidson Jang MD   Date of Visit: 2023      Subjective:       Jessica Perez is a 78 y.o. female who presents for   Chief Complaint   Patient presents with    Skin Check     Full body skin exam.     heat rash      Underneath breast. Tx gold bond.      HPI  Patient here for skin check.     Does patient have a personal hx of skin cancers? no  Does patient have family hx of melanoma?  no  Does patient have hx of strong sun exposure or tanning bed use in the past? Yes    Patient with new complaint of lesion(s)  Location: chest  Duration: years  Symptoms: itching  Relieving factors/Previous treatments: gold bond powder      Patient was last seen:Visit date not found     Prior notes by myself reviewed.   Clinical documentation obtained by nursing staff reviewed.    Review of Systems   Skin:  Positive for itching and rash.      Objective:    Physical Exam   Constitutional: She appears well-developed and well-nourished. No distress.   Neurological: She is alert and oriented to person, place, and time. She is not disoriented.   Psychiatric: She has a normal mood and affect.   Skin:   Areas Examined (abnormalities noted in diagram):   Scalp / Hair Palpated and Inspected  Head / Face Inspection Performed  Neck Inspection Performed  Chest / Axilla Inspection Performed  Abdomen Inspection Performed  Genitals / Buttocks / Groin Inspection Performed  Back Inspection Performed  RUE Inspected  LUE Inspection Performed  RLE Inspected  LLE Inspection Performed  Nails and Digits Inspection Performed                         Diagram Legend     Erythematous scaling macule/papule c/w actinic keratosis       Vascular papule c/w angioma      Pigmented verrucoid papule/plaque c/w seborrheic keratosis      Yellow umbilicated papule c/w sebaceous hyperplasia       Irregularly shaped tan macule c/w lentigo     1-2 mm smooth white papules consistent with Milia      Movable subcutaneous cyst with punctum c/w epidermal inclusion cyst      Subcutaneous movable cyst c/w pilar cyst      Firm pink to brown papule c/w dermatofibroma      Pedunculated fleshy papule(s) c/w skin tag(s)      Evenly pigmented macule c/w junctional nevus     Mildly variegated pigmented, slightly irregular-bordered macule c/w mildly atypical nevus      Flesh colored to evenly pigmented papule c/w intradermal nevus       Pink pearly papule/plaque c/w basal cell carcinoma      Erythematous hyperkeratotic cursted plaque c/w SCC      Surgical scar with no sign of skin cancer recurrence      Open and closed comedones      Inflammatory papules and pustules      Verrucoid papule consistent consistent with wart     Erythematous eczematous patches and plaques     Dystrophic onycholytic nail with subungual debris c/w onychomycosis     Umbilicated papule    Erythematous-base heme-crusted tan verrucoid plaque consistent with inflamed seborrheic keratosis     Erythematous Silvery Scaling Plaque c/w Psoriasis     See annotation      No images are attached to the encounter or orders placed in the encounter.    [] Data reviewed  [] Independent review of test  [] Management discussed with another provider    Assessment / Plan:        Onychomycosis  -     ciclopirox (PENLAC) 8 % Soln; Apply topically nightly.  Dispense: 6.6 mL; Refill: 11    Skin cancer screening  Total body skin examination performed today including at least 12 points as noted in physical examination. No lesions suspicious for malignancy noted.    Recommend daily sun protection/avoidance, use of at least SPF 30, broad spectrum sunscreen (OTC drug), skin self examinations, and routine physician surveillance to optimize early detection    Solar lentigo  This is a benign hyperpigmented sun induced lesion. Recommend daily sun protection/avoidance and use of at  least SPF 30, broad spectrum sunscreen (OTC drug) will reduce the number of new lesions. Treatment of these benign lesions are considered cosmetic.    Seborrheic keratoses  These are benign inherited growths without a malignant potential. Reassurance given to patient. No treatment is necessary.     Hemangioma, unspecified site  This is a benign vascular lesion. Reassurance given. No treatment required.     Acrochordon   - minor problem and chronic.   Reassurance given to patient. No treatment necessary.              LOS NUMBER AND COMPLEXITY OF PROBLEMS    COMPLEXITY OF DATA RISK TOTAL TIME (m)   55126  43712 [] 1 self-limited or minor problem [x] Minimal to none [] No treatment recommended or patient to monitor 15-29  10-19   80839  61905 Low  [] 2 or > self limited or minor problems  [] 1 stable chronic illness  [] 1 acute, uncomplicated illness or injury Limited (2)  [] Prior external notes from each unique source  [] Review result of each unique test  [] Order each unique test []  Low  OTC medications, minor skin biopsy 30-44  20-29   01236  45950 Moderate  [x]  1 or > chronic illness with progression, exacerbation or SE of treatment  [x]  2 or more stable chronic illnesses  []  1 acute illness with systemic symptoms  []  1 acute complicated injury  []  1 undiagnosed new problem with uncertain prognosis Moderate (1/3 below)  []  3 or more data items        *Now includes assessment requiring independent historian  []  Independent interpretation of a test  []  Discuss management/test with another provider Moderate  [x]  Prescription drug mgmt  []  Minor surgery with risk discussed  []  Mgmt limited by social determinates 45-59  30-39   81927  18002 High  []  1 or more chronic illness with severe exacerbation, progression or SE of treatment  []  1 acute or chronic illness/injury that poses a threat to life or bodily function Extensive (2/3 below)  []  3 or more data items        *Now includes assessment requiring  independent historian.  []  Independent interpretation of a test  []  Discuss management/test with another provider High  []  Major surgery with risk discussed  []  Drug therapy requiring intensive monitoring for toxicity  []  Hospitalization  []  Decision for DNR 60-74  40-54      No follow-ups on file.    Melba Shell MD, FAAD  [facility]
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
## 2023-06-22 NOTE — TELEPHONE ENCOUNTER
----- Message from Cynthia Zaldivar RT sent at 6/22/2023  1:05 PM CDT -----  Regarding: Prolia shot  Patient concerned about timing of prolia shot. Please call when available.

## 2023-06-23 ENCOUNTER — TELEPHONE (OUTPATIENT)
Dept: DERMATOLOGY | Facility: CLINIC | Age: 78
End: 2023-06-23
Payer: MEDICARE

## 2023-06-23 DIAGNOSIS — L30.4 INTERTRIGO: Primary | ICD-10-CM

## 2023-06-23 RX ORDER — KETOCONAZOLE 20 MG/G
CREAM TOPICAL 2 TIMES DAILY
Qty: 30 G | Refills: 1 | Status: SHIPPED | OUTPATIENT
Start: 2023-06-23

## 2023-06-23 RX ORDER — HYDROCORTISONE 25 MG/G
CREAM TOPICAL 2 TIMES DAILY
Qty: 30 G | Refills: 1 | Status: SHIPPED | OUTPATIENT
Start: 2023-06-23

## 2023-06-23 NOTE — TELEPHONE ENCOUNTER
----- Message from Cristina Pagan sent at 6/23/2023  3:03 PM CDT -----  States her second medication for her heat rash is not at the pharmacy.     [facility] - [place], [place] - [address] [facility] [address]  [address] [address] [address]  Phone: [phone] Fax: [phone]     Please call pt [phone]. Thank you

## 2023-06-26 ENCOUNTER — CLINICAL SUPPORT (OUTPATIENT)
Dept: REHABILITATION | Facility: HOSPITAL | Age: 78
End: 2023-06-26
Payer: MEDICARE

## 2023-06-26 DIAGNOSIS — R29.898 WEAKNESS OF BOTH LOWER EXTREMITIES: Primary | ICD-10-CM

## 2023-06-26 PROCEDURE — 97110 THERAPEUTIC EXERCISES: CPT | Mod: PN,CQ

## 2023-06-26 PROCEDURE — 97530 THERAPEUTIC ACTIVITIES: CPT | Mod: PN,CQ

## 2023-06-26 NOTE — PROGRESS NOTES
"[facility]   Physical Therapy Treatment Note      Name: Jessica Perez  Clinic Number: [id number]    Therapy Diagnosis:   Encounter Diagnosis   Name Primary?    Weakness of both lower extremities Yes     Physician: Dileep Mendoza MD    Visit Date: 6/26/2023    Physician Orders: PT Eval and Treat  Medical Diagnosis from Referral: M79.10 (ICD-10-CM) - Myalgia  Evaluation Date: 6/5/2023  Authorization Period Expiration: 12/31/2023  Plan of Care Expiration: 7/5/2023  Progress Note Due: 7/5/2023  Visit # / Visits authorized: 5/50 (+eval)   FOTO: 1/3     PTA Visit #: 1/5     Time In: 8:15am  Time Out: 9:00am  Total Billable Time: 45 minutes    Subjective     Pt reports: her back started to hurt after standing for an hour and a half in [facility].   She was compliant with home exercise program.  Response to previous treatment: first follow up  Functional change: none noted    Pain:  Current 0/10, worst 8/10, best 0/10   Location: bilateral mid to lower back   Description: Aching, Dull, and Grabbing  Aggravating Factors: Bending, Walking, and Lifting  Easing Factors: ice and rest    Objective      Objective Measures updated at progress report unless specified.     Treatment     Jessica received the treatments listed below:      Jessica received therapeutic exercises to develop strength, endurance, ROM, flexibility, posture, and core stabilization for 25 minutes including:  Stationary bike, lvl5, 6'  Gastroc stretch on slant board, lvl3, 3x1'  Heel raises, 3x15  Seated lumbar flexion stretch, 3 position, 3x10  DKTC w/ SB, 3x10  Bridges on SB 3x10  Sidelying Clams 3x10  Reverse Clams with ball 3x10  Supine hamstring stretch, 3x45"  F4 LTR, 1x15    Jessica received the following manual therapy techniques: Joint mobilizations, Myofacial release, and Soft tissue Mobilization were applied to the: low back for 0 minutes, including:     Jessica participated in neuromuscular re-education activities " "to improve: Balance, Coordination, Proprioception, and Posture for 0 minutes. The following activities were included:     Jessica participated in dynamic functional therapeutic activities to improve functional performance for 20 minutes, including:  Hip 3-way, 2x10  Standing marches on foam pad, 2'  Deadbugs, 3x10  Bird dogs, 1x10  Repeated sit to stands w/ blue weighted ball 3x10  Ball walk w/ yellow weighted ball, 1lap  Prone hip flexor stretch, 3x45"  Paloff Press, 7#, 2x10  3D axial seperation in flexion (R) demonstration and education    Patient Education and Home Exercises       Education provided:   - rationale for treatment    Written Home Exercises Provided: Patient instructed to cont prior HEP. Exercises were reviewed and Jessica was able to demonstrate them prior to the end of the session.  Jessica demonstrated good  understanding of the education provided. See EMR under Patient Instructions for exercises provided during therapy sessions    Assessment     Added hip flexor stretch to decrease stress on lower back when in standing and walking.  Ms Eli has fatigue, however, she works very hard in therapy. She was advised to keep working on her HEP daily.     Jessica Is progressing well towards her goals.   Pt prognosis is Good.     Pt will continue to benefit from skilled outpatient physical therapy to address the deficits listed in the problem list box on initial evaluation, provide pt/family education and to maximize pt's level of independence in the home and community environment.     Pt's spiritual, cultural and educational needs considered and pt agreeable to plan of care and goals.     Anticipated barriers to physical therapy: age    Goals: Short Term Goals: 2 weeks   1. Recent signs and systems trend is improving in order to progress towards LTG's.  2. Patient will improve lumbar ROM to 100% in order to perform all ADLs.  3. Patient will be independent with HEP in order to further progress " and return to maximal function.  4. Pain rating at Worst: 3/10 in order to progress towards increased independence with activity.     Long Term Goals: 4 weeks   1. Patient will improve glute med strength to 4/5 in order to perform all yard work.  2. Patient will improve glute max and psoas strength to 4+/5 in order to lift from floor.  3. Patient will self report improvement to 50% limitation on the FOTO Low Back Survey.    Plan     Plan of care Certification: 6/5/2023 to 7/5/2023.     Outpatient Physical Therapy 2 times weekly for 4 weeks to include the following interventions: Gait Training, Manual Therapy, Moist Heat/ Ice, Neuromuscular Re-ed, Patient Education, Therapeutic Activities, Therapeutic Exercise, and Dry Needling .     Lonny Padilla, PTA

## 2023-06-28 ENCOUNTER — CLINICAL SUPPORT (OUTPATIENT)
Dept: REHABILITATION | Facility: HOSPITAL | Age: 78
End: 2023-06-28
Payer: MEDICARE

## 2023-06-28 DIAGNOSIS — R29.898 WEAKNESS OF BOTH LOWER EXTREMITIES: Primary | ICD-10-CM

## 2023-06-28 PROCEDURE — 97112 NEUROMUSCULAR REEDUCATION: CPT | Mod: PN

## 2023-06-28 PROCEDURE — 97530 THERAPEUTIC ACTIVITIES: CPT | Mod: PN

## 2023-06-28 PROCEDURE — 97110 THERAPEUTIC EXERCISES: CPT | Mod: PN

## 2023-06-28 NOTE — PROGRESS NOTES
"[facility]   Physical Therapy Treatment Note      Name: Jessica Perez  Clinic Number: [id number]    Therapy Diagnosis:   Encounter Diagnosis   Name Primary?    Weakness of both lower extremities Yes     Physician: Dileep Mendoza MD    Visit Date: 6/28/2023    Physician Orders: PT Eval and Treat  Medical Diagnosis from Referral: M79.10 (ICD-10-CM) - Myalgia  Evaluation Date: 6/5/2023  Authorization Period Expiration: 12/31/2023  Plan of Care Expiration: 7/5/2023  Progress Note Due: 7/5/2023  Visit # / Visits authorized: 6/50 (+eval)   FOTO: 1/3     PTA Visit #: 0/5     Time In: 8:55am  Time Out: 9:50am  Total Billable Time: 55 minutes    Subjective     Pt reports: She is feeling really good.   She was compliant with home exercise program.  Response to previous treatment: first follow up  Functional change: none noted    Pain:  Current 0/10, worst 8/10, best 0/10   Location: bilateral mid to lower back     Objective      Objective Measures updated at progress report unless specified.     Treatment     Jessica received the treatments listed below:      Jessica received therapeutic exercises to develop strength, endurance, ROM, flexibility, posture, and core stabilization for 25 minutes including:  Stationary bike, lvl5, 6'  Gastroc stretch on slant board, lvl3, 3x1'  Seated lumbar flexion stretch, 3 position, 3x10  DKTC w/ SB, 3x10  Bridges on SB 3x10  Sidelying Clams 3x10  Reverse Clams with ball 3x10  Supine hamstring stretch, 3x45"  F4 LTR, 1x15    Jessica received the following manual therapy techniques: Joint mobilizations, Myofacial release, and Soft tissue Mobilization were applied to the: low back for 0 minutes, including:     Jessica participated in neuromuscular re-education activities to improve: Balance, Coordination, Proprioception, and Posture for 15 minutes. The following activities were included:  Hip 3-way, 2x10  Standing marches on foam pad, 2'  Deadbugs, " "3x10  Bird dogs, 1x10     Jessica participated in dynamic functional therapeutic activities to improve functional performance for 15 minutes, including:  Repeated sit to stands w/ blue weighted ball 3x10  Ball walk w/ yellow weighted ball, 1lap  Prone hip flexor stretch, 3x45"  Step Ups 2x10  Heel raises, 3x15  Paloff Press, 7#, 2x10  3D axial seperation in flexion (R) demonstration and education    Patient Education and Home Exercises       Education provided:   - rationale for treatment    Written Home Exercises Provided: Patient instructed to cont prior HEP. Exercises were reviewed and Jessica was able to demonstrate them prior to the end of the session.  Jessica demonstrated good  understanding of the education provided. See EMR under Patient Instructions for exercises provided during therapy sessions    Assessment     Patient was able to perform movements well today but she did deal with some foot cramping that caused her to pause some movements. She was educated on drinking plenty of fluids and staying consistent with her HEP daily to help with muscle endurance to prevent cramping.     Jessica Is progressing well towards her goals.   Pt prognosis is Good.     Pt will continue to benefit from skilled outpatient physical therapy to address the deficits listed in the problem list box on initial evaluation, provide pt/family education and to maximize pt's level of independence in the home and community environment.     Pt's spiritual, cultural and educational needs considered and pt agreeable to plan of care and goals.     Anticipated barriers to physical therapy: age    Goals: Short Term Goals: 2 weeks   1. Recent signs and systems trend is improving in order to progress towards LTG's.  2. Patient will improve lumbar ROM to 100% in order to perform all ADLs.  3. Patient will be independent with HEP in order to further progress and return to maximal function.  4. Pain rating at Worst: 3/10 in order to progress " towards increased independence with activity.     Long Term Goals: 4 weeks   1. Patient will improve glute med strength to 4/5 in order to perform all yard work.  2. Patient will improve glute max and psoas strength to 4+/5 in order to lift from floor.  3. Patient will self report improvement to 50% limitation on the FOTO Low Back Survey.    Plan     Plan of care Certification: 6/5/2023 to 7/5/2023.     Outpatient Physical Therapy 2 times weekly for 4 weeks to include the following interventions: Gait Training, Manual Therapy, Moist Heat/ Ice, Neuromuscular Re-ed, Patient Education, Therapeutic Activities, Therapeutic Exercise, and Dry Needling .     Oseas Geller, PT, DPT

## 2023-07-03 ENCOUNTER — CLINICAL SUPPORT (OUTPATIENT)
Dept: REHABILITATION | Facility: HOSPITAL | Age: 78
End: 2023-07-03
Payer: MEDICARE

## 2023-07-03 DIAGNOSIS — R29.898 WEAKNESS OF BOTH LOWER EXTREMITIES: Primary | ICD-10-CM

## 2023-07-03 PROCEDURE — 97110 THERAPEUTIC EXERCISES: CPT | Mod: PN

## 2023-07-03 PROCEDURE — 97530 THERAPEUTIC ACTIVITIES: CPT | Mod: PN

## 2023-07-03 PROCEDURE — 97112 NEUROMUSCULAR REEDUCATION: CPT | Mod: PN

## 2023-07-03 NOTE — PROGRESS NOTES
"[facility]   Physical Therapy Treatment Note      Name: Jessica Perez  Clinic Number: [id number]    Therapy Diagnosis:   Encounter Diagnosis   Name Primary?    Weakness of both lower extremities Yes     Physician: Dileep Mendoza MD    Visit Date: 7/3/2023    Physician Orders: PT Eval and Treat  Medical Diagnosis from Referral: M79.10 (ICD-10-CM) - Myalgia  Evaluation Date: 6/5/2023  Authorization Period Expiration: 12/31/2023  Plan of Care Expiration: 7/5/2023  Progress Note Due: 7/5/2023  Visit # / Visits authorized: 7/50 (+eval)   FOTO: 1/3     PTA Visit #: 0/5     Time In: 8:00am  Time Out: 9:00am  Total Billable Time: 55 minutes    Subjective     Pt reports: She is feeling good today.   She was compliant with home exercise program.  Response to previous treatment: first follow up  Functional change: none noted    Pain:  Current 0/10, worst 8/10, best 0/10   Location: bilateral mid to lower back     Objective      Objective Measures updated at progress report unless specified.     Treatment     Jessica received the treatments listed below:      Jessica received therapeutic exercises to develop strength, endurance, ROM, flexibility, posture, and core stabilization for 25 minutes including:  Stationary bike, lvl5, 6'  Gastroc stretch on slant board, lvl3, 3x1'  Seated lumbar flexion stretch, 3 position, 3x10  DKTC w/ SB, 3x10  Bridges on SB 3x10  Sidelying Clams 3x10  Reverse Clams with ball 3x10  Supine hamstring stretch, 3x45"  F4 LTR, 1x15    Jessica received the following manual therapy techniques: Joint mobilizations, Myofacial release, and Soft tissue Mobilization were applied to the: low back for 0 minutes, including:     Jessica participated in neuromuscular re-education activities to improve: Balance, Coordination, Proprioception, and Posture for 15 minutes. The following activities were included:  Hip 3-way, 2x10  Standing marches on foam pad, 2x10  Deadbugs, " "3x10  Bird dogs, 1x10     Jessica participated in dynamic functional therapeutic activities to improve functional performance for 15 minutes, including:  Repeated sit to stands w/ blue weighted ball 3x10  Ball walk w/ yellow weighted ball, 1lap  Prone hip flexor stretch, 3x45"  Step Ups 2x10  Heel raises, 3x15  Paloff Press, 7#, 2x10  3D axial seperation in flexion (R) demonstration and education    Patient Education and Home Exercises       Education provided:   - rationale for treatment    Written Home Exercises Provided: Patient instructed to cont prior HEP. Exercises were reviewed and Jessica was able to demonstrate them prior to the end of the session.  Jessica demonstrated good  understanding of the education provided. See EMR under Patient Instructions for exercises provided during therapy sessions    Assessment     Overall, patient tolerated today's session well. She did have increased fatigue with the standing movements but did report not having any discomfort while performing them. Patient does struggle more with muscle control and endurance on the left compared to the right.     Jessica Is progressing well towards her goals.   Pt prognosis is Good.     Pt will continue to benefit from skilled outpatient physical therapy to address the deficits listed in the problem list box on initial evaluation, provide pt/family education and to maximize pt's level of independence in the home and community environment.     Pt's spiritual, cultural and educational needs considered and pt agreeable to plan of care and goals.     Anticipated barriers to physical therapy: age    Goals: Short Term Goals: 2 weeks   1. Recent signs and systems trend is improving in order to progress towards LTG's. MET  2. Patient will improve lumbar ROM to 100% in order to perform all ADLs. MET  3. Patient will be independent with HEP in order to further progress and return to maximal function. MET  4. Pain rating at Worst: 3/10 in order " to progress towards increased independence with activity. MET     Long Term Goals: 4 weeks   1. Patient will improve glute med strength to 4/5 in order to perform all yard work.  2. Patient will improve glute max and psoas strength to 4+/5 in order to lift from floor.  3. Patient will self report improvement to 50% limitation on the FOTO Low Back Survey.    Plan     Plan of care Certification: 6/5/2023 to 7/5/2023.     Outpatient Physical Therapy 2 times weekly for 4 weeks to include the following interventions: Gait Training, Manual Therapy, Moist Heat/ Ice, Neuromuscular Re-ed, Patient Education, Therapeutic Activities, Therapeutic Exercise, and Dry Needling .     Oseas Geller, PT, DPT

## 2023-07-05 ENCOUNTER — CLINICAL SUPPORT (OUTPATIENT)
Dept: REHABILITATION | Facility: HOSPITAL | Age: 78
End: 2023-07-05
Payer: MEDICARE

## 2023-07-05 DIAGNOSIS — R29.898 WEAKNESS OF BOTH LOWER EXTREMITIES: Primary | ICD-10-CM

## 2023-07-05 PROCEDURE — 97112 NEUROMUSCULAR REEDUCATION: CPT | Mod: PN

## 2023-07-05 PROCEDURE — 97110 THERAPEUTIC EXERCISES: CPT | Mod: PN

## 2023-07-05 PROCEDURE — 97530 THERAPEUTIC ACTIVITIES: CPT | Mod: PN

## 2023-07-05 NOTE — PLAN OF CARE
[facility]  PT Discharge Note    [id number]    Therapy Diagnosis:   Encounter Diagnosis   Name Primary?    Weakness of both lower extremities Yes     Physician: Dileep Mendoza MD    Physician Orders: PT Eval and Treat  Medical Diagnosis from Referral: M79.10 (ICD-10-CM) - Myalgia  Evaluation Date: 6/5/2023    Date of Last visit: 7/5/2023  Total Visits Received: 9    ASSESSMENT      Patient had progressed very well through therapy.     Discharge reason: Patient has reached the maximum rehab potential for the present time    Discharge FOTO Score: 32% limitation     Goals: Short Term Goals: 2 weeks   1. Recent signs and systems trend is improving in order to progress towards LTG's. MET  2. Patient will improve lumbar ROM to 100% in order to perform all ADLs. MET  3. Patient will be independent with HEP in order to further progress and return to maximal function. MET  4. Pain rating at Worst: 3/10 in order to progress towards increased independence with activity. MET     Long Term Goals: 4 weeks   1. Patient will improve glute med strength to 4/5 in order to perform all yard work. MET  2. Patient will improve glute max and psoas strength to 4+/5 in order to lift from floor. MET  3. Patient will self report improvement to 50% limitation on the FOTO Low Back Survey. MET    PLAN   This patient is discharged from Physical Therapy      Oseas Geller, PT, DPT

## 2023-07-05 NOTE — PROGRESS NOTES
[facility]   Physical Therapy Treatment Note      Name: Jessica Perez  Clinic Number: [id number]    Therapy Diagnosis:   Encounter Diagnosis   Name Primary?    Weakness of both lower extremities Yes     Physician: Dileep Mendoza MD    Visit Date: 7/5/2023    Physician Orders: PT Eval and Treat  Medical Diagnosis from Referral: M79.10 (ICD-10-CM) - Myalgia  Evaluation Date: 6/5/2023  Authorization Period Expiration: 12/31/2023  Plan of Care Expiration: 7/5/2023  Progress Note Due: 7/5/2023  Visit # / Visits authorized: 8/50 (+eval)   FOTO: 2/2     PTA Visit #: 0/5     Time In: 8:00am  Time Out: 9:00am  Total Billable Time: 55 minutes    Subjective     Pt reports: She is feeling good. She states she is good with today being her last day.    She was compliant with home exercise program.  Response to previous treatment: first follow up  Functional change: able to perform all ADLs    Pain:  Current 0/10, worst 2/10, best 0/10   Location: bilateral mid to lower back     Objective      Objective Measures updated at progress report unless specified.     ROM    Right (degrees) Left (degrees)   Lumbar Flexion  100% 100%   Lumbar Extension 100%  100%    Lumbar Sidebending 100%  100%    Lumbar Rotation 100% 100%      Hip Flexion (125) WFL WFL   Hip Extension (15) WFL WFL   Hip Internal Rotation (45) WFL WFL   Hip External Rotation (45) WFL WFL   Hip Abduction (45) WFL WFL      Strength    Right     Left   Gluteus Luca 4+/5 4+/5   Gluteus Medius 4/5 4/5   Hip Adductors 4/5 4/5   Psoas 4+/5 4+/5   Quadriceps 4+/5 4+/5   Hamstrings 4+/5 4+/5   Anterior Tibialis 4+/5 4+/5   Gastroc/Soleus 4+/5 4+/5   Transverse Abdominis fair      Movement Analysis:   Test Right Left   Sit to Stand Normal   Step Up Normal Normal   Single Leg Balance Normal Normal     FOTO: 32% limitation     Treatment     Jessica received the treatments listed below:      Jessica received therapeutic exercises to develop  "strength, endurance, ROM, flexibility, posture, and core stabilization for 25 minutes including:  Stationary bike, lvl5, 6'  Gastroc stretch on slant board, lvl3, 3x1'  Seated lumbar flexion stretch, 3 position, 3x10  DKTC w/ SB, 3x10  Bridges on SB 3x10  Sidelying Clams 3x10  Reverse Clams with ball 3x10  Supine hamstring stretch, 3x45"  F4 LTR, 1x15    Jessica received the following manual therapy techniques: Joint mobilizations, Myofacial release, and Soft tissue Mobilization were applied to the: low back for 0 minutes, including:     Jessica participated in neuromuscular re-education activities to improve: Balance, Coordination, Proprioception, and Posture for 15 minutes. The following activities were included:  Hip 3-way, 2x10  Standing marches on foam pad, 2x10  Deadbugs, 3x10  Bird dogs, 1x10     Jessica participated in dynamic functional therapeutic activities to improve functional performance for 15 minutes, including:  Repeated sit to stands w/ blue weighted ball 3x10  Ball walk w/ yellow weighted ball, 1lap  Prone hip flexor stretch, 3x45"  Step Ups 2x10  Heel raises, 3x15  Paloff Press, 7#, 2x10  3D axial seperation in flexion (R) demonstration and education    Patient Education and Home Exercises       Education provided:   - rationale for treatment    Written Home Exercises Provided: Patient instructed to cont prior HEP. Exercises were reviewed and Jessica was able to demonstrate them prior to the end of the session.  Jessica demonstrated good  understanding of the education provided. See EMR under Patient Instructions for exercises provided during therapy sessions    Assessment     A separate note was done for discharge. Patient advised to follow up with her physician as needed. Patient was provided a full list of all her exercises today to continue at home on her own.       Jessica Is progressing well towards her goals.   Pt prognosis is Good.     Pt will continue to benefit from skilled " outpatient physical therapy to address the deficits listed in the problem list box on initial evaluation, provide pt/family education and to maximize pt's level of independence in the home and community environment.     Pt's spiritual, cultural and educational needs considered and pt agreeable to plan of care and goals.     Anticipated barriers to physical therapy: age    Goals: Short Term Goals: 2 weeks   1. Recent signs and systems trend is improving in order to progress towards LTG's. MET  2. Patient will improve lumbar ROM to 100% in order to perform all ADLs. MET  3. Patient will be independent with HEP in order to further progress and return to maximal function. MET  4. Pain rating at Worst: 3/10 in order to progress towards increased independence with activity. MET     Long Term Goals: 4 weeks   1. Patient will improve glute med strength to 4/5 in order to perform all yard work. MET  2. Patient will improve glute max and psoas strength to 4+/5 in order to lift from floor. MET  3. Patient will self report improvement to 50% limitation on the FOTO Low Back Survey. MET    Plan     Patient is being discharged from physical therapy.     Plan of care Certification: 6/5/2023 to 7/5/2023.     Outpatient Physical Therapy 2 times weekly for 4 weeks to include the following interventions: Gait Training, Manual Therapy, Moist Heat/ Ice, Neuromuscular Re-ed, Patient Education, Therapeutic Activities, Therapeutic Exercise, and Dry Needling .     Oseas Geller, PT, DPT

## 2023-07-14 DIAGNOSIS — R06.02 SOB (SHORTNESS OF BREATH): Primary | ICD-10-CM

## 2023-07-18 ENCOUNTER — SPECIALTY PHARMACY (OUTPATIENT)
Dept: PHARMACY | Facility: CLINIC | Age: 78
End: 2023-07-18
Payer: MEDICARE

## 2023-07-18 NOTE — TELEPHONE ENCOUNTER
Specialty Pharmacy - Refill Coordination    Specialty Medication Orders Linked to Encounter      Flowsheet Row Most Recent Value   Medication #1 evolocumab (REPATHA SURECLICK) 140 mg/mL PnIj (Order#[id number], Rx#[id number])     Refill was sent early due to pt going out of town on 7/23-7/31. Pt is due 7/28. Informed pt of storage instructions while traveling.      Refill Questions - Documented Responses      Flowsheet Row Most Recent Value   Patient Availability and HIPAA Verification    Does patient want to proceed with activity? Yes   HIPAA/medical authority confirmed? Yes   Relationship to patient of person spoken to? Self   Refill Screening Questions    Would patient like to speak to a pharmacist? No   When does the patient need to receive the medication? 07/28/23   Refill Delivery Questions    How will the patient receive the medication? [facility]   When does the patient need to receive the medication? 07/28/23   Shipping Address Home   Address in [facility] confirmed and updated if neccessary? Yes   Expected Copay ($) 0   Is the patient able to afford the medication copay? Yes   Payment Method zero copay   Days supply of Refill 28   Supplies needed? No supplies needed   Refill activity completed? Yes   Refill activity plan Refill scheduled   Shipment/Pickup Date: 07/20/23            Current Outpatient Medications   Medication Sig    acetaminophen/diphenhydramine (TYLENOL PM ORAL) Take by mouth.    APPLE CIDER VINEGAR ORAL Take by mouth.    artificial tears,hypromellose,,GENTEAL/SUSTANE, 0.3 % Gel     aspirin (ECOTRIN) 81 MG EC tablet Take 81 mg by mouth once daily.    azelastine (ASTELIN) 137 mcg (0.1 %) nasal spray 1 spray (137 mcg total) by Nasal route 2 (two) times daily.    b complex vitamins capsule Take 1 capsule by mouth once daily.    calcium carbonate (CALCIUM 500 ORAL) Take by mouth.    cholestyramine (QUESTRAN) 4 gram packet Take 1 packet (4 g total) by mouth daily as needed (diarrhea).     ciclopirox (PENLAC) 8 % Soln Apply topically nightly.    cyanocobalamin, vitamin B-12, (VITAMIN B-12 ORAL) Take by mouth.    denosumab (PROLIA SUBQ) Inject into the skin.    ergocalciferol, vitamin D2, (VITAMIN D ORAL) Take by mouth.    evolocumab (REPATHA SURECLICK) 140 mg/mL PnIj Repatha SureClick Take No date recorded No form recorded No frequency recorded No route recorded No set duration recorded No set duration amount recorded active No dosage strength recorded No dosage strength units of measure recorded    evolocumab (REPATHA SURECLICK) 140 mg/mL PnIj Inject 1 pen (140 mg total) into the skin every 14 (fourteen) days.    ezetimibe (ZETIA) 10 mg tablet Take 1 tablet (10 mg total) by mouth once daily.    famotidine (PEPCID) 40 MG tablet Take 1 tablet (40 mg total) by mouth every evening.    flaxseed oiL 1,000 mg Cap flaxseed oil Take No date recorded No form recorded No frequency recorded No route recorded No set duration recorded No set duration amount recorded suspended No dosage strength recorded No dosage strength units of measure recorded    fluocinolone (SYNALAR) 0.01 % external solution AAA of scalp twice a day PRN scalp itching or eczema flares.    fluticasone (FLONASE) 50 mcg/actuation nasal spray 2 sprays by Each Nare route once daily.    hydrocortisone 2.5 % cream Apply topically 2 (two) times daily. Mix with ketoconazole cream and AAA on face BID for up to 2 weeks at a time    ketoconazole (NIZORAL) 2 % cream Apply topically 2 (two) times daily. Mix with hydrocortisone cream and AAA on face BID for up to 2 weeks at a time    ketoconazole (NIZORAL) 2 % shampoo Apply topically every 7 days. Shampoo scalp 1-2 times per week. Lather x 5 minutes, rinse well.    krill oil-omega-3-dha-epa 150-450 mg CpDR Take by mouth.    MAGNESIUM CITRATE ORAL Take by mouth.    multivitamin capsule Take 1 capsule by mouth Daily.    tiZANidine (ZANAFLEX) 4 MG tablet Take 0.5 tablets (2 mg total) by mouth nightly as needed  (back pain / stiffness / cramps).    triamcinolone acetonide 0.1% (KENALOG) 0.1 % cream Apply topically 2 (two) times daily.    trospium (SANCTURA XR) 60 mg Cp24 capsule Take 1 capsule (60 mg total) by mouth once daily.    TURMERIC ORAL Take by mouth.    UNABLE TO FIND CBD Oil topical    ZINC ORAL Take by mouth.   Last reviewed on 6/22/2023  1:49 PM by Melba Shell MD    Review of patient's allergies indicates:   Allergen Reactions    Augmentin [amoxicillin-pot clavulanate] Other (See Comments)     Diarrhea, yeast    Bactrim  [sulfamethoxazole-trimethoprim]      Other reaction(s): Unknown    Celebrex [celecoxib] Other (See Comments)     Stomach pain, gas     Lipitor  [atorvastatin]      Other reaction(s): Unknown    Pravachol  [pravastatin]      Other reaction(s): Unknown    Statins-hmg-coa reductase inhibitors      Other reaction(s): Muscle pain    Sulfa (sulfonamide antibiotics)      Other reaction(s): Swelling    Zoster vaccine live     Last reviewed on  6/23/2023 3:28 PM by Melba Shell      Tasks added this encounter   No tasks added.   Tasks due within next 3 months   7/18/2023 - Refill Coordination Outreach (1 time occurrence)     Debi Herrera, Patient Care Assistant  [facility]  [facility] [address] [facility]  [facility] [address]  Phone: [phone]  Fax: [phone]

## 2023-07-19 ENCOUNTER — OFFICE VISIT (OUTPATIENT)
Dept: CARDIOLOGY | Facility: CLINIC | Age: 78
End: 2023-07-19
Payer: MEDICARE

## 2023-07-19 ENCOUNTER — HOSPITAL ENCOUNTER (OUTPATIENT)
Dept: CARDIOLOGY | Facility: HOSPITAL | Age: 78
Discharge: HOME | End: 2023-07-19
Attending: INTERNAL MEDICINE
Payer: MEDICARE

## 2023-07-19 VITALS
WEIGHT: 170.63 LBS | OXYGEN SATURATION: 99 % | HEIGHT: 62 IN | SYSTOLIC BLOOD PRESSURE: 129 MMHG | DIASTOLIC BLOOD PRESSURE: 80 MMHG | HEART RATE: 71 BPM | BODY MASS INDEX: 31.4 KG/M2

## 2023-07-19 DIAGNOSIS — R00.2 PALPITATIONS: ICD-10-CM

## 2023-07-19 DIAGNOSIS — E78.00 PURE HYPERCHOLESTEROLEMIA: ICD-10-CM

## 2023-07-19 DIAGNOSIS — R06.02 SOB (SHORTNESS OF BREATH): ICD-10-CM

## 2023-07-19 DIAGNOSIS — R07.9 CHRONIC CHEST PAIN: ICD-10-CM

## 2023-07-19 DIAGNOSIS — T46.6X5A STATIN MYOPATHY: ICD-10-CM

## 2023-07-19 DIAGNOSIS — Z82.49 FAMILY HISTORY OF ARTERIOSCLEROTIC CARDIOVASCULAR DISEASE: ICD-10-CM

## 2023-07-19 DIAGNOSIS — Z91.89 AT RISK FOR SLEEP APNEA: ICD-10-CM

## 2023-07-19 DIAGNOSIS — R07.9 CHEST PAIN, MODERATE CORONARY ARTERY RISK: ICD-10-CM

## 2023-07-19 DIAGNOSIS — G89.29 CHRONIC CHEST PAIN: ICD-10-CM

## 2023-07-19 DIAGNOSIS — R06.02 SOB (SHORTNESS OF BREATH): Primary | ICD-10-CM

## 2023-07-19 DIAGNOSIS — Z76.89 ENCOUNTER TO ESTABLISH CARE: ICD-10-CM

## 2023-07-19 DIAGNOSIS — G72.0 STATIN MYOPATHY: ICD-10-CM

## 2023-07-19 DIAGNOSIS — Z79.899 ENCOUNTER FOR LONG-TERM CURRENT USE OF MEDICATION: ICD-10-CM

## 2023-07-19 DIAGNOSIS — E78.5 HYPERLIPIDEMIA, UNSPECIFIED HYPERLIPIDEMIA TYPE: ICD-10-CM

## 2023-07-19 PROCEDURE — 93005 ELECTROCARDIOGRAM TRACING: CPT

## 2023-07-19 PROCEDURE — 3288F PR FALLS RISK ASSESSMENT DOCUMENTED: ICD-10-PCS | Mod: CPTII,S$GLB,, | Performed by: INTERNAL MEDICINE

## 2023-07-19 PROCEDURE — 93010 ELECTROCARDIOGRAM REPORT: CPT | Mod: ,,, | Performed by: STUDENT IN AN ORGANIZED HEALTH CARE EDUCATION/TRAINING PROGRAM

## 2023-07-19 PROCEDURE — 1159F MED LIST DOCD IN RCRD: CPT | Mod: CPTII,S$GLB,, | Performed by: INTERNAL MEDICINE

## 2023-07-19 PROCEDURE — 3288F FALL RISK ASSESSMENT DOCD: CPT | Mod: CPTII,S$GLB,, | Performed by: INTERNAL MEDICINE

## 2023-07-19 PROCEDURE — 1160F PR REVIEW ALL MEDS BY PRESCRIBER/CLIN PHARMACIST DOCUMENTED: ICD-10-PCS | Mod: CPTII,S$GLB,, | Performed by: INTERNAL MEDICINE

## 2023-07-19 PROCEDURE — 99214 PR OFFICE/OUTPT VISIT, EST, LEVL IV, 30-39 MIN: ICD-10-PCS | Mod: S$GLB,,, | Performed by: INTERNAL MEDICINE

## 2023-07-19 PROCEDURE — 99999 PR PBB SHADOW E&M-EST. PATIENT-LVL III: ICD-10-PCS | Mod: PBBFAC,,, | Performed by: INTERNAL MEDICINE

## 2023-07-19 PROCEDURE — 99214 OFFICE O/P EST MOD 30 MIN: CPT | Mod: S$GLB,,, | Performed by: INTERNAL MEDICINE

## 2023-07-19 PROCEDURE — 1101F PT FALLS ASSESS-DOCD LE1/YR: CPT | Mod: CPTII,S$GLB,, | Performed by: INTERNAL MEDICINE

## 2023-07-19 PROCEDURE — 3079F PR MOST RECENT DIASTOLIC BLOOD PRESSURE 80-89 MM HG: ICD-10-PCS | Mod: CPTII,S$GLB,, | Performed by: INTERNAL MEDICINE

## 2023-07-19 PROCEDURE — 99999 PR PBB SHADOW E&M-EST. PATIENT-LVL III: CPT | Mod: PBBFAC,,, | Performed by: INTERNAL MEDICINE

## 2023-07-19 PROCEDURE — 1159F PR MEDICATION LIST DOCUMENTED IN MEDICAL RECORD: ICD-10-PCS | Mod: CPTII,S$GLB,, | Performed by: INTERNAL MEDICINE

## 2023-07-19 PROCEDURE — 3079F DIAST BP 80-89 MM HG: CPT | Mod: CPTII,S$GLB,, | Performed by: INTERNAL MEDICINE

## 2023-07-19 PROCEDURE — 93010 EKG 12-LEAD: ICD-10-PCS | Mod: ,,, | Performed by: STUDENT IN AN ORGANIZED HEALTH CARE EDUCATION/TRAINING PROGRAM

## 2023-07-19 PROCEDURE — 3074F PR MOST RECENT SYSTOLIC BLOOD PRESSURE < 130 MM HG: ICD-10-PCS | Mod: CPTII,S$GLB,, | Performed by: INTERNAL MEDICINE

## 2023-07-19 PROCEDURE — 1101F PR PT FALLS ASSESS DOC 0-1 FALLS W/OUT INJ PAST YR: ICD-10-PCS | Mod: CPTII,S$GLB,, | Performed by: INTERNAL MEDICINE

## 2023-07-19 PROCEDURE — 1160F RVW MEDS BY RX/DR IN RCRD: CPT | Mod: CPTII,S$GLB,, | Performed by: INTERNAL MEDICINE

## 2023-07-19 PROCEDURE — 1125F AMNT PAIN NOTED PAIN PRSNT: CPT | Mod: CPTII,S$GLB,, | Performed by: INTERNAL MEDICINE

## 2023-07-19 PROCEDURE — 1125F PR PAIN SEVERITY QUANTIFIED, PAIN PRESENT: ICD-10-PCS | Mod: CPTII,S$GLB,, | Performed by: INTERNAL MEDICINE

## 2023-07-19 PROCEDURE — 3074F SYST BP LT 130 MM HG: CPT | Mod: CPTII,S$GLB,, | Performed by: INTERNAL MEDICINE

## 2023-07-19 NOTE — PROGRESS NOTES
Subjective:   Patient ID:  Jessica Perez is a 78 y.o. female who presents for follow-up of No chief complaint on file.  Patient is stable on the Zetia.  Improvement overall.  Will continue to follow up cholesterol profiles into the future.  Patient also complains of palpitations particularly at night and a sense of vibration.  Will do a Holter monitor in follow-up.  No chest pain noted.Patient is here for re-evaluation overall cholesterol profile.  Improvement in overall profile including triglycerides.     Patient is stable on the Zetia.  Improvement overall.  Will continue to follow up cholesterol profiles into the future.  Patient also complains of palpitations particularly at night and a sense of vibration.  Will do a Holter monitor in follow-up.  No chest pain noted.  Patient doing well or stable continue current medication other changes.  She continues Repatha , follow-up cholesterol profile 6 months will see her in 6 months.        This visit finds patient feeling well she stable current medications cholesterol profiles have greatly improved on Repatha and doing well this time.  NO FOCAL CNS SYMPTOMS OR SIGNS TO SUGGEST TIA OR STROKE  NO ANGINA OR EQUIVALENT  NO UNUSUAL MONROY. NO ORTHOPNEA OR PND  NO PALPITATIONS  NO NEAR SYNCOPE OR SYNCOPE  NO EDEMA. NO CALVE TENDERNESS  Overall patient doing well stable on Repatha.  No other changes.  No acute symptoms chest pain shortness breath heart blood pressure stable.     01/11/2023:   Overall patient doing well cholesterol profiles are excellent Repatha doing well continues on Zetia no other changes repeat lipid profile in 5-6 months follow-up evaluation 6 months all questions answered today.          07/19/2023:   Overall this patient is doing well no exertional symptoms chest pain shortness breath heart rate stable.  EKG today shows sinus rhythm with sinus arrhythmia otherwise stable lipid profiles are improved with Zetia and Repatha.  Clinically stable this  time all medications reviewed renewed doing at this time.      Review of Systems   Constitutional: Negative for chills, diaphoresis, night sweats, weight gain and weight loss.   HENT:  Negative for congestion, hoarse voice, sore throat and stridor.    Eyes:  Negative for double vision and pain.   Cardiovascular:  Negative for chest pain, claudication, cyanosis, dyspnea on exertion, irregular heartbeat, leg swelling, near-syncope, orthopnea, palpitations, paroxysmal nocturnal dyspnea and syncope.   Respiratory:  Negative for cough, hemoptysis, shortness of breath, sleep disturbances due to breathing, snoring, sputum production and wheezing.    Endocrine: Negative for cold intolerance, heat intolerance and polydipsia.   Hematologic/Lymphatic: Negative for bleeding problem. Does not bruise/bleed easily.   Skin:  Negative for color change, dry skin and rash.   Musculoskeletal:  Negative for joint swelling and muscle cramps.   Gastrointestinal:  Negative for bloating, abdominal pain, constipation, diarrhea, dysphagia, melena, nausea and vomiting.   Genitourinary:  Negative for flank pain and urgency.   Neurological:  Negative for dizziness, focal weakness, headaches, light-headedness, loss of balance, seizures and weakness.   Psychiatric/Behavioral:  Negative for altered mental status and memory loss. The patient is not nervous/anxious.    Family History   Problem Relation Age of Onset    Diabetes Father     Cancer Father         prostate ca    Hypertension Father     Cancer Sister         cervical ca    Hepatitis Sister     Dementia Mother     Osteoporosis Mother     Breast cancer Paternal Aunt      Past Medical History:   Diagnosis Date    Acute pain of left knee 3/22/2021    Arthritis     Arthritis of knee 2/9/2021    At risk for sleep apnea 9/19/2019    Balance problem 10/21/2021    Bilateral primary osteoarthritis of knee 4/4/2018    Chest pain, moderate coronary artery risk 8/8/2019    Chronic midline low back pain  without sciatica 11/30/2020    Chronic pain of both knees 3/23/2021    Costochondritis 1/20/2020    Depression, major, recurrent, mild     Diarrhea 5/11/2022    Difficulty walking 11/30/2020    Difficulty walking 11/30/2020    LANRE (generalized anxiety disorder) 3/22/2021    Gait, antalgic 10/21/2021    Generalized weakness 11/30/2020    Generalized weakness 11/30/2020    Glaucoma     HLD (hyperlipidemia)     Lower extremity weakness 10/21/2021    Muscle cramps 5/23/2018    Osteopenia of multiple sites 4/2/2018    Primary osteoarthritis of right knee 05/30/2018    Primary snoring     Rash 5/11/2022    Sleep related leg cramps 9/7/2010    SOB (shortness of breath) 8/8/2019    Unspecified gastritis and gastroduodenitis without mention of hemorrhage 11/28/2010     Dx updated per 2019 IMO Load    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      APPLE CIDER VINEGAR ORAL Take by mouth.      artificial tears,hypromellose,,GENTEAL/SUSTANE, 0.3 % Gel       aspirin (ECOTRIN) 81 MG EC tablet Take 81 mg by mouth once daily.      azelastine (ASTELIN) 137 mcg (0.1 %) nasal spray 1 spray (137 mcg total) by Nasal route 2 (two) times daily. 30 mL 1    b complex vitamins capsule Take 1 capsule by mouth once daily.      calcium carbonate (CALCIUM 500 ORAL) Take by mouth.      cholestyramine (QUESTRAN) 4 gram packet Take 1 packet (4 g total) by mouth daily as needed (diarrhea). 30 packet 5    ciclopirox  (PENLAC) 8 % Soln Apply topically nightly. 6.6 mL 11    cyanocobalamin, vitamin B-12, (VITAMIN B-12 ORAL) Take by mouth.      denosumab (PROLIA SUBQ) Inject into the skin.      ergocalciferol, vitamin D2, (VITAMIN D ORAL) Take by mouth.      evolocumab (REPATHA SURECLICK) 140 mg/mL PnIj Repatha SureClick Take No date recorded No form recorded No frequency recorded No route recorded No set duration recorded No set duration amount recorded active No dosage strength recorded No dosage strength units of measure recorded      evolocumab (REPATHA SURECLICK) 140 mg/mL PnIj Inject 1 pen (140 mg total) into the skin every 14 (fourteen) days. 2 mL 6    ezetimibe (ZETIA) 10 mg tablet Take 1 tablet (10 mg total) by mouth once daily. 90 tablet 3    famotidine (PEPCID) 40 MG tablet Take 1 tablet (40 mg total) by mouth every evening. 30 tablet 11    flaxseed oiL 1,000 mg Cap flaxseed oil Take No date recorded No form recorded No frequency recorded No route recorded No set duration recorded No set duration amount recorded suspended No dosage strength recorded No dosage strength units of measure recorded      fluocinolone (SYNALAR) 0.01 % external solution AAA of scalp twice a day PRN scalp itching or eczema flares. 60 mL 1    fluticasone (FLONASE) 50 mcg/actuation nasal spray 2 sprays by Each Nare route once daily. 1 Bottle 12    hydrocortisone 2.5 % cream Apply topically 2 (two) times daily. Mix with ketoconazole cream and AAA on face BID for up to 2 weeks at a time 30 g 1    ketoconazole (NIZORAL) 2 % cream Apply topically 2 (two) times daily. Mix with hydrocortisone cream and AAA on face BID for up to 2 weeks at a time 30 g 1    ketoconazole (NIZORAL) 2 % shampoo Apply topically every 7 days. Shampoo scalp 1-2 times per week. Lather x 5 minutes, rinse well. 120 mL 5    krill oil-omega-3-dha-epa 150-450 mg CpDR Take by mouth.      MAGNESIUM CITRATE ORAL Take by mouth.      multivitamin capsule Take 1 capsule by mouth Daily.       tiZANidine (ZANAFLEX) 4 MG tablet Take 0.5 tablets (2 mg total) by mouth nightly as needed (back pain / stiffness / cramps). 40 tablet 2    triamcinolone acetonide 0.1% (KENALOG) 0.1 % cream Apply topically 2 (two) times daily. 45 g 0    trospium (SANCTURA XR) 60 mg Cp24 capsule Take 1 capsule (60 mg total) by mouth once daily. 30 capsule 6    TURMERIC ORAL Take by mouth.      UNABLE TO FIND CBD Oil topical      ZINC ORAL Take by mouth.       Current Facility-Administered Medications on File Prior to Visit   Medication Dose Route Frequency Provider Last Rate Last Admin    triamcinolone acetonide injection 32 mg  32 mg Intra-articular 1 time in Clinic/HOD Dileep Mendoza MD         Review of patient's allergies indicates:   Allergen Reactions    Augmentin [amoxicillin-pot clavulanate] Other (See Comments)     Diarrhea, yeast    Bactrim  [sulfamethoxazole-trimethoprim]      Other reaction(s): Unknown    Celebrex [celecoxib] Other (See Comments)     Stomach pain, gas     Lipitor  [atorvastatin]      Other reaction(s): Unknown    Pravachol  [pravastatin]      Other reaction(s): Unknown    Statins-hmg-coa reductase inhibitors      Other reaction(s): Muscle pain    Sulfa (sulfonamide antibiotics)      Other reaction(s): Swelling    Zoster vaccine live        Objective:     Physical Exam  Eyes:      Pupils: Pupils are equal, round, and reactive to light.   Neck:      Trachea: No tracheal deviation.   Cardiovascular:      Rate and Rhythm: Normal rate and regular rhythm.      Pulses: Intact distal pulses.           Carotid pulses are 2+ on the right side and 2+ on the left side.       Radial pulses are 2+ on the right side and 2+ on the left side.        Femoral pulses are 2+ on the right side and 2+ on the left side.       Popliteal pulses are 2+ on the right side and 2+ on the left side.        Dorsalis pedis pulses are 2+ on the right side and 2+ on the left side.        Posterior tibial pulses are 2+ on the right side and  2+ on the left side.      Heart sounds: Normal heart sounds. No murmur heard.    No friction rub. No gallop.   Pulmonary:      Effort: Pulmonary effort is normal. No respiratory distress.      Breath sounds: Normal breath sounds. No stridor. No wheezing or rales.   Chest:      Chest wall: No tenderness.   Abdominal:      General: There is no distension.      Tenderness: There is no abdominal tenderness. There is no rebound.   Musculoskeletal:         General: No tenderness.      Cervical back: Normal range of motion.   Skin:     General: Skin is warm and dry.   Neurological:      Mental Status: She is alert and oriented to person, place, and time.     EKG shows sinus arrhythmia    Assessment:     1. SOB (shortness of breath)    2. Pure hypercholesterolemia    3. Encounter for long-term current use of medication    4. Family history of arteriosclerotic cardiovascular disease    5. Palpitations    6. At risk for sleep apnea    7. Hyperlipidemia, unspecified hyperlipidemia type    8. Chronic chest pain    9. Chest pain, moderate coronary artery risk    10. Encounter to establish care    11. Statin myopathy        Plan:     SOB (shortness of breath)    Pure hypercholesterolemia    Encounter for long-term current use of medication    Family history of arteriosclerotic cardiovascular disease    Palpitations    At risk for sleep apnea    Hyperlipidemia, unspecified hyperlipidemia type    Chronic chest pain    Chest pain, moderate coronary artery risk    Encounter to establish care    Statin myopathy    Impression:  1 hyperlipidemia: stable on current medications  2 no exertional symptoms

## 2023-08-03 ENCOUNTER — PATIENT MESSAGE (OUTPATIENT)
Dept: RESEARCH | Facility: HOSPITAL | Age: 78
End: 2023-08-03
Payer: MEDICARE

## 2023-08-07 ENCOUNTER — NURSE TRIAGE (OUTPATIENT)
Dept: ADMINISTRATIVE | Facility: CLINIC | Age: 78
End: 2023-08-07
Payer: MEDICARE

## 2023-08-07 NOTE — TELEPHONE ENCOUNTER
Pt took dog heart medication by accident and calling in to see what she needs to do. Pt went to  and they sending to the ED. Pt told unfamiliar with the medication but protocol to reach out to poison control and to call back if any other question or SOB. Pt given the number to call and she said she was already in route to the ED and she would go depending on what poison control is saying              Reason for Disposition   Took another person's prescription drug    Additional Information   Negative: Intentional drug overdose and suicidal thoughts or ideas    Protocols used: Medication Question Call-A-[place]

## 2023-08-09 ENCOUNTER — OFFICE VISIT (OUTPATIENT)
Dept: UROLOGY | Facility: CLINIC | Age: 78
End: 2023-08-09
Payer: MEDICARE

## 2023-08-09 VITALS
WEIGHT: 172.38 LBS | HEIGHT: 62 IN | BODY MASS INDEX: 31.72 KG/M2 | HEART RATE: 69 BPM | SYSTOLIC BLOOD PRESSURE: 116 MMHG | DIASTOLIC BLOOD PRESSURE: 69 MMHG

## 2023-08-09 DIAGNOSIS — N95.8 GENITOURINARY SYNDROME OF MENOPAUSE: ICD-10-CM

## 2023-08-09 DIAGNOSIS — N39.46 MIXED INCONTINENCE: Primary | ICD-10-CM

## 2023-08-09 DIAGNOSIS — R31.29 MICROHEMATURIA: ICD-10-CM

## 2023-08-09 DIAGNOSIS — N32.81 OAB (OVERACTIVE BLADDER): ICD-10-CM

## 2023-08-09 LAB
BILIRUB SERPL-MCNC: NORMAL MG/DL
BLOOD URINE, POC: NORMAL
CLARITY, POC UA: CLEAR
COLOR, POC UA: NORMAL
GLUCOSE UR QL STRIP: NORMAL
KETONES UR QL STRIP: NORMAL
LEUKOCYTE ESTERASE URINE, POC: NORMAL
NITRITE, POC UA: NORMAL
PH, POC UA: 5.5
PROTEIN, POC: NORMAL
SPECIFIC GRAVITY, POC UA: >=1.03
UROBILINOGEN, POC UA: 0.2

## 2023-08-09 PROCEDURE — 99999 PR PBB SHADOW E&M-EST. PATIENT-LVL V: CPT | Mod: PBBFAC,,, | Performed by: UROLOGY

## 2023-08-09 PROCEDURE — 1101F PT FALLS ASSESS-DOCD LE1/YR: CPT | Mod: CPTII,S$GLB,, | Performed by: UROLOGY

## 2023-08-09 PROCEDURE — 1101F PR PT FALLS ASSESS DOC 0-1 FALLS W/OUT INJ PAST YR: ICD-10-PCS | Mod: CPTII,S$GLB,, | Performed by: UROLOGY

## 2023-08-09 PROCEDURE — 3074F SYST BP LT 130 MM HG: CPT | Mod: CPTII,S$GLB,, | Performed by: UROLOGY

## 2023-08-09 PROCEDURE — 3078F PR MOST RECENT DIASTOLIC BLOOD PRESSURE < 80 MM HG: ICD-10-PCS | Mod: CPTII,S$GLB,, | Performed by: UROLOGY

## 2023-08-09 PROCEDURE — 1126F PR PAIN SEVERITY QUANTIFIED, NO PAIN PRESENT: ICD-10-PCS | Mod: CPTII,S$GLB,, | Performed by: UROLOGY

## 2023-08-09 PROCEDURE — 1159F MED LIST DOCD IN RCRD: CPT | Mod: CPTII,S$GLB,, | Performed by: UROLOGY

## 2023-08-09 PROCEDURE — 3078F DIAST BP <80 MM HG: CPT | Mod: CPTII,S$GLB,, | Performed by: UROLOGY

## 2023-08-09 PROCEDURE — 81002 POCT URINE DIPSTICK WITHOUT MICROSCOPE: ICD-10-PCS | Mod: S$GLB,,, | Performed by: UROLOGY

## 2023-08-09 PROCEDURE — 3288F PR FALLS RISK ASSESSMENT DOCUMENTED: ICD-10-PCS | Mod: CPTII,S$GLB,, | Performed by: UROLOGY

## 2023-08-09 PROCEDURE — 99214 OFFICE O/P EST MOD 30 MIN: CPT | Mod: S$GLB,,, | Performed by: UROLOGY

## 2023-08-09 PROCEDURE — 3288F FALL RISK ASSESSMENT DOCD: CPT | Mod: CPTII,S$GLB,, | Performed by: UROLOGY

## 2023-08-09 PROCEDURE — 99214 PR OFFICE/OUTPT VISIT, EST, LEVL IV, 30-39 MIN: ICD-10-PCS | Mod: S$GLB,,, | Performed by: UROLOGY

## 2023-08-09 PROCEDURE — 3074F PR MOST RECENT SYSTOLIC BLOOD PRESSURE < 130 MM HG: ICD-10-PCS | Mod: CPTII,S$GLB,, | Performed by: UROLOGY

## 2023-08-09 PROCEDURE — 81002 URINALYSIS NONAUTO W/O SCOPE: CPT | Mod: S$GLB,,, | Performed by: UROLOGY

## 2023-08-09 PROCEDURE — 1126F AMNT PAIN NOTED NONE PRSNT: CPT | Mod: CPTII,S$GLB,, | Performed by: UROLOGY

## 2023-08-09 PROCEDURE — 1159F PR MEDICATION LIST DOCUMENTED IN MEDICAL RECORD: ICD-10-PCS | Mod: CPTII,S$GLB,, | Performed by: UROLOGY

## 2023-08-09 PROCEDURE — 99999 PR PBB SHADOW E&M-EST. PATIENT-LVL V: ICD-10-PCS | Mod: PBBFAC,,, | Performed by: UROLOGY

## 2023-08-09 NOTE — PROGRESS NOTES
Chief Complaint   Patient presents with    Other     6 month FU       History of Present Illness:   Jessica Perez is a 78 y.o. female here for evaluation of Other (6 month FU)    8/9/23-on trospium about every other day. Sometimes she is having strong urges. Wearing a pad. Limiting pm fluid and not having nocturia. No difficulty emptying. No dysuria.   2/7/23-on trospium, and it seems to work okay. Only having stress incontinence and it is much improved. She thinks she may have cystitis and there is slight discomfort and she is having frequency and small volumes. Took a bubble bath 3 days ago. Not sexually active.   8/2/22- on trospium and significantly improved. Wearing 2 pads per day now. Here for cysto. CT urogram showed no stones, renal mass or hydronephrosis. Doing pelvic floor PT  7/8/22- She reports that she has been wearing pads for 10 years. She states that when she stands up, she pours out urine. She reports that she has had loose stools regularly and was taking Immodium when she would go anywhere. It used to be just KRISH with sneeze, cough and laugh.   Wears about 5 pads per day. During the night if she wakes, she doesn't have UUI. No daytime frequency. +Daytime urgency and UUI.   No gross hematuria or dysuria.         Review of Systems   Constitutional:  Negative for chills and fever.   Respiratory:  Negative for shortness of breath.    Cardiovascular:  Negative for chest pain.   Genitourinary:  Negative for difficulty urinating and vaginal pain.   All other systems reviewed and are negative.        Past Medical History:   Diagnosis Date    Acute pain of left knee 3/22/2021    Arthritis     Arthritis of knee 2/9/2021    At risk for sleep apnea 9/19/2019    Balance problem 10/21/2021    Bilateral primary osteoarthritis of knee 4/4/2018    Chest pain, moderate coronary artery risk 8/8/2019    Chronic midline low back pain without sciatica 11/30/2020    Chronic pain of both knees 3/23/2021     Costochondritis 1/20/2020    Depression, major, recurrent, mild     Diarrhea 5/11/2022    Difficulty walking 11/30/2020    Difficulty walking 11/30/2020    LANRE (generalized anxiety disorder) 3/22/2021    Gait, antalgic 10/21/2021    Generalized weakness 11/30/2020    Generalized weakness 11/30/2020    Glaucoma     HLD (hyperlipidemia)     Lower extremity weakness 10/21/2021    Muscle cramps 5/23/2018    Osteopenia of multiple sites 4/2/2018    Primary osteoarthritis of right knee 05/30/2018    Primary snoring     Rash 5/11/2022    Sleep related leg cramps 9/7/2010    SOB (shortness of breath) 8/8/2019    Unspecified gastritis and gastroduodenitis without mention of hemorrhage 11/28/2010     Dx updated per 2019 IMO Load    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 Benign.    CATARACT EXTRACTION W/  INTRAOCULAR LENS IMPLANT Left 02/04/2019    CATARACT EXTRACTION W/  INTRAOCULAR LENS IMPLANT Right 03/04/2019    CHOLECYSTECTOMY      COLONOSCOPY N/A 06/29/2022    Procedure: COLONOSCOPY;  Surgeon: Nichole Kelly MD;  Location: [facility];  Service: Endoscopy;  Laterality: N/A;    DILATION AND CURETTAGE OF UTERUS      finger surgery      middle finger on right hand    GALLBLADDER SURGERY      INJECTION OF ANESTHETIC AGENT AROUND NERVE Bilateral 10/01/2021    Procedure: Bilateral Genicular nerve block -;  Surgeon: Primo Bond MD;  Location: [facility];  Service: Pain Management;  Laterality: Bilateral;    RADIOFREQUENCY THERMOCOAGULATION Left 11/05/2021    Procedure: Left Genicular Nerve RFA (COOLIEF) with RN IV sedation;  Surgeon: Primo Bond MD;  [facility];  Service: Pain Management;  Laterality: Left;    TONSILLECTOMY, ADENOIDECTOMY      TUBAL LIGATION         Family History   Problem Relation Age of Onset    Diabetes Father     Cancer Father          prostate ca    Hypertension Father     Cancer Sister         cervical ca    Hepatitis Sister     Dementia Mother     Osteoporosis Mother     Breast cancer Paternal Aunt        Social History     Tobacco Use    Smoking status: Former     Current packs/day: 0.50     Average packs/day: 0.5 packs/day for 12.0 years (6.0 ttl pk-yrs)     Types: Cigarettes    Smokeless tobacco: Never   Substance Use Topics    Alcohol use: Yes     Comment: occasionally    Drug use: No       Current Outpatient Medications   Medication Sig Dispense Refill    acetaminophen/diphenhydramine (TYLENOL PM ORAL) Take by mouth.      APPLE CIDER VINEGAR ORAL Take by mouth.      artificial tears,hypromellose,,GENTEAL/SUSTANE, 0.3 % Gel       aspirin (ECOTRIN) 81 MG EC tablet Take 81 mg by mouth once daily.      b complex vitamins capsule Take 1 capsule by mouth once daily.      calcium carbonate (CALCIUM 500 ORAL) Take by mouth.      ciclopirox (PENLAC) 8 % Soln Apply topically nightly. 6.6 mL 11    cyanocobalamin, vitamin B-12, (VITAMIN B-12 ORAL) Take by mouth.      denosumab (PROLIA SUBQ) Inject into the skin.      ergocalciferol, vitamin D2, (VITAMIN D ORAL) Take by mouth.      evolocumab (REPATHA SURECLICK) 140 mg/mL PnIj Repatha SureClick Take No date recorded No form recorded No frequency recorded No route recorded No set duration recorded No set duration amount recorded active No dosage strength recorded No dosage strength units of measure recorded      evolocumab (REPATHA SURECLICK) 140 mg/mL PnIj Inject 1 pen (140 mg total) into the skin every 14 (fourteen) days. 2 mL 6    ezetimibe (ZETIA) 10 mg tablet Take 1 tablet (10 mg total) by mouth once daily. 90 tablet 3    flaxseed oiL 1,000 mg Cap flaxseed oil Take No date recorded No form recorded No frequency recorded No route recorded No set duration recorded No set duration amount recorded suspended No dosage strength recorded No dosage strength units of measure recorded      fluocinolone  (SYNALAR) 0.01 % external solution AAA of scalp twice a day PRN scalp itching or eczema flares. 60 mL 1    fluticasone (FLONASE) 50 mcg/actuation nasal spray 2 sprays by Each Nare route once daily. 1 Bottle 12    hydrocortisone 2.5 % cream Apply topically 2 (two) times daily. Mix with ketoconazole cream and AAA on face BID for up to 2 weeks at a time 30 g 1    ketoconazole (NIZORAL) 2 % cream Apply topically 2 (two) times daily. Mix with hydrocortisone cream and AAA on face BID for up to 2 weeks at a time 30 g 1    ketoconazole (NIZORAL) 2 % shampoo Apply topically every 7 days. Shampoo scalp 1-2 times per week. Lather x 5 minutes, rinse well. 120 mL 5    krill oil-omega-3-dha-epa 150-450 mg CpDR Take by mouth.      MAGNESIUM CITRATE ORAL Take by mouth.      multivitamin capsule Take 1 capsule by mouth Daily.      tiZANidine (ZANAFLEX) 4 MG tablet Take 0.5 tablets (2 mg total) by mouth nightly as needed (back pain / stiffness / cramps). 40 tablet 2    triamcinolone acetonide 0.1% (KENALOG) 0.1 % cream Apply topically 2 (two) times daily. 45 g 0    trospium (SANCTURA XR) 60 mg Cp24 capsule Take 1 capsule (60 mg total) by mouth once daily. 30 capsule 6    TURMERIC ORAL Take by mouth.      UNABLE TO FIND CBD Oil topical      ZINC ORAL Take by mouth.      azelastine (ASTELIN) 137 mcg (0.1 %) nasal spray 1 spray (137 mcg total) by Nasal route 2 (two) times daily. 30 mL 1    cholestyramine (QUESTRAN) 4 gram packet Take 1 packet (4 g total) by mouth daily as needed (diarrhea). 30 packet 5    famotidine (PEPCID) 40 MG tablet Take 1 tablet (40 mg total) by mouth every evening. 30 tablet 11     Current Facility-Administered Medications   Medication Dose Route Frequency Provider Last Rate Last Admin    triamcinolone acetonide injection 32 mg  32 mg Intra-articular 1 time in Clinic/HOD Dileep Mendoza MD           Review of patient's allergies indicates:   Allergen Reactions    Augmentin [amoxicillin-pot clavulanate] Other (See  Comments)     Diarrhea, yeast    Bactrim  [sulfamethoxazole-trimethoprim]      Other reaction(s): Unknown    Celebrex [celecoxib] Other (See Comments)     Stomach pain, gas     Lipitor  [atorvastatin]      Other reaction(s): Unknown    Pravachol  [pravastatin]      Other reaction(s): Unknown    Statins-hmg-coa reductase inhibitors      Other reaction(s): Muscle pain    Sulfa (sulfonamide antibiotics)      Other reaction(s): Swelling    Zoster vaccine live        Physical Exam  Vitals:    08/09/23 0826   BP: 116/69   Pulse: 69         General: Well-developed, well-nourished, in no acute distress  HEENT: Normocephalic, atraumatic, extraocular movements intact  Neck: Supple, no supraclavicular or cervical lymphadenopathy, trachea midline  Respirations: even and unlabored  Back: midline spine, No CVA tenderness  Abdomen: soft, Non-tender, non-distended, no palpable masses, no rebound or guarding  : 7/8/22-Normal external female genitalia without lesions. Orthotopic urethral meatus with caruncle. atrophic vaginal mucosa. No significant prolapse,  negative cough stress test, no urethral hypermobility  Extremities: moves all equally, no clubbing, cyanosis or edema  Skin: Warm and dry. No lesions  Psych: normal affect  Neuro: Alert and oriented x 3. Cranial nerves II-XII intact    PVR: 31cc 7/8/22    Urinalysis  Trace blood, trace LE      Assessment:  1. Mixed incontinence        2. OAB (overactive bladder)        3. Genitourinary syndrome of menopause        4. Microhematuria                  Plan:   Mixed incontinence    OAB (overactive bladder)    Genitourinary syndrome of menopause    Microhematuria      Continue trospium    Follow up in about 6 months (around 2/9/2024).

## 2023-08-10 ENCOUNTER — SPECIALTY PHARMACY (OUTPATIENT)
Dept: PHARMACY | Facility: CLINIC | Age: 78
End: 2023-08-10
Payer: MEDICARE

## 2023-08-10 NOTE — TELEPHONE ENCOUNTER
Outgoing call regarding repatha refill; per pt, she's due to inject on 8/11 and has a pen on hand; next injection is on 8/25, and pt was informed that [facility] will follow up on 8/18 to schedule delivery

## 2023-08-18 NOTE — TELEPHONE ENCOUNTER
Specialty Pharmacy - Refill Coordination    Specialty Medication Orders Linked to Encounter      Flowsheet Row Most Recent Value   Medication #1 evolocumab (REPATHA SURECLICK) 140 mg/mL PnIj (Order#[id number], Rx#[id number])            Refill Questions - Documented Responses      Flowsheet Row Most Recent Value   Patient Availability and HIPAA Verification    Does patient want to proceed with activity? Yes   HIPAA/medical authority confirmed? Yes   Relationship to patient of person spoken to? Self   Refill Screening Questions    Would patient like to speak to a pharmacist? No   When does the patient need to receive the medication? 08/25/23   Refill Delivery Questions    How will the patient receive the medication? [facility]   When does the patient need to receive the medication? 08/25/23   Shipping Address Home   Address in [facility] confirmed and updated if neccessary? Yes   Expected Copay ($) 0   Is the patient able to afford the medication copay? Yes   Payment Method zero copay   Days supply of Refill 28   Supplies needed? No supplies needed   Refill activity completed? Yes   Refill activity plan Refill scheduled   Shipment/Pickup Date: 08/22/23            Current Outpatient Medications   Medication Sig    acetaminophen/diphenhydramine (TYLENOL PM ORAL) Take by mouth.    APPLE CIDER VINEGAR ORAL Take by mouth.    artificial tears,hypromellose,,GENTEAL/SUSTANE, 0.3 % Gel     aspirin (ECOTRIN) 81 MG EC tablet Take 81 mg by mouth once daily.    azelastine (ASTELIN) 137 mcg (0.1 %) nasal spray 1 spray (137 mcg total) by Nasal route 2 (two) times daily.    b complex vitamins capsule Take 1 capsule by mouth once daily.    calcium carbonate (CALCIUM 500 ORAL) Take by mouth.    cholestyramine (QUESTRAN) 4 gram packet Take 1 packet (4 g total) by mouth daily as needed (diarrhea).    ciclopirox (PENLAC) 8 % Soln Apply topically nightly.    cyanocobalamin, vitamin B-12, (VITAMIN B-12 ORAL) Take by mouth.    denosumab  (PROLIA SUBQ) Inject into the skin.    ergocalciferol, vitamin D2, (VITAMIN D ORAL) Take by mouth.    evolocumab (REPATHA SURECLICK) 140 mg/mL PnIj Repatha SureClick Take No date recorded No form recorded No frequency recorded No route recorded No set duration recorded No set duration amount recorded active No dosage strength recorded No dosage strength units of measure recorded    evolocumab (REPATHA SURECLICK) 140 mg/mL PnIj Inject 1 pen (140 mg total) into the skin every 14 (fourteen) days.    ezetimibe (ZETIA) 10 mg tablet Take 1 tablet (10 mg total) by mouth once daily.    famotidine (PEPCID) 40 MG tablet Take 1 tablet (40 mg total) by mouth every evening.    flaxseed oiL 1,000 mg Cap flaxseed oil Take No date recorded No form recorded No frequency recorded No route recorded No set duration recorded No set duration amount recorded suspended No dosage strength recorded No dosage strength units of measure recorded    fluocinolone (SYNALAR) 0.01 % external solution AAA of scalp twice a day PRN scalp itching or eczema flares.    fluticasone (FLONASE) 50 mcg/actuation nasal spray 2 sprays by Each Nare route once daily.    hydrocortisone 2.5 % cream Apply topically 2 (two) times daily. Mix with ketoconazole cream and AAA on face BID for up to 2 weeks at a time    ketoconazole (NIZORAL) 2 % cream Apply topically 2 (two) times daily. Mix with hydrocortisone cream and AAA on face BID for up to 2 weeks at a time    ketoconazole (NIZORAL) 2 % shampoo Apply topically every 7 days. Shampoo scalp 1-2 times per week. Lather x 5 minutes, rinse well.    krill oil-omega-3-dha-epa 150-450 mg CpDR Take by mouth.    MAGNESIUM CITRATE ORAL Take by mouth.    multivitamin capsule Take 1 capsule by mouth Daily.    tiZANidine (ZANAFLEX) 4 MG tablet Take 0.5 tablets (2 mg total) by mouth nightly as needed (back pain / stiffness / cramps).    triamcinolone acetonide 0.1% (KENALOG) 0.1 % cream Apply topically 2 (two) times daily.     trospium (SANCTURA XR) 60 mg Cp24 capsule Take 1 capsule (60 mg total) by mouth once daily.    TURMERIC ORAL Take by mouth.    UNABLE TO FIND CBD Oil topical    ZINC ORAL Take by mouth.   Last reviewed on 8/9/2023  8:26 AM by Donna Doss, RN    Review of patient's allergies indicates:   Allergen Reactions    Augmentin [amoxicillin-pot clavulanate] Other (See Comments)     Diarrhea, yeast    Bactrim  [sulfamethoxazole-trimethoprim]      Other reaction(s): Unknown    Celebrex [celecoxib] Other (See Comments)     Stomach pain, gas     Lipitor  [atorvastatin]      Other reaction(s): Unknown    Pravachol  [pravastatin]      Other reaction(s): Unknown    Statins-hmg-coa reductase inhibitors      Other reaction(s): Muscle pain    Sulfa (sulfonamide antibiotics)      Other reaction(s): Swelling    Zoster vaccine live     Last reviewed on  8/9/2023 8:26 AM by Donna Doss      Tasks added this encounter   No tasks added.   Tasks due within next 3 months   8/18/2023 - Refill Coordination Outreach (1 time occurrence)     [facility]  [address]  [facility] [address]  Phone: [phone]  Fax: [phone]

## 2023-08-22 DIAGNOSIS — Z82.49 FAMILY HISTORY OF ARTERIOSCLEROTIC CARDIOVASCULAR DISEASE: ICD-10-CM

## 2023-08-22 DIAGNOSIS — G72.0 STATIN MYOPATHY: ICD-10-CM

## 2023-08-22 DIAGNOSIS — T46.6X5A STATIN MYOPATHY: ICD-10-CM

## 2023-08-22 DIAGNOSIS — R07.9 CHRONIC CHEST PAIN: ICD-10-CM

## 2023-08-22 DIAGNOSIS — Z76.89 ENCOUNTER TO ESTABLISH CARE: ICD-10-CM

## 2023-08-22 DIAGNOSIS — E78.00 PURE HYPERCHOLESTEROLEMIA: ICD-10-CM

## 2023-08-22 DIAGNOSIS — R06.02 SOB (SHORTNESS OF BREATH): ICD-10-CM

## 2023-08-22 DIAGNOSIS — E78.5 HYPERLIPIDEMIA, UNSPECIFIED HYPERLIPIDEMIA TYPE: ICD-10-CM

## 2023-08-22 DIAGNOSIS — R07.9 CHEST PAIN, MODERATE CORONARY ARTERY RISK: ICD-10-CM

## 2023-08-22 DIAGNOSIS — G89.29 CHRONIC CHEST PAIN: ICD-10-CM

## 2023-08-22 DIAGNOSIS — Z91.89 AT RISK FOR SLEEP APNEA: ICD-10-CM

## 2023-08-22 DIAGNOSIS — Z79.899 ENCOUNTER FOR LONG-TERM CURRENT USE OF MEDICATION: ICD-10-CM

## 2023-08-22 RX ORDER — EZETIMIBE 10 MG/1
10 TABLET ORAL
Qty: 90 TABLET | Refills: 3 | Status: SHIPPED | OUTPATIENT
Start: 2023-08-22

## 2023-09-20 ENCOUNTER — OFFICE VISIT (OUTPATIENT)
Dept: OPHTHALMOLOGY | Facility: CLINIC | Age: 78
End: 2023-09-20
Payer: MEDICARE

## 2023-09-20 DIAGNOSIS — H40.013 OPEN ANGLE WITH BORDERLINE FINDINGS, LOW RISK, BILATERAL: Primary | ICD-10-CM

## 2023-09-20 DIAGNOSIS — M35.01 KERATITIS SICCA, BILATERAL: ICD-10-CM

## 2023-09-20 DIAGNOSIS — Z96.1 PSEUDOPHAKIA OF BOTH EYES: ICD-10-CM

## 2023-09-20 PROCEDURE — 99999 PR PBB SHADOW E&M-EST. PATIENT-LVL III: ICD-10-PCS | Mod: PBBFAC,,, | Performed by: OPHTHALMOLOGY

## 2023-09-20 PROCEDURE — 1126F PR PAIN SEVERITY QUANTIFIED, NO PAIN PRESENT: ICD-10-PCS | Mod: CPTII,S$GLB,, | Performed by: OPHTHALMOLOGY

## 2023-09-20 PROCEDURE — 92083 EXTENDED VISUAL FIELD XM: CPT | Mod: S$GLB,,, | Performed by: OPHTHALMOLOGY

## 2023-09-20 PROCEDURE — 99999 PR PBB SHADOW E&M-EST. PATIENT-LVL III: CPT | Mod: PBBFAC,,, | Performed by: OPHTHALMOLOGY

## 2023-09-20 PROCEDURE — 1159F PR MEDICATION LIST DOCUMENTED IN MEDICAL RECORD: ICD-10-PCS | Mod: CPTII,S$GLB,, | Performed by: OPHTHALMOLOGY

## 2023-09-20 PROCEDURE — 92133 CPTRZD OPH DX IMG PST SGM ON: CPT | Mod: S$GLB,,, | Performed by: OPHTHALMOLOGY

## 2023-09-20 PROCEDURE — 1159F MED LIST DOCD IN RCRD: CPT | Mod: CPTII,S$GLB,, | Performed by: OPHTHALMOLOGY

## 2023-09-20 PROCEDURE — 92133 POSTERIOR SEGMENT OCT OPTIC NERVE(OCULAR COHERENCE TOMOGRAPHY) - OU - BOTH EYES: ICD-10-PCS | Mod: S$GLB,,, | Performed by: OPHTHALMOLOGY

## 2023-09-20 PROCEDURE — 99214 OFFICE O/P EST MOD 30 MIN: CPT | Mod: S$GLB,,, | Performed by: OPHTHALMOLOGY

## 2023-09-20 PROCEDURE — 99214 PR OFFICE/OUTPT VISIT, EST, LEVL IV, 30-39 MIN: ICD-10-PCS | Mod: S$GLB,,, | Performed by: OPHTHALMOLOGY

## 2023-09-20 PROCEDURE — 1126F AMNT PAIN NOTED NONE PRSNT: CPT | Mod: CPTII,S$GLB,, | Performed by: OPHTHALMOLOGY

## 2023-09-20 PROCEDURE — 92083 HUMPHREY VISUAL FIELD - OU - BOTH EYES: ICD-10-PCS | Mod: S$GLB,,, | Performed by: OPHTHALMOLOGY

## 2023-09-20 PROCEDURE — 1160F RVW MEDS BY RX/DR IN RCRD: CPT | Mod: CPTII,S$GLB,, | Performed by: OPHTHALMOLOGY

## 2023-09-20 PROCEDURE — 1160F PR REVIEW ALL MEDS BY PRESCRIBER/CLIN PHARMACIST DOCUMENTED: ICD-10-PCS | Mod: CPTII,S$GLB,, | Performed by: OPHTHALMOLOGY

## 2023-09-20 NOTE — PROGRESS NOTES
HPI     Glaucoma            Comments: Yearly follow up with HVF-24-2 and GCOT. VA changed since last   year, More blurry and kind of seeing double. Denied pain or irritation.   Onlt using AT's gtt.          Comments    PCP: Dr. Lemus   1. COAG SUSP   SLT OD 3/26/15   SLT OS 5/15   2. Dry Eyes   3. PCIOL OD 3/4/2019 (+28.0 SN60WF) CDE 10.38 -NEAR   PCIOL OS +25.5 SN60WF / CDE: 9.46 / 2-4-19 2-5-19       GCL: 32/31        Pataday PRN   Systane BID   In the past Pt wore cls for monovision- then changed to MF cls            Last edited by Iam Dunlap on 9/20/2023 10:09 AM.            Assessment /Plan     For exam results, see Encounter Report.      ICD-10-CM ICD-9-CM    1. Open angle with borderline findings, low risk, bilateral  H40.013 365.01 Kumar Visual Field - OU - Extended - Both Eyes      Posterior Segment OCT Optic Nerve- Both eyes    Glaucoma risk level is unchanged at this time and patient will continued to be followed.       2. Keratitis sicca, bilateral  M35.01 370.8 Continue Sytane PRN      3. Pseudophakia of both eyes  Z96.1 V43.1 Monitor           Return to clinic 1 year with HVF 24-2, GOCT and DFE

## 2023-09-26 ENCOUNTER — LAB VISIT (OUTPATIENT)
Dept: LAB | Facility: HOSPITAL | Age: 78
End: 2023-09-26
Attending: INTERNAL MEDICINE
Payer: MEDICARE

## 2023-09-26 DIAGNOSIS — M79.10 MYALGIA: ICD-10-CM

## 2023-09-26 LAB
ALBUMIN SERPL BCP-MCNC: 4 G/DL (ref 3.5–5.2)
ALP SERPL-CCNC: 109 U/L (ref 55–135)
ALT SERPL W/O P-5'-P-CCNC: 17 U/L (ref 10–44)
ANION GAP SERPL CALC-SCNC: 12 MMOL/L (ref 8–16)
AST SERPL-CCNC: 22 U/L (ref 10–40)
BILIRUB SERPL-MCNC: 0.4 MG/DL (ref 0.1–1)
BUN SERPL-MCNC: 12 MG/DL (ref 8–23)
CALCIUM SERPL-MCNC: 9.8 MG/DL (ref 8.7–10.5)
CHLORIDE SERPL-SCNC: 110 MMOL/L (ref 95–110)
CO2 SERPL-SCNC: 20 MMOL/L (ref 23–29)
CREAT SERPL-MCNC: 0.8 MG/DL (ref 0.5–1.4)
EST. GFR  (NO RACE VARIABLE): >60 ML/MIN/1.73 M^2
GLUCOSE SERPL-MCNC: 99 MG/DL (ref 70–110)
POTASSIUM SERPL-SCNC: 4 MMOL/L (ref 3.5–5.1)
PROT SERPL-MCNC: 7.2 G/DL (ref 6–8.4)
SODIUM SERPL-SCNC: 142 MMOL/L (ref 136–145)

## 2023-09-26 PROCEDURE — 80053 COMPREHEN METABOLIC PANEL: CPT | Performed by: INTERNAL MEDICINE

## 2023-09-26 PROCEDURE — 36415 COLL VENOUS BLD VENIPUNCTURE: CPT | Mod: PO | Performed by: INTERNAL MEDICINE

## 2023-10-03 ENCOUNTER — TELEPHONE (OUTPATIENT)
Dept: RHEUMATOLOGY | Facility: CLINIC | Age: 78
End: 2023-10-03
Payer: MEDICARE

## 2023-10-03 ENCOUNTER — OFFICE VISIT (OUTPATIENT)
Dept: RHEUMATOLOGY | Facility: CLINIC | Age: 78
End: 2023-10-03
Payer: MEDICARE

## 2023-10-03 VITALS — WEIGHT: 172.38 LBS | HEIGHT: 62 IN | BODY MASS INDEX: 31.72 KG/M2

## 2023-10-03 DIAGNOSIS — M15.9 PRIMARY OSTEOARTHRITIS INVOLVING MULTIPLE JOINTS: ICD-10-CM

## 2023-10-03 DIAGNOSIS — M17.0 PRIMARY OSTEOARTHRITIS OF BOTH KNEES: ICD-10-CM

## 2023-10-03 DIAGNOSIS — M81.0 AGE-RELATED OSTEOPOROSIS WITHOUT CURRENT PATHOLOGICAL FRACTURE: Primary | ICD-10-CM

## 2023-10-03 DIAGNOSIS — J30.2 SEASONAL ALLERGIC RHINITIS, UNSPECIFIED TRIGGER: ICD-10-CM

## 2023-10-03 DIAGNOSIS — G89.29 CHRONIC LOW BACK PAIN, UNSPECIFIED BACK PAIN LATERALITY, UNSPECIFIED WHETHER SCIATICA PRESENT: ICD-10-CM

## 2023-10-03 DIAGNOSIS — H40.019 OPEN ANGLE WITH BORDERLINE FINDINGS, LOW RISK: ICD-10-CM

## 2023-10-03 DIAGNOSIS — M35.01 KERATITIS SICCA, BILATERAL: ICD-10-CM

## 2023-10-03 DIAGNOSIS — E66.9 OBESITY (BMI 30.0-34.9): ICD-10-CM

## 2023-10-03 DIAGNOSIS — M79.10 MYALGIA: ICD-10-CM

## 2023-10-03 DIAGNOSIS — M54.50 CHRONIC LOW BACK PAIN, UNSPECIFIED BACK PAIN LATERALITY, UNSPECIFIED WHETHER SCIATICA PRESENT: ICD-10-CM

## 2023-10-03 PROCEDURE — 3288F PR FALLS RISK ASSESSMENT DOCUMENTED: ICD-10-PCS | Mod: CPTII,S$GLB,, | Performed by: INTERNAL MEDICINE

## 2023-10-03 PROCEDURE — 1125F PR PAIN SEVERITY QUANTIFIED, PAIN PRESENT: ICD-10-PCS | Mod: CPTII,S$GLB,, | Performed by: INTERNAL MEDICINE

## 2023-10-03 PROCEDURE — 99999 PR PBB SHADOW E&M-EST. PATIENT-LVL IV: ICD-10-PCS | Mod: PBBFAC,,, | Performed by: INTERNAL MEDICINE

## 2023-10-03 PROCEDURE — 1100F PTFALLS ASSESS-DOCD GE2>/YR: CPT | Mod: CPTII,S$GLB,, | Performed by: INTERNAL MEDICINE

## 2023-10-03 PROCEDURE — 99214 OFFICE O/P EST MOD 30 MIN: CPT | Mod: S$GLB,,, | Performed by: INTERNAL MEDICINE

## 2023-10-03 PROCEDURE — 99214 PR OFFICE/OUTPT VISIT, EST, LEVL IV, 30-39 MIN: ICD-10-PCS | Mod: S$GLB,,, | Performed by: INTERNAL MEDICINE

## 2023-10-03 PROCEDURE — 3288F FALL RISK ASSESSMENT DOCD: CPT | Mod: CPTII,S$GLB,, | Performed by: INTERNAL MEDICINE

## 2023-10-03 PROCEDURE — 99999 PR PBB SHADOW E&M-EST. PATIENT-LVL IV: CPT | Mod: PBBFAC,,, | Performed by: INTERNAL MEDICINE

## 2023-10-03 PROCEDURE — 1125F AMNT PAIN NOTED PAIN PRSNT: CPT | Mod: CPTII,S$GLB,, | Performed by: INTERNAL MEDICINE

## 2023-10-03 PROCEDURE — 1159F MED LIST DOCD IN RCRD: CPT | Mod: CPTII,S$GLB,, | Performed by: INTERNAL MEDICINE

## 2023-10-03 PROCEDURE — 1159F PR MEDICATION LIST DOCUMENTED IN MEDICAL RECORD: ICD-10-PCS | Mod: CPTII,S$GLB,, | Performed by: INTERNAL MEDICINE

## 2023-10-03 PROCEDURE — 1100F PR PT FALLS ASSESS DOC 2+ FALLS/FALL W/INJURY/YR: ICD-10-PCS | Mod: CPTII,S$GLB,, | Performed by: INTERNAL MEDICINE

## 2023-10-03 RX ORDER — TIZANIDINE 4 MG/1
2 TABLET ORAL NIGHTLY PRN
Qty: 90 TABLET | Refills: 0 | Status: SHIPPED | OUTPATIENT
Start: 2023-10-03 | End: 2024-01-01

## 2023-10-03 NOTE — TELEPHONE ENCOUNTER
----- Message from Ira Strong sent at 10/3/2023  3:22 PM CDT -----  Regarding: running late  Name of who is calling:   Jessica      What is the request in detail: Pt is requesting a call back in ref to running late due to traffic. She is running 10 mins late      Can the clinic reply by [facility]:no      What number to call back if not [facility]:[phone]

## 2023-10-03 NOTE — PROGRESS NOTES
RHEUMATOLOGY OUTPATIENT CLINIC NOTE    10/3/2023    Attending Rheumatologist: Dileep Mendoza  Primary Care Provider/Physician Requesting Consultation: Davidson Jang MD   Chief Complaint/Reason For Consultation:  Osteoporosis, Myalgia, and Osteoarthritis      Subjective:     Jessica Perez is a 78 y.o. White female with OP for f/u    No acute complaints.     Review of Systems   Constitutional:  Negative for fever.   Eyes:  Negative for photophobia and pain.   Respiratory:  Negative for cough and shortness of breath.    Gastrointestinal:  Negative for blood in stool and melena.   Genitourinary:  Negative for dysuria, hematuria and urgency.   Musculoskeletal:  Positive for back pain and joint pain. Negative for falls.   Skin:  Negative for rash.   Neurological:  Negative for focal weakness, weakness and headaches.       Chronic comorbid conditions affecting medical decision making today:  Past Medical History:   Diagnosis Date    Acute pain of left knee 3/22/2021    Arthritis     Arthritis of knee 2/9/2021    At risk for sleep apnea 9/19/2019    Balance problem 10/21/2021    Bilateral primary osteoarthritis of knee 4/4/2018    Chest pain, moderate coronary artery risk 8/8/2019    Chronic midline low back pain without sciatica 11/30/2020    Chronic pain of both knees 3/23/2021    Costochondritis 1/20/2020    Depression, major, recurrent, mild     Diarrhea 5/11/2022    Difficulty walking 11/30/2020    Difficulty walking 11/30/2020    LANRE (generalized anxiety disorder) 3/22/2021    Gait, antalgic 10/21/2021    Generalized weakness 11/30/2020    Generalized weakness 11/30/2020    Glaucoma     HLD (hyperlipidemia)     Lower extremity weakness 10/21/2021    Muscle cramps 5/23/2018    Osteopenia of multiple sites 4/2/2018    Primary osteoarthritis of right knee 05/30/2018    Primary snoring     Rash 5/11/2022    Sleep related leg cramps 9/7/2010    SOB (shortness of breath) 8/8/2019    Unspecified gastritis and  gastroduodenitis without mention of hemorrhage 11/28/2010     Dx updated per 2019 IMO Load    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 Benign.    CATARACT EXTRACTION W/  INTRAOCULAR LENS IMPLANT Left 02/04/2019    CATARACT EXTRACTION W/  INTRAOCULAR LENS IMPLANT Right 03/04/2019    CHOLECYSTECTOMY      COLONOSCOPY N/A 06/29/2022    Procedure: COLONOSCOPY;  Surgeon: Nichole Kelly MD;  Location: [facility] ENDO;  Service: Endoscopy;  Laterality: N/A;    DILATION AND CURETTAGE OF UTERUS      finger surgery      middle finger on right hand    GALLBLADDER SURGERY      INJECTION OF ANESTHETIC AGENT AROUND NERVE Bilateral 10/01/2021    Procedure: Bilateral Genicular nerve block -;  Surgeon: Primo Bond MD;  Location: [facility] PAIN MGT;  Service: Pain Management;  Laterality: Bilateral;    RADIOFREQUENCY THERMOCOAGULATION Left 11/05/2021    Procedure: Left Genicular Nerve RFA (COOLIEF) with RN IV sedation;  Surgeon: Primo Bond MD;  Location: [facility] PAIN MGT;  Service: Pain Management;  Laterality: Left;    TONSILLECTOMY, ADENOIDECTOMY      TUBAL LIGATION       Family History   Problem Relation Age of Onset    Diabetes Father     Cancer Father         prostate ca    Hypertension Father     Cancer Sister         cervical ca    Hepatitis Sister     Dementia Mother     Osteoporosis Mother     Breast cancer Paternal Aunt      Social History     Tobacco Use   Smoking Status Former    Current packs/day: 0.50    Average packs/day: 0.5 packs/day for 12.0 years (6.0 ttl pk-yrs)    Types: Cigarettes   Smokeless Tobacco Never       Current Outpatient Medications:     acetaminophen/diphenhydramine (TYLENOL PM ORAL), Take by mouth., Disp: , Rfl:     APPLE CIDER VINEGAR ORAL, Take by mouth., Disp: , Rfl:     artificial tears,hypromellose,,GENTEAL/SUSTANE, 0.3 % Gel, , Disp: , Rfl:     aspirin  (ECOTRIN) 81 MG EC tablet, Take 81 mg by mouth once daily., Disp: , Rfl:     azelastine (ASTELIN) 137 mcg (0.1 %) nasal spray, 1 spray (137 mcg total) by Nasal route 2 (two) times daily., Disp: 30 mL, Rfl: 1    b complex vitamins capsule, Take 1 capsule by mouth once daily., Disp: , Rfl:     calcium carbonate (CALCIUM 500 ORAL), Take by mouth., Disp: , Rfl:     cholestyramine (QUESTRAN) 4 gram packet, Take 1 packet (4 g total) by mouth daily as needed (diarrhea)., Disp: 30 packet, Rfl: 5    ciclopirox (PENLAC) 8 % Soln, Apply topically nightly., Disp: 6.6 mL, Rfl: 11    cyanocobalamin, vitamin B-12, (VITAMIN B-12 ORAL), Take by mouth., Disp: , Rfl:     denosumab (PROLIA SUBQ), Inject into the skin., Disp: , Rfl:     ergocalciferol, vitamin D2, (VITAMIN D ORAL), Take by mouth., Disp: , Rfl:     evolocumab (REPATHA SURECLICK) 140 mg/mL PnIj, Repatha SureClick Take No date recorded No form recorded No frequency recorded No route recorded No set duration recorded No set duration amount recorded active No dosage strength recorded No dosage strength units of measure recorded, Disp: , Rfl:     evolocumab (REPATHA SURECLICK) 140 mg/mL PnIj, Inject 1 pen (140 mg total) into the skin every 14 (fourteen) days., Disp: 2 mL, Rfl: 6    ezetimibe (ZETIA) 10 mg tablet, TAKE 1 TABLET BY MOUTH ONCE DAILY, Disp: 90 tablet, Rfl: 3    famotidine (PEPCID) 40 MG tablet, Take 1 tablet (40 mg total) by mouth every evening., Disp: 30 tablet, Rfl: 11    flaxseed oiL 1,000 mg Cap, flaxseed oil Take No date recorded No form recorded No frequency recorded No route recorded No set duration recorded No set duration amount recorded suspended No dosage strength recorded No dosage strength units of measure recorded, Disp: , Rfl:     fluocinolone (SYNALAR) 0.01 % external solution, AAA of scalp twice a day PRN scalp itching or eczema flares., Disp: 60 mL, Rfl: 1    fluticasone (FLONASE) 50 mcg/actuation nasal spray, 2 sprays by Each Nare route once  daily., Disp: 1 Bottle, Rfl: 12    hydrocortisone 2.5 % cream, Apply topically 2 (two) times daily. Mix with ketoconazole cream and AAA on face BID for up to 2 weeks at a time, Disp: 30 g, Rfl: 1    ketoconazole (NIZORAL) 2 % cream, Apply topically 2 (two) times daily. Mix with hydrocortisone cream and AAA on face BID for up to 2 weeks at a time, Disp: 30 g, Rfl: 1    ketoconazole (NIZORAL) 2 % shampoo, Apply topically every 7 days. Shampoo scalp 1-2 times per week. Lather x 5 minutes, rinse well., Disp: 120 mL, Rfl: 5    krill oil-omega-3-dha-epa 150-450 mg CpDR, Take by mouth., Disp: , Rfl:     MAGNESIUM CITRATE ORAL, Take by mouth., Disp: , Rfl:     multivitamin capsule, Take 1 capsule by mouth Daily., Disp: , Rfl:     tiZANidine (ZANAFLEX) 4 MG tablet, Take 0.5 tablets (2 mg total) by mouth nightly as needed (back pain / stiffness / cramps)., Disp: 40 tablet, Rfl: 2    triamcinolone acetonide 0.1% (KENALOG) 0.1 % cream, Apply topically 2 (two) times daily., Disp: 45 g, Rfl: 0    trospium (SANCTURA XR) 60 mg Cp24 capsule, Take 1 capsule (60 mg total) by mouth once daily., Disp: 30 capsule, Rfl: 6    TURMERIC ORAL, Take by mouth., Disp: , Rfl:     UNABLE TO FIND, CBD Oil topical, Disp: , Rfl:     ZINC ORAL, Take by mouth., Disp: , Rfl:     Current Facility-Administered Medications:     triamcinolone acetonide injection 32 mg, 32 mg, Intra-articular, 1 time in Clinic/HOD, Dileep Mendoza MD     Objective:     There were no vitals filed for this visit.  Physical Exam   Constitutional: She appears obese.   Eyes: Conjunctivae are normal.   Pulmonary/Chest: Effort normal. No respiratory distress.   Musculoskeletal:         General: Deformity present. No swelling or tenderness. Normal range of motion.   Neurological: She displays no weakness.   Skin: No rash noted.       Reviewed available old and all outside pertinent medical records available.    All lab results personally reviewed and interpreted by me.        ASSESSMENT      Encounter Diagnoses   Name Primary?    Age-related osteoporosis without current pathological fracture Yes    Chronic low back pain, unspecified back pain laterality, unspecified whether sciatica present     Myalgia     Open angle with borderline findings, low risk - Both Eyes     Keratitis sicca, bilateral     Seasonal allergic rhinitis, unspecified trigger     Obesity (BMI 30.0-34.9)     Primary osteoarthritis of both knees     Primary osteoarthritis involving multiple joints       PLAN     Myopathy workup negative.  Intermittent, self limited lower body arthralgias and back pain w/o alarm s/s.  Exam non focal w/o weakness, inflammatory rashes, or active synovitis.  Kidney function stable.  No hypocalcemia.  Muscle chemistries WNR.  Negative HELGA.  RA serologies negative.  Myomarker panel negative. HMGCOar ab negative.  Anti IBM negative.  DJD/DDD changes on imaging.  Repeat DXA with improved osteopenic values.  Plan to continue osteoporosis drug holiday.  CA/vit D supp.  Repeat labs close to f/u.  OA sx may improve with antiseizure medication, antidepressant, muscle relaxant.  Would benefit from weight loss, low impact aerobic exercise regularly.  Zanaflex refills provided.    Dileep Mendoza M.D.

## 2023-10-03 NOTE — TELEPHONE ENCOUNTER
"Message received after patient arrived at the clinic.     Marah Mayorga (Allye), [facility]  Rheumatology Department   "

## 2023-11-03 ENCOUNTER — TELEPHONE (OUTPATIENT)
Dept: INTERNAL MEDICINE | Facility: CLINIC | Age: 78
End: 2023-11-03
Payer: MEDICARE

## 2023-11-03 NOTE — TELEPHONE ENCOUNTER
1st attempt to contact pt to reschedule appt with Dr Jang due to his leaving.  LM offering to schedule est care/annual appt with new pcp.

## 2023-11-06 NOTE — TELEPHONE ENCOUNTER
2nd attempt to contact pt to reschedule appt with Dr Jang due to his leaving.  Advised pt of dr roman and need to est care with new pcp. Appt scheduled, 5/02/24 with Dr Lemus.

## 2023-11-08 DIAGNOSIS — E78.00 PURE HYPERCHOLESTEROLEMIA: ICD-10-CM

## 2023-11-08 RX ORDER — EVOLOCUMAB 140 MG/ML
140 INJECTION, SOLUTION SUBCUTANEOUS
Qty: 2 ML | Refills: 6 | Status: ACTIVE | OUTPATIENT
Start: 2023-11-08

## 2024-01-12 DIAGNOSIS — R00.2 PALPITATIONS: Primary | ICD-10-CM

## 2024-01-12 DIAGNOSIS — E78.5 HYPERLIPIDEMIA, UNSPECIFIED HYPERLIPIDEMIA TYPE: ICD-10-CM

## 2024-01-12 DIAGNOSIS — R06.02 SOB (SHORTNESS OF BREATH): ICD-10-CM

## 2024-01-16 ENCOUNTER — TELEPHONE (OUTPATIENT)
Dept: INTERNAL MEDICINE | Facility: CLINIC | Age: 79
End: 2024-01-16
Payer: MEDICARE

## 2024-01-16 ENCOUNTER — HOSPITAL ENCOUNTER (OUTPATIENT)
Dept: CARDIOLOGY | Facility: HOSPITAL | Age: 79
Discharge: HOME | End: 2024-01-16
Attending: STUDENT IN AN ORGANIZED HEALTH CARE EDUCATION/TRAINING PROGRAM
Payer: MEDICARE

## 2024-01-16 ENCOUNTER — OFFICE VISIT (OUTPATIENT)
Dept: CARDIOLOGY | Facility: CLINIC | Age: 79
End: 2024-01-16
Payer: MEDICARE

## 2024-01-16 VITALS
HEART RATE: 73 BPM | BODY MASS INDEX: 32.46 KG/M2 | DIASTOLIC BLOOD PRESSURE: 70 MMHG | SYSTOLIC BLOOD PRESSURE: 130 MMHG | HEIGHT: 62 IN | WEIGHT: 176.38 LBS

## 2024-01-16 DIAGNOSIS — R06.02 SOB (SHORTNESS OF BREATH): ICD-10-CM

## 2024-01-16 DIAGNOSIS — R06.02 SOB (SHORTNESS OF BREATH): Primary | ICD-10-CM

## 2024-01-16 DIAGNOSIS — Z13.9 SCREENING DUE: ICD-10-CM

## 2024-01-16 DIAGNOSIS — R00.2 PALPITATIONS: ICD-10-CM

## 2024-01-16 DIAGNOSIS — Z12.31 ENCOUNTER FOR SCREENING MAMMOGRAM FOR MALIGNANT NEOPLASM OF BREAST: ICD-10-CM

## 2024-01-16 DIAGNOSIS — Z82.49 FAMILY HISTORY OF ARTERIOSCLEROTIC CARDIOVASCULAR DISEASE: ICD-10-CM

## 2024-01-16 DIAGNOSIS — E78.5 HYPERLIPIDEMIA, UNSPECIFIED HYPERLIPIDEMIA TYPE: ICD-10-CM

## 2024-01-16 DIAGNOSIS — E66.9 OBESITY (BMI 30.0-34.9): ICD-10-CM

## 2024-01-16 DIAGNOSIS — E78.00 PURE HYPERCHOLESTEROLEMIA: ICD-10-CM

## 2024-01-16 DIAGNOSIS — G47.33 OSA (OBSTRUCTIVE SLEEP APNEA): ICD-10-CM

## 2024-01-16 DIAGNOSIS — M17.0 PRIMARY OSTEOARTHRITIS OF BOTH KNEES: ICD-10-CM

## 2024-01-16 DIAGNOSIS — Z12.31 SCREENING MAMMOGRAM FOR BREAST CANCER: Primary | ICD-10-CM

## 2024-01-16 PROCEDURE — 1126F AMNT PAIN NOTED NONE PRSNT: CPT | Mod: CPTII,S$GLB,, | Performed by: STUDENT IN AN ORGANIZED HEALTH CARE EDUCATION/TRAINING PROGRAM

## 2024-01-16 PROCEDURE — 3288F FALL RISK ASSESSMENT DOCD: CPT | Mod: CPTII,S$GLB,, | Performed by: STUDENT IN AN ORGANIZED HEALTH CARE EDUCATION/TRAINING PROGRAM

## 2024-01-16 PROCEDURE — 99999 PR PBB SHADOW E&M-EST. PATIENT-LVL V: CPT | Mod: PBBFAC,,, | Performed by: STUDENT IN AN ORGANIZED HEALTH CARE EDUCATION/TRAINING PROGRAM

## 2024-01-16 PROCEDURE — 3078F DIAST BP <80 MM HG: CPT | Mod: CPTII,S$GLB,, | Performed by: STUDENT IN AN ORGANIZED HEALTH CARE EDUCATION/TRAINING PROGRAM

## 2024-01-16 PROCEDURE — 99214 OFFICE O/P EST MOD 30 MIN: CPT | Mod: S$GLB,,, | Performed by: STUDENT IN AN ORGANIZED HEALTH CARE EDUCATION/TRAINING PROGRAM

## 2024-01-16 PROCEDURE — 1159F MED LIST DOCD IN RCRD: CPT | Mod: CPTII,S$GLB,, | Performed by: STUDENT IN AN ORGANIZED HEALTH CARE EDUCATION/TRAINING PROGRAM

## 2024-01-16 PROCEDURE — 93005 ELECTROCARDIOGRAM TRACING: CPT | Mod: PO

## 2024-01-16 PROCEDURE — 93010 ELECTROCARDIOGRAM REPORT: CPT | Mod: ,,, | Performed by: INTERNAL MEDICINE

## 2024-01-16 PROCEDURE — 3075F SYST BP GE 130 - 139MM HG: CPT | Mod: CPTII,S$GLB,, | Performed by: STUDENT IN AN ORGANIZED HEALTH CARE EDUCATION/TRAINING PROGRAM

## 2024-01-16 PROCEDURE — 1101F PT FALLS ASSESS-DOCD LE1/YR: CPT | Mod: CPTII,S$GLB,, | Performed by: STUDENT IN AN ORGANIZED HEALTH CARE EDUCATION/TRAINING PROGRAM

## 2024-01-16 NOTE — TELEPHONE ENCOUNTER
----- Message from Vane Lynn sent at 1/16/2024 10:50 AM CST -----  Regarding: schedule mammogram  Pt was a pt of Dr Yeboah and will be switching to Dr Lemus in May, but she said she is due for a mammogram in January. Can yall call her to schedule this please?     Thanks,  Vane MORGAN    
Pt requesting mammo   Last mammo 1/25/23  
none

## 2024-01-16 NOTE — PROGRESS NOTES
Section of Cardiology                  Cardiac Clinic Note      HPI:   Jessica Perez is a 79 y.o. female with h/o MARC, OA, HLD (on repatha), obesity who comes in to cardiology clinic for evaluation.       1/16/24  Was seeing Dr. Obrien in cardiology clinic  Fell last night in the tub and hit the right side of her chest   Has difficulty breathing when she takes a deep breath in, it hurts   Reports SOB with vacuuming and going up stairs prior to her falling  Noticeable in the last month  Wears compression stockings   Does not exercise due to significant knee pain   Has not been active  ECG compared of carbs, sodas, sleep    Supposed to be on prolia for over a year, not on any osteoporosis medication     Stopped smoking in her 30s after her kid   Drinks 1 glass of wine nightly    Family history:  Father with heart disease    EKG 1/16/24 NSR, sinus arrhythmia    ECHO  No results found for this or any previous visit.       STRESS TEST No results found for this or any previous visit.       C No results found for this or any previous visit.            ROS: All 10 systems reviewed. Please refer to the HPI for pertinent positives. All other systems negative.     Past Medical History  Past Medical History:   Diagnosis Date    Acute pain of left knee 3/22/2021    Arthritis     Arthritis of knee 2/9/2021    At risk for sleep apnea 9/19/2019    Balance problem 10/21/2021    Bilateral primary osteoarthritis of knee 4/4/2018    Chest pain, moderate coronary artery risk 8/8/2019    Chronic midline low back pain without sciatica 11/30/2020    Chronic pain of both knees 3/23/2021    Costochondritis 1/20/2020    Depression, major, recurrent, mild     Diarrhea 5/11/2022    Difficulty walking 11/30/2020    Difficulty walking 11/30/2020    LANRE (generalized anxiety disorder) 3/22/2021    Gait, antalgic 10/21/2021    Generalized weakness 11/30/2020    Generalized weakness 11/30/2020    Glaucoma     HLD  (hyperlipidemia)     Lower extremity weakness 10/21/2021    Muscle cramps 5/23/2018    Osteopenia of multiple sites 4/2/2018    Primary osteoarthritis of right knee 05/30/2018    Primary snoring     Rash 5/11/2022    Sleep related leg cramps 9/7/2010    SOB (shortness of breath) 8/8/2019    Unspecified gastritis and gastroduodenitis without mention of hemorrhage 11/28/2010     Dx updated per 2019 IMO Load    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 Benign.    CATARACT EXTRACTION W/  INTRAOCULAR LENS IMPLANT Left 02/04/2019    CATARACT EXTRACTION W/  INTRAOCULAR LENS IMPLANT Right 03/04/2019    CHOLECYSTECTOMY      COLONOSCOPY N/A 06/29/2022    Procedure: COLONOSCOPY;  Surgeon: Nichole Kelly MD;  Location: [facility] ENDO;  Service: Endoscopy;  Laterality: N/A;    DILATION AND CURETTAGE OF UTERUS      finger surgery      middle finger on right hand    GALLBLADDER SURGERY      INJECTION OF ANESTHETIC AGENT AROUND NERVE Bilateral 10/01/2021    Procedure: Bilateral Genicular nerve block -;  Surgeon: Primo Bond MD;  Location: [facility] PAIN MGT;  Service: Pain Management;  Laterality: Bilateral;    RADIOFREQUENCY THERMOCOAGULATION Left 11/05/2021    Procedure: Left Genicular Nerve RFA (COOLIEF) with RN IV sedation;  Surgeon: Primo Bond MD;  Location: [facility] PAIN MGT;  Service: Pain Management;  Laterality: Left;    TONSILLECTOMY, ADENOIDECTOMY      TUBAL LIGATION            Allergies:   Review of patient's allergies indicates:   Allergen Reactions    Augmentin [amoxicillin-pot clavulanate] Other (See Comments)     Diarrhea, yeast    Bactrim  [sulfamethoxazole-trimethoprim]      Other reaction(s): Unknown    Celebrex [celecoxib] Other (See Comments)     Stomach pain, gas     Lipitor  [atorvastatin]      Other reaction(s): Unknown    Pravachol  [pravastatin]       Other reaction(s): Unknown    Statins-hmg-coa reductase inhibitors      Other reaction(s): Muscle pain    Sulfa (sulfonamide antibiotics)      Other reaction(s): Swelling    Zoster vaccine live        Social History:  Social History     Socioeconomic History    Marital status:    Tobacco Use    Smoking status: Former     Current packs/day: 0.50     Average packs/day: 0.5 packs/day for 12.0 years (6.0 ttl pk-yrs)     Types: Cigarettes    Smokeless tobacco: Never   Substance and Sexual Activity    Alcohol use: Yes     Comment: occasionally    Drug use: No    Sexual activity: Yes     Social Determinants of Health     Financial Resource Strain: Low Risk  (6/30/2019)    Overall Financial Resource Strain (CARDIA)     Difficulty of Paying Living Expenses: Not hard at all   Food Insecurity: No Food Insecurity (6/30/2019)    Hunger Vital Sign     Worried About Running Out of Food in the Last Year: Never true     Ran Out of Food in the Last Year: Never true   Transportation Needs: Unknown (6/30/2019)    PRAPARE - Transportation     Lack of Transportation (Non-Medical): No   Physical Activity: Insufficiently Active (6/30/2019)    Exercise Vital Sign     Days of Exercise per Week: 2 days     Minutes of Exercise per Session: 40 min   Stress: Stress Concern Present (6/30/2019)    [place] [employer] - Occupational Stress Questionnaire     Feeling of Stress : Rather much   Social Connections: Unknown (6/30/2019)    Social Connection and Isolation Panel [NHANES]     Frequency of Communication with Friends and Family: More than three times a week     Frequency of Social Gatherings with Friends and Family: Once a week     Active Member of Clubs or Organizations: Yes     Attends Club or Organization Meetings: More than 4 times per year     Marital Status:        Family History:  family history includes Breast cancer in her paternal aunt; Cancer in her father and sister; Dementia in her mother;  Diabetes in her father; Hepatitis in her sister; Hypertension in her father; Osteoporosis in her mother.    Home Medications:  Current Outpatient Medications on File Prior to Visit   Medication Sig Dispense Refill    acetaminophen/diphenhydramine (TYLENOL PM ORAL) Take by mouth.      APPLE CIDER VINEGAR ORAL Take by mouth.      artificial tears,hypromellose,,GENTEAL/SUSTANE, 0.3 % Gel       aspirin (ECOTRIN) 81 MG EC tablet Take 81 mg by mouth once daily.      b complex vitamins capsule Take 1 capsule by mouth once daily.      calcium carbonate (CALCIUM 500 ORAL) Take by mouth.      ciclopirox (PENLAC) 8 % Soln Apply topically nightly. 6.6 mL 11    cyanocobalamin, vitamin B-12, (VITAMIN B-12 ORAL) Take by mouth.      denosumab (PROLIA SUBQ) Inject into the skin.      ergocalciferol, vitamin D2, (VITAMIN D ORAL) Take by mouth.      evolocumab (REPATHA SURECLICK) 140 mg/mL PnIj Repatha SureClick Take No date recorded No form recorded No frequency recorded No route recorded No set duration recorded No set duration amount recorded active No dosage strength recorded No dosage strength units of measure recorded      evolocumab (REPATHA SURECLICK) 140 mg/mL PnIj Inject 1 pen (140 mg total) into the skin every 14 (fourteen) days. 2 mL 6    ezetimibe (ZETIA) 10 mg tablet TAKE 1 TABLET BY MOUTH ONCE DAILY 90 tablet 3    flaxseed oiL 1,000 mg Cap flaxseed oil Take No date recorded No form recorded No frequency recorded No route recorded No set duration recorded No set duration amount recorded suspended No dosage strength recorded No dosage strength units of measure recorded      fluocinolone (SYNALAR) 0.01 % external solution AAA of scalp twice a day PRN scalp itching or eczema flares. 60 mL 1    fluticasone (FLONASE) 50 mcg/actuation nasal spray 2 sprays by Each Nare route once daily. 1 Bottle 12    hydrocortisone 2.5 % cream Apply topically 2 (two) times daily. Mix with ketoconazole cream and AAA on face BID for up to 2  "weeks at a time 30 g 1    ketoconazole (NIZORAL) 2 % cream Apply topically 2 (two) times daily. Mix with hydrocortisone cream and AAA on face BID for up to 2 weeks at a time 30 g 1    ketoconazole (NIZORAL) 2 % shampoo Apply topically every 7 days. Shampoo scalp 1-2 times per week. Lather x 5 minutes, rinse well. 120 mL 5    krill oil-omega-3-dha-epa 150-450 mg CpDR Take by mouth.      MAGNESIUM CITRATE ORAL Take by mouth.      multivitamin capsule Take 1 capsule by mouth Daily.      triamcinolone acetonide 0.1% (KENALOG) 0.1 % cream Apply topically 2 (two) times daily. 45 g 0    trospium (SANCTURA XR) 60 mg Cp24 capsule Take 1 capsule (60 mg total) by mouth once daily. 30 capsule 6    TURMERIC ORAL Take by mouth.      UNABLE TO FIND CBD Oil topical      ZINC ORAL Take by mouth.      azelastine (ASTELIN) 137 mcg (0.1 %) nasal spray 1 spray (137 mcg total) by Nasal route 2 (two) times daily. 30 mL 1    cholestyramine (QUESTRAN) 4 gram packet Take 1 packet (4 g total) by mouth daily as needed (diarrhea). 30 packet 5    famotidine (PEPCID) 40 MG tablet Take 1 tablet (40 mg total) by mouth every evening. 30 tablet 11     Current Facility-Administered Medications on File Prior to Visit   Medication Dose Route Frequency Provider Last Rate Last Admin    triamcinolone acetonide injection 32 mg  32 mg Intra-articular 1 time in Clinic/HOD Dileep Mendoza MD           Physical exam:  /70 (BP Location: Left arm, Patient Position: Sitting, BP Method: Medium (Manual))   Pulse 73   Ht 5' 2" (1.575 m)   Wt 80 kg (176 lb 5.9 oz)   BMI 32.26 kg/m²         General: Pt is a 79 y.o. year old female who is AAOx3, in NAD, is pleasant, well nourished, looks stated age  HEENT: PERRL, EOMI, Oral mucosa pink & moist  CVS  No abnormal cardiac pulsations noted on inspection. JVP not raised. The apical impulse is normal on palpation, and is located in the left 5th intercostal space in the mid - clavicular line. No palpable thrills or " "abnormal pulsations noted. RR, S1 - S2 heard, no murmurs, rubs or gallops appreciated.   PUL : CTA B/L. No wheezes/crackles heard   ABD : BS +, soft. No tenderness elicited   LE : No C/C/E. Distal Pulses palpable B/L         LABS:    Chemistry:   Lab Results   Component Value Date     09/26/2023    K 4.0 09/26/2023     09/26/2023    CO2 20 (L) 09/26/2023    BUN 12 09/26/2023    CREATININE 0.8 09/26/2023    CALCIUM 9.8 09/26/2023     Cardiac Markers: No results found for: "CKTOTAL", "CKMB", "CKMBINDEX", "TROPONINI"  Cardiac Markers (Last 3): No results found for: "CKTOTAL", "CKMB", "CKMBINDEX", "TROPONINI"  CBC:   Lab Results   Component Value Date    WBC 8.56 10/25/2022    HGB 14.2 10/25/2022    HCT 43.6 10/25/2022    MCV 94 10/25/2022     10/25/2022     Lipids:   Lab Results   Component Value Date    CHOL 126 10/25/2022    TRIG 189 (H) 10/25/2022    HDL 64 10/25/2022     Coagulation: No results found for: "PT", "INR", "APTT"        Assessment      1. MARC (obstructive sleep apnea)    2. Primary osteoarthritis of both knees    3. Obesity (BMI 30.0-34.9)    4. Pure hypercholesterolemia    5. Family history of arteriosclerotic cardiovascular disease    6. Palpitations    7. SOB (shortness of breath)         Plan:      HLD  Continue Repatha and Zetia   Check cholesterol today     Shortness of breath   Obtain stress echo, can not walk due to significant knee pain     Osteoarthritis/osteoporosis   Currently not taking Prolia   Recommend seated exercises when stationary bike     Obesity, Body mass index is 32.26 kg/m².   Low salt, low fat diet  Exercise as tolerated, at least 30 min daily     This note was prepared using voice recognition system and is likely to have sound alike errors that may have been overlooked even after proofreading.     I have reviewed all pertinent chart information.  Plans and recommendations have been formulated under my direct supervision. All questions answered and patient " voiced understanding.   If symptoms persist go to the ED.    RTC in 3 m        Robert Yoon MD  Cardiology

## 2024-02-12 ENCOUNTER — HOSPITAL ENCOUNTER (OUTPATIENT)
Dept: CARDIOLOGY | Facility: HOSPITAL | Age: 79
Discharge: HOME | End: 2024-02-12
Attending: STUDENT IN AN ORGANIZED HEALTH CARE EDUCATION/TRAINING PROGRAM
Payer: MEDICARE

## 2024-02-12 VITALS
WEIGHT: 176.81 LBS | DIASTOLIC BLOOD PRESSURE: 70 MMHG | HEIGHT: 62 IN | SYSTOLIC BLOOD PRESSURE: 130 MMHG | BODY MASS INDEX: 32.54 KG/M2

## 2024-02-12 DIAGNOSIS — Z82.49 FAMILY HISTORY OF ARTERIOSCLEROTIC CARDIOVASCULAR DISEASE: ICD-10-CM

## 2024-02-12 DIAGNOSIS — E78.00 PURE HYPERCHOLESTEROLEMIA: ICD-10-CM

## 2024-02-12 DIAGNOSIS — R00.2 PALPITATIONS: ICD-10-CM

## 2024-02-12 DIAGNOSIS — E66.9 OBESITY (BMI 30.0-34.9): ICD-10-CM

## 2024-02-12 DIAGNOSIS — G47.33 OSA (OBSTRUCTIVE SLEEP APNEA): ICD-10-CM

## 2024-02-12 DIAGNOSIS — R06.02 SOB (SHORTNESS OF BREATH): ICD-10-CM

## 2024-02-12 DIAGNOSIS — M17.0 PRIMARY OSTEOARTHRITIS OF BOTH KNEES: ICD-10-CM

## 2024-02-12 LAB
AORTIC ROOT ANNULUS: 2.85 CM
ASCENDING AORTA: 2.63 CM
AV INDEX (PROSTH): 0.73
AV MEAN GRADIENT: 6 MMHG
AV PEAK GRADIENT: 10 MMHG
AV VALVE AREA BY VELOCITY RATIO: 1.9 CM²
AV VALVE AREA: 1.88 CM²
AV VELOCITY RATIO: 0.74
BSA FOR ECHO PROCEDURE: 1.87 M2
CV ECHO LV RWT: 0.57 CM
CV STRESS BASE HR: 72 BPM
DIASTOLIC BLOOD PRESSURE: 72 MMHG
DOP CALC AO PEAK VEL: 1.62 M/S
DOP CALC AO VTI: 35.9 CM
DOP CALC LVOT AREA: 2.6 CM2
DOP CALC LVOT DIAMETER: 1.81 CM
DOP CALC LVOT PEAK VEL: 1.2 M/S
DOP CALC LVOT STROKE VOLUME: 67.38 CM3
DOP CALCLVOT PEAK VEL VTI: 26.2 CM
E WAVE DECELERATION TIME: 278.95 MSEC
E/A RATIO: 0.76
E/E' RATIO: 6.82 M/S
ECHO LV POSTERIOR WALL: 1.1 CM (ref 0.6–1.1)
EJECTION FRACTION: 65 %
FRACTIONAL SHORTENING: 39 % (ref 28–44)
INTERVENTRICULAR SEPTUM: 0.89 CM (ref 0.6–1.1)
IVC DIAMETER: 1.14 CM
IVRT: 68.51 MSEC
LA MAJOR: 5.21 CM
LA MINOR: 5.13 CM
LA WIDTH: 4 CM
LEFT ATRIUM SIZE: 3.18 CM
LEFT ATRIUM VOLUME INDEX MOD: 21.5 ML/M2
LEFT ATRIUM VOLUME INDEX: 30.9 ML/M2
LEFT ATRIUM VOLUME MOD: 38.92 CM3
LEFT ATRIUM VOLUME: 55.89 CM3
LEFT INTERNAL DIMENSION IN SYSTOLE: 2.36 CM (ref 2.1–4)
LEFT VENTRICLE DIASTOLIC VOLUME INDEX: 35.4 ML/M2
LEFT VENTRICLE DIASTOLIC VOLUME: 64.08 ML
LEFT VENTRICLE MASS INDEX: 66 G/M2
LEFT VENTRICLE SYSTOLIC VOLUME INDEX: 10.7 ML/M2
LEFT VENTRICLE SYSTOLIC VOLUME: 19.34 ML
LEFT VENTRICULAR INTERNAL DIMENSION IN DIASTOLE: 3.85 CM (ref 3.5–6)
LEFT VENTRICULAR MASS: 118.84 G
LV LATERAL E/E' RATIO: 5.8 M/S
LV SEPTAL E/E' RATIO: 8.29 M/S
LVOT MG: 3.2 MMHG
LVOT MV: 0.83 CM/S
MV PEAK A VEL: 0.76 M/S
MV PEAK E VEL: 0.58 M/S
MV STENOSIS PRESSURE HALF TIME: 80.9 MS
MV VALVE AREA P 1/2 METHOD: 2.72 CM2
OHS CV CPX 1 MINUTE RECOVERY HEART RATE: 102 BPM
OHS CV CPX 85 PERCENT MAX PREDICTED HEART RATE MALE: 122
OHS CV CPX ESTIMATED METS: 1
OHS CV CPX MAX PREDICTED HEART RATE: 143
OHS CV CPX PATIENT IS FEMALE: 1
OHS CV CPX PATIENT IS MALE: 0
OHS CV CPX PEAK DIASTOLIC BLOOD PRESSURE: 79 MMHG
OHS CV CPX PEAK HEAR RATE: 121 BPM
OHS CV CPX PEAK RATE PRESSURE PRODUCT: NORMAL
OHS CV CPX PEAK SYSTOLIC BLOOD PRESSURE: 154 MMHG
OHS CV CPX PERCENT MAX PREDICTED HEART RATE ACHIEVED: 88
OHS CV CPX RATE PRESSURE PRODUCT PRESENTING: 8784
OHS CV INITIAL DOSE: 10 MCG/KG/MIN
OHS CV PEAK DOSE: 20 MCG/KG/MIN
PISA TR MAX VEL: 2.8 M/S
PULM VEIN S/D RATIO: 1.92
PV MV: 0.71 M/S
PV PEAK D VEL: 0.39 M/S
PV PEAK GRADIENT: 3 MMHG
PV PEAK S VEL: 0.75 M/S
PV PEAK VELOCITY: 0.93 M/S
RA MAJOR: 5.38 CM
RA PRESSURE ESTIMATED: 3 MMHG
RA WIDTH: 2.66 CM
RIGHT VENTRICULAR END-DIASTOLIC DIMENSION: 2.71 CM
RV TB RVSP: 6 MMHG
RV TISSUE DOPPLER FREE WALL SYSTOLIC VELOCITY 1 (APICAL 4 CHAMBER VIEW): 12.33 CM/S
SINUS: 2.88 CM
STJ: 2.18 CM
STRESS ECHO POST EXERCISE DUR MIN: 6 MINUTES
STRESS ECHO POST EXERCISE DUR SEC: 31 SECONDS
SYSTOLIC BLOOD PRESSURE: 122 MMHG
TDI LATERAL: 0.1 M/S
TDI SEPTAL: 0.07 M/S
TDI: 0.09 M/S
TR MAX PG: 31 MMHG
TRICUSPID ANNULAR PLANE SYSTOLIC EXCURSION: 1.65 CM
TV REST PULMONARY ARTERY PRESSURE: 34 MMHG
Z-SCORE OF LEFT VENTRICULAR DIMENSION IN END DIASTOLE: -2.63
Z-SCORE OF LEFT VENTRICULAR DIMENSION IN END SYSTOLE: -2.14

## 2024-02-12 PROCEDURE — 63600175 PHARM REV CODE 636 W HCPCS: Performed by: STUDENT IN AN ORGANIZED HEALTH CARE EDUCATION/TRAINING PROGRAM

## 2024-02-12 PROCEDURE — 93351 STRESS TTE COMPLETE: CPT | Mod: 26,,, | Performed by: STUDENT IN AN ORGANIZED HEALTH CARE EDUCATION/TRAINING PROGRAM

## 2024-02-12 PROCEDURE — 93325 DOPPLER ECHO COLOR FLOW MAPG: CPT

## 2024-02-12 PROCEDURE — 93325 DOPPLER ECHO COLOR FLOW MAPG: CPT | Mod: 26,,, | Performed by: STUDENT IN AN ORGANIZED HEALTH CARE EDUCATION/TRAINING PROGRAM

## 2024-02-12 PROCEDURE — 93320 DOPPLER ECHO COMPLETE: CPT | Mod: 26,,, | Performed by: STUDENT IN AN ORGANIZED HEALTH CARE EDUCATION/TRAINING PROGRAM

## 2024-02-12 RX ORDER — DOBUTAMINE HYDROCHLORIDE 400 MG/100ML
10 INJECTION INTRAVENOUS CONTINUOUS
Status: SHIPPED | OUTPATIENT
Start: 2024-02-12

## 2024-02-12 RX ADMIN — DOBUTAMINE IN DEXTROSE 10 MCG/KG/MIN: 400 INJECTION, SOLUTION INTRAVENOUS at 01:02

## 2024-02-21 ENCOUNTER — OFFICE VISIT (OUTPATIENT)
Dept: UROLOGY | Facility: CLINIC | Age: 79
End: 2024-02-21
Payer: MEDICARE

## 2024-02-21 VITALS
RESPIRATION RATE: 18 BRPM | HEIGHT: 62 IN | DIASTOLIC BLOOD PRESSURE: 80 MMHG | WEIGHT: 175.94 LBS | BODY MASS INDEX: 32.37 KG/M2 | SYSTOLIC BLOOD PRESSURE: 119 MMHG | HEART RATE: 81 BPM

## 2024-02-21 DIAGNOSIS — N32.81 OAB (OVERACTIVE BLADDER): ICD-10-CM

## 2024-02-21 DIAGNOSIS — R31.29 MICROHEMATURIA: Primary | ICD-10-CM

## 2024-02-21 DIAGNOSIS — N95.8 GENITOURINARY SYNDROME OF MENOPAUSE: ICD-10-CM

## 2024-02-21 DIAGNOSIS — K52.9 CHRONIC DIARRHEA: ICD-10-CM

## 2024-02-21 LAB
BILIRUB UR QL STRIP: NEGATIVE
GLUCOSE UR QL STRIP: NEGATIVE
KETONES UR QL STRIP: NEGATIVE
LEUKOCYTE ESTERASE UR QL STRIP: POSITIVE
PH, POC UA: 6.5
POC BLOOD, URINE: NEGATIVE
POC NITRATES, URINE: NEGATIVE
POC RESIDUAL URINE VOLUME: 19 ML (ref 0–100)
PROT UR QL STRIP: NEGATIVE
SP GR UR STRIP: 1.01 (ref 1–1.03)
UROBILINOGEN UR STRIP-ACNC: 0.2 (ref 0.1–1.1)

## 2024-02-21 PROCEDURE — 99214 OFFICE O/P EST MOD 30 MIN: CPT | Mod: S$GLB,,, | Performed by: UROLOGY

## 2024-02-21 PROCEDURE — 1101F PT FALLS ASSESS-DOCD LE1/YR: CPT | Mod: CPTII,S$GLB,, | Performed by: UROLOGY

## 2024-02-21 PROCEDURE — 81003 URINALYSIS AUTO W/O SCOPE: CPT | Mod: QW,S$GLB,, | Performed by: UROLOGY

## 2024-02-21 PROCEDURE — 3079F DIAST BP 80-89 MM HG: CPT | Mod: CPTII,S$GLB,, | Performed by: UROLOGY

## 2024-02-21 PROCEDURE — 51798 US URINE CAPACITY MEASURE: CPT | Mod: S$GLB,,, | Performed by: UROLOGY

## 2024-02-21 PROCEDURE — 1159F MED LIST DOCD IN RCRD: CPT | Mod: CPTII,S$GLB,, | Performed by: UROLOGY

## 2024-02-21 PROCEDURE — 1126F AMNT PAIN NOTED NONE PRSNT: CPT | Mod: CPTII,S$GLB,, | Performed by: UROLOGY

## 2024-02-21 PROCEDURE — 3288F FALL RISK ASSESSMENT DOCD: CPT | Mod: CPTII,S$GLB,, | Performed by: UROLOGY

## 2024-02-21 PROCEDURE — 3074F SYST BP LT 130 MM HG: CPT | Mod: CPTII,S$GLB,, | Performed by: UROLOGY

## 2024-02-21 PROCEDURE — 99999 PR PBB SHADOW E&M-EST. PATIENT-LVL V: CPT | Mod: PBBFAC,,, | Performed by: UROLOGY

## 2024-02-21 RX ORDER — EZETIMIBE 10 MG/1
10 TABLET ORAL DAILY
COMMUNITY

## 2024-02-21 RX ORDER — SOLIFENACIN SUCCINATE 10 MG/1
10 TABLET, FILM COATED ORAL DAILY
Qty: 30 TABLET | Refills: 11 | Status: SHIPPED | OUTPATIENT
Start: 2024-02-21 | End: 2025-02-20

## 2024-02-21 NOTE — PROGRESS NOTES
Chief Complaint   Patient presents with    Follow-up     6 month DOMENICA follow up       History of Present Illness:   Jessica Perez is a 79 y.o. female here for evaluation of Follow-up (6 month DOMENICA follow up)    2/21/24-Pt reports frequency/urgency and UUI. She positionally voids to empty. Nocturia x 1-2, despite limiting pm fluid. She is taking trospium every day. She tried an OTC pessary and it didn't help and it hurt. 5 pads per day. She reports that she never has constipation, but rather chronically loose stools.    8/9/23-on trospium about every other day. Sometimes she is having strong urges. Wearing a pad. Limiting pm fluid and not having nocturia. No difficulty emptying. No dysuria.   2/7/23-on trospium, and it seems to work okay. Only having stress incontinence and it is much improved. She thinks she may have cystitis and there is slight discomfort and she is having frequency and small volumes. Took a bubble bath 3 days ago. Not sexually active.   8/2/22- on trospium and significantly improved. Wearing 2 pads per day now. Here for cysto. CT urogram showed no stones, renal mass or hydronephrosis. Doing pelvic floor PT  7/8/22- She reports that she has been wearing pads for 10 years. She states that when she stands up, she pours out urine. She reports that she has had loose stools regularly and was taking Immodium when she would go anywhere. It used to be just KRISH with sneeze, cough and laugh.   Wears about 5 pads per day. During the night if she wakes, she doesn't have UUI. No daytime frequency. +Daytime urgency and UUI.   No gross hematuria or dysuria.         Review of Systems   Constitutional:  Negative for chills and fever.   Respiratory:  Negative for shortness of breath.    Cardiovascular:  Negative for chest pain.   Gastrointestinal:  Negative for constipation.   Genitourinary:  Negative for difficulty urinating and vaginal pain.   All other systems reviewed and are negative.        Past Medical  History:   Diagnosis Date    Acute pain of left knee 3/22/2021    Arthritis     Arthritis of knee 2/9/2021    At risk for sleep apnea 9/19/2019    Balance problem 10/21/2021    Bilateral primary osteoarthritis of knee 4/4/2018    Chest pain, moderate coronary artery risk 8/8/2019    Chronic midline low back pain without sciatica 11/30/2020    Chronic pain of both knees 3/23/2021    Costochondritis 1/20/2020    Depression, major, recurrent, mild     Diarrhea 5/11/2022    Difficulty walking 11/30/2020    Difficulty walking 11/30/2020    LANRE (generalized anxiety disorder) 3/22/2021    Gait, antalgic 10/21/2021    Generalized weakness 11/30/2020    Generalized weakness 11/30/2020    Glaucoma     HLD (hyperlipidemia)     Lower extremity weakness 10/21/2021    Muscle cramps 5/23/2018    Osteopenia of multiple sites 4/2/2018    Primary osteoarthritis of right knee 05/30/2018    Primary snoring     Rash 5/11/2022    Sleep related leg cramps 9/7/2010    SOB (shortness of breath) 8/8/2019    Unspecified gastritis and gastroduodenitis without mention of hemorrhage 11/28/2010     Dx updated per 2019 IMO Load    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 Benign.    CATARACT EXTRACTION W/  INTRAOCULAR LENS IMPLANT Left 02/04/2019    CATARACT EXTRACTION W/  INTRAOCULAR LENS IMPLANT Right 03/04/2019    CHOLECYSTECTOMY      COLONOSCOPY N/A 06/29/2022    Procedure: COLONOSCOPY;  Surgeon: Nichole Kelly MD;  Location: [facility] ENDO;  Service: Endoscopy;  Laterality: N/A;    DILATION AND CURETTAGE OF UTERUS      finger surgery      middle finger on right hand    GALLBLADDER SURGERY      INJECTION OF ANESTHETIC AGENT AROUND NERVE Bilateral 10/01/2021    Procedure: Bilateral Genicular nerve block -;  Surgeon: Primo Bond MD;  Location: [facility] PAIN MGT;  Service: Pain Management;  Laterality:  Bilateral;    RADIOFREQUENCY THERMOCOAGULATION Left 11/05/2021    Procedure: Left Genicular Nerve RFA (COOLIEF) with RN IV sedation;  Surgeon: Primo Bond MD;  [facility];  Service: Pain Management;  Laterality: Left;    TONSILLECTOMY, ADENOIDECTOMY      TUBAL LIGATION         Family History   Problem Relation Age of Onset    Diabetes Father     Cancer Father         prostate ca    Hypertension Father     Cancer Sister         cervical ca    Hepatitis Sister     Dementia Mother     Osteoporosis Mother     Breast cancer Paternal Aunt        Social History     Tobacco Use    Smoking status: Former     Current packs/day: 0.50     Average packs/day: 0.5 packs/day for 12.0 years (6.0 ttl pk-yrs)     Types: Cigarettes    Smokeless tobacco: Never   Substance Use Topics    Alcohol use: Yes     Comment: occasionally    Drug use: No       Current Outpatient Medications   Medication Sig Dispense Refill    acetaminophen/diphenhydramine (TYLENOL PM ORAL) Take by mouth.      APPLE CIDER VINEGAR ORAL Take by mouth.      artificial tears,hypromellose,,GENTEAL/SUSTANE, 0.3 % Gel       aspirin (ECOTRIN) 81 MG EC tablet Take 81 mg by mouth once daily.      b complex vitamins capsule Take 1 capsule by mouth once daily.      calcium carbonate (CALCIUM 500 ORAL) Take by mouth.      ciclopirox (PENLAC) 8 % Soln Apply topically nightly. 6.6 mL 11    cyanocobalamin, vitamin B-12, (VITAMIN B-12 ORAL) Take by mouth.      denosumab (PROLIA SUBQ) Inject into the skin.      ergocalciferol, vitamin D2, (VITAMIN D ORAL) Take by mouth.      evolocumab (REPATHA SURECLICK) 140 mg/mL PnIj Repatha SureClick Take No date recorded No form recorded No frequency recorded No route recorded No set duration recorded No set duration amount recorded active No dosage strength recorded No dosage strength units of measure recorded      evolocumab (REPATHA SURECLICK) 140 mg/mL PnIj Inject 1 pen (140 mg total) into the skin every 14 (fourteen) days.  2 mL 6    ezetimibe (ZETIA) 10 mg tablet TAKE 1 TABLET BY MOUTH ONCE DAILY 90 tablet 3    flaxseed oiL 1,000 mg Cap flaxseed oil Take No date recorded No form recorded No frequency recorded No route recorded No set duration recorded No set duration amount recorded suspended No dosage strength recorded No dosage strength units of measure recorded      fluocinolone (SYNALAR) 0.01 % external solution AAA of scalp twice a day PRN scalp itching or eczema flares. 60 mL 1    fluticasone (FLONASE) 50 mcg/actuation nasal spray 2 sprays by Each Nare route once daily. 1 Bottle 12    hydrocortisone 2.5 % cream Apply topically 2 (two) times daily. Mix with ketoconazole cream and AAA on face BID for up to 2 weeks at a time 30 g 1    ketoconazole (NIZORAL) 2 % cream Apply topically 2 (two) times daily. Mix with hydrocortisone cream and AAA on face BID for up to 2 weeks at a time 30 g 1    ketoconazole (NIZORAL) 2 % shampoo Apply topically every 7 days. Shampoo scalp 1-2 times per week. Lather x 5 minutes, rinse well. 120 mL 5    krill oil-omega-3-dha-epa 150-450 mg CpDR Take by mouth.      MAGNESIUM CITRATE ORAL Take by mouth.      multivitamin capsule Take 1 capsule by mouth Daily.      triamcinolone acetonide 0.1% (KENALOG) 0.1 % cream Apply topically 2 (two) times daily. 45 g 0    TURMERIC ORAL Take by mouth.      UNABLE TO FIND CBD Oil topical      ZINC ORAL Take by mouth.      azelastine (ASTELIN) 137 mcg (0.1 %) nasal spray 1 spray (137 mcg total) by Nasal route 2 (two) times daily. 30 mL 1    cholestyramine (QUESTRAN) 4 gram packet Take 1 packet (4 g total) by mouth daily as needed (diarrhea). 30 packet 5    ezetimibe (ZETIA) 10 mg tablet Take 10 mg by mouth once daily.      famotidine (PEPCID) 40 MG tablet Take 1 tablet (40 mg total) by mouth every evening. 30 tablet 11    solifenacin (VESICARE) 10 MG tablet Take 1 tablet (10 mg total) by mouth once daily. 30 tablet 11     Current Facility-Administered Medications    Medication Dose Route Frequency Provider Last Rate Last Admin    DOBUtamine 1000 mg in D5W 250 mL infusion  10 mcg/kg/min Intravenous Continuous Robert Yoon MD   Stopped at 02/12/24 1307    triamcinolone acetonide injection 32 mg  32 mg Intra-articular 1 time in Clinic/HOD Dileep Mendoza MD           Review of patient's allergies indicates:   Allergen Reactions    Augmentin [amoxicillin-pot clavulanate] Other (See Comments)     Diarrhea, yeast    Bactrim  [sulfamethoxazole-trimethoprim]      Other reaction(s): Unknown    Celebrex [celecoxib] Other (See Comments)     Stomach pain, gas     Lipitor  [atorvastatin]      Other reaction(s): Unknown    Pravachol  [pravastatin]      Other reaction(s): Unknown    Statins-hmg-coa reductase inhibitors      Other reaction(s): Muscle pain    Sulfa (sulfonamide antibiotics)      Other reaction(s): Swelling    Zoster vaccine live        Physical Exam  Vitals:    02/21/24 1106   BP: 119/80   Pulse: 81   Resp: 18         General: Well-developed, well-nourished, in no acute distress  HEENT: Normocephalic, atraumatic, extraocular movements intact  Neck: Supple, no supraclavicular or cervical lymphadenopathy, trachea midline  Respirations: even and unlabored  : 7/8/22-Normal external female genitalia without lesions. Orthotopic urethral meatus with caruncle. atrophic vaginal mucosa. No significant prolapse,  negative cough stress test, no urethral hypermobility  Extremities: moves all equally, no clubbing, cyanosis or edema  Skin: Warm and dry. No lesions  Psych: normal affect  Neuro: Alert and oriented x 3. Cranial nerves II-XII intact    PVR: 19mL    Urinalysis  trace LE, no blood      Assessment:  1. Microhematuria  POCT Urinalysis, Dipstick, Automated, W/O Scope    POCT Bladder Scan      2. OAB (overactive bladder)        3. Genitourinary syndrome of menopause                  Plan:   Microhematuria  -     POCT Urinalysis, Dipstick, Automated, W/O Scope  -     POCT  Bladder Scan    OAB (overactive bladder)    Genitourinary syndrome of menopause    Other orders  -     solifenacin (VESICARE) 10 MG tablet; Take 1 tablet (10 mg total) by mouth once daily.  Dispense: 30 tablet; Refill: 11    We also briefly discussed botox and Interstim.      Follow up in about 3 months (around 5/21/2024).

## 2024-03-06 ENCOUNTER — HOSPITAL ENCOUNTER (OUTPATIENT)
Dept: RADIOLOGY | Facility: HOSPITAL | Age: 79
Discharge: HOME | End: 2024-03-06
Attending: FAMILY MEDICINE
Payer: MEDICARE

## 2024-03-06 DIAGNOSIS — Z12.31 SCREENING MAMMOGRAM FOR BREAST CANCER: ICD-10-CM

## 2024-03-06 PROCEDURE — 77067 SCR MAMMO BI INCL CAD: CPT | Mod: 26,,, | Performed by: RADIOLOGY

## 2024-03-06 PROCEDURE — 77063 BREAST TOMOSYNTHESIS BI: CPT | Mod: 26,,, | Performed by: RADIOLOGY

## 2024-03-06 PROCEDURE — 77067 SCR MAMMO BI INCL CAD: CPT | Mod: TC,PO

## 2024-03-20 ENCOUNTER — OFFICE VISIT (OUTPATIENT)
Dept: OPHTHALMOLOGY | Facility: CLINIC | Age: 79
End: 2024-03-20
Payer: MEDICARE

## 2024-03-20 DIAGNOSIS — Z96.1 PSEUDOPHAKIA OF BOTH EYES: ICD-10-CM

## 2024-03-20 DIAGNOSIS — H52.7 REFRACTION DISORDER: ICD-10-CM

## 2024-03-20 DIAGNOSIS — M35.01 KERATITIS SICCA, BILATERAL: ICD-10-CM

## 2024-03-20 DIAGNOSIS — H40.013 OPEN ANGLE WITH BORDERLINE FINDINGS, LOW RISK, BILATERAL: Primary | ICD-10-CM

## 2024-03-20 PROCEDURE — 92015 DETERMINE REFRACTIVE STATE: CPT | Mod: S$GLB,,, | Performed by: OPHTHALMOLOGY

## 2024-03-20 PROCEDURE — 99213 OFFICE O/P EST LOW 20 MIN: CPT | Mod: S$GLB,,, | Performed by: OPHTHALMOLOGY

## 2024-03-20 PROCEDURE — 99999 PR PBB SHADOW E&M-EST. PATIENT-LVL III: CPT | Mod: PBBFAC,,, | Performed by: OPHTHALMOLOGY

## 2024-03-20 PROCEDURE — 1160F RVW MEDS BY RX/DR IN RCRD: CPT | Mod: CPTII,S$GLB,, | Performed by: OPHTHALMOLOGY

## 2024-03-20 PROCEDURE — 1159F MED LIST DOCD IN RCRD: CPT | Mod: CPTII,S$GLB,, | Performed by: OPHTHALMOLOGY

## 2024-03-20 NOTE — PROGRESS NOTES
HPI     Glaucoma Suspect            Comments: 6 month IOP check    Patient states at times VA seems sharper then others and would like an   updated glasses RX.          Comments    PCP: Dr. Lemus   1. COAG SUSP   SLT OD 3/26/15   SLT OS 5/15   2. Dry Eyes   3. PCIOL OD 3/4/2019 (+28.0 SN60WF) CDE 10.38 -NEAR   PCIOL OS +25.5 SN60WF / CDE: 9.46 / 2-4-19 2-5-19       GCL: 32/31        Pataday PRN   Systane BID             Last edited by Janelle Griffin, Patient Care Assistant on 3/20/2024  8:19   AM.            Assessment /Plan     For exam results, see Encounter Report.      ICD-10-CM ICD-9-CM    1. Open angle with borderline findings, low risk, bilateral  H40.013 365.01 Doing well - intraocular pressure is within acceptable range relative to target pressure with no evidence of progression.   Continue current treatment.  Reviewed importance of continued compliance with treatment and follow up.         2. Keratitis sicca, bilateral  M35.01 370.8 Continue systane/pataday      3. Pseudophakia of both eyes  Z96.1 V43.1 S/p PCIOL OU x2019      4. Refraction disorder  H52.7 367.9 Disp mr per pt request          Return to clinic 6 months with DFE and HVF 24-2

## 2024-05-02 ENCOUNTER — OFFICE VISIT (OUTPATIENT)
Dept: INTERNAL MEDICINE | Facility: CLINIC | Age: 79
End: 2024-05-02
Payer: MEDICARE

## 2024-05-02 ENCOUNTER — LAB VISIT (OUTPATIENT)
Dept: LAB | Facility: HOSPITAL | Age: 79
End: 2024-05-02
Attending: INTERNAL MEDICINE
Payer: MEDICARE

## 2024-05-02 VITALS
TEMPERATURE: 97 F | BODY MASS INDEX: 32.58 KG/M2 | SYSTOLIC BLOOD PRESSURE: 120 MMHG | HEART RATE: 103 BPM | WEIGHT: 178.13 LBS | DIASTOLIC BLOOD PRESSURE: 84 MMHG | OXYGEN SATURATION: 97 %

## 2024-05-02 DIAGNOSIS — Z82.49 FAMILY HISTORY OF ARTERIOSCLEROTIC CARDIOVASCULAR DISEASE: ICD-10-CM

## 2024-05-02 DIAGNOSIS — T46.6X5A STATIN MYOPATHY: ICD-10-CM

## 2024-05-02 DIAGNOSIS — E78.2 MIXED HYPERLIPIDEMIA: Primary | ICD-10-CM

## 2024-05-02 DIAGNOSIS — E78.2 MIXED HYPERLIPIDEMIA: ICD-10-CM

## 2024-05-02 DIAGNOSIS — E21.3 HYPERPARATHYROIDISM: ICD-10-CM

## 2024-05-02 DIAGNOSIS — G72.0 STATIN MYOPATHY: ICD-10-CM

## 2024-05-02 PROBLEM — Z12.31 SCREENING MAMMOGRAM, ENCOUNTER FOR: Status: RESOLVED | Noted: 2022-01-06 | Resolved: 2024-05-02

## 2024-05-02 PROBLEM — R26.89 IMBALANCE: Status: RESOLVED | Noted: 2022-01-06 | Resolved: 2024-05-02

## 2024-05-02 PROBLEM — M70.50 PES ANSERINE BURSITIS: Status: RESOLVED | Noted: 2021-03-22 | Resolved: 2024-05-02

## 2024-05-02 PROBLEM — L98.9 SKIN LESION: Status: RESOLVED | Noted: 2023-04-25 | Resolved: 2024-05-02

## 2024-05-02 LAB
ALBUMIN SERPL BCP-MCNC: 4 G/DL (ref 3.5–5.2)
ALP SERPL-CCNC: 94 U/L (ref 55–135)
ALT SERPL W/O P-5'-P-CCNC: 18 U/L (ref 10–44)
ANION GAP SERPL CALC-SCNC: 10 MMOL/L (ref 8–16)
AST SERPL-CCNC: 20 U/L (ref 10–40)
BASOPHILS # BLD AUTO: 0.05 K/UL (ref 0–0.2)
BASOPHILS NFR BLD: 0.6 % (ref 0–1.9)
BILIRUB SERPL-MCNC: 0.4 MG/DL (ref 0.1–1)
BUN SERPL-MCNC: 10 MG/DL (ref 8–23)
CALCIUM SERPL-MCNC: 9.8 MG/DL (ref 8.7–10.5)
CHLORIDE SERPL-SCNC: 109 MMOL/L (ref 95–110)
CO2 SERPL-SCNC: 21 MMOL/L (ref 23–29)
CREAT SERPL-MCNC: 0.9 MG/DL (ref 0.5–1.4)
DIFFERENTIAL METHOD BLD: NORMAL
EOSINOPHIL # BLD AUTO: 0.1 K/UL (ref 0–0.5)
EOSINOPHIL NFR BLD: 1.6 % (ref 0–8)
ERYTHROCYTE [DISTWIDTH] IN BLOOD BY AUTOMATED COUNT: 14 % (ref 11.5–14.5)
EST. GFR  (NO RACE VARIABLE): >60 ML/MIN/1.73 M^2
GLUCOSE SERPL-MCNC: 90 MG/DL (ref 70–110)
HCT VFR BLD AUTO: 39.2 % (ref 37–48.5)
HGB BLD-MCNC: 12.7 G/DL (ref 12–16)
IMM GRANULOCYTES # BLD AUTO: 0.03 K/UL (ref 0–0.04)
IMM GRANULOCYTES NFR BLD AUTO: 0.3 % (ref 0–0.5)
LYMPHOCYTES # BLD AUTO: 2.3 K/UL (ref 1–4.8)
LYMPHOCYTES NFR BLD: 25.9 % (ref 18–48)
MCH RBC QN AUTO: 30.2 PG (ref 27–31)
MCHC RBC AUTO-ENTMCNC: 32.4 G/DL (ref 32–36)
MCV RBC AUTO: 93 FL (ref 82–98)
MONOCYTES # BLD AUTO: 0.5 K/UL (ref 0.3–1)
MONOCYTES NFR BLD: 5.8 % (ref 4–15)
NEUTROPHILS # BLD AUTO: 5.8 K/UL (ref 1.8–7.7)
NEUTROPHILS NFR BLD: 65.8 % (ref 38–73)
NRBC BLD-RTO: 0 /100 WBC
PLATELET # BLD AUTO: 266 K/UL (ref 150–450)
PMV BLD AUTO: 11.1 FL (ref 9.2–12.9)
POTASSIUM SERPL-SCNC: 5 MMOL/L (ref 3.5–5.1)
PROT SERPL-MCNC: 7 G/DL (ref 6–8.4)
PTH-INTACT SERPL-MCNC: 89.1 PG/ML (ref 9–77)
RBC # BLD AUTO: 4.21 M/UL (ref 4–5.4)
SODIUM SERPL-SCNC: 140 MMOL/L (ref 136–145)
TSH SERPL DL<=0.005 MIU/L-ACNC: 2.6 UIU/ML (ref 0.4–4)
WBC # BLD AUTO: 8.74 K/UL (ref 3.9–12.7)

## 2024-05-02 PROCEDURE — 84443 ASSAY THYROID STIM HORMONE: CPT | Performed by: INTERNAL MEDICINE

## 2024-05-02 PROCEDURE — G2211 COMPLEX E/M VISIT ADD ON: HCPCS | Mod: S$GLB,,, | Performed by: INTERNAL MEDICINE

## 2024-05-02 PROCEDURE — 85025 COMPLETE CBC W/AUTO DIFF WBC: CPT | Performed by: INTERNAL MEDICINE

## 2024-05-02 PROCEDURE — 80053 COMPREHEN METABOLIC PANEL: CPT | Performed by: INTERNAL MEDICINE

## 2024-05-02 PROCEDURE — 99999 PR PBB SHADOW E&M-EST. PATIENT-LVL V: CPT | Mod: PBBFAC,,, | Performed by: INTERNAL MEDICINE

## 2024-05-02 PROCEDURE — 83970 ASSAY OF PARATHORMONE: CPT | Performed by: INTERNAL MEDICINE

## 2024-05-02 PROCEDURE — 1101F PT FALLS ASSESS-DOCD LE1/YR: CPT | Mod: CPTII,S$GLB,, | Performed by: INTERNAL MEDICINE

## 2024-05-02 PROCEDURE — 36415 COLL VENOUS BLD VENIPUNCTURE: CPT | Mod: PO | Performed by: INTERNAL MEDICINE

## 2024-05-02 PROCEDURE — 1126F AMNT PAIN NOTED NONE PRSNT: CPT | Mod: CPTII,S$GLB,, | Performed by: INTERNAL MEDICINE

## 2024-05-02 PROCEDURE — 3288F FALL RISK ASSESSMENT DOCD: CPT | Mod: CPTII,S$GLB,, | Performed by: INTERNAL MEDICINE

## 2024-05-02 PROCEDURE — 3074F SYST BP LT 130 MM HG: CPT | Mod: CPTII,S$GLB,, | Performed by: INTERNAL MEDICINE

## 2024-05-02 PROCEDURE — 3079F DIAST BP 80-89 MM HG: CPT | Mod: CPTII,S$GLB,, | Performed by: INTERNAL MEDICINE

## 2024-05-02 PROCEDURE — 1159F MED LIST DOCD IN RCRD: CPT | Mod: CPTII,S$GLB,, | Performed by: INTERNAL MEDICINE

## 2024-05-02 PROCEDURE — 1160F RVW MEDS BY RX/DR IN RCRD: CPT | Mod: CPTII,S$GLB,, | Performed by: INTERNAL MEDICINE

## 2024-05-02 PROCEDURE — 99214 OFFICE O/P EST MOD 30 MIN: CPT | Mod: S$GLB,,, | Performed by: INTERNAL MEDICINE

## 2024-05-02 NOTE — PROGRESS NOTES
Subjective:       Patient ID: Jessica Perez is a 79 y.o. female.    Chief Complaint: Establish Care      HPI:  Patient is a 79-year-old female presenting today [facility] has a history of hyperlipidemia, hyperparathyroidism not requiring treatment statin myopathy and a family history of cardiovascular disease.  She indicates she has been doing well.  She has been following with Dr. Jang.  Her blood pressure has been stable her cholesterol numbers have been good over time.  She has a diagnosis of hyperparathyroidism but her calciums have been normal so no treatment has been required.  We will continue to monitor levels on that.    Patient does have a documented history of statin myopathy.  She is on Repatha for her cholesterol and that has worked very well.  She sees Urology for some urinary issues.    Review of Systems   Constitutional:  Negative for chills and fever.   HENT:  Negative for hearing loss.    Eyes:  Negative for photophobia and visual disturbance.   Respiratory:  Negative for cough, shortness of breath and wheezing.    Cardiovascular:  Negative for chest pain and palpitations.   Gastrointestinal:  Negative for blood in stool, constipation, nausea and vomiting.   Genitourinary:  Negative for dysuria and hematuria.   Musculoskeletal:  Negative for neck pain and neck stiffness.   Skin:  Negative for rash.   Neurological:  Negative for syncope, weakness, light-headedness and headaches.   Hematological:  Negative for adenopathy.   Psychiatric/Behavioral:  Negative for dysphoric mood. The patient is not nervous/anxious.        Objective:   /84   Pulse 103   Temp 97.1 °F (36.2 °C) (Tympanic)   Wt 80.8 kg (178 lb 2.1 oz)   SpO2 97%   BMI 32.58 kg/m²      Physical Exam  Vitals reviewed.   Constitutional:       Appearance: Normal appearance. She is well-developed. She is not ill-appearing.   HENT:      Head: Normocephalic and atraumatic.      Right Ear:  Tympanic membrane, ear canal and external ear normal.      Left Ear: Tympanic membrane, ear canal and external ear normal.   Eyes:      Pupils: Pupils are equal, round, and reactive to light.   Neck:      Thyroid: No thyromegaly.   Cardiovascular:      Rate and Rhythm: Normal rate and regular rhythm.      Heart sounds: No murmur heard.     No friction rub. No gallop.   Pulmonary:      Effort: Pulmonary effort is normal.      Breath sounds: Normal breath sounds. No wheezing or rales.   Chest:      Chest wall: No tenderness.   Abdominal:      General: Abdomen is flat. Bowel sounds are normal. There is no distension.      Palpations: Abdomen is soft. There is no mass.      Tenderness: There is no abdominal tenderness. There is no guarding or rebound.   Musculoskeletal:      Cervical back: Normal range of motion and neck supple.   Lymphadenopathy:      Cervical: No cervical adenopathy.   Skin:     General: Skin is warm and dry.      Findings: No rash.   Neurological:      General: No focal deficit present.      Mental Status: She is alert and oriented to person, place, and time.      Cranial Nerves: No cranial nerve deficit.      Deep Tendon Reflexes: Reflexes are normal and symmetric. Reflexes normal.   Psychiatric:         Behavior: Behavior normal.         Judgment: Judgment normal.             Assessment:       1. Mixed hyperlipidemia    2. Hyperparathyroidism    3. Family history of arteriosclerotic cardiovascular disease    4. Statin myopathy        Plan:   No problem-specific Assessment & Plan notes found for this encounter.    Jessica was seen today for establish care.    Diagnoses and all orders for this visit:    Mixed hyperlipidemia  Comments:  Continue current meds, stable. no changes  Orders:  -     CBC Auto Differential; Future  -     Comprehensive Metabolic Panel; Future  -     TSH; Future    Hyperparathyroidism  Comments:  Calcium has been normal. Check PTH  Orders:  -     PTH, intact; Future    Family  history of arteriosclerotic cardiovascular disease  Comments:  continue bp control and repatha    Statin myopathy  Comments:  onr Repatha.          Follow up in about 6 months (around 11/2/2024) for HLP, PTH, with Benton Terrell.

## 2024-05-03 ENCOUNTER — PATIENT MESSAGE (OUTPATIENT)
Dept: INTERNAL MEDICINE | Facility: CLINIC | Age: 79
End: 2024-05-03
Payer: MEDICARE

## 2024-05-03 DIAGNOSIS — R79.89 ELEVATED PTHRP LEVEL: Primary | ICD-10-CM

## 2024-05-06 ENCOUNTER — E-CONSULT (OUTPATIENT)
Dept: ENDOCRINOLOGY | Facility: CLINIC | Age: 79
End: 2024-05-06
Payer: MEDICARE

## 2024-05-06 ENCOUNTER — PATIENT MESSAGE (OUTPATIENT)
Dept: INTERNAL MEDICINE | Facility: CLINIC | Age: 79
End: 2024-05-06
Payer: MEDICARE

## 2024-05-06 DIAGNOSIS — M81.0 OSTEOPOROSIS, UNSPECIFIED OSTEOPOROSIS TYPE, UNSPECIFIED PATHOLOGICAL FRACTURE PRESENCE: ICD-10-CM

## 2024-05-06 DIAGNOSIS — E21.3 HYPERPARATHYROIDISM: Primary | ICD-10-CM

## 2024-05-06 PROCEDURE — 99451 NTRPROF PH1/NTRNET/EHR 5/>: CPT | Mod: S$GLB,,, | Performed by: INTERNAL MEDICINE

## 2024-05-06 NOTE — CONSULTS
[facility]  Response for E-Consult     Patient Name: Jessica Perez  MRN: [id number]  Primary Care Provider: Warren Lemus MD   Requesting Provider: Warren Lemus MD  E-Consult to Endocrinology  Consult performed by: Daquan Holcomb MD  Consult ordered by: Warren Lemus MD  Reason for consult: 79 year old female with persistent mild elevatiosn of PTH with no abnormalitiy of the calcium level or vitamin d historically.  Please recommend next steps and long term monitoring recs.      Chart reviewed:  Records indicate she has a history of osteoporosis and has previously been on treatment with alendronate, Reclast (2018, 2019, 2020) and Prolia (current). Serum calcium has been persistently normal aside from a one time elevation to 10.8 in 2018.          Recommendation:   Elevated PTH is commonly seen with patients on antiresorptive therapy and is generally not of clinical consequence.  Normocalcemic primary hyperparathyroidism is possible, but this cannot be diagnosed in a patient on anti-resorptive therapy.  Recommend continuing with medical management of osteoporosis and no further workup for hyperparathyroidism unless serum calcium becomes elevated.     Total time of Consultation: 5 minute    I did not speak to the requesting provider verbally about this.     *This eConsult is based on the clinical data available to me and is furnished without benefit of a physical examination. The eConsult will need to be interpreted in light of any clinical issues or changes in patient status not available to me at the time of filing this eConsults. Significant changes in patient condition or level of acuity should result in immediate formal consultation and reevaluation. Please alert me if you have further questions.    Thank you for this eConsult referral.     MD [facility]

## 2024-05-21 ENCOUNTER — OFFICE VISIT (OUTPATIENT)
Dept: CARDIOLOGY | Facility: CLINIC | Age: 79
End: 2024-05-21
Payer: MEDICARE

## 2024-05-21 ENCOUNTER — OFFICE VISIT (OUTPATIENT)
Dept: UROLOGY | Facility: CLINIC | Age: 79
End: 2024-05-21
Payer: MEDICARE

## 2024-05-21 VITALS
WEIGHT: 176.81 LBS | OXYGEN SATURATION: 97 % | DIASTOLIC BLOOD PRESSURE: 70 MMHG | HEART RATE: 88 BPM | BODY MASS INDEX: 32.34 KG/M2 | SYSTOLIC BLOOD PRESSURE: 108 MMHG

## 2024-05-21 VITALS
HEIGHT: 62 IN | SYSTOLIC BLOOD PRESSURE: 110 MMHG | HEART RATE: 78 BPM | RESPIRATION RATE: 18 BRPM | BODY MASS INDEX: 32.49 KG/M2 | WEIGHT: 176.56 LBS | DIASTOLIC BLOOD PRESSURE: 74 MMHG

## 2024-05-21 DIAGNOSIS — R31.29 MICROHEMATURIA: Primary | ICD-10-CM

## 2024-05-21 DIAGNOSIS — E66.9 OBESITY (BMI 30.0-34.9): Primary | ICD-10-CM

## 2024-05-21 DIAGNOSIS — M17.0 PRIMARY OSTEOARTHRITIS OF BOTH KNEES: ICD-10-CM

## 2024-05-21 DIAGNOSIS — G47.33 OSA (OBSTRUCTIVE SLEEP APNEA): ICD-10-CM

## 2024-05-21 DIAGNOSIS — M81.0 AGE-RELATED OSTEOPOROSIS WITHOUT CURRENT PATHOLOGICAL FRACTURE: ICD-10-CM

## 2024-05-21 DIAGNOSIS — R06.02 SOB (SHORTNESS OF BREATH): ICD-10-CM

## 2024-05-21 DIAGNOSIS — N32.81 OAB (OVERACTIVE BLADDER): ICD-10-CM

## 2024-05-21 DIAGNOSIS — E78.00 PURE HYPERCHOLESTEROLEMIA: ICD-10-CM

## 2024-05-21 DIAGNOSIS — Z82.49 FAMILY HISTORY OF ARTERIOSCLEROTIC CARDIOVASCULAR DISEASE: ICD-10-CM

## 2024-05-21 LAB
BILIRUB UR QL STRIP: NEGATIVE
GLUCOSE UR QL STRIP: NEGATIVE
KETONES UR QL STRIP: NEGATIVE
LEUKOCYTE ESTERASE UR QL STRIP: POSITIVE
PH, POC UA: 5.5
POC BLOOD, URINE: POSITIVE
POC NITRATES, URINE: NEGATIVE
POC RESIDUAL URINE VOLUME: 20 ML (ref 0–100)
PROT UR QL STRIP: POSITIVE
SP GR UR STRIP: 1.02 (ref 1–1.03)
UROBILINOGEN UR STRIP-ACNC: 0.2 (ref 0.1–1.1)

## 2024-05-21 PROCEDURE — 99214 OFFICE O/P EST MOD 30 MIN: CPT | Mod: S$GLB,,, | Performed by: UROLOGY

## 2024-05-21 PROCEDURE — 87086 URINE CULTURE/COLONY COUNT: CPT | Performed by: UROLOGY

## 2024-05-21 PROCEDURE — 3288F FALL RISK ASSESSMENT DOCD: CPT | Mod: CPTII,S$GLB,, | Performed by: UROLOGY

## 2024-05-21 PROCEDURE — 51798 US URINE CAPACITY MEASURE: CPT | Mod: S$GLB,,, | Performed by: UROLOGY

## 2024-05-21 PROCEDURE — 99214 OFFICE O/P EST MOD 30 MIN: CPT | Mod: S$GLB,,, | Performed by: STUDENT IN AN ORGANIZED HEALTH CARE EDUCATION/TRAINING PROGRAM

## 2024-05-21 PROCEDURE — 3078F DIAST BP <80 MM HG: CPT | Mod: CPTII,S$GLB,, | Performed by: UROLOGY

## 2024-05-21 PROCEDURE — 3074F SYST BP LT 130 MM HG: CPT | Mod: CPTII,S$GLB,, | Performed by: UROLOGY

## 2024-05-21 PROCEDURE — 3288F FALL RISK ASSESSMENT DOCD: CPT | Mod: CPTII,S$GLB,, | Performed by: STUDENT IN AN ORGANIZED HEALTH CARE EDUCATION/TRAINING PROGRAM

## 2024-05-21 PROCEDURE — 1101F PT FALLS ASSESS-DOCD LE1/YR: CPT | Mod: CPTII,S$GLB,, | Performed by: UROLOGY

## 2024-05-21 PROCEDURE — 99999 PR PBB SHADOW E&M-EST. PATIENT-LVL V: CPT | Mod: PBBFAC,,, | Performed by: UROLOGY

## 2024-05-21 PROCEDURE — 1159F MED LIST DOCD IN RCRD: CPT | Mod: CPTII,S$GLB,, | Performed by: UROLOGY

## 2024-05-21 PROCEDURE — 1101F PT FALLS ASSESS-DOCD LE1/YR: CPT | Mod: CPTII,S$GLB,, | Performed by: STUDENT IN AN ORGANIZED HEALTH CARE EDUCATION/TRAINING PROGRAM

## 2024-05-21 PROCEDURE — 3074F SYST BP LT 130 MM HG: CPT | Mod: CPTII,S$GLB,, | Performed by: STUDENT IN AN ORGANIZED HEALTH CARE EDUCATION/TRAINING PROGRAM

## 2024-05-21 PROCEDURE — 99999 PR PBB SHADOW E&M-EST. PATIENT-LVL II: CPT | Mod: PBBFAC,,, | Performed by: STUDENT IN AN ORGANIZED HEALTH CARE EDUCATION/TRAINING PROGRAM

## 2024-05-21 PROCEDURE — 81001 URINALYSIS AUTO W/SCOPE: CPT | Performed by: UROLOGY

## 2024-05-21 PROCEDURE — 3078F DIAST BP <80 MM HG: CPT | Mod: CPTII,S$GLB,, | Performed by: STUDENT IN AN ORGANIZED HEALTH CARE EDUCATION/TRAINING PROGRAM

## 2024-05-21 PROCEDURE — 1126F AMNT PAIN NOTED NONE PRSNT: CPT | Mod: CPTII,S$GLB,, | Performed by: UROLOGY

## 2024-05-21 PROCEDURE — 81003 URINALYSIS AUTO W/O SCOPE: CPT | Mod: QW,S$GLB,, | Performed by: UROLOGY

## 2024-05-21 NOTE — PROGRESS NOTES
Section of Cardiology                  Cardiac Clinic Note      HPI:   Jessica Perez is a 79 y.o. female with h/o MARC, OA, HLD (on repatha), obesity who comes in to cardiology clinic for evaluation.       1/16/24  Was seeing Dr. Obrien in cardiology clinic  Fell last night in the tub and hit the right side of her chest   Has difficulty breathing when she takes a deep breath in, it hurts   Reports SOB with vacuuming and going up stairs prior to her falling  Noticeable in the last month  Wears compression stockings   Does not exercise due to significant knee pain   Has not been active  ECG compared of carbs, sodas, sleep    Supposed to be on prolia for over a year, not on any osteoporosis medication     Stopped smoking in her 30s after her kid   Drinks 1 glass of wine nightly    Family history:  Father with heart disease      5/21/24  Stress echo 2/24 with no ischemia, EF 65%, mild AS  Gets SOB with walking stairs and walking dogs outside in the yard  Has had a cough  Symptoms for 2 months  Knee pain with swelling on left   Does not exercise   Overtime, weight has went up from high 160s lbs to 176lbs   Eats a good bit of bread, pasta, potatoes   Bp stable     Denies PND, chest pain, dizziness, syncope         EKG 1/16/24 NSR, sinus arrhythmia    STRESS ECHO 2/24       Left Ventricle: The left ventricle is normal in size. Normal wall thickness. There is concentric remodeling. Normal wall motion. There is normal systolic function with a visually estimated ejection fraction of 55 - 70%. Ejection fraction by visual approximation is 65%. There is normal diastolic function.    Right Ventricle: Normal right ventricular cavity size. Wall thickness is normal. Right ventricle wall motion  is normal. Systolic function is normal.    Aortic Valve: The aortic valve is a trileaflet valve. There is mild aortic valve sclerosis. There is mild stenosis. Aortic valve area by VTI is 1.88 cm². Aortic valve peak  velocity is 1.62 m/s. Mean gradient is 6 mmHg. The dimensionless index is 0.73.    Pulmonary Artery: The estimated pulmonary artery systolic pressure is 34 mmHg.    IVC/SVC: Normal venous pressure at 3 mmHg.    Stress Protocol: The test was stopped because the end of the protocol was reached.    Baseline ECG: The Baseline ECG reveals sinus rhythm.    Stress ECG: During stress, rare PACs are noted. There is normal blood pressure response with stress.    ECG Conclusion: The ECG portion of the study is abnormal but not diagnostic.    Post-stress Impression: The study is negative with no echocardiographic evidence of stress induced ischemia.            STRESS TEST No results found for this or any previous visit.       LHC No results found for this or any previous visit.            ROS: All 10 systems reviewed. Please refer to the HPI for pertinent positives. All other systems negative.     Past Medical History  Past Medical History:   Diagnosis Date    Acute pain of left knee 3/22/2021    Arthritis     Arthritis of knee 2/9/2021    At risk for sleep apnea 9/19/2019    Balance problem 10/21/2021    Bilateral primary osteoarthritis of knee 4/4/2018    Chest pain, moderate coronary artery risk 8/8/2019    Chronic midline low back pain without sciatica 11/30/2020    Chronic pain of both knees 3/23/2021    Costochondritis 1/20/2020    Depression, major, recurrent, mild     Diarrhea 5/11/2022    Difficulty walking 11/30/2020    Difficulty walking 11/30/2020    LANRE (generalized anxiety disorder) 3/22/2021    Gait, antalgic 10/21/2021    Generalized weakness 11/30/2020    Generalized weakness 11/30/2020    Glaucoma     HLD (hyperlipidemia)     Lower extremity weakness 10/21/2021    Muscle cramps 5/23/2018    Osteopenia of multiple sites 4/2/2018    Primary osteoarthritis of right knee 05/30/2018    Primary snoring     Rash 5/11/2022    Sleep related leg cramps 9/7/2010    SOB (shortness of breath) 8/8/2019    Unspecified  gastritis and gastroduodenitis without mention of hemorrhage 11/28/2010     Dx updated per 2019 IMO Load    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 Benign.    CATARACT EXTRACTION W/  INTRAOCULAR LENS IMPLANT Left 02/04/2019    CATARACT EXTRACTION W/  INTRAOCULAR LENS IMPLANT Right 03/04/2019    CHOLECYSTECTOMY      COLONOSCOPY N/A 06/29/2022    Procedure: COLONOSCOPY;  Surgeon: Nichole Kelly MD;  Location: [facility] ENDO;  Service: Endoscopy;  Laterality: N/A;    DILATION AND CURETTAGE OF UTERUS      finger surgery      middle finger on right hand    GALLBLADDER SURGERY      INJECTION OF ANESTHETIC AGENT AROUND NERVE Bilateral 10/01/2021    Procedure: Bilateral Genicular nerve block -;  Surgeon: Primo Bond MD;  Location: [facility] PAIN MGT;  Service: Pain Management;  Laterality: Bilateral;    RADIOFREQUENCY THERMOCOAGULATION Left 11/05/2021    Procedure: Left Genicular Nerve RFA (COOLIEF) with RN IV sedation;  Surgeon: Primo Bond MD;  Location: [facility] PAIN MGT;  Service: Pain Management;  Laterality: Left;    TONSILLECTOMY, ADENOIDECTOMY      TUBAL LIGATION            Allergies:   Review of patient's allergies indicates:   Allergen Reactions    Augmentin [amoxicillin-pot clavulanate] Other (See Comments)     Diarrhea, yeast    Bactrim  [sulfamethoxazole-trimethoprim]      Other reaction(s): Unknown    Celebrex [celecoxib] Other (See Comments)     Stomach pain, gas     Lipitor  [atorvastatin]      Other reaction(s): Unknown    Pravachol  [pravastatin]      Other reaction(s): Unknown    Statins-hmg-coa reductase inhibitors      Other reaction(s): Muscle pain    Sulfa (sulfonamide antibiotics)      Other reaction(s): Swelling    Zoster vaccine live        Social History:  Social History     Socioeconomic History    Marital status:    Tobacco  Use    Smoking status: Former     Current packs/day: 0.50     Average packs/day: 0.5 packs/day for 12.0 years (6.0 ttl pk-yrs)     Types: Cigarettes     Passive exposure: Never    Smokeless tobacco: Never   Substance and Sexual Activity    Alcohol use: Yes     Comment: occasionally    Drug use: No    Sexual activity: Yes     Social Determinants of Health     Financial Resource Strain: Low Risk  (6/30/2019)    Overall Financial Resource Strain (CARDIA)     Difficulty of Paying Living Expenses: Not hard at all   Food Insecurity: No Food Insecurity (6/30/2019)    Hunger Vital Sign     Worried About Running Out of Food in the Last Year: Never true     Ran Out of Food in the Last Year: Never true   Transportation Needs: Unknown (6/30/2019)    PRAPARE - Transportation     Lack of Transportation (Non-Medical): No   Physical Activity: Insufficiently Active (6/30/2019)    Exercise Vital Sign     Days of Exercise per Week: 2 days     Minutes of Exercise per Session: 40 min   Stress: Stress Concern Present (6/30/2019)    [employer] - Occupational Stress Questionnaire     Feeling of Stress : Rather much       Family History:  family history includes Breast cancer in her paternal aunt; Cancer in her father and sister; Dementia in her mother; Diabetes in her father; Hepatitis in her sister; Hypertension in her father; Osteoporosis in her mother.    Home Medications:  Current Outpatient Medications on File Prior to Visit   Medication Sig Dispense Refill    acetaminophen/diphenhydramine (TYLENOL PM ORAL) Take by mouth.      APPLE CIDER VINEGAR ORAL Take by mouth.      artificial tears,hypromellose,,GENTEAL/SUSTANE, 0.3 % Gel       b complex vitamins capsule Take 1 capsule by mouth once daily.      calcium carbonate (CALCIUM 500 ORAL) Take by mouth.      cyanocobalamin, vitamin B-12, (VITAMIN B-12 ORAL) Take by mouth.      ergocalciferol, vitamin D2, (VITAMIN D ORAL) Take by mouth.      evolocumab  (REPATHA SURECLICK) 140 mg/mL PnIj Inject 1 pen (140 mg total) into the skin every 14 (fourteen) days. 2 mL 6    ezetimibe (ZETIA) 10 mg tablet TAKE 1 TABLET BY MOUTH ONCE DAILY 90 tablet 3    ezetimibe (ZETIA) 10 mg tablet Take 10 mg by mouth once daily.      flaxseed oiL 1,000 mg Cap flaxseed oil Take No date recorded No form recorded No frequency recorded No route recorded No set duration recorded No set duration amount recorded suspended No dosage strength recorded No dosage strength units of measure recorded      fluticasone (FLONASE) 50 mcg/actuation nasal spray 2 sprays by Each Nare route once daily. 1 Bottle 12    krill oil-omega-3-dha-epa 150-450 mg CpDR Take by mouth.      MAGNESIUM CITRATE ORAL Take by mouth.      multivitamin capsule Take 1 capsule by mouth Daily.      solifenacin (VESICARE) 10 MG tablet Take 1 tablet (10 mg total) by mouth once daily. 30 tablet 11    TURMERIC ORAL Take by mouth.      UNABLE TO FIND CBD Oil topical      ZINC ORAL Take by mouth.      aspirin (ECOTRIN) 81 MG EC tablet Take 81 mg by mouth once daily. (Patient not taking: Reported on 5/21/2024)      azelastine (ASTELIN) 137 mcg (0.1 %) nasal spray 1 spray (137 mcg total) by Nasal route 2 (two) times daily. 30 mL 1    cholestyramine (QUESTRAN) 4 gram packet Take 1 packet (4 g total) by mouth daily as needed (diarrhea). 30 packet 5    ciclopirox (PENLAC) 8 % Soln Apply topically nightly. (Patient not taking: Reported on 5/21/2024) 6.6 mL 11    denosumab (PROLIA SUBQ) Inject into the skin.      famotidine (PEPCID) 40 MG tablet Take 1 tablet (40 mg total) by mouth every evening. 30 tablet 11    fluocinolone (SYNALAR) 0.01 % external solution AAA of scalp twice a day PRN scalp itching or eczema flares. (Patient not taking: Reported on 5/21/2024) 60 mL 1    hydrocortisone 2.5 % cream Apply topically 2 (two) times daily. Mix with ketoconazole cream and AAA on face BID for up to 2 weeks at a time (Patient not taking: Reported on  5/21/2024) 30 g 1    ketoconazole (NIZORAL) 2 % cream Apply topically 2 (two) times daily. Mix with hydrocortisone cream and AAA on face BID for up to 2 weeks at a time (Patient not taking: Reported on 5/21/2024) 30 g 1    ketoconazole (NIZORAL) 2 % shampoo Apply topically every 7 days. Shampoo scalp 1-2 times per week. Lather x 5 minutes, rinse well. (Patient not taking: Reported on 5/21/2024) 120 mL 5    triamcinolone acetonide 0.1% (KENALOG) 0.1 % cream Apply topically 2 (two) times daily. (Patient not taking: Reported on 5/21/2024) 45 g 0     Current Facility-Administered Medications on File Prior to Visit   Medication Dose Route Frequency Provider Last Rate Last Admin    DOBUtamine 1000 mg in D5W 250 mL infusion  10 mcg/kg/min Intravenous Continuous Robert Yoon MD   Stopped at 02/12/24 1307    triamcinolone acetonide injection 32 mg  32 mg Intra-articular 1 time in Clinic/HOD Dileep Mendoza MD           Physical exam:  /70 (BP Location: Left arm, Patient Position: Sitting)   Pulse 88   Wt 80.2 kg (176 lb 12.9 oz)   SpO2 97%   BMI 32.34 kg/m²         General: Pt is a 79 y.o. year old female who is AAOx3, in NAD, is pleasant, well nourished, looks stated age  HEENT: PERRL, EOMI, Oral mucosa pink & moist  CVS  No abnormal cardiac pulsations noted on inspection. JVP not raised. The apical impulse is normal on palpation, and is located in the left 5th intercostal space in the mid - clavicular line. No palpable thrills or abnormal pulsations noted. RR, S1 - S2 heard, no murmurs, rubs or gallops appreciated.   PUL : CTA B/L. No wheezes/crackles heard   ABD : BS +, soft. No tenderness elicited   LE : No C/C/E. Distal Pulses palpable B/L         LABS:    Chemistry:   Lab Results   Component Value Date     05/02/2024    K 5.0 05/02/2024     05/02/2024    CO2 21 (L) 05/02/2024    BUN 10 05/02/2024    CREATININE 0.9 05/02/2024    CALCIUM 9.8 05/02/2024     Cardiac Markers: No results found  "for: "CKTOTAL", "CKMB", "CKMBINDEX", "TROPONINI"  Cardiac Markers (Last 3): No results found for: "CKTOTAL", "CKMB", "CKMBINDEX", "TROPONINI"  CBC:   Lab Results   Component Value Date    WBC 8.74 05/02/2024    HGB 12.7 05/02/2024    HCT 39.2 05/02/2024    MCV 93 05/02/2024     05/02/2024     Lipids:   Lab Results   Component Value Date    CHOL 142 01/16/2024    TRIG 144 01/16/2024    HDL 70 01/16/2024     Coagulation: No results found for: "PT", "INR", "APTT"        Assessment      1. Obesity (BMI 30.0-34.9)    2. Pure hypercholesterolemia    3. SOB (shortness of breath)    4. Family history of arteriosclerotic cardiovascular disease    5. MARC (obstructive sleep apnea)    6. Age-related osteoporosis without current pathological fracture    7. Primary osteoarthritis of both knees           Plan:      HLD  LDL 43 as of 1/24  Continue Repatha and Zetia     Shortness of breath - stable   Stress echo 2/24 with no ischemia, EF 65%, mild AS  Recommend regular exercise- reports knee pain (can wear knee brace with cycling)    Osteoarthritis/osteoporosis   Stable   Currently not taking Prolia     Obesity, Body mass index is 32.34 kg/m².   Low salt, low fat diet  Exercise as tolerated, at least 30 min daily     This note was prepared using voice recognition system and is likely to have sound alike errors that may have been overlooked even after proofreading.     I have reviewed all pertinent chart information.  Plans and recommendations have been formulated under my direct supervision. All questions answered and patient voiced understanding.   If symptoms persist go to the ED.    RTC in 6 m        Robert Yoon MD  Cardiology            "

## 2024-05-21 NOTE — PROGRESS NOTES
Chief Complaint   Patient presents with    Follow-up     3 month microscopic hematuria and overactive bladder follow up.       History of Present Illness:   Jessica Perez is a 79 y.o. female here for evaluation of Follow-up (3 month microscopic hematuria and overactive bladder follow up.)      5/21/24-Vesicare is helping a lot. No dysuria. Feels like her stream is a lot better. Empties by positionally voiding. Wearing 2 pads per day. Sometimes has UUI when she first stands up. Still with loose stools. No abdominal pain. No gross hematuria.   2/21/24-Pt reports frequency/urgency and UUI. She positionally voids to empty. Nocturia x 1-2, despite limiting pm fluid. She is taking trospium every day. She tried an OTC pessary and it didn't help and it hurt. 5 pads per day. She reports that she never has constipation, but rather chronically loose stools.    8/9/23-on trospium about every other day. Sometimes she is having strong urges. Wearing a pad. Limiting pm fluid and not having nocturia. No difficulty emptying. No dysuria.   2/7/23-on trospium, and it seems to work okay. Only having stress incontinence and it is much improved. She thinks she may have cystitis and there is slight discomfort and she is having frequency and small volumes. Took a bubble bath 3 days ago. Not sexually active.   8/2/22- on trospium and significantly improved. Wearing 2 pads per day now. Here for cysto. CT urogram showed no stones, renal mass or hydronephrosis. Doing pelvic floor PT  7/8/22- She reports that she has been wearing pads for 10 years. She states that when she stands up, she pours out urine. She reports that she has had loose stools regularly and was taking Immodium when she would go anywhere. It used to be just KRISH with sneeze, cough and laugh.   Wears about 5 pads per day. During the night if she wakes, she doesn't have UUI. No daytime frequency. +Daytime urgency and UUI.   No gross hematuria or dysuria.         Review of  Systems   Constitutional:  Negative for chills and fever.   Respiratory:  Negative for shortness of breath.    Cardiovascular:  Negative for chest pain.   Gastrointestinal:  Negative for constipation.   Genitourinary:  Negative for difficulty urinating and vaginal pain.   All other systems reviewed and are negative.        Past Medical History:   Diagnosis Date    Acute pain of left knee 3/22/2021    Arthritis     Arthritis of knee 2/9/2021    At risk for sleep apnea 9/19/2019    Balance problem 10/21/2021    Bilateral primary osteoarthritis of knee 4/4/2018    Chest pain, moderate coronary artery risk 8/8/2019    Chronic midline low back pain without sciatica 11/30/2020    Chronic pain of both knees 3/23/2021    Costochondritis 1/20/2020    Depression, major, recurrent, mild     Diarrhea 5/11/2022    Difficulty walking 11/30/2020    Difficulty walking 11/30/2020    LANRE (generalized anxiety disorder) 3/22/2021    Gait, antalgic 10/21/2021    Generalized weakness 11/30/2020    Generalized weakness 11/30/2020    Glaucoma     HLD (hyperlipidemia)     Lower extremity weakness 10/21/2021    Muscle cramps 5/23/2018    Osteopenia of multiple sites 4/2/2018    Primary osteoarthritis of right knee 05/30/2018    Primary snoring     Rash 5/11/2022    Sleep related leg cramps 9/7/2010    SOB (shortness of breath) 8/8/2019    Unspecified gastritis and gastroduodenitis without mention of hemorrhage 11/28/2010     Dx updated per 2019 IMO Load    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 Benign.    CATARACT EXTRACTION W/  INTRAOCULAR LENS IMPLANT Left 02/04/2019    CATARACT EXTRACTION W/  INTRAOCULAR LENS IMPLANT Right 03/04/2019    CHOLECYSTECTOMY      COLONOSCOPY N/A 06/29/2022    Procedure: COLONOSCOPY;  Surgeon: Nichole Kelly MD;  Location: [facility];  Service: Endoscopy;   Laterality: N/A;    DILATION AND CURETTAGE OF UTERUS      finger surgery      middle finger on right hand    GALLBLADDER SURGERY      INJECTION OF ANESTHETIC AGENT AROUND NERVE Bilateral 10/01/2021    Procedure: Bilateral Genicular nerve block -;  Surgeon: Primo Bond MD;  [facility];  Service: Pain Management;  Laterality: Bilateral;    RADIOFREQUENCY THERMOCOAGULATION Left 11/05/2021    Procedure: Left Genicular Nerve RFA (COOLIEF) with RN IV sedation;  Surgeon: Pirmo Bond MD;  Location: [facility] PAIN MGT;  Service: Pain Management;  Laterality: Left;    TONSILLECTOMY, ADENOIDECTOMY      TUBAL LIGATION         Family History   Problem Relation Name Age of Onset    Diabetes Father      Cancer Father          prostate ca    Hypertension Father      Cancer Sister          cervical ca    Hepatitis Sister      Dementia Mother      Osteoporosis Mother      Breast cancer Paternal Aunt         Social History     Tobacco Use    Smoking status: Former     Current packs/day: 0.50     Average packs/day: 0.5 packs/day for 12.0 years (6.0 ttl pk-yrs)     Types: Cigarettes     Passive exposure: Never    Smokeless tobacco: Never   Substance Use Topics    Alcohol use: Yes     Comment: occasionally    Drug use: No       Current Outpatient Medications   Medication Sig Dispense Refill    acetaminophen/diphenhydramine (TYLENOL PM ORAL) Take by mouth.      APPLE CIDER VINEGAR ORAL Take by mouth.      artificial tears,hypromellose,,GENTEAL/SUSTANE, 0.3 % Gel       aspirin (ECOTRIN) 81 MG EC tablet Take 81 mg by mouth once daily. (Patient not taking: Reported on 5/21/2024)      b complex vitamins capsule Take 1 capsule by mouth once daily.      calcium carbonate (CALCIUM 500 ORAL) Take by mouth.      ciclopirox (PENLAC) 8 % Soln Apply topically nightly. (Patient not taking: Reported on 5/21/2024) 6.6 mL 11    cyanocobalamin, vitamin B-12, (VITAMIN B-12 ORAL) Take by mouth.      denosumab (PROLIA SUBQ) Inject into the  skin.      ergocalciferol, vitamin D2, (VITAMIN D ORAL) Take by mouth.      evolocumab (REPATHA SURECLICK) 140 mg/mL PnIj Inject 1 pen (140 mg total) into the skin every 14 (fourteen) days. 2 mL 6    ezetimibe (ZETIA) 10 mg tablet TAKE 1 TABLET BY MOUTH ONCE DAILY 90 tablet 3    ezetimibe (ZETIA) 10 mg tablet Take 10 mg by mouth once daily.      flaxseed oiL 1,000 mg Cap flaxseed oil Take No date recorded No form recorded No frequency recorded No route recorded No set duration recorded No set duration amount recorded suspended No dosage strength recorded No dosage strength units of measure recorded      fluocinolone (SYNALAR) 0.01 % external solution AAA of scalp twice a day PRN scalp itching or eczema flares. (Patient not taking: Reported on 5/21/2024) 60 mL 1    fluticasone (FLONASE) 50 mcg/actuation nasal spray 2 sprays by Each Nare route once daily. 1 Bottle 12    hydrocortisone 2.5 % cream Apply topically 2 (two) times daily. Mix with ketoconazole cream and AAA on face BID for up to 2 weeks at a time (Patient not taking: Reported on 5/21/2024) 30 g 1    ketoconazole (NIZORAL) 2 % cream Apply topically 2 (two) times daily. Mix with hydrocortisone cream and AAA on face BID for up to 2 weeks at a time (Patient not taking: Reported on 5/21/2024) 30 g 1    ketoconazole (NIZORAL) 2 % shampoo Apply topically every 7 days. Shampoo scalp 1-2 times per week. Lather x 5 minutes, rinse well. (Patient not taking: Reported on 5/21/2024) 120 mL 5    krill oil-omega-3-dha-epa 150-450 mg CpDR Take by mouth.      MAGNESIUM CITRATE ORAL Take by mouth.      multivitamin capsule Take 1 capsule by mouth Daily.      solifenacin (VESICARE) 10 MG tablet Take 1 tablet (10 mg total) by mouth once daily. 30 tablet 11    triamcinolone acetonide 0.1% (KENALOG) 0.1 % cream Apply topically 2 (two) times daily. (Patient not taking: Reported on 5/21/2024) 45 g 0    TURMERIC ORAL Take by mouth.      UNABLE TO FIND CBD Oil topical      ZINC ORAL  Take by mouth.      azelastine (ASTELIN) 137 mcg (0.1 %) nasal spray 1 spray (137 mcg total) by Nasal route 2 (two) times daily. 30 mL 1    cholestyramine (QUESTRAN) 4 gram packet Take 1 packet (4 g total) by mouth daily as needed (diarrhea). 30 packet 5    famotidine (PEPCID) 40 MG tablet Take 1 tablet (40 mg total) by mouth every evening. 30 tablet 11     Current Facility-Administered Medications   Medication Dose Route Frequency Provider Last Rate Last Admin    DOBUtamine 1000 mg in D5W 250 mL infusion  10 mcg/kg/min Intravenous Continuous Robert Yoon MD   Stopped at 02/12/24 1307    triamcinolone acetonide injection 32 mg  32 mg Intra-articular 1 time in Clinic/HOD Dileep Mendoza MD           Review of patient's allergies indicates:   Allergen Reactions    Augmentin [amoxicillin-pot clavulanate] Other (See Comments)     Diarrhea, yeast    Bactrim  [sulfamethoxazole-trimethoprim]      Other reaction(s): Unknown    Celebrex [celecoxib] Other (See Comments)     Stomach pain, gas     Lipitor  [atorvastatin]      Other reaction(s): Unknown    Pravachol  [pravastatin]      Other reaction(s): Unknown    Statins-hmg-coa reductase inhibitors      Other reaction(s): Muscle pain    Sulfa (sulfonamide antibiotics)      Other reaction(s): Swelling    Zoster vaccine live        Physical Exam  Vitals:    05/21/24 1002   BP: 110/74   Pulse: 78   Resp: 18         General: Well-developed, well-nourished, in no acute distress  HEENT: Normocephalic, atraumatic, extraocular movements intact  Neck: Supple, no supraclavicular or cervical lymphadenopathy, trachea midline  Respirations: even and unlabored  : 7/8/22-Normal external female genitalia without lesions. Orthotopic urethral meatus with caruncle. atrophic vaginal mucosa. No significant prolapse,  negative cough stress test, no urethral hypermobility  Extremities: moves all equally, no clubbing, cyanosis or edema  Skin: Warm and dry. No lesions  Psych: normal  affect  Neuro: Alert and oriented x 3. Cranial nerves II-XII intact    PVR: 19mL    Urinalysis  moderate LE, small blood, nit negative      Assessment:  1. Microhematuria  POCT Urinalysis, Dipstick, Automated, W/O Scope    POCT Bladder Scan    Urinalysis Microscopic    Urine culture      2. OAB (overactive bladder)  POCT Urinalysis, Dipstick, Automated, W/O Scope    POCT Bladder Scan                Plan:   Microhematuria  -     POCT Urinalysis, Dipstick, Automated, W/O Scope  -     POCT Bladder Scan  -     Urinalysis Microscopic  -     Urine culture    OAB (overactive bladder)  -     POCT Urinalysis, Dipstick, Automated, W/O Scope  -     POCT Bladder Scan      Continue vesicare    Follow up in about 6 months (around 11/21/2024).

## 2024-05-22 LAB
BACTERIA #/AREA URNS AUTO: ABNORMAL /HPF
BACTERIA UR CULT: NORMAL
BACTERIA UR CULT: NORMAL
CAOX CRY UR QL COMP ASSIST: ABNORMAL
HYALINE CASTS UR QL AUTO: 161 /LPF
MICROSCOPIC COMMENT: ABNORMAL
RBC #/AREA URNS AUTO: 16 /HPF (ref 0–4)
SQUAMOUS #/AREA URNS AUTO: 4 /HPF
WBC #/AREA URNS AUTO: 55 /HPF (ref 0–5)

## 2024-07-15 DIAGNOSIS — E78.00 PURE HYPERCHOLESTEROLEMIA: ICD-10-CM

## 2024-07-15 RX ORDER — EVOLOCUMAB 140 MG/ML
140 INJECTION, SOLUTION SUBCUTANEOUS
Qty: 2 ML | Refills: 6 | Status: SHIPPED | OUTPATIENT
Start: 2024-07-15

## 2024-07-15 RX ORDER — EVOLOCUMAB 140 MG/ML
140 INJECTION, SOLUTION SUBCUTANEOUS
Qty: 2 ML | Refills: 6 | OUTPATIENT
Start: 2024-07-15

## 2024-07-15 NOTE — TELEPHONE ENCOUNTER
----- Message from Ira Strong sent at 7/15/2024 10:34 AM CDT -----  Regarding: refill  ype:  RX Refill Request    Who Called: Jessica  Refill or New Rx:refill  RX Name and Strength:  evolocumab (REPATHA SURECLICK) 140 mg/mL PnIj   [[id number]] /     How is the patient currently taking it? (ex. 1XDay):  Is this a 30 day or 90 day RX:  Preferred Pharmacy with phone number:[facility] / in [place] on  [email or web address] / [phone]  Local or Mail Order:local  Ordering Provider:  Would the patient rather a call back or a response via [facility]? Call back  Best Call Back Number: [phone]  Additional Information:

## 2024-07-15 NOTE — TELEPHONE ENCOUNTER
----- Message from Ira Strong sent at 7/15/2024 10:27 AM CDT -----  Regarding: rx concerns  Type:  RX Refill Request    Who Called: Jessica  Refill or New Rx:refill  RX Name and Strength:  evolocumab (REPATHA SURECLICK) 140 mg/mL PnIj   [[id number]] /     How is the patient currently taking it? (ex. 1XDay):  Is this a 30 day or 90 day RX:  Preferred Pharmacy with phone number:[facility] / in [place] on  [address] [employer] / [phone]  Local or Mail Order:local  Ordering Provider:  Would the patient rather a call back or a response via [facility]? Call back  Best Call Back Number: [phone]  Additional Information:

## 2024-07-15 NOTE — TELEPHONE ENCOUNTER
Refill Decision Note   Jessica Perez  is requesting a refill authorization.  Brief Assessment and Rationale for Refill:  Quick Discontinue     Medication Therapy Plan:  duplicate      Comments:     Note composed:11:00 AM 07/15/2024             Appointments     Last Visit   5/2/2024 Warren Lemus MD   Next Visit   Visit date not found Warren Lemus MD

## 2024-07-18 ENCOUNTER — TELEPHONE (OUTPATIENT)
Dept: INTERNAL MEDICINE | Facility: CLINIC | Age: 79
End: 2024-07-18
Payer: MEDICARE

## 2024-07-31 ENCOUNTER — PATIENT MESSAGE (OUTPATIENT)
Dept: RESEARCH | Facility: HOSPITAL | Age: 79
End: 2024-07-31
Payer: MEDICARE

## 2024-09-04 ENCOUNTER — OFFICE VISIT (OUTPATIENT)
Dept: OPHTHALMOLOGY | Facility: CLINIC | Age: 79
End: 2024-09-04
Payer: MEDICARE

## 2024-09-04 DIAGNOSIS — Z96.1 PSEUDOPHAKIA OF BOTH EYES: ICD-10-CM

## 2024-09-04 DIAGNOSIS — H52.7 REFRACTION DISORDER: ICD-10-CM

## 2024-09-04 DIAGNOSIS — M35.01 KERATITIS SICCA, BILATERAL: ICD-10-CM

## 2024-09-04 DIAGNOSIS — H40.013 OPEN ANGLE WITH BORDERLINE FINDINGS, LOW RISK, BILATERAL: Primary | ICD-10-CM

## 2024-09-04 PROCEDURE — 92133 CPTRZD OPH DX IMG PST SGM ON: CPT | Mod: S$GLB,,, | Performed by: OPHTHALMOLOGY

## 2024-09-04 PROCEDURE — 99999 PR PBB SHADOW E&M-EST. PATIENT-LVL III: CPT | Mod: PBBFAC,,, | Performed by: OPHTHALMOLOGY

## 2024-09-04 PROCEDURE — 99214 OFFICE O/P EST MOD 30 MIN: CPT | Mod: S$GLB,,, | Performed by: OPHTHALMOLOGY

## 2024-09-04 PROCEDURE — 92083 EXTENDED VISUAL FIELD XM: CPT | Mod: S$GLB,,, | Performed by: OPHTHALMOLOGY

## 2024-09-04 PROCEDURE — 1159F MED LIST DOCD IN RCRD: CPT | Mod: CPTII,S$GLB,, | Performed by: OPHTHALMOLOGY

## 2024-09-04 NOTE — PROGRESS NOTES
HPI     Glaucoma Suspect            Comments: 6 month IOP check with HVF and DFE    Patient states seeing floaters that come and go in OU but concerned they   have worsened, also having trouble focusing and feels it is from OU being   extremely dry and patient no longer drives at night secondary to glare   becoming bothersome.          Comments    PCP: Dr. Lemus   1. COAG SUSP   SLT OD 3/26/15   SLT OS 5/15   2. Dry Eyes   3. PCIOL OD 3/4/2019 (+28.0 SN60WF) CDE 10.38 -NEAR   PCIOL OS +25.5 SN60WF / CDE: 9.46 / 2-4-19 2-5-19       GCL: 32/31        Pataday PRN   Systane BID             Last edited by Janelle Griffin, Patient Care Assistant on 9/4/2024  2:57   PM.            Assessment /Plan     For exam results, see Encounter Report.      ICD-10-CM ICD-9-CM    1. Open angle with borderline findings, low risk, bilateral  H40.013 365.01 Kumar Visual Field - OU - Extended - Both Eyes      Color Fundus Photography - OU - Both Eyes      Posterior Segment OCT Optic Nerve- Both eyes Doing well - intraocular pressure is within acceptable range relative to target pressure with no evidence of progression.   Continue current treatment.  Reviewed importance of continued compliance with treatment and follow up.         2. Keratitis sicca, bilateral  M35.01 370.8 Start PF tears QID OU and Genteal Gel at bedtime to help treat dryness       3. Pseudophakia of both eyes  Z96.1 V43.1       4. Refraction disorder  H52.7 367.9           RETURN TO CLINIC 6 weeks , refract OU

## 2024-10-09 ENCOUNTER — OFFICE VISIT (OUTPATIENT)
Dept: OPHTHALMOLOGY | Facility: CLINIC | Age: 79
End: 2024-10-09
Payer: MEDICARE

## 2024-10-09 DIAGNOSIS — H52.7 REFRACTION DISORDER: ICD-10-CM

## 2024-10-09 DIAGNOSIS — Z96.1 PSEUDOPHAKIA OF BOTH EYES: ICD-10-CM

## 2024-10-09 DIAGNOSIS — H40.013 OPEN ANGLE WITH BORDERLINE FINDINGS, LOW RISK, BILATERAL: Primary | ICD-10-CM

## 2024-10-09 DIAGNOSIS — H16.223 KERATITIS SICCA, BILATERAL: ICD-10-CM

## 2024-10-09 PROCEDURE — 99999 PR PBB SHADOW E&M-EST. PATIENT-LVL III: CPT | Mod: PBBFAC,,, | Performed by: OPHTHALMOLOGY

## 2024-10-09 PROCEDURE — 1159F MED LIST DOCD IN RCRD: CPT | Mod: CPTII,S$GLB,, | Performed by: OPHTHALMOLOGY

## 2024-10-09 PROCEDURE — 99214 OFFICE O/P EST MOD 30 MIN: CPT | Mod: S$GLB,,, | Performed by: OPHTHALMOLOGY

## 2024-10-09 NOTE — PROGRESS NOTES
HPI     Glaucoma            Comments: Pt reports for 4-6wk IOP check w/Mrx. Denies any pain or   irritation. Va stable. No drops.           Comments    1. COAG SUSP   SLT OD 3/26/15   SLT OS 5/15   2. Dry Eyes   3. PCIOL OD 3/4/2019 (+28.0 SN60WF) CDE 10.38 -NEAR   PCIOL OS +25.5 SN60WF / CDE: 9.46 / 2-4-19 2-5-19       GCL: 32/31        Pataday PRN   Systane BID   In the past Pt wore cls for monovision- then changed to MF cls             Last edited by Ramiro Calderón on 10/9/2024  1:07 PM.            Assessment /Plan     For exam results, see Encounter Report.      ICD-10-CM ICD-9-CM    1. Open angle with borderline findings, low risk, bilateral  H40.013 365.01 Appears stable- follow         2. Keratitis sicca, bilateral  H16.223 370.33 Continue tears       3. Pseudophakia of both eyes  Z96.1 V43.1 Doing well       4. Refraction disorder  H52.7 367.9           RETURN TO CLINIC 6 months DOA

## 2024-11-04 ENCOUNTER — OFFICE VISIT (OUTPATIENT)
Dept: INTERNAL MEDICINE | Facility: CLINIC | Age: 79
End: 2024-11-04
Payer: MEDICARE

## 2024-11-04 VITALS
WEIGHT: 172.38 LBS | DIASTOLIC BLOOD PRESSURE: 70 MMHG | OXYGEN SATURATION: 96 % | SYSTOLIC BLOOD PRESSURE: 110 MMHG | HEART RATE: 91 BPM | HEIGHT: 62 IN | TEMPERATURE: 99 F | BODY MASS INDEX: 31.72 KG/M2

## 2024-11-04 DIAGNOSIS — T46.6X5A STATIN MYOPATHY: ICD-10-CM

## 2024-11-04 DIAGNOSIS — G72.0 STATIN MYOPATHY: ICD-10-CM

## 2024-11-04 DIAGNOSIS — E66.811 OBESITY (BMI 30.0-34.9): ICD-10-CM

## 2024-11-04 DIAGNOSIS — E78.2 MIXED HYPERLIPIDEMIA: Primary | ICD-10-CM

## 2024-11-04 DIAGNOSIS — E21.3 HYPERPARATHYROIDISM: ICD-10-CM

## 2024-11-04 PROCEDURE — 1159F MED LIST DOCD IN RCRD: CPT | Mod: CPTII,S$GLB,, | Performed by: NURSE PRACTITIONER

## 2024-11-04 PROCEDURE — 99214 OFFICE O/P EST MOD 30 MIN: CPT | Mod: S$GLB,,, | Performed by: NURSE PRACTITIONER

## 2024-11-04 PROCEDURE — 3078F DIAST BP <80 MM HG: CPT | Mod: CPTII,S$GLB,, | Performed by: NURSE PRACTITIONER

## 2024-11-04 PROCEDURE — G2211 COMPLEX E/M VISIT ADD ON: HCPCS | Mod: S$GLB,,, | Performed by: NURSE PRACTITIONER

## 2024-11-04 PROCEDURE — 3074F SYST BP LT 130 MM HG: CPT | Mod: CPTII,S$GLB,, | Performed by: NURSE PRACTITIONER

## 2024-11-04 PROCEDURE — 99999 PR PBB SHADOW E&M-EST. PATIENT-LVL V: CPT | Mod: PBBFAC,,, | Performed by: NURSE PRACTITIONER

## 2024-11-04 PROCEDURE — 1160F RVW MEDS BY RX/DR IN RCRD: CPT | Mod: CPTII,S$GLB,, | Performed by: NURSE PRACTITIONER

## 2024-11-04 PROCEDURE — 1126F AMNT PAIN NOTED NONE PRSNT: CPT | Mod: CPTII,S$GLB,, | Performed by: NURSE PRACTITIONER

## 2024-11-04 RX ORDER — TRAMADOL HYDROCHLORIDE 50 MG/1
50 TABLET ORAL EVERY 6 HOURS PRN
COMMUNITY
Start: 2024-09-27

## 2024-11-04 RX ORDER — GABAPENTIN 100 MG/1
100 CAPSULE ORAL NIGHTLY
COMMUNITY
Start: 2024-10-24

## 2024-11-04 NOTE — PROGRESS NOTES
"Subjective:       Patient ID: Jessica Perez is a 79 y.o. female.    Chief Complaint: Follow-up    Pt presents to clinic today for follow up   Pt with history of hyperlipidemia, hyperparathyroidism not requiring treatment statin myopathy and a family history of cardiovascular disease.  She indicates she has been doing well.   Her blood pressure has been stable her cholesterol numbers have been good over time.    She has a diagnosis of hyperparathyroidism but her calciums have been normal so no treatment has been required.    We will continue to monitor levels on that.     Patient does have a documented history of statin myopathy.    She is on Repatha for her cholesterol and that has worked very well.    She sees Urology for some urinary issues.          /70 (Patient Position: Sitting)   Pulse 91   Temp 99.4 °F (37.4 °C) (Tympanic)   Ht 5' 2" (1.575 m)   Wt 78.2 kg (172 lb 6.4 oz)   SpO2 96%   BMI 31.53 kg/m²     Review of Systems   Constitutional:  Negative for chills and fever.   HENT:  Negative for hearing loss.    Eyes:  Negative for photophobia and visual disturbance.   Respiratory:  Negative for cough, shortness of breath and wheezing.    Cardiovascular:  Negative for chest pain and palpitations.   Gastrointestinal:  Negative for blood in stool, constipation, nausea and vomiting.   Genitourinary:  Negative for dysuria and hematuria.   Musculoskeletal:  Positive for arthralgias and joint swelling. Negative for neck pain and neck stiffness.   Skin:  Negative for rash.   Neurological:  Negative for syncope, weakness, light-headedness and headaches.   Hematological:  Negative for adenopathy.   Psychiatric/Behavioral:  Negative for dysphoric mood. The patient is not nervous/anxious.        Objective:      Physical Exam  Vitals and nursing note reviewed.   Constitutional:       General: She is not in acute distress.     Appearance: Normal appearance. She is well-developed. She is not diaphoretic. "   HENT:      Head: Normocephalic and atraumatic.   Cardiovascular:      Rate and Rhythm: Normal rate and regular rhythm.      Heart sounds: Normal heart sounds. No murmur heard.  Pulmonary:      Effort: Pulmonary effort is normal. No respiratory distress.      Breath sounds: Normal breath sounds.   Musculoskeletal:         General: Normal range of motion.   Skin:     General: Skin is warm and dry.      Findings: No rash.   Neurological:      Mental Status: She is alert.   Psychiatric:         Mood and Affect: Mood normal.         Behavior: Behavior normal. Behavior is cooperative.         Thought Content: Thought content normal.         Judgment: Judgment normal.         Assessment:       1. Mixed hyperlipidemia    2. Statin myopathy    3. Hyperparathyroidism    4. Obesity (BMI 30.0-34.9)        Plan:       Jessica was seen today for follow-up.    Diagnoses and all orders for this visit:    Mixed hyperlipidemia    - Chronic, stable. Doing well on repatha   Statin myopathy    - Chronic, stable. Doing well on repatha   Hyperparathyroidism    - Chronic, continue to monitor   Obesity (BMI 30.0-34.9)    Chronic conditions stable.   Continue meds as prescribed  Follow up in 6 months with labs prior and PRN  Counseled on vaccines due for for age- COVID, Flu, RSV.

## 2024-11-14 DIAGNOSIS — E66.811 OBESITY (BMI 30.0-34.9): Primary | ICD-10-CM

## 2024-11-14 DIAGNOSIS — R06.02 SOB (SHORTNESS OF BREATH): ICD-10-CM

## 2024-11-15 ENCOUNTER — HOSPITAL ENCOUNTER (OUTPATIENT)
Dept: CARDIOLOGY | Facility: HOSPITAL | Age: 79
Discharge: HOME | End: 2024-11-15
Attending: STUDENT IN AN ORGANIZED HEALTH CARE EDUCATION/TRAINING PROGRAM
Payer: MEDICARE

## 2024-11-15 ENCOUNTER — OFFICE VISIT (OUTPATIENT)
Dept: CARDIOLOGY | Facility: CLINIC | Age: 79
End: 2024-11-15
Payer: MEDICARE

## 2024-11-15 VITALS
HEART RATE: 75 BPM | BODY MASS INDEX: 31.56 KG/M2 | SYSTOLIC BLOOD PRESSURE: 124 MMHG | HEIGHT: 62 IN | WEIGHT: 171.5 LBS | OXYGEN SATURATION: 98 % | DIASTOLIC BLOOD PRESSURE: 70 MMHG

## 2024-11-15 DIAGNOSIS — G89.29 CHRONIC CHEST PAIN: ICD-10-CM

## 2024-11-15 DIAGNOSIS — R07.9 CHRONIC CHEST PAIN: ICD-10-CM

## 2024-11-15 DIAGNOSIS — R00.2 PALPITATIONS: ICD-10-CM

## 2024-11-15 DIAGNOSIS — E66.811 OBESITY (BMI 30.0-34.9): ICD-10-CM

## 2024-11-15 DIAGNOSIS — M81.0 AGE-RELATED OSTEOPOROSIS WITHOUT CURRENT PATHOLOGICAL FRACTURE: ICD-10-CM

## 2024-11-15 DIAGNOSIS — G47.33 OSA (OBSTRUCTIVE SLEEP APNEA): ICD-10-CM

## 2024-11-15 DIAGNOSIS — R06.02 SOB (SHORTNESS OF BREATH): Primary | ICD-10-CM

## 2024-11-15 DIAGNOSIS — Z82.49 FAMILY HISTORY OF ARTERIOSCLEROTIC CARDIOVASCULAR DISEASE: ICD-10-CM

## 2024-11-15 DIAGNOSIS — T46.6X5A STATIN MYOPATHY: ICD-10-CM

## 2024-11-15 DIAGNOSIS — E78.00 PURE HYPERCHOLESTEROLEMIA: ICD-10-CM

## 2024-11-15 DIAGNOSIS — M17.0 PRIMARY OSTEOARTHRITIS OF BOTH KNEES: ICD-10-CM

## 2024-11-15 DIAGNOSIS — R06.02 SOB (SHORTNESS OF BREATH): ICD-10-CM

## 2024-11-15 DIAGNOSIS — G72.0 STATIN MYOPATHY: ICD-10-CM

## 2024-11-15 PROCEDURE — 99999 PR PBB SHADOW E&M-EST. PATIENT-LVL III: CPT | Mod: PBBFAC,,, | Performed by: STUDENT IN AN ORGANIZED HEALTH CARE EDUCATION/TRAINING PROGRAM

## 2024-11-15 PROCEDURE — 93010 ELECTROCARDIOGRAM REPORT: CPT | Mod: ,,, | Performed by: INTERNAL MEDICINE

## 2024-11-15 PROCEDURE — 93005 ELECTROCARDIOGRAM TRACING: CPT | Mod: PO

## 2024-11-15 NOTE — PROGRESS NOTES
Section of Cardiology                  Cardiac Clinic Note      HPI:   Jessica Perez is a 79 y.o. female with h/o MARC, OA, HLD (on repatha), obesity who comes in to cardiology clinic for evaluation.       1/16/24  Was seeing Dr. Obrien in cardiology clinic  Fell last night in the tub and hit the right side of her chest   Has difficulty breathing when she takes a deep breath in, it hurts   Reports SOB with vacuuming and going up stairs prior to her falling  Noticeable in the last month  Wears compression stockings   Does not exercise due to significant knee pain   Has not been active  ECG compared of carbs, sodas, sleep    Supposed to be on prolia for over a year, not on any osteoporosis medication     Stopped smoking in her 30s after her kid   Drinks 1 glass of wine nightly    Family history:  Father with heart disease      5/21/24  Stress echo 2/24 with no ischemia, EF 65%, mild AS  Gets SOB with walking stairs and walking dogs outside in the yard  Has had a cough  Symptoms for 2 months  Knee pain with swelling on left   Does not exercise   Overtime, weight has went up from high 160s lbs to 176lbs   Eats a good bit of bread, pasta, potatoes   Bp stable     Denies PND, chest pain, dizziness, syncope         11/15/24  Has been having cough, with mucous  Recent with left knee replacement surgery in 9/24  Working with PT  Still with MONROY- somewhat worse   No SOB at rest   Weight down 5 lbs   BP stable       Denies PND, chest pain, dizziness, syncope, LE swelling       EKG 11/15/24 NSR, sinus arrhythmia  EKG 1/16/24 NSR, sinus arrhythmia    STRESS ECHO 2/24       Left Ventricle: The left ventricle is normal in size. Normal wall thickness. There is concentric remodeling. Normal wall motion. There is normal systolic function with a visually estimated ejection fraction of 55 - 70%. Ejection fraction by visual approximation is 65%. There is normal diastolic function.    Right Ventricle: Normal right  ventricular cavity size. Wall thickness is normal. Right ventricle wall motion  is normal. Systolic function is normal.    Aortic Valve: The aortic valve is a trileaflet valve. There is mild aortic valve sclerosis. There is mild stenosis. Aortic valve area by VTI is 1.88 cm². Aortic valve peak velocity is 1.62 m/s. Mean gradient is 6 mmHg. The dimensionless index is 0.73.    Pulmonary Artery: The estimated pulmonary artery systolic pressure is 34 mmHg.    IVC/SVC: Normal venous pressure at 3 mmHg.    Stress Protocol: The test was stopped because the end of the protocol was reached.    Baseline ECG: The Baseline ECG reveals sinus rhythm.    Stress ECG: During stress, rare PACs are noted. There is normal blood pressure response with stress.    ECG Conclusion: The ECG portion of the study is abnormal but not diagnostic.    Post-stress Impression: The study is negative with no echocardiographic evidence of stress induced ischemia.            STRESS TEST No results found for this or any previous visit.       LHC No results found for this or any previous visit.            ROS: All 10 systems reviewed. Please refer to the HPI for pertinent positives. All other systems negative.     Past Medical History  Past Medical History:   Diagnosis Date    Acute pain of left knee 3/22/2021    Arthritis     Arthritis of knee 2/9/2021    At risk for sleep apnea 9/19/2019    Balance problem 10/21/2021    Bilateral primary osteoarthritis of knee 4/4/2018    Chest pain, moderate coronary artery risk 8/8/2019    Chronic midline low back pain without sciatica 11/30/2020    Chronic pain of both knees 3/23/2021    Costochondritis 1/20/2020    Depression, major, recurrent, mild     Diarrhea 5/11/2022    Difficulty walking 11/30/2020    Difficulty walking 11/30/2020    LANRE (generalized anxiety disorder) 3/22/2021    Gait, antalgic 10/21/2021    Generalized weakness 11/30/2020    Generalized weakness 11/30/2020    Glaucoma     HLD  (hyperlipidemia)     Lower extremity weakness 10/21/2021    Muscle cramps 5/23/2018    Osteopenia of multiple sites 4/2/2018    Primary osteoarthritis of right knee 05/30/2018    Primary snoring     Rash 5/11/2022    Sleep related leg cramps 9/7/2010    SOB (shortness of breath) 8/8/2019    Unspecified gastritis and gastroduodenitis without mention of hemorrhage 11/28/2010     Dx updated per 2019 IMO Load    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 Benign.    CATARACT EXTRACTION W/  INTRAOCULAR LENS IMPLANT Left 02/04/2019    CATARACT EXTRACTION W/  INTRAOCULAR LENS IMPLANT Right 03/04/2019    CHOLECYSTECTOMY      COLONOSCOPY N/A 06/29/2022    Procedure: COLONOSCOPY;  Surgeon: Nichole Kelly MD;  Location: [facility] ENDO;  Service: Endoscopy;  Laterality: N/A;    DILATION AND CURETTAGE OF UTERUS      finger surgery      middle finger on right hand    GALLBLADDER SURGERY      INJECTION OF ANESTHETIC AGENT AROUND NERVE Bilateral 10/01/2021    Procedure: Bilateral Genicular nerve block -;  Surgeon: Primo Bond MD;  Location: [facility] PAIN MGT;  Service: Pain Management;  Laterality: Bilateral;    RADIOFREQUENCY THERMOCOAGULATION Left 11/05/2021    Procedure: Left Genicular Nerve RFA (COOLIEF) with RN IV sedation;  Surgeon: Primo Bond MD;  Location: [facility] PAIN MGT;  Service: Pain Management;  Laterality: Left;    TONSILLECTOMY, ADENOIDECTOMY      TUBAL LIGATION            Allergies:   Review of patient's allergies indicates:   Allergen Reactions    Augmentin [amoxicillin-pot clavulanate] Other (See Comments)     Diarrhea, yeast    Bactrim  [sulfamethoxazole-trimethoprim]      Other reaction(s): Unknown    Celebrex [celecoxib] Other (See Comments)     Stomach pain, gas     Lipitor  [atorvastatin]      Other reaction(s): Unknown    Pravachol  [pravastatin]       Other reaction(s): Unknown    Statins-hmg-coa reductase inhibitors      Other reaction(s): Muscle pain    Sulfa (sulfonamide antibiotics)      Other reaction(s): Swelling    Zoster vaccine live        Social History:  Social History     Socioeconomic History    Marital status:    Tobacco Use    Smoking status: Former     Current packs/day: 0.50     Average packs/day: 0.5 packs/day for 12.0 years (6.0 ttl pk-yrs)     Types: Cigarettes     Passive exposure: Never    Smokeless tobacco: Never   Substance and Sexual Activity    Alcohol use: Yes     Comment: occasionally    Drug use: No    Sexual activity: Yes     Social Drivers of Health     Financial Resource Strain: Low Risk  (6/30/2019)    Overall Financial Resource Strain (CARDIA)     Difficulty of Paying Living Expenses: Not hard at all   Food Insecurity: No Food Insecurity (6/30/2019)    Hunger Vital Sign     Worried About Running Out of Food in the Last Year: Never true     Ran Out of Food in the Last Year: Never true   Transportation Needs: Unknown (6/30/2019)    PRAPARE - Transportation     Lack of Transportation (Non-Medical): No   Physical Activity: Insufficiently Active (6/30/2019)    Exercise Vital Sign     Days of Exercise per Week: 2 days     Minutes of Exercise per Session: 40 min   Stress: Stress Concern Present (6/30/2019)    [employer] - Occupational Stress Questionnaire     Feeling of Stress : Rather much       Family History:  family history includes Breast cancer in her paternal aunt; Cancer in her father and sister; Dementia in her mother; Diabetes in her father; Hepatitis in her sister; Hypertension in her father; Osteoporosis in her mother.    Home Medications:  Current Outpatient Medications on File Prior to Visit   Medication Sig Dispense Refill    acetaminophen/diphenhydramine (TYLENOL PM ORAL) Take by mouth.      APPLE CIDER VINEGAR ORAL Take by mouth.      artificial  tears,hypromellose,,GENTEAL/SUSTANE, 0.3 % Gel       aspirin (ECOTRIN) 81 MG EC tablet Take 81 mg by mouth once daily.      b complex vitamins capsule Take 1 capsule by mouth once daily.      calcium carbonate (CALCIUM 500 ORAL) Take by mouth.      ciclopirox (PENLAC) 8 % Soln Apply topically nightly. 6.6 mL 11    cyanocobalamin, vitamin B-12, (VITAMIN B-12 ORAL) Take by mouth.      denosumab (PROLIA SUBQ) Inject into the skin.      ergocalciferol, vitamin D2, (VITAMIN D ORAL) Take by mouth.      evolocumab (REPATHA SURECLICK) 140 mg/mL PnIj Inject 1 pen (140 mg total) into the skin every 14 (fourteen) days. 2 mL 6    ezetimibe (ZETIA) 10 mg tablet TAKE 1 TABLET BY MOUTH ONCE DAILY 90 tablet 3    ezetimibe (ZETIA) 10 mg tablet Take 10 mg by mouth once daily.      flaxseed oiL 1,000 mg Cap flaxseed oil Take No date recorded No form recorded No frequency recorded No route recorded No set duration recorded No set duration amount recorded suspended No dosage strength recorded No dosage strength units of measure recorded      fluocinolone (SYNALAR) 0.01 % external solution AAA of scalp twice a day PRN scalp itching or eczema flares. 60 mL 1    fluticasone (FLONASE) 50 mcg/actuation nasal spray 2 sprays by Each Nare route once daily. 1 Bottle 12    gabapentin (NEURONTIN) 100 MG capsule Take 100 mg by mouth every evening.      hydrocortisone 2.5 % cream Apply topically 2 (two) times daily. Mix with ketoconazole cream and AAA on face BID for up to 2 weeks at a time 30 g 1    ketoconazole (NIZORAL) 2 % cream Apply topically 2 (two) times daily. Mix with hydrocortisone cream and AAA on face BID for up to 2 weeks at a time 30 g 1    ketoconazole (NIZORAL) 2 % shampoo Apply topically every 7 days. Shampoo scalp 1-2 times per week. Lather x 5 minutes, rinse well. 120 mL 5    krill oil-omega-3-dha-epa 150-450 mg CpDR Take by mouth.      MAGNESIUM CITRATE ORAL Take by mouth.      multivitamin capsule Take 1 capsule by mouth Daily.  "     solifenacin (VESICARE) 10 MG tablet Take 1 tablet (10 mg total) by mouth once daily. 30 tablet 11    traMADoL (ULTRAM) 50 mg tablet Take 50 mg by mouth every 6 (six) hours as needed.      triamcinolone acetonide 0.1% (KENALOG) 0.1 % cream Apply topically 2 (two) times daily. 45 g 0    TURMERIC ORAL Take by mouth.      UNABLE TO FIND CBD Oil topical      ZINC ORAL Take by mouth.      azelastine (ASTELIN) 137 mcg (0.1 %) nasal spray 1 spray (137 mcg total) by Nasal route 2 (two) times daily. 30 mL 1    cholestyramine (QUESTRAN) 4 gram packet Take 1 packet (4 g total) by mouth daily as needed (diarrhea). 30 packet 5    famotidine (PEPCID) 40 MG tablet Take 1 tablet (40 mg total) by mouth every evening. 30 tablet 11     Current Facility-Administered Medications on File Prior to Visit   Medication Dose Route Frequency Provider Last Rate Last Admin    DOBUtamine 1000 mg in D5W 250 mL infusion  10 mcg/kg/min Intravenous Continuous Robert Yoon MD   Stopped at 02/12/24 1307    triamcinolone acetonide injection 32 mg  32 mg Intra-articular 1 time in Clinic/HOD Dileep Mendoza MD           Physical exam:  /70 (BP Location: Right arm, Patient Position: Sitting)   Pulse 75   Ht 5' 2" (1.575 m)   Wt 77.8 kg (171 lb 8.3 oz)   SpO2 98%   BMI 31.37 kg/m²         General: Pt is a 79 y.o. year old female who is AAOx3, in NAD, is pleasant, well nourished, looks stated age  HEENT: PERRL, EOMI, Oral mucosa pink & moist  CVS  No abnormal cardiac pulsations noted on inspection. JVP not raised. The apical impulse is normal on palpation, and is located in the left 5th intercostal space in the mid - clavicular line. No palpable thrills or abnormal pulsations noted. RR, S1 - S2 heard, no murmurs, rubs or gallops appreciated.   PUL : CTA B/L. No wheezes/crackles heard   ABD : BS +, soft. No tenderness elicited   LE : No C/C/E. Distal Pulses palpable B/L         LABS:    Chemistry:   Lab Results   Component Value Date    " " 05/02/2024    K 5.0 05/02/2024     05/02/2024    CO2 21 (L) 05/02/2024    BUN 10 05/02/2024    CREATININE 0.9 05/02/2024    CALCIUM 9.8 05/02/2024     Cardiac Markers: No results found for: "CKTOTAL", "CKMB", "CKMBINDEX", "TROPONINI"  Cardiac Markers (Last 3): No results found for: "CKTOTAL", "CKMB", "CKMBINDEX", "TROPONINI"  CBC:   Lab Results   Component Value Date    WBC 8.74 05/02/2024    HGB 12.7 05/02/2024    HCT 39.2 05/02/2024    MCV 93 05/02/2024     05/02/2024     Lipids:   Lab Results   Component Value Date    CHOL 142 01/16/2024    TRIG 144 01/16/2024    HDL 70 01/16/2024     Coagulation:   Lab Results   Component Value Date    INR 0.9 08/14/2024           Assessment      1. Obesity (BMI 30.0-34.9)    2. SOB (shortness of breath)    3. Pure hypercholesterolemia    4. Family history of arteriosclerotic cardiovascular disease    5. MARC (obstructive sleep apnea)    6. Palpitations    7. Primary osteoarthritis of both knees    8. Age-related osteoporosis without current pathological fracture    9. Statin myopathy    10. Chronic chest pain         Plan:      HLD  LDL 43 as of 1/24  Continue Repatha and Zetia     Shortness of breath - intermittent with cough  No LE swelling, no PND  Refer to pulmonary   Stress echo 2/24 with no ischemia, EF 65%, mild AS  Check BNP    Osteoarthritis/osteoporosis   Stable   Currently not taking Prolia     Obesity, Body mass index is 31.37 kg/m².   Low salt, low fat diet  Exercise as tolerated, at least 30 min daily     This note was prepared using voice recognition system and is likely to have sound alike errors that may have been overlooked even after proofreading.     I have reviewed all pertinent chart information.  Plans and recommendations have been formulated under my direct supervision. All questions answered and patient voiced understanding.   If symptoms persist go to the ED.    RTC in 6 m        Robert Yoon MD  Cardiology              "

## 2024-11-18 DIAGNOSIS — R06.02 SHORTNESS OF BREATH: Primary | ICD-10-CM

## 2024-11-18 LAB
OHS QRS DURATION: 76 MS
OHS QTC CALCULATION: 394 MS

## 2024-11-19 ENCOUNTER — LAB VISIT (OUTPATIENT)
Dept: LAB | Facility: HOSPITAL | Age: 79
End: 2024-11-19
Attending: STUDENT IN AN ORGANIZED HEALTH CARE EDUCATION/TRAINING PROGRAM
Payer: MEDICARE

## 2024-11-19 ENCOUNTER — OFFICE VISIT (OUTPATIENT)
Dept: PULMONOLOGY | Facility: CLINIC | Age: 79
End: 2024-11-19
Payer: MEDICARE

## 2024-11-19 ENCOUNTER — CLINICAL SUPPORT (OUTPATIENT)
Dept: PULMONOLOGY | Facility: CLINIC | Age: 79
End: 2024-11-19
Payer: MEDICARE

## 2024-11-19 VITALS
BODY MASS INDEX: 31.56 KG/M2 | OXYGEN SATURATION: 98 % | WEIGHT: 171.5 LBS | HEART RATE: 70 BPM | DIASTOLIC BLOOD PRESSURE: 74 MMHG | SYSTOLIC BLOOD PRESSURE: 122 MMHG | RESPIRATION RATE: 21 BRPM | HEIGHT: 62 IN

## 2024-11-19 DIAGNOSIS — L20.82 FLEXURAL ECZEMA: ICD-10-CM

## 2024-11-19 DIAGNOSIS — R06.02 SOB (SHORTNESS OF BREATH): ICD-10-CM

## 2024-11-19 DIAGNOSIS — R06.02 SHORTNESS OF BREATH: ICD-10-CM

## 2024-11-19 LAB
BASOPHILS # BLD AUTO: 0.05 K/UL (ref 0–0.2)
BASOPHILS NFR BLD: 0.6 % (ref 0–1.9)
DIFFERENTIAL METHOD BLD: NORMAL
EOSINOPHIL # BLD AUTO: 0.1 K/UL (ref 0–0.5)
EOSINOPHIL NFR BLD: 1.4 % (ref 0–8)
ERYTHROCYTE [DISTWIDTH] IN BLOOD BY AUTOMATED COUNT: 14.4 % (ref 11.5–14.5)
HCT VFR BLD AUTO: 42.6 % (ref 37–48.5)
HGB BLD-MCNC: 13.7 G/DL (ref 12–16)
IGE SERPL-ACNC: <35 IU/ML (ref 0–100)
IMM GRANULOCYTES # BLD AUTO: 0.02 K/UL (ref 0–0.04)
IMM GRANULOCYTES NFR BLD AUTO: 0.2 % (ref 0–0.5)
LYMPHOCYTES # BLD AUTO: 2.6 K/UL (ref 1–4.8)
LYMPHOCYTES NFR BLD: 32.6 % (ref 18–48)
MCH RBC QN AUTO: 29.3 PG (ref 27–31)
MCHC RBC AUTO-ENTMCNC: 32.2 G/DL (ref 32–36)
MCV RBC AUTO: 91 FL (ref 82–98)
MONOCYTES # BLD AUTO: 0.4 K/UL (ref 0.3–1)
MONOCYTES NFR BLD: 5.4 % (ref 4–15)
NEUTROPHILS # BLD AUTO: 4.8 K/UL (ref 1.8–7.7)
NEUTROPHILS NFR BLD: 59.8 % (ref 38–73)
NRBC BLD-RTO: 0 /100 WBC
PLATELET # BLD AUTO: 181 K/UL (ref 150–450)
PMV BLD AUTO: 11.3 FL (ref 9.2–12.9)
RBC # BLD AUTO: 4.67 M/UL (ref 4–5.4)
WBC # BLD AUTO: 8.09 K/UL (ref 3.9–12.7)

## 2024-11-19 PROCEDURE — 3078F DIAST BP <80 MM HG: CPT | Mod: CPTII,S$GLB,, | Performed by: STUDENT IN AN ORGANIZED HEALTH CARE EDUCATION/TRAINING PROGRAM

## 2024-11-19 PROCEDURE — 3074F SYST BP LT 130 MM HG: CPT | Mod: CPTII,S$GLB,, | Performed by: STUDENT IN AN ORGANIZED HEALTH CARE EDUCATION/TRAINING PROGRAM

## 2024-11-19 PROCEDURE — 1101F PT FALLS ASSESS-DOCD LE1/YR: CPT | Mod: CPTII,S$GLB,, | Performed by: STUDENT IN AN ORGANIZED HEALTH CARE EDUCATION/TRAINING PROGRAM

## 2024-11-19 PROCEDURE — 99999 PR PBB SHADOW E&M-EST. PATIENT-LVL V: CPT | Mod: PBBFAC,,, | Performed by: STUDENT IN AN ORGANIZED HEALTH CARE EDUCATION/TRAINING PROGRAM

## 2024-11-19 PROCEDURE — 36415 COLL VENOUS BLD VENIPUNCTURE: CPT | Mod: PO | Performed by: STUDENT IN AN ORGANIZED HEALTH CARE EDUCATION/TRAINING PROGRAM

## 2024-11-19 PROCEDURE — 85025 COMPLETE CBC W/AUTO DIFF WBC: CPT | Performed by: STUDENT IN AN ORGANIZED HEALTH CARE EDUCATION/TRAINING PROGRAM

## 2024-11-19 PROCEDURE — 94726 PLETHYSMOGRAPHY LUNG VOLUMES: CPT | Mod: S$GLB,,, | Performed by: STUDENT IN AN ORGANIZED HEALTH CARE EDUCATION/TRAINING PROGRAM

## 2024-11-19 PROCEDURE — 1126F AMNT PAIN NOTED NONE PRSNT: CPT | Mod: CPTII,S$GLB,, | Performed by: STUDENT IN AN ORGANIZED HEALTH CARE EDUCATION/TRAINING PROGRAM

## 2024-11-19 PROCEDURE — 82785 ASSAY OF IGE: CPT | Performed by: STUDENT IN AN ORGANIZED HEALTH CARE EDUCATION/TRAINING PROGRAM

## 2024-11-19 PROCEDURE — 94060 EVALUATION OF WHEEZING: CPT | Mod: S$GLB,,, | Performed by: STUDENT IN AN ORGANIZED HEALTH CARE EDUCATION/TRAINING PROGRAM

## 2024-11-19 PROCEDURE — 3288F FALL RISK ASSESSMENT DOCD: CPT | Mod: CPTII,S$GLB,, | Performed by: STUDENT IN AN ORGANIZED HEALTH CARE EDUCATION/TRAINING PROGRAM

## 2024-11-19 PROCEDURE — 94729 DIFFUSING CAPACITY: CPT | Mod: S$GLB,,, | Performed by: STUDENT IN AN ORGANIZED HEALTH CARE EDUCATION/TRAINING PROGRAM

## 2024-11-19 PROCEDURE — 99214 OFFICE O/P EST MOD 30 MIN: CPT | Mod: 25,S$GLB,, | Performed by: STUDENT IN AN ORGANIZED HEALTH CARE EDUCATION/TRAINING PROGRAM

## 2024-11-19 RX ORDER — BUDESONIDE AND FORMOTEROL FUMARATE DIHYDRATE 160; 4.5 UG/1; UG/1
2 AEROSOL RESPIRATORY (INHALATION) EVERY 12 HOURS
Qty: 1 G | Refills: 3 | Status: SHIPPED | OUTPATIENT
Start: 2024-11-19 | End: 2025-11-19

## 2024-11-19 RX ORDER — ASPIRIN 325 MG
325 TABLET ORAL 2 TIMES DAILY
COMMUNITY
Start: 2024-09-09

## 2024-11-19 NOTE — PROGRESS NOTES
Chief complaint:  Chief Complaint   Patient presents with    Shortness of Breath        History of present illness -  RADHA Lopez comes to clinic complaining of dyspnea on exertion that started about 3 years ago.    Surprisingly she can for the most part do stairs but her main limitation seems to be pain and physical limitation from a recent left-sided knee replacement done a few months ago.  More recently over the past several months she feels more exhausted and tired after doing normal activities of daily living and chores around the house like vacuuming or cutting grass, she tells me she has to sit every couple of minutes and takes few minutes to recover    Sometimes he will experience her symptoms even when minimal exertion like taken out groceries from her car.    She did mentioned some occasional productive cough but the sputum is white.    Tobacco use: Former smoker  smoked 10 years and stopped 40 years.  Family history: father side 2 aunts had breast cancer.  Mother side uncle had lung cancer.   Occupational history: Retired and goes to [employer], exercise (chair and spin bike at a slow pace)     Physical examination -   Physical Exam  Vitals and nursing note reviewed.   Constitutional:       Appearance: Normal appearance.   HENT:      Head: Normocephalic and atraumatic.      Nose: Nose normal.      Mouth/Throat:      Mouth: Mucous membranes are moist.   Eyes:      Pupils: Pupils are equal, round, and reactive to light.   Cardiovascular:      Rate and Rhythm: Normal rate and regular rhythm.      Pulses: Normal pulses.      Heart sounds: Normal heart sounds.   Pulmonary:      Effort: Pulmonary effort is normal.      Breath sounds: Normal breath sounds.   Abdominal:      General: Abdomen is flat.      Palpations: Abdomen is soft.   Musculoskeletal:         General: No swelling.   Skin:     General: Skin is warm.      Capillary Refill: Capillary refill takes less than 2 seconds.   Neurological:       "General: No focal deficit present.      Mental Status: She is alert.        Vitals:    24 0845   BP: 122/74   Pulse: 70   Resp: (!) 21   SpO2: 98%   Weight: 77.8 kg (171 lb 8.3 oz)   Height: 5' 2" (1.575 m)      Review of Systems   Constitutional: Negative.    HENT: Negative.     Eyes: Negative.    Respiratory: Negative.     Cardiovascular: Negative.    Gastrointestinal: Negative.    Musculoskeletal: Negative.    Skin: Negative.    Neurological: Negative.        Relevant data (Independently reviewed by me) -        Assessment & Plan    SOB (shortness of breath)  -     Ambulatory referral/consult to Pulmonology  -     CBC Auto Differential; Future; Expected date: 2024  -     Fraction of  Nitric Oxide; Future; Expected date: 2024  -     IgE; Future; Expected date: 2024  -     CT Chest Without Contrast; Future; Expected date: 2024    Flexural eczema  -     IgE; Future; Expected date: 2024    Other orders  -     budesonide-formoterol 160-4.5 mcg (SYMBICORT) 160-4.5 mcg/actuation HFAA; Inhale 2 puffs into the lungs every 12 (twelve) hours. Controller  Dispense: 1 g; Refill: 3    Unspecified dyspnea on exertion on this elderly woman who approximately 3 years ago was able to be much more active and had no functional limitation.  It seems that this might be coming from a pulmonary standpoint as recent stress echo was within normal limits nothing to suggest cardiac limitation of exercise.  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    We have ruled out functional airway limitation to her condition with a proceed breathing tests, does have a history of some allergic rhinitis and eczema therefore likelihood of asthma is at least low moderate.  We will prescribe a an empirical inhaler to help her symptoms.    CT of the chest to rule out anatomical defects    If investigations prove unsuccessful we will proceed " with cardiopulmonary exercise test    RTC in 3 months     Kareem Richards   11/21/2024

## 2024-11-20 ENCOUNTER — OFFICE VISIT (OUTPATIENT)
Dept: UROLOGY | Facility: CLINIC | Age: 79
End: 2024-11-20
Payer: MEDICARE

## 2024-11-20 ENCOUNTER — HOSPITAL ENCOUNTER (OUTPATIENT)
Dept: RADIOLOGY | Facility: HOSPITAL | Age: 79
Discharge: HOME | End: 2024-11-20
Attending: UROLOGY
Payer: MEDICARE

## 2024-11-20 VITALS
WEIGHT: 173.06 LBS | TEMPERATURE: 98 F | HEIGHT: 62 IN | BODY MASS INDEX: 31.85 KG/M2 | HEART RATE: 72 BPM | DIASTOLIC BLOOD PRESSURE: 78 MMHG | SYSTOLIC BLOOD PRESSURE: 138 MMHG | RESPIRATION RATE: 18 BRPM

## 2024-11-20 DIAGNOSIS — Z01.818 PRE-OP TESTING: ICD-10-CM

## 2024-11-20 DIAGNOSIS — N39.3 SUI (STRESS URINARY INCONTINENCE, FEMALE): ICD-10-CM

## 2024-11-20 DIAGNOSIS — N32.81 OAB (OVERACTIVE BLADDER): ICD-10-CM

## 2024-11-20 DIAGNOSIS — Z01.818 PRE-OP EVALUATION: ICD-10-CM

## 2024-11-20 DIAGNOSIS — N39.3 STRESS INCONTINENCE: Primary | ICD-10-CM

## 2024-11-20 DIAGNOSIS — R31.29 MICROHEMATURIA: ICD-10-CM

## 2024-11-20 LAB
BILIRUB UR QL STRIP: NEGATIVE
GLUCOSE UR QL STRIP: NEGATIVE
KETONES UR QL STRIP: NEGATIVE
LEUKOCYTE ESTERASE UR QL STRIP: POSITIVE
PH, POC UA: 5.5
POC BLOOD, URINE: POSITIVE
POC NITRATES, URINE: NEGATIVE
PROT UR QL STRIP: NEGATIVE
SP GR UR STRIP: 1.02 (ref 1–1.03)
UROBILINOGEN UR STRIP-ACNC: 0.2 (ref 0.1–1.1)

## 2024-11-20 PROCEDURE — 99999 PR PBB SHADOW E&M-EST. PATIENT-LVL V: CPT | Mod: PBBFAC,,, | Performed by: UROLOGY

## 2024-11-20 PROCEDURE — 71046 X-RAY EXAM CHEST 2 VIEWS: CPT | Mod: TC,PN

## 2024-11-20 PROCEDURE — 71046 X-RAY EXAM CHEST 2 VIEWS: CPT | Mod: 26,,, | Performed by: RADIOLOGY

## 2024-11-20 PROCEDURE — 87086 URINE CULTURE/COLONY COUNT: CPT | Performed by: UROLOGY

## 2024-11-20 RX ORDER — SODIUM CHLORIDE 9 MG/ML
INJECTION, SOLUTION INTRAVENOUS CONTINUOUS
OUTPATIENT
Start: 2024-11-20

## 2024-11-20 RX ORDER — FESOTERODINE FUMARATE 8 MG/1
8 TABLET, FILM COATED, EXTENDED RELEASE ORAL DAILY
Qty: 30 TABLET | Refills: 11 | Status: SHIPPED | OUTPATIENT
Start: 2024-11-20 | End: 2025-11-20

## 2024-11-20 NOTE — H&P (VIEW-ONLY)
Chief Complaint   Patient presents with    Follow-up     6 month Microhematuria        History of Present Illness:   Jessica Perez is a 79 y.o. female here for evaluation of Follow-up (6 month Microhematuria )    11/20/24-leaking more with laugh, sneeze, cough and when she first gets up. Still takes vesicare. Currently up to 5 pads per day. Also having worsening urge symptoms.   5/21/24-Vesicare is helping a lot. No dysuria. Feels like her stream is a lot better. Empties by positionally voiding. Wearing 2 pads per day. Sometimes has UUI when she first stands up. Still with loose stools. No abdominal pain. No gross hematuria.   2/21/24-Pt reports frequency/urgency and UUI. She positionally voids to empty. Nocturia x 1-2, despite limiting pm fluid. She is taking trospium every day. She tried an OTC pessary and it didn't help and it hurt. 5 pads per day. She reports that she never has constipation, but rather chronically loose stools.    8/9/23-on trospium about every other day. Sometimes she is having strong urges. Wearing a pad. Limiting pm fluid and not having nocturia. No difficulty emptying. No dysuria.   2/7/23-on trospium, and it seems to work okay. Only having stress incontinence and it is much improved. She thinks she may have cystitis and there is slight discomfort and she is having frequency and small volumes. Took a bubble bath 3 days ago. Not sexually active.   8/2/22- on trospium and significantly improved. Wearing 2 pads per day now. Here for cysto. CT urogram showed no stones, renal mass or hydronephrosis. Doing pelvic floor PT  7/8/22- She reports that she has been wearing pads for 10 years. She states that when she stands up, she pours out urine. She reports that she has had loose stools regularly and was taking Immodium when she would go anywhere. It used to be just KRISH with sneeze, cough and laugh.   Wears about 5 pads per day. During the night if she wakes, she doesn't have UUI. No daytime  frequency. +Daytime urgency and UUI.   No gross hematuria or dysuria.       Review of Systems   Constitutional:  Negative for chills and fever.   Respiratory:  Negative for shortness of breath.    Cardiovascular:  Negative for chest pain.   Gastrointestinal:  Negative for constipation.   Genitourinary:  Negative for difficulty urinating and vaginal pain.   All other systems reviewed and are negative.        Past Medical History:   Diagnosis Date    Acute pain of left knee 3/22/2021    Arthritis     Arthritis of knee 2/9/2021    At risk for sleep apnea 9/19/2019    Balance problem 10/21/2021    Bilateral primary osteoarthritis of knee 4/4/2018    Chest pain, moderate coronary artery risk 8/8/2019    Chronic midline low back pain without sciatica 11/30/2020    Chronic pain of both knees 3/23/2021    Costochondritis 1/20/2020    Depression, major, recurrent, mild     Diarrhea 5/11/2022    Difficulty walking 11/30/2020    Difficulty walking 11/30/2020    LANRE (generalized anxiety disorder) 3/22/2021    Gait, antalgic 10/21/2021    Generalized weakness 11/30/2020    Generalized weakness 11/30/2020    Glaucoma     HLD (hyperlipidemia)     Lower extremity weakness 10/21/2021    Muscle cramps 5/23/2018    Osteopenia of multiple sites 4/2/2018    Primary osteoarthritis of right knee 05/30/2018    Primary snoring     Rash 5/11/2022    Sleep related leg cramps 9/7/2010    SOB (shortness of breath) 8/8/2019    Unspecified gastritis and gastroduodenitis without mention of hemorrhage 11/28/2010     Dx updated per 2019 IMO Load    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 Benign.    CATARACT EXTRACTION W/  INTRAOCULAR LENS IMPLANT Left 02/04/2019    CATARACT EXTRACTION W/  INTRAOCULAR LENS IMPLANT Right 03/04/2019    CHOLECYSTECTOMY      COLONOSCOPY N/A 06/29/2022    Procedure:  COLONOSCOPY;  Surgeon: Nichole Kelly MD;  Location: [facility];  Service: Endoscopy;  Laterality: N/A;    DILATION AND CURETTAGE OF UTERUS      finger surgery      middle finger on right hand    GALLBLADDER SURGERY      INJECTION OF ANESTHETIC AGENT AROUND NERVE Bilateral 10/01/2021    Procedure: Bilateral Genicular nerve block -;  Surgeon: Primo Bond MD;  Location: [facility];  Service: Pain Management;  Laterality: Bilateral;    RADIOFREQUENCY THERMOCOAGULATION Left 11/05/2021    Procedure: Left Genicular Nerve RFA (COOLIEF) with RN IV sedation;  Surgeon: Primo Bond MD;  Location: [facility];  Service: Pain Management;  Laterality: Left;    TONSILLECTOMY, ADENOIDECTOMY      TUBAL LIGATION         Family History   Problem Relation Name Age of Onset    Diabetes Father      Cancer Father          prostate ca    Hypertension Father      Cancer Sister          cervical ca    Hepatitis Sister      Dementia Mother      Osteoporosis Mother      Breast cancer Paternal Aunt         Social History     Tobacco Use    Smoking status: Former     Current packs/day: 0.50     Average packs/day: 0.5 packs/day for 12.0 years (6.0 ttl pk-yrs)     Types: Cigarettes     Passive exposure: Never    Smokeless tobacco: Never   Substance Use Topics    Alcohol use: Yes     Comment: occasionally    Drug use: No       Current Outpatient Medications   Medication Sig Dispense Refill    acetaminophen/diphenhydramine (TYLENOL PM ORAL) Take by mouth.      artificial tears,hypromellose,,GENTEAL/SUSTANE, 0.3 % Gel       aspirin (ECOTRIN) 81 MG EC tablet Take 81 mg by mouth once daily.      aspirin 325 MG tablet Take 325 mg by mouth 2 (two) times daily.      b complex vitamins capsule Take 1 capsule by mouth once daily.      budesonide-formoterol 160-4.5 mcg (SYMBICORT) 160-4.5 mcg/actuation HFAA Inhale 2 puffs into the lungs every 12 (twelve) hours. Controller 1 g 3    calcium carbonate (CALCIUM 500 ORAL) Take by mouth.       cyanocobalamin, vitamin B-12, (VITAMIN B-12 ORAL) Take by mouth.      denosumab (PROLIA SUBQ) Inject into the skin.      ergocalciferol, vitamin D2, (VITAMIN D ORAL) Take by mouth.      evolocumab (REPATHA SURECLICK) 140 mg/mL PnIj Inject 1 pen (140 mg total) into the skin every 14 (fourteen) days. 2 mL 6    ezetimibe (ZETIA) 10 mg tablet TAKE 1 TABLET BY MOUTH ONCE DAILY 90 tablet 3    ezetimibe (ZETIA) 10 mg tablet Take 10 mg by mouth once daily.      flaxseed oiL 1,000 mg Cap flaxseed oil Take No date recorded No form recorded No frequency recorded No route recorded No set duration recorded No set duration amount recorded suspended No dosage strength recorded No dosage strength units of measure recorded      fluocinolone (SYNALAR) 0.01 % external solution AAA of scalp twice a day PRN scalp itching or eczema flares. 60 mL 1    fluticasone (FLONASE) 50 mcg/actuation nasal spray 2 sprays by Each Nare route once daily. 1 Bottle 12    gabapentin (NEURONTIN) 100 MG capsule Take 100 mg by mouth every evening.      ketoconazole (NIZORAL) 2 % shampoo Apply topically every 7 days. Shampoo scalp 1-2 times per week. Lather x 5 minutes, rinse well. 120 mL 5    krill oil-omega-3-dha-epa 150-450 mg CpDR Take by mouth.      MAGNESIUM CITRATE ORAL Take by mouth.      multivitamin capsule Take 1 capsule by mouth Daily.      traMADoL (ULTRAM) 50 mg tablet Take 50 mg by mouth every 6 (six) hours as needed.      TURMERIC ORAL Take by mouth.      UNABLE TO FIND CBD Oil topical      ZINC ORAL Take by mouth.      APPLE CIDER VINEGAR ORAL Take by mouth.      azelastine (ASTELIN) 137 mcg (0.1 %) nasal spray 1 spray (137 mcg total) by Nasal route 2 (two) times daily. 30 mL 1    cholestyramine (QUESTRAN) 4 gram packet Take 1 packet (4 g total) by mouth daily as needed (diarrhea). 30 packet 5    ciclopirox (PENLAC) 8 % Soln Apply topically nightly. 6.6 mL 11    famotidine (PEPCID) 40 MG tablet Take 1 tablet (40 mg total) by mouth every  evening. 30 tablet 11    fesoterodine (TOVIAZ) 8 mg Tb24 Take 8 mg by mouth Daily. 30 tablet 11    hydrocortisone 2.5 % cream Apply topically 2 (two) times daily. Mix with ketoconazole cream and AAA on face BID for up to 2 weeks at a time 30 g 1    ketoconazole (NIZORAL) 2 % cream Apply topically 2 (two) times daily. Mix with hydrocortisone cream and AAA on face BID for up to 2 weeks at a time 30 g 1    triamcinolone acetonide 0.1% (KENALOG) 0.1 % cream Apply topically 2 (two) times daily. 45 g 0     Current Facility-Administered Medications   Medication Dose Route Frequency Provider Last Rate Last Admin    DOBUtamine 1000 mg in D5W 250 mL infusion  10 mcg/kg/min Intravenous Continuous Robert Yoon MD   Stopped at 02/12/24 1307    triamcinolone acetonide injection 32 mg  32 mg Intra-articular 1 time in Clinic/HOD Dileep Mendoza MD           Review of patient's allergies indicates:   Allergen Reactions    Augmentin [amoxicillin-pot clavulanate] Other (See Comments)     Diarrhea, yeast    Bactrim  [sulfamethoxazole-trimethoprim]      Other reaction(s): Unknown    Celebrex [celecoxib] Other (See Comments)     Stomach pain, gas     Lipitor  [atorvastatin]      Other reaction(s): Unknown    Pravachol  [pravastatin]      Other reaction(s): Unknown    Statins-hmg-coa reductase inhibitors      Other reaction(s): Muscle pain    Sulfa (sulfonamide antibiotics)      Other reaction(s): Swelling    Zoster vaccine live        Physical Exam  Vitals:    11/20/24 1027   BP: 138/78   Pulse: 72   Resp: 18   Temp: 98.1 °F (36.7 °C)         General: Well-developed, well-nourished, in no acute distress  HEENT: Normocephalic, atraumatic, extraocular movements intact  Neck: Supple, no supraclavicular or cervical lymphadenopathy, trachea midline  Respirations: even and unlabored  : 7/8/22-Normal external female genitalia without lesions. Orthotopic urethral meatus with caruncle. atrophic vaginal mucosa. No significant prolapse,   negative cough stress test, no urethral hypermobility  Extremities: moves all equally, no clubbing, cyanosis or edema  Skin: Warm and dry. No lesions  Psych: normal affect  Neuro: Alert and oriented x 3. Cranial nerves II-XII intact    PVR: 19mL 5/21/24    Urinalysis  Small LE, trace blood    Assessment:  1. Stress incontinence  Case Request Operating Room: CYSTOSCOPY, WITH PERIURETHRAL BULKING AGENT INJECTION    Vital signs    Insert peripheral IV    Diet NPO    Pulse Oximetry Q4H    Full code    Place in Outpatient    Place sequential compression device    POCT Urinalysis, Dipstick, Automated, W/O Scope      2. KRISH (stress urinary incontinence, female)        3. Microhematuria  Urine culture      4. Pre-op testing  Comprehensive Metabolic Panel    Ambulatory referral/consult to Pre-Admit    X-Ray Chest PA And Lateral      5. Pre-op evaluation  Comprehensive Metabolic Panel    Ambulatory referral/consult to Pre-Admit    X-Ray Chest PA And Lateral      6. OAB (overactive bladder)              Plan:   Stress incontinence  -     Case Request Operating Room: CYSTOSCOPY, WITH PERIURETHRAL BULKING AGENT INJECTION  -     Vital signs; Standing  -     Insert peripheral IV; Standing  -     Diet NPO; Standing  -     Pulse Oximetry Q4H; Standing  -     Full code; Standing  -     Place in Outpatient; Standing  -     Place sequential compression device; Standing  -     POCT Urinalysis, Dipstick, Automated, W/O Scope    KRISH (stress urinary incontinence, female)    Microhematuria  -     Urine culture    Pre-op testing  -     Comprehensive Metabolic Panel; Future; Expected date: 11/20/2024  -     Ambulatory referral/consult to Pre-Admit; Future; Expected date: 11/27/2024  -     X-Ray Chest PA And Lateral; Future; Expected date: 11/20/2024    Pre-op evaluation  -     Comprehensive Metabolic Panel; Future; Expected date: 11/20/2024  -     Ambulatory referral/consult to Pre-Admit; Future; Expected date: 11/27/2024  -     X-Ray Chest  PA And Lateral; Future; Expected date: 11/20/2024    OAB (overactive bladder)    Other orders  -     0.9%  NaCl infusion  -     IP VTE LOW RISK PATIENT; Standing  -     fesoterodine (TOVIAZ) 8 mg Tb24; Take 8 mg by mouth Daily.  Dispense: 30 tablet; Refill: 11      Discussed management options for KRISH, including kegels, pelvic floor PT, sling surgery and urethral bulking. Pt would like to pursue Bulkamid.   Bulkamid scheduled 12/9/24.   Will switch from vesicare to toviaz for her OAB symptoms.

## 2024-11-20 NOTE — PROGRESS NOTES
Chief Complaint   Patient presents with    Follow-up     6 month Microhematuria        History of Present Illness:   Jessica Perez is a 79 y.o. female here for evaluation of Follow-up (6 month Microhematuria )    11/20/24-leaking more with laugh, sneeze, cough and when she first gets up. Still takes vesicare. Currently up to 5 pads per day. Also having worsening urge symptoms.   5/21/24-Vesicare is helping a lot. No dysuria. Feels like her stream is a lot better. Empties by positionally voiding. Wearing 2 pads per day. Sometimes has UUI when she first stands up. Still with loose stools. No abdominal pain. No gross hematuria.   2/21/24-Pt reports frequency/urgency and UUI. She positionally voids to empty. Nocturia x 1-2, despite limiting pm fluid. She is taking trospium every day. She tried an OTC pessary and it didn't help and it hurt. 5 pads per day. She reports that she never has constipation, but rather chronically loose stools.    8/9/23-on trospium about every other day. Sometimes she is having strong urges. Wearing a pad. Limiting pm fluid and not having nocturia. No difficulty emptying. No dysuria.   2/7/23-on trospium, and it seems to work okay. Only having stress incontinence and it is much improved. She thinks she may have cystitis and there is slight discomfort and she is having frequency and small volumes. Took a bubble bath 3 days ago. Not sexually active.   8/2/22- on trospium and significantly improved. Wearing 2 pads per day now. Here for cysto. CT urogram showed no stones, renal mass or hydronephrosis. Doing pelvic floor PT  7/8/22- She reports that she has been wearing pads for 10 years. She states that when she stands up, she pours out urine. She reports that she has had loose stools regularly and was taking Immodium when she would go anywhere. It used to be just KRISH with sneeze, cough and laugh.   Wears about 5 pads per day. During the night if she wakes, she doesn't have UUI. No daytime  frequency. +Daytime urgency and UUI.   No gross hematuria or dysuria.       Review of Systems   Constitutional:  Negative for chills and fever.   Respiratory:  Negative for shortness of breath.    Cardiovascular:  Negative for chest pain.   Gastrointestinal:  Negative for constipation.   Genitourinary:  Negative for difficulty urinating and vaginal pain.   All other systems reviewed and are negative.        Past Medical History:   Diagnosis Date    Acute pain of left knee 3/22/2021    Arthritis     Arthritis of knee 2/9/2021    At risk for sleep apnea 9/19/2019    Balance problem 10/21/2021    Bilateral primary osteoarthritis of knee 4/4/2018    Chest pain, moderate coronary artery risk 8/8/2019    Chronic midline low back pain without sciatica 11/30/2020    Chronic pain of both knees 3/23/2021    Costochondritis 1/20/2020    Depression, major, recurrent, mild     Diarrhea 5/11/2022    Difficulty walking 11/30/2020    Difficulty walking 11/30/2020    LANRE (generalized anxiety disorder) 3/22/2021    Gait, antalgic 10/21/2021    Generalized weakness 11/30/2020    Generalized weakness 11/30/2020    Glaucoma     HLD (hyperlipidemia)     Lower extremity weakness 10/21/2021    Muscle cramps 5/23/2018    Osteopenia of multiple sites 4/2/2018    Primary osteoarthritis of right knee 05/30/2018    Primary snoring     Rash 5/11/2022    Sleep related leg cramps 9/7/2010    SOB (shortness of breath) 8/8/2019    Unspecified gastritis and gastroduodenitis without mention of hemorrhage 11/28/2010     Dx updated per 2019 IMO Load    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 Benign.    CATARACT EXTRACTION W/  INTRAOCULAR LENS IMPLANT Left 02/04/2019    CATARACT EXTRACTION W/  INTRAOCULAR LENS IMPLANT Right 03/04/2019    CHOLECYSTECTOMY      COLONOSCOPY N/A 06/29/2022    Procedure:  COLONOSCOPY;  Surgeon: Nichole Kelly MD;  Location: [facility] ENDO;  Service: Endoscopy;  Laterality: N/A;    DILATION AND CURETTAGE OF UTERUS      finger surgery      middle finger on right hand    GALLBLADDER SURGERY      INJECTION OF ANESTHETIC AGENT AROUND NERVE Bilateral 10/01/2021    Procedure: Bilateral Genicular nerve block -;  Surgeon: Primo Bond MD;  Location: [facility] PAIN MGT;  Service: Pain Management;  Laterality: Bilateral;    RADIOFREQUENCY THERMOCOAGULATION Left 11/05/2021    Procedure: Left Genicular Nerve RFA (COOLIEF) with RN IV sedation;  Surgeon: Primo Bond MD;  Location: [facility] PAIN MGT;  Service: Pain Management;  Laterality: Left;    TONSILLECTOMY, ADENOIDECTOMY      TUBAL LIGATION         Family History   Problem Relation Name Age of Onset    Diabetes Father      Cancer Father          prostate ca    Hypertension Father      Cancer Sister          cervical ca    Hepatitis Sister      Dementia Mother      Osteoporosis Mother      Breast cancer Paternal Aunt         Social History     Tobacco Use    Smoking status: Former     Current packs/day: 0.50     Average packs/day: 0.5 packs/day for 12.0 years (6.0 ttl pk-yrs)     Types: Cigarettes     Passive exposure: Never    Smokeless tobacco: Never   Substance Use Topics    Alcohol use: Yes     Comment: occasionally    Drug use: No       Current Outpatient Medications   Medication Sig Dispense Refill    acetaminophen/diphenhydramine (TYLENOL PM ORAL) Take by mouth.      artificial tears,hypromellose,,GENTEAL/SUSTANE, 0.3 % Gel       aspirin (ECOTRIN) 81 MG EC tablet Take 81 mg by mouth once daily.      aspirin 325 MG tablet Take 325 mg by mouth 2 (two) times daily.      b complex vitamins capsule Take 1 capsule by mouth once daily.      budesonide-formoterol 160-4.5 mcg (SYMBICORT) 160-4.5 mcg/actuation HFAA Inhale 2 puffs into the lungs every 12 (twelve) hours. Controller 1 g 3    calcium carbonate (CALCIUM 500 ORAL) Take by mouth.       cyanocobalamin, vitamin B-12, (VITAMIN B-12 ORAL) Take by mouth.      denosumab (PROLIA SUBQ) Inject into the skin.      ergocalciferol, vitamin D2, (VITAMIN D ORAL) Take by mouth.      evolocumab (REPATHA SURECLICK) 140 mg/mL PnIj Inject 1 pen (140 mg total) into the skin every 14 (fourteen) days. 2 mL 6    ezetimibe (ZETIA) 10 mg tablet TAKE 1 TABLET BY MOUTH ONCE DAILY 90 tablet 3    ezetimibe (ZETIA) 10 mg tablet Take 10 mg by mouth once daily.      flaxseed oiL 1,000 mg Cap flaxseed oil Take No date recorded No form recorded No frequency recorded No route recorded No set duration recorded No set duration amount recorded suspended No dosage strength recorded No dosage strength units of measure recorded      fluocinolone (SYNALAR) 0.01 % external solution AAA of scalp twice a day PRN scalp itching or eczema flares. 60 mL 1    fluticasone (FLONASE) 50 mcg/actuation nasal spray 2 sprays by Each Nare route once daily. 1 Bottle 12    gabapentin (NEURONTIN) 100 MG capsule Take 100 mg by mouth every evening.      ketoconazole (NIZORAL) 2 % shampoo Apply topically every 7 days. Shampoo scalp 1-2 times per week. Lather x 5 minutes, rinse well. 120 mL 5    krill oil-omega-3-dha-epa 150-450 mg CpDR Take by mouth.      MAGNESIUM CITRATE ORAL Take by mouth.      multivitamin capsule Take 1 capsule by mouth Daily.      traMADoL (ULTRAM) 50 mg tablet Take 50 mg by mouth every 6 (six) hours as needed.      TURMERIC ORAL Take by mouth.      UNABLE TO FIND CBD Oil topical      ZINC ORAL Take by mouth.      APPLE CIDER VINEGAR ORAL Take by mouth.      azelastine (ASTELIN) 137 mcg (0.1 %) nasal spray 1 spray (137 mcg total) by Nasal route 2 (two) times daily. 30 mL 1    cholestyramine (QUESTRAN) 4 gram packet Take 1 packet (4 g total) by mouth daily as needed (diarrhea). 30 packet 5    ciclopirox (PENLAC) 8 % Soln Apply topically nightly. 6.6 mL 11    famotidine (PEPCID) 40 MG tablet Take 1 tablet (40 mg total) by mouth every  evening. 30 tablet 11    fesoterodine (TOVIAZ) 8 mg Tb24 Take 8 mg by mouth Daily. 30 tablet 11    hydrocortisone 2.5 % cream Apply topically 2 (two) times daily. Mix with ketoconazole cream and AAA on face BID for up to 2 weeks at a time 30 g 1    ketoconazole (NIZORAL) 2 % cream Apply topically 2 (two) times daily. Mix with hydrocortisone cream and AAA on face BID for up to 2 weeks at a time 30 g 1    triamcinolone acetonide 0.1% (KENALOG) 0.1 % cream Apply topically 2 (two) times daily. 45 g 0     Current Facility-Administered Medications   Medication Dose Route Frequency Provider Last Rate Last Admin    DOBUtamine 1000 mg in D5W 250 mL infusion  10 mcg/kg/min Intravenous Continuous Robert Yoon MD   Stopped at 02/12/24 1307    triamcinolone acetonide injection 32 mg  32 mg Intra-articular 1 time in Clinic/HOD Dileep Mendoza MD           Review of patient's allergies indicates:   Allergen Reactions    Augmentin [amoxicillin-pot clavulanate] Other (See Comments)     Diarrhea, yeast    Bactrim  [sulfamethoxazole-trimethoprim]      Other reaction(s): Unknown    Celebrex [celecoxib] Other (See Comments)     Stomach pain, gas     Lipitor  [atorvastatin]      Other reaction(s): Unknown    Pravachol  [pravastatin]      Other reaction(s): Unknown    Statins-hmg-coa reductase inhibitors      Other reaction(s): Muscle pain    Sulfa (sulfonamide antibiotics)      Other reaction(s): Swelling    Zoster vaccine live        Physical Exam  Vitals:    11/20/24 1027   BP: 138/78   Pulse: 72   Resp: 18   Temp: 98.1 °F (36.7 °C)         General: Well-developed, well-nourished, in no acute distress  HEENT: Normocephalic, atraumatic, extraocular movements intact  Neck: Supple, no supraclavicular or cervical lymphadenopathy, trachea midline  Respirations: even and unlabored  : 7/8/22-Normal external female genitalia without lesions. Orthotopic urethral meatus with caruncle. atrophic vaginal mucosa. No significant prolapse,   negative cough stress test, no urethral hypermobility  Extremities: moves all equally, no clubbing, cyanosis or edema  Skin: Warm and dry. No lesions  Psych: normal affect  Neuro: Alert and oriented x 3. Cranial nerves II-XII intact    PVR: 19mL 5/21/24    Urinalysis  Small LE, trace blood    Assessment:  1. Stress incontinence  Case Request Operating Room: CYSTOSCOPY, WITH PERIURETHRAL BULKING AGENT INJECTION    Vital signs    Insert peripheral IV    Diet NPO    Pulse Oximetry Q4H    Full code    Place in Outpatient    Place sequential compression device    POCT Urinalysis, Dipstick, Automated, W/O Scope      2. KRISH (stress urinary incontinence, female)        3. Microhematuria  Urine culture      4. Pre-op testing  Comprehensive Metabolic Panel    Ambulatory referral/consult to Pre-Admit    X-Ray Chest PA And Lateral      5. Pre-op evaluation  Comprehensive Metabolic Panel    Ambulatory referral/consult to Pre-Admit    X-Ray Chest PA And Lateral      6. OAB (overactive bladder)              Plan:   Stress incontinence  -     Case Request Operating Room: CYSTOSCOPY, WITH PERIURETHRAL BULKING AGENT INJECTION  -     Vital signs; Standing  -     Insert peripheral IV; Standing  -     Diet NPO; Standing  -     Pulse Oximetry Q4H; Standing  -     Full code; Standing  -     Place in Outpatient; Standing  -     Place sequential compression device; Standing  -     POCT Urinalysis, Dipstick, Automated, W/O Scope    KRISH (stress urinary incontinence, female)    Microhematuria  -     Urine culture    Pre-op testing  -     Comprehensive Metabolic Panel; Future; Expected date: 11/20/2024  -     Ambulatory referral/consult to Pre-Admit; Future; Expected date: 11/27/2024  -     X-Ray Chest PA And Lateral; Future; Expected date: 11/20/2024    Pre-op evaluation  -     Comprehensive Metabolic Panel; Future; Expected date: 11/20/2024  -     Ambulatory referral/consult to Pre-Admit; Future; Expected date: 11/27/2024  -     X-Ray Chest  PA And Lateral; Future; Expected date: 11/20/2024    OAB (overactive bladder)    Other orders  -     0.9%  NaCl infusion  -     IP VTE LOW RISK PATIENT; Standing  -     fesoterodine (TOVIAZ) 8 mg Tb24; Take 8 mg by mouth Daily.  Dispense: 30 tablet; Refill: 11      Discussed management options for KRISH, including kegels, pelvic floor PT, sling surgery and urethral bulking. Pt would like to pursue Bulkamid.   Bulkamid scheduled 12/9/24.   Will switch from vesicare to toviaz for her OAB symptoms.

## 2024-11-21 LAB
BRPFT: ABNORMAL
DLCO SINGLE BREATH LLN: 13.28
DLCO SINGLE BREATH PRE REF: 80.1 %
DLCO SINGLE BREATH REF: 19.02
DLCOC SBVA LLN: 2.6
DLCOC SBVA REF: 4.13
DLCOC SINGLE BREATH LLN: 13.28
DLCOC SINGLE BREATH REF: 19.02
DLCOVA LLN: 2.6
DLCOVA PRE REF: 81 %
DLCOVA PRE: 3.34 ML/(MIN*MMHG*L) (ref 2.6–5.66)
DLCOVA REF: 4.13
ERV LLN: -16449.48
ERV PRE REF: 165 %
ERV REF: 0.52
FEF 25 75 CHG: 34.2 %
FEF 25 75 LLN: 0.87
FEF 25 75 POST REF: 138.9 %
FEF 25 75 PRE REF: 103.5 %
FEF 25 75 REF: 2.27
FET100 CHG: -16.2 %
FEV1 CHG: 3.4 %
FEV1 FVC CHG: 6.7 %
FEV1 FVC LLN: 63
FEV1 FVC POST REF: 107.6 %
FEV1 FVC PRE REF: 100.8 %
FEV1 FVC REF: 77
FEV1 LLN: 1.31
FEV1 POST REF: 138.1 %
FEV1 PRE REF: 133.5 %
FEV1 REF: 1.86
FRCPLETH LLN: 1.78
FRCPLETH PREREF: 87.8 %
FRCPLETH REF: 2.6
FVC CHG: -3.1 %
FVC LLN: 1.71
FVC POST REF: 126.9 %
FVC PRE REF: 131 %
FVC REF: 2.42
IVC PRE: 3.11 L (ref 1.71–3.17)
IVC SINGLE BREATH LLN: 1.71
IVC SINGLE BREATH PRE REF: 128.2 %
IVC SINGLE BREATH REF: 2.42
MVV LLN: 58
MVV PRE REF: 164.9 %
MVV REF: 68
PEF CHG: 4.8 %
PEF LLN: 3.16
PEF POST REF: 138 %
PEF PRE REF: 131.7 %
PEF REF: 4.77
POST FEF 25 75: 3.15 L/S (ref 0.87–3.67)
POST FET 100: 7.34 SEC
POST FEV1 FVC: 83.38 % (ref 63.09–89.97)
POST FEV1: 2.57 L (ref 1.31–2.38)
POST FVC: 3.08 L (ref 1.71–3.17)
POST PEF: 6.58 L/S (ref 3.16–6.37)
PRE DLCO: 15.24 ML/(MIN*MMHG) (ref 13.28–24.75)
PRE ERV: 0.86 L (ref -16449.48–16450.52)
PRE FEF 25 75: 2.35 L/S (ref 0.87–3.67)
PRE FET 100: 8.76 SEC
PRE FEV1 FVC: 78.12 % (ref 63.09–89.97)
PRE FEV1: 2.48 L (ref 1.31–2.38)
PRE FRC PL: 2.29 L (ref 1.78–3.43)
PRE FVC: 3.18 L (ref 1.71–3.17)
PRE MVV: 111.85 L/MIN (ref 57.65–77.99)
PRE PEF: 6.28 L/S (ref 3.16–6.37)
PRE RV: 1.42 L (ref 1.51–2.66)
PRE TLC: 4.67 L (ref 3.62–5.59)
RAW LLN: 3.06
RAW PRE REF: 112.5 %
RAW PRE: 3.44 CMH2O*S/L (ref 3.06–3.06)
RAW REF: 3.06
RV LLN: 1.51
RV PRE REF: 68.4 %
RV REF: 2.08
RVTLC LLN: 36
RVTLC PRE REF: 67.6 %
RVTLC PRE: 30.5 % (ref 35.55–54.73)
RVTLC REF: 45
TLC LLN: 3.62
TLC PRE REF: 101.4 %
TLC REF: 4.6
VA PRE: 4.56 L (ref 4.45–4.45)
VA SINGLE BREATH LLN: 4.45
VA SINGLE BREATH PRE REF: 102.3 %
VA SINGLE BREATH REF: 4.45
VC LLN: 1.71
VC PRE REF: 133.8 %
VC PRE: 3.25 L (ref 1.71–3.17)
VC REF: 2.42

## 2024-11-22 LAB
BACTERIA UR CULT: NORMAL
BACTERIA UR CULT: NORMAL

## 2024-12-02 ENCOUNTER — PATIENT MESSAGE (OUTPATIENT)
Dept: PREADMISSION TESTING | Facility: HOSPITAL | Age: 79
End: 2024-12-02
Payer: MEDICARE

## 2024-12-03 ENCOUNTER — ANESTHESIA EVENT (OUTPATIENT)
Dept: SURGERY | Facility: HOSPITAL | Age: 79
End: 2024-12-03
Payer: MEDICARE

## 2024-12-03 NOTE — ANESTHESIA PREPROCEDURE EVALUATION
12/03/2024  Jessica Perez is a 79 y.o., female.  Past Medical History:   Diagnosis Date    Acute pain of left knee 3/22/2021    Arthritis     Arthritis of knee 2/9/2021    At risk for sleep apnea 9/19/2019    Balance problem 10/21/2021    Bilateral primary osteoarthritis of knee 4/4/2018    Chest pain, moderate coronary artery risk 8/8/2019    Chronic midline low back pain without sciatica 11/30/2020    Chronic pain of both knees 3/23/2021    Costochondritis 1/20/2020    Depression, major, recurrent, mild     Diarrhea 5/11/2022    Difficulty walking 11/30/2020    Difficulty walking 11/30/2020    LANRE (generalized anxiety disorder) 3/22/2021    Gait, antalgic 10/21/2021    Generalized weakness 11/30/2020    Generalized weakness 11/30/2020    Glaucoma     HLD (hyperlipidemia)     Lower extremity weakness 10/21/2021    Muscle cramps 5/23/2018    Osteopenia of multiple sites 4/2/2018    Primary osteoarthritis of right knee 05/30/2018    Primary snoring     Rash 5/11/2022    Sleep related leg cramps 9/7/2010    SOB (shortness of breath) 8/8/2019    Unspecified gastritis and gastroduodenitis without mention of hemorrhage 11/28/2010     Dx updated per 2019 IMO Load    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 Benign.    CATARACT EXTRACTION W/  INTRAOCULAR LENS IMPLANT Left 02/04/2019    CATARACT EXTRACTION W/  INTRAOCULAR LENS IMPLANT Right 03/04/2019    CHOLECYSTECTOMY      COLONOSCOPY N/A 06/29/2022    Procedure: COLONOSCOPY;  Surgeon: Nichole Kelly MD;  Location: [facility];  Service: Endoscopy;  Laterality: N/A;    DILATION AND CURETTAGE OF UTERUS      finger surgery      middle finger on right hand    GALLBLADDER SURGERY      INJECTION OF ANESTHETIC AGENT  "AROUND NERVE Bilateral 10/01/2021    Procedure: Bilateral Genicular nerve block -;  Surgeon: Primo Bond MD;  Location: [facility] PAIN MGT;  Service: Pain Management;  Laterality: Bilateral;    RADIOFREQUENCY THERMOCOAGULATION Left 11/05/2021    Procedure: Left Genicular Nerve RFA (COOLIEF) with RN IV sedation;  Surgeon: Primo Bond MD;  Location: [facility] PAIN MGT;  Service: Pain Management;  Laterality: Left;    TONSILLECTOMY, ADENOIDECTOMY      TUBAL LIGATION       8/2019 Echo   CONCLUSIONS     1 - Concentric remodeling.     2 - No wall motion abnormalities.     3 - Normal left ventricular systolic function (EF 60-65%).     4 - Impaired LV relaxation, normal LAP (grade 1 diastolic dysfunction).     5 - Normal right ventricular systolic function .     6 - The estimated PA systolic pressure is greater than 28 mmHg.        Pre-op Assessment    I have reviewed the Patient Summary Reports.     I have reviewed the Nursing Notes. I have reviewed the NPO Status.   I have reviewed the Medications.     Review of Systems  Anesthesia Hx:  No problems with previous Anesthesia             Denies Family Hx of Anesthesia complications.    Denies Personal Hx of Anesthesia complications.                    Social:  Former Smoker, Alcohol Use       Hematology/Oncology:  Hematology Normal   Oncology Normal                                   EENT/Dental:  EENT/Dental Normal           Cardiovascular:        Denies MI.  Denies CAD.     Denies Dysrhythmias.       hyperlipidemia MONROY  ECG has been reviewed. 2019 had Echo w nml EF and neg stress test. EKG 2020 sinus arrhythmia. Last cards visit 2/2022 chronic chest pain "stable". F/U 3-6 mos. Pt denies recent CP and SOB. EKG currently SR w PACs.                           Pulmonary:         Pt denies h/o MARC                Renal/:  Renal/ Normal                 Hepatic/GI:  Bowel Prep.      H/o gastritis, diarrhea              Musculoskeletal:  Arthritis   Statin myopathy           "   Endocrine:     Hyperparathyroidism       Obesity / BMI > 30  Dermatological:  Skin Normal    Psych:  Psychiatric History  depression                Physical Exam  General: Well nourished, Cooperative, Alert and Oriented    Airway:  Mallampati: II   Mouth Opening: Normal  TM Distance: Normal  Tongue: Normal  Neck ROM: Normal ROM    Dental:  Caps / Implants, Intact    Chest/Lungs:  Clear to auscultation, Normal Respiratory Rate    Heart:  Rate: Normal  Rhythm: Regular Rhythm        Anesthesia Plan  Type of Anesthesia, risks & benefits discussed:    Anesthesia Type: Gen Supraglottic Airway, Gen ETT  Intra-op Monitoring Plan: Standard ASA Monitors  Post Op Pain Control Plan: multimodal analgesia and IV/PO Opioids PRN  Induction:  IV  Informed Consent: Informed consent signed with the Patient and all parties understand the risks and agree with anesthesia plan.  All questions answered. Patient consented to blood products? No  ASA Score: 3  Day of Surgery Review of History & Physical: H&P Update referred to the surgeon/provider.    Ready For Surgery From Anesthesia Perspective.     .

## 2024-12-04 DIAGNOSIS — R07.9 CHEST PAIN, MODERATE CORONARY ARTERY RISK: ICD-10-CM

## 2024-12-04 DIAGNOSIS — G89.29 CHRONIC CHEST PAIN: ICD-10-CM

## 2024-12-04 DIAGNOSIS — E78.5 HYPERLIPIDEMIA, UNSPECIFIED HYPERLIPIDEMIA TYPE: ICD-10-CM

## 2024-12-04 DIAGNOSIS — R06.02 SOB (SHORTNESS OF BREATH): ICD-10-CM

## 2024-12-04 DIAGNOSIS — T46.6X5A STATIN MYOPATHY: ICD-10-CM

## 2024-12-04 DIAGNOSIS — R07.9 CHRONIC CHEST PAIN: ICD-10-CM

## 2024-12-04 DIAGNOSIS — G72.0 STATIN MYOPATHY: ICD-10-CM

## 2024-12-04 DIAGNOSIS — Z79.899 ENCOUNTER FOR LONG-TERM CURRENT USE OF MEDICATION: ICD-10-CM

## 2024-12-04 DIAGNOSIS — Z82.49 FAMILY HISTORY OF ARTERIOSCLEROTIC CARDIOVASCULAR DISEASE: ICD-10-CM

## 2024-12-04 DIAGNOSIS — Z76.89 ENCOUNTER TO ESTABLISH CARE: ICD-10-CM

## 2024-12-04 DIAGNOSIS — E78.00 PURE HYPERCHOLESTEROLEMIA: ICD-10-CM

## 2024-12-04 DIAGNOSIS — Z91.89 AT RISK FOR SLEEP APNEA: ICD-10-CM

## 2024-12-04 RX ORDER — EZETIMIBE 10 MG/1
10 TABLET ORAL DAILY
Qty: 90 TABLET | Refills: 3 | Status: SHIPPED | OUTPATIENT
Start: 2024-12-04

## 2024-12-05 ENCOUNTER — PATIENT MESSAGE (OUTPATIENT)
Dept: PREADMISSION TESTING | Facility: HOSPITAL | Age: 79
End: 2024-12-05
Payer: MEDICARE

## 2024-12-06 ENCOUNTER — TELEPHONE (OUTPATIENT)
Dept: UROLOGY | Facility: CLINIC | Age: 79
End: 2024-12-06
Payer: MEDICARE

## 2024-12-06 ENCOUNTER — PATIENT MESSAGE (OUTPATIENT)
Dept: PREADMISSION TESTING | Facility: HOSPITAL | Age: 79
End: 2024-12-06
Payer: MEDICARE

## 2024-12-06 NOTE — TELEPHONE ENCOUNTER
Attempt to reach out to pt but was unsuccessful. LVM          ----- Message from Niurka sent at 12/6/2024  8:03 AM CST -----  Contact: Jessica  Pt is calling in regards to rather she needs a antibiotic after knee replace before having her procedure.please call back at .[phone]         Thanks  BRANDON

## 2024-12-09 ENCOUNTER — HOSPITAL ENCOUNTER (OUTPATIENT)
Facility: HOSPITAL | Age: 79
Discharge: HOME | End: 2024-12-09
Attending: UROLOGY | Admitting: UROLOGY
Payer: MEDICARE

## 2024-12-09 ENCOUNTER — ANESTHESIA (OUTPATIENT)
Dept: SURGERY | Facility: HOSPITAL | Age: 79
End: 2024-12-09
Payer: MEDICARE

## 2024-12-09 VITALS
BODY MASS INDEX: 31.24 KG/M2 | SYSTOLIC BLOOD PRESSURE: 129 MMHG | DIASTOLIC BLOOD PRESSURE: 58 MMHG | HEIGHT: 62 IN | RESPIRATION RATE: 18 BRPM | HEART RATE: 73 BPM | WEIGHT: 169.75 LBS | OXYGEN SATURATION: 99 % | TEMPERATURE: 98 F

## 2024-12-09 DIAGNOSIS — N39.3 STRESS INCONTINENCE: ICD-10-CM

## 2024-12-09 DIAGNOSIS — N39.3 SUI (STRESS URINARY INCONTINENCE, FEMALE): Primary | ICD-10-CM

## 2024-12-09 PROCEDURE — 37000009 HC ANESTHESIA EA ADD 15 MINS: Performed by: UROLOGY

## 2024-12-09 PROCEDURE — 25000003 PHARM REV CODE 250: Performed by: UROLOGY

## 2024-12-09 PROCEDURE — 36000707: Performed by: UROLOGY

## 2024-12-09 PROCEDURE — 63600175 PHARM REV CODE 636 W HCPCS: Performed by: NURSE ANESTHETIST, CERTIFIED REGISTERED

## 2024-12-09 PROCEDURE — 51715 ENDOSCOPIC INJECTION/IMPLANT: CPT | Mod: ,,, | Performed by: UROLOGY

## 2024-12-09 PROCEDURE — 25000003 PHARM REV CODE 250: Performed by: NURSE ANESTHETIST, CERTIFIED REGISTERED

## 2024-12-09 PROCEDURE — 27200651 HC AIRWAY, LMA: Performed by: ANESTHESIOLOGY

## 2024-12-09 PROCEDURE — 71000016 HC POSTOP RECOV ADDL HR: Performed by: UROLOGY

## 2024-12-09 PROCEDURE — 36000706: Performed by: UROLOGY

## 2024-12-09 PROCEDURE — D9220A PRA ANESTHESIA: Mod: ,,, | Performed by: NURSE ANESTHETIST, CERTIFIED REGISTERED

## 2024-12-09 PROCEDURE — 37000008 HC ANESTHESIA 1ST 15 MINUTES: Performed by: UROLOGY

## 2024-12-09 PROCEDURE — 71000033 HC RECOVERY, INTIAL HOUR: Performed by: UROLOGY

## 2024-12-09 PROCEDURE — L8606 SYNTHETIC IMPLNT URINARY 1ML: HCPCS | Performed by: UROLOGY

## 2024-12-09 PROCEDURE — 71000015 HC POSTOP RECOV 1ST HR: Performed by: UROLOGY

## 2024-12-09 DEVICE — SYS BULKAMID URETH BULKING 1ML: Type: IMPLANTABLE DEVICE | Site: URETHRA | Status: FUNCTIONAL

## 2024-12-09 RX ORDER — FENTANYL CITRATE 50 UG/ML
INJECTION, SOLUTION INTRAMUSCULAR; INTRAVENOUS
Status: DISCONTINUED | OUTPATIENT
Start: 2024-12-09 | End: 2024-12-09

## 2024-12-09 RX ORDER — DEXAMETHASONE SODIUM PHOSPHATE 4 MG/ML
INJECTION, SOLUTION INTRA-ARTICULAR; INTRALESIONAL; INTRAMUSCULAR; INTRAVENOUS; SOFT TISSUE
Status: DISCONTINUED | OUTPATIENT
Start: 2024-12-09 | End: 2024-12-09

## 2024-12-09 RX ORDER — SODIUM CHLORIDE 9 MG/ML
INJECTION, SOLUTION INTRAVENOUS CONTINUOUS
Status: DISCONTINUED | OUTPATIENT
Start: 2024-12-09 | End: 2024-12-09 | Stop reason: HOSPADM

## 2024-12-09 RX ORDER — GLUCAGON 1 MG
1 KIT INJECTION
Status: DISCONTINUED | OUTPATIENT
Start: 2024-12-09 | End: 2024-12-09 | Stop reason: HOSPADM

## 2024-12-09 RX ORDER — FENTANYL CITRATE 50 UG/ML
25 INJECTION, SOLUTION INTRAMUSCULAR; INTRAVENOUS EVERY 5 MIN PRN
Status: DISCONTINUED | OUTPATIENT
Start: 2024-12-09 | End: 2024-12-09 | Stop reason: HOSPADM

## 2024-12-09 RX ORDER — ONDANSETRON HYDROCHLORIDE 2 MG/ML
4 INJECTION, SOLUTION INTRAVENOUS ONCE AS NEEDED
Status: DISCONTINUED | OUTPATIENT
Start: 2024-12-09 | End: 2024-12-09 | Stop reason: HOSPADM

## 2024-12-09 RX ORDER — PHENAZOPYRIDINE HYDROCHLORIDE 200 MG/1
200 TABLET, FILM COATED ORAL 3 TIMES DAILY PRN
Qty: 15 TABLET | Refills: 0 | Status: SHIPPED | OUTPATIENT
Start: 2024-12-09 | End: 2024-12-19

## 2024-12-09 RX ORDER — MEPERIDINE HYDROCHLORIDE 25 MG/ML
12.5 INJECTION INTRAMUSCULAR; INTRAVENOUS; SUBCUTANEOUS ONCE
Status: DISCONTINUED | OUTPATIENT
Start: 2024-12-09 | End: 2024-12-09 | Stop reason: HOSPADM

## 2024-12-09 RX ORDER — ONDANSETRON HYDROCHLORIDE 2 MG/ML
INJECTION, SOLUTION INTRAVENOUS
Status: DISCONTINUED | OUTPATIENT
Start: 2024-12-09 | End: 2024-12-09

## 2024-12-09 RX ORDER — PROPOFOL 10 MG/ML
INJECTION, EMULSION INTRAVENOUS
Status: DISCONTINUED | OUTPATIENT
Start: 2024-12-09 | End: 2024-12-09

## 2024-12-09 RX ORDER — LIDOCAINE HYDROCHLORIDE 20 MG/ML
INJECTION INTRAVENOUS
Status: DISCONTINUED | OUTPATIENT
Start: 2024-12-09 | End: 2024-12-09

## 2024-12-09 RX ORDER — DIPHENHYDRAMINE HYDROCHLORIDE 50 MG/ML
25 INJECTION INTRAMUSCULAR; INTRAVENOUS EVERY 6 HOURS PRN
Status: DISCONTINUED | OUTPATIENT
Start: 2024-12-09 | End: 2024-12-09 | Stop reason: HOSPADM

## 2024-12-09 RX ORDER — PHENYLEPHRINE HYDROCHLORIDE 10 MG/ML
INJECTION INTRAVENOUS
Status: DISCONTINUED | OUTPATIENT
Start: 2024-12-09 | End: 2024-12-09

## 2024-12-09 RX ORDER — MIDAZOLAM HYDROCHLORIDE 1 MG/ML
INJECTION INTRAMUSCULAR; INTRAVENOUS
Status: DISCONTINUED | OUTPATIENT
Start: 2024-12-09 | End: 2024-12-09

## 2024-12-09 RX ORDER — HYDROCODONE BITARTRATE AND ACETAMINOPHEN 5; 325 MG/1; MG/1
1 TABLET ORAL
Status: DISCONTINUED | OUTPATIENT
Start: 2024-12-09 | End: 2024-12-09 | Stop reason: HOSPADM

## 2024-12-09 RX ORDER — ALBUTEROL SULFATE 0.83 MG/ML
2.5 SOLUTION RESPIRATORY (INHALATION) EVERY 4 HOURS PRN
Status: DISCONTINUED | OUTPATIENT
Start: 2024-12-09 | End: 2024-12-09 | Stop reason: HOSPADM

## 2024-12-09 RX ADMIN — MIDAZOLAM 1 MG: 1 INJECTION INTRAMUSCULAR; INTRAVENOUS at 02:12

## 2024-12-09 RX ADMIN — SODIUM CHLORIDE: 0.9 INJECTION, SOLUTION INTRAVENOUS at 02:12

## 2024-12-09 RX ADMIN — PROPOFOL 20 MG: 10 INJECTION, EMULSION INTRAVENOUS at 03:12

## 2024-12-09 RX ADMIN — DEXAMETHASONE SODIUM PHOSPHATE 4 MG: 4 INJECTION, SOLUTION INTRA-ARTICULAR; INTRALESIONAL; INTRAMUSCULAR; INTRAVENOUS; SOFT TISSUE at 03:12

## 2024-12-09 RX ADMIN — LIDOCAINE HYDROCHLORIDE 70 MG: 20 INJECTION INTRAVENOUS at 03:12

## 2024-12-09 RX ADMIN — PHENYLEPHRINE HYDROCHLORIDE 100 MCG: 10 INJECTION INTRAVENOUS at 03:12

## 2024-12-09 RX ADMIN — FENTANYL CITRATE 50 MCG: 50 INJECTION, SOLUTION INTRAMUSCULAR; INTRAVENOUS at 03:12

## 2024-12-09 RX ADMIN — ONDANSETRON 4 MG: 2 INJECTION INTRAMUSCULAR; INTRAVENOUS at 03:12

## 2024-12-09 RX ADMIN — SODIUM CHLORIDE: 0.9 INJECTION, SOLUTION INTRAVENOUS at 03:12

## 2024-12-09 RX ADMIN — PROPOFOL 130 MG: 10 INJECTION, EMULSION INTRAVENOUS at 03:12

## 2024-12-09 RX ADMIN — DEXTROSE 2 G: 50 INJECTION, SOLUTION INTRAVENOUS at 03:12

## 2024-12-09 NOTE — DISCHARGE SUMMARY
[facility]  Discharge Note  Short Stay    Procedure(s) (LRB):  CYSTOSCOPY, WITH PERIURETHRAL BULKING AGENT INJECTION (N/A)      OUTCOME: Patient tolerated treatment/procedure well without complication and is now ready for discharge.    DISPOSITION: Home or Self Care    FINAL DIAGNOSIS:  KRISH (stress urinary incontinence, female)    FOLLOWUP: In clinic    DISCHARGE INSTRUCTIONS:    Discharge Procedure Orders   Diet Adult Regular     Notify your health care provider if you experience any of the following:  temperature >100.4     Notify your health care provider if you experience any of the following:  severe uncontrolled pain     Notify your health care provider if you experience any of the following:  persistent nausea and vomiting or diarrhea     No dressing needed     Activity as tolerated        TIME SPENT ON DISCHARGE: 10 minutes

## 2024-12-09 NOTE — ANESTHESIA POSTPROCEDURE EVALUATION
Anesthesia Post Evaluation    Patient: Jessica Perez    Procedure(s) Performed: Procedure(s) (LRB):  CYSTOSCOPY, WITH PERIURETHRAL BULKING AGENT INJECTION (N/A)    Final Anesthesia Type: MAC      Patient location during evaluation: PACU  Patient participation: Yes- Able to Participate  Level of consciousness: awake and alert and oriented  Post-procedure vital signs: reviewed and stable  Pain management: adequate  Airway patency: patent    PONV status at discharge: No PONV  Anesthetic complications: no      Cardiovascular status: hemodynamically stable  Respiratory status: unassisted  Hydration status: euvolemic  Follow-up not needed.              Vitals Value Taken Time   /62 12/09/24 1555   Temp 36.7 °C (98.1 °F) 12/09/24 1536   Pulse 76 12/09/24 1559   Resp 27 12/09/24 1559   SpO2 98 % 12/09/24 1559   Vitals shown include unfiled device data.      No case tracking events are documented in the log.      Pain/Marj Score: Marj Score: 8 (12/9/2024  3:36 PM)

## 2024-12-09 NOTE — PLAN OF CARE
Printed AVS and educated patient and friend of its entirety, including physician's orders, follow-up appt, medications, when to call, and when to report to the emergency room. Reviewed prescriptions, pharmacy information, and made sure there were no conflicts preventing the patient from obtaining the newly prescribed medications. I encouraged questions, answered them thoroughly, and evaluated my instructions via teach-back method.

## 2024-12-09 NOTE — TRANSFER OF CARE
"Anesthesia Transfer of Care Note    Patient: Jessica Perez    Procedure(s) Performed: Procedure(s) (LRB):  CYSTOSCOPY, WITH PERIURETHRAL BULKING AGENT INJECTION (N/A)    Patient location: PACU    Anesthesia Type: general    Transport from OR: Transported from OR on room air with adequate spontaneous ventilation    Post pain: adequate analgesia    Post assessment: no apparent anesthetic complications and tolerated procedure well    Post vital signs: stable    Level of consciousness: awake    Nausea/Vomiting: no nausea/vomiting    Complications: none    Transfer of care protocol was followed      Last vitals: Visit Vitals  /63 (BP Location: Right arm, Patient Position: Sitting)   Pulse 99   Temp 36.3 °C (97.4 °F) (Temporal)   Resp 16   Ht 5' 2" (1.575 m)   Wt 77 kg (169 lb 12.1 oz)   SpO2 99%   Breastfeeding No   BMI 31.05 kg/m²     "

## 2024-12-09 NOTE — OP NOTE
Date: 12/09/2024      Procedure: Cystoscopy w urethral bulking injections    Surgeon: Jennifer Graves MD    Pre-op Diagnosis  Stress urinary incontinence      Post-op Diagnosis  Same    Estimated Blood Loss: 2cc    Drains: none    Complications: None    Specimens: No specimens collected during this procedure.    Implants: * No implants in log *    Disposition: PACU - hemodynamically stable.    Condition: stable    Indication for procedure:    79 y.o.yo female with h/o  stress urinary incontinence presents today for treatment with urethral bulking injections.     Procedure in detail:    Informed consent was obtained. The patient was premedicated and brought back to the operative suite. They were placed on the cystoscopy table in the supine position. Sequential compression devices were placed on her lower extremities bilaterally. The patient underwent anesthesia. They were then placed in the dorsal lithotomy position and all pressure points were padded. The genitalia was prepped and draped in the usual sterile fashion. A formal timeout was performed. I began the procedure by advancing the Bulkamid 0 degree scope with the sheath into the urethra. Systematic review was performed of the bladder revealing no stone, foreign body or tumor. Bilateral ureteral orifices were visualized and noted to be effluxing clear urine. The Bulkamid needle was then advanced into the rotatable sheath until the tip of the sheath was adjacent to the bladder neck. The sheath was then rotated to the 7 o'clock position. The needle was then extended into the bladder until the 2cm melvin on the needle was visible. The Bulkamid system was then retracted until the tip of the needle was resting on the bladder neck. The needle was then retracted into the sheath and approximately 1.5cm from the bladder neck the Bulkamid system was pressed parallel against the urethral wall and the needle was advanced into the submucosal tissue, ensuring that the bevel  of the needle was facing towards the lumen. The needle was advanced approximately 0.5cm, and the Bulkamid hydrogel was then injected until the Bulkamid cushion was visible and reached the midline of the urethral lumen. The needle was then retracted back into the rotatable sheath and rotated to the next injection site. Subsequent injections were performed at 5 o'clock, 2 o'clock and 10 o'clock all along the same plane as the original injection until all 4 cushions met at the midline of the urethral lumen. 2mLs total of Bulkamid Hydrogel was used. The bladder was left full at the conclusion of the procedure. The patient tolerated the procedure well and awakened in good condition.

## 2024-12-09 NOTE — NURSING TRANSFER
Received patient from AIDAN Pickard. Patient's vital signs stable, AAOx3. Nonslip socks on, bedrails up, call light within reach, instructed to call staff for assistance. Ambulated to toilet with RN assistance. POC continues.

## 2024-12-09 NOTE — PLAN OF CARE
Report to AIDAN Encarnacion and AIDAN Leslie, care assumed. Patient to be moved to [facility] room 3 for extended discharge. Friend at bedside with with patient's belongings and purse. Patient must urinate to discharge per Dr. Graves.

## 2024-12-10 ENCOUNTER — TELEPHONE (OUTPATIENT)
Dept: UROLOGY | Facility: CLINIC | Age: 79
End: 2024-12-10
Payer: MEDICARE

## 2024-12-10 NOTE — TELEPHONE ENCOUNTER
.Outgoing call placed to patient, patient verified name and date of birth, confirmed patient's post op appt, she verbalized understanding and no further assistance needed.       ----- Message from Jennifer Graves MD sent at 12/10/2024  4:19 PM CST -----  Contact: [phone]  She needs a 2-3 week post op with UA and PVR  ----- Message -----  From: Evan Jackson, AIDAN  Sent: 12/10/2024  10:29 AM CST  To: Jennifer Graves MD    When would you like for this patient to follow up?  ----- Message -----  From: Bozena Sullivan  Sent: 12/10/2024   8:35 AM CST  To: [facility] Staff    .[email or web address]     Patient is calling for Medical Advice regarding:needing her 2 week appt     How long has patient had these symptoms:she wants to go to the [facility]     Pharmacy name and phone#:    Patient wants a call back or thru [facility]:call back     Comments:  She is asking for 12/19 or  12/26 she states she has other appts to do on other days   Please advise patient replies from provider may take up to 48 hours.

## 2024-12-20 ENCOUNTER — OFFICE VISIT (OUTPATIENT)
Dept: UROLOGY | Facility: CLINIC | Age: 79
End: 2024-12-20
Payer: MEDICARE

## 2024-12-20 VITALS
RESPIRATION RATE: 17 BRPM | SYSTOLIC BLOOD PRESSURE: 120 MMHG | DIASTOLIC BLOOD PRESSURE: 80 MMHG | HEIGHT: 62 IN | TEMPERATURE: 98 F | WEIGHT: 173.94 LBS | BODY MASS INDEX: 32.01 KG/M2 | HEART RATE: 87 BPM

## 2024-12-20 DIAGNOSIS — R31.29 MICROHEMATURIA: Primary | ICD-10-CM

## 2024-12-20 DIAGNOSIS — N39.46 MIXED INCONTINENCE: ICD-10-CM

## 2024-12-20 PROCEDURE — 99999 PR PBB SHADOW E&M-EST. PATIENT-LVL V: CPT | Mod: PBBFAC,,, | Performed by: UROLOGY

## 2024-12-20 NOTE — PROGRESS NOTES
Chief Complaint   Patient presents with    Post-op Evaluation       History of Present Illness:   Jessica Perez is a 79 y.o. female here for evaluation of Post-op Evaluation    12/20/24-s/p bulkamid 2 weeks ago. Taking toviaz. Doing great. Wears 1 pad during the night and one during the day. Very pleased with results.   11/20/24-leaking more with laugh, sneeze, cough and when she first gets up. Still takes vesicare. Currently up to 5 pads per day. Also having worsening urge symptoms.   5/21/24-Vesicare is helping a lot. No dysuria. Feels like her stream is a lot better. Empties by positionally voiding. Wearing 2 pads per day. Sometimes has UUI when she first stands up. Still with loose stools. No abdominal pain. No gross hematuria.   2/21/24-Pt reports frequency/urgency and UUI. She positionally voids to empty. Nocturia x 1-2, despite limiting pm fluid. She is taking trospium every day. She tried an OTC pessary and it didn't help and it hurt. 5 pads per day. She reports that she never has constipation, but rather chronically loose stools.    8/9/23-on trospium about every other day. Sometimes she is having strong urges. Wearing a pad. Limiting pm fluid and not having nocturia. No difficulty emptying. No dysuria.   2/7/23-on trospium, and it seems to work okay. Only having stress incontinence and it is much improved. She thinks she may have cystitis and there is slight discomfort and she is having frequency and small volumes. Took a bubble bath 3 days ago. Not sexually active.   8/2/22- on trospium and significantly improved. Wearing 2 pads per day now. Here for cysto. CT urogram showed no stones, renal mass or hydronephrosis. Doing pelvic floor PT  7/8/22- She reports that she has been wearing pads for 10 years. She states that when she stands up, she pours out urine. She reports that she has had loose stools regularly and was taking Immodium when she would go anywhere. It used to be just KRISH with sneeze,  cough and laugh.   Wears about 5 pads per day. During the night if she wakes, she doesn't have UUI. No daytime frequency. +Daytime urgency and UUI.   No gross hematuria or dysuria.       Review of Systems   Constitutional:  Negative for chills and fever.   Respiratory:  Negative for shortness of breath.    Cardiovascular:  Negative for chest pain.   Gastrointestinal:  Negative for constipation.   Genitourinary:  Negative for difficulty urinating and vaginal pain.   All other systems reviewed and are negative.        Past Medical History:   Diagnosis Date    Acute pain of left knee 03/22/2021    Arthritis     Arthritis of knee 02/09/2021    At risk for sleep apnea 09/19/2019    Balance problem 10/21/2021    Bilateral primary osteoarthritis of knee 04/04/2018    Chest pain, moderate coronary artery risk 08/08/2019    Chronic midline low back pain without sciatica 11/30/2020    Chronic pain of both knees 03/23/2021    Costochondritis 01/20/2020    Depression, major, recurrent, mild     Diarrhea 05/11/2022    Difficulty walking 11/30/2020    Difficulty walking 11/30/2020    LANRE (generalized anxiety disorder) 03/22/2021    Gait, antalgic 10/21/2021    Generalized weakness 11/30/2020    Generalized weakness 11/30/2020    Glaucoma     HLD (hyperlipidemia)     Lower extremity weakness 10/21/2021    Muscle cramps 05/23/2018    Osteopenia of multiple sites 04/02/2018    Primary osteoarthritis of right knee 05/30/2018    Primary snoring     Rash 05/11/2022    Sleep related leg cramps 09/07/2010    SOB (shortness of breath) 08/08/2019    Unspecified gastritis and gastroduodenitis without mention of hemorrhage 11/28/2010     Dx updated per 2019 IMO Load    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 Benign.    CATARACT EXTRACTION W/  INTRAOCULAR LENS IMPLANT Left 02/04/2019     CATARACT EXTRACTION W/  INTRAOCULAR LENS IMPLANT Right 03/04/2019    CHOLECYSTECTOMY      COLONOSCOPY N/A 06/29/2022    Procedure: COLONOSCOPY;  Surgeon: Nichole Kelly MD;  Location: [facility] ENDO;  Service: Endoscopy;  Laterality: N/A;    CYSTOSCOPY WITH INJECTION OF PERIURETHRAL BULKING AGENT N/A 12/9/2024    Procedure: CYSTOSCOPY, WITH PERIURETHRAL BULKING AGENT INJECTION;  Surgeon: Jennifer Graves MD;  Location: [facility] OR;  Service: Urology;  Laterality: N/A;    DILATION AND CURETTAGE OF UTERUS      finger surgery      middle finger on right hand    GALLBLADDER SURGERY      INJECTION OF ANESTHETIC AGENT AROUND NERVE Bilateral 10/01/2021    Procedure: Bilateral Genicular nerve block -;  Surgeon: Primo Bond MD;  Location: [facility] PAIN MGT;  Service: Pain Management;  Laterality: Bilateral;    RADIOFREQUENCY THERMOCOAGULATION Left 11/05/2021    Procedure: Left Genicular Nerve RFA (COOLIEF) with RN IV sedation;  Surgeon: Primo Bond MD;  Location: [facility] PAIN MGT;  Service: Pain Management;  Laterality: Left;    TONSILLECTOMY, ADENOIDECTOMY      TOTAL KNEE ARTHROPLASTY Left 09/2024    TUBAL LIGATION         Family History   Problem Relation Name Age of Onset    Dementia Mother      Osteoporosis Mother      Diabetes Father      Cancer Father          prostate ca    Hypertension Father      Heart attack Father      Cancer Sister          cervical ca    Hepatitis Sister      No Known Problems Brother      Breast cancer Paternal Aunt         Social History     Tobacco Use    Smoking status: Former     Current packs/day: 0.50     Average packs/day: 0.5 packs/day for 12.0 years (6.0 ttl pk-yrs)     Types: Cigarettes     Passive exposure: Never    Smokeless tobacco: Never   Substance Use Topics    Alcohol use: Yes     Alcohol/week: 1.0 standard drink of alcohol     Types: 1 Glasses of wine per week     Comment: occasionally    Drug use: No       Current Outpatient Medications   Medication Sig Dispense Refill     acetaminophen/diphenhydramine (TYLENOL PM ORAL) Take by mouth.      artificial tears,hypromellose,,GENTEAL/SUSTANE, 0.3 % Gel       b complex vitamins capsule Take 1 capsule by mouth once daily.      budesonide-formoterol 160-4.5 mcg (SYMBICORT) 160-4.5 mcg/actuation HFAA Inhale 2 puffs into the lungs every 12 (twelve) hours. Controller 1 g 3    calcium carbonate (CALCIUM 500 ORAL) Take by mouth.      cyanocobalamin, vitamin B-12, (VITAMIN B-12 ORAL) Take by mouth.      ergocalciferol, vitamin D2, (VITAMIN D ORAL) Take by mouth.      evolocumab (REPATHA SURECLICK) 140 mg/mL PnIj Inject 1 pen (140 mg total) into the skin every 14 (fourteen) days. 2 mL 6    ezetimibe (ZETIA) 10 mg tablet Take 10 mg by mouth once daily.      ezetimibe (ZETIA) 10 mg tablet Take 1 tablet (10 mg total) by mouth once daily. 90 tablet 3    fesoterodine (TOVIAZ) 8 mg Tb24 Take 8 mg by mouth Daily. 30 tablet 11    flaxseed oiL 1,000 mg Cap flaxseed oil Take No date recorded No form recorded No frequency recorded No route recorded No set duration recorded No set duration amount recorded suspended No dosage strength recorded No dosage strength units of measure recorded      fluticasone (FLONASE) 50 mcg/actuation nasal spray 2 sprays by Each Nare route once daily. 1 Bottle 12    gabapentin (NEURONTIN) 100 MG capsule Take 100 mg by mouth every evening.      krill oil-omega-3-dha-epa 150-450 mg CpDR Take by mouth.      MAGNESIUM CITRATE ORAL Take by mouth.      multivitamin capsule Take 1 capsule by mouth Daily.      traMADoL (ULTRAM) 50 mg tablet Take 50 mg by mouth every 6 (six) hours as needed.      TURMERIC ORAL Take by mouth.      UNABLE TO FIND CBD Oil topical      ZINC ORAL Take by mouth.      APPLE CIDER VINEGAR ORAL Take by mouth.      aspirin (ECOTRIN) 81 MG EC tablet Take 81 mg by mouth once daily.      aspirin 325 MG tablet Take 325 mg by mouth 2 (two) times daily.      azelastine (ASTELIN) 137 mcg (0.1 %) nasal spray 1 spray (137 mcg  total) by Nasal route 2 (two) times daily. 30 mL 1    cholestyramine (QUESTRAN) 4 gram packet Take 1 packet (4 g total) by mouth daily as needed (diarrhea). 30 packet 5    ciclopirox (PENLAC) 8 % Soln Apply topically nightly. 6.6 mL 11    denosumab (PROLIA SUBQ) Inject into the skin.      famotidine (PEPCID) 40 MG tablet Take 1 tablet (40 mg total) by mouth every evening. 30 tablet 11    fluocinolone (SYNALAR) 0.01 % external solution AAA of scalp twice a day PRN scalp itching or eczema flares. 60 mL 1    hydrocortisone 2.5 % cream Apply topically 2 (two) times daily. Mix with ketoconazole cream and AAA on face BID for up to 2 weeks at a time 30 g 1    ketoconazole (NIZORAL) 2 % cream Apply topically 2 (two) times daily. Mix with hydrocortisone cream and AAA on face BID for up to 2 weeks at a time 30 g 1    ketoconazole (NIZORAL) 2 % shampoo Apply topically every 7 days. Shampoo scalp 1-2 times per week. Lather x 5 minutes, rinse well. 120 mL 5    triamcinolone acetonide 0.1% (KENALOG) 0.1 % cream Apply topically 2 (two) times daily. 45 g 0     Current Facility-Administered Medications   Medication Dose Route Frequency Provider Last Rate Last Admin    DOBUtamine 1000 mg in D5W 250 mL infusion  10 mcg/kg/min Intravenous Continuous Robert Yoon MD   Stopped at 02/12/24 1307    triamcinolone acetonide injection 32 mg  32 mg Intra-articular 1 time in Clinic/HOD Dileep Mendoza MD           Review of patient's allergies indicates:   Allergen Reactions    Augmentin [amoxicillin-pot clavulanate] Other (See Comments)     Diarrhea, yeast    Bactrim  [sulfamethoxazole-trimethoprim]      Other reaction(s): Unknown    Celebrex [celecoxib] Other (See Comments)     Stomach pain, gas     Lipitor  [atorvastatin]      Other reaction(s): Unknown    Pravachol  [pravastatin]      Other reaction(s): Unknown    Statins-hmg-coa reductase inhibitors      Other reaction(s): Muscle pain    Sulfa (sulfonamide antibiotics)      Other  reaction(s): Swelling    Zoster vaccine live        Physical Exam  Vitals:    12/20/24 0808   BP: 120/80   Pulse: 87   Resp: 17   Temp: 98 °F (36.7 °C)         General: Well-developed, well-nourished, in no acute distress  HEENT: Normocephalic, atraumatic, extraocular movements intact  Neck: Supple, no supraclavicular or cervical lymphadenopathy, trachea midline  Respirations: even and unlabored  : 7/8/22-Normal external female genitalia without lesions. Orthotopic urethral meatus with caruncle. atrophic vaginal mucosa. No significant prolapse,  negative cough stress test, no urethral hypermobility  Extremities: moves all equally, no clubbing, cyanosis or edema  Skin: Warm and dry. No lesions  Psych: normal affect  Neuro: Alert and oriented x 3. Cranial nerves II-XII intact    Bladder scan (not PVR): 139cc      Assessment:  1. Microhematuria  POCT Bladder Scan      2. Mixed incontinence              Plan:   Microhematuria  -     POCT Bladder Scan    Mixed incontinence    States that she may be moving. Pt will follow up in April but will push back to 6 months if she doesn't move by then. She will continue with toviaz.

## 2025-01-08 ENCOUNTER — TELEPHONE (OUTPATIENT)
Dept: INTERNAL MEDICINE | Facility: CLINIC | Age: 80
End: 2025-01-08
Payer: MEDICARE

## 2025-01-08 DIAGNOSIS — E78.2 MIXED HYPERLIPIDEMIA: Primary | ICD-10-CM

## 2025-01-08 DIAGNOSIS — E78.00 PURE HYPERCHOLESTEROLEMIA: ICD-10-CM

## 2025-01-08 DIAGNOSIS — E21.3 HYPERPARATHYROIDISM: ICD-10-CM

## 2025-01-08 RX ORDER — EVOLOCUMAB 140 MG/ML
140 INJECTION, SOLUTION SUBCUTANEOUS
Qty: 2 ML | Refills: 6 | Status: SHIPPED | OUTPATIENT
Start: 2025-01-08

## 2025-01-08 NOTE — TELEPHONE ENCOUNTER
----- Message from Monika sent at 1/8/2025  1:41 PM CST -----  Contact: [phone] PATIENT  Requesting an RX refill or new RX.    Is this a refill or new RX:     RX name and strength evolocumab (REPATHA SURECLICK) 140 mg/mL PnIj    Is this a 30 day or 90 day RX:     Pharmacy name and phone #      [facility]  [address]  [facility] [address]  Phone: [phone] Fax: [phone]         The doctors have asked that we provide their patients with the following 2 reminders -- prescription refills can take up to 72 hours, and a friendly reminder that in the future you can use your [facility] account to request refills: yes

## 2025-01-08 NOTE — TELEPHONE ENCOUNTER
----- Message from Monika sent at 1/8/2025  1:46 PM CST -----  Contact: patient  type: Lab    Caller is requesting to schedule their Lab appointment prior to an appointment.    Order is not listed in EPIC.  Please enter order and contact patient to schedule.    Preferred Date and Time of Labs: 01 23 2025 8 AM   Date of Appointment: 01/30/25    Where would they like the lab performed?[facility]    Would the patient rather a call back or a response via [facility]? Call     Best Call Back Number:

## 2025-01-28 ENCOUNTER — LAB VISIT (OUTPATIENT)
Dept: LAB | Facility: HOSPITAL | Age: 80
End: 2025-01-28
Attending: INTERNAL MEDICINE
Payer: MEDICARE

## 2025-01-28 DIAGNOSIS — E21.3 HYPERPARATHYROIDISM: ICD-10-CM

## 2025-01-28 DIAGNOSIS — E78.2 MIXED HYPERLIPIDEMIA: ICD-10-CM

## 2025-01-28 LAB
ALBUMIN SERPL BCP-MCNC: 4 G/DL (ref 3.5–5.2)
ALP SERPL-CCNC: 103 U/L (ref 40–150)
ALT SERPL W/O P-5'-P-CCNC: 25 U/L (ref 10–44)
ANION GAP SERPL CALC-SCNC: 8 MMOL/L (ref 8–16)
AST SERPL-CCNC: 24 U/L (ref 10–40)
BASOPHILS # BLD AUTO: 0.06 K/UL (ref 0–0.2)
BASOPHILS NFR BLD: 0.9 % (ref 0–1.9)
BILIRUB SERPL-MCNC: 0.5 MG/DL (ref 0.1–1)
BUN SERPL-MCNC: 17 MG/DL (ref 8–23)
CALCIUM SERPL-MCNC: 10.1 MG/DL (ref 8.7–10.5)
CHLORIDE SERPL-SCNC: 111 MMOL/L (ref 95–110)
CHOLEST SERPL-MCNC: 123 MG/DL (ref 120–199)
CHOLEST/HDLC SERPL: 2 {RATIO} (ref 2–5)
CO2 SERPL-SCNC: 22 MMOL/L (ref 23–29)
CREAT SERPL-MCNC: 0.8 MG/DL (ref 0.5–1.4)
DIFFERENTIAL METHOD BLD: ABNORMAL
EOSINOPHIL # BLD AUTO: 0.2 K/UL (ref 0–0.5)
EOSINOPHIL NFR BLD: 2.1 % (ref 0–8)
ERYTHROCYTE [DISTWIDTH] IN BLOOD BY AUTOMATED COUNT: 13.9 % (ref 11.5–14.5)
EST. GFR  (NO RACE VARIABLE): >60 ML/MIN/1.73 M^2
GLUCOSE SERPL-MCNC: 92 MG/DL (ref 70–110)
HCT VFR BLD AUTO: 42.5 % (ref 37–48.5)
HDLC SERPL-MCNC: 62 MG/DL (ref 40–75)
HDLC SERPL: 50.4 % (ref 20–50)
HGB BLD-MCNC: 14.5 G/DL (ref 12–16)
IMM GRANULOCYTES # BLD AUTO: 0.01 K/UL (ref 0–0.04)
IMM GRANULOCYTES NFR BLD AUTO: 0.1 % (ref 0–0.5)
LDLC SERPL CALC-MCNC: 34 MG/DL (ref 63–159)
LYMPHOCYTES # BLD AUTO: 2.2 K/UL (ref 1–4.8)
LYMPHOCYTES NFR BLD: 31.3 % (ref 18–48)
MCH RBC QN AUTO: 31.2 PG (ref 27–31)
MCHC RBC AUTO-ENTMCNC: 34.1 G/DL (ref 32–36)
MCV RBC AUTO: 91 FL (ref 82–98)
MONOCYTES # BLD AUTO: 0.4 K/UL (ref 0.3–1)
MONOCYTES NFR BLD: 5.7 % (ref 4–15)
NEUTROPHILS # BLD AUTO: 4.2 K/UL (ref 1.8–7.7)
NEUTROPHILS NFR BLD: 59.9 % (ref 38–73)
NONHDLC SERPL-MCNC: 61 MG/DL
NRBC BLD-RTO: 0 /100 WBC
PLATELET # BLD AUTO: 191 K/UL (ref 150–450)
PMV BLD AUTO: 11.3 FL (ref 9.2–12.9)
POTASSIUM SERPL-SCNC: 4.5 MMOL/L (ref 3.5–5.1)
PROT SERPL-MCNC: 7.4 G/DL (ref 6–8.4)
RBC # BLD AUTO: 4.65 M/UL (ref 4–5.4)
SODIUM SERPL-SCNC: 141 MMOL/L (ref 136–145)
TRIGL SERPL-MCNC: 135 MG/DL (ref 30–150)
WBC # BLD AUTO: 7.02 K/UL (ref 3.9–12.7)

## 2025-01-28 PROCEDURE — 80061 LIPID PANEL: CPT | Performed by: INTERNAL MEDICINE

## 2025-01-28 PROCEDURE — 85025 COMPLETE CBC W/AUTO DIFF WBC: CPT | Performed by: INTERNAL MEDICINE

## 2025-01-28 PROCEDURE — 80053 COMPREHEN METABOLIC PANEL: CPT | Performed by: INTERNAL MEDICINE

## 2025-01-28 PROCEDURE — 36415 COLL VENOUS BLD VENIPUNCTURE: CPT | Mod: PN | Performed by: INTERNAL MEDICINE

## 2025-02-01 ENCOUNTER — OFFICE VISIT (OUTPATIENT)
Dept: INTERNAL MEDICINE | Facility: CLINIC | Age: 80
End: 2025-02-01
Payer: MEDICARE

## 2025-02-01 VITALS
WEIGHT: 172.19 LBS | BODY MASS INDEX: 31.49 KG/M2 | HEART RATE: 93 BPM | TEMPERATURE: 96 F | SYSTOLIC BLOOD PRESSURE: 120 MMHG | DIASTOLIC BLOOD PRESSURE: 64 MMHG | OXYGEN SATURATION: 96 %

## 2025-02-01 DIAGNOSIS — G72.0 STATIN MYOPATHY: ICD-10-CM

## 2025-02-01 DIAGNOSIS — E21.3 HYPERPARATHYROIDISM: ICD-10-CM

## 2025-02-01 DIAGNOSIS — K52.9 CHRONIC DIARRHEA: ICD-10-CM

## 2025-02-01 DIAGNOSIS — Z12.31 ENCOUNTER FOR SCREENING MAMMOGRAM FOR HIGH-RISK PATIENT: ICD-10-CM

## 2025-02-01 DIAGNOSIS — E78.2 MIXED HYPERLIPIDEMIA: Primary | ICD-10-CM

## 2025-02-01 DIAGNOSIS — T46.6X5A STATIN MYOPATHY: ICD-10-CM

## 2025-02-01 PROCEDURE — G2211 COMPLEX E/M VISIT ADD ON: HCPCS | Mod: S$GLB,,, | Performed by: INTERNAL MEDICINE

## 2025-02-01 PROCEDURE — 3078F DIAST BP <80 MM HG: CPT | Mod: CPTII,S$GLB,, | Performed by: INTERNAL MEDICINE

## 2025-02-01 PROCEDURE — 1160F RVW MEDS BY RX/DR IN RCRD: CPT | Mod: CPTII,S$GLB,, | Performed by: INTERNAL MEDICINE

## 2025-02-01 PROCEDURE — 1159F MED LIST DOCD IN RCRD: CPT | Mod: CPTII,S$GLB,, | Performed by: INTERNAL MEDICINE

## 2025-02-01 PROCEDURE — 99214 OFFICE O/P EST MOD 30 MIN: CPT | Mod: S$GLB,,, | Performed by: INTERNAL MEDICINE

## 2025-02-01 PROCEDURE — 3288F FALL RISK ASSESSMENT DOCD: CPT | Mod: CPTII,S$GLB,, | Performed by: INTERNAL MEDICINE

## 2025-02-01 PROCEDURE — 1101F PT FALLS ASSESS-DOCD LE1/YR: CPT | Mod: CPTII,S$GLB,, | Performed by: INTERNAL MEDICINE

## 2025-02-01 PROCEDURE — 1126F AMNT PAIN NOTED NONE PRSNT: CPT | Mod: CPTII,S$GLB,, | Performed by: INTERNAL MEDICINE

## 2025-02-01 PROCEDURE — 3074F SYST BP LT 130 MM HG: CPT | Mod: CPTII,S$GLB,, | Performed by: INTERNAL MEDICINE

## 2025-02-01 PROCEDURE — 99999 PR PBB SHADOW E&M-EST. PATIENT-LVL V: CPT | Mod: PBBFAC,,, | Performed by: INTERNAL MEDICINE

## 2025-02-01 RX ORDER — MELOXICAM 7.5 MG/1
7.5 TABLET ORAL 2 TIMES DAILY
COMMUNITY
Start: 2024-12-04 | End: 2025-02-28

## 2025-02-01 NOTE — PROGRESS NOTES
Subjective:       Patient ID: Jessica Perez is a 80 y.o. female.    Chief Complaint: Annual Exam (Cough, nasal drainage )      HPI:  History of Present Illness    Patient presents today with chronic diarrhea    She reports chronic diarrhea with stools softer than normal but not completely liquid. She requires regular use of Kaopectate for management. Previous stool sample was normal. She attempted BRAT diet without significant improvement.    She underwent knee replacement surgery in September. She experienced nerve pain described as burning and electrical in nature, severe enough to wake her from sleep. She previously took gabapentin which was helpful for pain management despite initial cognitive side effects, but is no longer taking it. She did not tolerate tramadol. She is completing a course of Mobic for inflammation. She notes continued warmth in the operated knee compared to the contralateral side.    She has psoriasis where glasses rest on face and skin irritation where bra makes contact with skin.    She continues Repatha for cholesterol management with good effect.         Review of Systems   Constitutional:  Negative for chills and fever.   HENT:  Negative for hearing loss.    Eyes:  Negative for photophobia and visual disturbance.   Respiratory:  Negative for cough, shortness of breath and wheezing.    Cardiovascular:  Negative for chest pain and palpitations.   Gastrointestinal:  Negative for blood in stool, constipation, nausea and vomiting.   Genitourinary:  Negative for dysuria and hematuria.   Musculoskeletal:  Negative for neck pain and neck stiffness.   Skin:  Negative for rash.   Neurological:  Negative for syncope, weakness, light-headedness and headaches.   Hematological:  Negative for adenopathy.   Psychiatric/Behavioral:  Negative for dysphoric mood. The patient is not nervous/anxious.        Objective:   /64   Pulse 93   Temp 96.3 °F (35.7 °C) (Tympanic)   Wt 78.1 kg (172 lb  2.9 oz)   SpO2 96%   BMI 31.49 kg/m²      Physical Exam  Vitals reviewed.   Constitutional:       Appearance: Normal appearance. She is well-developed. She is not ill-appearing.   HENT:      Head: Normocephalic and atraumatic.      Right Ear: Tympanic membrane, ear canal and external ear normal.      Left Ear: Tympanic membrane, ear canal and external ear normal.   Eyes:      Pupils: Pupils are equal, round, and reactive to light.   Neck:      Thyroid: No thyromegaly.   Cardiovascular:      Rate and Rhythm: Normal rate and regular rhythm.      Heart sounds: No murmur heard.     No friction rub. No gallop.   Pulmonary:      Effort: Pulmonary effort is normal.      Breath sounds: Normal breath sounds. No wheezing or rales.   Chest:      Chest wall: No tenderness.   Abdominal:      General: Abdomen is flat. Bowel sounds are normal. There is no distension.      Palpations: Abdomen is soft. There is no mass.      Tenderness: There is no abdominal tenderness. There is no guarding or rebound.   Musculoskeletal:      Cervical back: Normal range of motion and neck supple.   Lymphadenopathy:      Cervical: No cervical adenopathy.   Skin:     General: Skin is warm and dry.      Findings: No rash.   Neurological:      General: No focal deficit present.      Mental Status: She is alert and oriented to person, place, and time.      Cranial Nerves: No cranial nerve deficit.      Deep Tendon Reflexes: Reflexes are normal and symmetric. Reflexes normal.   Psychiatric:         Behavior: Behavior normal.         Judgment: Judgment normal.         Lab Visit on 01/28/2025   Component Date Value    WBC 01/28/2025 7.02     RBC 01/28/2025 4.65     Hemoglobin 01/28/2025 14.5     Hematocrit 01/28/2025 42.5     MCV 01/28/2025 91     MCH 01/28/2025 31.2 (H)     MCHC 01/28/2025 34.1     RDW 01/28/2025 13.9     Platelets 01/28/2025 191     MPV 01/28/2025 11.3     Immature Granulocytes 01/28/2025 0.1     Gran # (ANC) 01/28/2025 4.2     Immature  Grans (Abs) 01/28/2025 0.01     Lymph # 01/28/2025 2.2     Mono # 01/28/2025 0.4     Eos # 01/28/2025 0.2     Baso # 01/28/2025 0.06     nRBC 01/28/2025 0     Gran % 01/28/2025 59.9     Lymph % 01/28/2025 31.3     Mono % 01/28/2025 5.7     Eosinophil % 01/28/2025 2.1     Basophil % 01/28/2025 0.9     Differential Method 01/28/2025 Automated     Sodium 01/28/2025 141     Potassium 01/28/2025 4.5     Chloride 01/28/2025 111 (H)     CO2 01/28/2025 22 (L)     Glucose 01/28/2025 92     BUN 01/28/2025 17     Creatinine 01/28/2025 0.8     Calcium 01/28/2025 10.1     Total Protein 01/28/2025 7.4     Albumin 01/28/2025 4.0     Total Bilirubin 01/28/2025 0.5     Alkaline Phosphatase 01/28/2025 103     AST 01/28/2025 24     ALT 01/28/2025 25     eGFR 01/28/2025 >60.0     Anion Gap 01/28/2025 8     Cholesterol 01/28/2025 123     Triglycerides 01/28/2025 135     HDL 01/28/2025 62     LDL Cholesterol 01/28/2025 34.0 (L)     HDL/Cholesterol Ratio 01/28/2025 50.4 (H)     Total Cholesterol/HDL Ra* 01/28/2025 2.0     Non-HDL Cholesterol 01/28/2025 61        Assessment:       1. Mixed hyperlipidemia    2. Hyperparathyroidism    3. Statin myopathy    4. Chronic diarrhea    5. Encounter for screening mammogram for high-risk patient        Plan:   No problem-specific Assessment & Plan notes found for this encounter.    Jessica was seen today for annual exam.    Diagnoses and all orders for this visit:    Mixed hyperlipidemia  Comments:  continue repatha, stable.    Hyperparathyroidism  Comments:  continue monitoring. no issues at this time.    Statin myopathy  Comments:  does not tolerate statins.  continue repatha for cholesterol    Chronic diarrhea  Comments:  will see Dr. Rudd for evaluation  Orders:  -     Ambulatory referral/consult to Gastroenterology; Future    Encounter for screening mammogram for high-risk patient  -     Mammo Digital Screening Bilat w/ Cory; Future      Assessment & Plan    CHRONIC DIARRHEA:  - Evaluated the  patient's chronic diarrhea, which requires anti-diarrheal medication every 2 days.  - Noted that stools are not completely liquid but are softer than normal.  - Reviewed previous stool sample results taken by Dr. Butler, which were reported as normal.  - Recommend gastroenterology evaluation for persistent diarrhea.  - Referred the patient to Dr. Eber Rudd, gastroenterologist at [facility], for further evaluation and management.  - Advised the patient to continue the BRAT diet as tolerated.    PSORIASIS:  - Assessed psoriasis lesions on the patient's face where glasses rub, under bra, and in two other locations.  - Noted previous dermatologist's diagnosis of psoriasis.  - Recommend dermatology follow-up for psoriasis and other skin lesions.  - Referred the patient to Dr. Barone, dermatologist at the [facility], for evaluation.    HYPERLIPIDEMIA:  - Evaluated the patient's cholesterol levels: Total cholesterol 123, HDL 62, LDL 34.  - Assessed that Repatha is effectively managing the patient's cholesterol levels.  - Continued Repatha for cholesterol management.    FOLLOW UP:  - Planned to arrange for medication delivery after the patient's move to ensure continuity of treatment.  - Contact office when moving date to [place] is confirmed for medication management and care transition planning.  - Follow up for mammogram in March.             This note was generated with the assistance of ambient listening technology. Verbal consent was obtained by the patient and accompanying visitor(s) for the recording of patient appointment to facilitate this note

## 2025-02-20 ENCOUNTER — CLINICAL SUPPORT (OUTPATIENT)
Dept: PULMONOLOGY | Facility: CLINIC | Age: 80
End: 2025-02-20
Attending: STUDENT IN AN ORGANIZED HEALTH CARE EDUCATION/TRAINING PROGRAM
Payer: MEDICARE

## 2025-02-20 ENCOUNTER — HOSPITAL ENCOUNTER (OUTPATIENT)
Dept: RADIOLOGY | Facility: HOSPITAL | Age: 80
Discharge: HOME | End: 2025-02-20
Attending: STUDENT IN AN ORGANIZED HEALTH CARE EDUCATION/TRAINING PROGRAM
Payer: MEDICARE

## 2025-02-20 VITALS
BODY MASS INDEX: 32.66 KG/M2 | HEART RATE: 93 BPM | DIASTOLIC BLOOD PRESSURE: 78 MMHG | RESPIRATION RATE: 24 BRPM | HEIGHT: 62 IN | WEIGHT: 177.5 LBS | OXYGEN SATURATION: 96 % | SYSTOLIC BLOOD PRESSURE: 134 MMHG

## 2025-02-20 DIAGNOSIS — R06.02 SOB (SHORTNESS OF BREATH): ICD-10-CM

## 2025-02-20 DIAGNOSIS — J45.20 MILD INTERMITTENT ASTHMA WITHOUT COMPLICATION: Primary | ICD-10-CM

## 2025-02-20 DIAGNOSIS — R53.81 PHYSICAL DECONDITIONING: ICD-10-CM

## 2025-02-20 PROCEDURE — 71250 CT THORAX DX C-: CPT | Mod: TC

## 2025-02-20 PROCEDURE — 71250 CT THORAX DX C-: CPT | Mod: 26,,, | Performed by: RADIOLOGY

## 2025-02-20 NOTE — PROGRESS NOTES
Chief complaint:  Chief Complaint   Patient presents with    Follow-up    Cough        History of present illness -  Established clinic patient for nonspecific dyspnea and back in late 2024.  Mentioned cough and asthma like symptoms we started her on empiric ics Laba.  Had severe dyspnea on exertion even with activities of daily living such as getting groceries out of her car.  All of this got worse after the patient had a left-sided knee replacement that probably contributed into her physical deconditioning and driving most of her symptoms.    Follow-up  Associated symptoms include coughing.   Cough       Interval history   02/20/2025   Patient comes back for follow-up after completing all the testing that we requested including fractional excretion of nitric oxide, complete spirometry and a CT scan of the chest.  Fortunately after initiation of inhaler she feels better with less cough even though she has not used it in every day.  But ongoing usage proves improvement with therapy.    Tobacco use: Former smoker  smoked 10 years and stopped 40 years.  Family history: father side 2 aunts had breast cancer.  Mother side uncle had lung cancer.   Occupational history: Retired and goes to [employer], exercise (chair and spin bike at a slow pace)     Physical examination -   Physical Exam  Vitals and nursing note reviewed.   Constitutional:       Appearance: Normal appearance.   HENT:      Head: Normocephalic and atraumatic.      Nose: Nose normal.      Mouth/Throat:      Mouth: Mucous membranes are moist.   Eyes:      Pupils: Pupils are equal, round, and reactive to light.   Cardiovascular:      Rate and Rhythm: Normal rate and regular rhythm.      Pulses: Normal pulses.      Heart sounds: Normal heart sounds.   Pulmonary:      Effort: Pulmonary effort is normal.      Breath sounds: Normal breath sounds.   Abdominal:      General: Abdomen is flat.      Palpations: Abdomen is soft.   Musculoskeletal:         General:  "No swelling.   Skin:     General: Skin is warm.      Capillary Refill: Capillary refill takes less than 2 seconds.   Neurological:      General: No focal deficit present.      Mental Status: She is alert.        Vitals:    02/20/25 1107   BP: 134/78   BP Location: Left arm   Patient Position: Sitting   Pulse: 93   Resp: (!) 24   SpO2: 96%   Weight: 80.5 kg (177 lb 7.5 oz)   Height: 5' 2" (1.575 m)      Review of Systems   Constitutional: Negative.    HENT: Negative.     Eyes: Negative.    Respiratory:  Positive for cough.    Cardiovascular: Negative.    Gastrointestinal: Negative.    Musculoskeletal: Negative.    Skin: Negative.    Neurological: Negative.        Relevant data (Independently reviewed by me) -    Spirometry  11/21/2024 basic physiology was normal  02/20/2025 fractional excretion of nitric oxide was 20 parts per billion  6 minute walk test never performed.    Imaging  02/20/2025 CT scan of the chest showed areas of very subtle bronchiectasis bilaterally right slightly larger than left and very subtle upper lobe predominant emphysema.    Assessment & Plan    Mild intermittent asthma without complication    Physical deconditioning      Patient's symptoms are most likely related to mild intermittent asthma with a cough variant, patient needs to continue to use her maintenance inhaler on a regular basis to prevent cough fits.      Her dyspnea on exertion was likely from severe deconditioning which is now improving after the patient started a regular exercise regimen with some friends she exercises almost 5/7 days a week including going up and down the stairs in the 's office this has dramatically improve her symptoms and her dyspnea on exertion is much improved at this visit.    RTC in 1 year    Kareem Richards   02/20/2025      "

## 2025-02-20 NOTE — PROCEDURES
Clinical Guide to Interpretation or FeNO Levels :    FeNO  (ppb) LOW INTERMEDIATE HIGH   ADULT VALUES < 25 25-50          > 50   Th2-driven Inflammation Unlikely Likely Significant     Patients FeNO level at this visit : __20__ (ppb)    Interpretation of FeNO measurement in adults:  [x] FENO is less than 25 ppb implies non eosinophilic airway inflammation or the absence of airway inflammation.    Comment: Low FENO (<25 ppb) in adult asthmatics with persistent symptoms suggests other etiologies for these symptoms and a lower likelihood of benefit from adding or increasing inhaled glucocorticoids.      Discussion:  A FENO less than 25 ppb in adults and less than 20 ppb in children younger than 12 years of age implies noneosinophilic airway inflammation or the absence of airway inflammation.  A FENO greater than 50 ppb in adults or greater than 35 ppb in children suggests eosinophilic airway inflammation.   Values of FENO between 25 and 50 ppb in adults (20 to 35 ppb in children) should be interpreted cautiously with reference to the clinical situation (eg, symptomatic, on or off therapy, current smoking).  A rising FENO with a greater than 20 percent change and more than 25 ppb (20 ppb in children) from a previously stable level suggests increasing eosinophilic airway inflammation, but there are wide inter-individual differences.  A decrease in FENO greater than 20 percent for values over 50 ppb or more than 10 ppb for values less than 50 ppb may be clinically important.  ?FENO in other respiratory diseases - Several other diseases are associated with altered levels of exhaled NO: low levels of FENO have been noted in cystic fibrosis, current smoking, pulmonary hypertension, hypothermia, primary ciliary dyskinesia, and bronchopulmonary dysplasia. Elevated FENO has been noted in atopy, nonasthmatic eosinophilic bronchitis, COPD exacerbations, noncystic fibrosis bronchiectasis, and viral upper respiratory  infections.    REFERENCE:  ATS Board of Directors, December 2004, and by the ERS Executive Committee, June 2004. ATS/ERS Recommendations for Standardized Procedures for the Online and Offline Measurement of Exhaled Lower Respiratory Nitric Oxide and Nasal Nitric Oxide. Guideline 2005

## 2025-02-28 ENCOUNTER — OFFICE VISIT (OUTPATIENT)
Dept: GASTROENTEROLOGY | Facility: CLINIC | Age: 80
End: 2025-02-28
Payer: MEDICARE

## 2025-02-28 VITALS
HEART RATE: 89 BPM | DIASTOLIC BLOOD PRESSURE: 82 MMHG | SYSTOLIC BLOOD PRESSURE: 129 MMHG | WEIGHT: 175.5 LBS | BODY MASS INDEX: 32.3 KG/M2 | HEIGHT: 62 IN

## 2025-02-28 DIAGNOSIS — R12 HEARTBURN: ICD-10-CM

## 2025-02-28 DIAGNOSIS — K58.0 IRRITABLE BOWEL SYNDROME WITH DIARRHEA: Primary | ICD-10-CM

## 2025-02-28 PROCEDURE — 99999 PR PBB SHADOW E&M-EST. PATIENT-LVL IV: CPT | Mod: PBBFAC,,, | Performed by: NURSE PRACTITIONER

## 2025-02-28 RX ORDER — LOPERAMIDE HYDROCHLORIDE 2 MG/1
2 CAPSULE ORAL 2 TIMES DAILY
Qty: 180 CAPSULE | Refills: 3 | Status: SHIPPED | OUTPATIENT
Start: 2025-02-28 | End: 2026-02-28

## 2025-02-28 NOTE — PROGRESS NOTES
Clinic Follow Up:  [facility] Follow Up Note    Reason for Follow Up:  The primary encounter diagnosis was Irritable bowel syndrome with diarrhea. A diagnosis of Heartburn was also pertinent to this visit.    PCP: Warren Lemus   [id number] [facility] [address] [place] [id number]    HPI:  This is a 80 y.o. female here for follow up of diarrhea.   Chronic diarrhea.   She reports loose stools with mucus.   Workup: stool studies negative for infection or pancreatic insufficiency; colonoscopy (2022) negative for microscopic colitis.   BRAT diet helps.   She does very well on Imodium but she has not been taking it on a regular basis as she was told in the past to not take it regularly.   She does very occasional mild heartburn. If this happens, she will take kamran seltzer and sleep with HOB elevated. This is effective.      Review of Systems   Constitutional:  Negative for activity change and appetite change.        As per interval history above   Respiratory:  Negative for cough and shortness of breath.    Cardiovascular:  Negative for chest pain.   Gastrointestinal:  Positive for diarrhea. Negative for abdominal pain, anal bleeding, blood in stool and constipation.   Skin:  Negative for color change and rash.       Medical History:  Past Medical History:   Diagnosis Date    Acute pain of left knee 03/22/2021    Arthritis     Arthritis of knee 02/09/2021    At risk for sleep apnea 09/19/2019    Balance problem 10/21/2021    Bilateral primary osteoarthritis of knee 04/04/2018    Chest pain, moderate coronary artery risk 08/08/2019    Chronic midline low back pain without sciatica 11/30/2020    Chronic pain of both knees 03/23/2021    Costochondritis 01/20/2020    Depression, major, recurrent, mild     Diarrhea 05/11/2022    Difficulty walking 11/30/2020    Difficulty walking 11/30/2020    LANRE (generalized anxiety disorder) 03/22/2021    Gait, antalgic 10/21/2021    Generalized weakness  11/30/2020    Generalized weakness 11/30/2020    Glaucoma     HLD (hyperlipidemia)     Lower extremity weakness 10/21/2021    Muscle cramps 05/23/2018    Osteopenia of multiple sites 04/02/2018    Primary osteoarthritis of right knee 05/30/2018    Primary snoring     Rash 05/11/2022    Sleep related leg cramps 09/07/2010    SOB (shortness of breath) 08/08/2019    Unspecified gastritis and gastroduodenitis without mention of hemorrhage 11/28/2010     Dx updated per 2019 IMO Load    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    Urinary tract infection without hematuria 01/06/2022       Surgical History:   Past Surgical History:   Procedure Laterality Date    BREAST BIOPSY Left     over 20 years ago. Benign.    CATARACT EXTRACTION W/  INTRAOCULAR LENS IMPLANT Left 02/04/2019    CATARACT EXTRACTION W/  INTRAOCULAR LENS IMPLANT Right 03/04/2019    CHOLECYSTECTOMY      COLONOSCOPY N/A 06/29/2022    Procedure: COLONOSCOPY;  Surgeon: Nichole Kelly MD;  Location: [facility] ENDO;  Service: Endoscopy;  Laterality: N/A;    CYSTOSCOPY WITH INJECTION OF PERIURETHRAL BULKING AGENT N/A 12/9/2024    Procedure: CYSTOSCOPY, WITH PERIURETHRAL BULKING AGENT INJECTION;  Surgeon: Jennifer Graves MD;  Location: [facility] OR;  Service: Urology;  Laterality: N/A;    DILATION AND CURETTAGE OF UTERUS      finger surgery      middle finger on right hand    GALLBLADDER SURGERY      INJECTION OF ANESTHETIC AGENT AROUND NERVE Bilateral 10/01/2021    Procedure: Bilateral Genicular nerve block -;  Surgeon: Primo Bond MD;  Location: [facility] PAIN MGT;  Service: Pain Management;  Laterality: Bilateral;    RADIOFREQUENCY THERMOCOAGULATION Left 11/05/2021    Procedure: Left Genicular Nerve RFA (COOLIEF) with RN IV sedation;  Surgeon: Primo Bond MD;  Location: [facility] PAIN MGT;  Service: Pain Management;  Laterality: Left;    TONSILLECTOMY, ADENOIDECTOMY      TOTAL KNEE ARTHROPLASTY Left 09/2024    TUBAL LIGATION         Family History:   Family History  "  Problem Relation Name Age of Onset    Dementia Mother      Osteoporosis Mother      Diabetes Father      Cancer Father          prostate ca    Hypertension Father      Heart attack Father      Cancer Sister          cervical ca    Hepatitis Sister      No Known Problems Brother      Breast cancer Paternal Aunt         Social History:   Social History[1]    Allergies:   Review of patient's allergies indicates:   Allergen Reactions    Augmentin [amoxicillin-pot clavulanate] Other (See Comments)     Diarrhea, yeast    Bactrim  [sulfamethoxazole-trimethoprim]      Other reaction(s): Unknown    Celebrex [celecoxib] Other (See Comments)     Stomach pain, gas     Lipitor  [atorvastatin]      Other reaction(s): Unknown    Pravachol  [pravastatin]      Other reaction(s): Unknown    Statins-hmg-coa reductase inhibitors      Other reaction(s): Muscle pain    Sulfa (sulfonamide antibiotics)      Other reaction(s): Swelling    Zoster vaccine live        Home Medications:  Medications Ordered Prior to Encounter[2]    /82 (BP Location: Left arm, Patient Position: Sitting)   Pulse 89   Ht 5' 2" (1.575 m)   Wt 79.6 kg (175 lb 7.8 oz)   BMI 32.10 kg/m²   Body mass index is 32.1 kg/m².  Physical Exam  Constitutional:       General: She is not in acute distress.  HENT:      Head: Normocephalic.   Cardiovascular:      Rate and Rhythm: Normal rate and regular rhythm.      Heart sounds: Normal heart sounds. No murmur heard.  Pulmonary:      Effort: Pulmonary effort is normal. No respiratory distress.      Breath sounds: Normal breath sounds.   Neurological:      General: No focal deficit present.      Mental Status: She is alert.   Psychiatric:         Mood and Affect: Mood normal.         Behavior: Behavior normal.         Judgment: Judgment normal.         Labs: Pertinent labs reviewed.  CRC Screening:     Assessment:   1. Irritable bowel syndrome with diarrhea    2. Heartburn    Able to diagnose her with IBS given normal " diarrhea workup in the past. She can take imodium on a scheduled basis as this is very effective for her diarrhea. Heartburn is mild and only when eating something she should not be eating. Well controlled with elevation of HOB and kamran tamayo     Recommendations:   - imodium 2 mg BID.   - continue heartburn management as above.     Irritable bowel syndrome with diarrhea    Heartburn    Other orders  -     loperamide (IMODIUM) 2 mg capsule; Take 1 capsule (2 mg total) by mouth 2 (two) times a day.  Dispense: 180 capsule; Refill: 3    Return to Clinic:  Follow up in about 1 year (around 2/28/2026).    Thank you for the opportunity to participate in the care of this patient.  DELFINA Navarro             [1]   Social History  Tobacco Use    Smoking status: Former     Current packs/day: 0.50     Average packs/day: 0.5 packs/day for 12.0 years (6.0 ttl pk-yrs)     Types: Cigarettes     Passive exposure: Never    Smokeless tobacco: Never   Substance Use Topics    Alcohol use: Yes     Alcohol/week: 1.0 standard drink of alcohol     Types: 1 Glasses of wine per week     Comment: occasionally    Drug use: No   [2]   Current Outpatient Medications on File Prior to Visit   Medication Sig Dispense Refill    acetaminophen/diphenhydramine (TYLENOL PM ORAL) Take by mouth.      artificial tears,hypromellose,,GENTEAL/SUSTANE, 0.3 % Gel       b complex vitamins capsule Take 1 capsule by mouth once daily.      calcium carbonate (CALCIUM 500 ORAL) Take by mouth.      cyanocobalamin, vitamin B-12, (VITAMIN B-12 ORAL) Take by mouth.      ergocalciferol, vitamin D2, (VITAMIN D ORAL) Take by mouth.      evolocumab (REPATHA SURECLICK) 140 mg/mL PnIj Inject 1 pen (140 mg total) into the skin every 14 (fourteen) days. 2 mL 6    ezetimibe (ZETIA) 10 mg tablet Take 1 tablet (10 mg total) by mouth once daily. 90 tablet 3    fesoterodine (TOVIAZ) 8 mg Tb24 Take 8 mg by mouth Daily. 30 tablet 11    flaxseed oiL 1,000 mg Cap flaxseed oil  Take No date recorded No form recorded No frequency recorded No route recorded No set duration recorded No set duration amount recorded suspended No dosage strength recorded No dosage strength units of measure recorded      fluticasone (FLONASE) 50 mcg/actuation nasal spray 2 sprays by Each Nare route once daily. 1 Bottle 12    krill oil-omega-3-dha-epa 150-450 mg CpDR Take by mouth.      MAGNESIUM CITRATE ORAL Take by mouth.      multivitamin capsule Take 1 capsule by mouth Daily.      UNABLE TO FIND CBD Oil topical      budesonide-formoterol 160-4.5 mcg (SYMBICORT) 160-4.5 mcg/actuation HFAA Inhale 2 puffs into the lungs every 12 (twelve) hours. Controller (Patient not taking: Reported on 2/28/2025) 1 g 3    ZINC ORAL Take by mouth. (Patient not taking: Reported on 2/28/2025)      [DISCONTINUED] azelastine (ASTELIN) 137 mcg (0.1 %) nasal spray 1 spray (137 mcg total) by Nasal route 2 (two) times daily. 30 mL 1    [DISCONTINUED] denosumab (PROLIA SUBQ) Inject into the skin.      [DISCONTINUED] famotidine (PEPCID) 40 MG tablet Take 1 tablet (40 mg total) by mouth every evening. 30 tablet 11    [DISCONTINUED] meloxicam (MOBIC) 7.5 MG tablet Take 7.5 mg by mouth 2 (two) times daily. (Patient not taking: Reported on 2/28/2025)      [DISCONTINUED] traMADoL (ULTRAM) 50 mg tablet Take 50 mg by mouth every 6 (six) hours as needed. (Patient not taking: Reported on 2/28/2025)      [DISCONTINUED] TURMERIC ORAL Take by mouth. (Patient not taking: Reported on 2/28/2025)       Current Facility-Administered Medications on File Prior to Visit   Medication Dose Route Frequency Provider Last Rate Last Admin    [DISCONTINUED] triamcinolone acetonide injection 32 mg  32 mg Intra-articular 1 time in Clinic/HOD Dileep Mendoza MD

## 2025-03-07 ENCOUNTER — HOSPITAL ENCOUNTER (OUTPATIENT)
Dept: RADIOLOGY | Facility: HOSPITAL | Age: 80
Discharge: HOME | End: 2025-03-07
Attending: INTERNAL MEDICINE
Payer: MEDICARE

## 2025-03-07 DIAGNOSIS — Z12.31 ENCOUNTER FOR SCREENING MAMMOGRAM FOR HIGH-RISK PATIENT: ICD-10-CM

## 2025-03-07 PROCEDURE — 77067 SCR MAMMO BI INCL CAD: CPT | Mod: TC,PO

## 2025-03-07 PROCEDURE — 77063 BREAST TOMOSYNTHESIS BI: CPT | Mod: 26,,, | Performed by: RADIOLOGY

## 2025-03-07 PROCEDURE — 77067 SCR MAMMO BI INCL CAD: CPT | Mod: 26,,, | Performed by: RADIOLOGY

## 2025-03-10 ENCOUNTER — RESULTS FOLLOW-UP (OUTPATIENT)
Dept: INTERNAL MEDICINE | Facility: CLINIC | Age: 80
End: 2025-03-10

## 2025-04-07 ENCOUNTER — PATIENT MESSAGE (OUTPATIENT)
Dept: UROLOGY | Facility: CLINIC | Age: 80
End: 2025-04-07
Payer: MEDICARE

## 2025-04-07 ENCOUNTER — TELEPHONE (OUTPATIENT)
Dept: UROLOGY | Facility: CLINIC | Age: 80
End: 2025-04-07
Payer: MEDICARE

## 2025-04-07 ENCOUNTER — TELEPHONE (OUTPATIENT)
Dept: INTERNAL MEDICINE | Facility: CLINIC | Age: 80
End: 2025-04-07
Payer: MEDICARE

## 2025-04-07 NOTE — TELEPHONE ENCOUNTER
.Outgoing call attempted to notify patient regarding rescheduling her upcoming appointment but no answer, left voice message with contact information letting patient know she can contact us at her earliest convenience for assistance with rescheduling or she can rescheduling via the portal on [employer] as well.

## 2025-04-08 ENCOUNTER — TELEPHONE (OUTPATIENT)
Dept: UROLOGY | Facility: CLINIC | Age: 80
End: 2025-04-08
Payer: MEDICARE

## 2025-04-08 NOTE — TELEPHONE ENCOUNTER
Outgoing call placed to patient, patient verified name and date of birth, patient notified of need to reschedule her upcoming appointment due to doctor being out, she verbalized understanding and appointment rescheduled per patient approval.

## 2025-04-24 ENCOUNTER — OFFICE VISIT (OUTPATIENT)
Dept: OPHTHALMOLOGY | Facility: CLINIC | Age: 80
End: 2025-04-24
Payer: MEDICARE

## 2025-04-24 DIAGNOSIS — H52.7 REFRACTION DISORDER: ICD-10-CM

## 2025-04-24 DIAGNOSIS — Z96.1 PSEUDOPHAKIA OF BOTH EYES: ICD-10-CM

## 2025-04-24 DIAGNOSIS — H40.013 OPEN ANGLE WITH BORDERLINE FINDINGS, LOW RISK, BILATERAL: Primary | ICD-10-CM

## 2025-04-24 DIAGNOSIS — H16.223 KERATITIS SICCA, BILATERAL: ICD-10-CM

## 2025-04-24 PROCEDURE — 1159F MED LIST DOCD IN RCRD: CPT | Mod: CPTII,S$GLB,, | Performed by: OPHTHALMOLOGY

## 2025-04-24 PROCEDURE — 92014 COMPRE OPH EXAM EST PT 1/>: CPT | Mod: S$GLB,,, | Performed by: OPHTHALMOLOGY

## 2025-04-24 PROCEDURE — 92015 DETERMINE REFRACTIVE STATE: CPT | Mod: S$GLB,,, | Performed by: OPHTHALMOLOGY

## 2025-04-24 PROCEDURE — 1160F RVW MEDS BY RX/DR IN RCRD: CPT | Mod: CPTII,S$GLB,, | Performed by: OPHTHALMOLOGY

## 2025-04-24 PROCEDURE — 99999 PR PBB SHADOW E&M-EST. PATIENT-LVL III: CPT | Mod: PBBFAC,,, | Performed by: OPHTHALMOLOGY

## 2025-04-24 NOTE — PROGRESS NOTES
HPI     Glaucoma            Comments: 6m dil. Denies any pain or irritation. Va stable. Using AT prn.              Comments    1. COAG SUSP   SLT OD 3/26/15   SLT OS 5/15   2. Dry Eyes   3. PCIOL OD 3/4/2019 (+28.0 SN60WF) CDE 10.38 -NEAR   PCIOL OS +25.5 SN60WF / CDE: 9.46 / 2-4-19 2-5-19       GCL: 31/30-- 09/04/24      Pataday PRN   Systane BID   In the past Pt wore cls for monovision- then changed to MF cls             Last edited by Ramiro Calderón on 4/24/2025 10:46 AM.            Assessment /Plan     For exam results, see Encounter Report.      ICD-10-CM ICD-9-CM    1. Open angle with borderline findings, low risk, bilateral  H40.013 365.01 Doing well - intraocular pressure is within acceptable range relative to target pressure with no evidence of progression.   Continue current treatment.  Reviewed importance of continued compliance with treatment and follow up.         2. Keratitis sicca, bilateral  H16.223 370.33 Findings and symptoms consistent with moderate dry eyes.     Recommend regular use of Artificial Tears. These should be thought of as Ocular Surface Moisturizers similar to skin moisturizers in that regular use is required to achieve maximum benefit.    iVizia or Refresh Joe 3-4 times a day and 2 drops upon awakening in each eye. These products are preservative free.  The first dose upon awakening is most important because we do not make tears at night.  Avoid generic products as they contain Benzalkonium Chloride as a preservative. This is a very irritating chemical and can make your eyes worse.    Hydroeye 2 capsules twice a day    Night time gels: Our tear production is drastically reduced at night and these products moisturize the ocular surface throughout the night making a big difference in the health of our ocular surface.   Specifically, Genteal gel, Systane gel - in a small tube, or iVizia gel in individual vials             3. Pseudophakia of both eyes  Z96.1 V43.1           Return to  clinic 6 months   See above for dry eye treatment

## 2025-05-12 ENCOUNTER — TELEPHONE (OUTPATIENT)
Dept: UROLOGY | Facility: CLINIC | Age: 80
End: 2025-05-12
Payer: MEDICARE

## 2025-05-12 ENCOUNTER — PATIENT MESSAGE (OUTPATIENT)
Dept: UROLOGY | Facility: CLINIC | Age: 80
End: 2025-05-12
Payer: MEDICARE

## 2025-05-12 NOTE — TELEPHONE ENCOUNTER
.Outgoing call attempted to notify patient regarding rescheduling her upcoming appt but no answer, left voice message with contact information letting patient know she can come in on today if available at the [place] location between 3-4pm or contact us at her earliest convenience for assistance with rescheduling on another date.

## 2025-05-12 NOTE — TELEPHONE ENCOUNTER
.Outgoing call attempted to notify patient regarding rescheduling her upcoming appt but no answer, left voice message with contact information letting patient know she can contact us at her earliest convenience for assistance with rescheduling her appointment.

## 2025-05-14 ENCOUNTER — LAB VISIT (OUTPATIENT)
Dept: LAB | Facility: HOSPITAL | Age: 80
End: 2025-05-14
Attending: UROLOGY
Payer: MEDICARE

## 2025-05-14 DIAGNOSIS — R31.29 MICROHEMATURIA: ICD-10-CM

## 2025-05-14 DIAGNOSIS — R31.29 MICROHEMATURIA: Primary | ICD-10-CM

## 2025-05-14 LAB
BACTERIA #/AREA URNS AUTO: ABNORMAL /HPF
BILIRUB UR QL STRIP.AUTO: NEGATIVE
CLARITY UR: CLEAR
COLOR UR AUTO: YELLOW
GLUCOSE UR QL STRIP: NEGATIVE
HGB UR QL STRIP: NEGATIVE
KETONES UR QL STRIP: NEGATIVE
LEUKOCYTE ESTERASE UR QL STRIP: ABNORMAL
MICROSCOPIC COMMENT: ABNORMAL
NITRITE UR QL STRIP: NEGATIVE
PH UR STRIP: 7 [PH]
PROT UR QL STRIP: NEGATIVE
RBC #/AREA URNS AUTO: 1 /HPF (ref 0–4)
SP GR UR STRIP: 1.01
SQUAMOUS #/AREA URNS AUTO: 2 /HPF
UROBILINOGEN UR STRIP-ACNC: NEGATIVE EU/DL
WBC #/AREA URNS AUTO: 6 /HPF (ref 0–5)

## 2025-05-14 PROCEDURE — 81001 URINALYSIS AUTO W/SCOPE: CPT

## 2025-05-15 ENCOUNTER — RESULTS FOLLOW-UP (OUTPATIENT)
Dept: UROLOGY | Facility: HOSPITAL | Age: 80
End: 2025-05-15

## 2025-05-23 ENCOUNTER — OFFICE VISIT (OUTPATIENT)
Dept: CARDIOLOGY | Facility: CLINIC | Age: 80
End: 2025-05-23
Payer: MEDICARE

## 2025-05-23 VITALS
HEART RATE: 82 BPM | WEIGHT: 174.63 LBS | HEIGHT: 62 IN | DIASTOLIC BLOOD PRESSURE: 56 MMHG | BODY MASS INDEX: 32.14 KG/M2 | SYSTOLIC BLOOD PRESSURE: 110 MMHG

## 2025-05-23 DIAGNOSIS — G72.0 STATIN MYOPATHY: ICD-10-CM

## 2025-05-23 DIAGNOSIS — E78.2 MIXED HYPERLIPIDEMIA: Primary | ICD-10-CM

## 2025-05-23 DIAGNOSIS — Z82.49 FAMILY HISTORY OF ARTERIOSCLEROTIC CARDIOVASCULAR DISEASE: ICD-10-CM

## 2025-05-23 DIAGNOSIS — T46.6X5A STATIN MYOPATHY: ICD-10-CM

## 2025-05-23 DIAGNOSIS — I35.0 AORTIC VALVE STENOSIS, ETIOLOGY OF CARDIAC VALVE DISEASE UNSPECIFIED: ICD-10-CM

## 2025-05-23 PROCEDURE — 99999 PR PBB SHADOW E&M-EST. PATIENT-LVL IV: CPT | Mod: PBBFAC,,,

## 2025-05-23 NOTE — PROGRESS NOTES
Subjective:   Patient ID:  Jessica Perez is a 80 y.o. female who presents for evaluation of No chief complaint on file.      [id number] whose current medical conditions include MARC, OA, HLD (on repatha), obesity previously followed by Dr. Yoon in cardiology clinic here today for CV follow up, weight up 3lb, BP stable. Has occ SOB with exertion tries to stay active. Compliant with medications. Denies any CP, palpitations and dizziness at this time.     LDL 34 1/25'  Stress Echo 2/24' neg for ischemia, midl AS    Lab Results   Component Value Date    HGBA1C 5.2 10/25/2022       Past Medical History:   Diagnosis Date    Acute pain of left knee 03/22/2021    Arthritis     Arthritis of knee 02/09/2021    At risk for sleep apnea 09/19/2019    Balance problem 10/21/2021    Bilateral primary osteoarthritis of knee 04/04/2018    Chest pain, moderate coronary artery risk 08/08/2019    Chronic midline low back pain without sciatica 11/30/2020    Chronic pain of both knees 03/23/2021    Costochondritis 01/20/2020    Depression, major, recurrent, mild     Diarrhea 05/11/2022    Difficulty walking 11/30/2020    Difficulty walking 11/30/2020    LANRE (generalized anxiety disorder) 03/22/2021    Gait, antalgic 10/21/2021    Generalized weakness 11/30/2020    Generalized weakness 11/30/2020    Glaucoma     HLD (hyperlipidemia)     Lower extremity weakness 10/21/2021    Muscle cramps 05/23/2018    Osteopenia of multiple sites 04/02/2018    Primary osteoarthritis of right knee 05/30/2018    Primary snoring     Rash 05/11/2022    Sleep related leg cramps 09/07/2010    SOB (shortness of breath) 08/08/2019    Unspecified gastritis and gastroduodenitis without mention of hemorrhage 11/28/2010     Dx updated per 2019 IMO Load    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 Benign.     CATARACT EXTRACTION W/  INTRAOCULAR LENS IMPLANT Left 02/04/2019    CATARACT EXTRACTION W/  INTRAOCULAR LENS IMPLANT Right 03/04/2019    CHOLECYSTECTOMY      COLONOSCOPY N/A 06/29/2022    Procedure: COLONOSCOPY;  Surgeon: Nichole Kelly MD;  Location: [facility] ENDO;  Service: Endoscopy;  Laterality: N/A;    CYSTOSCOPY WITH INJECTION OF PERIURETHRAL BULKING AGENT N/A 12/9/2024    Procedure: CYSTOSCOPY, WITH PERIURETHRAL BULKING AGENT INJECTION;  Surgeon: Jennifer Graves MD;  Location: [facility] OR;  Service: Urology;  Laterality: N/A;    DILATION AND CURETTAGE OF UTERUS      finger surgery      middle finger on right hand    GALLBLADDER SURGERY      INJECTION OF ANESTHETIC AGENT AROUND NERVE Bilateral 10/01/2021    Procedure: Bilateral Genicular nerve block -;  Surgeon: Primo Bond MD;  Location: [facility] PAIN MGT;  Service: Pain Management;  Laterality: Bilateral;    RADIOFREQUENCY THERMOCOAGULATION Left 11/05/2021    Procedure: Left Genicular Nerve RFA (COOLIEF) with RN IV sedation;  Surgeon: Primo Bond MD;  Location: [facility] PAIN MGT;  Service: Pain Management;  Laterality: Left;    TONSILLECTOMY, ADENOIDECTOMY      TOTAL KNEE ARTHROPLASTY Left 09/2024    TUBAL LIGATION         Social History[1]    Family History   Problem Relation Name Age of Onset    Dementia Mother      Osteoporosis Mother      Diabetes Father      Cancer Father          prostate ca    Hypertension Father      Heart attack Father      Cancer Sister          cervical ca    Hepatitis Sister      No Known Problems Brother      Breast cancer Paternal Aunt         Medications Ordered Prior to Encounter[2]   Wt Readings from Last 3 Encounters:   05/23/25 79.2 kg (174 lb 9.7 oz)   04/03/25 79.3 kg (174 lb 12.8 oz)   02/28/25 79.6 kg (175 lb 7.8 oz)     Temp Readings from Last 3 Encounters:   02/01/25 96.3 °F (35.7 °C) (Tympanic)   12/20/24 98 °F (36.7 °C) (Oral)   12/09/24 98.1 °F (36.7 °C) (Temporal)     BP Readings from Last 3 Encounters:   05/23/25  (!) 110/56   04/03/25 126/78   02/28/25 129/82     Pulse Readings from Last 3 Encounters:   05/23/25 82   02/28/25 89   02/20/25 93        Review of Systems   Constitutional: Negative.   HENT: Negative.     Eyes: Negative.    Cardiovascular: Negative.    Respiratory:  Positive for shortness of breath.    Skin: Negative.    Musculoskeletal: Negative.    Gastrointestinal: Negative.    Genitourinary: Negative.    Neurological: Negative.    Psychiatric/Behavioral: Negative.         Objective:   Physical Exam  Vitals and nursing note reviewed.   Constitutional:       Appearance: Normal appearance.   HENT:      Head: Normocephalic.   Eyes:      Pupils: Pupils are equal, round, and reactive to light.   Cardiovascular:      Rate and Rhythm: Normal rate and regular rhythm.      Heart sounds: S1 normal and S2 normal. Murmur heard.      No S3 or S4 sounds.   Pulmonary:      Effort: Pulmonary effort is normal.      Breath sounds: Normal breath sounds.   Abdominal:      General: Bowel sounds are normal.      Palpations: Abdomen is soft.   Musculoskeletal:         General: Normal range of motion.      Cervical back: Normal range of motion.   Skin:     Capillary Refill: Capillary refill takes less than 2 seconds.   Neurological:      General: No focal deficit present.      Mental Status: She is alert and oriented to person, place, and time.   Psychiatric:         Mood and Affect: Mood normal.         Behavior: Behavior normal.         Thought Content: Thought content normal.         Lab Results   Component Value Date    CHOL 123 01/28/2025    CHOL 142 01/16/2024    CHOL 126 10/25/2022     Lab Results   Component Value Date    HDL 62 01/28/2025    HDL 70 01/16/2024    HDL 64 10/25/2022     Lab Results   Component Value Date    LDLCALC 34.0 (L) 01/28/2025    LDLCALC 43.2 (L) 01/16/2024    LDLCALC 24.2 (L) 10/25/2022     Lab Results   Component Value Date    TRIG 135 01/28/2025    TRIG 144 01/16/2024    TRIG 189 (H) 10/25/2022      Lab Results   Component Value Date    CHOLHDL 50.4 (H) 01/28/2025    CHOLHDL 49.3 01/16/2024    CHOLHDL 50.8 (H) 10/25/2022       Chemistry        Component Value Date/Time     01/28/2025 0833    K 4.5 01/28/2025 0833     (H) 01/28/2025 0833    CO2 22 (L) 01/28/2025 0833    BUN 17 01/28/2025 0833    CREATININE 0.8 01/28/2025 0833    GLU 92 01/28/2025 0833        Component Value Date/Time    CALCIUM 10.1 01/28/2025 0833    ALKPHOS 103 01/28/2025 0833    AST 24 01/28/2025 0833    ALT 25 01/28/2025 0833    BILITOT 0.5 01/28/2025 0833    ESTGFRAFRICA >60 07/20/2022 0834    EGFRNONAA >60 07/20/2022 0834          Lab Results   Component Value Date    TSH 2.605 05/02/2024     Lab Results   Component Value Date    INR 0.9 08/14/2024     @RESUFAST(WBC,HGB,HCT,MCV,PLT)  @LABRCNTIP(BNP,BNPTRIAGEBLO)@  CrCl cannot be calculated (Patient's most recent lab result is older than the maximum 7 days allowed.).     No results found for this or any previous visit.     No results found for this or any previous visit.     Assessment:      1. Mixed hyperlipidemia    2. Family history of arteriosclerotic cardiovascular disease    3. Statin myopathy    4. Aortic valve stenosis, etiology of cardiac valve disease unspecified        Plan:   Mixed hyperlipidemia  -     Echo; Future    Family history of arteriosclerotic cardiovascular disease    Statin myopathy  -     Echo; Future    Aortic valve stenosis, etiology of cardiac valve disease unspecified  -     Echo; Future      Repatha, zetia, low fat diet- Hlp  RF modifications  Low Na diet  Daily exercise as tolerated    RTC 6m  Check Echo- AS      Courtney Guillot, FNP-C [facility], Cardiology         [1]   Social History  Tobacco Use    Smoking status: Former     Current packs/day: 0.50     Average packs/day: 0.5 packs/day for 12.0 years (6.0 ttl pk-yrs)     Types: Cigarettes     Passive exposure: Never    Smokeless tobacco: Never   Substance Use Topics    Alcohol use: Yes      Alcohol/week: 1.0 standard drink of alcohol     Types: 1 Glasses of wine per week     Comment: occasionally    Drug use: No   [2]   Current Outpatient Medications on File Prior to Visit   Medication Sig Dispense Refill    acetaminophen/diphenhydramine (TYLENOL PM ORAL) Take by mouth as needed.      artificial tears,hypromellose,,GENTEAL/SUSTANE, 0.3 % Gel       b complex vitamins capsule Take 1 capsule by mouth once daily.      calcium carbonate (CALCIUM 500 ORAL) Take by mouth.      cyanocobalamin, vitamin B-12, (VITAMIN B-12 ORAL) Take by mouth.      ergocalciferol, vitamin D2, (VITAMIN D ORAL) Take by mouth.      evolocumab (REPATHA SURECLICK) 140 mg/mL PnIj Inject 1 pen (140 mg total) into the skin every 14 (fourteen) days. 2 mL 6    ezetimibe (ZETIA) 10 mg tablet Take 1 tablet (10 mg total) by mouth once daily. 90 tablet 3    fesoterodine (TOVIAZ) 8 mg Tb24 Take 8 mg by mouth Daily. 30 tablet 11    flaxseed oiL 1,000 mg Cap flaxseed oil Take No date recorded No form recorded No frequency recorded No route recorded No set duration recorded No set duration amount recorded suspended No dosage strength recorded No dosage strength units of measure recorded      fluticasone (FLONASE) 50 mcg/actuation nasal spray 2 sprays by Each Nare route once daily. (Patient taking differently: 2 sprays by Each Nostril route daily as needed.) 1 Bottle 12    krill oil-omega-3-dha-epa 150-450 mg CpDR Take by mouth.      loperamide (IMODIUM) 2 mg capsule Take 1 capsule (2 mg total) by mouth 2 (two) times a day. 180 capsule 3    MAGNESIUM CITRATE ORAL Take by mouth.      multivitamin capsule Take 1 capsule by mouth Daily.      ZINC ORAL Take by mouth.      budesonide-formoterol 160-4.5 mcg (SYMBICORT) 160-4.5 mcg/actuation HFAA Inhale 2 puffs into the lungs every 12 (twelve) hours. Controller 1 g 3    UNABLE TO FIND CBD Oil topical       No current facility-administered medications on file prior to visit.

## 2025-05-26 ENCOUNTER — TELEPHONE (OUTPATIENT)
Dept: INTERNAL MEDICINE | Facility: CLINIC | Age: 80
End: 2025-05-26
Payer: MEDICARE

## 2025-05-26 NOTE — TELEPHONE ENCOUNTER
Please reach out to patient and let her know to contact the specialty pharmacy regarding her repatha.  Or even better, do a warm handoff to the pharmacy team while on the phone with her.  Thanks.

## 2025-05-28 ENCOUNTER — OFFICE VISIT (OUTPATIENT)
Dept: UROLOGY | Facility: CLINIC | Age: 80
End: 2025-05-28
Payer: MEDICARE

## 2025-05-28 VITALS
RESPIRATION RATE: 16 BRPM | WEIGHT: 175.06 LBS | SYSTOLIC BLOOD PRESSURE: 123 MMHG | DIASTOLIC BLOOD PRESSURE: 83 MMHG | HEIGHT: 62 IN | HEART RATE: 86 BPM | BODY MASS INDEX: 32.22 KG/M2

## 2025-05-28 DIAGNOSIS — Z01.818 PRE-OP TESTING: ICD-10-CM

## 2025-05-28 DIAGNOSIS — N39.3 SUI (STRESS URINARY INCONTINENCE, FEMALE): ICD-10-CM

## 2025-05-28 DIAGNOSIS — N39.3 STRESS INCONTINENCE: ICD-10-CM

## 2025-05-28 DIAGNOSIS — Z01.818 PRE-OP EXAM: ICD-10-CM

## 2025-05-28 DIAGNOSIS — N32.81 OAB (OVERACTIVE BLADDER): ICD-10-CM

## 2025-05-28 DIAGNOSIS — R31.29 MICROHEMATURIA: Primary | ICD-10-CM

## 2025-05-28 LAB
BACTERIA #/AREA URNS AUTO: NORMAL /HPF
BILIRUBIN, UA POC OHS: NEGATIVE
BLOOD, UA POC OHS: ABNORMAL
CLARITY, UA POC OHS: CLEAR
COLOR, UA POC OHS: YELLOW
GLUCOSE, UA POC OHS: NEGATIVE
KETONES, UA POC OHS: NEGATIVE
LEUKOCYTES, UA POC OHS: ABNORMAL
MICROSCOPIC COMMENT: NORMAL
NITRITE, UA POC OHS: NEGATIVE
PH, UA POC OHS: 6
PROTEIN, UA POC OHS: NEGATIVE
RBC #/AREA URNS AUTO: 1 /HPF (ref 0–4)
SPECIFIC GRAVITY, UA POC OHS: 1.01
SQUAMOUS #/AREA URNS AUTO: <1 /HPF
UROBILINOGEN, UA POC OHS: 0.2
WBC #/AREA URNS AUTO: 5 /HPF (ref 0–5)

## 2025-05-28 PROCEDURE — 1159F MED LIST DOCD IN RCRD: CPT | Mod: CPTII,S$GLB,, | Performed by: UROLOGY

## 2025-05-28 PROCEDURE — 81003 URINALYSIS AUTO W/O SCOPE: CPT | Mod: QW,S$GLB,, | Performed by: UROLOGY

## 2025-05-28 PROCEDURE — 1101F PT FALLS ASSESS-DOCD LE1/YR: CPT | Mod: CPTII,S$GLB,, | Performed by: UROLOGY

## 2025-05-28 PROCEDURE — 99999 PR PBB SHADOW E&M-EST. PATIENT-LVL V: CPT | Mod: PBBFAC,,, | Performed by: UROLOGY

## 2025-05-28 PROCEDURE — 3074F SYST BP LT 130 MM HG: CPT | Mod: CPTII,S$GLB,, | Performed by: UROLOGY

## 2025-05-28 PROCEDURE — 1125F AMNT PAIN NOTED PAIN PRSNT: CPT | Mod: CPTII,S$GLB,, | Performed by: UROLOGY

## 2025-05-28 PROCEDURE — 3288F FALL RISK ASSESSMENT DOCD: CPT | Mod: CPTII,S$GLB,, | Performed by: UROLOGY

## 2025-05-28 PROCEDURE — 87086 URINE CULTURE/COLONY COUNT: CPT | Performed by: UROLOGY

## 2025-05-28 PROCEDURE — 3079F DIAST BP 80-89 MM HG: CPT | Mod: CPTII,S$GLB,, | Performed by: UROLOGY

## 2025-05-28 PROCEDURE — 81001 URINALYSIS AUTO W/SCOPE: CPT | Performed by: UROLOGY

## 2025-05-28 PROCEDURE — 99214 OFFICE O/P EST MOD 30 MIN: CPT | Mod: S$GLB,,, | Performed by: UROLOGY

## 2025-05-28 RX ORDER — SOLIFENACIN SUCCINATE 10 MG/1
10 TABLET, FILM COATED ORAL DAILY
Qty: 30 TABLET | Refills: 11 | Status: SHIPPED | OUTPATIENT
Start: 2025-05-28 | End: 2026-05-28

## 2025-05-28 RX ORDER — SODIUM CHLORIDE 9 MG/ML
INJECTION, SOLUTION INTRAVENOUS CONTINUOUS
OUTPATIENT
Start: 2025-05-28

## 2025-05-28 NOTE — H&P (VIEW-ONLY)
Chief Complaint   Patient presents with    Urinary Incontinence     Stress incontinence       History of Present Illness:   Jessica Perez is a 80 y.o. female here for evaluation of Urinary Incontinence (Stress incontinence)    5/28/25-Still taking toviaz. Having urgency when she stands up and often UUI. Wearing 4 pads per day. Incontinence crept back. UUI>KRISH. She is having KRISH again with cough/sneeze, laugh. Chronic low back pain. No lower back pain.   12/20/24-s/p bulkamid 2 weeks ago. Taking toviaz. Doing great. Wears 1 pad during the night and one during the day. Very pleased with results.   11/20/24-leaking more with laugh, sneeze, cough and when she first gets up. Still takes vesicare. Currently up to 5 pads per day. Also having worsening urge symptoms.   5/21/24-Vesicare is helping a lot. No dysuria. Feels like her stream is a lot better. Empties by positionally voiding. Wearing 2 pads per day. Sometimes has UUI when she first stands up. Still with loose stools. No abdominal pain. No gross hematuria.   2/21/24-Pt reports frequency/urgency and UUI. She positionally voids to empty. Nocturia x 1-2, despite limiting pm fluid. She is taking trospium every day. She tried an OTC pessary and it didn't help and it hurt. 5 pads per day. She reports that she never has constipation, but rather chronically loose stools.    8/9/23-on trospium about every other day. Sometimes she is having strong urges. Wearing a pad. Limiting pm fluid and not having nocturia. No difficulty emptying. No dysuria.   2/7/23-on trospium, and it seems to work okay. Only having stress incontinence and it is much improved. She thinks she may have cystitis and there is slight discomfort and she is having frequency and small volumes. Took a bubble bath 3 days ago. Not sexually active.   8/2/22- on trospium and significantly improved. Wearing 2 pads per day now. Here for cysto. CT urogram showed no stones, renal mass or hydronephrosis. Doing  pelvic floor PT  7/8/22- She reports that she has been wearing pads for 10 years. She states that when she stands up, she pours out urine. She reports that she has had loose stools regularly and was taking Immodium when she would go anywhere. It used to be just KRISH with sneeze, cough and laugh.   Wears about 5 pads per day. During the night if she wakes, she doesn't have UUI. No daytime frequency. +Daytime urgency and UUI.   No gross hematuria or dysuria.       Review of Systems   Constitutional:  Negative for chills and fever.   Respiratory:  Negative for shortness of breath.    Cardiovascular:  Negative for chest pain.   Gastrointestinal:  Negative for constipation.   Genitourinary:  Negative for difficulty urinating and vaginal pain.   All other systems reviewed and are negative.        Past Medical History:   Diagnosis Date    Acute pain of left knee 03/22/2021    Arthritis     Arthritis of knee 02/09/2021    At risk for sleep apnea 09/19/2019    Balance problem 10/21/2021    Bilateral primary osteoarthritis of knee 04/04/2018    Chest pain, moderate coronary artery risk 08/08/2019    Chronic midline low back pain without sciatica 11/30/2020    Chronic pain of both knees 03/23/2021    Costochondritis 01/20/2020    Depression, major, recurrent, mild     Diarrhea 05/11/2022    Difficulty walking 11/30/2020    Difficulty walking 11/30/2020    LANRE (generalized anxiety disorder) 03/22/2021    Gait, antalgic 10/21/2021    Generalized weakness 11/30/2020    Generalized weakness 11/30/2020    Glaucoma     HLD (hyperlipidemia)     Lower extremity weakness 10/21/2021    Muscle cramps 05/23/2018    Osteopenia of multiple sites 04/02/2018    Primary osteoarthritis of right knee 05/30/2018    Primary snoring     Rash 05/11/2022    Sleep related leg cramps 09/07/2010    SOB (shortness of breath) 08/08/2019    Unspecified gastritis and gastroduodenitis without mention of hemorrhage 11/28/2010     Dx updated per 2019 IMO Load     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 Benign.    CATARACT EXTRACTION W/  INTRAOCULAR LENS IMPLANT Left 02/04/2019    CATARACT EXTRACTION W/  INTRAOCULAR LENS IMPLANT Right 03/04/2019    CHOLECYSTECTOMY      COLONOSCOPY N/A 06/29/2022    Procedure: COLONOSCOPY;  Surgeon: Nichole Kelly MD;  Location: [facility] ENDO;  Service: Endoscopy;  Laterality: N/A;    CYSTOSCOPY WITH INJECTION OF PERIURETHRAL BULKING AGENT N/A 12/9/2024    Procedure: CYSTOSCOPY, WITH PERIURETHRAL BULKING AGENT INJECTION;  Surgeon: Jennifer Graves MD;  Location: [facility] OR;  Service: Urology;  Laterality: N/A;    DILATION AND CURETTAGE OF UTERUS      finger surgery      middle finger on right hand    GALLBLADDER SURGERY      INJECTION OF ANESTHETIC AGENT AROUND NERVE Bilateral 10/01/2021    Procedure: Bilateral Genicular nerve block -;  Surgeon: Primo Bond MD;  Location: [facility] PAIN MGT;  Service: Pain Management;  Laterality: Bilateral;    RADIOFREQUENCY THERMOCOAGULATION Left 11/05/2021    Procedure: Left Genicular Nerve RFA (COOLIEF) with RN IV sedation;  Surgeon: Primo Bond MD;  Location: [facility] PAIN MGT;  Service: Pain Management;  Laterality: Left;    TONSILLECTOMY, ADENOIDECTOMY      TOTAL KNEE ARTHROPLASTY Left 09/2024    TUBAL LIGATION         Family History   Problem Relation Name Age of Onset    Dementia Mother      Osteoporosis Mother      Diabetes Father      Cancer Father          prostate ca    Hypertension Father      Heart attack Father      Cancer Sister          cervical ca    Hepatitis Sister      No Known Problems Brother      Breast cancer Paternal Aunt         Social History     Tobacco Use    Smoking status: Former     Current packs/day: 0.50     Average packs/day: 0.5 packs/day for 12.0 years (6.0 ttl pk-yrs)     Types: Cigarettes     Passive exposure: Never     Smokeless tobacco: Never   Substance Use Topics    Alcohol use: Yes     Alcohol/week: 1.0 standard drink of alcohol     Types: 1 Glasses of wine per week     Comment: occasionally    Drug use: No       Current Outpatient Medications   Medication Sig Dispense Refill    acetaminophen/diphenhydramine (TYLENOL PM ORAL) Take by mouth as needed.      artificial tears,hypromellose,,GENTEAL/SUSTANE, 0.3 % Gel       b complex vitamins capsule Take 1 capsule by mouth once daily.      budesonide-formoterol 160-4.5 mcg (SYMBICORT) 160-4.5 mcg/actuation HFAA Inhale 2 puffs into the lungs every 12 (twelve) hours. Controller 1 g 3    calcium carbonate (CALCIUM 500 ORAL) Take by mouth.      cyanocobalamin, vitamin B-12, (VITAMIN B-12 ORAL) Take by mouth.      ergocalciferol, vitamin D2, (VITAMIN D ORAL) Take by mouth.      evolocumab (REPATHA SURECLICK) 140 mg/mL PnIj Inject 1 pen (140 mg total) into the skin every 14 (fourteen) days. 2 mL 6    ezetimibe (ZETIA) 10 mg tablet Take 1 tablet (10 mg total) by mouth once daily. 90 tablet 3    flaxseed oiL 1,000 mg Cap flaxseed oil Take No date recorded No form recorded No frequency recorded No route recorded No set duration recorded No set duration amount recorded suspended No dosage strength recorded No dosage strength units of measure recorded      fluticasone (FLONASE) 50 mcg/actuation nasal spray 2 sprays by Each Nare route once daily. (Patient taking differently: 2 sprays by Each Nostril route daily as needed.) 1 Bottle 12    krill oil-omega-3-dha-epa 150-450 mg CpDR Take by mouth.      loperamide (IMODIUM) 2 mg capsule Take 1 capsule (2 mg total) by mouth 2 (two) times a day. 180 capsule 3    MAGNESIUM CITRATE ORAL Take by mouth.      multivitamin capsule Take 1 capsule by mouth Daily.      solifenacin (VESICARE) 10 MG tablet Take 1 tablet (10 mg total) by mouth once daily. 30 tablet 11    UNABLE TO FIND CBD Oil topical      ZINC ORAL Take by mouth.       No current  facility-administered medications for this visit.       Review of patient's allergies indicates:   Allergen Reactions    Augmentin [amoxicillin-pot clavulanate] Other (See Comments)     Diarrhea, yeast    Bactrim  [sulfamethoxazole-trimethoprim]      Other reaction(s): Unknown    Celebrex [celecoxib] Other (See Comments)     Stomach pain, gas     Lipitor  [atorvastatin]      Other reaction(s): Unknown    Pravachol  [pravastatin]      Other reaction(s): Unknown    Statins-hmg-coa reductase inhibitors      Other reaction(s): Muscle pain    Sulfa (sulfonamide antibiotics)      Other reaction(s): Swelling    Zoster vaccine live        Physical Exam  Vitals:    05/28/25 1027   BP: 123/83   Pulse: 86   Resp: 16         General: Well-developed, well-nourished, in no acute distress  HEENT: Normocephalic, atraumatic, extraocular movements intact  Neck: Supple, no supraclavicular or cervical lymphadenopathy, trachea midline  Respirations: even and unlabored  : 7/8/22-Normal external female genitalia without lesions. Orthotopic urethral meatus with caruncle. atrophic vaginal mucosa. No significant prolapse,  negative cough stress test, no urethral hypermobility  Extremities: moves all equally, no clubbing, cyanosis or edema  Skin: Warm and dry. No lesions  Psych: normal affect  Neuro: Alert and oriented x 3. Cranial nerves II-XII intact    Bladder scan (not PVR): 139cc  UA: trace blood, small     Assessment:  1. Microhematuria  Urine Culture High Risk    Urinalysis Microscopic    POCT Urinalysis(Instrument)      2. Stress incontinence  Case Request Operating Room: CYSTOSCOPY, WITH PERIURETHRAL BULKING AGENT INJECTION    Vital signs    Insert peripheral IV    Diet NPO    Pulse Oximetry Q4H    Full code    Place in Outpatient    Place sequential compression device      3. KRISH (stress urinary incontinence, female)  Ambulatory referral/consult to Pre-Admit      4. Pre-op testing  Ambulatory referral/consult to Pre-Admit      5.  "Pre-op exam  CBC Auto Differential    Comprehensive Metabolic Panel    SCHEDULED EKG 12-LEAD (to Muse)      6. OAB (overactive bladder)                Plan:   Microhematuria  -     Urine Culture High Risk  -     Urinalysis Microscopic  -     POCT Urinalysis(Instrument)    Stress incontinence  -     Case Request Operating Room: CYSTOSCOPY, WITH PERIURETHRAL BULKING AGENT INJECTION  -     Vital signs; Standing  -     Insert peripheral IV; Standing  -     Diet NPO; Standing  -     Pulse Oximetry Q4H; Standing  -     Full code; Standing  -     Place in Outpatient; Standing  -     Place sequential compression device; Standing    KRISH (stress urinary incontinence, female)  -     Ambulatory referral/consult to Pre-Admit; Future; Expected date: 06/16/2025    Pre-op testing  -     Ambulatory referral/consult to Pre-Admit; Future; Expected date: 06/16/2025    Pre-op exam  -     CBC Auto Differential; Future; Expected date: 05/28/2025  -     Comprehensive Metabolic Panel; Future; Expected date: 05/28/2025  -     SCHEDULED EKG 12-LEAD (to Muse); Future    OAB (overactive bladder)    Other orders  -     0.9% NaCl infusion  -     IP VTE LOW RISK PATIENT; Standing  -     solifenacin (VESICARE) 10 MG tablet; Take 1 tablet (10 mg total) by mouth once daily.  Dispense: 30 tablet; Refill: 11      Discussed switching OAB meds for treatment of UUI. Discussed that we could also "touch up" bulkamid if she would like. Risks/benefits discussed and she agreed. Will schedule 6/16/25.                 "

## 2025-05-28 NOTE — PROGRESS NOTES
Chief Complaint   Patient presents with    Urinary Incontinence     Stress incontinence       History of Present Illness:   Jessica Perez is a 80 y.o. female here for evaluation of Urinary Incontinence (Stress incontinence)    5/28/25-Still taking toviaz. Having urgency when she stands up and often UUI. Wearing 4 pads per day. Incontinence crept back. UUI>KRISH. She is having KRISH again with cough/sneeze, laugh. Chronic low back pain. No lower back pain.   12/20/24-s/p bulkamid 2 weeks ago. Taking toviaz. Doing great. Wears 1 pad during the night and one during the day. Very pleased with results.   11/20/24-leaking more with laugh, sneeze, cough and when she first gets up. Still takes vesicare. Currently up to 5 pads per day. Also having worsening urge symptoms.   5/21/24-Vesicare is helping a lot. No dysuria. Feels like her stream is a lot better. Empties by positionally voiding. Wearing 2 pads per day. Sometimes has UUI when she first stands up. Still with loose stools. No abdominal pain. No gross hematuria.   2/21/24-Pt reports frequency/urgency and UUI. She positionally voids to empty. Nocturia x 1-2, despite limiting pm fluid. She is taking trospium every day. She tried an OTC pessary and it didn't help and it hurt. 5 pads per day. She reports that she never has constipation, but rather chronically loose stools.    8/9/23-on trospium about every other day. Sometimes she is having strong urges. Wearing a pad. Limiting pm fluid and not having nocturia. No difficulty emptying. No dysuria.   2/7/23-on trospium, and it seems to work okay. Only having stress incontinence and it is much improved. She thinks she may have cystitis and there is slight discomfort and she is having frequency and small volumes. Took a bubble bath 3 days ago. Not sexually active.   8/2/22- on trospium and significantly improved. Wearing 2 pads per day now. Here for cysto. CT urogram showed no stones, renal mass or hydronephrosis. Doing  pelvic floor PT  7/8/22- She reports that she has been wearing pads for 10 years. She states that when she stands up, she pours out urine. She reports that she has had loose stools regularly and was taking Immodium when she would go anywhere. It used to be just KRISH with sneeze, cough and laugh.   Wears about 5 pads per day. During the night if she wakes, she doesn't have UUI. No daytime frequency. +Daytime urgency and UUI.   No gross hematuria or dysuria.       Review of Systems   Constitutional:  Negative for chills and fever.   Respiratory:  Negative for shortness of breath.    Cardiovascular:  Negative for chest pain.   Gastrointestinal:  Negative for constipation.   Genitourinary:  Negative for difficulty urinating and vaginal pain.   All other systems reviewed and are negative.        Past Medical History:   Diagnosis Date    Acute pain of left knee 03/22/2021    Arthritis     Arthritis of knee 02/09/2021    At risk for sleep apnea 09/19/2019    Balance problem 10/21/2021    Bilateral primary osteoarthritis of knee 04/04/2018    Chest pain, moderate coronary artery risk 08/08/2019    Chronic midline low back pain without sciatica 11/30/2020    Chronic pain of both knees 03/23/2021    Costochondritis 01/20/2020    Depression, major, recurrent, mild     Diarrhea 05/11/2022    Difficulty walking 11/30/2020    Difficulty walking 11/30/2020    LANRE (generalized anxiety disorder) 03/22/2021    Gait, antalgic 10/21/2021    Generalized weakness 11/30/2020    Generalized weakness 11/30/2020    Glaucoma     HLD (hyperlipidemia)     Lower extremity weakness 10/21/2021    Muscle cramps 05/23/2018    Osteopenia of multiple sites 04/02/2018    Primary osteoarthritis of right knee 05/30/2018    Primary snoring     Rash 05/11/2022    Sleep related leg cramps 09/07/2010    SOB (shortness of breath) 08/08/2019    Unspecified gastritis and gastroduodenitis without mention of hemorrhage 11/28/2010     Dx updated per 2019 IMO Load     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 Benign.    CATARACT EXTRACTION W/  INTRAOCULAR LENS IMPLANT Left 02/04/2019    CATARACT EXTRACTION W/  INTRAOCULAR LENS IMPLANT Right 03/04/2019    CHOLECYSTECTOMY      COLONOSCOPY N/A 06/29/2022    Procedure: COLONOSCOPY;  Surgeon: Nichole Kelly MD;  Location: [facility] ENDO;  Service: Endoscopy;  Laterality: N/A;    CYSTOSCOPY WITH INJECTION OF PERIURETHRAL BULKING AGENT N/A 12/9/2024    Procedure: CYSTOSCOPY, WITH PERIURETHRAL BULKING AGENT INJECTION;  Surgeon: Jennifer Graves MD;  Location: [facility] OR;  Service: Urology;  Laterality: N/A;    DILATION AND CURETTAGE OF UTERUS      finger surgery      middle finger on right hand    GALLBLADDER SURGERY      INJECTION OF ANESTHETIC AGENT AROUND NERVE Bilateral 10/01/2021    Procedure: Bilateral Genicular nerve block -;  Surgeon: Primo Bond MD;  Location: [facility] PAIN MGT;  Service: Pain Management;  Laterality: Bilateral;    RADIOFREQUENCY THERMOCOAGULATION Left 11/05/2021    Procedure: Left Genicular Nerve RFA (COOLIEF) with RN IV sedation;  Surgeon: Primo Bond MD;  Location: [facility] PAIN MGT;  Service: Pain Management;  Laterality: Left;    TONSILLECTOMY, ADENOIDECTOMY      TOTAL KNEE ARTHROPLASTY Left 09/2024    TUBAL LIGATION         Family History   Problem Relation Name Age of Onset    Dementia Mother      Osteoporosis Mother      Diabetes Father      Cancer Father          prostate ca    Hypertension Father      Heart attack Father      Cancer Sister          cervical ca    Hepatitis Sister      No Known Problems Brother      Breast cancer Paternal Aunt         Social History     Tobacco Use    Smoking status: Former     Current packs/day: 0.50     Average packs/day: 0.5 packs/day for 12.0 years (6.0 ttl pk-yrs)     Types: Cigarettes     Passive exposure: Never     Smokeless tobacco: Never   Substance Use Topics    Alcohol use: Yes     Alcohol/week: 1.0 standard drink of alcohol     Types: 1 Glasses of wine per week     Comment: occasionally    Drug use: No       Current Outpatient Medications   Medication Sig Dispense Refill    acetaminophen/diphenhydramine (TYLENOL PM ORAL) Take by mouth as needed.      artificial tears,hypromellose,,GENTEAL/SUSTANE, 0.3 % Gel       b complex vitamins capsule Take 1 capsule by mouth once daily.      budesonide-formoterol 160-4.5 mcg (SYMBICORT) 160-4.5 mcg/actuation HFAA Inhale 2 puffs into the lungs every 12 (twelve) hours. Controller 1 g 3    calcium carbonate (CALCIUM 500 ORAL) Take by mouth.      cyanocobalamin, vitamin B-12, (VITAMIN B-12 ORAL) Take by mouth.      ergocalciferol, vitamin D2, (VITAMIN D ORAL) Take by mouth.      evolocumab (REPATHA SURECLICK) 140 mg/mL PnIj Inject 1 pen (140 mg total) into the skin every 14 (fourteen) days. 2 mL 6    ezetimibe (ZETIA) 10 mg tablet Take 1 tablet (10 mg total) by mouth once daily. 90 tablet 3    flaxseed oiL 1,000 mg Cap flaxseed oil Take No date recorded No form recorded No frequency recorded No route recorded No set duration recorded No set duration amount recorded suspended No dosage strength recorded No dosage strength units of measure recorded      fluticasone (FLONASE) 50 mcg/actuation nasal spray 2 sprays by Each Nare route once daily. (Patient taking differently: 2 sprays by Each Nostril route daily as needed.) 1 Bottle 12    krill oil-omega-3-dha-epa 150-450 mg CpDR Take by mouth.      loperamide (IMODIUM) 2 mg capsule Take 1 capsule (2 mg total) by mouth 2 (two) times a day. 180 capsule 3    MAGNESIUM CITRATE ORAL Take by mouth.      multivitamin capsule Take 1 capsule by mouth Daily.      solifenacin (VESICARE) 10 MG tablet Take 1 tablet (10 mg total) by mouth once daily. 30 tablet 11    UNABLE TO FIND CBD Oil topical      ZINC ORAL Take by mouth.       No current  facility-administered medications for this visit.       Review of patient's allergies indicates:   Allergen Reactions    Augmentin [amoxicillin-pot clavulanate] Other (See Comments)     Diarrhea, yeast    Bactrim  [sulfamethoxazole-trimethoprim]      Other reaction(s): Unknown    Celebrex [celecoxib] Other (See Comments)     Stomach pain, gas     Lipitor  [atorvastatin]      Other reaction(s): Unknown    Pravachol  [pravastatin]      Other reaction(s): Unknown    Statins-hmg-coa reductase inhibitors      Other reaction(s): Muscle pain    Sulfa (sulfonamide antibiotics)      Other reaction(s): Swelling    Zoster vaccine live        Physical Exam  Vitals:    05/28/25 1027   BP: 123/83   Pulse: 86   Resp: 16         General: Well-developed, well-nourished, in no acute distress  HEENT: Normocephalic, atraumatic, extraocular movements intact  Neck: Supple, no supraclavicular or cervical lymphadenopathy, trachea midline  Respirations: even and unlabored  : 7/8/22-Normal external female genitalia without lesions. Orthotopic urethral meatus with caruncle. atrophic vaginal mucosa. No significant prolapse,  negative cough stress test, no urethral hypermobility  Extremities: moves all equally, no clubbing, cyanosis or edema  Skin: Warm and dry. No lesions  Psych: normal affect  Neuro: Alert and oriented x 3. Cranial nerves II-XII intact    Bladder scan (not PVR): 139cc  UA: trace blood, small     Assessment:  1. Microhematuria  Urine Culture High Risk    Urinalysis Microscopic    POCT Urinalysis(Instrument)      2. Stress incontinence  Case Request Operating Room: CYSTOSCOPY, WITH PERIURETHRAL BULKING AGENT INJECTION    Vital signs    Insert peripheral IV    Diet NPO    Pulse Oximetry Q4H    Full code    Place in Outpatient    Place sequential compression device      3. KRISH (stress urinary incontinence, female)  Ambulatory referral/consult to Pre-Admit      4. Pre-op testing  Ambulatory referral/consult to Pre-Admit      5.  "Pre-op exam  CBC Auto Differential    Comprehensive Metabolic Panel    SCHEDULED EKG 12-LEAD (to Muse)      6. OAB (overactive bladder)                Plan:   Microhematuria  -     Urine Culture High Risk  -     Urinalysis Microscopic  -     POCT Urinalysis(Instrument)    Stress incontinence  -     Case Request Operating Room: CYSTOSCOPY, WITH PERIURETHRAL BULKING AGENT INJECTION  -     Vital signs; Standing  -     Insert peripheral IV; Standing  -     Diet NPO; Standing  -     Pulse Oximetry Q4H; Standing  -     Full code; Standing  -     Place in Outpatient; Standing  -     Place sequential compression device; Standing    KRISH (stress urinary incontinence, female)  -     Ambulatory referral/consult to Pre-Admit; Future; Expected date: 06/16/2025    Pre-op testing  -     Ambulatory referral/consult to Pre-Admit; Future; Expected date: 06/16/2025    Pre-op exam  -     CBC Auto Differential; Future; Expected date: 05/28/2025  -     Comprehensive Metabolic Panel; Future; Expected date: 05/28/2025  -     SCHEDULED EKG 12-LEAD (to Muse); Future    OAB (overactive bladder)    Other orders  -     0.9% NaCl infusion  -     IP VTE LOW RISK PATIENT; Standing  -     solifenacin (VESICARE) 10 MG tablet; Take 1 tablet (10 mg total) by mouth once daily.  Dispense: 30 tablet; Refill: 11      Discussed switching OAB meds for treatment of UUI. Discussed that we could also "touch up" bulkamid if she would like. Risks/benefits discussed and she agreed. Will schedule 6/16/25.                 "

## 2025-05-30 LAB — BACTERIA UR CULT: NORMAL

## 2025-05-31 ENCOUNTER — RESULTS FOLLOW-UP (OUTPATIENT)
Dept: UROLOGY | Facility: HOSPITAL | Age: 80
End: 2025-05-31

## 2025-06-05 ENCOUNTER — TELEPHONE (OUTPATIENT)
Dept: UROLOGY | Facility: CLINIC | Age: 80
End: 2025-06-05
Payer: MEDICARE

## 2025-06-06 ENCOUNTER — TELEPHONE (OUTPATIENT)
Dept: CARDIOLOGY | Facility: CLINIC | Age: 80
End: 2025-06-06

## 2025-06-06 ENCOUNTER — HOSPITAL ENCOUNTER (OUTPATIENT)
Dept: CARDIOLOGY | Facility: HOSPITAL | Age: 80
Discharge: HOME | End: 2025-06-06
Attending: UROLOGY
Payer: MEDICARE

## 2025-06-06 ENCOUNTER — HOSPITAL ENCOUNTER (OUTPATIENT)
Dept: CARDIOLOGY | Facility: HOSPITAL | Age: 80
Discharge: HOME | End: 2025-06-06
Payer: MEDICARE

## 2025-06-06 ENCOUNTER — RESULTS FOLLOW-UP (OUTPATIENT)
Dept: CARDIOLOGY | Facility: CLINIC | Age: 80
End: 2025-06-06

## 2025-06-06 VITALS
SYSTOLIC BLOOD PRESSURE: 110 MMHG | WEIGHT: 175 LBS | HEIGHT: 62 IN | BODY MASS INDEX: 32.2 KG/M2 | DIASTOLIC BLOOD PRESSURE: 56 MMHG

## 2025-06-06 DIAGNOSIS — T46.6X5A STATIN MYOPATHY: ICD-10-CM

## 2025-06-06 DIAGNOSIS — Z01.818 PRE-OP EXAM: ICD-10-CM

## 2025-06-06 DIAGNOSIS — I35.0 AORTIC VALVE STENOSIS, ETIOLOGY OF CARDIAC VALVE DISEASE UNSPECIFIED: ICD-10-CM

## 2025-06-06 DIAGNOSIS — G72.0 STATIN MYOPATHY: ICD-10-CM

## 2025-06-06 DIAGNOSIS — E78.2 MIXED HYPERLIPIDEMIA: ICD-10-CM

## 2025-06-06 LAB
AORTIC ROOT ANNULUS: 2.9 CM
AORTIC SIZE INDEX (SOV): 1.4 CM/M2
AV INDEX (PROSTH): 0.78
AV MEAN GRADIENT: 5 MMHG
AV PEAK GRADIENT: 8 MMHG
AV VALVE AREA BY VELOCITY RATIO: 2.2 CM²
AV VALVE AREA: 2.2 CM²
AV VELOCITY RATIO: 0.79
BSA FOR ECHO PROCEDURE: 1.86 M2
CV ECHO LV RWT: 0.53 CM
DOP CALC AO PEAK VEL: 1.4 M/S
DOP CALC AO VTI: 32.3 CM
DOP CALC LVOT AREA: 2.8 CM2
DOP CALC LVOT DIAMETER: 1.9 CM
DOP CALC LVOT PEAK VEL: 1.1 M/S
DOP CALC LVOT STROKE VOLUME: 71.7 CM3
DOP CALC RVOT PEAK VEL: 0.75 M/S
DOP CALC RVOT VTI: 17.2 CM
DOP CALCLVOT PEAK VEL VTI: 25.3 CM
E WAVE DECELERATION TIME: 241 MSEC
E/A RATIO: 0.63
E/E' RATIO: 7 M/S
ECHO LV POSTERIOR WALL: 1 CM (ref 0.6–1.1)
FRACTIONAL SHORTENING: 28.9 % (ref 28–44)
INTERVENTRICULAR SEPTUM: 0.9 CM (ref 0.6–1.1)
IVRT: 83 MSEC
LA MAJOR: 5 CM
LA MINOR: 4.8 CM
LA WIDTH: 3.5 CM
LEFT ATRIUM AREA SYSTOLIC (APICAL 2 CHAMBER): 15.47 CM2
LEFT ATRIUM AREA SYSTOLIC (APICAL 4 CHAMBER): 15.59 CM2
LEFT ATRIUM SIZE: 3.5 CM
LEFT ATRIUM VOLUME INDEX MOD: 22 ML/M2
LEFT ATRIUM VOLUME INDEX: 28 ML/M2
LEFT ATRIUM VOLUME MOD: 39 ML
LEFT ATRIUM VOLUME: 51 CM3
LEFT INTERNAL DIMENSION IN SYSTOLE: 2.7 CM (ref 2.1–4)
LEFT VENTRICLE DIASTOLIC VOLUME INDEX: 35.36 ML/M2
LEFT VENTRICLE DIASTOLIC VOLUME: 64 ML
LEFT VENTRICLE END SYSTOLIC VOLUME APICAL 2 CHAMBER: 38.23 ML
LEFT VENTRICLE END SYSTOLIC VOLUME APICAL 4 CHAMBER: 40.06 ML
LEFT VENTRICLE MASS INDEX: 60.2 G/M2
LEFT VENTRICLE SYSTOLIC VOLUME INDEX: 14.9 ML/M2
LEFT VENTRICLE SYSTOLIC VOLUME: 27 ML
LEFT VENTRICULAR INTERNAL DIMENSION IN DIASTOLE: 3.8 CM (ref 3.5–6)
LEFT VENTRICULAR MASS: 109 G
LV LATERAL E/E' RATIO: 7.4 M/S
LV SEPTAL E/E' RATIO: 7.4 M/S
LVED V (TEICH): 63.67 ML
LVES V (TEICH): 26.65 ML
LVOT MG: 2.97 MMHG
LVOT MV: 0.81 CM/S
MV PEAK A VEL: 0.94 M/S
MV PEAK E VEL: 0.59 M/S
OHS CV RV/LV RATIO: 0.39 CM
OHS QRS DURATION: 78 MS
OHS QTC CALCULATION: 398 MS
PISA TR MAX VEL: 2.8 M/S
PV MEAN GRADIENT: 1 MMHG
PV MV: 0.63 M/S
PV PEAK GRADIENT: 3 MMHG
PV PEAK VELOCITY: 0.86 M/S
RA MAJOR: 4.7 CM
RA PRESSURE ESTIMATED: 3 MMHG
RA WIDTH: 2.31 CM
RIGHT VENTRICLE DIASTOLIC BASEL DIMENSION: 1.5 CM
RIGHT VENTRICULAR END-DIASTOLIC DIMENSION: 1.53 CM
RV TB RVSP: 6 MMHG
SINUS: 2.53 CM
STJ: 2.4 CM
TDI LATERAL: 0.08 M/S
TDI SEPTAL: 0.08 M/S
TDI: 0.08 M/S
TR MAX PG: 32 MMHG
TRICUSPID ANNULAR PLANE SYSTOLIC EXCURSION: 1.8 CM
TV REST PULMONARY ARTERY PRESSURE: 34 MMHG
Z-SCORE OF LEFT VENTRICULAR DIMENSION IN END DIASTOLE: -2.76
Z-SCORE OF LEFT VENTRICULAR DIMENSION IN END SYSTOLE: -1.08

## 2025-06-06 PROCEDURE — 93306 TTE W/DOPPLER COMPLETE: CPT | Mod: 26,,, | Performed by: INTERNAL MEDICINE

## 2025-06-06 PROCEDURE — 93005 ELECTROCARDIOGRAM TRACING: CPT | Mod: PO

## 2025-06-06 PROCEDURE — 93010 ELECTROCARDIOGRAM REPORT: CPT | Mod: ,,, | Performed by: INTERNAL MEDICINE

## 2025-06-06 PROCEDURE — 93306 TTE W/DOPPLER COMPLETE: CPT | Mod: PO

## 2025-06-09 ENCOUNTER — TELEPHONE (OUTPATIENT)
Dept: PREADMISSION TESTING | Facility: HOSPITAL | Age: 80
End: 2025-06-09
Payer: MEDICARE

## 2025-06-09 RX ORDER — MELOXICAM 7.5 MG/1
7.5 TABLET ORAL DAILY
COMMUNITY

## 2025-06-09 NOTE — TELEPHONE ENCOUNTER
----- Message from Lila Beyer NP sent at 6/9/2025  9:47 AM CDT -----  Regarding: RE: surgery 6/16  Staff please place clearance letter in [facility] 6/25' nml EF Grade I DDEKG 6/6/25 NSR no acute dynamic changes notedPt is moderate risk for manish and post op cardiac complications.  ----- Message -----  From: Urvashi Patterson, RN  Sent: 6/9/2025   9:12 AM CDT  To: Urvashi Mccall RN; Alysha Magana RN; Cour#  Subject: surgery 6/16                                     Hello, This pt will be having a CYSTOSCOPY WITH PERIURETHRAL BULKING AGENT surgery on 6/16/25 with Dr DE LA CRUZ. We are requesting a CARDIAC clearance note. Please advise on whether this patient can be cleared to proceed from your standpoint or will need any further testing to ensure they are optimized to proceed. -Thanks in advance,[facility] Pre Admit Dept.Mon-Fri 7:30 am- 4 pm (Closed Major Holidays)

## 2025-06-09 NOTE — TELEPHONE ENCOUNTER
Reached out to Pre Admit Surgery PROVIDER, CAMRYN PATE, regarding pt upcoming procedure on 6/16/25. Nurse Practitioner reviewed pt chart/medical history. NP recommendations: Pt does not need to be seen in PAT clinic prior to surgery.

## 2025-06-09 NOTE — TELEPHONE ENCOUNTER
Pre op instructions reviewed with Pt per phone.      To confirm, your doctor has instructed you: Surgery is scheduled for 6/16/25.   Pre admit office will call the afternoon prior to surgery between 1PM and 3PM with arrival time.    Surgery will be at [facility],  The address is [address] [place]. [place] [address].      IMPORTANT INSTRUCTIONS!    Do not eat or drink after 12 midnight, including water. Do not smoke or use chewing tobacco after 12 midnight!  OK to brush teeth, but no gum, candy, or mints!      *Take only these medicines with a small swallow of water-morning of surgery*     VESICARE         ____ Stop taking all vitamins, herbal supplements, Aspirin, & NSAIDS (Ibuprofen, Advil, Aleve) 7 days prior to surgery, as these can thin your blood.    ____ Weight loss medication, such as Adipex and Phentermine, must be stopped 14 days prior to surgery, no exceptions!    *Diabetic Patients: If you take diabetic or weight loss medication, do NOT take morning of surgery unless instructed by Doctor. Metformin to be stopped 24 hrs prior to surgery. DO NOT take short-acting insulin the day of surgery. Only take HALF of your regular dose of long-acting insulin the night before surgery, unless instructed otherwise. Blood sugars will be checked in pre-op.   ~Ozempic/Mounjaro/Wegovy/Trulicity/Semaglutide injections must be stopped 7 days prior to surgery.     Please notify MD office if you develop an active infection, are prescribed antibiotics by someone other than the surgeon doing your surgery, or visit urgent care/ER.    Bathing Instructions:   The night before surgery and the morning prior to coming to the hospital:    - Shower & rinse your body as usual with anti-bacterial Soap (Dial or Lever 2000)   -Hibiclens (if indicated) use AFTER anti-bacterial soap; 1 packet PM/1 packet in AM on surgical site only   -Do not use hibiclens on your head, face, or genitals.    -Do not wash with anti-bacterial  soap after you use the hibiclens.    -Do not shave surgical site 5-7 days prior to surgery.    -Pubic hair 7 days prior to surgery (OBGYN/Urology only).       Additional Instructions:   __ No makeup, powder, lotions, creams, or body spray to skin   __ No deodorant if you are having: breast procedure, PORT insertion, or shoulder surgery!   __ No nail polish or artificial nails due to risk of infection.             **SURGERY MAY BE CANCELLED AT SURGEON'S DISCRETION IF ARTIFICIAL/NAIL POLISH IS PRESENT!!!**  __ Please remove all piercings and leave all jewelry at home.    **SURGERY WILL BE CANCELLED IF PIERCINGS ARE PRESENT!!!**    __ Dentures, Hearing Aids and Contact Lens need to be removed prior to the start of surgery.    __ Avoid Alcoholic beverages 3 days prior to surgery, as it can thin the blood, unless told otherwise by pre-admit department.  __ Females: may need to give a urine sample the morning of surgery;   **Please ask  for a specimen cup if you need to use the restroom prior to being called into pre-op.**  __ Males: Stop ED medications (Viagra, Cialis) 24 hrs prior to surgery.  __ You must have transportation, and they MUST stay the entire time.   __  Bring photo ID and insurance information to hospital    What to Wear:  Clean, loose-fitting clothing. Please allow for dressings/bandages/surgical equipment/drains.   ~Breast Patients: We recommend a button up shirt so you do not have to lift your arms.   Amazon Link recommended by breast surgeons if you will have drains in place: https://a.co/d/aBHF4vY  ~Shoulder Patients: We recommend a button up shirt. If unavailable, an oversized t-shirt (2 sizes bigger than your normal size) or a stretchy dress will also work.   [employer] Mobile Link for hospital type button sleeve t-shirt: https://a.co/d/86BAbRk  ~Knee Patients: We recommend oversized pants/shorts or a dress to accommodate any knee braces in place. Braces will not be removed to get dressed.    [facility]  Visitor/Ride Policy:    Only 1 adult allowed (18 or older) to accompany you and MUST STAY through the entire admission length.      -Must have a ride home from a responsible adult that you know and trust.     -Medical Transport, Uber or Lyft can only be used if patient has a responsible adult to   accompany them during ride home.      ~Your ride MUST STAY the entire time until you are discharged~   Please notify the pre-admit department prior to surgery if you use medication transportation so we can verify your arrival/pickup time!   -The patient is responsible for setting up their own transportation!    Discharge Prescriptions:  Your discharge prescriptions will be sent next door to [facility] pharmacy, unless otherwise discussed with your surgeon. Your  will be responsible for calling the pharmacy ([phone]) to begin the prescription filling process. They will receive a text message with instructions once you are in recovery. Please make sure we have your insurance information and you bring a payment method (cash or card) if needed for prescriptions. If you have  insurance, please let the pre-op nurse know, as the pharmacy does not take this insurance.     *If you are running late or have questions the morning of surgery, please call the Pre-OP Department @ [phone].       *Please Call [facility] Pre-Admit Department for surgery instruction questions: (M-F 8AM-4:30PM)  [phone]     Financial Questions:  Ting: [phone]  Hours ~ 5AM-1:30PM  Monday-Friday    Billing Question Numbers:   [phone]

## 2025-06-12 ENCOUNTER — ANESTHESIA EVENT (OUTPATIENT)
Dept: SURGERY | Facility: HOSPITAL | Age: 80
End: 2025-06-12
Payer: MEDICARE

## 2025-06-12 NOTE — ANESTHESIA PREPROCEDURE EVALUATION
06/12/2025  Jessica Perez is a 80 y.o., female.  Past Medical History:   Diagnosis Date    Acute pain of left knee 03/22/2021    Arthritis     Arthritis of knee 02/09/2021    At risk for sleep apnea 09/19/2019    Balance problem 10/21/2021    Bilateral primary osteoarthritis of knee 04/04/2018    Chest pain, moderate coronary artery risk 08/08/2019    Chronic midline low back pain without sciatica 11/30/2020    Chronic pain of both knees 03/23/2021    Costochondritis 01/20/2020    Depression, major, recurrent, mild     Diarrhea 05/11/2022    Difficulty walking 11/30/2020    Difficulty walking 11/30/2020    LANRE (generalized anxiety disorder) 03/22/2021    Gait, antalgic 10/21/2021    Generalized weakness 11/30/2020    Generalized weakness 11/30/2020    Glaucoma     HLD (hyperlipidemia)     Lower extremity weakness 10/21/2021    Muscle cramps 05/23/2018    Osteopenia of multiple sites 04/02/2018    Primary osteoarthritis of right knee 05/30/2018    Primary snoring     Rash 05/11/2022    Sleep related leg cramps 09/07/2010    SOB (shortness of breath) 08/08/2019    Unspecified gastritis and gastroduodenitis without mention of hemorrhage 11/28/2010     Dx updated per 2019 IMO Load    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 Benign.    CATARACT EXTRACTION W/  INTRAOCULAR LENS IMPLANT Left 02/04/2019    CATARACT EXTRACTION W/  INTRAOCULAR LENS IMPLANT Right 03/04/2019    CHOLECYSTECTOMY      COLONOSCOPY N/A 06/29/2022    Procedure: COLONOSCOPY;  Surgeon: Nichole Kelly MD;  [facility];  Service: Endoscopy;  Laterality: N/A;    CYSTOSCOPY WITH INJECTION OF PERIURETHRAL BULKING AGENT N/A 12/9/2024    Procedure: CYSTOSCOPY, WITH PERIURETHRAL BULKING  "AGENT INJECTION;  Surgeon: Jennifer Graves MD;  Location: [facility] OR;  Service: Urology;  Laterality: N/A;    DILATION AND CURETTAGE OF UTERUS      finger surgery      middle finger on right hand    GALLBLADDER SURGERY      INJECTION OF ANESTHETIC AGENT AROUND NERVE Bilateral 10/01/2021    Procedure: Bilateral Genicular nerve block -;  Surgeon: Primo Bond MD;  Location: [facility] PAIN MGT;  Service: Pain Management;  Laterality: Bilateral;    RADIOFREQUENCY THERMOCOAGULATION Left 11/05/2021    Procedure: Left Genicular Nerve RFA (COOLIEF) with RN IV sedation;  Surgeon: Primo Bond MD;  Location: [facility] PAIN MGT;  Service: Pain Management;  Laterality: Left;    TONSILLECTOMY, ADENOIDECTOMY      TOTAL KNEE ARTHROPLASTY Left 09/2024    TUBAL LIGATION       8/2019 Echo   CONCLUSIONS     1 - Concentric remodeling.     2 - No wall motion abnormalities.     3 - Normal left ventricular systolic function (EF 60-65%).     4 - Impaired LV relaxation, normal LAP (grade 1 diastolic dysfunction).     5 - Normal right ventricular systolic function .     6 - The estimated PA systolic pressure is greater than 28 mmHg.        Pre-op Assessment    I have reviewed the Patient Summary Reports.     I have reviewed the Nursing Notes. I have reviewed the NPO Status.   I have reviewed the Medications.     Review of Systems  Anesthesia Hx:  No problems with previous Anesthesia             Denies Family Hx of Anesthesia complications.    Denies Personal Hx of Anesthesia complications.                    Social:  Former Smoker, Alcohol Use       Hematology/Oncology:  Hematology Normal   Oncology Normal                                   EENT/Dental:  EENT/Dental Normal           Cardiovascular:        Denies MI.  Denies CAD.     Denies Dysrhythmias.       hyperlipidemia MONROY  ECG has been reviewed. 2019 had Echo w nml EF and neg stress test. EKG 2020 sinus arrhythmia. Last cards visit 2/2022 chronic chest pain "stable". F/U 3-6 mos. Pt denies " recent CP and SOB. EKG currently SR w PACs.                           Pulmonary:         Pt denies h/o MARC                Renal/:  Renal/ Normal                 Hepatic/GI:  Bowel Prep.      H/o gastritis, diarrhea              Musculoskeletal:  Arthritis   Statin myopathy             Endocrine:     Hyperparathyroidism       Obesity / BMI > 30  Dermatological:  Skin Normal    Psych:  Psychiatric History  depression                Physical Exam  General: Well nourished, Cooperative, Alert and Oriented    Airway:  Mallampati: II   Mouth Opening: Normal  TM Distance: Normal  Tongue: Normal  Neck ROM: Normal ROM    Dental:  Caps / Implants, Intact        Anesthesia Plan  Type of Anesthesia, risks & benefits discussed:    Anesthesia Type: Gen Supraglottic Airway, Gen ETT, Gen Natural Airway  Intra-op Monitoring Plan: Standard ASA Monitors  Post Op Pain Control Plan: multimodal analgesia and IV/PO Opioids PRN  Induction:  IV  Informed Consent: Informed consent signed with the Patient and all parties understand the risks and agree with anesthesia plan.  All questions answered. Patient consented to blood products? No  ASA Score: 3  Day of Surgery Review of History & Physical: H&P Update referred to the surgeon/provider.    Ready For Surgery From Anesthesia Perspective.     .

## 2025-06-13 ENCOUNTER — TELEPHONE (OUTPATIENT)
Dept: UROLOGY | Facility: CLINIC | Age: 80
End: 2025-06-13
Payer: MEDICARE

## 2025-06-13 ENCOUNTER — TELEPHONE (OUTPATIENT)
Dept: PREADMISSION TESTING | Facility: HOSPITAL | Age: 80
End: 2025-06-13
Payer: MEDICARE

## 2025-06-13 NOTE — TELEPHONE ENCOUNTER
Called and LVM about the following:      Your Surgery arrival time is at 1:30 pm on 6/16/25 at [facility].   The address is [address] [address]. [place] [address].       *If you are running late or have questions the morning of surgery, please call the Pre-OP Department @ [phone].     Do not eat or drink after 12 midnight, including water. Do not smoke or use chewing tobacco after 12 midnight!  OK to brush teeth, but no gum, candy, or mints!      Additional Instructions:  You may be required to provide a urine sample prior to procedure;   Please ask  for a specimen cup if you need to use the restroom prior to being called into pre-op.     Please come to the main lobby and be prepared to show your photo ID and insurance card.       Only take specific medications discussed with the Pre-Admit Nurse.      Please call with any questions or concerns ([phone] or [phone])    [facility] Visitor/Ride Policy:   Adults:  Only 1 adult allowed (must be 18 or older) to accompany you and MUST STAY through the entire length of admission.     -Must have a ride home from a responsible adult that you know and trust.    -Medical Transport, Uber or Lyft can only be used if patient has a responsible adult to accompany them during ride home.    Pediatrics:  Pediatric patients are encouraged to have 2 adults (must be 18 or older) accompany them to the surgery center.   ~If the parent/legal guardian is unable to stay for the duration of the surgery time,   you MUST have someone over the age of 18 able to stay with the patient until time of discharge.     ~The parent/legal guardian must be available to be reached by phone at all times if they are unable to stay with the patient.

## 2025-06-13 NOTE — TELEPHONE ENCOUNTER
.Outgoing call placed to patient, patient verified name and date of birth, patient notified of her arrival time, confirmed location, that patient is not on any blood thinners or GLP-1 injections. No further assistance needed.       ----- Message from AIDAN Landin sent at 6/13/2025  2:11 PM CDT -----  Regarding: SURGERY 6/16  Good Afternoon,    Unable to reach Pt to review arrival time for surgery on Monday.  Arrival time: 1:30 pm    Thank you,    Urvashi ROY

## 2025-06-16 ENCOUNTER — ANESTHESIA (OUTPATIENT)
Dept: SURGERY | Facility: HOSPITAL | Age: 80
End: 2025-06-16
Payer: MEDICARE

## 2025-06-16 ENCOUNTER — HOSPITAL ENCOUNTER (OUTPATIENT)
Facility: HOSPITAL | Age: 80
Discharge: HOME | End: 2025-06-16
Attending: UROLOGY | Admitting: UROLOGY
Payer: MEDICARE

## 2025-06-16 VITALS
TEMPERATURE: 98 F | SYSTOLIC BLOOD PRESSURE: 126 MMHG | HEIGHT: 62 IN | BODY MASS INDEX: 31.87 KG/M2 | DIASTOLIC BLOOD PRESSURE: 58 MMHG | WEIGHT: 173.19 LBS | HEART RATE: 62 BPM | OXYGEN SATURATION: 100 % | RESPIRATION RATE: 16 BRPM

## 2025-06-16 DIAGNOSIS — N39.3 STRESS INCONTINENCE: ICD-10-CM

## 2025-06-16 DIAGNOSIS — N39.3 SUI (STRESS URINARY INCONTINENCE, FEMALE): Primary | ICD-10-CM

## 2025-06-16 PROCEDURE — 36000707: Performed by: UROLOGY

## 2025-06-16 PROCEDURE — 71000015 HC POSTOP RECOV 1ST HR: Performed by: UROLOGY

## 2025-06-16 PROCEDURE — 37000008 HC ANESTHESIA 1ST 15 MINUTES: Performed by: UROLOGY

## 2025-06-16 PROCEDURE — 63600175 PHARM REV CODE 636 W HCPCS: Performed by: NURSE ANESTHETIST, CERTIFIED REGISTERED

## 2025-06-16 PROCEDURE — 37000009 HC ANESTHESIA EA ADD 15 MINS: Performed by: UROLOGY

## 2025-06-16 PROCEDURE — 36000706: Performed by: UROLOGY

## 2025-06-16 PROCEDURE — L8606 SYNTHETIC IMPLNT URINARY 1ML: HCPCS | Performed by: UROLOGY

## 2025-06-16 PROCEDURE — 51715 ENDOSCOPIC INJECTION/IMPLANT: CPT | Mod: ,,, | Performed by: UROLOGY

## 2025-06-16 PROCEDURE — 71000033 HC RECOVERY, INTIAL HOUR: Performed by: UROLOGY

## 2025-06-16 DEVICE — SYS BULKAMID URETH BULKING 1ML: Type: IMPLANTABLE DEVICE | Site: URETHRA | Status: FUNCTIONAL

## 2025-06-16 RX ORDER — SODIUM CHLORIDE 9 MG/ML
INJECTION, SOLUTION INTRAVENOUS CONTINUOUS
Status: DISCONTINUED | OUTPATIENT
Start: 2025-06-16 | End: 2025-06-16 | Stop reason: HOSPADM

## 2025-06-16 RX ORDER — FENTANYL CITRATE 50 UG/ML
INJECTION, SOLUTION INTRAMUSCULAR; INTRAVENOUS
Status: DISCONTINUED | OUTPATIENT
Start: 2025-06-16 | End: 2025-06-16

## 2025-06-16 RX ORDER — PROPOFOL 10 MG/ML
VIAL (ML) INTRAVENOUS CONTINUOUS PRN
Status: DISCONTINUED | OUTPATIENT
Start: 2025-06-16 | End: 2025-06-16

## 2025-06-16 RX ORDER — DEXAMETHASONE SODIUM PHOSPHATE 4 MG/ML
INJECTION, SOLUTION INTRA-ARTICULAR; INTRALESIONAL; INTRAMUSCULAR; INTRAVENOUS; SOFT TISSUE
Status: DISCONTINUED | OUTPATIENT
Start: 2025-06-16 | End: 2025-06-16

## 2025-06-16 RX ORDER — OXYCODONE AND ACETAMINOPHEN 5; 325 MG/1; MG/1
1 TABLET ORAL
Status: DISCONTINUED | OUTPATIENT
Start: 2025-06-16 | End: 2025-06-16 | Stop reason: HOSPADM

## 2025-06-16 RX ORDER — LIDOCAINE HYDROCHLORIDE 20 MG/ML
JELLY TOPICAL
Status: DISCONTINUED
Start: 2025-06-16 | End: 2025-06-16 | Stop reason: HOSPADM

## 2025-06-16 RX ORDER — LIDOCAINE HYDROCHLORIDE 20 MG/ML
INJECTION, SOLUTION EPIDURAL; INFILTRATION; INTRACAUDAL; PERINEURAL
Status: DISCONTINUED | OUTPATIENT
Start: 2025-06-16 | End: 2025-06-16

## 2025-06-16 RX ORDER — PROPOFOL 10 MG/ML
VIAL (ML) INTRAVENOUS
Status: DISCONTINUED | OUTPATIENT
Start: 2025-06-16 | End: 2025-06-16

## 2025-06-16 RX ORDER — FENTANYL CITRATE 50 UG/ML
25 INJECTION, SOLUTION INTRAMUSCULAR; INTRAVENOUS EVERY 5 MIN PRN
Status: DISCONTINUED | OUTPATIENT
Start: 2025-06-16 | End: 2025-06-16 | Stop reason: HOSPADM

## 2025-06-16 RX ORDER — ONDANSETRON HYDROCHLORIDE 2 MG/ML
4 INJECTION, SOLUTION INTRAVENOUS DAILY PRN
Status: DISCONTINUED | OUTPATIENT
Start: 2025-06-16 | End: 2025-06-16 | Stop reason: HOSPADM

## 2025-06-16 RX ORDER — ONDANSETRON HYDROCHLORIDE 2 MG/ML
INJECTION, SOLUTION INTRAVENOUS
Status: DISCONTINUED | OUTPATIENT
Start: 2025-06-16 | End: 2025-06-16

## 2025-06-16 RX ORDER — PHENAZOPYRIDINE HYDROCHLORIDE 200 MG/1
200 TABLET, FILM COATED ORAL 3 TIMES DAILY PRN
Qty: 15 TABLET | Refills: 0 | Status: SHIPPED | OUTPATIENT
Start: 2025-06-16 | End: 2025-06-26

## 2025-06-16 RX ORDER — GLUCAGON 1 MG
1 KIT INJECTION
Status: DISCONTINUED | OUTPATIENT
Start: 2025-06-16 | End: 2025-06-16 | Stop reason: HOSPADM

## 2025-06-16 RX ORDER — CEFAZOLIN SODIUM 1 G/3ML
INJECTION, POWDER, FOR SOLUTION INTRAMUSCULAR; INTRAVENOUS
Status: DISCONTINUED | OUTPATIENT
Start: 2025-06-16 | End: 2025-06-16

## 2025-06-16 RX ORDER — HYDROMORPHONE HYDROCHLORIDE 2 MG/ML
0.2 INJECTION, SOLUTION INTRAMUSCULAR; INTRAVENOUS; SUBCUTANEOUS EVERY 5 MIN PRN
Status: DISCONTINUED | OUTPATIENT
Start: 2025-06-16 | End: 2025-06-16 | Stop reason: HOSPADM

## 2025-06-16 RX ORDER — PHENYLEPHRINE HYDROCHLORIDE 10 MG/ML
INJECTION INTRAVENOUS
Status: DISCONTINUED | OUTPATIENT
Start: 2025-06-16 | End: 2025-06-16

## 2025-06-16 RX ADMIN — PHENYLEPHRINE HYDROCHLORIDE 100 MCG: 10 INJECTION INTRAVENOUS at 02:06

## 2025-06-16 RX ADMIN — PROPOFOL 100 MCG/KG/MIN: 10 INJECTION, EMULSION INTRAVENOUS at 02:06

## 2025-06-16 RX ADMIN — ONDANSETRON 4 MG: 2 INJECTION INTRAMUSCULAR; INTRAVENOUS at 02:06

## 2025-06-16 RX ADMIN — PHENYLEPHRINE HYDROCHLORIDE 100 MCG: 10 INJECTION INTRAVENOUS at 03:06

## 2025-06-16 RX ADMIN — SODIUM CHLORIDE, POTASSIUM CHLORIDE, SODIUM LACTATE AND CALCIUM CHLORIDE: 600; 310; 30; 20 INJECTION, SOLUTION INTRAVENOUS at 02:06

## 2025-06-16 RX ADMIN — DEXAMETHASONE SODIUM PHOSPHATE 4 MG: 4 INJECTION, SOLUTION INTRA-ARTICULAR; INTRALESIONAL; INTRAMUSCULAR; INTRAVENOUS; SOFT TISSUE at 02:06

## 2025-06-16 RX ADMIN — CEFAZOLIN 2 G: 330 INJECTION, POWDER, FOR SOLUTION INTRAMUSCULAR; INTRAVENOUS at 02:06

## 2025-06-16 RX ADMIN — FENTANYL CITRATE 50 MCG: 50 INJECTION, SOLUTION INTRAMUSCULAR; INTRAVENOUS at 02:06

## 2025-06-16 RX ADMIN — LIDOCAINE HYDROCHLORIDE 70 MG: 20 INJECTION, SOLUTION EPIDURAL; INFILTRATION; INTRACAUDAL; PERINEURAL at 02:06

## 2025-06-16 RX ADMIN — PROPOFOL 20 MG: 10 INJECTION, EMULSION INTRAVENOUS at 02:06

## 2025-06-16 RX ADMIN — PROPOFOL 20 MG: 10 INJECTION, EMULSION INTRAVENOUS at 03:06

## 2025-06-16 RX ADMIN — PROPOFOL 30 MG: 10 INJECTION, EMULSION INTRAVENOUS at 02:06

## 2025-06-16 NOTE — OP NOTE
Date: 06/16/2025      Procedure: Cystoscopy w urethral bulking injections    Surgeon: Jennifer Graves MD    Pre-op Diagnosis  Stress urinary incontinence      Post-op Diagnosis  Same    Estimated Blood Loss: 2cc    Drains: none    Complications: None    Specimens: No specimens collected during this procedure.    Implants: * No implants in log *    Disposition: PACU - hemodynamically stable.    Condition: stable    Indication for procedure:    80 y.o.yo female with h/o  stress urinary incontinence presents today for treatment with urethral bulking injections.     Procedure in detail:    Informed consent was obtained. The patient was premedicated and brought back to the operative suite. They were placed on the cystoscopy table in the supine position. Sequential compression devices were placed on her lower extremities bilaterally. The patient underwent anesthesia. They were then placed in the dorsal lithotomy position and all pressure points were padded. The genitalia was prepped and draped in the usual sterile fashion. A formal timeout was performed. I began the procedure by advancing the Bulkamid 0 degree scope with the sheath into the urethra. Systematic review was performed of the bladder revealing no stone, foreign body or tumor. Bilateral ureteral orifices were visualized and noted to be effluxing clear urine. The Bulkamid needle was then advanced into the rotatable sheath until the tip of the sheath was adjacent to the bladder neck. The sheath was then rotated to the 7 o'clock position. The needle was then extended into the bladder until the 2cm melvin on the needle was visible. The Bulkamid system was then retracted until the tip of the needle was resting on the bladder neck. The needle was then retracted into the sheath and approximately 1.5cm from the bladder neck the Bulkamid system was pressed parallel against the urethral wall and the needle was advanced into the submucosal tissue, ensuring that the bevel  of the needle was facing towards the lumen. The needle was advanced approximately 0.5cm, and the Bulkamid hydrogel was then injected until the Bulkamid cushion was visible and reached the midline of the urethral lumen. The needle was then retracted back into the rotatable sheath and rotated to the next injection site. Subsequent injections were performed at 5 o'clock, 2 o'clock and 10 o'clock all along the same plane as the original injection until all 4 cushions met at the midline of the urethral lumen. 2mLs total of Bulkamid Hydrogel was used. The bladder was left full at the conclusion of the procedure. The patient tolerated the procedure well and awakened in good condition.

## 2025-06-16 NOTE — TRANSFER OF CARE
"Anesthesia Transfer of Care Note    Patient: Jessica Perez    Procedure(s) Performed: Procedure(s) (LRB):  CYSTOSCOPY, WITH PERIURETHRAL BULKING AGENT INJECTION (N/A)    Patient location: PACU    Anesthesia Type: general    Transport from OR: Transported from OR on room air with adequate spontaneous ventilation    Post pain: adequate analgesia    Post assessment: no apparent anesthetic complications and tolerated procedure well    Post vital signs: stable    Level of consciousness: awake    Nausea/Vomiting: no nausea/vomiting    Complications: none    Transfer of care protocol was followed      Last vitals: Visit Vitals  /64 (BP Location: Right arm, Patient Position: Sitting)   Pulse 83   Temp (P) 36.4 °C (97.5 °F) (Temporal)   Resp 18   Ht 5' 2" (1.575 m)   Wt 78.5 kg (173 lb 2.7 oz)   SpO2 96%   Breastfeeding No   BMI 31.67 kg/m²     "

## 2025-06-16 NOTE — ANESTHESIA POSTPROCEDURE EVALUATION
Anesthesia Post Evaluation    Patient: Jessica Perez    Procedure(s) Performed: Procedure(s) (LRB):  CYSTOSCOPY, WITH PERIURETHRAL BULKING AGENT INJECTION (N/A)    Final Anesthesia Type: general      Patient location during evaluation: PACU  Patient participation: Yes- Able to Participate  Level of consciousness: awake  Post-procedure vital signs: reviewed and stable  Pain management: adequate  Airway patency: patent    PONV status at discharge: No PONV  Anesthetic complications: no      Cardiovascular status: stable  Respiratory status: unassisted  Hydration status: euvolemic  Follow-up not needed.              Vitals Value Taken Time   /58 06/16/25 15:38   Temp 36.4 °C (97.5 °F) 06/16/25 15:14   Pulse 60 06/16/25 15:40   Resp 16 06/16/25 15:40   SpO2 100 % 06/16/25 15:40   Vitals shown include unfiled device data.      No case tracking events are documented in the log.      Pain/Marj Score: Marj Score: 10 (6/16/2025  3:29 PM)

## 2025-06-17 ENCOUNTER — TELEPHONE (OUTPATIENT)
Dept: UROLOGY | Facility: CLINIC | Age: 80
End: 2025-06-17
Payer: MEDICARE

## 2025-06-17 NOTE — TELEPHONE ENCOUNTER
.Outgoing call placed to patient, patient verified name and date of birth, patient stated she is doing well today, notified of below, verbalized understanding and appt scheduled.    ----- Message from Jennifer Graves MD sent at 6/16/2025  3:25 PM CDT -----  Please schedule a 2 month follow up.

## 2025-07-28 ENCOUNTER — TELEPHONE (OUTPATIENT)
Dept: INTERNAL MEDICINE | Facility: CLINIC | Age: 80
End: 2025-07-28
Payer: MEDICARE

## 2025-07-28 NOTE — TELEPHONE ENCOUNTER
Call patient and let her know the [facility]pharmacy is trying to contact her about her repatha refill.  Connect her to the specialty pharmacy if you make contact so she can speak to them.

## 2025-07-30 DIAGNOSIS — Z78.0 MENOPAUSE: ICD-10-CM

## 2025-08-18 ENCOUNTER — OFFICE VISIT (OUTPATIENT)
Dept: UROLOGY | Facility: CLINIC | Age: 80
End: 2025-08-18
Payer: MEDICARE

## 2025-08-18 VITALS
RESPIRATION RATE: 18 BRPM | WEIGHT: 175.5 LBS | SYSTOLIC BLOOD PRESSURE: 125 MMHG | DIASTOLIC BLOOD PRESSURE: 80 MMHG | BODY MASS INDEX: 32.3 KG/M2 | HEIGHT: 62 IN | HEART RATE: 84 BPM

## 2025-08-18 DIAGNOSIS — N39.46 MIXED INCONTINENCE: ICD-10-CM

## 2025-08-18 DIAGNOSIS — N30.01 ACUTE CYSTITIS WITH HEMATURIA: Primary | ICD-10-CM

## 2025-08-18 DIAGNOSIS — R31.29 MICROHEMATURIA: ICD-10-CM

## 2025-08-18 LAB — POC RESIDUAL URINE VOLUME: 30 ML (ref 0–100)

## 2025-08-18 PROCEDURE — 99999 PR PBB SHADOW E&M-EST. PATIENT-LVL IV: CPT | Mod: PBBFAC,,, | Performed by: UROLOGY

## 2025-08-18 PROCEDURE — 3288F FALL RISK ASSESSMENT DOCD: CPT | Mod: CPTII,S$GLB,, | Performed by: UROLOGY

## 2025-08-18 PROCEDURE — 99214 OFFICE O/P EST MOD 30 MIN: CPT | Mod: S$GLB,,, | Performed by: UROLOGY

## 2025-08-18 PROCEDURE — 1101F PT FALLS ASSESS-DOCD LE1/YR: CPT | Mod: CPTII,S$GLB,, | Performed by: UROLOGY

## 2025-08-18 PROCEDURE — 3074F SYST BP LT 130 MM HG: CPT | Mod: CPTII,S$GLB,, | Performed by: UROLOGY

## 2025-08-18 PROCEDURE — 1159F MED LIST DOCD IN RCRD: CPT | Mod: CPTII,S$GLB,, | Performed by: UROLOGY

## 2025-08-18 PROCEDURE — 3079F DIAST BP 80-89 MM HG: CPT | Mod: CPTII,S$GLB,, | Performed by: UROLOGY

## 2025-08-18 PROCEDURE — 51798 US URINE CAPACITY MEASURE: CPT | Mod: S$GLB,,, | Performed by: UROLOGY

## 2025-08-18 PROCEDURE — 1126F AMNT PAIN NOTED NONE PRSNT: CPT | Mod: CPTII,S$GLB,, | Performed by: UROLOGY

## 2025-08-18 PROCEDURE — 87186 SC STD MICRODIL/AGAR DIL: CPT | Performed by: UROLOGY

## 2025-08-18 RX ORDER — CEFDINIR 300 MG/1
300 CAPSULE ORAL 2 TIMES DAILY
Qty: 14 CAPSULE | Refills: 0 | Status: SHIPPED | OUTPATIENT
Start: 2025-08-18 | End: 2025-08-25

## 2025-08-18 RX ORDER — VIBEGRON 75 MG/1
75 TABLET, FILM COATED ORAL DAILY
Qty: 30 TABLET | Refills: 3 | Status: SHIPPED | OUTPATIENT
Start: 2025-08-18

## 2025-08-21 LAB — BACTERIA UR CULT: ABNORMAL

## (undated) DEVICE — GOWN POLY REINF BRTH SLV XL

## (undated) DEVICE — KIT TURNOVER

## (undated) DEVICE — DRAPE T CYSTOSCOPY STERILE

## (undated) DEVICE — GLOVE SURGICAL LATEX SZ 6.5

## (undated) DEVICE — SET IRRIGATION WARMING NORMAL

## (undated) DEVICE — SPONGE COTTON TRAY 4X4IN

## (undated) DEVICE — TRAY SKIN SCRUB WET PREMIUM

## (undated) DEVICE — TOWEL OR DISP STRL BLUE 4/PK

## (undated) DEVICE — LEGGING CLEAR POLY 2/PACK

## (undated) DEVICE — SOL NORMAL USPCA 0.9%

## (undated) DEVICE — SET IRR URLGY 2LINE UNIV SPIKE

## (undated) DEVICE — COVER TABLE HVY DTY 60X90IN

## (undated) DEVICE — BOWL STERILE LARGE 32OZ

## (undated) DEVICE — GOWN SMARTGOWN LVL4 X-LONG XL

## (undated) DEVICE — UROVIEW 2600/2800